# Patient Record
Sex: MALE | Race: OTHER | HISPANIC OR LATINO | Employment: OTHER | URBAN - METROPOLITAN AREA
[De-identification: names, ages, dates, MRNs, and addresses within clinical notes are randomized per-mention and may not be internally consistent; named-entity substitution may affect disease eponyms.]

---

## 2017-02-13 ENCOUNTER — HOSPITAL ENCOUNTER (EMERGENCY)
Facility: HOSPITAL | Age: 52
Discharge: HOME | End: 2017-02-13
Attending: EMERGENCY MEDICINE | Admitting: EMERGENCY MEDICINE
Payer: COMMERCIAL

## 2017-02-13 ENCOUNTER — APPOINTMENT (EMERGENCY)
Dept: RADIOLOGY | Facility: HOSPITAL | Age: 52
End: 2017-02-13
Payer: COMMERCIAL

## 2017-02-13 VITALS
OXYGEN SATURATION: 98 % | SYSTOLIC BLOOD PRESSURE: 130 MMHG | WEIGHT: 202.82 LBS | RESPIRATION RATE: 18 BRPM | DIASTOLIC BLOOD PRESSURE: 75 MMHG | HEART RATE: 95 BPM | TEMPERATURE: 98.5 F

## 2017-02-13 DIAGNOSIS — J45.901 ASTHMA EXACERBATION: Primary | ICD-10-CM

## 2017-02-13 DIAGNOSIS — J06.9 ACUTE UPPER RESPIRATORY INFECTION: ICD-10-CM

## 2017-02-13 PROCEDURE — 94640 AIRWAY INHALATION TREATMENT: CPT

## 2017-02-13 PROCEDURE — 99285 EMERGENCY DEPT VISIT HI MDM: CPT

## 2017-02-13 PROCEDURE — 71020 HB CHEST X-RAY 2VW FRONTAL&LATL: CPT

## 2017-02-13 RX ORDER — PREDNISONE 20 MG/1
60 TABLET ORAL DAILY
Qty: 12 TABLET | Refills: 0 | Status: SHIPPED | OUTPATIENT
Start: 2017-02-13 | End: 2017-02-17

## 2017-02-13 RX ORDER — IPRATROPIUM BROMIDE AND ALBUTEROL SULFATE 2.5; .5 MG/3ML; MG/3ML
SOLUTION RESPIRATORY (INHALATION)
Status: COMPLETED
Start: 2017-02-13 | End: 2017-02-13

## 2017-02-13 RX ORDER — METHYLPREDNISOLONE SODIUM SUCCINATE 125 MG/2ML
INJECTION, POWDER, LYOPHILIZED, FOR SOLUTION INTRAMUSCULAR; INTRAVENOUS
Status: COMPLETED
Start: 2017-02-13 | End: 2017-02-13

## 2017-02-13 RX ADMIN — IPRATROPIUM BROMIDE 0.5 MG: 0.5 SOLUTION RESPIRATORY (INHALATION) at 10:55

## 2017-02-13 RX ADMIN — ALBUTEROL SULFATE 5 MG: 2.5 SOLUTION RESPIRATORY (INHALATION) at 10:54

## 2017-04-05 ENCOUNTER — HOSPITAL ENCOUNTER (EMERGENCY)
Facility: HOSPITAL | Age: 52
Discharge: HOME | End: 2017-04-05
Attending: EMERGENCY MEDICINE | Admitting: EMERGENCY MEDICINE
Payer: COMMERCIAL

## 2017-04-05 VITALS
TEMPERATURE: 97.7 F | WEIGHT: 195 LBS | SYSTOLIC BLOOD PRESSURE: 121 MMHG | RESPIRATION RATE: 17 BRPM | DIASTOLIC BLOOD PRESSURE: 72 MMHG | OXYGEN SATURATION: 96 % | HEART RATE: 92 BPM

## 2017-04-05 DIAGNOSIS — J45.901 ASTHMA EXACERBATION: Primary | ICD-10-CM

## 2017-04-05 PROCEDURE — 94640 AIRWAY INHALATION TREATMENT: CPT

## 2017-04-05 PROCEDURE — 99285 EMERGENCY DEPT VISIT HI MDM: CPT

## 2017-04-05 RX ORDER — PREDNISONE 20 MG/1
60 TABLET ORAL DAILY
Qty: 12 TABLET | Refills: 0 | Status: SHIPPED | OUTPATIENT
Start: 2017-04-05 | End: 2017-04-05

## 2017-04-05 RX ORDER — PREDNISONE 20 MG/1
60 TABLET ORAL DAILY
Qty: 12 TABLET | Refills: 0 | Status: SHIPPED | OUTPATIENT
Start: 2017-04-05 | End: 2017-04-05 | Stop reason: ALTCHOICE

## 2017-04-05 RX ORDER — LANOLIN ALCOHOL/MO/W.PET/CERES
3 CREAM (GRAM) TOPICAL
COMMUNITY
End: 2018-08-04

## 2017-04-05 RX ORDER — HYDROCODONE BITARTRATE AND ACETAMINOPHEN 5; 325 MG/1; MG/1
1 TABLET ORAL EVERY 4 HOURS PRN
COMMUNITY
End: 2018-05-24

## 2017-04-05 RX ORDER — PREDNISONE 20 MG/1
60 TABLET ORAL ONCE
Status: COMPLETED | OUTPATIENT
Start: 2017-04-05 | End: 2017-04-05

## 2017-04-05 RX ORDER — OMEGA-3-ACID ETHYL ESTERS 1 G/1
1 CAPSULE, LIQUID FILLED ORAL DAILY
COMMUNITY

## 2017-04-05 RX ADMIN — PREDNISONE 60 MG: 20 TABLET ORAL at 07:14

## 2017-04-05 RX ADMIN — IPRATROPIUM BROMIDE 0.5 MG: 0.5 SOLUTION RESPIRATORY (INHALATION) at 07:14

## 2017-04-05 RX ADMIN — ALBUTEROL SULFATE 5 MG: 2.5 SOLUTION RESPIRATORY (INHALATION) at 07:14

## 2017-07-18 ENCOUNTER — HOSPITAL ENCOUNTER (EMERGENCY)
Facility: HOSPITAL | Age: 52
Discharge: HOME | End: 2017-07-18
Attending: EMERGENCY MEDICINE | Admitting: EMERGENCY MEDICINE
Payer: COMMERCIAL

## 2017-07-18 ENCOUNTER — APPOINTMENT (EMERGENCY)
Dept: RADIOLOGY | Facility: HOSPITAL | Age: 52
End: 2017-07-18
Payer: COMMERCIAL

## 2017-07-18 VITALS
HEART RATE: 81 BPM | DIASTOLIC BLOOD PRESSURE: 95 MMHG | OXYGEN SATURATION: 100 % | WEIGHT: 203.04 LBS | TEMPERATURE: 98.3 F | RESPIRATION RATE: 18 BRPM | SYSTOLIC BLOOD PRESSURE: 144 MMHG

## 2017-07-18 DIAGNOSIS — J45.901 ASTHMA EXACERBATION: Primary | ICD-10-CM

## 2017-07-18 LAB
ANION GAP SERPL CALCULATED.3IONS-SCNC: 8 MMOL/L (ref 4–13)
ATRIAL RATE: 76 BPM
BASOPHILS # BLD AUTO: 0.02 THOUSANDS/ΜL (ref 0–0.1)
BASOPHILS NFR BLD AUTO: 0 % (ref 0–1)
BUN SERPL-MCNC: 15 MG/DL (ref 5–25)
CALCIUM SERPL-MCNC: 8.6 MG/DL (ref 8.3–10.1)
CHLORIDE SERPL-SCNC: 103 MMOL/L (ref 100–108)
CO2 SERPL-SCNC: 29 MMOL/L (ref 21–32)
CREAT SERPL-MCNC: 1.25 MG/DL (ref 0.6–1.3)
EOSINOPHIL # BLD AUTO: 0.26 THOUSAND/ΜL (ref 0–0.61)
EOSINOPHIL NFR BLD AUTO: 4 % (ref 0–6)
ERYTHROCYTE [DISTWIDTH] IN BLOOD BY AUTOMATED COUNT: 12.8 % (ref 11.6–15.1)
GFR SERPL CREATININE-BSD FRML MDRD: >60 ML/MIN/1.73SQ M
GLUCOSE SERPL-MCNC: 318 MG/DL (ref 65–140)
HCT VFR BLD AUTO: 40 % (ref 36.5–49.3)
HGB BLD-MCNC: 14.3 G/DL (ref 12–17)
LYMPHOCYTES # BLD AUTO: 2.38 THOUSANDS/ΜL (ref 0.6–4.47)
LYMPHOCYTES NFR BLD AUTO: 34 % (ref 14–44)
MCH RBC QN AUTO: 29.6 PG (ref 26.8–34.3)
MCHC RBC AUTO-ENTMCNC: 35.8 G/DL (ref 31.4–37.4)
MCV RBC AUTO: 83 FL (ref 82–98)
MONOCYTES # BLD AUTO: 0.61 THOUSAND/ΜL (ref 0.17–1.22)
MONOCYTES NFR BLD AUTO: 9 % (ref 4–12)
NEUTROPHILS # BLD AUTO: 3.83 THOUSANDS/ΜL (ref 1.85–7.62)
NEUTS SEG NFR BLD AUTO: 53 % (ref 43–75)
NRBC BLD AUTO-RTO: 0 /100 WBCS
P AXIS: 66 DEGREES
PLATELET # BLD AUTO: 231 THOUSANDS/UL (ref 149–390)
PMV BLD AUTO: 9.7 FL (ref 8.9–12.7)
POTASSIUM SERPL-SCNC: 3.7 MMOL/L (ref 3.5–5.3)
PR INTERVAL: 164 MS
QRS AXIS: 69 DEGREES
QRSD INTERVAL: 82 MS
QT INTERVAL: 360 MS
QTC INTERVAL: 405 MS
RBC # BLD AUTO: 4.83 MILLION/UL (ref 3.88–5.62)
SODIUM SERPL-SCNC: 140 MMOL/L (ref 136–145)
T WAVE AXIS: 62 DEGREES
VENTRICULAR RATE: 76 BPM
WBC # BLD AUTO: 7.1 THOUSAND/UL (ref 4.31–10.16)

## 2017-07-18 PROCEDURE — 94640 AIRWAY INHALATION TREATMENT: CPT

## 2017-07-18 PROCEDURE — 80048 BASIC METABOLIC PNL TOTAL CA: CPT | Performed by: EMERGENCY MEDICINE

## 2017-07-18 PROCEDURE — 36415 COLL VENOUS BLD VENIPUNCTURE: CPT | Performed by: EMERGENCY MEDICINE

## 2017-07-18 PROCEDURE — 93005 ELECTROCARDIOGRAM TRACING: CPT

## 2017-07-18 PROCEDURE — 85025 COMPLETE CBC W/AUTO DIFF WBC: CPT | Performed by: EMERGENCY MEDICINE

## 2017-07-18 PROCEDURE — 99285 EMERGENCY DEPT VISIT HI MDM: CPT

## 2017-07-18 PROCEDURE — 93005 ELECTROCARDIOGRAM TRACING: CPT | Performed by: EMERGENCY MEDICINE

## 2017-07-18 PROCEDURE — 71020 HB CHEST X-RAY 2VW FRONTAL&LATL: CPT

## 2017-07-18 PROCEDURE — 96360 HYDRATION IV INFUSION INIT: CPT

## 2017-07-18 RX ORDER — PREDNISONE 20 MG/1
50 TABLET ORAL DAILY
Qty: 10 TABLET | Refills: 0 | Status: SHIPPED | OUTPATIENT
Start: 2017-07-18 | End: 2017-07-22

## 2017-07-18 RX ORDER — ALBUTEROL SULFATE 2.5 MG/3ML
5 SOLUTION RESPIRATORY (INHALATION) ONCE
Status: COMPLETED | OUTPATIENT
Start: 2017-07-18 | End: 2017-07-18

## 2017-07-18 RX ADMIN — IPRATROPIUM BROMIDE 0.5 MG: 0.5 SOLUTION RESPIRATORY (INHALATION) at 01:38

## 2017-07-18 RX ADMIN — SODIUM CHLORIDE 1000 ML: 0.9 INJECTION, SOLUTION INTRAVENOUS at 01:41

## 2017-07-18 RX ADMIN — ALBUTEROL SULFATE 5 MG: 2.5 SOLUTION RESPIRATORY (INHALATION) at 01:38

## 2017-09-09 ENCOUNTER — HOSPITAL ENCOUNTER (EMERGENCY)
Facility: HOSPITAL | Age: 52
Discharge: HOME | End: 2017-09-09
Attending: EMERGENCY MEDICINE | Admitting: EMERGENCY MEDICINE
Payer: COMMERCIAL

## 2017-09-09 VITALS
RESPIRATION RATE: 16 BRPM | WEIGHT: 195.77 LBS | SYSTOLIC BLOOD PRESSURE: 151 MMHG | DIASTOLIC BLOOD PRESSURE: 84 MMHG | HEART RATE: 94 BPM | OXYGEN SATURATION: 98 % | TEMPERATURE: 98.6 F

## 2017-09-09 DIAGNOSIS — J45.51 ACUTE EXACERBATION OF SEVERE PERSISTENT EXTRINSIC ASTHMA: Primary | ICD-10-CM

## 2017-09-09 PROCEDURE — 94644 CONT INHLJ TX 1ST HOUR: CPT

## 2017-09-09 PROCEDURE — 99285 EMERGENCY DEPT VISIT HI MDM: CPT

## 2017-09-09 PROCEDURE — 96374 THER/PROPH/DIAG INJ IV PUSH: CPT

## 2017-09-09 RX ORDER — PREDNISONE 20 MG/1
20 TABLET ORAL 2 TIMES DAILY
Qty: 10 TABLET | Refills: 0 | Status: SHIPPED | OUTPATIENT
Start: 2017-09-09 | End: 2018-10-27 | Stop reason: HOSPADM

## 2017-09-09 RX ORDER — METHYLPREDNISOLONE SODIUM SUCCINATE 125 MG/2ML
125 INJECTION, POWDER, LYOPHILIZED, FOR SOLUTION INTRAMUSCULAR; INTRAVENOUS ONCE
Status: COMPLETED | OUTPATIENT
Start: 2017-09-09 | End: 2017-09-09

## 2017-09-09 RX ORDER — ALBUTEROL SULFATE 2.5 MG/3ML
10 SOLUTION RESPIRATORY (INHALATION) ONCE
Status: COMPLETED | OUTPATIENT
Start: 2017-09-09 | End: 2017-09-09

## 2017-09-09 RX ADMIN — IPRATROPIUM BROMIDE 1 MG: 0.5 SOLUTION RESPIRATORY (INHALATION) at 11:39

## 2017-09-09 RX ADMIN — METHYLPREDNISOLONE SODIUM SUCCINATE 125 MG: 125 INJECTION, POWDER, FOR SOLUTION INTRAMUSCULAR; INTRAVENOUS at 11:30

## 2017-09-09 RX ADMIN — ALBUTEROL SULFATE 10 MG: 2.5 SOLUTION RESPIRATORY (INHALATION) at 11:39

## 2017-10-31 ENCOUNTER — APPOINTMENT (EMERGENCY)
Dept: RADIOLOGY | Facility: HOSPITAL | Age: 52
End: 2017-10-31
Payer: COMMERCIAL

## 2017-10-31 ENCOUNTER — HOSPITAL ENCOUNTER (EMERGENCY)
Facility: HOSPITAL | Age: 52
Discharge: HOME | End: 2017-10-31
Attending: EMERGENCY MEDICINE | Admitting: EMERGENCY MEDICINE
Payer: COMMERCIAL

## 2017-10-31 VITALS
DIASTOLIC BLOOD PRESSURE: 82 MMHG | SYSTOLIC BLOOD PRESSURE: 150 MMHG | RESPIRATION RATE: 16 BRPM | TEMPERATURE: 97.7 F | WEIGHT: 197.2 LBS | HEART RATE: 84 BPM | OXYGEN SATURATION: 97 %

## 2017-10-31 DIAGNOSIS — M25.561 RIGHT KNEE PAIN: Primary | ICD-10-CM

## 2017-10-31 PROCEDURE — 96372 THER/PROPH/DIAG INJ SC/IM: CPT

## 2017-10-31 PROCEDURE — 73564 X-RAY EXAM KNEE 4 OR MORE: CPT

## 2017-10-31 PROCEDURE — 99283 EMERGENCY DEPT VISIT LOW MDM: CPT

## 2017-10-31 RX ORDER — MORPHINE SULFATE 15 MG/1
7.5 TABLET ORAL EVERY 6 HOURS PRN
Qty: 4 TABLET | Refills: 0 | Status: SHIPPED | OUTPATIENT
Start: 2017-10-31 | End: 2017-11-02

## 2017-10-31 RX ORDER — KETOROLAC TROMETHAMINE 30 MG/ML
30 INJECTION, SOLUTION INTRAMUSCULAR; INTRAVENOUS ONCE
Status: COMPLETED | OUTPATIENT
Start: 2017-10-31 | End: 2017-10-31

## 2017-10-31 RX ORDER — ACETAMINOPHEN 325 MG/1
650 TABLET ORAL EVERY 6 HOURS PRN
Qty: 30 TABLET | Refills: 0 | Status: SHIPPED | OUTPATIENT
Start: 2017-10-31 | End: 2018-07-22

## 2017-10-31 RX ORDER — NAPROXEN 375 MG/1
375 TABLET ORAL 2 TIMES DAILY WITH MEALS
Qty: 20 TABLET | Refills: 0 | Status: SHIPPED | OUTPATIENT
Start: 2017-10-31 | End: 2018-10-27 | Stop reason: HOSPADM

## 2017-10-31 RX ORDER — ACETAMINOPHEN 325 MG/1
975 TABLET ORAL ONCE
Status: COMPLETED | OUTPATIENT
Start: 2017-10-31 | End: 2017-10-31

## 2017-10-31 RX ADMIN — KETOROLAC TROMETHAMINE 30 MG: 30 INJECTION, SOLUTION INTRAMUSCULAR at 12:08

## 2017-10-31 RX ADMIN — ACETAMINOPHEN 975 MG: 325 TABLET, FILM COATED ORAL at 12:08

## 2017-10-31 NOTE — DISCHARGE INSTRUCTIONS

## 2017-10-31 NOTE — ED PROVIDER NOTES
Final Diagnosis:  1  Right knee pain      Chief Complaint   Patient presents with    Knee Pain     Reports to have R knee pain and weakness that started yesterday  Denies trauma  This is a 47year old male presenting for right knee pain  About 2 days ago he woke up and had right knee pain  Denies falls, trauma  Denies being on his hands and knees a lot  Pain is worse with movement, worse with bending  Has no pain if perfectly still  Denies f/ch/n/v/cp/sob  Denies dizziness/LH  Recently treated with a course of abx for a URI  He states that his breathing / URI symptoms have resolved  The only thing bothering him now is his knee  PMH:  - Asthma  - HLD  - Seizures  - DM  - Prostate enlargement  PSH:  - Knee surgery (left)  No smoking, drinking, drugs  PE:   Vitals:    10/31/17 1100   BP: 150/82   Pulse: 84   Resp: 16   Temp: 97 7 °F (36 5 °C)   TempSrc: Oral   SpO2: 97%   Weight: 89 4 kg (197 lb 3 2 oz)   General: VSS, NAD, awake, alert  Well-nourished, well-developed  Appears stated age  Speaking normally in full sentences  Head: Normocephalic, atraumatic, nontender  Eyes: PERRL, EOM-I  No diplopia  No hyphema  No subconjunctival hemorrhages  Symmetrical lids  ENT: Atraumatic external nose and ears  MMM  No malocclusion  No stridor  Normal phonation  No drooling  Normal swallowing  Neck: Symmetric, trachea midline  No JVD  CV: RRR  +S1/S2  No murmurs or gallops  Peripheral pulses +2 throughout  No chest wall tenderness  Lungs:   Unlabored No retractions  CTAB, lungs sounds equal bilateral    No tachypnea  Abd: +BS, soft, NT/ND    MSK:   RIGHT:  EHL/FHL/PF/DF/KF/KE/HF/HE 5/5  No saddle anesthesia  2+ patellar reflexes  Severe pain with flexion of the knee  There is tenderness of the posterior popliteal fossa with palpation  No tendon tenderness  No swelling, no cords  Knee: AD/PD/Varus/Valgus/Lachman 5/5 without demonstration of joint laxity   These don't cause pain  No signs of trauma  No effusion  Back:   No rashes  Skin: Dry, intact  Neuro: AAOx3, GCS 15, CN II-XII grossly intact  Motor grossly intact  Psychiatric/Behavioral: Appropriate mood and affect   Exam: deferred  A:  - Knee pain, atraumatic  P:  - XR for fluid, arthritic changes  - NSAIDs  - Short course of pain meds PRN  - f/u orthopedics   - He is walking with a cane and can do his ADLs which is good  - 13 point ROS was performed and all are normal unless stated in the history above  - Nursing note reviewed  Vitals reviewed  - Orders placed by myself and/or advanced practitioner / resident     - Previous chart was not reviewed  - No language barrier    - History obtained from patient  - There are no limitations to the history obtained  - Critical care time: Not applicable for this patient  ED Course as of Oct 31 1239   Tue Oct 31, 2017   1210 At [facility] now  1226 ACE WRAP Splint was placed by nursing  Examined by me post splint placement  Splint is in good position and neurovascular exam intact after splint placement  XR is normal without fx  Will do NSAIDs, short course of morphine for use at night when going to bed        1227  Aware:  - 01/24/2017 1 01/24/2017 ACETAMINOPHEN-COD #3 TABLET       Medications   ketorolac (TORADOL) 30 mg/mL injection 30 mg (30 mg Intramuscular Given 10/31/17 1208)   acetaminophen (TYLENOL) tablet 975 mg (975 mg Oral Given 10/31/17 1208)     XR knee 4+ vw right injury   ED Interpretation   The KNEE was ordered and interpreted by me independently  On my read, it appears normal without acute abnormalities      - Arthritic changes        Orders Placed This Encounter   Procedures    XR knee 4+ vw right injury    Nursing Communication ACE Wrap / cling wrap right knee     Labs Reviewed - No data to display  Time reflects when diagnosis was documented in both MDM as applicable and the Disposition within this note     Time User Action Codes Description Comment    10/31/2017 12:29 PM Loki Party Add [G45 [id number]] Right knee pain       ED Disposition     ED Disposition Condition Comment    Discharge  Annika Mendez discharge to home/self care  Condition at discharge: Good        Follow-up Information     Follow up With Specialties Details Why [facility] Emergency Department Emergency Medicine Go to If symptoms worsen [address] [facility]  [phone] [place] ED, [address] Laura Gutierrez , Malena Magdaleno MD Family Medicine Call in 1 day To make appointment for re-eval in 3-5 days [address] [facility]       Pranav Cox MD Orthopedic Surgery Call in 1 day To make appointment for re-evaluation in 1 week 300 [facility]  2nd [address] [facility] [address]           Patient's Medications   Discharge Prescriptions    ACETAMINOPHEN (TYLENOL) 325 MG TABLET    Take 2 tablets by mouth every 6 (six) hours as needed for mild pain or fever       Start Date: 10/31/2017End Date: --       Order Dose: 650 mg       Quantity: 30 tablet    Refills: 0    MORPHINE (MSIR) 15 MG TABLET    Take 0 5 tablets by mouth every 6 (six) hours as needed for severe pain (No driving while using ) for up to 2 days Max Daily Amount: 30 mg       Start Date: 10/31/2017End Date: 11/2/2017       Order Dose: 7 5 mg       Quantity: 4 tablet    Refills: 0    NAPROXEN (NAPROSYN) 375 MG TABLET    Take 1 tablet by mouth 2 (two) times a day with meals       Start Date: 10/31/2017End Date: --       Order Dose: 375 mg       Quantity: 20 tablet    Refills: 0     No discharge procedures on file  Prior to Admission Medications   Prescriptions Last Dose Informant Patient Reported? Taking?    Calcium Carb-Cholecalciferol (CALCIUM CARBONATE-VITAMIN D3 PO)   Yes Yes   Sig: Take 1,500 mg by mouth daily   HYDROcodone-acetaminophen (NORCO) 5-325 mg per tablet   Yes Yes   Sig: Take 1 tablet by mouth every 4 (four) hours as needed for pain   Mometasone Furo-Formoterol Fum (DULERA) 200-5 MCG/ACT AERO   Yes Yes   Si puffs   Multiple Vitamins-Minerals (CENTRUM SILVER PO)   Yes Yes   Si tablet daily   albuterol (2 5 mg/3 mL) 0 083 % nebulizer solution   Yes Yes   Sig: Inhale 2 5 mg as needed   albuterol (PROVENTIL HFA,VENTOLIN HFA) 90 mcg/act inhaler   Yes Yes   Sig: Inhale 2 puffs as needed   atorvastatin (LIPITOR) 80 mg tablet   Yes Yes   Sig: Take 80 mg by mouth daily at bedtime   beclomethasone (QVAR) 40 MCG/ACT inhaler   Yes Yes   Si puffs as needed     bimatoprost (LUMIGAN) 0 03 % ophthalmic drops   Yes Yes   Sig: daily   brimonidine (ALPHAGAN P) 0 15 % ophthalmic solution   Yes Yes   Sig: Apply to eye   carBAMazepine (TEGRETOL) 200 mg tablet   Yes Yes   Sig: Take 2 tabs in the morning and 3 tabs at night   finasteride (PROSCAR) 5 mg tablet   Yes Yes   Sig: Take 5 mg by mouth daily   fluticasone (FLONASE) 50 mcg/act nasal spray   Yes Yes   Sig: into each nostril daily   gabapentin (NEURONTIN) 300 mg capsule   Yes Yes   Sig: Take 300 mg by mouth 3 (three) times a day   glucagon 1 MG injection   Yes Yes   Sig: as needed   hydrOXYzine HCL (ATARAX) 25 mg tablet   Yes Yes   Sig: Take 25 mg by mouth every 6 (six) hours as needed   hydrocortisone (PROCTOZONE-HC) 2 5 % rectal cream   Yes Yes   Sig: Insert 2 5 % into the rectum 2 (two) times a day   insulin glargine (LANTUS) 100 units/mL subcutaneous injection   Yes Yes   Si Units   insulin lispro (HUMALOG KWIKPEN) 100 Units/mL SOPN   Yes Yes   Sig: Inject 10 units with breakfast, 10 units with lunch, 14 units with dinner; plus sliding scale (TDD= 55)   ipratropium (ATROVENT) 0 02 % nebulizer solution   Yes Yes   Sig: as needed   latanoprost (XALATAN) 0 005 % ophthalmic solution   Yes Yes   Sig: Apply 1 drop to eye   lisinopril (ZESTRIL) 5 mg tablet   Yes Yes   Si mg daily   loratadine (CLARITIN REDITABS) 10 MG dissolvable tablet Yes Yes   Sig: Take 10 mg by mouth daily   melatonin 3 mg   Yes Yes   Sig: Take 3 mg by mouth daily at bedtime   montelukast (SINGULAIR) 10 mg tablet   Yes Yes   Sig: 10 mg daily   omega-3-acid ethyl esters (LOVAZA) 1 g capsule   Yes Yes   Sig: Take 1 g by mouth daily   omeprazole (PriLOSEC) 40 MG capsule   Yes Yes   Sig: Take 40 mg by mouth daily   predniSONE 20 mg tablet   No Yes   Sig: Take 1 tablet by mouth 2 (two) times a day   tamsulosin (FLOMAX) 0 4 mg   Yes Yes   Sig: Take 0 4 mg by mouth daily   terbinafine (LamISIL) 1 % cream   Yes Yes   Sig: Apply topically      Facility-Administered Medications: None       Portions of the record may have been created with voice recognition software  Occasional wrong word or "sound a like" substitutions may have occurred due to the inherent limitations of voice recognition software  Read the chart carefully and recognize, using context, where substitutions have occurred      Electronically signed by:  Esau Garibay MD  10/31/17 [phone]

## 2017-10-31 NOTE — ED NOTES
Pt c/o right knee pain starting 2 days ago denies injury or trauma c/o pain radiating down to ankle reports tingling     Florentin Land RN  10/31/17 3304

## 2018-05-24 ENCOUNTER — HOSPITAL ENCOUNTER (EMERGENCY)
Facility: HOSPITAL | Age: 53
Discharge: HOME | End: 2018-05-24
Admitting: EMERGENCY MEDICINE
Payer: COMMERCIAL

## 2018-05-24 VITALS
SYSTOLIC BLOOD PRESSURE: 137 MMHG | RESPIRATION RATE: 16 BRPM | WEIGHT: 210.98 LBS | TEMPERATURE: 98 F | OXYGEN SATURATION: 99 % | DIASTOLIC BLOOD PRESSURE: 71 MMHG | HEART RATE: 98 BPM

## 2018-05-24 DIAGNOSIS — J45.41 MODERATE PERSISTENT ASTHMA WITH EXACERBATION: Primary | ICD-10-CM

## 2018-05-24 PROCEDURE — 99284 EMERGENCY DEPT VISIT MOD MDM: CPT

## 2018-05-24 PROCEDURE — 94644 CONT INHLJ TX 1ST HOUR: CPT

## 2018-05-24 RX ORDER — ALBUTEROL SULFATE 2.5 MG/3ML
SOLUTION RESPIRATORY (INHALATION)
Status: DISCONTINUED
Start: 2018-05-24 | End: 2018-05-24 | Stop reason: HOSPADM

## 2018-05-24 RX ORDER — INSULIN GLARGINE 100 [IU]/ML
40 INJECTION, SOLUTION SUBCUTANEOUS
COMMUNITY
End: 2019-04-08

## 2018-05-24 RX ORDER — SODIUM CHLORIDE FOR INHALATION 0.9 %
3 VIAL, NEBULIZER (ML) INHALATION ONCE
Status: DISCONTINUED | OUTPATIENT
Start: 2018-05-24 | End: 2018-05-24 | Stop reason: HOSPADM

## 2018-05-24 RX ORDER — ALBUTEROL SULFATE 2.5 MG/3ML
SOLUTION RESPIRATORY (INHALATION)
Status: COMPLETED
Start: 2018-05-24 | End: 2018-05-24

## 2018-05-24 RX ADMIN — IPRATROPIUM BROMIDE 1 MG: 0.5 SOLUTION RESPIRATORY (INHALATION) at 07:36

## 2018-05-24 RX ADMIN — ALBUTEROL SULFATE 10 MG: 2.5 SOLUTION RESPIRATORY (INHALATION) at 07:36

## 2018-05-24 NOTE — DISCHARGE INSTRUCTIONS
Asthma   WHAT YOU NEED TO KNOW:   What is asthma? Asthma is a lung disease that makes breathing difficult  Chronic inflammation and reactions to triggers narrow the airways in the lungs  Asthma can become life-threatening if it is not managed  What is cough-variant asthma? Cough-variant asthma is a type of asthma that causes a dry cough that keeps coming back  A dry cough may be your only symptom, or you may also have chest tightness  These symptoms may be caused by exercise or exposure to odors, allergens, or respiratory tract infections  Cough-variant asthma is treated the same way as typical asthma  What are the signs and symptoms of asthma? · Coughing     · Wheezing     · Shortness of breath     · Chest tightness  What may trigger an asthma attack? · A cold, the flu, or a sinus infection     · Exercise     · Weather changes, especially cold, dry air    · Smoking or secondhand smoke    · Fumes from chemicals, dust, air pollution, or other small particles in the air    · Pets, pollen, dust mites, or cockroaches       How is asthma diagnosed? Your healthcare provider will examine you and listen to your lungs  He or she will ask how often you have symptoms and what makes them worse  Tell him or her if you have trouble sleeping, exercising, or doing other activities because of shortness of breath  Your provider will ask about your allergies and past colds, and if anyone in your family has allergies or asthma  Tell your healthcare provider about medicines you take, including over-the-counter drugs and herbal supplements  You may need a chest x-ray to check for lung problems, or a lung function test  Lung function tests show how well you can breathe  How is asthma treated? · Medicines  decrease inflammation, open airways, and make it easier to breathe  Medicines may be inhaled, taken as a pill, or injected  Short-term medicines relieve your symptoms quickly   Long-term medicines are used to prevent future attacks  You may also need medicine to help control your allergies  · Allergy testing  may find allergies that trigger an asthma attack  You may need allergy shots or medicine to control allergies that make your asthma worse  How can I manage my symptoms and prevent future attacks? · Follow your Asthma Action Plan (AAP)  This is a written plan that you and your healthcare provider create  It explains which medicine you need and when to change doses if necessary  It also explains how you can monitor symptoms and use a peak flow meter  The meter measures how well your lungs are working  · Manage other health conditions , such as allergies, acid reflux, and sleep apnea  · Identify and avoid triggers  These may include pets, dust mites, mold, and cockroaches  · Do not smoke or be around others who smoke  Nicotine and other chemicals in cigarettes and cigars can cause lung damage  Ask your healthcare provider for information if you currently smoke and need help to quit  E-cigarettes or smokeless tobacco still contain nicotine  Talk to your healthcare provider before you use these products  · Ask about the flu vaccine  The flu can make your asthma worse  You may need a yearly flu shot  When should I seek immediate care? · You have severe shortness of breath  · Your lips or nails turn blue or gray  · The skin around your neck and ribs pulls in with each breath  · You have shortness of breath, even after you take your short-term medicine as directed  · Your peak flow numbers are in the red zone of your AAP  When should I contact my healthcare provider? · You run out of medicine before your next refill is due  · Your symptoms get worse  · You need to take more medicine than usual to control your symptoms  · You have questions or concerns about your condition or care  CARE AGREEMENT:   You have the right to help plan your care   Learn about your health condition and how it may be treated  Discuss treatment options with your caregivers to decide what care you want to receive  You always have the right to refuse treatment  The above information is an  only  It is not intended as medical advice for individual conditions or treatments  Talk to your doctor, nurse or pharmacist before following any medical regimen to see if it is safe and effective for you  © 2017 [employer] Information is for End User's use only and may not be sold, redistributed or otherwise used for commercial purposes  All illustrations and images included in CareNotes® are the copyrighted property of [employer]  or [employer]ópez

## 2018-05-24 NOTE — ED PROVIDER NOTES
History  Chief Complaint   Patient presents with    Asthma     SOB this morning with feeling of a lump in his throat  Recently allergy symptoms- PCP called in Prednisone 40mg yesterday and 10mg every other day  59-year-old male with a history of asthma, DMT2, seizure disorder, depression, presents for evaluation of chest tightness  Patient states that for the past 2 days he feels that his asthma is acting up, due to the weather  States that he felt as if his throat was closing up any felt a "lump" in his throat when he has an asthma exacerbation  Patient reports that he noticed wheezing  States that he takes a DuoNeb at home when he gets like this  Patient reports that he started taking 40 mg of prednisone yesterday which was called in by his family doctor  States he normally takes prednisone 10 mg every other day for his asthma, which is less than what he used to take previously  States that he did a DuoNeb prior to arrival via EMS  Reports since his EMS replenishment of solumedrol and albuterol, he feels much improved  States he has allergies and due to the weather he gets this way  Denies fever, chills, nausea, vomiting, abdominal pain  Reports he was intubated once for 2 days in  when he went into cardiac arrest due to his asthma exacerbation  Prior to Admission Medications   Prescriptions Last Dose Informant Patient Reported? Taking?    Calcium Carb-Cholecalciferol (CALCIUM CARBONATE-VITAMIN D3 PO)   Yes No   Sig: Take 1,500 mg by mouth daily   Mometasone Furo-Formoterol Fum (DULERA) 200-5 MCG/ACT AERO   Yes No   Si puffs   Multiple Vitamins-Minerals (CENTRUM SILVER PO)   Yes No   Si tablet daily   acetaminophen (TYLENOL) 325 mg tablet   No No   Sig: Take 2 tablets by mouth every 6 (six) hours as needed for mild pain or fever   albuterol (2 5 mg/3 mL) 0 083 % nebulizer solution 2018 at Unknown time  Yes Yes   Sig: Inhale 2 5 mg as needed   albuterol (PROVENTIL HFA,VENTOLIN HFA) 90 mcg/act inhaler   Yes No   Sig: Inhale 2 puffs as needed   atorvastatin (LIPITOR) 80 mg tablet   Yes No   Sig: Take 80 mg by mouth daily at bedtime   beclomethasone (QVAR) 40 MCG/ACT inhaler   Yes No   Si puffs as needed     bimatoprost (LUMIGAN) 0 03 % ophthalmic drops   Yes No   Sig: daily   brimonidine (ALPHAGAN P) 0 15 % ophthalmic solution   Yes No   Sig: Apply to eye   carBAMazepine (TEGRETOL) 200 mg tablet   Yes No   Sig: Take 2 tabs in the morning and 3 tabs at night   finasteride (PROSCAR) 5 mg tablet   Yes No   Sig: Take 5 mg by mouth daily   fluticasone (FLONASE) 50 mcg/act nasal spray   Yes No   Sig: into each nostril daily   gabapentin (NEURONTIN) 300 mg capsule   Yes No   Sig: Take 300 mg by mouth 3 (three) times a day   glucagon 1 MG injection   Yes No   Sig: as needed   hydrocortisone (PROCTOZONE-HC) 2 5 % rectal cream   Yes No   Sig: Insert 2 5 % into the rectum 2 (two) times a day   insulin glargine (LANTUS) 100 units/mL subcutaneous injection   Yes Yes   Sig: Inject 40 Units under the skin daily at bedtime   insulin lispro (HUMALOG KWIKPEN) 100 Units/mL SOPN   Yes No   Sig: Inject 12 units with breakfast, 14 units with lunch, 16 units with dinner; plus sliding scale (TDD= 55)   ipratropium (ATROVENT) 0 02 % nebulizer solution   Yes No   Sig: as needed   latanoprost (XALATAN) 0 005 % ophthalmic solution   Yes No   Sig: Apply 1 drop to eye   lisinopril (ZESTRIL) 5 mg tablet   Yes No   Si mg daily   loratadine (CLARITIN REDITABS) 10 MG dissolvable tablet   Yes No   Sig: Take 10 mg by mouth daily   melatonin 3 mg   Yes No   Sig: Take 3 mg by mouth daily at bedtime   montelukast (SINGULAIR) 10 mg tablet   Yes No   Sig: 10 mg daily   naproxen (NAPROSYN) 375 mg tablet   No No   Sig: Take 1 tablet by mouth 2 (two) times a day with meals   omega-3-acid ethyl esters (LOVAZA) 1 g capsule   Yes No   Sig: Take 1 g by mouth daily   omeprazole (PriLOSEC) 40 MG capsule   Yes No   Sig: Take 40 mg by mouth daily   predniSONE 20 mg tablet 5/23/2018 at Unknown time  No Yes   Sig: Take 1 tablet by mouth 2 (two) times a day   Patient taking differently: Take 10 mg by mouth every other day Had 40 mg dose 5/23/18    tamsulosin (FLOMAX) 0 4 mg   Yes No   Sig: Take 0 4 mg by mouth daily   terbinafine (LamISIL) 1 % cream   Yes No   Sig: Apply topically      Facility-Administered Medications: None       Past Medical History:   Diagnosis Date    Asthma     Diabetes mellitus ([facility] )     Enlarged prostate     Hyperlipidemia     Seasonal allergic rhinitis     Seizures ([facility] )        Past Surgical History:   Procedure Laterality Date    KNEE SURGERY         History reviewed  No pertinent family history  I have reviewed and agree with the history as documented  Social History   Substance Use Topics    Smoking status: Former Smoker     Quit date: 1992    Smokeless tobacco: Never Used    Alcohol use No        Review of Systems   Constitutional: Negative for chills and fever  HENT: Negative for congestion and sore throat  Respiratory: Positive for chest tightness, shortness of breath and wheezing  Cardiovascular: Negative for chest pain  Gastrointestinal: Negative for abdominal pain, constipation, diarrhea, nausea and vomiting  Musculoskeletal: Negative for myalgias  Skin: Negative  Physical Exam  Physical Exam   Constitutional: He is oriented to person, place, and time  He appears well-developed and well-nourished  No distress  HENT:   Head: Normocephalic and atraumatic  Mouth/Throat: Uvula is midline and oropharynx is clear and moist  No oropharyngeal exudate  Eyes: Conjunctivae are normal    Neck: Normal range of motion  Cardiovascular: Normal rate and normal heart sounds  Pulmonary/Chest: Effort normal  He has wheezes  Expiratory wheezing heard b/l over lung bases  No accessory muscle usage or labored breathing noted   Speaking in full sentences without being short of breath  Abdominal: Soft  Neurological: He is alert and oriented to person, place, and time  Skin: Skin is warm  Capillary refill takes less than 2 seconds  He is not diaphoretic  No erythema  No pallor  Nursing note and vitals reviewed  Vital Signs  ED Triage Vitals [05/24/18 0658]   Temperature Pulse Respirations Blood Pressure SpO2   98 °F (36 7 °C) 86 16 142/85 97 %      Temp src Heart Rate Source Patient Position - Orthostatic VS BP Location FiO2 (%)   -- -- -- -- --      Pain Score       No Pain           Vitals:    05/24/18 0658 05/24/18 0715 05/24/18 0830 05/24/18 0900   BP: 142/85   137/71   Pulse: 86 82 96 98       Visual Acuity      ED Medications  Medications    EMS REPLENISHMENT MED ( Does not apply Given to EMS 5/24/18 0657)   albuterol inhalation solution 10 mg (10 mg Nebulization Given 5/24/18 0736)     And   ipratropium (ATROVENT) 0 02 % inhalation solution 1 mg (1 mg Nebulization Given 5/24/18 0736)   albuterol (2 5 mg/3 mL) 0 083 % inhalation solution **AcuDose Override Pull** (10 mg  Given 5/24/18 0736)       Diagnostic Studies  Results Reviewed     None                 No orders to display              Procedures  Procedures       Phone Contacts  ED Phone Contact    ED Course  ED Course as of May 28 0932   Thu May 24, 2018   0905 Reports he feels much better and is back to baseline  MDM  Number of Diagnoses or Management Options  Moderate persistent asthma with exacerbation:   Diagnosis management comments: 58-year-old male presents for evaluation of an asthma exacerbation that started approximately 2 does appear patient reports that he has been taking his daily medications for asthma as well having Atrovent and albuterol  Patient states that he feels improved since being on the ambulance  States he gets allergy shots, has it scheduled for today however will be unable to get it due to his exacerbation   I advised pt to take 20 mg prednisone once tomorrow and to continue going back to his original regimen  CritCare Time    Disposition  Final diagnoses: Moderate persistent asthma with exacerbation     Time reflects when diagnosis was documented in both MDM as applicable and the Disposition within this note     Time User Action Codes Description Comment    5/24/2018  9:06 AM Lakshmi August Sandoval [J45 41] Moderate persistent asthma with exacerbation       ED Disposition     ED Disposition Condition Comment    Discharge  Carlin Essex discharge to home/self care      Condition at discharge: Stable        Follow-up Information     Follow up With Specialties Details Why [address] [facility] Schedule an appointment as soon as possible for a visit  [address] [place] [address] Blvd  [id number]  [address] [place] [address], [address] [place] [address] Drive   [address] [place] [address]  [phone]            Discharge Medication List as of 5/24/2018  9:07 AM      CONTINUE these medications which have NOT CHANGED    Details   albuterol (2 5 mg/3 mL) 0 083 % nebulizer solution Inhale 2 5 mg as needed, Starting 7/3/2015, Until Discontinued, Historical Med      insulin glargine (LANTUS) 100 units/mL subcutaneous injection Inject 40 Units under the skin daily at bedtime, Historical Med      predniSONE 20 mg tablet Take 1 tablet by mouth 2 (two) times a day, Starting Sat 9/9/2017, Print      acetaminophen (TYLENOL) 325 mg tablet Take 2 tablets by mouth every 6 (six) hours as needed for mild pain or fever, Starting Tue 10/31/2017, Print      albuterol (PROVENTIL HFA,VENTOLIN HFA) 90 mcg/act inhaler Inhale 2 puffs as needed, Starting 4/8/2009, Until Discontinued, Historical Med      atorvastatin (LIPITOR) 80 mg tablet Take 80 mg by mouth daily at bedtime, Starting Thu 9/8/2016, Until Tue 10/31/2017, Historical Med      beclomethasone (QVAR) 40 MCG/ACT inhaler 2 puffs as needed  , Starting Fri 8/27/2010, Historical Med      bimatoprost (LUMIGAN) 0 03 % ophthalmic drops daily, Starting 6/5/2008, Until Discontinued, Historical Med      brimonidine (ALPHAGAN P) 0 15 % ophthalmic solution Apply to eye, Starting 7/2/2015, Until Discontinued, Historical Med      Calcium Carb-Cholecalciferol (CALCIUM CARBONATE-VITAMIN D3 PO) Take 1,500 mg by mouth daily, Starting 9/20/2013, Until Discontinued, Historical Med      carBAMazepine (TEGRETOL) 200 mg tablet Take 2 tabs in the morning and 3 tabs at night, Historical Med      finasteride (PROSCAR) 5 mg tablet Take 5 mg by mouth daily, Starting 4/26/2016, Until Discontinued, Historical Med      fluticasone (FLONASE) 50 mcg/act nasal spray into each nostril daily, Starting 6/13/2008, Until Discontinued, Historical Med      gabapentin (NEURONTIN) 300 mg capsule Take 300 mg by mouth 3 (three) times a day, Starting Thu 9/8/2016, Until Tue 10/31/2017, Historical Med      glucagon 1 MG injection as needed, Starting 8/5/2016, Until Discontinued, Historical Med      hydrocortisone (PROCTOZONE-HC) 2 5 % rectal cream Insert 2 5 % into the rectum 2 (two) times a day, Starting 7/26/2012, Until Discontinued, Historical Med      insulin lispro (HUMALOG KWIKPEN) 100 Units/mL SOPN Inject 12 units with breakfast, 14 units with lunch, 16 units with dinner; plus sliding scale (TDD= 55), Historical Med      ipratropium (ATROVENT) 0 02 % nebulizer solution as needed, Starting 7/2/2015, Until Discontinued, Historical Med      latanoprost (XALATAN) 0 005 % ophthalmic solution Apply 1 drop to eye, Starting 7/2/2015, Until Discontinued, Historical Med      lisinopril (ZESTRIL) 5 mg tablet 5 mg daily, Starting 7/7/2016, Until Discontinued, Historical Med      loratadine (CLARITIN REDITABS) 10 MG dissolvable tablet Take 10 mg by mouth daily, Starting 4/2/2015, Until Discontinued, Historical Med      melatonin 3 mg Take 3 mg by mouth daily at bedtime, Until Discontinued, Historical Med      Mometasone Furo-Formoterol Fum (DULERA) 200-5 MCG/ACT AERO 2 puffs, Starting 8/31/2015, Until Discontinued, Historical Med      montelukast (SINGULAIR) 10 mg tablet 10 mg daily, Starting 8/25/2015, Until Discontinued, Historical Med      Multiple Vitamins-Minerals (CENTRUM SILVER PO) 1 tablet daily, Starting 6/2/2011, Until Discontinued, Historical Med      naproxen (NAPROSYN) 375 mg tablet Take 1 tablet by mouth 2 (two) times a day with meals, Starting Tue 10/31/2017, Print      omega-3-acid ethyl esters (LOVAZA) 1 g capsule Take 1 g by mouth daily, Until Discontinued, Historical Med      omeprazole (PriLOSEC) 40 MG capsule Take 40 mg by mouth daily, Starting 4/26/2016, Until Discontinued, Historical Med      tamsulosin (FLOMAX) 0 4 mg Take 0 4 mg by mouth daily, Starting 4/26/2016, Until Discontinued, Historical Med      terbinafine (LamISIL) 1 % cream Apply topically, Starting Thu 9/8/2016, Until Tue 10/31/2017, Historical Med           No discharge procedures on file      ED Provider  Electronically Signed by           Mehdi Hernadez PA-C  05/28/18 4552

## 2018-05-24 NOTE — ED NOTES
Patient states is steroid dependent  Has appt today with allergist at [phone]       Crista Sandifer, RN  05/24/18 5958

## 2018-07-22 ENCOUNTER — HOSPITAL ENCOUNTER (EMERGENCY)
Facility: HOSPITAL | Age: 53
Discharge: HOME | End: 2018-07-22
Attending: EMERGENCY MEDICINE
Payer: COMMERCIAL

## 2018-07-22 ENCOUNTER — APPOINTMENT (EMERGENCY)
Dept: RADIOLOGY | Facility: HOSPITAL | Age: 53
End: 2018-07-22
Payer: COMMERCIAL

## 2018-07-22 VITALS
HEART RATE: 94 BPM | DIASTOLIC BLOOD PRESSURE: 77 MMHG | WEIGHT: 212.96 LBS | SYSTOLIC BLOOD PRESSURE: 129 MMHG | RESPIRATION RATE: 20 BRPM | OXYGEN SATURATION: 98 %

## 2018-07-22 DIAGNOSIS — R06.00 DYSPNEA: Primary | ICD-10-CM

## 2018-07-22 DIAGNOSIS — R05.9 COUGH: ICD-10-CM

## 2018-07-22 DIAGNOSIS — J45.901 ASTHMA EXACERBATION: ICD-10-CM

## 2018-07-22 DIAGNOSIS — R06.2 WHEEZING: ICD-10-CM

## 2018-07-22 LAB
ALBUMIN SERPL BCP-MCNC: 3.9 G/DL (ref 3.5–5)
ALP SERPL-CCNC: 66 U/L (ref 46–116)
ALT SERPL W P-5'-P-CCNC: 30 U/L (ref 12–78)
ANION GAP SERPL CALCULATED.3IONS-SCNC: 7 MMOL/L (ref 4–13)
AST SERPL W P-5'-P-CCNC: 14 U/L (ref 5–45)
ATRIAL RATE: 88 BPM
BASOPHILS # BLD AUTO: 0.04 THOUSANDS/ΜL (ref 0–0.1)
BASOPHILS NFR BLD AUTO: 1 % (ref 0–1)
BILIRUB SERPL-MCNC: 0.21 MG/DL (ref 0.2–1)
BUN SERPL-MCNC: 17 MG/DL (ref 5–25)
CALCIUM SERPL-MCNC: 8.7 MG/DL (ref 8.3–10.1)
CHLORIDE SERPL-SCNC: 106 MMOL/L (ref 100–108)
CO2 SERPL-SCNC: 29 MMOL/L (ref 21–32)
CREAT SERPL-MCNC: 1.12 MG/DL (ref 0.6–1.3)
EOSINOPHIL # BLD AUTO: 0.31 THOUSAND/ΜL (ref 0–0.61)
EOSINOPHIL NFR BLD AUTO: 5 % (ref 0–6)
ERYTHROCYTE [DISTWIDTH] IN BLOOD BY AUTOMATED COUNT: 12.8 % (ref 11.6–15.1)
GFR SERPL CREATININE-BSD FRML MDRD: 74 ML/MIN/1.73SQ M
GLUCOSE SERPL-MCNC: 160 MG/DL (ref 65–140)
HCT VFR BLD AUTO: 42.5 % (ref 36.5–49.3)
HGB BLD-MCNC: 14.5 G/DL (ref 12–17)
LYMPHOCYTES # BLD AUTO: 2.37 THOUSANDS/ΜL (ref 0.6–4.47)
LYMPHOCYTES NFR BLD AUTO: 39 % (ref 14–44)
MCH RBC QN AUTO: 29.7 PG (ref 26.8–34.3)
MCHC RBC AUTO-ENTMCNC: 34.1 G/DL (ref 31.4–37.4)
MCV RBC AUTO: 87 FL (ref 82–98)
MONOCYTES # BLD AUTO: 0.57 THOUSAND/ΜL (ref 0.17–1.22)
MONOCYTES NFR BLD AUTO: 10 % (ref 4–12)
NEUTROPHILS # BLD AUTO: 2.74 THOUSANDS/ΜL (ref 1.85–7.62)
NEUTS SEG NFR BLD AUTO: 45 % (ref 43–75)
NRBC BLD AUTO-RTO: 0 /100 WBCS
P AXIS: 67 DEGREES
PLATELET # BLD AUTO: 193 THOUSANDS/UL (ref 149–390)
PMV BLD AUTO: 9.9 FL (ref 8.9–12.7)
POTASSIUM SERPL-SCNC: 3.5 MMOL/L (ref 3.5–5.3)
PR INTERVAL: 150 MS
PROT SERPL-MCNC: 6.4 G/DL (ref 6.4–8.2)
QRS AXIS: 87 DEGREES
QRSD INTERVAL: 82 MS
QT INTERVAL: 370 MS
QTC INTERVAL: 448 MS
RBC # BLD AUTO: 4.88 MILLION/UL (ref 3.88–5.62)
SODIUM SERPL-SCNC: 142 MMOL/L (ref 136–145)
T WAVE AXIS: 45 DEGREES
TROPONIN I SERPL-MCNC: <0.02 NG/ML
VENTRICULAR RATE: 88 BPM
WBC # BLD AUTO: 6.03 THOUSAND/UL (ref 4.31–10.16)

## 2018-07-22 PROCEDURE — 85025 COMPLETE CBC W/AUTO DIFF WBC: CPT | Performed by: EMERGENCY MEDICINE

## 2018-07-22 PROCEDURE — 80053 COMPREHEN METABOLIC PANEL: CPT | Performed by: EMERGENCY MEDICINE

## 2018-07-22 PROCEDURE — 93010 ELECTROCARDIOGRAM REPORT: CPT | Performed by: INTERNAL MEDICINE

## 2018-07-22 PROCEDURE — 36415 COLL VENOUS BLD VENIPUNCTURE: CPT | Performed by: EMERGENCY MEDICINE

## 2018-07-22 PROCEDURE — 71045 X-RAY EXAM CHEST 1 VIEW: CPT

## 2018-07-22 PROCEDURE — 84484 ASSAY OF TROPONIN QUANT: CPT | Performed by: EMERGENCY MEDICINE

## 2018-07-22 PROCEDURE — 99285 EMERGENCY DEPT VISIT HI MDM: CPT

## 2018-07-22 PROCEDURE — 94640 AIRWAY INHALATION TREATMENT: CPT

## 2018-07-22 PROCEDURE — 93005 ELECTROCARDIOGRAM TRACING: CPT

## 2018-07-22 PROCEDURE — 96365 THER/PROPH/DIAG IV INF INIT: CPT

## 2018-07-22 RX ORDER — PREDNISONE 10 MG/1
60 TABLET ORAL DAILY
Qty: 24 TABLET | Refills: 0 | Status: SHIPPED | OUTPATIENT
Start: 2018-07-22 | End: 2018-07-26

## 2018-07-22 RX ORDER — MAGNESIUM SULFATE HEPTAHYDRATE 40 MG/ML
2 INJECTION, SOLUTION INTRAVENOUS ONCE
Status: COMPLETED | OUTPATIENT
Start: 2018-07-22 | End: 2018-07-22

## 2018-07-22 RX ORDER — SODIUM CHLORIDE FOR INHALATION 0.9 %
3 VIAL, NEBULIZER (ML) INHALATION ONCE
Status: COMPLETED | OUTPATIENT
Start: 2018-07-22 | End: 2018-07-22

## 2018-07-22 RX ADMIN — IPRATROPIUM BROMIDE 1 MG: 0.5 SOLUTION RESPIRATORY (INHALATION) at 06:16

## 2018-07-22 RX ADMIN — MAGNESIUM SULFATE HEPTAHYDRATE 2 G: 40 INJECTION, SOLUTION INTRAVENOUS at 06:19

## 2018-07-22 RX ADMIN — ISODIUM CHLORIDE 3 ML: 0.03 SOLUTION RESPIRATORY (INHALATION) at 06:16

## 2018-07-22 RX ADMIN — ALBUTEROL SULFATE 10 MG: 2.5 SOLUTION RESPIRATORY (INHALATION) at 06:13

## 2018-07-22 RX ADMIN — SODIUM CHLORIDE 1000 ML: 0.9 INJECTION, SOLUTION INTRAVENOUS at 06:18

## 2018-07-22 NOTE — ED CARE HANDOFF
Emergency Department Sign Out Note        Sign out and transfer of care from Dr Max Mancuso  See Separate Emergency Department note  The patient, Monty Bush, was evaluated by the previous provider for dyspnea, asthma exacerbation  Workup Completed:  Chest x-ray reviewed and patient finished an hour long breathing treatment along with magnesium and received pre-hospital steroids  ED Course / Workup Pending (followup): Patient finished his breathing treatment and feels much better  Good air movement  Relatively clear lungs with only minor wheezing  98% saturation  Patient follows up at [facility] and will follow up with his primary care doctor next week  Patient has never seen a pulmonary doctor and I told him that he should discuss with his doctor about following up with the pulmonary doctor  Procedures  MDM  CritCare Time      Disposition  Final diagnoses:   Dyspnea   Wheezing   Asthma exacerbation   Cough     Time reflects when diagnosis was documented in both MDM as applicable and the Disposition within this note     Time User Action Codes Description Comment    7/22/2018  6:12 AM Theodoro Salisbury Add [R06 00] Dyspnea     7/22/2018  6:12 AM Theodoro Lisa Add [R06 2] Wheezing     7/22/2018  6:12 AM Theodoro Salisbury Add [[id number]] Asthma exacerbation     7/22/2018  6:12 AM Theodoro Lisa Add [R05] Cough       ED Disposition     ED Disposition Condition Comment    Discharge  Monty Bush discharge to home/self care      Condition at discharge: Good        Follow-up Information     Follow up With Specialties Details Why Contact Info Additional [address] [facility] [address] Emergency Department Emergency Medicine Go to If symptoms worsen [address] [facility]  [phone] [place] ED, [address] , Seamus Herrera MD Family Medicine Call in 1 day To make appointment for re-evaluation in 1 week [facility]  [phone]           Patient's Medications   Discharge Prescriptions    PREDNISONE 10 MG TABLET    Take 6 tablets (60 mg total) by mouth daily for 4 days       Start Date: 7/22/2018 End Date: 7/26/2018       Order Dose: 60 mg       Quantity: 24 tablet    Refills: 0     No discharge procedures on file         ED Provider  Electronically Signed by     Artemio Lucas MD  07/22/18 7045

## 2018-07-22 NOTE — ED PROVIDER NOTES
Final Diagnosis:  1  Dyspnea    2  Wheezing    3  Asthma exacerbation    4  Cough      Chief Complaint   Patient presents with    Shortness of Breath     Pt brought to ED by ambulance Per EMS PT c/o SOB reports Hx of asthma; worsening over last 2 days; symptoms silimar to previous asthma attacks    Asthma     This is a 46year old male presenting for evaluation of dyspnea  Per EMS + patient, he has been well until about 2 days ago  He has been compliant with all of his medications including inhaled steroids, standing every other day prednisone 10mg PO, and albuterol tx (both puffer + nebulizer)  Despite this he has been slowly becoming more and more short of breath noticing chest tightness + pain  He tonight felt extremely SOB w/o any improvement from nebulizer so came in for evaluation  Denies f/ch/n/v  Denies arm pain jaw pain  Denies diaphoresis  EMS gave 125 solumedrol + 2 duonebs with marked improvement per patient and EMS  He does state he has had a cough that is productive of green mucous which is new  PMH:  - asthma  - hld  - seizures  - DM  - prostate is enlarged  PSH:  - knee surgery  Former smoker  No alcohol/drugs  PE:   Vitals:    07/22/18 0602 07/22/18 0735   BP: 134/88 129/77   BP Location:  Left arm   Pulse: 97 94   Resp: 20 20   SpO2: 98% 98%   Weight: 96 6 kg (212 lb 15 4 oz)    General: VSS, NAD, awake, alert  Well-nourished, well-developed  Appears stated age  Head: Normocephalic, atraumatic, nontender  Eyes: PERRL, EOM-I  No diplopia  No hyphema  No subconjunctival hemorrhages  Symmetrical lids  ENT: Atraumatic external nose and ears  Dry MM  No malocclusion  No stridor  Normal phonation  No drooling  Normal swallowing  Neck: Symmetric, trachea midline  No JVD  CV: RRR  +S1/S2  No murmurs or gallops  Peripheral pulses +2 throughout  No chest wall tenderness     Lungs:   Minimally labored   No retractions  Wheezing, poor air movement, very tight sounding  lungs sounds equal bilateral    No crepitus  Mild tachypnea  Abd: +BS, soft, NT/ND    MSK:   FROM   No lower extremity edema  Back:   No CVAT  Skin: Dry, intact  Neuro: AAOx3, GCS 15, CN II-XII grossly intact  Motor grossly intact  Psychiatric/Behavioral: Appropriate mood and affect   Exam: deferred  A:  - Dyspnea  - wheezing  P:  - cardiac r/o  - We will do IVF for insensible losses  - IV steroids provided by EMS: solumedrol 125mg  - We will do aggressive beta-agonist therapy, specifically 1 hour MULLIGAN neb   - CXR for PNA  - Given how dyspneic the patient appears, will do IV magnesium 2g over 30 minutes as well  - Will continue to monitor patient  - Re-assess --> disposition based on improvement  If still wheezing, tachypneic, or tachycardic, or appears ill and unable to do baseline activities, will admit  - 13 point ROS was performed and all are normal unless stated in the history above  - Nursing note reviewed  Vitals reviewed  - Orders placed by myself and/or advanced practitioner / resident     - Previous chart was reviewed  - No language barrier    - History obtained from EMS patient  - There are no limitations to the history obtained  - Critical care time: 35 minutes  - Critical care time was exclusive of seperately bilable procedures and treating other patients as well as teaching time     - Critical care was necessary to treat or prevent imminent or life-threatening deterioration of the following condition: Dyspnea, asthma exacerbation necessitating 1 hour neb treatment  - Critical care time was spent personally by me on the following activities as well as the above as per the ED course and rest of chart: blood draw for specimens, obtaining history from patient / surrogate, developement of a treatment plan, discussions with consultants, evaluation of patient's response to the treatment, examination of the patient, ordering/performing treatements and interventions, re-evaluation of the patient's condition, review of old charts, ordering/reviewing laboratory studies, ordering/reviewing of radiographic studies    ED Course as of Jul 24 1407   Sun Jul 22, 2018   0608 Procedure Note: EKG  Date/Time: 07/22/18 6:08 AM   Performed by: Zeenat Walsh  Authorized by: Zeenat Walsh   Indications / Diagnosis: asthma exacerbation; CP  ECG reviewed by me, the ED Provider: yes   The EKG demonstrates:  Rhythm: 88 normal sinus  Intervals: normal intervals  Axis: normal axis  QRS/Blocks: normal QRS  ST Changes: No acute ST Changes, no STD/NENA  Similar to July 18 2018    0638 HEART Score = [2]  [0] History = Highly / Moderately / Slightly Suspicious  [0] EKG = Significant STD / Non-specific repolarization / Normal  [1] Age = >65, 45-65, <45  [1] Risk Factors (0, 1-2, 3+): Cholesterol, HTN, DM, Cigarettes, FH, Obesity  [0] Troponin: 3+ x normal, 1-3x normal, <normal    Low Score (0-3 points), risk of MACE of 0 9-1 7%  Troponin I: <0 02   2791 Updated patient about normal CXR  Reviewed that whether he stays or goes home is up to him:  - if symptomatically improved, DC home with steroid burst and f/u PCP  - if continues to be symptomatic / dyspneic, admit for asthma exacerbation  Medications    EMS REPLENISHMENT MED ( Does not apply Given to EMS 7/22/18 0602)   sodium chloride 0 9 % bolus 1,000 mL (0 mL Intravenous Stopped 7/22/18 0721)   magnesium sulfate 2 g/50 mL IVPB (premix) 2 g (0 g Intravenous Stopped 7/22/18 0721)   albuterol inhalation solution 10 mg (10 mg Nebulization Given 7/22/18 0613)   ipratropium (ATROVENT) 0 02 % inhalation solution 1 mg (1 mg Nebulization Given 7/22/18 0616)   sodium chloride 0 9 % inhalation solution 3 mL (3 mL Nebulization Given 7/22/18 0616)     XR chest portable   ED Interpretation   The cxr was ordered by me and interpreted by me independently  On my read, it appears: no obvious pna      Final Result      No acute cardiopulmonary disease  Hyperinflation  Findings concur with the preliminary report by the referring clinician already in PACS and/or our electronic record EPIC  Workstation performed: [id number]           Orders Placed This Encounter   Procedures    XR chest portable    CBC and differential    Comprehensive metabolic panel    Troponin I    EKG RESULTS    ECG 12 lead    ECG 12 lead     Labs Reviewed   COMPREHENSIVE METABOLIC PANEL - Abnormal        Result Value Ref Range Status    Sodium 142  136 - 145 mmol/L Final    Potassium 3 5  3 5 - 5 3 mmol/L Final    Chloride 106  100 - 108 mmol/L Final    CO2 29  21 - 32 mmol/L Final    Anion Gap 7  4 - 13 mmol/L Final    BUN 17  5 - 25 mg/dL Final    Creatinine 1 12  0 60 - 1 30 mg/dL Final    Comment: Standardized to IDMS reference method    Glucose 160 (*) 65 - 140 mg/dL Final    Comment:   If the patient is fasting, the ADA then defines impaired fasting glucose as > 100 mg/dL and diabetes as > or equal to 123 mg/dL  Specimen collection should occur prior to Sulfasalazine administration due to the potential for falsely depressed results  Specimen collection should occur prior to Sulfapyridine administration due to the potential for falsely elevated results  Calcium 8 7  8 3 - 10 1 mg/dL Final    AST 14  5 - 45 U/L Final    Comment:   Specimen collection should occur prior to Sulfasalazine administration due to the potential for falsely depressed results  ALT 30  12 - 78 U/L Final    Comment:   Specimen collection should occur prior to Sulfasalazine administration due to the potential for falsely depressed results       Alkaline Phosphatase 66  46 - 116 U/L Final    Total Protein 6 4  6 4 - 8 2 g/dL Final    Albumin 3 9  3 5 - 5 0 g/dL Final    Total Bilirubin 0 21  0 20 - 1 00 mg/dL Final    eGFR 74  ml/min/1 73sq m Final    Narrative:     National Kidney Disease Education Program recommendations are as follows:  GFR calculation is accurate only with a steady state creatinine  Chronic Kidney disease less than 60 ml/min/1 73 sq  meters  Kidney failure less than 15 ml/min/1 73 sq  meters  TROPONIN I - Normal    Troponin I <0 02  <=0 04 ng/mL Final    Comment: 3Autovalidation override  [employer] Chemistry analyzer 99% cutoff is > 0 04 ng/mL in network labs     o cTnI 99% cutoff is useful only when applied to patients in the clinical setting of myocardial ischemia   o cTnI 99% cutoff should be interpreted in the context of clinical history, ECG findings and possibly cardiac imaging to establish correct diagnosis  o cTnI 99% cutoff may be suggestive but clearly not indicative of a coronary event without the clinical setting of myocardial ischemia       CBC AND DIFFERENTIAL    WBC 6 03  4 31 - 10 16 Thousand/uL Final    RBC 4 88  3 88 - 5 62 Million/uL Final    Hemoglobin 14 5  12 0 - 17 0 g/dL Final    Hematocrit 42 5  36 5 - 49 3 % Final    MCV 87  82 - 98 fL Final    MCH 29 7  26 8 - 34 3 pg Final    MCHC 34 1  31 4 - 37 4 g/dL Final    RDW 12 8  11 6 - 15 1 % Final    MPV 9 9  8 9 - 12 7 fL Final    Platelets 750  376 - 390 Thousands/uL Final    nRBC 0  /100 WBCs Final    Neutrophils Relative 45  43 - 75 % Final    Lymphocytes Relative 39  14 - 44 % Final    Monocytes Relative 10  4 - 12 % Final    Eosinophils Relative 5  0 - 6 % Final    Basophils Relative 1  0 - 1 % Final    Neutrophils Absolute 2 74  1 85 - 7 62 Thousands/µL Final    Lymphocytes Absolute 2 37  0 60 - 4 47 Thousands/µL Final    Monocytes Absolute 0 57  0 17 - 1 22 Thousand/µL Final    Eosinophils Absolute 0 31  0 00 - 0 61 Thousand/µL Final    Basophils Absolute 0 04  0 00 - 0 10 Thousands/µL Final     Time reflects when diagnosis was documented in both MDM as applicable and the Disposition within this note     Time User Action Codes Description Comment    7/22/2018  6:12 AM Vasu Urbina Add [R06 00] Dyspnea     7/22/2018  6:12 AM Vasu Urbina Add [R06 2] Wheezing     7/22/2018  6:12 AM Vasu Urbina Add [J45 901] Asthma exacerbation     7/22/2018  6:12 AM Loki Party Add [R05] Cough       ED Disposition     ED Disposition Condition Comment    Discharge  Annika Mendez discharge to home/self care  Condition at discharge: Good        Follow-up Information     Follow up With Specialties Details Why Contact Info Additional [address] [facility] Emergency Department Emergency Medicine Go to If symptoms worsen [address] [facility]  [phone] [place] ED, [address] , Malena Magdaleno MD Family Medicine Call in 1 day To make appointment for re-evaluation in 1 week [address] [facility]  [phone]           Discharge Medication List as of 7/22/2018  6:13 AM      START taking these medications    Details   ! ! predniSONE 10 mg tablet Take 6 tablets (60 mg total) by mouth daily for 4 days, Starting Sun 7/22/2018, Until Thu 7/26/2018, Print       !! - Potential duplicate medications found  Please discuss with provider        CONTINUE these medications which have NOT CHANGED    Details   albuterol (2 5 mg/3 mL) 0 083 % nebulizer solution Inhale 2 5 mg as needed, Starting 7/3/2015, Until Discontinued, Historical Med      albuterol (PROVENTIL HFA,VENTOLIN HFA) 90 mcg/act inhaler Inhale 2 puffs as needed, Starting 4/8/2009, Until Discontinued, Historical Med      atorvastatin (LIPITOR) 80 mg tablet Take 80 mg by mouth daily at bedtime, Starting Thu 9/8/2016, Until Tue 10/31/2017, Historical Med      beclomethasone (QVAR) 40 MCG/ACT inhaler 2 puffs as needed  , Starting Fri 8/27/2010, Historical Med      bimatoprost (LUMIGAN) 0 03 % ophthalmic drops daily, Starting 6/5/2008, Until Discontinued, Historical Med      brimonidine (ALPHAGAN P) 0 15 % ophthalmic solution Apply to eye, Starting 7/2/2015, Until Discontinued, Historical Med      Calcium Carb-Cholecalciferol (CALCIUM CARBONATE-VITAMIN D3 PO) Take 1,500 mg by mouth daily, Starting 9/20/2013, Until Discontinued, Historical Med      carBAMazepine (TEGRETOL) 200 mg tablet Take 2 tabs in the morning and 3 tabs at night, Historical Med      finasteride (PROSCAR) 5 mg tablet Take 5 mg by mouth daily, Starting 4/26/2016, Until Discontinued, Historical Med      fluticasone (FLONASE) 50 mcg/act nasal spray into each nostril daily, Starting 6/13/2008, Until Discontinued, Historical Med      gabapentin (NEURONTIN) 300 mg capsule Take 300 mg by mouth 3 (three) times a day, Starting Thu 9/8/2016, Until Tue 10/31/2017, Historical Med      glucagon 1 MG injection as needed, Starting 8/5/2016, Until Discontinued, Historical Med      hydrocortisone (PROCTOZONE-HC) 2 5 % rectal cream Insert 2 5 % into the rectum 2 (two) times a day, Starting 7/26/2012, Until Discontinued, Historical Med      insulin glargine (LANTUS) 100 units/mL subcutaneous injection Inject 40 Units under the skin daily at bedtime, Historical Med      insulin lispro (HUMALOG KWIKPEN) 100 Units/mL SOPN Inject 12 units with breakfast, 14 units with lunch, 16 units with dinner; plus sliding scale (TDD= 55), Historical Med      ipratropium (ATROVENT) 0 02 % nebulizer solution as needed, Starting 7/2/2015, Until Discontinued, Historical Med      latanoprost (XALATAN) 0 005 % ophthalmic solution Apply 1 drop to eye, Starting 7/2/2015, Until Discontinued, Historical Med      lisinopril (ZESTRIL) 5 mg tablet 5 mg daily, Starting 7/7/2016, Until Discontinued, Historical Med      loratadine (CLARITIN REDITABS) 10 MG dissolvable tablet Take 10 mg by mouth daily, Starting 4/2/2015, Until Discontinued, Historical Med      melatonin 3 mg Take 3 mg by mouth daily at bedtime, Until Discontinued, Historical Med      Mometasone Furo-Formoterol Fum (DULERA) 200-5 MCG/ACT AERO 2 puffs, Starting 8/31/2015, Until Discontinued, Historical Med      montelukast (SINGULAIR) 10 mg tablet 10 mg daily, Starting 8/25/2015, Until Discontinued, Historical Med      Multiple Vitamins-Minerals (CENTRUM SILVER PO) 1 tablet daily, Starting 2011, Until Discontinued, Historical Med      naproxen (NAPROSYN) 375 mg tablet Take 1 tablet by mouth 2 (two) times a day with meals, Starting Tue 10/31/2017, Print      omega-3-acid ethyl esters (LOVAZA) 1 g capsule Take 1 g by mouth daily, Until Discontinued, Historical Med      omeprazole (PriLOSEC) 40 MG capsule Take 40 mg by mouth daily, Starting 2016, Until Discontinued, Historical Med      !! predniSONE 20 mg tablet Take 1 tablet by mouth 2 (two) times a day, Starting Sat 2017, Print      tamsulosin (FLOMAX) 0 4 mg Take 0 4 mg by mouth daily, Starting 2016, Until Discontinued, Historical Med      terbinafine (LamISIL) 1 % cream Apply topically, Starting Thu 2016, Until Tue 10/31/2017, Historical Med      acetaminophen (TYLENOL) 325 mg tablet Take 2 tablets by mouth every 6 (six) hours as needed for mild pain or fever, Starting Tue 10/31/2017, Print       !! - Potential duplicate medications found  Please discuss with provider  No discharge procedures on file  Prior to Admission Medications   Prescriptions Last Dose Informant Patient Reported? Taking?    Calcium Carb-Cholecalciferol (CALCIUM CARBONATE-VITAMIN D3 PO)   Yes Yes   Sig: Take 1,500 mg by mouth daily   Mometasone Furo-Formoterol Fum (DULERA) 200-5 MCG/ACT AERO   Yes Yes   Si puffs   Multiple Vitamins-Minerals (CENTRUM SILVER PO)   Yes Yes   Si tablet daily   albuterol (2 5 mg/3 mL) 0 083 % nebulizer solution   Yes Yes   Sig: Inhale 2 5 mg as needed   albuterol (PROVENTIL HFA,VENTOLIN HFA) 90 mcg/act inhaler   Yes Yes   Sig: Inhale 2 puffs as needed   atorvastatin (LIPITOR) 80 mg tablet   Yes Yes   Sig: Take 80 mg by mouth daily at bedtime   beclomethasone (QVAR) 40 MCG/ACT inhaler   Yes Yes   Si puffs as needed     bimatoprost (LUMIGAN) 0 03 % ophthalmic drops   Yes Yes   Sig: daily   brimonidine (ALPHAGAN P) 0 15 % ophthalmic solution   Yes Yes   Sig: Apply to eye   carBAMazepine (TEGRETOL) 200 mg tablet   Yes Yes   Sig: Take 2 tabs in the morning and 3 tabs at night   finasteride (PROSCAR) 5 mg tablet   Yes Yes   Sig: Take 5 mg by mouth daily   fluticasone (FLONASE) 50 mcg/act nasal spray   Yes Yes   Sig: into each nostril daily   gabapentin (NEURONTIN) 300 mg capsule   Yes Yes   Sig: Take 300 mg by mouth 3 (three) times a day   glucagon 1 MG injection   Yes Yes   Sig: as needed   hydrocortisone (PROCTOZONE-HC) 2 5 % rectal cream   Yes Yes   Sig: Insert 2 5 % into the rectum 2 (two) times a day   insulin glargine (LANTUS) 100 units/mL subcutaneous injection   Yes Yes   Sig: Inject 40 Units under the skin daily at bedtime   insulin lispro (HUMALOG KWIKPEN) 100 Units/mL SOPN   Yes Yes   Sig: Inject 12 units with breakfast, 14 units with lunch, 16 units with dinner; plus sliding scale (TDD= 55)   ipratropium (ATROVENT) 0 02 % nebulizer solution   Yes Yes   Sig: as needed   latanoprost (XALATAN) 0 005 % ophthalmic solution   Yes Yes   Sig: Apply 1 drop to eye   lisinopril (ZESTRIL) 5 mg tablet   Yes Yes   Si mg daily   loratadine (CLARITIN REDITABS) 10 MG dissolvable tablet   Yes Yes   Sig: Take 10 mg by mouth daily   melatonin 3 mg   Yes Yes   Sig: Take 3 mg by mouth daily at bedtime   montelukast (SINGULAIR) 10 mg tablet   Yes Yes   Sig: 10 mg daily   naproxen (NAPROSYN) 375 mg tablet   No Yes   Sig: Take 1 tablet by mouth 2 (two) times a day with meals   omega-3-acid ethyl esters (LOVAZA) 1 g capsule   Yes Yes   Sig: Take 1 g by mouth daily   omeprazole (PriLOSEC) 40 MG capsule   Yes Yes   Sig: Take 40 mg by mouth daily   predniSONE 20 mg tablet   No Yes   Sig: Take 1 tablet by mouth 2 (two) times a day   Patient taking differently: Take 10 mg by mouth every other day Had 40 mg dose 18    tamsulosin (FLOMAX) 0 4 mg   Yes Yes   Sig: Take 0 4 mg by mouth daily   terbinafine (LamISIL) 1 % cream   Yes Yes   Sig: Apply topically Facility-Administered Medications: None       Portions of the record may have been created with voice recognition software  Occasional wrong word or "sound a like" substitutions may have occurred due to the inherent limitations of voice recognition software  Read the chart carefully and recognize, using context, where substitutions have occurred      Electronically signed by:  Osmar Yee MD  07/24/18 7819

## 2018-07-22 NOTE — DISCHARGE INSTRUCTIONS
Asthma, Ambulatory Care   GENERAL INFORMATION:   Asthma  is a lung disease that makes breathing difficult  Chronic inflammation and reactions to triggers narrow the airways in your lungs  Asthma can become life-threatening if it is not managed  Common symptoms include the following:   · Coughing     · Wheezing     · Shortness of breath     · Chest tightness  Seek immediate care for the following symptoms:   · Severe shortness of breath    · Blue or gray lips or nails    · Skin around your neck and ribs pulls in with each breath    · Shortness of breath, even after you take your short-term medicine as directed     · Peak flow numbers in the red zone of your asthma action plan  Treatment for asthma  will depend on how severe it is  Medicine may decrease inflammation, open airways, and make it easier to breathe  Medicines may be inhaled, taken as a pill, or injected  Short-term medicines relieve your symptoms quickly  Long-term medicines are used to prevent future attacks  You may also need medicine to help control your allergies  Manage and prevent future asthma attacks:   · Follow your asthma action pan  This is a written plan that you and your healthcare provider create  It explains which medicine you need and when to change doses if necessary  It also explains how you can monitor symptoms and use a peak flow meter  The meter measures how well your lungs are working  · Manage other health conditions , such as allergies, acid reflux, and sleep apnea  · Identify and avoid triggers  These may include pets, dust mites, mold, and cockroaches  · Do not smoke and avoid others who smoke  If you smoke, it is never too late to quit  Ask your healthcare provider if you need help quitting  · Ask about a flu vaccine  The flu can make your asthma worse  You may need a yearly flu shot  Follow up with your healthcare provider as directed:   You will need to return to make sure your medicine is working and your symptoms are controlled  You may be referred to an asthma or allergy specialist  Devika Dwaine may be asked to keep a record of your peak flow values and bring it with you to your appointments  Write down your questions so you remember to ask them during your visits  CARE AGREEMENT:   You have the right to help plan your care  Learn about your health condition and how it may be treated  Discuss treatment options with your caregivers to decide what care you want to receive  You always have the right to refuse treatment  The above information is an  only  It is not intended as medical advice for individual conditions or treatments  Talk to your doctor, nurse or pharmacist before following any medical regimen to see if it is safe and effective for you  © 2014 [address] is for End User's use only and may not be sold, redistributed or otherwise used for commercial purposes  All illustrations and images included in CareNotes® are the copyrighted property of A D [employer] M [employer]  or Toby López

## 2018-08-04 ENCOUNTER — HOSPITAL ENCOUNTER (EMERGENCY)
Facility: HOSPITAL | Age: 53
Discharge: HOME | End: 2018-08-04
Attending: EMERGENCY MEDICINE
Payer: COMMERCIAL

## 2018-08-04 VITALS
DIASTOLIC BLOOD PRESSURE: 71 MMHG | TEMPERATURE: 97.9 F | HEART RATE: 91 BPM | RESPIRATION RATE: 16 BRPM | SYSTOLIC BLOOD PRESSURE: 118 MMHG | OXYGEN SATURATION: 97 % | WEIGHT: 204 LBS

## 2018-08-04 DIAGNOSIS — T78.40XA ACUTE ALLERGIC REACTION, INITIAL ENCOUNTER: Primary | ICD-10-CM

## 2018-08-04 DIAGNOSIS — Z91.030 BEE STING ALLERGY: ICD-10-CM

## 2018-08-04 PROCEDURE — 99284 EMERGENCY DEPT VISIT MOD MDM: CPT

## 2018-08-04 RX ORDER — ACETAMINOPHEN 325 MG/1
650 TABLET ORAL ONCE
Status: COMPLETED | OUTPATIENT
Start: 2018-08-04 | End: 2018-08-04

## 2018-08-04 RX ORDER — DIPHENHYDRAMINE HCL 25 MG
25 CAPSULE ORAL EVERY 6 HOURS PRN
Qty: 20 CAPSULE | Refills: 0 | Status: SHIPPED | OUTPATIENT
Start: 2018-08-04 | End: 2018-11-14

## 2018-08-04 RX ORDER — FAMOTIDINE 20 MG/1
20 TABLET, FILM COATED ORAL 2 TIMES DAILY
Qty: 10 TABLET | Refills: 0 | Status: SHIPPED | OUTPATIENT
Start: 2018-08-04 | End: 2018-10-27 | Stop reason: HOSPADM

## 2018-08-04 RX ORDER — FAMOTIDINE 20 MG/1
20 TABLET, FILM COATED ORAL ONCE
Status: COMPLETED | OUTPATIENT
Start: 2018-08-04 | End: 2018-08-04

## 2018-08-04 RX ORDER — EPINEPHRINE 0.3 MG/.3ML
0.3 INJECTION SUBCUTANEOUS ONCE
Qty: 0.3 ML | Refills: 0 | Status: SHIPPED | OUTPATIENT
Start: 2018-08-04 | End: 2018-11-15 | Stop reason: HOSPADM

## 2018-08-04 RX ORDER — PREDNISONE 20 MG/1
40 TABLET ORAL DAILY
Qty: 8 TABLET | Refills: 0 | Status: SHIPPED | OUTPATIENT
Start: 2018-08-04 | End: 2018-08-08

## 2018-08-04 RX ADMIN — ACETAMINOPHEN 650 MG: 325 TABLET, FILM COATED ORAL at 11:59

## 2018-08-04 RX ADMIN — FAMOTIDINE 20 MG: 20 TABLET ORAL at 11:43

## 2018-08-04 NOTE — DISCHARGE INSTRUCTIONS
Anaphylaxis   WHAT YOU NEED TO KNOW:   Anaphylaxis is a life-threatening allergic reaction that must be treated immediately  Your risk for anaphylaxis increases if you have asthma that is severe or not controlled  Medical conditions such as heart disease can also increase your risk  It is important to be prepared if you are at risk for anaphylaxis  Your symptoms can be worse each time you are exposed to the trigger  DISCHARGE INSTRUCTIONS:   Steps to take for signs or symptoms of anaphylaxis:   · Immediately  give 1 shot of epinephrine only into the outer thigh muscle  · Leave the shot in place  as directed  Your healthcare provider may recommend you leave it in place for up to 10 seconds before you remove it  This helps make sure all of the epinephrine is delivered  · Call 911 and go to the emergency department,  even if the shot improved symptoms  Do not drive yourself  Bring the used epinephrine shot with you  Call 911 for any of the following:   · You have a skin rash, hives, swelling, or itching  · You have trouble breathing, shortness of breath, wheezing, or coughing  · Your throat tightens or your lips or tongue swell  · You have difficulty swallowing or speaking  · You are dizzy, lightheaded, confused, or feel like you are going to faint  · You have nausea, diarrhea, or abdominal cramps, or you are vomiting  Seek care immediately if:   · Signs or symptoms of anaphylaxis return  Contact your healthcare provider if:   · You have questions or concerns about your condition or care  Medicines:   · Epinephrine  is used to treat severe allergic reactions such as anaphylaxis  · Medicines  such as antihistamines, steroids, and bronchodilators decrease inflammation, open airways, and make breathing easier  · Take your medicine as directed  Contact your healthcare provider if you think your medicine is not helping or if you have side effects   Tell him or her if you are allergic to any medicine  Keep a list of the medicines, vitamins, and herbs you take  Include the amounts, and when and why you take them  Bring the list or the pill bottles to follow-up visits  Carry your medicine list with you in case of an emergency  Follow up with your healthcare provider as directed: Allergy testing may reveal allergies that can trigger anaphylaxis  Write down your questions so you remember to ask them during your visits  Safety precautions:   · Keep 2 shots of epinephrine with you at all times  You may need a second shot, because epinephrine only works for about 20 minutes and symptoms may return  Your healthcare provider can show you and family members how to give the shot  Check the expiration date every month and replace it before it expires  · Create an action plan  Your healthcare provider can help you create a written plan that explains the allergy and an emergency plan to treat a reaction  The plan explains when to give a second epinephrine shot if symptoms return or do not improve after the first  Give copies of the action plan and emergency instructions to family members, work and school staff, and  providers  Show them how to give a shot of epinephrine  · Be careful when you exercise  If you have had exercise-induced anaphylaxis, do not exercise right after you eat  Stop exercising right away if you start to develop any signs or symptoms of anaphylaxis  You may first feel tired, warm, or have itchy skin  Hives, swelling, and severe breathing problems may develop if you continue to exercise  · Carry medical alert identification  Wear medical alert jewelry or carry a card that explains the allergy  Ask your healthcare provider where to get these items  · Identify and avoid known triggers  Read food labels for ingredients  Look for triggers in your environment  · Ask about treatments to prevent anaphylaxis    You may need allergy shots or other medicines to treat allergies  © 2017 [address] [facility] Information is for End User's use only and may not be sold, redistributed or otherwise used for commercial purposes  All illustrations and images included in CareNotes® are the copyrighted property of A D A M [employer] Inc  or Toby López  The above information is an  only  It is not intended as medical advice for individual conditions or treatments  Talk to your doctor, nurse or pharmacist before following any medical regimen to see if it is safe and effective for you

## 2018-08-04 NOTE — ED PROVIDER NOTES
History  Chief Complaint   Patient presents with    Allergic Reaction     pt with allergy to bees was trying to get rid of a nest and got stung twice  once behind right ear and on right 5th digit  pt felt knot in throat and had audible wheezing per EMS  pt took 25mg PO benadryl at home and EMS gave pt another 25mg IV along with 125mg solu-medrol and 2,5mg albuterol  pt reports feeling a little better at this time  History provided by:  Patient  Allergic Reaction   Presenting symptoms: difficulty swallowing (felt like a knot in his throat  resolved after iv benadryl, solumedrol, albuterol prehospital ) and wheezing (improved after prehospital tx )    Presenting symptoms: no difficulty breathing, no itching, no rash and no swelling    Severity:  Mild  Duration: minutes pta  Prior episodes: prior bee sting  Context: insect bite/sting    Relieved by:  Bronchodilators, antihistamines and steroids  Worsened by:  Nothing      Prior to Admission Medications   Prescriptions Last Dose Informant Patient Reported? Taking?    Calcium Carb-Cholecalciferol (CALCIUM CARBONATE-VITAMIN D3 PO)   Yes Yes   Sig: Take 1,500 mg by mouth daily   Mometasone Furo-Formoterol Fum (DULERA) 200-5 MCG/ACT AERO   Yes Yes   Si puffs   Multiple Vitamins-Minerals (CENTRUM SILVER PO)   Yes Yes   Si tablet daily   albuterol (2 5 mg/3 mL) 0 083 % nebulizer solution   Yes Yes   Sig: Inhale 2 5 mg as needed   albuterol (PROVENTIL HFA,VENTOLIN HFA) 90 mcg/act inhaler   Yes Yes   Sig: Inhale 2 puffs as needed   atorvastatin (LIPITOR) 80 mg tablet   Yes Yes   Sig: Take 80 mg by mouth daily at bedtime   beclomethasone (QVAR) 40 MCG/ACT inhaler   Yes Yes   Si puffs as needed     bimatoprost (LUMIGAN) 0 03 % ophthalmic drops   Yes Yes   Sig: daily   brimonidine (ALPHAGAN P) 0 15 % ophthalmic solution   Yes Yes   Sig: Apply to eye   carBAMazepine (TEGRETOL) 200 mg tablet   Yes Yes   Sig: Take 2 tabs in the morning and 3 tabs at night finasteride (PROSCAR) 5 mg tablet   Yes Yes   Sig: Take 5 mg by mouth daily   gabapentin (NEURONTIN) 300 mg capsule   Yes Yes   Sig: Take 300 mg by mouth 3 (three) times a day   glucagon 1 MG injection   Yes Yes   Sig: as needed   hydrocortisone (PROCTOZONE-HC) 2 5 % rectal cream   Yes Yes   Sig: Insert 2 5 % into the rectum 2 (two) times a day   insulin glargine (LANTUS) 100 units/mL subcutaneous injection   Yes Yes   Sig: Inject 40 Units under the skin daily at bedtime   insulin lispro (HUMALOG KWIKPEN) 100 Units/mL SOPN   Yes Yes   Sig: Inject 12 units with breakfast, 14 units with lunch, 16 units with dinner; plus sliding scale (TDD= 55)   ipratropium (ATROVENT) 0 02 % nebulizer solution   Yes Yes   Sig: as needed   latanoprost (XALATAN) 0 005 % ophthalmic solution   Yes Yes   Sig: Apply 1 drop to eye   lisinopril (ZESTRIL) 5 mg tablet   Yes Yes   Si mg daily   loratadine (CLARITIN REDITABS) 10 MG dissolvable tablet   Yes Yes   Sig: Take 10 mg by mouth daily   montelukast (SINGULAIR) 10 mg tablet   Yes Yes   Sig: 10 mg daily   naproxen (NAPROSYN) 375 mg tablet   No Yes   Sig: Take 1 tablet by mouth 2 (two) times a day with meals   omega-3-acid ethyl esters (LOVAZA) 1 g capsule   Yes Yes   Sig: Take 1 g by mouth daily   omeprazole (PriLOSEC) 40 MG capsule   Yes Yes   Sig: Take 40 mg by mouth daily   predniSONE 20 mg tablet   No Yes   Sig: Take 1 tablet by mouth 2 (two) times a day   Patient taking differently: Take 10 mg by mouth every other day Had 40 mg dose 18    terbinafine (LamISIL) 1 % cream   Yes Yes   Sig: Apply topically      Facility-Administered Medications: None       Past Medical History:   Diagnosis Date    Asthma     Diabetes mellitus (HCC)     Enlarged prostate     Hyperlipidemia     Seasonal allergic rhinitis     Seizures (HCC)        Past Surgical History:   Procedure Laterality Date    KNEE SURGERY         History reviewed  No pertinent family history    I have reviewed and agree with the history as documented  Social History   Substance Use Topics    Smoking status: Former Smoker     Quit date: 1992    Smokeless tobacco: Never Used    Alcohol use No        Review of Systems   Constitutional: Negative for activity change, appetite change, fatigue and fever  HENT: Positive for sore throat and trouble swallowing (felt like a knot in his throat  resolved after iv benadryl, solumedrol, albuterol prehospital )  Negative for congestion, dental problem, ear pain and rhinorrhea  Eyes: Negative for pain and redness  Respiratory: Positive for wheezing (improved after prehospital tx )  Negative for chest tightness and shortness of breath  Cardiovascular: Negative for chest pain and palpitations  Gastrointestinal: Negative for abdominal pain, blood in stool, constipation, diarrhea, nausea and vomiting  Endocrine: Negative for cold intolerance and heat intolerance  Genitourinary: Negative for dysuria, frequency and hematuria  Musculoskeletal: Negative for arthralgias and myalgias  Skin: Negative for color change, itching, pallor and rash  Neurological: Negative for weakness and numbness  Hematological: Does not bruise/bleed easily  Psychiatric/Behavioral: Negative for agitation, hallucinations and suicidal ideas  Physical Exam  Physical Exam   Constitutional: He is oriented to person, place, and time  He appears well-developed and well-nourished  HENT:   Mouth/Throat: No oropharyngeal exudate  TMs normal bilaterally no pharyngeal erythema no rhinorrhea nontender palpation of sinuses, normal looking turbinates   Eyes: Conjunctivae and EOM are normal    Neck: Normal range of motion  Neck supple  No meningeal signs   Cardiovascular: Normal rate, regular rhythm, normal heart sounds and intact distal pulses  Pulmonary/Chest: Effort normal and breath sounds normal  No respiratory distress  He has no wheezes  He has no rales  He exhibits no tenderness  Abdominal: Soft  Bowel sounds are normal  He exhibits no distension and no mass  There is no tenderness  No hernia  No cvat   Musculoskeletal: Normal range of motion  He exhibits no edema  Lymphadenopathy:     He has no cervical adenopathy  Neurological: He is alert and oriented to person, place, and time  No cranial nerve deficit  Skin: No rash noted  There is erythema (localized allergic reaction behind R ear and R 5th finger)  No edema   Psychiatric: He has a normal mood and affect  His behavior is normal    Nursing note and vitals reviewed  Vital Signs  ED Triage Vitals [08/04/18 1054]   Temperature Pulse Respirations Blood Pressure SpO2   97 9 °F (36 6 °C) 94 16 148/73 97 %      Temp Source Heart Rate Source Patient Position - Orthostatic VS BP Location FiO2 (%)   Oral -- -- -- --      Pain Score       8           Vitals:    08/04/18 1054 08/04/18 1143   BP: 148/73 136/72   Pulse: 94 79       Visual Acuity      ED Medications  Medications    EMS REPLENISHMENT MED ( Does not apply Given to EMS 8/4/18 1100)   famotidine (PEPCID) tablet 20 mg (20 mg Oral Given 8/4/18 1143)   acetaminophen (TYLENOL) tablet 650 mg (650 mg Oral Given 8/4/18 1159)       Diagnostic Studies  Results Reviewed     None                 No orders to display              Procedures  Procedures       Phone Contacts  ED Phone Contact    ED Course  ED Course as of Aug 04 1222   Sat Aug 04, 2018   1217 Patient's symptoms have completely resolved  Will discharged home with symptomatic treatment  MDM  Number of Diagnoses or Management Options  Diagnosis management comments: Acute allergic reaction with improving symptoms-will add pepcid, observe for one hour   If symtpoms still improving will dc to [facility] on benadryl, pepcid, steroid, script for epi-pen    CritCare Time    Disposition  Final diagnoses:   Acute allergic reaction, initial encounter   Bee sting allergy     Time reflects when diagnosis was documented in both MDM as applicable and the Disposition within this note     Time User Action Codes Description Comment    8/4/2018 12:18 PM Mecca Hendrix Add [T78 40XA] Acute allergic reaction, initial encounter     8/4/2018 12:18 PM Rashid Benshwetha Durant Add [T58 [id number]] Bee sting allergy       ED Disposition     ED Disposition Condition Comment    Discharge  Mercedes Kohli discharge to home/self care  Condition at discharge: Good        Follow-up Information     Follow up With Specialties Details Why Jeferson Álvarez MD Family Medicine Schedule an appointment as soon as possible for a visit in 2 days  [address] [facility]  [phone]            Patient's Medications   Discharge Prescriptions    DIPHENHYDRAMINE (BENADRYL) 25 MG CAPSULE    Take 1 capsule (25 mg total) by mouth every 6 (six) hours as needed for itching       Start Date: 8/4/2018  End Date: --       Order Dose: 25 mg       Quantity: 20 capsule    Refills: 0    EPINEPHRINE (EPIPEN) 0 3 MG/0 3 ML SOAJ    Inject 0 3 mL (0 3 mg total) into a muscle once for 1 dose       Start Date: 8/4/2018  End Date: 8/4/2018       Order Dose: 0 3 mg       Quantity: 0 3 mL    Refills: 0    FAMOTIDINE (PEPCID) 20 MG TABLET    Take 1 tablet (20 mg total) by mouth 2 (two) times a day for 10 doses       Start Date: 8/4/2018  End Date: 8/9/2018       Order Dose: 20 mg       Quantity: 10 tablet    Refills: 0    PREDNISONE 20 MG TABLET    Take 2 tablets (40 mg total) by mouth daily for 4 days       Start Date: 8/4/2018  End Date: 8/8/2018       Order Dose: 40 mg       Quantity: 8 tablet    Refills: 0     No discharge procedures on file      ED Provider  Electronically Signed by           Franci Carrizales MD  08/04/18 7456

## 2018-10-25 ENCOUNTER — HOSPITAL ENCOUNTER (INPATIENT)
Facility: HOSPITAL | Age: 53
LOS: 2 days | Discharge: HOME | DRG: 141 | End: 2018-10-27
Attending: EMERGENCY MEDICINE | Admitting: FAMILY MEDICINE
Payer: COMMERCIAL

## 2018-10-25 ENCOUNTER — APPOINTMENT (EMERGENCY)
Dept: RADIOLOGY | Facility: HOSPITAL | Age: 53
DRG: 141 | End: 2018-10-25
Payer: COMMERCIAL

## 2018-10-25 DIAGNOSIS — R06.02 SHORTNESS OF BREATH: ICD-10-CM

## 2018-10-25 DIAGNOSIS — J45.901 ASTHMA EXACERBATION: Primary | ICD-10-CM

## 2018-10-25 DIAGNOSIS — J20.9 ACUTE BRONCHITIS: ICD-10-CM

## 2018-10-25 DIAGNOSIS — Z79.4 TYPE 2 DIABETES MELLITUS, WITH LONG-TERM CURRENT USE OF INSULIN (HCC): ICD-10-CM

## 2018-10-25 DIAGNOSIS — J45.51 SEVERE PERSISTENT ASTHMA WITH ACUTE EXACERBATION: ICD-10-CM

## 2018-10-25 DIAGNOSIS — E11.9 TYPE 2 DIABETES MELLITUS, WITH LONG-TERM CURRENT USE OF INSULIN (HCC): ICD-10-CM

## 2018-10-25 DIAGNOSIS — R06.2 WHEEZING: ICD-10-CM

## 2018-10-25 PROBLEM — I10 HTN (HYPERTENSION): Status: ACTIVE | Noted: 2018-10-25

## 2018-10-25 PROBLEM — K21.9 GERD (GASTROESOPHAGEAL REFLUX DISEASE): Status: ACTIVE | Noted: 2018-10-25

## 2018-10-25 PROBLEM — G47.33 OSA (OBSTRUCTIVE SLEEP APNEA): Status: ACTIVE | Noted: 2018-10-25

## 2018-10-25 PROBLEM — N40.0 BPH (BENIGN PROSTATIC HYPERPLASIA): Status: ACTIVE | Noted: 2018-10-25

## 2018-10-25 PROBLEM — H40.9 GLAUCOMA: Status: ACTIVE | Noted: 2018-10-25

## 2018-10-25 PROBLEM — E11.40 DIABETIC NEUROPATHY (HCC): Status: ACTIVE | Noted: 2018-10-25

## 2018-10-25 PROBLEM — R56.9 SEIZURE (HCC): Status: ACTIVE | Noted: 2018-10-25

## 2018-10-25 PROBLEM — E78.5 HLD (HYPERLIPIDEMIA): Status: ACTIVE | Noted: 2018-10-25

## 2018-10-25 LAB
ALBUMIN SERPL BCP-MCNC: 4.4 G/DL (ref 3–5.2)
ALP SERPL-CCNC: 75 U/L (ref 43–122)
ALT SERPL W P-5'-P-CCNC: 30 U/L (ref 9–52)
ANION GAP SERPL CALCULATED.3IONS-SCNC: 10 MMOL/L (ref 5–14)
AST SERPL W P-5'-P-CCNC: 19 U/L (ref 17–59)
BASOPHILS # BLD AUTO: 0.1 THOUSANDS/ΜL (ref 0–0.1)
BASOPHILS NFR BLD AUTO: 1 % (ref 0–1)
BILIRUB SERPL-MCNC: 0.8 MG/DL
BILIRUB UR QL STRIP: NEGATIVE
BUN SERPL-MCNC: 13 MG/DL (ref 5–25)
CALCIUM SERPL-MCNC: 9.5 MG/DL (ref 8.4–10.2)
CARBAMAZEPINE SERPL-MCNC: <3 UG/ML (ref 4–12)
CHLORIDE SERPL-SCNC: 100 MMOL/L (ref 97–108)
CLARITY UR: CLEAR
CO2 SERPL-SCNC: 27 MMOL/L (ref 22–30)
COLOR UR: YELLOW
CREAT SERPL-MCNC: 0.81 MG/DL (ref 0.7–1.5)
EOSINOPHIL # BLD AUTO: 0.1 THOUSAND/ΜL (ref 0–0.4)
EOSINOPHIL NFR BLD AUTO: 1 % (ref 0–6)
ERYTHROCYTE [DISTWIDTH] IN BLOOD BY AUTOMATED COUNT: 12.8 %
FLUAV + FLUBV RNA ISLT NAA+PROBE: NOT DETECTED
FLUAV + FLUBV RNA ISLT NAA+PROBE: NOT DETECTED
GFR SERPL CREATININE-BSD FRML MDRD: 101 ML/MIN/1.73SQ M
GLUCOSE SERPL-MCNC: 178 MG/DL (ref 70–99)
GLUCOSE SERPL-MCNC: 267 MG/DL (ref 70–99)
GLUCOSE SERPL-MCNC: 364 MG/DL (ref 70–99)
GLUCOSE UR STRIP-MCNC: ABNORMAL MG/DL
HCT VFR BLD AUTO: 45.4 % (ref 41–53)
HGB BLD-MCNC: 15.4 G/DL (ref 13.5–17.5)
HGB UR QL STRIP.AUTO: NEGATIVE
KETONES UR STRIP-MCNC: NEGATIVE MG/DL
LACTATE SERPL-SCNC: 1.4 MMOL/L (ref 0.7–2)
LEUKOCYTE ESTERASE UR QL STRIP: NEGATIVE
LYMPHOCYTES # BLD AUTO: 1.2 THOUSANDS/ΜL (ref 0.5–4)
LYMPHOCYTES NFR BLD AUTO: 13 % (ref 20–50)
MAGNESIUM SERPL-MCNC: 2.1 MG/DL (ref 1.6–2.3)
MCH RBC QN AUTO: 29.4 PG (ref 26–34)
MCHC RBC AUTO-ENTMCNC: 33.9 G/DL (ref 31–36)
MCV RBC AUTO: 87 FL (ref 80–100)
MONOCYTES # BLD AUTO: 0.5 THOUSAND/ΜL (ref 0.2–0.9)
MONOCYTES NFR BLD AUTO: 5 % (ref 1–10)
NEUTROPHILS # BLD AUTO: 7.6 THOUSANDS/ΜL (ref 1.8–7.8)
NEUTS SEG NFR BLD AUTO: 81 % (ref 45–65)
NITRITE UR QL STRIP: NEGATIVE
PH UR STRIP.AUTO: 5 [PH] (ref 4.5–8)
PLATELET # BLD AUTO: 273 THOUSANDS/UL (ref 150–450)
PMV BLD AUTO: 7.4 FL (ref 8.9–12.7)
POTASSIUM SERPL-SCNC: 3.7 MMOL/L (ref 3.6–5)
PROCALCITONIN SERPL-MCNC: <0.05 NG/ML
PROT SERPL-MCNC: 7 G/DL (ref 5.9–8.4)
PROT UR STRIP-MCNC: NEGATIVE MG/DL
RBC # BLD AUTO: 5.23 MILLION/UL (ref 4.5–5.9)
RSV RNA ISLT QL NAA+PROBE: NOT DETECTED
SODIUM SERPL-SCNC: 137 MMOL/L (ref 137–147)
SP GR UR STRIP.AUTO: 1.03 (ref 1–1.04)
TROPONIN I SERPL-MCNC: <0.01 NG/ML (ref 0–0.03)
TROPONIN I SERPL-MCNC: <0.01 NG/ML (ref 0–0.03)
UROBILINOGEN UA: NEGATIVE MG/DL
WBC # BLD AUTO: 9.4 THOUSAND/UL (ref 4.5–11)

## 2018-10-25 PROCEDURE — 99285 EMERGENCY DEPT VISIT HI MDM: CPT

## 2018-10-25 PROCEDURE — 85025 COMPLETE CBC W/AUTO DIFF WBC: CPT | Performed by: EMERGENCY MEDICINE

## 2018-10-25 PROCEDURE — 36415 COLL VENOUS BLD VENIPUNCTURE: CPT | Performed by: EMERGENCY MEDICINE

## 2018-10-25 PROCEDURE — 94640 AIRWAY INHALATION TREATMENT: CPT

## 2018-10-25 PROCEDURE — 87801 DETECT AGNT MULT DNA AMPLI: CPT | Performed by: EMERGENCY MEDICINE

## 2018-10-25 PROCEDURE — 99222 1ST HOSP IP/OBS MODERATE 55: CPT | Performed by: FAMILY MEDICINE

## 2018-10-25 PROCEDURE — 93005 ELECTROCARDIOGRAM TRACING: CPT

## 2018-10-25 PROCEDURE — 84145 PROCALCITONIN (PCT): CPT | Performed by: EMERGENCY MEDICINE

## 2018-10-25 PROCEDURE — 83605 ASSAY OF LACTIC ACID: CPT | Performed by: EMERGENCY MEDICINE

## 2018-10-25 PROCEDURE — 80053 COMPREHEN METABOLIC PANEL: CPT | Performed by: EMERGENCY MEDICINE

## 2018-10-25 PROCEDURE — 80156 ASSAY CARBAMAZEPINE TOTAL: CPT | Performed by: EMERGENCY MEDICINE

## 2018-10-25 PROCEDURE — 94664 DEMO&/EVAL PT USE INHALER: CPT

## 2018-10-25 PROCEDURE — 82948 REAGENT STRIP/BLOOD GLUCOSE: CPT

## 2018-10-25 PROCEDURE — 83735 ASSAY OF MAGNESIUM: CPT | Performed by: EMERGENCY MEDICINE

## 2018-10-25 PROCEDURE — 94760 N-INVAS EAR/PLS OXIMETRY 1: CPT

## 2018-10-25 PROCEDURE — 87502 INFLUENZA DNA AMP PROBE: CPT | Performed by: EMERGENCY MEDICINE

## 2018-10-25 PROCEDURE — 96365 THER/PROPH/DIAG IV INF INIT: CPT

## 2018-10-25 PROCEDURE — 94660 CPAP INITIATION&MGMT: CPT

## 2018-10-25 PROCEDURE — 71046 X-RAY EXAM CHEST 2 VIEWS: CPT

## 2018-10-25 PROCEDURE — 84484 ASSAY OF TROPONIN QUANT: CPT | Performed by: EMERGENCY MEDICINE

## 2018-10-25 RX ORDER — GUAIFENESIN 100 MG/5ML
200 SOLUTION ORAL EVERY 4 HOURS PRN
Status: DISCONTINUED | OUTPATIENT
Start: 2018-10-25 | End: 2018-10-27 | Stop reason: HOSPADM

## 2018-10-25 RX ORDER — CARBAMAZEPINE 200 MG/1
400 TABLET ORAL 3 TIMES DAILY
Status: DISCONTINUED | OUTPATIENT
Start: 2018-10-25 | End: 2018-10-27 | Stop reason: HOSPADM

## 2018-10-25 RX ORDER — LATANOPROST 50 UG/ML
1 SOLUTION/ DROPS OPHTHALMIC
Status: DISCONTINUED | OUTPATIENT
Start: 2018-10-25 | End: 2018-10-27 | Stop reason: HOSPADM

## 2018-10-25 RX ORDER — GABAPENTIN 300 MG/1
300 CAPSULE ORAL 3 TIMES DAILY
Status: DISCONTINUED | OUTPATIENT
Start: 2018-10-25 | End: 2018-10-27 | Stop reason: HOSPADM

## 2018-10-25 RX ORDER — PANTOPRAZOLE SODIUM 40 MG/1
40 TABLET, DELAYED RELEASE ORAL
Status: DISCONTINUED | OUTPATIENT
Start: 2018-10-26 | End: 2018-10-27 | Stop reason: HOSPADM

## 2018-10-25 RX ORDER — METHYLPREDNISOLONE SODIUM SUCCINATE 125 MG/2ML
INJECTION, POWDER, LYOPHILIZED, FOR SOLUTION INTRAMUSCULAR; INTRAVENOUS
Status: COMPLETED
Start: 2018-10-25 | End: 2018-10-25

## 2018-10-25 RX ORDER — FLUTICASONE FUROATE AND VILANTEROL 200; 25 UG/1; UG/1
1 POWDER RESPIRATORY (INHALATION) DAILY
Status: DISCONTINUED | OUTPATIENT
Start: 2018-10-25 | End: 2018-10-27 | Stop reason: HOSPADM

## 2018-10-25 RX ORDER — IPRATROPIUM BROMIDE AND ALBUTEROL SULFATE 2.5; .5 MG/3ML; MG/3ML
3 SOLUTION RESPIRATORY (INHALATION)
Status: DISCONTINUED | OUTPATIENT
Start: 2018-10-25 | End: 2018-10-26

## 2018-10-25 RX ORDER — FLUTICASONE PROPIONATE 44 UG/1
1 AEROSOL, METERED RESPIRATORY (INHALATION)
Status: DISCONTINUED | OUTPATIENT
Start: 2018-10-26 | End: 2018-10-27 | Stop reason: HOSPADM

## 2018-10-25 RX ORDER — SODIUM CHLORIDE FOR INHALATION 0.9 %
3 VIAL, NEBULIZER (ML) INHALATION ONCE
Status: DISCONTINUED | OUTPATIENT
Start: 2018-10-25 | End: 2018-10-27 | Stop reason: HOSPADM

## 2018-10-25 RX ORDER — INSULIN GLARGINE 100 [IU]/ML
40 INJECTION, SOLUTION SUBCUTANEOUS
Status: DISCONTINUED | OUTPATIENT
Start: 2018-10-25 | End: 2018-10-26

## 2018-10-25 RX ORDER — LORATADINE 10 MG/1
10 TABLET ORAL DAILY
Status: DISCONTINUED | OUTPATIENT
Start: 2018-10-25 | End: 2018-10-27 | Stop reason: HOSPADM

## 2018-10-25 RX ORDER — AZITHROMYCIN 250 MG/1
250 TABLET, FILM COATED ORAL EVERY 24 HOURS
Status: DISCONTINUED | OUTPATIENT
Start: 2018-10-26 | End: 2018-10-27 | Stop reason: HOSPADM

## 2018-10-25 RX ORDER — MAGNESIUM SULFATE HEPTAHYDRATE 40 MG/ML
INJECTION, SOLUTION INTRAVENOUS
Status: COMPLETED
Start: 2018-10-25 | End: 2018-10-25

## 2018-10-25 RX ORDER — IPRATROPIUM BROMIDE AND ALBUTEROL SULFATE 2.5; .5 MG/3ML; MG/3ML
SOLUTION RESPIRATORY (INHALATION)
Status: COMPLETED
Start: 2018-10-25 | End: 2018-10-25

## 2018-10-25 RX ORDER — MONTELUKAST SODIUM 10 MG/1
10 TABLET ORAL DAILY
Status: DISCONTINUED | OUTPATIENT
Start: 2018-10-25 | End: 2018-10-27 | Stop reason: HOSPADM

## 2018-10-25 RX ORDER — METHYLPREDNISOLONE SODIUM SUCCINATE 125 MG/2ML
60 INJECTION, POWDER, LYOPHILIZED, FOR SOLUTION INTRAMUSCULAR; INTRAVENOUS ONCE
Status: COMPLETED | OUTPATIENT
Start: 2018-10-25 | End: 2018-10-25

## 2018-10-25 RX ORDER — LATANOPROST 50 UG/ML
1 SOLUTION/ DROPS OPHTHALMIC
Status: DISCONTINUED | OUTPATIENT
Start: 2018-10-25 | End: 2018-10-25

## 2018-10-25 RX ORDER — MAGNESIUM SULFATE HEPTAHYDRATE 40 MG/ML
2 INJECTION, SOLUTION INTRAVENOUS ONCE
Status: COMPLETED | OUTPATIENT
Start: 2018-10-25 | End: 2018-10-25

## 2018-10-25 RX ORDER — BRIMONIDINE TARTRATE 0.15 %
1 DROPS OPHTHALMIC (EYE) 3 TIMES DAILY
Status: DISCONTINUED | OUTPATIENT
Start: 2018-10-25 | End: 2018-10-27 | Stop reason: HOSPADM

## 2018-10-25 RX ORDER — IPRATROPIUM BROMIDE AND ALBUTEROL SULFATE 2.5; .5 MG/3ML; MG/3ML
3 SOLUTION RESPIRATORY (INHALATION) ONCE
Status: COMPLETED | OUTPATIENT
Start: 2018-10-25 | End: 2018-10-25

## 2018-10-25 RX ORDER — IPRATROPIUM BROMIDE AND ALBUTEROL SULFATE 2.5; .5 MG/3ML; MG/3ML
3 SOLUTION RESPIRATORY (INHALATION) EVERY 2 HOUR PRN
Status: DISCONTINUED | OUTPATIENT
Start: 2018-10-25 | End: 2018-10-27 | Stop reason: HOSPADM

## 2018-10-25 RX ORDER — LISINOPRIL 5 MG/1
5 TABLET ORAL DAILY
Status: DISCONTINUED | OUTPATIENT
Start: 2018-10-25 | End: 2018-10-27 | Stop reason: HOSPADM

## 2018-10-25 RX ORDER — AZITHROMYCIN 250 MG/1
500 TABLET, FILM COATED ORAL ONCE
Status: COMPLETED | OUTPATIENT
Start: 2018-10-25 | End: 2018-10-25

## 2018-10-25 RX ORDER — GUAIFENESIN 600 MG
600 TABLET, EXTENDED RELEASE 12 HR ORAL 2 TIMES DAILY
Status: DISCONTINUED | OUTPATIENT
Start: 2018-10-25 | End: 2018-10-27 | Stop reason: HOSPADM

## 2018-10-25 RX ORDER — FAMOTIDINE 20 MG/1
20 TABLET, FILM COATED ORAL 2 TIMES DAILY
Status: DISCONTINUED | OUTPATIENT
Start: 2018-10-25 | End: 2018-10-25

## 2018-10-25 RX ORDER — ATORVASTATIN CALCIUM 80 MG/1
80 TABLET, FILM COATED ORAL
Status: DISCONTINUED | OUTPATIENT
Start: 2018-10-25 | End: 2018-10-27 | Stop reason: HOSPADM

## 2018-10-25 RX ORDER — METHYLPREDNISOLONE SODIUM SUCCINATE 40 MG/ML
40 INJECTION, POWDER, LYOPHILIZED, FOR SOLUTION INTRAMUSCULAR; INTRAVENOUS EVERY 8 HOURS SCHEDULED
Status: DISCONTINUED | OUTPATIENT
Start: 2018-10-25 | End: 2018-10-26

## 2018-10-25 RX ORDER — CHLORAL HYDRATE 500 MG
1000 CAPSULE ORAL DAILY
Status: DISCONTINUED | OUTPATIENT
Start: 2018-10-25 | End: 2018-10-27 | Stop reason: HOSPADM

## 2018-10-25 RX ORDER — FINASTERIDE 5 MG/1
5 TABLET, FILM COATED ORAL DAILY
Status: DISCONTINUED | OUTPATIENT
Start: 2018-10-25 | End: 2018-10-27 | Stop reason: HOSPADM

## 2018-10-25 RX ADMIN — MAGNESIUM SULFATE HEPTAHYDRATE 2 G: 40 INJECTION, SOLUTION INTRAVENOUS at 10:16

## 2018-10-25 RX ADMIN — LATANOPROST 1 DROP: 50 SOLUTION OPHTHALMIC at 22:02

## 2018-10-25 RX ADMIN — GUAIFENESIN 600 MG: 600 TABLET, EXTENDED RELEASE ORAL at 17:29

## 2018-10-25 RX ADMIN — CARBAMAZEPINE 400 MG: 200 TABLET ORAL at 17:23

## 2018-10-25 RX ADMIN — METHYLPREDNISOLONE SODIUM SUCCINATE 60 MG: 125 INJECTION, POWDER, FOR SOLUTION INTRAMUSCULAR; INTRAVENOUS at 13:43

## 2018-10-25 RX ADMIN — ALBUTEROL SULFATE 10 MG: 2.5 SOLUTION RESPIRATORY (INHALATION) at 10:16

## 2018-10-25 RX ADMIN — INSULIN LISPRO 3 UNITS: 100 INJECTION, SOLUTION INTRAVENOUS; SUBCUTANEOUS at 17:21

## 2018-10-25 RX ADMIN — GABAPENTIN 300 MG: 300 CAPSULE ORAL at 17:22

## 2018-10-25 RX ADMIN — MONTELUKAST SODIUM 10 MG: 10 TABLET, COATED ORAL at 17:30

## 2018-10-25 RX ADMIN — Medication 0.5 MG: at 10:16

## 2018-10-25 RX ADMIN — IPRATROPIUM BROMIDE AND ALBUTEROL SULFATE 3 ML: 2.5; .5 SOLUTION RESPIRATORY (INHALATION) at 15:52

## 2018-10-25 RX ADMIN — ATORVASTATIN CALCIUM 80 MG: 80 TABLET, FILM COATED ORAL at 21:51

## 2018-10-25 RX ADMIN — MAGNESIUM SULFATE IN WATER 2 G: 40 INJECTION, SOLUTION INTRAVENOUS at 10:16

## 2018-10-25 RX ADMIN — LORATADINE 10 MG: 10 TABLET ORAL at 17:29

## 2018-10-25 RX ADMIN — LISINOPRIL 5 MG: 5 TABLET ORAL at 17:29

## 2018-10-25 RX ADMIN — IPRATROPIUM BROMIDE 0.5 MG: 0.5 SOLUTION RESPIRATORY (INHALATION) at 10:16

## 2018-10-25 RX ADMIN — BRIMONIDINE TARTRATE 1 DROP: 1.5 SOLUTION OPHTHALMIC at 21:13

## 2018-10-25 RX ADMIN — AZITHROMYCIN MONOHYDRATE 500 MG: 250 TABLET ORAL at 17:22

## 2018-10-25 RX ADMIN — IPRATROPIUM BROMIDE AND ALBUTEROL SULFATE 3 ML: 2.5; .5 SOLUTION RESPIRATORY (INHALATION) at 13:15

## 2018-10-25 RX ADMIN — METHYLPREDNISOLONE SODIUM SUCCINATE 60 MG: 125 INJECTION, POWDER, LYOPHILIZED, FOR SOLUTION INTRAMUSCULAR; INTRAVENOUS at 13:43

## 2018-10-25 RX ADMIN — Medication 1000 MG: at 17:25

## 2018-10-25 RX ADMIN — IPRATROPIUM BROMIDE AND ALBUTEROL SULFATE 3 ML: 2.5; .5 SOLUTION RESPIRATORY (INHALATION) at 19:10

## 2018-10-25 RX ADMIN — INSULIN GLARGINE 40 UNITS: 100 INJECTION, SOLUTION SUBCUTANEOUS at 21:54

## 2018-10-25 RX ADMIN — GABAPENTIN 300 MG: 300 CAPSULE ORAL at 21:14

## 2018-10-25 RX ADMIN — BRIMONIDINE TARTRATE 1 DROP: 1.5 SOLUTION OPHTHALMIC at 17:26

## 2018-10-25 RX ADMIN — INSULIN LISPRO 16 UNITS: 100 INJECTION, SOLUTION INTRAVENOUS; SUBCUTANEOUS at 17:24

## 2018-10-25 RX ADMIN — SODIUM CHLORIDE 1000 ML: 9 INJECTION, SOLUTION INTRAVENOUS at 10:04

## 2018-10-25 RX ADMIN — METHYLPREDNISOLONE SODIUM SUCCINATE 40 MG: 40 INJECTION, POWDER, FOR SOLUTION INTRAMUSCULAR; INTRAVENOUS at 17:29

## 2018-10-25 RX ADMIN — Medication 10 MG: at 10:16

## 2018-10-25 RX ADMIN — METHYLPREDNISOLONE SODIUM SUCCINATE 40 MG: 40 INJECTION, POWDER, FOR SOLUTION INTRAMUSCULAR; INTRAVENOUS at 21:52

## 2018-10-25 RX ADMIN — CARBAMAZEPINE 400 MG: 200 TABLET ORAL at 21:12

## 2018-10-25 RX ADMIN — FINASTERIDE 5 MG: 5 TABLET, FILM COATED ORAL at 17:26

## 2018-10-26 LAB
GLUCOSE SERPL-MCNC: 120 MG/DL (ref 70–99)
GLUCOSE SERPL-MCNC: 164 MG/DL (ref 70–99)
GLUCOSE SERPL-MCNC: 213 MG/DL (ref 70–99)
GLUCOSE SERPL-MCNC: 232 MG/DL (ref 70–99)

## 2018-10-26 PROCEDURE — 99232 SBSQ HOSP IP/OBS MODERATE 35: CPT | Performed by: FAMILY MEDICINE

## 2018-10-26 PROCEDURE — 94640 AIRWAY INHALATION TREATMENT: CPT

## 2018-10-26 PROCEDURE — 82948 REAGENT STRIP/BLOOD GLUCOSE: CPT

## 2018-10-26 PROCEDURE — 94760 N-INVAS EAR/PLS OXIMETRY 1: CPT

## 2018-10-26 PROCEDURE — 94660 CPAP INITIATION&MGMT: CPT

## 2018-10-26 RX ORDER — INSULIN GLARGINE 100 [IU]/ML
45 INJECTION, SOLUTION SUBCUTANEOUS
Status: DISCONTINUED | OUTPATIENT
Start: 2018-10-26 | End: 2018-10-27 | Stop reason: HOSPADM

## 2018-10-26 RX ORDER — METHYLPREDNISOLONE SODIUM SUCCINATE 40 MG/ML
40 INJECTION, POWDER, LYOPHILIZED, FOR SOLUTION INTRAMUSCULAR; INTRAVENOUS EVERY 12 HOURS SCHEDULED
Status: DISCONTINUED | OUTPATIENT
Start: 2018-10-26 | End: 2018-10-27 | Stop reason: HOSPADM

## 2018-10-26 RX ORDER — IPRATROPIUM BROMIDE AND ALBUTEROL SULFATE 2.5; .5 MG/3ML; MG/3ML
3 SOLUTION RESPIRATORY (INHALATION)
Status: DISCONTINUED | OUTPATIENT
Start: 2018-10-26 | End: 2018-10-27 | Stop reason: HOSPADM

## 2018-10-26 RX ADMIN — GABAPENTIN 300 MG: 300 CAPSULE ORAL at 15:17

## 2018-10-26 RX ADMIN — FINASTERIDE 5 MG: 5 TABLET, FILM COATED ORAL at 08:34

## 2018-10-26 RX ADMIN — GABAPENTIN 300 MG: 300 CAPSULE ORAL at 08:33

## 2018-10-26 RX ADMIN — BRIMONIDINE TARTRATE 1 DROP: 1.5 SOLUTION OPHTHALMIC at 08:35

## 2018-10-26 RX ADMIN — BRIMONIDINE TARTRATE 1 DROP: 1.5 SOLUTION OPHTHALMIC at 17:14

## 2018-10-26 RX ADMIN — ENOXAPARIN SODIUM 40 MG: 40 INJECTION SUBCUTANEOUS at 08:32

## 2018-10-26 RX ADMIN — GUAIFENESIN 600 MG: 600 TABLET, EXTENDED RELEASE ORAL at 08:32

## 2018-10-26 RX ADMIN — AZITHROMYCIN MONOHYDRATE 250 MG: 250 TABLET ORAL at 08:32

## 2018-10-26 RX ADMIN — INSULIN LISPRO 16 UNITS: 100 INJECTION, SOLUTION INTRAVENOUS; SUBCUTANEOUS at 16:45

## 2018-10-26 RX ADMIN — FLUTICASONE PROPIONATE 1 PUFF: 44 AEROSOL, METERED RESPIRATORY (INHALATION) at 11:24

## 2018-10-26 RX ADMIN — LORATADINE 10 MG: 10 TABLET ORAL at 08:32

## 2018-10-26 RX ADMIN — LATANOPROST 1 DROP: 50 SOLUTION OPHTHALMIC at 21:59

## 2018-10-26 RX ADMIN — GUAIFENESIN 600 MG: 600 TABLET, EXTENDED RELEASE ORAL at 17:13

## 2018-10-26 RX ADMIN — Medication 1000 MG: at 08:33

## 2018-10-26 RX ADMIN — IPRATROPIUM BROMIDE AND ALBUTEROL SULFATE 3 ML: 2.5; .5 SOLUTION RESPIRATORY (INHALATION) at 08:11

## 2018-10-26 RX ADMIN — INSULIN LISPRO 1 UNITS: 100 INJECTION, SOLUTION INTRAVENOUS; SUBCUTANEOUS at 16:44

## 2018-10-26 RX ADMIN — METHYLPREDNISOLONE SODIUM SUCCINATE 40 MG: 40 INJECTION, POWDER, FOR SOLUTION INTRAMUSCULAR; INTRAVENOUS at 20:57

## 2018-10-26 RX ADMIN — MONTELUKAST SODIUM 10 MG: 10 TABLET, COATED ORAL at 08:32

## 2018-10-26 RX ADMIN — IPRATROPIUM BROMIDE AND ALBUTEROL SULFATE 3 ML: 2.5; .5 SOLUTION RESPIRATORY (INHALATION) at 11:27

## 2018-10-26 RX ADMIN — FLUTICASONE FUROATE AND VILANTEROL TRIFENATATE 1 PUFF: 200; 25 POWDER RESPIRATORY (INHALATION) at 11:25

## 2018-10-26 RX ADMIN — INSULIN LISPRO 12 UNITS: 100 INJECTION, SOLUTION INTRAVENOUS; SUBCUTANEOUS at 08:38

## 2018-10-26 RX ADMIN — ATORVASTATIN CALCIUM 80 MG: 80 TABLET, FILM COATED ORAL at 21:55

## 2018-10-26 RX ADMIN — IPRATROPIUM BROMIDE AND ALBUTEROL SULFATE 3 ML: 2.5; .5 SOLUTION RESPIRATORY (INHALATION) at 19:48

## 2018-10-26 RX ADMIN — PANTOPRAZOLE SODIUM 40 MG: 40 TABLET, DELAYED RELEASE ORAL at 06:12

## 2018-10-26 RX ADMIN — GABAPENTIN 300 MG: 300 CAPSULE ORAL at 20:56

## 2018-10-26 RX ADMIN — METHYLPREDNISOLONE SODIUM SUCCINATE 40 MG: 40 INJECTION, POWDER, FOR SOLUTION INTRAMUSCULAR; INTRAVENOUS at 06:10

## 2018-10-26 RX ADMIN — BRIMONIDINE TARTRATE 1 DROP: 1.5 SOLUTION OPHTHALMIC at 20:56

## 2018-10-26 RX ADMIN — CARBAMAZEPINE 400 MG: 200 TABLET ORAL at 08:34

## 2018-10-26 RX ADMIN — INSULIN LISPRO 2 UNITS: 100 INJECTION, SOLUTION INTRAVENOUS; SUBCUTANEOUS at 08:37

## 2018-10-26 RX ADMIN — METHYLPREDNISOLONE SODIUM SUCCINATE 40 MG: 40 INJECTION, POWDER, FOR SOLUTION INTRAMUSCULAR; INTRAVENOUS at 13:03

## 2018-10-26 RX ADMIN — FLUTICASONE PROPIONATE 1 PUFF: 44 AEROSOL, METERED RESPIRATORY (INHALATION) at 19:48

## 2018-10-26 RX ADMIN — IPRATROPIUM BROMIDE AND ALBUTEROL SULFATE 3 ML: 2.5; .5 SOLUTION RESPIRATORY (INHALATION) at 00:23

## 2018-10-26 RX ADMIN — CARBAMAZEPINE 400 MG: 200 TABLET ORAL at 20:56

## 2018-10-26 RX ADMIN — INSULIN LISPRO 16 UNITS: 100 INJECTION, SOLUTION INTRAVENOUS; SUBCUTANEOUS at 11:32

## 2018-10-26 RX ADMIN — CARBAMAZEPINE 400 MG: 200 TABLET ORAL at 15:17

## 2018-10-26 RX ADMIN — INSULIN GLARGINE 45 UNITS: 100 INJECTION, SOLUTION SUBCUTANEOUS at 21:55

## 2018-10-27 VITALS
WEIGHT: 208.34 LBS | TEMPERATURE: 98 F | HEIGHT: 68 IN | RESPIRATION RATE: 20 BRPM | SYSTOLIC BLOOD PRESSURE: 128 MMHG | HEART RATE: 85 BPM | BODY MASS INDEX: 31.57 KG/M2 | DIASTOLIC BLOOD PRESSURE: 80 MMHG | OXYGEN SATURATION: 98 %

## 2018-10-27 LAB
ATRIAL RATE: 85 BPM
GLUCOSE SERPL-MCNC: 178 MG/DL (ref 70–99)
P AXIS: 56 DEGREES
PR INTERVAL: 152 MS
QRS AXIS: 31 DEGREES
QRSD INTERVAL: 86 MS
QT INTERVAL: 378 MS
QTC INTERVAL: 449 MS
T WAVE AXIS: 37 DEGREES
VENTRICULAR RATE: 85 BPM

## 2018-10-27 PROCEDURE — 99239 HOSP IP/OBS DSCHRG MGMT >30: CPT | Performed by: FAMILY MEDICINE

## 2018-10-27 PROCEDURE — 94760 N-INVAS EAR/PLS OXIMETRY 1: CPT

## 2018-10-27 PROCEDURE — 94640 AIRWAY INHALATION TREATMENT: CPT

## 2018-10-27 PROCEDURE — 93010 ELECTROCARDIOGRAM REPORT: CPT | Performed by: INTERNAL MEDICINE

## 2018-10-27 PROCEDURE — 82948 REAGENT STRIP/BLOOD GLUCOSE: CPT

## 2018-10-27 RX ORDER — PREDNISONE 10 MG/1
10 TABLET ORAL SEE ADMIN INSTRUCTIONS
Qty: 40 TABLET | Refills: 0 | Status: ON HOLD | OUTPATIENT
Start: 2018-10-27 | End: 2018-11-15 | Stop reason: CLARIF

## 2018-10-27 RX ORDER — AZITHROMYCIN 250 MG/1
250 TABLET, FILM COATED ORAL EVERY 24 HOURS
Qty: 2 TABLET | Refills: 0 | Status: SHIPPED | OUTPATIENT
Start: 2018-10-28 | End: 2018-10-30

## 2018-10-27 RX ADMIN — FLUTICASONE PROPIONATE 1 PUFF: 44 AEROSOL, METERED RESPIRATORY (INHALATION) at 08:15

## 2018-10-27 RX ADMIN — FINASTERIDE 5 MG: 5 TABLET, FILM COATED ORAL at 09:05

## 2018-10-27 RX ADMIN — INSULIN LISPRO 12 UNITS: 100 INJECTION, SOLUTION INTRAVENOUS; SUBCUTANEOUS at 07:40

## 2018-10-27 RX ADMIN — CARBAMAZEPINE 400 MG: 200 TABLET ORAL at 09:06

## 2018-10-27 RX ADMIN — LORATADINE 10 MG: 10 TABLET ORAL at 09:06

## 2018-10-27 RX ADMIN — MONTELUKAST SODIUM 10 MG: 10 TABLET, COATED ORAL at 09:06

## 2018-10-27 RX ADMIN — INSULIN LISPRO 1 UNITS: 100 INJECTION, SOLUTION INTRAVENOUS; SUBCUTANEOUS at 07:40

## 2018-10-27 RX ADMIN — LISINOPRIL 5 MG: 5 TABLET ORAL at 09:05

## 2018-10-27 RX ADMIN — METHYLPREDNISOLONE SODIUM SUCCINATE 40 MG: 40 INJECTION, POWDER, FOR SOLUTION INTRAMUSCULAR; INTRAVENOUS at 09:05

## 2018-10-27 RX ADMIN — PANTOPRAZOLE SODIUM 40 MG: 40 TABLET, DELAYED RELEASE ORAL at 07:53

## 2018-10-27 RX ADMIN — GABAPENTIN 300 MG: 300 CAPSULE ORAL at 09:06

## 2018-10-27 RX ADMIN — ENOXAPARIN SODIUM 40 MG: 40 INJECTION SUBCUTANEOUS at 09:05

## 2018-10-27 RX ADMIN — FLUTICASONE FUROATE AND VILANTEROL TRIFENATATE 1 PUFF: 200; 25 POWDER RESPIRATORY (INHALATION) at 08:15

## 2018-10-27 RX ADMIN — AZITHROMYCIN MONOHYDRATE 250 MG: 250 TABLET ORAL at 09:06

## 2018-10-27 RX ADMIN — GUAIFENESIN 600 MG: 600 TABLET, EXTENDED RELEASE ORAL at 09:05

## 2018-10-27 RX ADMIN — Medication 1000 MG: at 09:06

## 2018-10-27 RX ADMIN — BRIMONIDINE TARTRATE 1 DROP: 1.5 SOLUTION OPHTHALMIC at 09:05

## 2018-10-27 RX ADMIN — IPRATROPIUM BROMIDE AND ALBUTEROL SULFATE 3 ML: 2.5; .5 SOLUTION RESPIRATORY (INHALATION) at 08:16

## 2018-11-14 ENCOUNTER — HOSPITAL ENCOUNTER (INPATIENT)
Facility: HOSPITAL | Age: 53
LOS: 1 days | Discharge: HOME | DRG: 141 | End: 2018-11-15
Attending: EMERGENCY MEDICINE | Admitting: FAMILY MEDICINE
Payer: COMMERCIAL

## 2018-11-14 ENCOUNTER — APPOINTMENT (INPATIENT)
Dept: RADIOLOGY | Facility: HOSPITAL | Age: 53
DRG: 141 | End: 2018-11-14
Payer: COMMERCIAL

## 2018-11-14 DIAGNOSIS — J45.51 SEVERE PERSISTENT ASTHMA WITH ACUTE EXACERBATION: ICD-10-CM

## 2018-11-14 DIAGNOSIS — J45.902 STATUS ASTHMATICUS: Primary | ICD-10-CM

## 2018-11-14 PROBLEM — E87.6 HYPOKALEMIA: Status: ACTIVE | Noted: 2018-11-14

## 2018-11-14 LAB
ANION GAP SERPL CALCULATED.3IONS-SCNC: 7 MMOL/L (ref 5–14)
BUN SERPL-MCNC: 10 MG/DL (ref 5–25)
CALCIUM SERPL-MCNC: 9.2 MG/DL (ref 8.4–10.2)
CARBAMAZEPINE SERPL-MCNC: 3.8 UG/ML (ref 4–12)
CHLORIDE SERPL-SCNC: 102 MMOL/L (ref 97–108)
CO2 SERPL-SCNC: 31 MMOL/L (ref 22–30)
CREAT SERPL-MCNC: 0.8 MG/DL (ref 0.7–1.5)
EOSINOPHIL # BLD AUTO: 0.22 THOUSAND/UL (ref 0–0.4)
EOSINOPHIL NFR BLD MANUAL: 3 % (ref 0–6)
ERYTHROCYTE [DISTWIDTH] IN BLOOD BY AUTOMATED COUNT: 13.1 %
GFR SERPL CREATININE-BSD FRML MDRD: 101 ML/MIN/1.73SQ M
GLUCOSE SERPL-MCNC: 135 MG/DL (ref 70–99)
GLUCOSE SERPL-MCNC: 251 MG/DL (ref 70–99)
HCT VFR BLD AUTO: 42.6 % (ref 41–53)
HGB BLD-MCNC: 14.4 G/DL (ref 13.5–17.5)
LYMPHOCYTES # BLD AUTO: 1.31 THOUSAND/UL (ref 0.5–4)
LYMPHOCYTES # BLD AUTO: 18 % (ref 20–50)
MCH RBC QN AUTO: 29.3 PG (ref 26–34)
MCHC RBC AUTO-ENTMCNC: 33.7 G/DL (ref 31–36)
MCV RBC AUTO: 87 FL (ref 80–100)
MONOCYTES # BLD AUTO: 0.37 THOUSAND/UL (ref 0.2–0.9)
MONOCYTES NFR BLD AUTO: 5 % (ref 1–10)
NEUTS BAND NFR BLD MANUAL: 2 % (ref 0–8)
NEUTS SEG # BLD: 5.4 THOUSAND/UL (ref 1.8–7.8)
NEUTS SEG NFR BLD AUTO: 72 %
PLATELET # BLD AUTO: 235 THOUSANDS/UL (ref 150–450)
PLATELET BLD QL SMEAR: ADEQUATE
PMV BLD AUTO: 7.4 FL (ref 8.9–12.7)
POTASSIUM SERPL-SCNC: 3.5 MMOL/L (ref 3.6–5)
RBC # BLD AUTO: 4.9 MILLION/UL (ref 4.5–5.9)
RBC MORPH BLD: NORMAL
SODIUM SERPL-SCNC: 140 MMOL/L (ref 137–147)
TOTAL CELLS COUNTED SPEC: 100
WBC # BLD AUTO: 7.3 THOUSAND/UL (ref 4.5–11)

## 2018-11-14 PROCEDURE — 85027 COMPLETE CBC AUTOMATED: CPT | Performed by: EMERGENCY MEDICINE

## 2018-11-14 PROCEDURE — 82948 REAGENT STRIP/BLOOD GLUCOSE: CPT

## 2018-11-14 PROCEDURE — 93005 ELECTROCARDIOGRAM TRACING: CPT

## 2018-11-14 PROCEDURE — 85007 BL SMEAR W/DIFF WBC COUNT: CPT | Performed by: EMERGENCY MEDICINE

## 2018-11-14 PROCEDURE — 94640 AIRWAY INHALATION TREATMENT: CPT

## 2018-11-14 PROCEDURE — 99222 1ST HOSP IP/OBS MODERATE 55: CPT | Performed by: FAMILY MEDICINE

## 2018-11-14 PROCEDURE — 71045 X-RAY EXAM CHEST 1 VIEW: CPT

## 2018-11-14 PROCEDURE — 87040 BLOOD CULTURE FOR BACTERIA: CPT | Performed by: EMERGENCY MEDICINE

## 2018-11-14 PROCEDURE — 36415 COLL VENOUS BLD VENIPUNCTURE: CPT | Performed by: EMERGENCY MEDICINE

## 2018-11-14 PROCEDURE — 80048 BASIC METABOLIC PNL TOTAL CA: CPT | Performed by: EMERGENCY MEDICINE

## 2018-11-14 PROCEDURE — 99285 EMERGENCY DEPT VISIT HI MDM: CPT

## 2018-11-14 PROCEDURE — 94664 DEMO&/EVAL PT USE INHALER: CPT

## 2018-11-14 PROCEDURE — 99255 IP/OBS CONSLTJ NEW/EST HI 80: CPT | Performed by: INTERNAL MEDICINE

## 2018-11-14 PROCEDURE — 80156 ASSAY CARBAMAZEPINE TOTAL: CPT | Performed by: FAMILY MEDICINE

## 2018-11-14 PROCEDURE — 94150 VITAL CAPACITY TEST: CPT

## 2018-11-14 RX ORDER — CHLORAL HYDRATE 500 MG
1000 CAPSULE ORAL DAILY
Status: DISCONTINUED | OUTPATIENT
Start: 2018-11-15 | End: 2018-11-15 | Stop reason: HOSPADM

## 2018-11-14 RX ORDER — BRIMONIDINE TARTRATE 0.15 %
1 DROPS OPHTHALMIC (EYE) EVERY 8 HOURS SCHEDULED
Status: DISCONTINUED | OUTPATIENT
Start: 2018-11-14 | End: 2018-11-15 | Stop reason: HOSPADM

## 2018-11-14 RX ORDER — FLUTICASONE FUROATE AND VILANTEROL 200; 25 UG/1; UG/1
1 POWDER RESPIRATORY (INHALATION) DAILY
Status: DISCONTINUED | OUTPATIENT
Start: 2018-11-15 | End: 2018-11-15

## 2018-11-14 RX ORDER — GUAIFENESIN 600 MG
600 TABLET, EXTENDED RELEASE 12 HR ORAL 2 TIMES DAILY
Status: DISCONTINUED | OUTPATIENT
Start: 2018-11-14 | End: 2018-11-15 | Stop reason: HOSPADM

## 2018-11-14 RX ORDER — PANTOPRAZOLE SODIUM 40 MG/1
40 TABLET, DELAYED RELEASE ORAL
Status: DISCONTINUED | OUTPATIENT
Start: 2018-11-15 | End: 2018-11-15 | Stop reason: HOSPADM

## 2018-11-14 RX ORDER — CARBAMAZEPINE 200 MG/1
600 TABLET ORAL EVERY EVENING
Status: DISCONTINUED | OUTPATIENT
Start: 2018-11-14 | End: 2018-11-15 | Stop reason: HOSPADM

## 2018-11-14 RX ORDER — METHYLPREDNISOLONE SODIUM SUCCINATE 125 MG/2ML
125 INJECTION, POWDER, LYOPHILIZED, FOR SOLUTION INTRAMUSCULAR; INTRAVENOUS ONCE
Status: COMPLETED | OUTPATIENT
Start: 2018-11-14 | End: 2018-11-14

## 2018-11-14 RX ORDER — ATORVASTATIN CALCIUM 80 MG/1
80 TABLET, FILM COATED ORAL
Status: DISCONTINUED | OUTPATIENT
Start: 2018-11-14 | End: 2018-11-15 | Stop reason: HOSPADM

## 2018-11-14 RX ORDER — INSULIN GLARGINE 100 [IU]/ML
40 INJECTION, SOLUTION SUBCUTANEOUS
Status: DISCONTINUED | OUTPATIENT
Start: 2018-11-14 | End: 2018-11-15 | Stop reason: HOSPADM

## 2018-11-14 RX ORDER — LISINOPRIL 5 MG/1
5 TABLET ORAL DAILY
Status: DISCONTINUED | OUTPATIENT
Start: 2018-11-15 | End: 2018-11-15 | Stop reason: HOSPADM

## 2018-11-14 RX ORDER — POTASSIUM CHLORIDE 20 MEQ/1
20 TABLET, EXTENDED RELEASE ORAL ONCE
Status: COMPLETED | OUTPATIENT
Start: 2018-11-14 | End: 2018-11-14

## 2018-11-14 RX ORDER — LEVALBUTEROL 1.25 MG/.5ML
1.25 SOLUTION, CONCENTRATE RESPIRATORY (INHALATION) EVERY 8 HOURS PRN
Status: DISCONTINUED | OUTPATIENT
Start: 2018-11-14 | End: 2018-11-14

## 2018-11-14 RX ORDER — IPRATROPIUM BROMIDE AND ALBUTEROL SULFATE 2.5; .5 MG/3ML; MG/3ML
3 SOLUTION RESPIRATORY (INHALATION) 3 TIMES DAILY PRN
Status: DISCONTINUED | OUTPATIENT
Start: 2018-11-14 | End: 2018-11-15 | Stop reason: HOSPADM

## 2018-11-14 RX ORDER — CARBAMAZEPINE 200 MG/1
400 TABLET ORAL EVERY MORNING
Status: DISCONTINUED | OUTPATIENT
Start: 2018-11-15 | End: 2018-11-15 | Stop reason: HOSPADM

## 2018-11-14 RX ORDER — LORATADINE 10 MG/1
10 TABLET ORAL DAILY
Status: DISCONTINUED | OUTPATIENT
Start: 2018-11-15 | End: 2018-11-15 | Stop reason: HOSPADM

## 2018-11-14 RX ORDER — LEVALBUTEROL 1.25 MG/.5ML
0.63 SOLUTION, CONCENTRATE RESPIRATORY (INHALATION)
Status: DISCONTINUED | OUTPATIENT
Start: 2018-11-14 | End: 2018-11-15 | Stop reason: HOSPADM

## 2018-11-14 RX ORDER — MONTELUKAST SODIUM 10 MG/1
10 TABLET ORAL DAILY
Status: DISCONTINUED | OUTPATIENT
Start: 2018-11-15 | End: 2018-11-15 | Stop reason: HOSPADM

## 2018-11-14 RX ORDER — BENZONATATE 100 MG/1
100 CAPSULE ORAL 3 TIMES DAILY PRN
Status: DISCONTINUED | OUTPATIENT
Start: 2018-11-14 | End: 2018-11-15 | Stop reason: HOSPADM

## 2018-11-14 RX ORDER — IPRATROPIUM BROMIDE AND ALBUTEROL SULFATE 2.5; .5 MG/3ML; MG/3ML
3 SOLUTION RESPIRATORY (INHALATION)
Status: DISCONTINUED | OUTPATIENT
Start: 2018-11-14 | End: 2018-11-14

## 2018-11-14 RX ORDER — METHYLPREDNISOLONE SODIUM SUCCINATE 40 MG/ML
40 INJECTION, POWDER, LYOPHILIZED, FOR SOLUTION INTRAMUSCULAR; INTRAVENOUS EVERY 8 HOURS SCHEDULED
Status: DISCONTINUED | OUTPATIENT
Start: 2018-11-14 | End: 2018-11-15 | Stop reason: HOSPADM

## 2018-11-14 RX ORDER — LATANOPROST 50 UG/ML
1 SOLUTION/ DROPS OPHTHALMIC
Status: DISCONTINUED | OUTPATIENT
Start: 2018-11-14 | End: 2018-11-15 | Stop reason: HOSPADM

## 2018-11-14 RX ADMIN — ATORVASTATIN CALCIUM 80 MG: 80 TABLET, FILM COATED ORAL at 22:55

## 2018-11-14 RX ADMIN — ALBUTEROL SULFATE 10 MG: 2.5 SOLUTION RESPIRATORY (INHALATION) at 13:41

## 2018-11-14 RX ADMIN — IPRATROPIUM BROMIDE 1 MG: 0.5 SOLUTION RESPIRATORY (INHALATION) at 13:41

## 2018-11-14 RX ADMIN — CARBAMAZEPINE 600 MG: 200 TABLET ORAL at 17:17

## 2018-11-14 RX ADMIN — INSULIN LISPRO 16 UNITS: 100 INJECTION, SOLUTION INTRAVENOUS; SUBCUTANEOUS at 17:17

## 2018-11-14 RX ADMIN — IPRATROPIUM BROMIDE 0.5 MG: 0.5 SOLUTION RESPIRATORY (INHALATION) at 18:20

## 2018-11-14 RX ADMIN — POTASSIUM CHLORIDE 20 MEQ: 20 TABLET, EXTENDED RELEASE ORAL at 17:16

## 2018-11-14 RX ADMIN — LATANOPROST 1 DROP: 50 SOLUTION OPHTHALMIC at 22:55

## 2018-11-14 RX ADMIN — BRIMONIDINE TARTRATE 1 DROP: 1.5 SOLUTION OPHTHALMIC at 17:19

## 2018-11-14 RX ADMIN — METHYLPREDNISOLONE SODIUM SUCCINATE 125 MG: 125 INJECTION, POWDER, FOR SOLUTION INTRAMUSCULAR; INTRAVENOUS at 14:51

## 2018-11-14 RX ADMIN — INSULIN GLARGINE 40 UNITS: 100 INJECTION, SOLUTION SUBCUTANEOUS at 22:55

## 2018-11-14 RX ADMIN — INSULIN LISPRO 3 UNITS: 100 INJECTION, SOLUTION INTRAVENOUS; SUBCUTANEOUS at 17:18

## 2018-11-14 RX ADMIN — METHYLPREDNISOLONE SODIUM SUCCINATE 40 MG: 40 INJECTION, POWDER, FOR SOLUTION INTRAMUSCULAR; INTRAVENOUS at 22:55

## 2018-11-14 RX ADMIN — BRIMONIDINE TARTRATE 1 DROP: 1.5 SOLUTION OPHTHALMIC at 22:55

## 2018-11-14 RX ADMIN — ALBUTEROL SULFATE 10 MG: 2.5 SOLUTION RESPIRATORY (INHALATION) at 14:39

## 2018-11-14 RX ADMIN — GUAIFENESIN 600 MG: 600 TABLET, EXTENDED RELEASE ORAL at 17:16

## 2018-11-14 RX ADMIN — IPRATROPIUM BROMIDE 0.5 MG: 0.5 SOLUTION RESPIRATORY (INHALATION) at 18:00

## 2018-11-14 RX ADMIN — ALBUTEROL SULFATE 2.5 MG: 2.5 SOLUTION RESPIRATORY (INHALATION) at 18:00

## 2018-11-14 RX ADMIN — METHYLPREDNISOLONE SODIUM SUCCINATE 40 MG: 40 INJECTION, POWDER, FOR SOLUTION INTRAMUSCULAR; INTRAVENOUS at 17:16

## 2018-11-14 NOTE — ASSESSMENT & PLAN NOTE
Lab Results   Component Value Date    HGBA1C 10 6 (H) 12/11/2014       No results for input(s): POCGLU in the last 72 hours      Blood Sugar Average: Last 72 hrs:  · continue gabapentin

## 2018-11-14 NOTE — CONSULTS
Consultation - Pulmonary Medicine   Francoise Burris 46 y o  male MRN: [id number]  Unit/Bed#: 5T -01 Encounter: [id number]      Physician Requesting Consult:  Dr Zina Romero  Reason for Consult:  Severe persistent asthma with acute exacerbation      Assessment:  1  Improved acute pulmonary insufficiency  2  Severe persistent asthma with acute exacerbation  3  Obstructive sleep apnea on CPAP  4  Allergic rhinitis  5  GERD    Plan:   · Continue IV Solu-Medrol for today, I believe if patient is stable he can be switched to prednisone 40 mg tomorrow morning for another 5 days then stop without tapering  · Continue bronchodilator nebulizer therapy with albuterol but be cautious with ipratropium with his history of urinary retention  · Continue Breo while in hospital, after discharge she can go back to his regimen of Dulera and QVar, and p r n  Albuterol  · Continue Singulair and Claritin  · Continue PPI  · Continue CPAP at night  · Encourage out of bed and ambulation  · Patient will call our office and schedule follow-up after discharge     ______________________________________________________________________    HPI:    Francoise Burris is a 46 y o  male who presents with worsening shortness of breath  Patient has severe persistent asthma who was admitted few weeks ago with exacerbation and improved and discharged home given starting Saturday he started to have worsening of his shortness of breath that he attributes to be out under the rain for long time, his shortness of breath was stable until today when he started to have worsening shortness of breath and chest tightness with wheezing and cough productive of green sputum without any fever or chills, denies any hemoptysis, he was brought to the emergency room and started on medications and nebulizer and currently feels much better    Patient had asthma since infancy, he was intubated once due to asthma in 2013, his asthma exacerbations/symptoms got worse over the past few years with multiple exacerbations every year, 3-4 per year, his triggers are usually allergies and also URIs  He is currently on Dulera 200/5 b i d , QVar twice daily, and p r n  Albuterol  He also has allergic rhinitis all year long and he takes Claritin and Singulair  He follows with allergist and he is allergic to multiple antigens and he is currently getting some allergy therapy  At baseline he uses albuterol at least once daily  Patient has peak flow meter at home and is best number is 370 ml, and today is peak flow was 270 then improved to 320  Patient was tried on Spiriva in the past but that caused worsening of his BPH and urinary retention  Patient denies postnasal drips usually  He has GERD symptoms/acid reflux that is controlled with PPI  He also has obstructive sleep apnea diagnosed few years ago and he is currently on CPAP 10 cm H2 O and he is compliant every night  He denies any excessive daytime sleepiness or fatigue after using the CPAP but he notices the difference when he does not use it  He lives at home, he has no pets, he smoked cigarettes in the past for about 7 years, 2 packs per day, he quit in 1992  He does not use any drugs  He was a vee but currently on disability  Review of Systems:  12 points Full review of systems was performed  Aside from what was mentioned in the HPI, it is otherwise negative  Historical Information   Past Medical History:   Diagnosis Date    Asthma     Diabetes mellitus ([address] )     Enlarged prostate     Hyperlipidemia     Seasonal allergic rhinitis     Seizures (HCC)      Past Surgical History:   Procedure Laterality Date    KNEE SURGERY       Social History   History   Alcohol Use No     History   Smoking Status    Former Smoker    Quit date: 1992   Smokeless Tobacco    Never Used       Occupational history:  No occupational exposure    Family History:   History reviewed  No pertinent family history     Multiple family members with asthma including mother, grandmother and siblings    Medications: The patient's active and prehospital medications were reviewed      Current Facility-Administered Medications:  albuterol 2 5 mg Nebulization Q4H Latasha Montenegro MD   albuterol 2 5 mg Nebulization Q2H PRN Latasha Montenegro MD   atorvastatin 80 mg Oral HS Latasha Montenegro MD   benzonatate 100 mg Oral TID PRN Latasha Montenegro MD   brimonidine 1 drop Both [facility] Latasha Montenegro MD   [START ON 11/15/2018] carBAMazepine 400 mg Oral QAM Latasha Montenegro MD   carBAMazepine 600 mg Oral QPM Latasha Montenegro MD   [START ON 11/15/2018] fish oil 1,000 mg Oral Daily MD Audrey Ventura ON 11/15/2018] fluticasone-vilanterol 1 puff Inhalation Daily Latasha Montenegro MD   guaiFENesin 600 mg Oral BID Latasha Montenegro MD   insulin glargine 40 Units Subcutaneous HS Latasha Montenegro MD   insulin lispro 1-6 Units Subcutaneous TID [facility] Latasha Montenegro MD   [START ON 11/15/2018] insulin lispro 12 Units Subcutaneous Daily With Breakfast Latasha Montenegro MD   [START ON 11/15/2018] insulin lispro 16 Units Subcutaneous Daily With Lunch Latasha Montenegro MD   insulin lispro 16 Units Subcutaneous Daily With Khalida Finn MD   ipratropium 0 5 mg Nebulization TID Latasha Montenegro MD   ipratropium-albuterol 3 mL Nebulization Q6H Chase Anderson MD   latanoprost 1 drop Both Eyes HS Latasha Montenegro MD   [START ON 11/15/2018] lisinopril 5 mg Oral Daily MD Audrey Ventura ON 11/15/2018] loratadine 10 mg Oral Daily Latasha Montenegro MD   methylPREDNISolone sodium succinate 40 mg Intravenous [facility] Latasha Montenegro MD   [START ON 11/15/2018] montelukast 10 mg Oral Daily Latasha Montenegro MD   [START ON 11/15/2018] pantoprazole 40 mg Oral Early Morning Latasha Montenegro MD   potassium chloride 20 mEq Oral Once Latasha Montenegro MD         PhysicalExamination:  Vitals:   Vitals:    11/14/18 1334 11/14/18 1425 11/14/18 1541   BP: 143/86 131/78 120/72   BP Location: Left arm Left arm Left arm   Pulse: 99 101 (!) 108   Resp: 22 20 22   Temp: (!) 97 3 °F (36 3 °C)  97 6 °F (36 4 °C)   TempSrc: Tympanic  Temporal   SpO2: 97% 99% 94%   Weight: 94 8 kg (209 lb)  93 5 kg (206 lb 2 1 oz)   Height: 5' 8" (1 727 m)  5' 8" (1 727 m)     Temp  Min: 97 3 °F (36 3 °C)  Max: 97 6 °F (36 4 °C)  IBW: 68 4 kg    SpO2: 94 %,   SpO2 Activity: At Rest,   O2 Device: None (Room air)    General: alert, not in acute distress  HEENT: PERRL, no icteric sclera or cyanosis, no thrush, Mallampati 3  Neck:  Supple, no lymphadenopathy or thyromegaly, no JVD  Lungs:  Equal breath sounds with bilateral diffuse mild wheezing, no crackles, good air more  Heart: S1S2 regular, no murmures or gallops  Abdomen: soft, non-tender, bowel sounds  present  Extrimities: no edema, no clubbing or cyanosis  Neuro: Alert and oriented x 3, no focal neurodeficits   Skin: intact, no rashes  Diagnostic Data:  CBC:     Results from last 7 days  Lab Units 11/14/18  1451   WBC Thousand/uL 7 30   HEMOGLOBIN g/dL 14 4   HEMATOCRIT % 42 6   PLATELETS Thousands/uL 235       CMP:     Results from last 7 days  Lab Units 11/14/18  1451   POTASSIUM mmol/L 3 5*   CHLORIDE mmol/L 102   CO2 mmol/L 31*   BUN mg/dL 10   CREATININE mg/dL 0 80   CALCIUM mg/dL 9 2     PT/INR:   No results found for: PT, INR,     Microbiology:         ABG: No results found for: PHART, WHG2MWX, PO2ART, RKU7EPA, P1NODKLF, BEART, SOURCE    Imaging:  Chest x-ray reviewed on PACs:  Clear lungs       Code Status: Level 1 - Full Code    Dayanal MD Quentin    Portions of the record may have been created with voice recognition software  Occasional wrong word or "sound a like" substitutions may have occurred due to the inherent limitations of voice recognition software  Read the chart carefully and recognize, using context, where substitutions have occurred

## 2018-11-14 NOTE — RESPIRATORY THERAPY NOTE
RT Protocol Note  Josselyn Song 46 y o  male MRN: [id number]  Unit/Bed#: 5T -01 Encounter: [id number]    Assessment    Principal Problem:    Severe persistent asthma with acute exacerbation  Active Problems:    Acute bronchitis    HTN (hypertension)    Type 2 diabetes mellitus, with long-term current use of insulin (HCC)    Seizure (HCC)    Glaucoma    BPH (benign prostatic hyperplasia)    Diabetic neuropathy (HCC)    HLD (hyperlipidemia)    ARPITA (obstructive sleep apnea)    GERD (gastroesophageal reflux disease)    Hypokalemia      Home Pulmonary Medications:  Neb treatments with albuteral/atrovent cpap 10cm h2o  Home Devices/Therapy: Other (Comment)    Past Medical History:   Diagnosis Date    Asthma     Diabetes mellitus ([address] )     Enlarged prostate     Hyperlipidemia     Seasonal allergic rhinitis     Seizures (HCC)      Social History     Social History    Marital status: /Civil Union     Spouse name: N/A    Number of children: N/A    Years of education: N/A     Social History Main Topics    Smoking status: Former Smoker     Quit date: 1992    Smokeless tobacco: Never Used    Alcohol use No    Drug use: No      Comment: previous heroin clean since 1997    Sexual activity: Not Asked     Other Topics Concern    None     Social History Narrative    None       Subjective    pt resting and in no distress  Objective  Pt very knowable about his treatment has all med at home  Physical Exam:   Assessment Type: Pre-treatment  General Appearance: Alert, Awake  Respiratory Pattern: Normal  Chest Assessment: Chest expansion symmetrical  Bilateral Breath Sounds: Expiratory wheezes  Cough: Productive    Vitals:  Blood pressure 120/72, pulse (!) 108, temperature 97 6 °F (36 4 °C), temperature source Temporal, resp  rate 22, height 5' 8" (1 727 m), weight 93 5 kg (206 lb 2 1 oz), SpO2 94 %  Imaging and other studies: I have personally reviewed pertinent reports              Plan  Will change resp treatments to xopenex and atrovent q6h and at this time pt refused cpap 10  Said he was going home tomarrow and if needed tonight will call    Respiratory Plan: Mild Distress pathway

## 2018-11-14 NOTE — PLAN OF CARE
DISCHARGE PLANNING     Discharge to home or other facility with appropriate resources Adequate for Discharge        Knowledge Deficit     Patient/family/caregiver demonstrates understanding of disease process, treatment plan, medications, and discharge instructions Adequate for Discharge        PAIN - ADULT     Verbalizes/displays adequate comfort level or baseline comfort level Adequate for Discharge        Potential for Falls     Patient will remain free of falls Adequate for Discharge        SAFETY ADULT     Maintain or return to baseline ADL function Adequate for Discharge     Maintain or return mobility status to optimal level Adequate for Discharge          INFECTION - ADULT     Absence or prevention of progression during hospitalization Progressing     Absence of fever/infection during neutropenic period Progressing

## 2018-11-14 NOTE — ED PROVIDER NOTES
History  Chief Complaint   Patient presents with    Asthma     I woke up this morining feeling sob and pain in my throat, I was hospitalized with asthma 3 weeks ago for 2-3 days, pt ambulated in with EMS on duo neb treatment into room 5     Patient is a 26-year-old male with a history of asthma who presents with a 2 day history of an asthma exacerbation  States that he has been using his albuterol inhalers at home with no relief  States that he had 3 nebs today prior to calling the ambulance and was given additional neb EN route  No steroids were given by EMS  Patient was just recently admitted here 3 weeks ago for the same was discharged with steroids and antibiotics  Patient states his last intubation was   Also complained of productive green cough no fevers sweats or chills  Worse with exertion better with rest               Prior to Admission Medications   Prescriptions Last Dose Informant Patient Reported? Taking?    Calcium Carb-Cholecalciferol (CALCIUM CARBONATE-VITAMIN D3 PO) 2018 at Unknown time  Yes Yes   Sig: Take 1,500 mg by mouth daily   EPINEPHrine (EPIPEN) 0 3 mg/0 3 mL SOAJ   No No   Sig: Inject 0 3 mL (0 3 mg total) into a muscle once for 1 dose   Mometasone Furo-Formoterol Fum (DULERA) 200-5 MCG/ACT AERO 2018 at Unknown time  Yes Yes   Si puffs   Multiple Vitamins-Minerals (CENTRUM SILVER PO) 2018 at Unknown time  Yes Yes   Si tablet daily   albuterol (2 5 mg/3 mL) 0 083 % nebulizer solution 2018 at Unknown time  Yes Yes   Sig: Inhale 2 5 mg as needed   albuterol (PROVENTIL HFA,VENTOLIN HFA) 90 mcg/act inhaler 2018 at Unknown time  Yes Yes   Sig: Inhale 2 puffs as needed   atorvastatin (LIPITOR) 80 mg tablet   Yes No   Sig: Take 80 mg by mouth daily at bedtime   beclomethasone (QVAR) 40 MCG/ACT inhaler   Yes No   Si puffs as needed     bimatoprost (LUMIGAN) 0 03 % ophthalmic drops 2018 at Unknown time  Yes Yes   Sig: daily   brimonidine (ALPHAGAN P) 0 15 % ophthalmic solution 2018 at Unknown time  Yes Yes   Sig: Administer 1 drop to both eyes every 8 (eight) hours     carBAMazepine (TEGRETOL) 200 mg tablet 2018 at Unknown time  Yes Yes   Sig: Take 2 tabs in the morning and 3 tabs at night   finasteride (PROSCAR) 5 mg tablet 2018 at Unknown time  Yes Yes   Sig: Take 5 mg by mouth daily   gabapentin (NEURONTIN) 300 mg capsule   Yes No   Sig: Take 300 mg by mouth 3 (three) times a day   glucagon 1 MG injection   Yes No   Sig: as needed   insulin glargine (LANTUS) 100 units/mL subcutaneous injection 2018 at Unknown time  Yes Yes   Sig: Inject 40 Units under the skin daily at bedtime   insulin lispro (HumaLOG) 100 units/mL injection 2018 at Unknown time  No Yes   Sig: Inject 12 Units under the skin daily with breakfast   insulin lispro (HumaLOG) 100 units/mL injection 2018 at Unknown time  No Yes   Sig: Inject 16 Units under the skin daily with lunch   insulin lispro (HumaLOG) 100 units/mL injection 2018 at Unknown time  No Yes   Sig: Inject 16 Units under the skin daily with dinner   ipratropium (ATROVENT) 0 02 % nebulizer solution 2018 at Unknown time  Yes Yes   Sig: as needed   lisinopril (ZESTRIL) 5 mg tablet 2018 at Unknown time  Yes Yes   Si mg daily   loratadine (CLARITIN REDITABS) 10 MG dissolvable tablet   Yes No   Sig: Take 10 mg by mouth daily   montelukast (SINGULAIR) 10 mg tablet 2018 at Unknown time  Yes Yes   Sig: 10 mg daily   omega-3-acid ethyl esters (LOVAZA) 1 g capsule   Yes No   Sig: Take 1 g by mouth daily   omeprazole (PriLOSEC) 40 MG capsule   Yes No   Sig: Take 40 mg by mouth daily   predniSONE 10 mg tablet 2018 at Unknown time  No Yes   Sig: Take 1 tablet (10 mg total) by mouth see administration instructions 4 tabs po daily x2 more days start on 10/28, then 3 tabs daily x3 days ,then 2 tabs daily x3 days , then can go back to 10 mg every other day as before  Facility-Administered Medications: None       Past Medical History:   Diagnosis Date    Asthma     Diabetes mellitus ([facility] )     Enlarged prostate     Hyperlipidemia     Seasonal allergic rhinitis     Seizures ([facility] )        Past Surgical History:   Procedure Laterality Date    KNEE SURGERY         History reviewed  No pertinent family history  I have reviewed and agree with the history as documented  Social History   Substance Use Topics    Smoking status: Former Smoker     Quit date: 1992    Smokeless tobacco: Never Used    Alcohol use No        Review of Systems   Constitutional: Negative  Negative for activity change, chills and fever  HENT: Negative  Eyes: Negative  Respiratory: Positive for cough, shortness of breath and wheezing  Cardiovascular: Negative  Negative for chest pain  Gastrointestinal: Negative  Negative for nausea and vomiting  Endocrine: Negative  Genitourinary: Negative  Musculoskeletal: Negative  Skin: Negative  Allergic/Immunologic: Negative  Neurological: Negative  Hematological: Negative  Psychiatric/Behavioral: Negative  All other systems reviewed and are negative  Physical Exam  Physical Exam   Constitutional: He appears well-developed and well-nourished  HENT:   Head: Normocephalic  Right Ear: External ear normal    Left Ear: External ear normal    Nose: Nose normal    Mouth/Throat: Oropharynx is clear and moist    Eyes: Pupils are equal, round, and reactive to light  Conjunctivae are normal    Neck: Normal range of motion  Neck supple  Cardiovascular: Normal rate, regular rhythm, normal heart sounds and intact distal pulses  Pulmonary/Chest: He is in respiratory distress  He has wheezes  Patient with diffuse end-expiratory wheezes in all fields  Abdominal: Soft  Bowel sounds are normal    Musculoskeletal: Normal range of motion  Neurological: He is alert  Skin: Skin is warm and dry   Capillary refill takes less than 2 seconds  Psychiatric: He has a normal mood and affect  His behavior is normal    Nursing note and vitals reviewed        Vital Signs  ED Triage Vitals [11/14/18 1334]   Temperature Pulse Respirations Blood Pressure SpO2   (!) 97 3 °F (36 3 °C) 99 22 143/86 97 %      Temp Source Heart Rate Source Patient Position - Orthostatic VS BP Location FiO2 (%)   Tympanic Monitor Lying Left arm --      Pain Score       No Pain           Vitals:    11/14/18 1334 11/14/18 1425 11/14/18 1541   BP: 143/86 131/78 120/72   Pulse: 99 101 (!) 108   Patient Position - Orthostatic VS: Lying Lying Lying       Visual Acuity      ED Medications  Medications   ipratropium-albuterol (DUO-NEB) 0 5-2 5 mg/3 mL inhalation solution 3 mL (0 mL Nebulization Given to EMS 11/14/18 1410)   potassium chloride (K-DUR,KLOR-CON) CR tablet 20 mEq (not administered)   albuterol inhalation solution 10 mg (10 mg Nebulization Given 11/14/18 1341)   ipratropium (ATROVENT) 0 02 % inhalation solution 1 mg (1 mg Nebulization Given 11/14/18 1341)   albuterol inhalation solution 10 mg (10 mg Nebulization Given 11/14/18 1439)   methylPREDNISolone sodium succinate (Solu-MEDROL) injection 125 mg (125 mg Intravenous Given 11/14/18 1451)       Diagnostic Studies  Results Reviewed     Procedure Component Value Units Date/Time    Blood culture [[id number]] Collected:  11/14/18 1514    Lab Status:  No result Specimen:  Blood from Arm, Left     Carbamazepine level, total [[id number]]     Lab Status:  No result Specimen:  Blood     CBC and differential [[id number]]  (Abnormal) Collected:  11/14/18 1451    Lab Status:  Final result Specimen:  Blood from Arm, Right Updated:  11/14/18 1511     WBC 7 30 Thousand/uL      RBC 4 90 Million/uL      Hemoglobin 14 4 g/dL      Hematocrit 42 6 %      MCV 87 fL      MCH 29 3 pg      MCHC 33 7 g/dL      RDW 13 1 %      MPV 7 4 (L) fL      Platelets 616 Thousands/uL     Basic metabolic panel [[id number]]  (Abnormal) Collected: 11/14/18 1451    Lab Status:  Final result Specimen:  Blood from Arm, Right Updated:  11/14/18 1510     Sodium 140 mmol/L      Potassium 3 5 (L) mmol/L      Chloride 102 mmol/L      CO2 31 (H) mmol/L      ANION GAP 7 mmol/L      BUN 10 mg/dL      Creatinine 0 80 mg/dL      Glucose 135 (H) mg/dL      Calcium 9 2 mg/dL      eGFR 101 ml/min/1 73sq m     Narrative:         National Kidney Disease Education Program recommendations are as follows:  GFR calculation is accurate only with a steady state creatinine  Chronic Kidney disease less than 60 ml/min/1 73 sq  meters  Kidney failure less than 15 ml/min/1 73 sq  meters  Blood culture [[id number]] Collected:  11/14/18 1451    Lab Status:  No result Specimen:  Blood from Arm, Right                  XR chest portable    (Results Pending)              Procedures  Procedures       Phone Contacts  ED Phone Contact    ED Course  ED Course as of Nov 14 1549 Wed Nov 14, 2018   1336 Initial peak flow 270      1429 After hour long neb  Still wheezing, peak flow unchanged at 270       1431 Spoke with Dr Anatoliy Perdomo  Will admit  Okay with 125 of solumedrol  MDM    CritCare Time    Disposition  Final diagnoses:   Status asthmaticus     Time reflects when diagnosis was documented in both MDM as applicable and the Disposition within this note     Time User Action Codes Description Comment    11/14/2018  2:33 PM Becca Andersen Add [G54 207] Status asthmaticus     11/14/2018  3:08 PM Leigh Wallis Add [J45 51] Severe persistent asthma with acute exacerbation     11/14/2018  3:08 PM Leigh Wallis Modify [J45 51] Severe persistent asthma with acute exacerbation       ED Disposition     ED Disposition Condition Comment    Admit  Case was discussed with Dr Anatoliy Perdomo and the patient's admission status was agreed to be Admission Status: inpatient status to the service of Dr Anatoliy Perdomo           Follow-up Information    None         Current Discharge Medication List      CONTINUE these medications which have NOT CHANGED    Details   albuterol (2 5 mg/3 mL) 0 083 % nebulizer solution Inhale 2 5 mg as needed      albuterol (PROVENTIL HFA,VENTOLIN HFA) 90 mcg/act inhaler Inhale 2 puffs as needed      bimatoprost (LUMIGAN) 0 03 % ophthalmic drops daily      brimonidine (ALPHAGAN P) 0 15 % ophthalmic solution Administer 1 drop to both eyes every 8 (eight) hours        Calcium Carb-Cholecalciferol (CALCIUM CARBONATE-VITAMIN D3 PO) Take 1,500 mg by mouth daily      carBAMazepine (TEGRETOL) 200 mg tablet Take 2 tabs in the morning and 3 tabs at night      finasteride (PROSCAR) 5 mg tablet Take 5 mg by mouth daily      insulin glargine (LANTUS) 100 units/mL subcutaneous injection Inject 40 Units under the skin daily at bedtime      !! insulin lispro (HumaLOG) 100 units/mL injection Inject 12 Units under the skin daily with breakfast  Qty: 10 mL, Refills: 0    Associated Diagnoses: Type 2 diabetes mellitus, with long-term current use of insulin ([address] )      ! ! insulin lispro (HumaLOG) 100 units/mL injection Inject 16 Units under the skin daily with lunch  Qty: 10 mL, Refills: 0    Associated Diagnoses: Type 2 diabetes mellitus, with long-term current use of insulin ([address] )      ! ! insulin lispro (HumaLOG) 100 units/mL injection Inject 16 Units under the skin daily with dinner  Qty: 10 mL, Refills: 0    Associated Diagnoses: Type 2 diabetes mellitus, with long-term current use of insulin (HCC)      ipratropium (ATROVENT) 0 02 % nebulizer solution as needed      lisinopril (ZESTRIL) 5 mg tablet 5 mg daily      Mometasone Furo-Formoterol Fum (DULERA) 200-5 MCG/ACT AERO 2 puffs      montelukast (SINGULAIR) 10 mg tablet 10 mg daily      Multiple Vitamins-Minerals (CENTRUM SILVER PO) 1 tablet daily      predniSONE 10 mg tablet Take 1 tablet (10 mg total) by mouth see administration instructions 4 tabs po daily x2 more days start on 10/28, then 3 tabs daily x3 days ,then 2 tabs daily x3 days , then can go back to 10 mg every other day as before  Qty: 40 tablet, Refills: 0    Associated Diagnoses: Severe persistent asthma with acute exacerbation      atorvastatin (LIPITOR) 80 mg tablet Take 80 mg by mouth daily at bedtime      beclomethasone (QVAR) 40 MCG/ACT inhaler 2 puffs as needed        EPINEPHrine (EPIPEN) 0 3 mg/0 3 mL SOAJ Inject 0 3 mL (0 3 mg total) into a muscle once for 1 dose  Qty: 0 3 mL, Refills: 0    Associated Diagnoses: Acute allergic reaction, initial encounter      gabapentin (NEURONTIN) 300 mg capsule Take 300 mg by mouth 3 (three) times a day      glucagon 1 MG injection as needed      loratadine (CLARITIN REDITABS) 10 MG dissolvable tablet Take 10 mg by mouth daily      omega-3-acid ethyl esters (LOVAZA) 1 g capsule Take 1 g by mouth daily      omeprazole (PriLOSEC) 40 MG capsule Take 40 mg by mouth daily       ! ! - Potential duplicate medications found  Please discuss with provider  No discharge procedures on file      ED Provider  Electronically Signed by           Acosta Kingston MD  11/14/18 9383

## 2018-11-14 NOTE — ASSESSMENT & PLAN NOTE
· Obtain tegretol level as last time was sub therapeutic - continue tegretol 400 mg po am and 600 mg po pm

## 2018-11-14 NOTE — ED NOTES
Patient is resting comfortably  , headphones on listening to music, no c/o offered, "I am feeling better"     [address] [facility], RN  11/14/18 4469

## 2018-11-14 NOTE — ASSESSMENT & PLAN NOTE
· Patient does have severe persistent asthma he is usually on prednisone 10 mg every other day  He was hospitalized on 10/27 and discharged on a taper he finished his taper on nov 4th  Resume 10 every other day now comes in with another acute exacerbation  · Will be placed back on Solu-Medrol 40 mg IV q 8 hours he was given a loading of 125 in the ER  · Will restart his although the [facility] he has outpatient will be [facility] to [facility]  · DuoNeb treatments standing- and will prescribe albuterol p r n   · Oxygen if needed to keep sats greater than 92%  · Peak flows pre and post treatment  · Continue singular  · This time will consult pulmonology  · Last intubation 2013  · He finished antibiotics last admission no antibiotics indicated right now  Acute bronchitis most likely viral   · He follows with allergy outpatient and has been on prednisone x 25 years  He does not follow with pulmonology

## 2018-11-14 NOTE — ED NOTES
Pt states he was once intubated on 6/2013 for his asthma, post nasal drip with throat pain and progressive sob since Saturday, "I got stuck in the rain and I think it got me sick", denies fever or chills, not sleeping, decreased appetite     Estelita Leslie RN  11/14/18 4049

## 2018-11-14 NOTE — H&P
H&P- Rebecca Amin 1965, 46 y o  male MRN: [id number]    Unit/Bed#: CHAYITO Encounter: [id number]    Primary Care Provider: Joanie Chowdhury MD   Date and time admitted to hospital: 11/14/2018  1:31 PM        Hypokalemia   Assessment & Plan    · Replace kcl 20 meq po x1, bmp in am      GERD (gastroesophageal reflux disease)   Assessment & Plan    · Continue ppi      ARPITA (obstructive sleep apnea)   Assessment & Plan    · cpap at night at setting of 10     HLD (hyperlipidemia)   Assessment & Plan    · Continue statin     Diabetic neuropathy [facility])   Assessment & Plan    Lab Results   Component Value Date    HGBA1C 10 6 (H) 12/11/2014       No results for input(s): POCGLU in the last 72 hours  Blood Sugar Average: Last 72 hrs:  · continue gabapentin     BPH (benign prostatic hyperplasia)   Assessment & Plan    · Continue proscar     Glaucoma   Assessment & Plan    · Continue lumigan and alphagan eye drops     Seizure ([address] )   Assessment & Plan    · Obtain tegretol level as last time was sub therapeutic - continue tegretol 400 mg po am and 600 mg po pm      Type 2 diabetes mellitus, with long-term current use of insulin ([facility])   Assessment & Plan    · Lab Results   Component Value Date    HGBA1C 10 6 (H) 12/11/2014       No results for input(s): POCGLU in the last 72 hours      Blood Sugar Average: Last 72 hrs:  · continue with lantus 40 units at night and pre meal 12/16/16 and correction sliding scale   · Follows outpatient with [facility] endocrinology      HTN (hypertension)   Assessment & Plan    · Continue lisinopril      Acute bronchitis   Assessment & Plan    · He completed course of antibiotics on last discharge 3 weeks ago- no clinical evidence of pneumonia- no wbc or fever- most likely vital- just symptomatic tx- mucinex and tessalon pearls  · cxr done im unable to view it will await read     * Severe persistent asthma with acute exacerbation   Assessment & Plan    · Patient does have severe persistent asthma he is usually on prednisone 10 mg every other day  He was hospitalized on 10/27 and discharged on a taper he finished his taper on nov 4th  Resume 10 every other day now comes in with another acute exacerbation  · Will be placed back on Solu-Medrol 40 mg IV q 8 hours he was given a loading of 125 in the ER  · Will restart his although the [facility] he has outpatient will be converted to [facility]  · DuoNeb treatments standing- and will prescribe albuterol p r n   · Oxygen if needed to keep sats greater than 92%  · Peak flows pre and post treatment  · Continue singular  · This time will consult pulmonology  · Last intubation 2013  · He finished antibiotics last admission no antibiotics indicated right now  Acute bronchitis most likely viral   · He follows with allergy outpatient and has been on prednisone x 25 years  He does not follow with pulmonology   VTE Prophylaxis: Pharmacologic VTE Prophylaxis contraindicated due to low risk  / sequential compression device   Code Status: full code  POLST: There is no POLST form on file for this patient (pre-hospital)  Discussion with family: patient     Anticipated Length of Stay:  Patient will be admitted on an Inpatient basis with an anticipated length of stay of  > 2 midnights  Justification for Hospital Stay: severe persistent asthma exacerbation     Total Time for Visit, including Counseling / Coordination of Care: 1 hour  Greater than 50% of this total time spent on direct patient counseling and coordination of care  Chief Complaint:   Sob     History of Present Illness:    Licha Oneil is a 46 y o  male who presents with shortness of breath and productive cough of green sputum since Friday  Patient states he got wet from the rain on Friday and that were all started otherwise since finishing his tapered steroids on November 4th he felt great week after which he followed up with his allergist specialist and received an allergy shot  He has no fevers he has been taking all his medications as prescribed  Also associated with runny nose and sore throat  No chest pain this time  Post nebulizer treatment in the ER feels little better but still tight  No nausea no vomiting no diarrhea no abdominal pain  Patient was previously intubated with a cardiac arrest from exacerbation of asthma in 2013  Last exacerbation he was hospitalized here in October 27  Previous to that [place] 2017  He states he has been using all his medications and compliant with it  He does not have a pulmonologist outpatient and he will white to follow up with 1  No sick contact  He is on chronic prednisone every other day  He does not wear any cologne he does have carpet in his house he thinks he has mold as well  Patient does not smoke anymore he quit smoking in 89 and he does not do heroin anymore he quit in 97 he used to snort heroin  Review of Systems:    Review of Systems   Constitutional: Negative for fatigue and fever  HENT: Positive for rhinorrhea and sore throat  Negative for ear pain  Eyes: Negative  Negative for pain and itching  Respiratory: Positive for cough, shortness of breath and wheezing  Negative for chest tightness  Cardiovascular: Negative  Negative for chest pain, palpitations and leg swelling  Gastrointestinal: Negative  Negative for abdominal pain, diarrhea, nausea and vomiting  Endocrine: Negative for polydipsia, polyphagia and polyuria  Genitourinary: Negative for dysuria and flank pain  Musculoskeletal: Negative  Negative for back pain and myalgias  Skin: Negative  Negative for rash and wound  Neurological: Negative  Negative for dizziness, syncope, speech difficulty, weakness, light-headedness, numbness and headaches  Psychiatric/Behavioral: Negative for agitation, confusion and decreased concentration  All other systems reviewed and are negative        Past Medical and Surgical History:     Past Medical History:   Diagnosis Date    Asthma     Diabetes mellitus (HCC)     Enlarged prostate     Hyperlipidemia     Seasonal allergic rhinitis     Seizures (HCC)        Past Surgical History:   Procedure Laterality Date    KNEE SURGERY         Meds/Allergies:    Prior to Admission medications    Medication Sig Start Date End Date Taking?  Authorizing Provider   albuterol (2 5 mg/3 mL) 0 083 % nebulizer solution Inhale 2 5 mg as needed 7/3/15  Yes Historical Provider, MD   albuterol (PROVENTIL HFA,VENTOLIN HFA) 90 mcg/act inhaler Inhale 2 puffs as needed 4/8/09  Yes Historical Provider, MD   bimatoprost (LUMIGAN) 0 03 % ophthalmic drops daily 6/5/08  Yes Historical Provider, MD   brimonidine (ALPHAGAN P) 0 15 % ophthalmic solution Administer 1 drop to both eyes every 8 (eight) hours   7/2/15  Yes Historical Provider, MD   Calcium Carb-Cholecalciferol (CALCIUM CARBONATE-VITAMIN D3 PO) Take 1,500 mg by mouth daily 9/20/13  Yes Historical Provider, MD   carBAMazepine (TEGRETOL) 200 mg tablet Take 2 tabs in the morning and 3 tabs at night 10/19/16  Yes Historical Provider, MD   finasteride (PROSCAR) 5 mg tablet Take 5 mg by mouth daily 4/26/16  Yes Historical Provider, MD   insulin glargine (LANTUS) 100 units/mL subcutaneous injection Inject 40 Units under the skin daily at bedtime   Yes Historical Provider, MD   insulin lispro (HumaLOG) 100 units/mL injection Inject 12 Units under the skin daily with breakfast 10/27/18  Yes Hesham Nunez MD   insulin lispro (HumaLOG) 100 units/mL injection Inject 16 Units under the skin daily with lunch 10/27/18  Yes Hesham Nunez MD   insulin lispro (HumaLOG) 100 units/mL injection Inject 16 Units under the skin daily with dinner 10/27/18  Yes Hesham Nunez MD   ipratropium (ATROVENT) 0 02 % nebulizer solution as needed 7/2/15  Yes Historical Provider, MD   lisinopril (ZESTRIL) 5 mg tablet 5 mg daily 7/7/16  Yes Historical Provider, MD   Mometasone Furo-Formoterol Fum (DULERA) 200-5 MCG/ACT AERO 2 puffs 8/31/15  Yes Historical Provider, MD   montelukast (SINGULAIR) 10 mg tablet 10 mg daily 8/25/15  Yes Historical Provider, MD   Multiple Vitamins-Minerals (CENTRUM SILVER PO) 1 tablet daily 6/2/11  Yes Historical Provider, MD   predniSONE 10 mg tablet Take 1 tablet (10 mg total) by mouth see administration instructions 4 tabs po daily x2 more days start on 10/28, then 3 tabs daily x3 days ,then 2 tabs daily x3 days , then can go back to 10 mg every other day as before  10/27/18  Yes Anupama Pal MD   atorvastatin (LIPITOR) 80 mg tablet Take 80 mg by mouth daily at bedtime 9/8/16 8/4/18  Historical Provider, MD   beclomethasone (QVAR) 40 MCG/ACT inhaler 2 puffs as needed   8/27/10   Historical Provider, MD   EPINEPHrine (EPIPEN) 0 3 mg/0 3 mL SOAJ Inject 0 3 mL (0 3 mg total) into a muscle once for 1 dose 8/4/18 8/4/18  David Solomon MD   gabapentin (NEURONTIN) 300 mg capsule Take 300 mg by mouth 3 (three) times a day 9/8/16 8/4/18  Historical Provider, MD   glucagon 1 MG injection as needed 8/5/16   Historical Provider, MD   loratadine (CLARITIN REDITABS) 10 MG dissolvable tablet Take 10 mg by mouth daily 4/2/15   Historical Provider, MD   omega-3-acid ethyl esters (LOVAZA) 1 g capsule Take 1 g by mouth daily    Historical Provider, MD   omeprazole (PriLOSEC) 40 MG capsule Take 40 mg by mouth daily 4/26/16   Historical Provider, MD   diphenhydrAMINE (BENADRYL) 25 mg capsule Take 1 capsule (25 mg total) by mouth every 6 (six) hours as needed for itching 8/4/18 11/14/18  David Solomon MD     I have reviewed home medications using allscripts  Allergies:    Allergies   Allergen Reactions    Aspirin     Haloperidol Other (See Comments)     "bent out of shape" - dystonia    Ibuprofen GI Bleeding    Licorice Flavor [Flavoring Agent]     Lithium Other (See Comments)     edema    Penicillins     Propoxyphene Hives    Tramadol      Other reaction(s): heart pumps real fast    Wasp Venom        Social History:     Marital Status: /Civil Union   Substance Use History:   History   Alcohol Use No     History   Smoking Status    Former Smoker    Quit date: 1992   Smokeless Tobacco    Never Used     History   Drug Use No     Comment: previous heroin clean since 1997       Family History:    History reviewed  No pertinent family history  Physical Exam:     Vitals:   Blood Pressure: 131/78 (11/14/18 1425)  Pulse: 101 (11/14/18 1425)  Temperature: (!) 97 3 °F (36 3 °C) (11/14/18 1334)  Temp Source: Tympanic (11/14/18 1334)  Respirations: 20 (11/14/18 1425)  Height: 5' 8" (172 7 cm) (11/14/18 1334)  Weight - Scale: 94 8 kg (209 lb) (11/14/18 1334)  SpO2: 99 % (11/14/18 1425)    Physical Exam   Constitutional: He is oriented to person, place, and time  He appears well-developed and well-nourished  HENT:   Head: Normocephalic and atraumatic  Mouth/Throat: No oropharyngeal exudate  Eyes: Pupils are equal, round, and reactive to light  EOM are normal    Neck: Normal range of motion  No tracheal deviation present  No thyromegaly present  Cardiovascular: Normal heart sounds  Tachycardia present  Pulmonary/Chest: Effort normal  No stridor  No respiratory distress  He has wheezes (Expiratory wheezing throughout lung fields)  Abdominal: Soft  Bowel sounds are normal  He exhibits no distension  There is no tenderness  Musculoskeletal: Normal range of motion  He exhibits no edema or tenderness  Neurological: He is alert and oriented to person, place, and time  Skin: Skin is warm  Psychiatric: He has a normal mood and affect  Additional Data:     Lab Results: I have personally reviewed pertinent reports          Results from last 7 days  Lab Units 11/14/18  1451   WBC Thousand/uL 7 30   HEMOGLOBIN g/dL 14 4   HEMATOCRIT % 42 6   PLATELETS Thousands/uL 235   BANDS PCT % 2   TOTAL NEUT ABS Thousand/uL 5 40   LYMPHO PCT % 18*   MONO PCT % 5   EOS PCT % 3       Results from last 7 days  Lab Units 11/14/18  1451   POTASSIUM mmol/L 3 5*   CHLORIDE mmol/L 102   CO2 mmol/L 31*   BUN mg/dL 10   CREATININE mg/dL 0 80   ANION GAP mmol/L 7   CALCIUM mg/dL 9 2                       Imaging:  I am unable to open chest x-ray with multiple attempts two-view myself awaiting final read    XR chest portable    (Results Pending)       EKG, Pathology, and Other Studies Reviewed on Admission:   · EKG: will order     Allscripts / Epic Records Reviewed: Yes     ** Please Note: This note has been constructed using a voice recognition system   **

## 2018-11-14 NOTE — ASSESSMENT & PLAN NOTE
· He completed course of antibiotics on last discharge 3 weeks ago- no clinical evidence of pneumonia- no wbc or fever- most likely vital- just symptomatic tx- mucinex and tessalon pearls  · cxr done im unable to view it will await read

## 2018-11-14 NOTE — ASSESSMENT & PLAN NOTE
· Lab Results   Component Value Date    HGBA1C 10 6 (H) 12/11/2014       No results for input(s): POCGLU in the last 72 hours      Blood Sugar Average: Last 72 hrs:  · continue with lantus 40 units at night and pre meal 12/16/16 and correction sliding scale   · Follows outpatient with [facility] endocrinology

## 2018-11-15 VITALS
DIASTOLIC BLOOD PRESSURE: 80 MMHG | HEART RATE: 94 BPM | HEIGHT: 68 IN | BODY MASS INDEX: 31.24 KG/M2 | OXYGEN SATURATION: 97 % | SYSTOLIC BLOOD PRESSURE: 134 MMHG | WEIGHT: 206.13 LBS | RESPIRATION RATE: 18 BRPM | TEMPERATURE: 97 F

## 2018-11-15 LAB
ANION GAP SERPL CALCULATED.3IONS-SCNC: 6 MMOL/L (ref 5–14)
ATRIAL RATE: 104 BPM
BUN SERPL-MCNC: 14 MG/DL (ref 5–25)
CALCIUM SERPL-MCNC: 9.9 MG/DL (ref 8.4–10.2)
CHLORIDE SERPL-SCNC: 102 MMOL/L (ref 97–108)
CO2 SERPL-SCNC: 31 MMOL/L (ref 22–30)
CREAT SERPL-MCNC: 0.78 MG/DL (ref 0.7–1.5)
ERYTHROCYTE [DISTWIDTH] IN BLOOD BY AUTOMATED COUNT: 13.2 %
GFR SERPL CREATININE-BSD FRML MDRD: 102 ML/MIN/1.73SQ M
GLUCOSE SERPL-MCNC: 203 MG/DL (ref 70–99)
GLUCOSE SERPL-MCNC: 220 MG/DL (ref 70–99)
HCT VFR BLD AUTO: 41.4 % (ref 41–53)
HGB BLD-MCNC: 14.1 G/DL (ref 13.5–17.5)
LYMPHOCYTES # BLD AUTO: 0.12 THOUSAND/UL (ref 0.5–4)
LYMPHOCYTES # BLD AUTO: 1 % (ref 20–50)
MCH RBC QN AUTO: 29.8 PG (ref 26–34)
MCHC RBC AUTO-ENTMCNC: 34 G/DL (ref 31–36)
MCV RBC AUTO: 88 FL (ref 80–100)
MONOCYTES # BLD AUTO: 0.12 THOUSAND/UL (ref 0.2–0.9)
MONOCYTES NFR BLD AUTO: 1 % (ref 1–10)
NEUTS BAND NFR BLD MANUAL: 20 % (ref 0–8)
NEUTS SEG # BLD: 11.56 THOUSAND/UL (ref 1.8–7.8)
NEUTS SEG NFR BLD AUTO: 78 %
P AXIS: 61 DEGREES
PLATELET # BLD AUTO: 247 THOUSANDS/UL (ref 150–450)
PLATELET BLD QL SMEAR: ADEQUATE
PMV BLD AUTO: 7.6 FL (ref 8.9–12.7)
POTASSIUM SERPL-SCNC: 4.4 MMOL/L (ref 3.6–5)
PR INTERVAL: 146 MS
QRS AXIS: 18 DEGREES
QRSD INTERVAL: 82 MS
QT INTERVAL: 328 MS
QTC INTERVAL: 431 MS
RBC # BLD AUTO: 4.72 MILLION/UL (ref 4.5–5.9)
RBC MORPH BLD: NORMAL
SODIUM SERPL-SCNC: 139 MMOL/L (ref 137–147)
T WAVE AXIS: 35 DEGREES
TOTAL CELLS COUNTED SPEC: 100
VENTRICULAR RATE: 104 BPM
WBC # BLD AUTO: 11.8 THOUSAND/UL (ref 4.5–11)

## 2018-11-15 PROCEDURE — 80048 BASIC METABOLIC PNL TOTAL CA: CPT | Performed by: FAMILY MEDICINE

## 2018-11-15 PROCEDURE — 94640 AIRWAY INHALATION TREATMENT: CPT

## 2018-11-15 PROCEDURE — 93010 ELECTROCARDIOGRAM REPORT: CPT | Performed by: INTERNAL MEDICINE

## 2018-11-15 PROCEDURE — 85007 BL SMEAR W/DIFF WBC COUNT: CPT | Performed by: FAMILY MEDICINE

## 2018-11-15 PROCEDURE — 99239 HOSP IP/OBS DSCHRG MGMT >30: CPT | Performed by: INTERNAL MEDICINE

## 2018-11-15 PROCEDURE — 94760 N-INVAS EAR/PLS OXIMETRY 1: CPT

## 2018-11-15 PROCEDURE — 82948 REAGENT STRIP/BLOOD GLUCOSE: CPT

## 2018-11-15 PROCEDURE — 85027 COMPLETE CBC AUTOMATED: CPT | Performed by: FAMILY MEDICINE

## 2018-11-15 RX ORDER — PREDNISONE 10 MG/1
TABLET ORAL
Qty: 30 TABLET | Refills: 0 | Status: SHIPPED | OUTPATIENT
Start: 2018-11-15 | End: 2019-01-28 | Stop reason: ALTCHOICE

## 2018-11-15 RX ORDER — FLUTICASONE FUROATE AND VILANTEROL 200; 25 UG/1; UG/1
1 POWDER RESPIRATORY (INHALATION) DAILY
Status: DISCONTINUED | OUTPATIENT
Start: 2018-11-16 | End: 2018-11-15 | Stop reason: HOSPADM

## 2018-11-15 RX ORDER — ALBUTEROL SULFATE 2.5 MG/3ML
2.5 SOLUTION RESPIRATORY (INHALATION) EVERY 4 HOURS PRN
Qty: 25 VIAL | Refills: 0 | Status: SHIPPED | OUTPATIENT
Start: 2018-11-15

## 2018-11-15 RX ORDER — ALBUTEROL SULFATE 90 UG/1
2 AEROSOL, METERED RESPIRATORY (INHALATION) EVERY 4 HOURS PRN
Qty: 1 INHALER | Refills: 0 | Status: SHIPPED | OUTPATIENT
Start: 2018-11-15

## 2018-11-15 RX ORDER — PREDNISONE 10 MG/1
10 TABLET ORAL EVERY OTHER DAY
COMMUNITY
End: 2019-05-03 | Stop reason: HOSPADM

## 2018-11-15 RX ADMIN — CARBAMAZEPINE 400 MG: 200 TABLET ORAL at 08:18

## 2018-11-15 RX ADMIN — BRIMONIDINE TARTRATE 1 DROP: 1.5 SOLUTION OPHTHALMIC at 06:36

## 2018-11-15 RX ADMIN — LORATADINE 10 MG: 10 TABLET ORAL at 08:18

## 2018-11-15 RX ADMIN — Medication 1000 MG: at 08:18

## 2018-11-15 RX ADMIN — FLUTICASONE FUROATE AND VILANTEROL TRIFENATATE 1 PUFF: 200; 25 POWDER RESPIRATORY (INHALATION) at 07:33

## 2018-11-15 RX ADMIN — PANTOPRAZOLE SODIUM 40 MG: 40 TABLET, DELAYED RELEASE ORAL at 06:37

## 2018-11-15 RX ADMIN — LEVALBUTEROL HYDROCHLORIDE 0.63 MG: 1.25 SOLUTION, CONCENTRATE RESPIRATORY (INHALATION) at 07:32

## 2018-11-15 RX ADMIN — INSULIN LISPRO 12 UNITS: 100 INJECTION, SOLUTION INTRAVENOUS; SUBCUTANEOUS at 08:19

## 2018-11-15 RX ADMIN — LISINOPRIL 5 MG: 5 TABLET ORAL at 08:18

## 2018-11-15 RX ADMIN — IPRATROPIUM BROMIDE 0.5 MG: 0.5 SOLUTION RESPIRATORY (INHALATION) at 00:38

## 2018-11-15 RX ADMIN — MONTELUKAST SODIUM 10 MG: 10 TABLET, FILM COATED ORAL at 08:18

## 2018-11-15 RX ADMIN — IPRATROPIUM BROMIDE AND ALBUTEROL SULFATE 3 ML: 2.5; .5 SOLUTION RESPIRATORY (INHALATION) at 06:10

## 2018-11-15 RX ADMIN — IPRATROPIUM BROMIDE 0.5 MG: 0.5 SOLUTION RESPIRATORY (INHALATION) at 07:32

## 2018-11-15 RX ADMIN — LEVALBUTEROL HYDROCHLORIDE 0.63 MG: 1.25 SOLUTION, CONCENTRATE RESPIRATORY (INHALATION) at 00:38

## 2018-11-15 RX ADMIN — GUAIFENESIN 600 MG: 600 TABLET, EXTENDED RELEASE ORAL at 08:18

## 2018-11-15 RX ADMIN — METHYLPREDNISOLONE SODIUM SUCCINATE 40 MG: 40 INJECTION, POWDER, FOR SOLUTION INTRAMUSCULAR; INTRAVENOUS at 06:37

## 2018-11-15 RX ADMIN — INSULIN LISPRO 2 UNITS: 100 INJECTION, SOLUTION INTRAVENOUS; SUBCUTANEOUS at 06:37

## 2018-11-15 NOTE — DISCHARGE INSTR - AVS FIRST PAGE
1  Asthma exacerbation    Continue prednisone taper and resume 10 mg every other day as previously taken

## 2018-11-15 NOTE — NURSING NOTE
Patient's assessment as charted  Alert and orientated x3  Lungs with inspiratory and expiratory wheezes christal  Denies shortness of breath and chest pain  Patient inquiring as to if he'll be discharged today, nurse advised patient that physician will discuss this when rounds are done but he is still on IV solumedrol at this time to which patient stated "I'll probably stay today and go home tomorrow"  Patient compliant when speaking about when discharge will occur  Remaining assessment WNL, no pain, abdomen unremarkable  No edema  Call bell in reach, will monitor

## 2018-11-15 NOTE — NURSING NOTE
Patient's VS stable  Diminished breath sounds  Inspr  And expiratory wheezes present  SCDs are on  Call bell within reach

## 2018-11-15 NOTE — DISCHARGE SUMMARY
Discharge- Colton Javed 1965, 46 y o  male MRN: [id number]    Unit/Bed#: 5T -01 Encounter: [id number]      Admitting Provider:  Ren Sarabia MD  Discharge Provider:  David Farah DO  Admission Date: 11/14/2018       Discharge Date: 11/15/18   LOS: 1  Primary Care Physician at Discharge: [facility]:  Colton Javed is a 46 y o  male who presented worsening shortness of breath  The patient has severe asthma maintain on prednisone 10 mg every other day  During hospitalization he was found to have pulmonary insufficiency in the setting of severe asthma with exacerbation  He was seen by pulmonary  He was started on IV methylprednisolone with improving symptoms  Status at time of discharge he is able to ambulate, shower, and perform activities of daily living without difficulty  DISCHARGE DIAGNOSES  * Severe persistent asthma with acute exacerbation   Assessment & Plan    Seen by pulmonary  PTA on prednisone 10 mg every other day  Was placed on IV methylprednisolone during hospitalization  Seen by Pulmonary  Will be discharged prednisone taper and can resume 10 mg every other day as previously taken  Can resume montelukast albuterol p r n  and dulera     GERD (gastroesophageal reflux disease)   Assessment & Plan    Continue omeprazole after discharge     ARPITA (obstructive sleep apnea)   Assessment & Plan    Continue CPAP q h s  HLD (hyperlipidemia)   Assessment & Plan    Continue omega 3 and atorvastatin after discharge     Seizure [facility])   Assessment & Plan    Seizure disorder on tegretol     Type 2 diabetes mellitus, with long-term current use of insulin [facility])   Assessment & Plan    Lab Results   Component Value Date    HGBA1C 10 6 (H) 12/11/2014       Recent Labs      11/14/18   1707  11/15/18   0604   POCGLU  251*  203*       Steroid induced hyperglycemia    Continue glargine and lispro as previously taken     HTN (hypertension)   Assessment & Plan    Blood pressure stable lisinopril     Acute bronchitis   Assessment & Plan    The viral tracheobronchitis  No evidence of infiltrate on chest x-ray  [facility]   Dr Joselito Hess - Pulmonary    RADIOLOGY RESULTS  Xr Chest Portable  Result Date: 11/14/2018  Impression: No acute cardiopulmonary disease  Workstation performed: [id number]     LABS  Results from last 7 days  Lab Units 11/15/18  0537 11/14/18  1451   WBC Thousand/uL 11 80* 7 30   HEMOGLOBIN g/dL 14 1 14 4   HEMATOCRIT % 41 4 42 6   MCV fL 88 87   BANDS PCT % 20* 2   PLATELETS Thousands/uL 247 235       Results from last 7 days  Lab Units 11/15/18  0537 11/14/18  1451   SODIUM mmol/L 139 140   POTASSIUM mmol/L 4 4 3 5*   CHLORIDE mmol/L 102 102   CO2 mmol/L 31* 31*   BUN mg/dL 14 10   CREATININE mg/dL 0 78 0 80   CALCIUM mg/dL 9 9 9 2   EGFR ml/min/1 73sq m 102 101   GLUCOSE RANDOM mg/dL 220* 135*       Results from last 7 days  Lab Units 11/15/18  0604 11/14/18  1707   POC GLUCOSE mg/dl 203* 251*       PHYSICAL EXAM:  Vitals:   Blood Pressure: 134/80 (11/15/18 0818)  Pulse: 94 (11/15/18 0730)  Temperature: (!) 97 °F (36 1 °C) (11/15/18 0730)  Temp Source: Temporal (11/15/18 0730)  Respirations: 18 (11/15/18 0730)  Height: 5' 8" (172 7 cm) (11/14/18 1541)  Weight - Scale: 93 5 kg (206 lb 2 1 oz) (11/14/18 1541)  SpO2: 97 % (11/15/18 0734)    General appearance: alert, appears stated age and cooperative  Head: Normocephalic, without obvious abnormality, atraumatic  Eyes: conjunctivae/corneas clear  PERRL, EOM's intact  Lungs: diminished breath sounds  Heart: regular rate and rhythm  Abdomen: soft, non-tender; bowel sounds normal; no masses,  no organomegaly  Back: symmetric, no curvature  ROM normal  No CVA tenderness    Extremities: no ulcers, gangrene or trophic changes  Neurologic: Grossly normal    Planned Re-admission: NO  Discharge Disposition: Home/Self Care    Test Results Pending at Discharge:    Order Current Status    Blood culture In process    Blood culture In process        Incidental findings: None    Medications   Please see After Visit Summary for reconciled discharge medications provided to patient and family  Diet restrictions: Diabetic diet   Activity restrictions: No strenuous activity  Discharge Condition: fair    Outpatient Follow-Up  1  Cherri Alcazar MD [id number] / [place] [address] [phone] - follow-up within one week  2  Angy Vidal MD pulmonary  Call for follow-up appointment    Code Status: Level 1 - Full Code  Discharge Statement   I spent 35 minutes discharging the patient  This time was spent on the day of discharge  Greater than 50% of total time was spent with the patient and / or family counseling and / or coordination of care  ** Please Note: This note has been constructed using a voice recognition system   **

## 2018-11-15 NOTE — PROGRESS NOTES
Clinical Pharmacy Note: Carbamazepine    Tilton Nissen is a 46 y o  male who presents with seizure ([facility] [address] )    Assessment/Plan:    Carbamazepine indication: continuation of home medication and seizure disorder  Edd Scott is currently taking 1000 mg carbamazepine tablet twice daily  Last carbamazepine level concentration is 3 8ug/mL on 11/15/2018  Continue current-dose/current dosing and Take steady state trough after 4 days of therapy  Patient specific Drug Interactions: solu-medrol    Monitoring:    Therapeutic carbamazepine levels are 4-12 mg/L based on epilepsy studies  Will target this serum range to prevent toxicity and achieve symptom resolvement  Common side effects include: upset stomach, dry mouth, constipation, dizziness, drowsiness  Carbamazepine may cause hyponatremia and agranulocytosis so monitor baseline and weekly sodium levels and complete blood count  Assess for worsening depressive symptoms, mild rash, and severe rash with blistering of the skin and mucus membranes  If a severe rash occurs, discontinue carbamazepine and assess for Ojeda-Rock Syndrome  Carbamazepine has several drug interactions and may decrease the effectiveness of: oral contraceptives, antipsychotics, anticonvulsants, calcium channel blockers, benzodiazepines, antibiotics, statins, HIV medications, anticoagulations, tricyclic antidepressants, and MAO-Is  Reassess carbamazepine level if liver function or mental status changes      Carbamazepine:    0  Lab Value Date/Time   CARBAMAZEPIN 3 8 (L) 11/14/2018 1451       Sodium:    0  Lab Value Date/Time   SODIUM 139 11/15/2018 0537   SODIUM 140 11/14/2018 1451   SODIUM 137 10/25/2018 1003       Complete Blood Count:    0  Lab Value Date/Time   WBC 11 80 (H) 11/15/2018 0537   WBC 7 30 11/14/2018 1451   WBC 9 40 10/25/2018 1003   WBC 8 75 12/11/2014 0545   WBC 6 79 12/10/2014 1136   WBC 7 24 10/01/2014 0720       0  Lab Value Date/Time   HGB 14 1 11/15/2018 0537   HGB 14 4 11/14/2018 1451   HGB 15 4 10/25/2018 1003   HGB 15 0 12/11/2014 0545   HGB 14 8 12/10/2014 1136   HGB 15 0 10/01/2014 0720       0  Lab Value Date/Time   HCT 41 4 11/15/2018 0537   HCT 42 6 11/14/2018 1451   HCT 45 4 10/25/2018 1003   HCT 44 2 12/11/2014 0545   HCT 43 0 12/10/2014 1136   HCT 43 0 10/01/2014 0720       0  Lab Value Date/Time    11/15/2018 0537    11/14/2018 1451    10/25/2018 1003    12/11/2014 0545    12/10/2014 1136    10/01/2014 0720       0  Lab Value Date/Time   MCV 88 11/15/2018 0537   MCV 87 11/14/2018 1451   MCV 87 10/25/2018 1003   MCV 85 12/11/2014 0545   MCV 85 12/10/2014 1136   MCV 83 10/01/2014 0720       0  Lab Value Date/Time   MCH 29 8 11/15/2018 0537   MCH 29 3 11/14/2018 1451   MCH 29 4 10/25/2018 1003   MCH 28 8 12/11/2014 0545   MCH 29 1 12/10/2014 1136   MCH 28 9 10/01/2014 0720       0  Lab Value Date/Time   MCHC 34 0 11/15/2018 0537   MCHC 33 7 11/14/2018 1451   MCHC 33 9 10/25/2018 1003   MCHC 33 9 12/11/2014 0545   MCHC 34 4 12/10/2014 1136   MCHC 34 9 10/01/2014 0720       0  Lab Value Date/Time   RDW 13 2 11/15/2018 0537   RDW 13 1 11/14/2018 1451   RDW 12 8 10/25/2018 1003   RDW 13 0 12/11/2014 0545   RDW 12 9 12/10/2014 1136   RDW 12 6 10/01/2014 0720       0  Lab Value Date/Time   NEUTROABS 11 56 (H) 11/15/2018 0537   NEUTROABS 5 40 11/14/2018 1451   NEUTROABS 7 60 10/25/2018 1003   NEUTROABS 2 74 07/22/2018 0610   NEUTROABS 3 83 07/18/2017 0141   NEUTROABS 7 61 12/11/2014 0545   NEUTROABS 4 82 12/10/2014 1136   NEUTROABS 4 05 10/01/2014 0720       0  Lab Value Date/Time   LYMPHSABS 0 12 (L) 11/15/2018 0537   LYMPHSABS 1 31 11/14/2018 1451   LYMPHSABS 1 20 10/25/2018 1003   LYMPHSABS 2 37 07/22/2018 0610   LYMPHSABS 2 38 07/18/2017 0141   LYMPHSABS 0 61 12/11/2014 0545   LYMPHSABS 0 88 12/10/2014 1136   LYMPHSABS 2 17 10/01/2014 0720       0  Lab Value Date/Time   MONOSABS 0 50 10/25/2018 1003   MONOSABS 0 57 07/22/2018 0610   MONOSABS 0 61 07/18/2017 0141   MONOSABS 0 53 12/11/2014 0545   MONOSABS 0 95 12/10/2014 1136   MONOSABS 0 65 10/01/2014 0720       0  Lab Value Date/Time   EOSABS 0 22 11/14/2018 1451   EOSABS 0 10 10/25/2018 1003   EOSABS 0 31 07/22/2018 0610   EOSABS 0 26 07/18/2017 0141   EOSABS 0 00 12/11/2014 0545   EOSABS 0 14 12/10/2014 1136   EOSABS 0 36 10/01/2014 0720       0  Lab Value Date/Time   BASOSABS 0 10 10/25/2018 1003   BASOSABS 0 04 07/22/2018 0610   BASOSABS 0 02 07/18/2017 0141   BASOSABS 0 01 12/11/2014 0545   BASOSABS 0 02 12/10/2014 1136   BASOSABS 0 01 10/01/2014 0720         Liver Function:    0  Lab Value Date/Time   ALB 4 4 10/25/2018 1003   ALB 3 9 07/22/2018 0610   ALB 3 6 10/01/2014 0720   ALB 3 7 04/17/2014 1525   ALB 3 7 04/02/2014 0655       0  Lab Value Date/Time   TBILI 0 80 10/25/2018 1003   TBILI 0 21 07/22/2018 0610       0  Lab Value Date/Time   AST 19 10/25/2018 1003   AST 14 07/22/2018 0610   AST 9 (L) 10/01/2014 0720   AST 17 04/17/2014 1525   AST 18 04/02/2014 0655       0  Lab Value Date/Time   ALT 30 10/25/2018 1003   ALT 30 07/22/2018 0610   ALT 26 10/01/2014 0720   ALT 35 04/17/2014 1525   ALT 26 04/02/2014 0655     No results found for: INR    Pharmacy will continue to follow patient with team     Electronically signed by: Immanuel Brown, Pharmacist

## 2018-11-15 NOTE — ASSESSMENT & PLAN NOTE
Seen by pulmonary  PTA on prednisone 10 mg every other day  Was placed on IV methylprednisolone during hospitalization  Seen by Pulmonary  Will be discharged prednisone taper and can resume 10 mg every other day as previously taken    Can resume montelukast albuterol bucky r n  and dulera

## 2018-11-15 NOTE — SOCIAL WORK
Pt admitted for treatment of severe persistent asthma w/ acute exacerbation  Pt admitted for same on 10/27 and was discharged home on a steroid taper  Pt is followed out-patient by his PCP Dr Jonathan Sylvester and allergist through [facility]  Pt has been inquiring nursing on ability to be discharged home today  SW met with pt to complete assessment  Pt lives with his wife in a 1st floor apartment with 6 NENA  Pt ambulates independently w/o AD and is independent with self-care/home management tasks  Due to hx of seizures, pt cannot drive and relies on public transportation/walking  No recent hx of in-home services or SNF/rehab admissions  Pt reports dx of Bipolar D/O with OP treatment at [facility] on [address] by Dr Marley Sacks  Preferred pharmacy is [facility]  SW discussed f/u with pulmonary  Pt expressed interest and requested SW put information on AVS  Pt wants to schedule own appt  When cleared for discharge pt informs SW that he wants to walk home  No other questions or concerns from pt at this time  SW will continue to follow for plan of care

## 2018-11-15 NOTE — NURSING NOTE
Patient's VS stable  Remains afebrile  Denies any pain  Diminished breath sounds, rhonchi present  Faint expiratory wheezes b/l  Patient resting in bed  SCDs are on  Will continue to monitor

## 2018-11-15 NOTE — ASSESSMENT & PLAN NOTE
Lab Results   Component Value Date    HGBA1C 10 6 (H) 12/11/2014       Recent Labs      11/14/18   1707  11/15/18   0604   POCGLU  251*  203*       Steroid induced hyperglycemia    Continue glargine and lispro as previously taken

## 2018-11-15 NOTE — SOCIAL WORK
Pt has been medically cleared for discharge home  Medications escribed to [address]  Per pharmacy staff, Prednisone will not be covered as it is too soon (can be filled on 11/22)  This will have an OOP cost of $14  SW discussed with pt who called a friend that will bring money to the pharmacy, meet pt there to  meds, and will provide transport home  Nursing and Attending made aware

## 2018-11-16 NOTE — UTILIZATION REVIEW
Initial Clinical Review    Admission: Date/Time/Statement: 11/14/18 @ [address] [place] [address] This Encounter   Procedures    Inpatient Admission (expected length of stay for this patient is greater than two midnights)     Standing Status:   Standing     Number of Occurrences:   1     Order Specific Question:   Admitting Physician     Answer:   Katelyn Marin [[id number]]     Order Specific Question:   Level of Care     Answer:   Med Surg [16]     Order Specific Question:   Estimated length of stay     Answer:   More than 2 Midnights     Order Specific Question:   Certification     Answer:   I certify that inpatient services are medically necessary for this patient for a duration of greater than two midnights  See H&P and MD Progress Notes for additional information about the patient's course of treatment  ED: Date/Time/Mode of Arrival:   ED Arrival Information     Expected Arrival Acuity Means of Arrival Escorted By Service Admission Type    - 11/14/2018 13:31 Urgent Walk-In [address] [facility] Hospitalist Urgent    Arrival Complaint    Shortness Of Breath          Chief Complaint:   Chief Complaint   Patient presents with    Asthma     I woke up this morining feeling sob and pain in my throat, I was hospitalized with asthma 3 weeks ago for 2-3 days, pt ambulated in with EMS on duo neb treatment into room 5       History of Illness: Patient is a 50-year-old male with a history of asthma who presents with a 2 day history of an asthma exacerbation  States that he has been using his albuterol inhalers at home with no relief  States that he had 3 nebs today prior to calling the ambulance and was given additional neb EN route  No steroids were given by EMS  Patient was just recently admitted here 3 weeks ago for the same was discharged with steroids and antibiotics  Patient states his last intubation was 2013  Also complained of productive green cough no fevers sweats or chills    Worse with exertion better with rest       ED Vital Signs:   ED Triage Vitals [11/14/18 1334]   Temperature Pulse Respirations Blood Pressure SpO2   (!) 97 3 °F (36 3 °C) 99 22 143/86 97 % Room Air      Temp Source Heart Rate Source Patient Position - Orthostatic VS BP Location FiO2 (%)   Tympanic Monitor Lying Left arm --      Pain Score       No Pain        Wt Readings from Last 1 Encounters:   11/14/18 93 5 kg (206 lb 2 1 oz)     Physical exam: Pulmonary/Chest: He is in respiratory distress  He has wheezes  Patient with diffuse end-expiratory wheezes in all fields  Vital Signs: WNL    Abnormal Labs/Diagnostic Test Results:     Potassium = 3 5, Glucose = 135    Chest x-ray: WNL    ED Treatment:   Medication Administration from 11/14/2018 1331 to 11/14/2018 1537       Date/Time Order Dose Route Action Comments     11/14/2018 1341 albuterol inhalation solution 10 mg 10 mg Nebulization Given      11/14/2018 1341 ipratropium (ATROVENT) 0 02 % inhalation solution 1 mg 1 mg Nebulization Given      11/14/2018 1410 ipratropium-albuterol (DUO-NEB) 0 5-2 5 mg/3 mL inhalation solution 3 mL 0 mL Nebulization Given to EMS handed to [facility]     11/14/2018 1403 ipratropium-albuterol (DUO-NEB) 0 5-2 5 mg/3 mL inhalation solution 3 mL 0 mL Nebulization Given to EMS      11/14/2018 1439 albuterol inhalation solution 10 mg 10 mg Nebulization Given      11/14/2018 1451 methylPREDNISolone sodium succinate (Solu-MEDROL) injection 125 mg 125 mg Intravenous Given           Past Medical/Surgical History:    Active Ambulatory Problems     Diagnosis Date Noted    Severe persistent asthma with acute exacerbation 10/25/2018    Acute bronchitis 10/25/2018    HTN (hypertension) 10/25/2018    Type 2 diabetes mellitus, with long-term current use of insulin ([facility] ) 10/25/2018    Seizure ([address] ) 10/25/2018    Glaucoma 10/25/2018    BPH (benign prostatic hyperplasia) 10/25/2018    Diabetic neuropathy ([facility] [address] [facility] ) 10/25/2018    HLD (hyperlipidemia) 10/25/2018    ARPITA (obstructive sleep apnea) 10/25/2018    GERD (gastroesophageal reflux disease) 10/25/2018     Resolved Ambulatory Problems     Diagnosis Date Noted    No Resolved Ambulatory Problems     Past Medical History:   Diagnosis Date    Asthma     Diabetes mellitus ([address] )     Enlarged prostate     Hyperlipidemia     Seasonal allergic rhinitis     Seizures ([facility])        Admitting Diagnosis: Status asthmaticus [J45 902]  Asthma [J45 909]  Severe persistent asthma with acute exacerbation [J45 51]    Age/Sex: 46 y o  male    Assessment/Plan:     Sha Pierce is a 46 y o  male who presents with shortness of breath and productive cough of green sputum since Friday  Post nebulizer treatment in the ER feels little better but still tight  Previous to that [place] 2017  He states he has been using all his medications and compliant with it  * Severe persistent asthma with acute exacerbation   Assessment & Plan     · Patient does have severe persistent asthma he is usually on prednisone 10 mg every other day  He was hospitalized on 10/27 and discharged on a taper he finished his taper on nov 4th  Resume 10 every other day now comes in with another acute exacerbation  · Will be placed back on Solu-Medrol 40 mg IV q 8 hours he was given a loading of 125 in the ER  · Will restart his although the [facility] he has outpatient will be converted to [facility]  · DuoNeb treatments standing- and will prescribe albuterol p r n   · Oxygen if needed to keep sats greater than 92%  · Peak flows pre and post treatment  · Continue singular  · Consult pulmonology  · Last intubation 2013  · He finished antibiotics last admission no antibiotics indicated right now  Acute bronchitis most likely viral   · He follows with allergy outpatient and has been on prednisone x 25 years   He does not follow with pulmonology           Hypokalemia   Assessment & Plan     · Replace kcl 20 meq po x1, bmp in am       ARPITA (obstructive sleep apnea)   Assessment & Plan     · cpap at night at setting of 10      Type 2 diabetes mellitus, with long-term current use of insulin (HCC)   Assessment & Plan           ·   Lab Results   Component Value Date     HGBA1C 10 6 (H) 12/11/2014         No results for input(s): POCGLU in the last 72 hours      Blood Sugar Average: Last 72 hrs:  · continue with lantus 40 units at night and pre meal 12/16/16 and correction sliding scale   · Follows outpatient with [facility] endocrinology         Admission Orders: Pulmonology consult, blood cultures, nasal cannula oxygen, peak flow pre and post, CPAP, CBC, BMP in a m  Scheduled medications:        Medications         albuterol inhalation solution 2 5 mg  Dose: 2 5 mg  Freq: Every 4 hours (RESP) Route: NEBULIZATION  Indications Comment: wheezing, shortness of breath  Start: 11/14/18 1615 End: 11/14/18 1918      atorvastatin (LIPITOR) tablet 80 mg  Dose: 80 mg  Freq: Daily at bedtime Route: PO  Start: 11/14/18 2200 End: 11/15/18 1428      bimatoprost (LUMIGAN) 0 01 % ophthalmic solution 1 drop  Dose: 1 drop  Freq: Daily Route: Both Eyes  Start: 11/15/18 0900 End: 11/14/18 1612      brimonidine (ALPHAGAN P) 0 15 % ophthalmic solution 1 drop  Dose: 1 drop  Freq: Every 8 hours scheduled Route: Both Eyes  Start: 11/14/18 1615 End: 11/15/18 1428      carBAMazepine (TEGretol) tablet 400 mg  Dose: 400 mg  Freq: Every morning Route: PO  Start: 11/15/18 0900 End: 11/15/18 1428    Admin Instructions:   LOOK ALIKE SOUND ALIKE MED      carBAMazepine (TEGretol) tablet 600 mg  Dose: 600 mg  Freq: Every evening Route: PO  Start: 11/14/18 1800 End: 11/15/18 1428    Admin Instructions:   LOOK ALIKE SOUND ALIKE MED      fish oil capsule 1,000 mg  Dose: 1,000 mg  Freq: Daily Route: PO  Start: 11/15/18 0900 End: 11/15/18 1428    Admin Instructions:   Swallow whole; do not break, crush, dissolve, or chew        fluticasone-vilanterol (BREO ELLIPTA) 200-25 MCG/INH inhaler 1 puff  Dose: 1 puff  Freq: Daily Route: IN  Start: 11/16/18 0800 End: 11/15/18 1428    Admin Instructions:   Rinse mouth after use  fluticasone-vilanterol (BREO ELLIPTA) 200-25 MCG/INH inhaler 1 puff  Dose: 1 puff  Freq: Daily Route: IN  Start: 11/15/18 0900 End: 11/15/18 0734    Admin Instructions:   Rinse mouth after use  guaiFENesin (MUCINEX) 12 hr tablet 600 mg  Dose: 600 mg  Freq: 2 times daily Route: PO  Start: 11/14/18 1800 End: 11/15/18 1428      insulin glargine (LANTUS) subcutaneous injection 40 Units 0 4 mL  Dose: 40 Units  Freq: Daily at bedtime Route: SC  Start: 11/14/18 2200 End: 11/15/18 1428    Admin Instructions:   HIGH ALERT MEDICATION  Do not dilute/mix insulin glargine with any other insulin formulation/solution  **DISPOSE IN 8 GALLON BLACK CONTAINER** Do not hold medication without a physician order  insulin lispro (HumaLOG) 100 units/mL subcutaneous injection 1-6 Units  Dose: 1-6 Units  Freq: 3 times daily before meals Route: SC  Start: 11/14/18 1615 End: 11/15/18 1428    Admin Instructions:   Algorithm 3    Blood Glucose 150 - 189: 1 Unit of Insulin  Blood Glucose 190 - 229: 2 Units of Insulin  Blood Glucose 230 - 269: 3 Units of Insulin  Blood Glucose 270 - 309: 4 Units of Insulin  Blood Glucose 310 - 349: 5 Units of Insulin  Blood Glucose greater than or equal to 350: 6 Units of Insulin  HIGH ALERT MEDICATION  **DISPOSE IN 8 GALLON BLACK CONTAINER**  LOOK ALIKE SOUND ALIKE MED      insulin lispro (HumaLOG) 100 units/mL subcutaneous injection 12 Units  Dose: 12 Units  Freq: Daily with breakfast Route: SC  Start: 11/15/18 0730 End: 11/15/18 1428    Admin Instructions:   HIGH ALERT MEDICATION  **DISPOSE IN 8 GALLON BLACK CONTAINER**  LOOK ALIKE SOUND ALIKE MED      insulin lispro (HumaLOG) 100 units/mL subcutaneous injection 16 Units  Dose: 16 Units  Freq: Daily with dinner Route: SC  Start: 11/14/18 1630 End: 11/15/18 1428    Admin Instructions:   HIGH ALERT MEDICATION   **DISPOSE IN 8 GALLON BLACK CONTAINER**  LOOK ALIKE SOUND ALIKE MED      insulin lispro (HumaLOG) 100 units/mL subcutaneous injection 16 Units  Dose: 16 Units  Freq: Daily with lunch Route: SC  Start: 11/15/18 1200 End: 11/15/18 1428    Admin Instructions:   HIGH ALERT MEDICATION  **DISPOSE IN 8 GALLON BLACK CONTAINER**  LOOK ALIKE SOUND ALIKE MED      ipratropium (ATROVENT) 0 02 % inhalation solution 0 5 mg  Dose: 0 5 mg  Freq: Every 6 hours (RESP) Route: NEBULIZATION  Start: 11/14/18 2000 End: 11/15/18 1428      ipratropium (ATROVENT) 0 02 % inhalation solution 0 5 mg  Dose: 0 5 mg  Freq: 4 times daily (RESP) Route: NEBULIZATION  Start: 11/14/18 2000 End: 11/14/18 1923      ipratropium (ATROVENT) 0 02 % inhalation solution 0 5 mg  Dose: 0 5 mg  Freq: 3 times daily (RESP) Route: NEBULIZATION  Start: 11/14/18 1615 End: 11/14/18 1918         ipratropium-albuterol (DUO-NEB) 0 5-2 5 mg/3 mL inhalation solution 3 mL  Dose: 3 mL  Freq: Every 6 hours (RESP) Route: NEBULIZATION  Start: 11/14/18 1400 End: 11/14/18 1918      latanoprost (XALATAN) 0 005 % ophthalmic solution 1 drop  Dose: 1 drop  Freq: Daily at bedtime Route:  Both Eyes  Start: 11/14/18 2200 End: 11/15/18 1428    Admin Instructions:   Sub for bimatoprost 0 01% one drop both eyes daily      levalbuterol (XOPENEX) inhalation solution 0 63 mg  Dose: 0 63 mg  Freq: Every 6 hours (RESP) Route: NEBULIZATION  Start: 11/14/18 2000 End: 11/15/18 1428      lisinopril (ZESTRIL) tablet 5 mg  Dose: 5 mg  Freq: Daily Route: PO  Start: 11/15/18 0900 End: 11/15/18 1428    Admin Instructions:   LOOK ALIKE SOUND ALIKE MED    Order specific questions:   Hold for systolic blood pressure less than (mmHg) 110      loratadine (CLARITIN) tablet 10 mg  Dose: 10 mg  Freq: Daily Route: PO  Start: 11/15/18 0900 End: 11/15/18 1428         methylPREDNISolone sodium succinate (Solu-MEDROL) injection 40 mg  Dose: 40 mg  Freq: Every 8 hours scheduled Route: IV  Start: 11/14/18 1615 End: 11/15/18 1428 montelukast (SINGULAIR) tablet 10 mg  Dose: 10 mg  Freq: Daily Route: PO  Start: 11/15/18 0900 End: 11/15/18 1428      pantoprazole (PROTONIX) EC tablet 40 mg  Dose: 40 mg  Freq: Daily (early morning) Route: PO  Start: 11/15/18 0600 End: 11/15/18 1428    Admin Instructions:   Swallow whole; do not crush, chew or split  LOOK ALIKE SOUND ALIKE MED       Admin Instructions:            PRN Meds     Medications   albuterol inhalation solution 2 5 mg  Dose: 2 5 mg  Freq: Every 2 hour PRN Route: NEBULIZATION  PRN Reasons: wheezing,shortness of breath  Start: 11/14/18 1601 End: 11/15/18 1428      benzonatate (TESSALON PERLES) capsule 100 mg  Dose: 100 mg  Freq: 3 times daily PRN Route: PO  PRN Reason: cough  Start: 11/14/18 1601 End: 11/15/18 1428    Admin Instructions:   Do NOT crush      ipratropium-albuterol (DUO-NEB) 0 5-2 5 mg/3 mL inhalation solution 3 mL  Dose: 3 mL  Freq: 3 times daily PRN Route: NEBULIZATION  PRN Reason: wheezing  Start: 11/14/18 1930 End: 11/15/18 1428      levalbuterol (XOPENEX) inhalation solution 1 25 mg  Dose: 1 25 mg  Freq: Every 8 hours PRN Route: NEBULIZATION  PRN Reason: wheezing  Start: 11/14/18 1918 End: 11/14/18 1700 [facility] Utilization Review Department  Phone: [phone]; Fax [phone]  [email or web address]  ATTENTION: Please call with any questions or concerns to [phone]  and carefully listen to the prompts so that you are directed to the right person  Send all requests for admission clinical reviews, approved or denied determinations and any other requests to fax [phone]   All voicemails are confidential

## 2018-11-19 LAB
BACTERIA BLD CULT: NORMAL
BACTERIA BLD CULT: NORMAL

## 2018-11-26 ENCOUNTER — APPOINTMENT (EMERGENCY)
Dept: RADIOLOGY | Facility: HOSPITAL | Age: 53
End: 2018-11-26
Payer: COMMERCIAL

## 2018-11-26 ENCOUNTER — HOSPITAL ENCOUNTER (EMERGENCY)
Facility: HOSPITAL | Age: 53
Discharge: HOME | End: 2018-11-26
Attending: EMERGENCY MEDICINE | Admitting: EMERGENCY MEDICINE
Payer: COMMERCIAL

## 2018-11-26 VITALS
SYSTOLIC BLOOD PRESSURE: 134 MMHG | HEART RATE: 105 BPM | DIASTOLIC BLOOD PRESSURE: 66 MMHG | TEMPERATURE: 97.5 F | RESPIRATION RATE: 18 BRPM | OXYGEN SATURATION: 95 %

## 2018-11-26 DIAGNOSIS — J45.901 ASTHMA EXACERBATION: Primary | ICD-10-CM

## 2018-11-26 LAB
ANION GAP SERPL CALCULATED.3IONS-SCNC: 12 MMOL/L (ref 5–14)
APTT PPP: 28 SECONDS (ref 23–34)
ATRIAL RATE: 117 BPM
BUN SERPL-MCNC: 13 MG/DL (ref 5–25)
CALCIUM SERPL-MCNC: 9.5 MG/DL (ref 8.4–10.2)
CHLORIDE SERPL-SCNC: 101 MMOL/L (ref 97–108)
CO2 SERPL-SCNC: 26 MMOL/L (ref 22–30)
CREAT SERPL-MCNC: 0.87 MG/DL (ref 0.7–1.5)
ERYTHROCYTE [DISTWIDTH] IN BLOOD BY AUTOMATED COUNT: 13 %
GFR SERPL CREATININE-BSD FRML MDRD: 98 ML/MIN/1.73SQ M
GLUCOSE SERPL-MCNC: 107 MG/DL (ref 70–99)
HCT VFR BLD AUTO: 44.8 % (ref 41–53)
HGB BLD-MCNC: 14.7 G/DL (ref 13.5–17.5)
INR PPP: 0.98 (ref 0.89–1.1)
LIPASE SERPL-CCNC: 31 U/L (ref 23–300)
LYMPHOCYTES # BLD AUTO: 12 % (ref 20–50)
LYMPHOCYTES # BLD AUTO: 2.57 THOUSAND/UL (ref 0.5–4)
MCH RBC QN AUTO: 28.4 PG (ref 26–34)
MCHC RBC AUTO-ENTMCNC: 32.7 G/DL (ref 31–36)
MCV RBC AUTO: 87 FL (ref 80–100)
NEUTS BAND NFR BLD MANUAL: 11 % (ref 0–8)
NEUTS SEG # BLD: 18.83 THOUSAND/UL (ref 1.8–7.8)
NEUTS SEG NFR BLD AUTO: 77 %
NT-PROBNP SERPL-MCNC: 12.5 PG/ML (ref 0–299)
P AXIS: 53 DEGREES
PLATELET # BLD AUTO: 271 THOUSANDS/UL (ref 150–450)
PLATELET BLD QL SMEAR: ADEQUATE
PMV BLD AUTO: 7.3 FL (ref 8.9–12.7)
POTASSIUM SERPL-SCNC: 3.4 MMOL/L (ref 3.6–5)
PR INTERVAL: 144 MS
PROTHROMBIN TIME: 10.4 SECONDS (ref 9.5–11.6)
QRS AXIS: -21 DEGREES
QRSD INTERVAL: 82 MS
QT INTERVAL: 330 MS
QTC INTERVAL: 460 MS
RBC # BLD AUTO: 5.17 MILLION/UL (ref 4.5–5.9)
RBC MORPH BLD: NORMAL
SODIUM SERPL-SCNC: 139 MMOL/L (ref 137–147)
T WAVE AXIS: 32 DEGREES
TOTAL CELLS COUNTED SPEC: 100
TROPONIN I SERPL-MCNC: <0.01 NG/ML (ref 0–0.03)
VENTRICULAR RATE: 117 BPM
WBC # BLD AUTO: 21.4 THOUSAND/UL (ref 4.5–11)

## 2018-11-26 PROCEDURE — 85730 THROMBOPLASTIN TIME PARTIAL: CPT | Performed by: EMERGENCY MEDICINE

## 2018-11-26 PROCEDURE — 84484 ASSAY OF TROPONIN QUANT: CPT | Performed by: EMERGENCY MEDICINE

## 2018-11-26 PROCEDURE — 71045 X-RAY EXAM CHEST 1 VIEW: CPT

## 2018-11-26 PROCEDURE — 83690 ASSAY OF LIPASE: CPT | Performed by: EMERGENCY MEDICINE

## 2018-11-26 PROCEDURE — 85007 BL SMEAR W/DIFF WBC COUNT: CPT | Performed by: EMERGENCY MEDICINE

## 2018-11-26 PROCEDURE — 36415 COLL VENOUS BLD VENIPUNCTURE: CPT | Performed by: EMERGENCY MEDICINE

## 2018-11-26 PROCEDURE — 83880 ASSAY OF NATRIURETIC PEPTIDE: CPT | Performed by: EMERGENCY MEDICINE

## 2018-11-26 PROCEDURE — 85027 COMPLETE CBC AUTOMATED: CPT | Performed by: EMERGENCY MEDICINE

## 2018-11-26 PROCEDURE — 99285 EMERGENCY DEPT VISIT HI MDM: CPT

## 2018-11-26 PROCEDURE — 80048 BASIC METABOLIC PNL TOTAL CA: CPT | Performed by: EMERGENCY MEDICINE

## 2018-11-26 PROCEDURE — 93005 ELECTROCARDIOGRAM TRACING: CPT

## 2018-11-26 PROCEDURE — 93010 ELECTROCARDIOGRAM REPORT: CPT | Performed by: INTERNAL MEDICINE

## 2018-11-26 PROCEDURE — 85610 PROTHROMBIN TIME: CPT | Performed by: EMERGENCY MEDICINE

## 2018-11-26 RX ORDER — IPRATROPIUM BROMIDE AND ALBUTEROL SULFATE 2.5; .5 MG/3ML; MG/3ML
3 SOLUTION RESPIRATORY (INHALATION)
Status: DISCONTINUED | OUTPATIENT
Start: 2018-11-26 | End: 2018-11-26 | Stop reason: HOSPADM

## 2018-11-26 RX ORDER — METHYLPREDNISOLONE SODIUM SUCCINATE 125 MG/2ML
INJECTION, POWDER, LYOPHILIZED, FOR SOLUTION INTRAMUSCULAR; INTRAVENOUS
Status: COMPLETED
Start: 2018-11-26 | End: 2018-11-26

## 2018-11-26 RX ORDER — ALBUTEROL SULFATE 2.5 MG/3ML
SOLUTION RESPIRATORY (INHALATION)
Status: COMPLETED
Start: 2018-11-26 | End: 2018-11-26

## 2018-11-26 RX ORDER — LANOLIN ALCOHOL/MO/W.PET/CERES
3 CREAM (GRAM) TOPICAL
COMMUNITY
End: 2021-05-14

## 2018-11-26 RX ORDER — LATANOPROST 50 UG/ML
1 SOLUTION/ DROPS OPHTHALMIC DAILY
COMMUNITY

## 2018-11-26 RX ADMIN — IPRATROPIUM BROMIDE AND ALBUTEROL SULFATE 3 ML: 2.5; .5 SOLUTION RESPIRATORY (INHALATION) at 07:51

## 2018-11-26 NOTE — ED PROVIDER NOTES
History  Chief Complaint   Patient presents with    Shortness of Breath     pt states that he has been short of breath since last night  used neb and inhaler with little relief       History provided by:  Patient  Asthma   Location:  Shortness of breath  Severity:  Moderate  Onset quality:  Gradual  Progression:  Waxing and waning  Context:  Recent admission for bronchitis, tx   Associated symptoms: cough, shortness of breath and wheezing    Associated symptoms: no abdominal pain, no chest pain, no diarrhea, no fever, no headaches, no myalgias, no nausea, no rash, no rhinorrhea and no sore throat        Prior to Admission Medications   Prescriptions Last Dose Informant Patient Reported? Taking?    Calcium Carb-Cholecalciferol (CALCIUM CARBONATE-VITAMIN D3 PO)   Yes No   Sig: Take 1,500 mg by mouth daily   Multiple Vitamins-Minerals (CENTRUM SILVER PO)   Yes No   Si tablet daily   TAMSULOSIN HCL PO   Yes Yes   Sig: Take by mouth daily at bedtime   albuterol (2 5 mg/3 mL) 0 083 % nebulizer solution   No No   Sig: Take 1 vial (2 5 mg total) by nebulization every 4 (four) hours as needed for shortness of breath   albuterol (PROVENTIL HFA,VENTOLIN HFA) 90 mcg/act inhaler   No No   Sig: Inhale 2 puffs every 4 (four) hours as needed for wheezing   atorvastatin (LIPITOR) 80 mg tablet   Yes No   Sig: Take 80 mg by mouth daily at bedtime   beclomethasone (QVAR) 40 MCG/ACT inhaler   Yes No   Sig: Inhale 2 puffs 2 (two) times a day Rinse mouth after use    bimatoprost (LUMIGAN) 0 03 % ophthalmic drops   Yes No   Sig: Administer 1 drop to both eyes daily at bedtime     brimonidine (ALPHAGAN P) 0 15 % ophthalmic solution   Yes No   Sig: Administer 1 drop to both eyes every 8 (eight) hours     carBAMazepine (TEGRETOL) 200 mg tablet   Yes No   Sig: Take 2 tabs in the morning and 3 tabs at night   finasteride (PROSCAR) 5 mg tablet   Yes No   Sig: Take 5 mg by mouth daily   gabapentin (NEURONTIN) 300 mg capsule   Yes No   Sig: Take 300 mg by mouth 3 (three) times a day   glucagon 1 MG injection   Yes No   Sig: as needed   insulin glargine (LANTUS) 100 units/mL subcutaneous injection   Yes No   Sig: Inject 40 Units under the skin daily at bedtime   insulin lispro (HumaLOG) 100 units/mL injection   No No   Sig: Inject 12 Units under the skin daily with breakfast   insulin lispro (HumaLOG) 100 units/mL injection   No No   Sig: Inject 16 Units under the skin daily with lunch   insulin lispro (HumaLOG) 100 units/mL injection   No No   Sig: Inject 16 Units under the skin daily with dinner   ipratropium (ATROVENT) 0 02 % nebulizer solution   Yes No   Sig: as needed   latanoprost (XALATAN) 0 005 % ophthalmic solution   Yes Yes   Sig: Administer 1 drop to both eyes daily   lisinopril (ZESTRIL) 5 mg tablet   Yes No   Sig: Take 5 mg by mouth daily     melatonin 3 mg   Yes Yes   Sig: Take 3 mg by mouth daily at bedtime   mometasone-formoterol (DULERA) 200-5 MCG/ACT inhaler   Yes No   Sig: Inhale 2 puffs 2 (two) times a day     montelukast (SINGULAIR) 10 mg tablet   Yes No   Sig: Take 10 mg by mouth daily     omega-3-acid ethyl esters (LOVAZA) 1 g capsule   Yes No   Sig: Take 1 g by mouth daily   omeprazole (PriLOSEC) 40 MG capsule   Yes No   Sig: Take 40 mg by mouth daily   predniSONE 10 mg tablet   No No   Sig: Days 1-3: 4 tablets daily Days 4-6: 3 tablets daily Days 7-9: 2 tablets daily Days 10-12: 1 tablet daily then stop   predniSONE 10 mg tablet   Yes No   Sig: Take 10 mg by mouth every other day      Facility-Administered Medications: None       Past Medical History:   Diagnosis Date    Asthma     [facility]ipolar disorder ([facility] [address] [facility]     Diabetes mellitus ([facility] [address] [facility]     Enlarged prostate     Glaucoma     Hyperlipidemia     Hypertension     Seasonal allergic rhinitis     Seizures (HCC)        Past Surgical History:   Procedure Laterality Date    KNEE SURGERY      WRIST SURGERY Left        History reviewed  No pertinent family history    I have reviewed and agree with the history as documented  Social History   Substance Use Topics    Smoking status: Former Smoker     Quit date: 1992    Smokeless tobacco: Never Used    Alcohol use No        Review of Systems   Constitutional: Negative for chills and fever  HENT: Negative for rhinorrhea, sore throat and trouble swallowing  Eyes: Negative for pain  Respiratory: Positive for cough, shortness of breath and wheezing  Negative for stridor  Cardiovascular: Negative for chest pain and leg swelling  Gastrointestinal: Negative for abdominal pain, diarrhea and nausea  Endocrine: Negative for polyuria  Genitourinary: Negative for dysuria, flank pain and urgency  Musculoskeletal: Negative for joint swelling, myalgias and neck stiffness  Skin: Negative for rash  Allergic/Immunologic: Negative for immunocompromised state  Neurological: Negative for dizziness, syncope, weakness, numbness and headaches  Psychiatric/Behavioral: Negative for confusion and suicidal ideas  All other systems reviewed and are negative  Physical Exam  Physical Exam   Constitutional: He is oriented to person, place, and time  He appears well-developed and well-nourished  HENT:   Head: Normocephalic and atraumatic  Eyes: Pupils are equal, round, and reactive to light  EOM are normal    Neck: Normal range of motion  Neck supple  Cardiovascular: Normal rate and regular rhythm  Exam reveals no friction rub  No murmur heard  Pulmonary/Chest: No respiratory distress  He has wheezes (Slight expiratory wheeze)  He has no rales  No evidence of respiratory distress no tachypnea   Abdominal: Soft  Bowel sounds are normal  He exhibits no distension  There is no tenderness  Musculoskeletal: Normal range of motion  He exhibits no edema or tenderness  Neurological: He is alert and oriented to person, place, and time  Skin: Skin is warm  No rash noted  Psychiatric: He has a normal mood and affect     Nursing note and vitals reviewed        Vital Signs  ED Triage Vitals [11/26/18 0743]   Temperature Pulse Respirations Blood Pressure SpO2   97 5 °F (36 4 °C) (!) 117 (!) 26 155/84 98 %      Temp Source Heart Rate Source Patient Position - Orthostatic VS BP Location FiO2 (%)   Tympanic Monitor Sitting Left arm --      Pain Score       --           Vitals:    11/26/18 0743 11/26/18 0903 11/26/18 0930   BP: 155/84 147/88 134/66   Pulse: (!) 117 (!) 112 105   Patient Position - Orthostatic VS: Sitting Sitting Sitting       Visual Acuity      ED Medications  Medications   albuterol (2 5 mg/3 mL) 0 083 % inhalation solution **ADS Override Pull** (  Given to EMS 11/26/18 0744)   methylPREDNISolone sodium succinate (Solu-MEDROL) 125 MG injection **ADS Override Pull** (  Given to EMS 11/26/18 0745)       Diagnostic Studies  Results Reviewed     Procedure Component Value Units Date/Time    NT-BNP PRO [[id number]]  (Normal) Collected:  11/26/18 0749    Lab Status:  Final result Specimen:  Blood from Arm, Right Updated:  11/26/18 0821     NT-proBNP 12 5 pg/mL     Troponin I [[id number]]  (Normal) Collected:  11/26/18 0749    Lab Status:  Final result Specimen:  Blood from Arm, Right Updated:  11/26/18 0821     Troponin I <0 01 ng/mL     Protime-INR [[id number]]  (Normal) Collected:  11/26/18 0749    Lab Status:  Final result Specimen:  Blood from Arm, Right Updated:  11/26/18 0817     Protime 10 4 seconds      INR 0 98    Narrative:       INR:  ,PROTIME:      APTT [[id number]]  (Normal) Collected:  11/26/18 0749    Lab Status:  Final result Specimen:  Blood from Arm, Right Updated:  11/26/18 0817     PTT 28 seconds     Narrative:       PTT:      CBC and differential [[id number]]  (Abnormal) Collected:  11/26/18 0749    Lab Status:  Final result Specimen:  Blood from Arm, Right Updated:  11/26/18 0816     WBC 21 40 (H) Thousand/uL      RBC 5 17 Million/uL      Hemoglobin 14 7 g/dL      Hematocrit 44 8 %      MCV 87 fL      MCH 28 4 pg      MCHC 32 7 g/dL      RDW 13 0 %      MPV 7 3 (L) fL      Platelets 643 Thousands/uL     Lipase [[id number]]  (Normal) Collected:  11/26/18 0749    Lab Status:  Final result Specimen:  Blood from Arm, Right Updated:  11/26/18 0806     Lipase 31 u/L     Basic metabolic panel [[id number]]  (Abnormal) Collected:  11/26/18 0749    Lab Status:  Final result Specimen:  Blood from Arm, Right Updated:  11/26/18 6727     Sodium 139 mmol/L      Potassium 3 4 (L) mmol/L      Chloride 101 mmol/L      CO2 26 mmol/L      ANION GAP 12 mmol/L      BUN 13 mg/dL      Creatinine 0 87 mg/dL      Glucose 107 (H) mg/dL      Calcium 9 5 mg/dL      eGFR 98 ml/min/1 73sq m     Narrative:         National Kidney Disease Education Program recommendations are as follows:  GFR calculation is accurate only with a steady state creatinine  Chronic Kidney disease less than 60 ml/min/1 73 sq  meters  Kidney failure less than 15 ml/min/1 73 sq  meters  XR chest portable   Final Result by Berkley Lofton MD (11/26 4585)      No acute cardiopulmonary disease  Minimal right basilar atelectasis or scarring              Workstation performed: [id number]                    Procedures  ECG 12 Lead Documentation  Date/Time: 11/26/2018 8:08 AM  Performed by: Noreen Marquez by: Elvera Kayser     ECG reviewed by me, the ED Provider: yes    Patient location:  ED  Previous ECG:     Previous ECG:  Compared to current    Similarity:  No change  Interpretation:     Interpretation: abnormal    Rate:     ECG rate assessment: tachycardic    Rhythm:     Rhythm: sinus rhythm and sinus tachycardia    Ectopy:     Ectopy: none    QRS:     QRS axis:  Normal    QRS intervals:  Normal  Conduction:     Conduction: normal    ST segments:     ST segments:  Normal  T waves:     T waves: normal             Phone Contacts  ED Phone Contact    ED Course  ED Course as of Dec 01 0816   Mon Nov 26, 2018   0848 Chronic elevated wbc from chronic steroids, recent steroids given  5232 Re-evaluation after ambulatory pulse ox shows no evidence of hypoxia no increased tachycardia  Patient has no respiratory distress at this point time feels much improved I suspect that the patient white blood cell count is related to the recent steroid exacerbation and steroid administration  The plan will be for outpatient management followup patient has also medications at home and is noted to follow up with the pulmonologist as an outpatient given strict instructions when to return back to the emergency department  MDM  Number of Diagnoses or Management Options  Asthma exacerbation: new and requires workup  Diagnosis management comments:  Pt re-examined and evaluated after testing and treatment  Spoke with the patient and feeling improved and sxs have resolved  Will discharge home with close f/u with pcp and instructed to return to the ED if sxs worsen or continue  Pt agrees with the plan for discharge and feels comfortable to go home with proper f/u  Advised to return for worsening or additional problems  Diagnostic tests were reviewed and questions answered  Diagnosis, care plan and treatment options were discussed  The patient understand instructions and will follow up as directed           Amount and/or Complexity of Data Reviewed  Clinical lab tests: reviewed and ordered  Tests in the radiology section of CPT®: ordered and reviewed  Decide to obtain previous medical records or to obtain history from someone other than the patient: yes  Review and summarize past medical records: yes  Independent visualization of images, tracings, or specimens: yes (All labs reviewed and utilized in the medical decision making process    All radiology studies independently viewed by me and interpreted by the radiologist )      CritCare Time    Disposition  Final diagnoses:   Asthma exacerbation     Time reflects when diagnosis was documented in both MDM as applicable and the Disposition within this note     Time User Action Codes Description Comment    11/26/2018  9:04 AM Rogelio Alicea Add [L74 [id number]] Asthma exacerbation       ED Disposition     ED Disposition Condition Comment    Discharge  John Rosas discharge to home/self care      Condition at discharge: Stable        Follow-up Information    None         Discharge Medication List as of 11/26/2018  9:24 AM      CONTINUE these medications which have NOT CHANGED    Details   latanoprost (XALATAN) 0 005 % ophthalmic solution Administer 1 drop to both eyes daily, Historical Med      melatonin 3 mg Take 3 mg by mouth daily at bedtime, Historical Med      TAMSULOSIN HCL PO Take by mouth daily at bedtime, Historical Med      albuterol (2 5 mg/3 mL) 0 083 % nebulizer solution Take 1 vial (2 5 mg total) by nebulization every 4 (four) hours as needed for shortness of breath, Starting Thu 11/15/2018, Normal      albuterol (PROVENTIL HFA,VENTOLIN HFA) 90 mcg/act inhaler Inhale 2 puffs every 4 (four) hours as needed for wheezing, Starting Thu 11/15/2018, Normal      atorvastatin (LIPITOR) 80 mg tablet Take 80 mg by mouth daily at bedtime, Starting Thu 9/8/2016, Historical Med      beclomethasone (QVAR) 40 MCG/ACT inhaler Inhale 2 puffs 2 (two) times a day Rinse mouth after use , Historical Med      bimatoprost (LUMIGAN) 0 03 % ophthalmic drops Administer 1 drop to both eyes daily at bedtime  , Starting Thu 6/5/2008, Historical Med      brimonidine (ALPHAGAN P) 0 15 % ophthalmic solution Administer 1 drop to both eyes every 8 (eight) hours  , Starting Thu 7/2/2015, Historical Med      Calcium Carb-Cholecalciferol (CALCIUM CARBONATE-VITAMIN D3 PO) Take 1,500 mg by mouth daily, Starting 9/20/2013, Until Discontinued, Historical Med      carBAMazepine (TEGRETOL) 200 mg tablet Take 2 tabs in the morning and 3 tabs at night, Historical Med      finasteride (PROSCAR) 5 mg tablet Take 5 mg by mouth daily, Starting 4/26/2016, Until Discontinued, Historical Med      gabapentin (NEURONTIN) 300 mg capsule Take 300 mg by mouth 3 (three) times a day, Starting Thu 9/8/2016, Historical Med      glucagon 1 MG injection as needed, Starting 8/5/2016, Until Discontinued, Historical Med      insulin glargine (LANTUS) 100 units/mL subcutaneous injection Inject 40 Units under the skin daily at bedtime, Historical Med      !! insulin lispro (HumaLOG) 100 units/mL injection Inject 12 Units under the skin daily with breakfast, Starting Sat 10/27/2018, Print      !! insulin lispro (HumaLOG) 100 units/mL injection Inject 16 Units under the skin daily with lunch, Starting Sat 10/27/2018, Print      !! insulin lispro (HumaLOG) 100 units/mL injection Inject 16 Units under the skin daily with dinner, Starting Sat 10/27/2018, Print      ipratropium (ATROVENT) 0 02 % nebulizer solution as needed, Starting 7/2/2015, Until Discontinued, Historical Med      lisinopril (ZESTRIL) 5 mg tablet Take 5 mg by mouth daily  , Starting Thu 7/7/2016, Historical Med      mometasone-formoterol (DULERA) 200-5 MCG/ACT inhaler Inhale 2 puffs 2 (two) times a day  , Starting Mon 8/31/2015, Historical Med      montelukast (SINGULAIR) 10 mg tablet Take 10 mg by mouth daily  , Starting Tue 8/25/2015, Historical Med      Multiple Vitamins-Minerals (CENTRUM SILVER PO) 1 tablet daily, Starting 6/2/2011, Until Discontinued, Historical Med      omega-3-acid ethyl esters (LOVAZA) 1 g capsule Take 1 g by mouth daily, Until Discontinued, Historical Med      omeprazole (PriLOSEC) 40 MG capsule Take 40 mg by mouth daily, Starting 4/26/2016, Until Discontinued, Historical Med      !! predniSONE 10 mg tablet Days 1-3: 4 tablets daily Days 4-6: 3 tablets daily Days 7-9: 2 tablets daily Days 10-12: 1 tablet daily then stop, Normal      !! predniSONE 10 mg tablet Take 10 mg by mouth every other day, Historical Med       !! - Potential duplicate medications found  Please discuss with provider          No discharge procedures on file      ED Provider  Electronically Signed by           Jyothi Pritchard DO  12/01/18 9832

## 2018-11-26 NOTE — ED NOTES
Pt walked on pulse ox 95%-94% Pt stated he feels good and much better than when he tried to walk earlier today       Nydia Haines  11/26/18 8721

## 2018-11-26 NOTE — ED NOTES
Pt states that he feels better at this time compared to when he came in to [address] JASV[place] Mariee  11/26/18 0911

## 2018-12-03 ENCOUNTER — HOSPITAL ENCOUNTER (EMERGENCY)
Facility: HOSPITAL | Age: 53
Discharge: HOME | End: 2018-12-03
Attending: EMERGENCY MEDICINE | Admitting: EMERGENCY MEDICINE
Payer: COMMERCIAL

## 2018-12-03 ENCOUNTER — APPOINTMENT (EMERGENCY)
Dept: RADIOLOGY | Facility: HOSPITAL | Age: 53
End: 2018-12-03
Payer: COMMERCIAL

## 2018-12-03 VITALS
OXYGEN SATURATION: 95 % | SYSTOLIC BLOOD PRESSURE: 152 MMHG | RESPIRATION RATE: 18 BRPM | DIASTOLIC BLOOD PRESSURE: 89 MMHG | WEIGHT: 208.56 LBS | HEIGHT: 66 IN | BODY MASS INDEX: 33.52 KG/M2 | TEMPERATURE: 97.4 F | HEART RATE: 113 BPM

## 2018-12-03 DIAGNOSIS — J45.909 ASTHMA: Primary | ICD-10-CM

## 2018-12-03 LAB
ANION GAP SERPL CALCULATED.3IONS-SCNC: 8 MMOL/L (ref 5–14)
BUN SERPL-MCNC: 12 MG/DL (ref 5–25)
CALCIUM SERPL-MCNC: 9.2 MG/DL (ref 8.4–10.2)
CHLORIDE SERPL-SCNC: 102 MMOL/L (ref 97–108)
CO2 SERPL-SCNC: 29 MMOL/L (ref 22–30)
CREAT SERPL-MCNC: 0.84 MG/DL (ref 0.7–1.5)
ERYTHROCYTE [DISTWIDTH] IN BLOOD BY AUTOMATED COUNT: 13.2 %
GFR SERPL CREATININE-BSD FRML MDRD: 99 ML/MIN/1.73SQ M
GLUCOSE SERPL-MCNC: 130 MG/DL (ref 70–99)
HCT VFR BLD AUTO: 41.7 % (ref 41–53)
HGB BLD-MCNC: 13.5 G/DL (ref 13.5–17.5)
LYMPHOCYTES # BLD AUTO: 1.13 THOUSAND/UL (ref 0.5–4)
LYMPHOCYTES # BLD AUTO: 9 % (ref 20–50)
MCH RBC QN AUTO: 28.4 PG (ref 26–34)
MCHC RBC AUTO-ENTMCNC: 32.5 G/DL (ref 31–36)
MCV RBC AUTO: 87 FL (ref 80–100)
MONOCYTES # BLD AUTO: 0.13 THOUSAND/UL (ref 0.2–0.9)
MONOCYTES NFR BLD AUTO: 1 % (ref 1–10)
NEUTS BAND NFR BLD MANUAL: 8 % (ref 0–8)
NEUTS SEG # BLD: 11.25 THOUSAND/UL (ref 1.8–7.8)
NEUTS SEG NFR BLD AUTO: 82 %
NT-PROBNP SERPL-MCNC: 16.1 PG/ML (ref 0–299)
PLATELET # BLD AUTO: 318 THOUSANDS/UL (ref 150–450)
PLATELET BLD QL SMEAR: ADEQUATE
PMV BLD AUTO: 6.7 FL (ref 8.9–12.7)
POTASSIUM SERPL-SCNC: 3.7 MMOL/L (ref 3.6–5)
RBC # BLD AUTO: 4.77 MILLION/UL (ref 4.5–5.9)
RBC MORPH BLD: NORMAL
SODIUM SERPL-SCNC: 139 MMOL/L (ref 137–147)
TOTAL CELLS COUNTED SPEC: 100
TROPONIN I SERPL-MCNC: <0.01 NG/ML (ref 0–0.03)
WBC # BLD AUTO: 12.5 THOUSAND/UL (ref 4.5–11)

## 2018-12-03 PROCEDURE — 99284 EMERGENCY DEPT VISIT MOD MDM: CPT

## 2018-12-03 PROCEDURE — 83880 ASSAY OF NATRIURETIC PEPTIDE: CPT | Performed by: EMERGENCY MEDICINE

## 2018-12-03 PROCEDURE — 94640 AIRWAY INHALATION TREATMENT: CPT

## 2018-12-03 PROCEDURE — 85007 BL SMEAR W/DIFF WBC COUNT: CPT | Performed by: EMERGENCY MEDICINE

## 2018-12-03 PROCEDURE — 80048 BASIC METABOLIC PNL TOTAL CA: CPT | Performed by: EMERGENCY MEDICINE

## 2018-12-03 PROCEDURE — 36415 COLL VENOUS BLD VENIPUNCTURE: CPT | Performed by: EMERGENCY MEDICINE

## 2018-12-03 PROCEDURE — 84484 ASSAY OF TROPONIN QUANT: CPT | Performed by: EMERGENCY MEDICINE

## 2018-12-03 PROCEDURE — 85027 COMPLETE CBC AUTOMATED: CPT | Performed by: EMERGENCY MEDICINE

## 2018-12-03 PROCEDURE — 93005 ELECTROCARDIOGRAM TRACING: CPT

## 2018-12-03 PROCEDURE — 71046 X-RAY EXAM CHEST 2 VIEWS: CPT

## 2018-12-03 RX ORDER — METHYLPREDNISOLONE SODIUM SUCCINATE 125 MG/2ML
INJECTION, POWDER, LYOPHILIZED, FOR SOLUTION INTRAMUSCULAR; INTRAVENOUS
Status: DISCONTINUED
Start: 2018-12-03 | End: 2018-12-03 | Stop reason: HOSPADM

## 2018-12-03 RX ORDER — ALBUTEROL SULFATE 2.5 MG/3ML
SOLUTION RESPIRATORY (INHALATION)
Status: DISCONTINUED
Start: 2018-12-03 | End: 2018-12-03 | Stop reason: HOSPADM

## 2018-12-03 RX ORDER — SODIUM CHLORIDE FOR INHALATION 0.9 %
12 VIAL, NEBULIZER (ML) INHALATION ONCE
Status: COMPLETED | OUTPATIENT
Start: 2018-12-03 | End: 2018-12-03

## 2018-12-03 RX ORDER — HYDROCODONE BITARTRATE AND ACETAMINOPHEN 5; 325 MG/1; MG/1
1 TABLET ORAL EVERY 4 HOURS PRN
COMMUNITY
End: 2019-04-08

## 2018-12-03 RX ADMIN — ALBUTEROL SULFATE 10 MG: 2.5 SOLUTION RESPIRATORY (INHALATION) at 13:06

## 2018-12-03 RX ADMIN — IPRATROPIUM BROMIDE 1 MG: 0.5 SOLUTION RESPIRATORY (INHALATION) at 13:06

## 2018-12-03 RX ADMIN — ISODIUM CHLORIDE 12 ML: 0.03 SOLUTION RESPIRATORY (INHALATION) at 13:06

## 2018-12-03 NOTE — DISCHARGE INSTRUCTIONS
Asthma, Ambulatory Care   GENERAL INFORMATION:   Asthma  is a lung disease that makes breathing difficult  Chronic inflammation and reactions to triggers narrow the airways in your lungs  Asthma can become life-threatening if it is not managed  Common symptoms include the following:   · Coughing     · Wheezing     · Shortness of breath     · Chest tightness  Seek immediate care for the following symptoms:   · Severe shortness of breath    · Blue or gray lips or nails    · Skin around your neck and ribs pulls in with each breath    · Shortness of breath, even after you take your short-term medicine as directed     · Peak flow numbers in the red zone of your asthma action plan  Treatment for asthma  will depend on how severe it is  Medicine may decrease inflammation, open airways, and make it easier to breathe  Medicines may be inhaled, taken as a pill, or injected  Short-term medicines relieve your symptoms quickly  Long-term medicines are used to prevent future attacks  You may also need medicine to help control your allergies  Manage and prevent future asthma attacks:   · Follow your asthma action pan  This is a written plan that you and your healthcare provider create  It explains which medicine you need and when to change doses if necessary  It also explains how you can monitor symptoms and use a peak flow meter  The meter measures how well your lungs are working  · Manage other health conditions , such as allergies, acid reflux, and sleep apnea  · Identify and avoid triggers  These may include pets, dust mites, mold, and cockroaches  · Do not smoke and avoid others who smoke  If you smoke, it is never too late to quit  Ask your healthcare provider if you need help quitting  · Ask about a flu vaccine  The flu can make your asthma worse  You may need a yearly flu shot  Follow up with your healthcare provider as directed:   You will need to return to make sure your medicine is working and your symptoms are controlled  You may be referred to an asthma or allergy specialist  Wyatt Carrie may be asked to keep a record of your peak flow values and bring it with you to your appointments  Write down your questions so you remember to ask them during your visits  CARE AGREEMENT:   You have the right to help plan your care  Learn about your health condition and how it may be treated  Discuss treatment options with your caregivers to decide what care you want to receive  You always have the right to refuse treatment  The above information is an  only  It is not intended as medical advice for individual conditions or treatments  Talk to your doctor, nurse or pharmacist before following any medical regimen to see if it is safe and effective for you  © 2014 [address] is for End User's use only and may not be sold, redistributed or otherwise used for commercial purposes  All illustrations and images included in CareNotes® are the copyrighted property of [employer]  or [employer] López

## 2018-12-03 NOTE — ED PROVIDER NOTES
History  Chief Complaint   Patient presents with    Asthma     Pt states he has had URI symptoms x35 days  Pt seen by PCP 5 days ago and was Rx'd steriods and ABX, but has had no relief  Pt experienced difficulty breathing earlier today and called the PCP  PCP told him to come to ER since he wasnt feeling better  Pt denies difficulty breathing at this time  50-year-old gentleman presents with complaint of URI symptoms for the past month  States that he has been seen multiple times but has not been given antibiotics  He reports he has follow-up established already with his allergist on the sixth of this month  He denies fever but complains of greenish sputum with this coughing and chest discomfort  He [place] at the bank any began having increased wheezing and chest tightness  He used his albuterol inhaler with minimal to no effect  He went home and continued to have increasing wheezing and discomfort along shortness of breath  Called his allergist to then directed him here for evaluation  URI   Presenting symptoms: congestion, cough, fatigue and rhinorrhea    Presenting symptoms: no fever and no sore throat    Severity:  Moderate  Onset quality:  Gradual  Timing:  Constant  Progression:  Worsening  Relieved by:  Nothing  Worsened by: Movement  Ineffective treatments:  Nebulizer treatments and prescription medications  Associated symptoms: wheezing    Associated symptoms: no arthralgias, no myalgias and no sinus pain        Prior to Admission Medications   Prescriptions Last Dose Informant Patient Reported? Taking?    Calcium Carb-Cholecalciferol (CALCIUM CARBONATE-VITAMIN D3 PO) 12/2/2018 at Unknown time  Yes Yes   Sig: Take 1,500 mg by mouth daily   HYDROcodone-acetaminophen (NORCO) 5-325 mg per tablet 12/2/2018 at Unknown time  Yes Yes   Sig: Take 1 tablet by mouth every 4 (four) hours as needed for pain   Multiple Vitamins-Minerals (CENTRUM SILVER PO) 12/2/2018 at Unknown time  Yes Yes   Si tablet daily   TAMSULOSIN HCL PO 2018 at Unknown time  Yes Yes   Sig: Take by mouth daily at bedtime   albuterol (2 5 mg/3 mL) 0 083 % nebulizer solution 12/3/2018 at Unknown time  No Yes   Sig: Take 1 vial (2 5 mg total) by nebulization every 4 (four) hours as needed for shortness of breath   albuterol (PROVENTIL HFA,VENTOLIN HFA) 90 mcg/act inhaler 12/3/2018 at Unknown time  No Yes   Sig: Inhale 2 puffs every 4 (four) hours as needed for wheezing   atorvastatin (LIPITOR) 80 mg tablet 12/3/2018 at Unknown time  Yes Yes   Sig: Take 80 mg by mouth daily at bedtime   beclomethasone (QVAR) 40 MCG/ACT inhaler 2018 at Unknown time  Yes Yes   Sig: Inhale 2 puffs 2 (two) times a day Rinse mouth after use    bimatoprost (LUMIGAN) 0 03 % ophthalmic drops 2018 at Unknown time  Yes Yes   Sig: Administer 1 drop to both eyes daily at bedtime     brimonidine (ALPHAGAN P) 0 15 % ophthalmic solution Unknown at Unknown time  Yes No   Sig: Administer 1 drop to both eyes every 8 (eight) hours     carBAMazepine (TEGRETOL) 200 mg tablet 2018 at Unknown time  Yes Yes   Sig: Take 2 tabs in the morning and 3 tabs at night   finasteride (PROSCAR) 5 mg tablet 2018 at Unknown time  Yes Yes   Sig: Take 5 mg by mouth daily   gabapentin (NEURONTIN) 300 mg capsule 2018 at Unknown time  Yes Yes   Sig: Take 300 mg by mouth 3 (three) times a day   glucagon 1 MG injection Unknown at Unknown time  Yes No   Sig: as needed   insulin glargine (LANTUS) 100 units/mL subcutaneous injection 2018 at Unknown time  Yes Yes   Sig: Inject 40 Units under the skin daily at bedtime   insulin lispro (HumaLOG) 100 units/mL injection 2018 at Unknown time  No Yes   Sig: Inject 12 Units under the skin daily with breakfast   insulin lispro (HumaLOG) 100 units/mL injection 2018 at Unknown time  No Yes   Sig: Inject 16 Units under the skin daily with lunch   insulin lispro (HumaLOG) 100 units/mL injection 2018 at Unknown time  No Yes   Sig: Inject 16 Units under the skin daily with dinner   ipratropium (ATROVENT) 0 02 % nebulizer solution 12/2/2018 at Unknown time  Yes Yes   Sig: as needed   latanoprost (XALATAN) 0 005 % ophthalmic solution 12/2/2018 at Unknown time  Yes Yes   Sig: Administer 1 drop to both eyes daily   lisinopril (ZESTRIL) 5 mg tablet 12/2/2018 at Unknown time  Yes Yes   Sig: Take 5 mg by mouth daily     melatonin 3 mg 12/2/2018 at Unknown time  Yes Yes   Sig: Take 3 mg by mouth daily at bedtime   mometasone-formoterol (DULERA) 200-5 MCG/ACT inhaler 12/2/2018 at Unknown time  Yes Yes   Sig: Inhale 2 puffs 2 (two) times a day     montelukast (SINGULAIR) 10 mg tablet 12/2/2018 at Unknown time  Yes Yes   Sig: Take 10 mg by mouth daily     omega-3-acid ethyl esters (LOVAZA) 1 g capsule 12/2/2018 at Unknown time  Yes Yes   Sig: Take 1 g by mouth daily   omeprazole (PriLOSEC) 40 MG capsule 12/2/2018 at Unknown time  Yes Yes   Sig: Take 40 mg by mouth daily   predniSONE 10 mg tablet 12/2/2018 at Unknown time  No Yes   Sig: Days 1-3: 4 tablets daily Days 4-6: 3 tablets daily Days 7-9: 2 tablets daily Days 10-12: 1 tablet daily then stop   predniSONE 10 mg tablet 12/2/2018 at Unknown time  Yes Yes   Sig: Take 10 mg by mouth every other day      Facility-Administered Medications: None       Past Medical History:   Diagnosis Date    Asthma     [facility] [address] [facility]     [facility]     Enlarged prostate     Glaucoma     Hyperlipidemia     Hypertension     Seasonal allergic rhinitis     [facility]        Past Surgical History:   Procedure Laterality Date    KNEE SURGERY      WRIST SURGERY Left        History reviewed  No pertinent family history  I have reviewed and agree with the history as documented      Social History   Substance Use Topics    Smoking status: Former Smoker     Quit date: 1992    Smokeless tobacco: Never Used    Alcohol use No        Review of Systems Constitutional: Positive for fatigue  Negative for activity change, chills and fever  HENT: Positive for congestion, postnasal drip and rhinorrhea  Negative for sinus pain, sore throat and trouble swallowing  Eyes: Negative  Respiratory: Positive for cough, chest tightness, shortness of breath and wheezing  Cardiovascular: Negative for chest pain  Gastrointestinal: Negative for abdominal pain, constipation, diarrhea, nausea and vomiting  Endocrine: Negative  Genitourinary: Negative  Musculoskeletal: Negative  Negative for arthralgias, back pain and myalgias  Skin: Negative  Allergic/Immunologic: Negative  Neurological: Negative  Hematological: Negative  Psychiatric/Behavioral: Negative  Physical Exam  Physical Exam   Constitutional: He is oriented to person, place, and time  He appears well-developed and well-nourished  No distress  HENT:   Head: Normocephalic and atraumatic  Nose: Nose normal    Mouth/Throat: Oropharynx is clear and moist    Eyes: Pupils are equal, round, and reactive to light  Conjunctivae are normal    Neck: Neck supple  Cardiovascular: Normal rate, regular rhythm and normal heart sounds  Pulmonary/Chest: Effort normal  No respiratory distress  He has wheezes  Abdominal: Soft  Bowel sounds are normal  He exhibits no distension  There is no tenderness  There is no guarding  Musculoskeletal: Normal range of motion  He exhibits no edema  Neurological: He is alert and oriented to person, place, and time  Skin: Skin is warm and dry  Capillary refill takes less than 2 seconds  He is not diaphoretic  Psychiatric: He has a normal mood and affect  His behavior is normal    Nursing note and vitals reviewed        Vital Signs  ED Triage Vitals   Temperature Pulse Respirations Blood Pressure SpO2   12/03/18 1236 12/03/18 1236 12/03/18 1236 12/03/18 1236 12/03/18 1236   (!) 97 4 °F (36 3 °C) (!) 106 20 133/82 97 %      Temp Source Heart Rate Source Patient Position - Orthostatic VS BP Location FiO2 (%)   12/03/18 1236 12/03/18 1236 12/03/18 1236 12/03/18 1236 --   Tympanic Monitor Sitting Left arm       Pain Score       12/03/18 1514       No Pain           Vitals:    12/03/18 1236 12/03/18 1514   BP: 133/82 152/89   Pulse: (!) 106 (!) 113   Patient Position - Orthostatic VS: Sitting Sitting       Visual Acuity      ED Medications  Medications   albuterol (2 5 mg/3 mL) 0 083 % inhalation solution **ADS Override Pull** (not administered)   methylPREDNISolone sodium succinate (Solu-MEDROL) 125 MG injection **ADS Override Pull** (not administered)   albuterol inhalation solution 10 mg (10 mg Nebulization Given 12/3/18 1306)   ipratropium (ATROVENT) 0 02 % inhalation solution 1 mg (1 mg Nebulization Given 12/3/18 1306)   sodium chloride 0 9 % inhalation solution 12 mL (12 mL Nebulization Given 12/3/18 1306)       Diagnostic Studies  Results Reviewed     Procedure Component Value Units Date/Time    Troponin I [[id number]]  (Normal) Collected:  12/03/18 1255    Lab Status:  Final result Specimen:  Blood from Arm, Left Updated:  12/03/18 1324     Troponin I <0 01 ng/mL     B-type natriuretic peptide [[id number]]  (Normal) Collected:  12/03/18 1255    Lab Status:  Final result Specimen:  Blood from Arm, Left Updated:  12/03/18 1321     NT-proBNP 16 1 pg/mL     Basic metabolic panel [[id number]]  (Abnormal) Collected:  12/03/18 1255    Lab Status:  Final result Specimen:  Blood from Arm, Left Updated:  12/03/18 1312     Sodium 139 mmol/L      Potassium 3 7 mmol/L      Chloride 102 mmol/L      CO2 29 mmol/L      ANION GAP 8 mmol/L      BUN 12 mg/dL      Creatinine 0 84 mg/dL      Glucose 130 (H) mg/dL      Calcium 9 2 mg/dL      eGFR 99 ml/min/1 73sq m     Narrative:         National Kidney Disease Education Program recommendations are as follows:  GFR calculation is accurate only with a steady state creatinine  Chronic Kidney disease less than 60 ml/min/1 73 sq  meters  Kidney failure less than 15 ml/min/1 73 sq  meters  CBC and differential [[id number]]  (Abnormal) Collected:  12/03/18 1255    Lab Status:  Final result Specimen:  Blood from Arm, Left Updated:  12/03/18 1306     WBC 12 50 (H) Thousand/uL      RBC 4 77 Million/uL      Hemoglobin 13 5 g/dL      Hematocrit 41 7 %      MCV 87 fL      MCH 28 4 pg      MCHC 32 5 g/dL      RDW 13 2 %      MPV 6 7 (L) fL      Platelets 775 Thousands/uL                  XR chest 2 views   Final Result by Stephanie Garcia MD (12/03 1320)      No acute cardiopulmonary disease  Workstation performed: [id number]                    Procedures  ECG 12 Lead Documentation  Date/Time: 12/3/2018 3:28 PM  Performed by: Lamont Messina  Authorized by: Lamont Messina     ECG reviewed by me, the ED Provider: yes    Patient location:  ED  Interpretation:     Interpretation: normal    Rate:     ECG rate:  104    ECG rate assessment: tachycardic    Rhythm:     Rhythm: sinus tachycardia    Ectopy:     Ectopy: none    QRS:     QRS axis:  Normal  Conduction:     Conduction: normal    T waves:     T waves: flattening             Phone Contacts  ED Phone Contact    ED Course                               MDM  Number of Diagnoses or Management Options  Asthma:   Diagnosis management comments: 48-year-old gentleman presents with symptoms of increasing asthma  States that the present for the past month or so  He has been seen several times this had fairly unremarkable workups  His workup here today is also unremarkable except for a very mild leukocytosis  His chest x-ray remains clear  He was given a nebulization treatment with marked improvement of his symptoms  He stated that he was breathing much easier and on re-evaluation, his wheezing had diminished dramatically  He states that he has all the medications that he requires but that he believes his apartment is making things worse    He states that every time he returns there that his symptoms increase in intensity  I discussed at length the need to get an air pure fire and also addition to the carpets in addition to other measures to reduce his increasing asthma symptoms  He really has follow-up established in a few days with his allergist   He is aware of the need to keep this appointment along with reasons to return to the ER  Amount and/or Complexity of Data Reviewed  Clinical lab tests: ordered and reviewed  Tests in the radiology section of CPT®: ordered and reviewed  Tests in the medicine section of CPT®: ordered and reviewed  Independent visualization of images, tracings, or specimens: yes      CritCare Time    Disposition  Final diagnoses:   Asthma     Time reflects when diagnosis was documented in both MDM as applicable and the Disposition within this note     Time User Action Codes Description Comment    12/3/2018  2:43 PM Keith Lira Smoke [P90 [id number]] Asthma       ED Disposition     ED Disposition Condition Comment    Discharge  Roxana Ibrahim discharge to home/self care  Condition at discharge: Good        Follow-up Information     Follow up With Specialties Details Why Contact Info       You must follow up with your physician  He will also should acquire an air purifier and shampoo your carpets as we discussed             Discharge Medication List as of 12/3/2018  3:09 PM      CONTINUE these medications which have NOT CHANGED    Details   albuterol (2 5 mg/3 mL) 0 083 % nebulizer solution Take 1 vial (2 5 mg total) by nebulization every 4 (four) hours as needed for shortness of breath, Starting Thu 11/15/2018, Normal      albuterol (PROVENTIL HFA,VENTOLIN HFA) 90 mcg/act inhaler Inhale 2 puffs every 4 (four) hours as needed for wheezing, Starting Thu 11/15/2018, Normal      atorvastatin (LIPITOR) 80 mg tablet Take 80 mg by mouth daily at bedtime, Starting Thu 9/8/2016, Historical Med      beclomethasone (QVAR) 40 MCG/ACT inhaler Inhale 2 puffs 2 (two) times a day Rinse mouth after use , Historical Med      bimatoprost (LUMIGAN) 0 03 % ophthalmic drops Administer 1 drop to both eyes daily at bedtime  , Starting Thu 6/5/2008, Historical Med      Calcium Carb-Cholecalciferol (CALCIUM CARBONATE-VITAMIN D3 PO) Take 1,500 mg by mouth daily, Starting 9/20/2013, Until Discontinued, Historical Med      carBAMazepine (TEGRETOL) 200 mg tablet Take 2 tabs in the morning and 3 tabs at night, Historical Med      finasteride (PROSCAR) 5 mg tablet Take 5 mg by mouth daily, Starting 4/26/2016, Until Discontinued, Historical Med      gabapentin (NEURONTIN) 300 mg capsule Take 300 mg by mouth 3 (three) times a day, Starting Thu 9/8/2016, Historical Med      HYDROcodone-acetaminophen (NORCO) 5-325 mg per tablet Take 1 tablet by mouth every 4 (four) hours as needed for pain, Historical Med      insulin glargine (LANTUS) 100 units/mL subcutaneous injection Inject 40 Units under the skin daily at bedtime, Historical Med      !! insulin lispro (HumaLOG) 100 units/mL injection Inject 12 Units under the skin daily with breakfast, Starting Sat 10/27/2018, Print      !! insulin lispro (HumaLOG) 100 units/mL injection Inject 16 Units under the skin daily with lunch, Starting Sat 10/27/2018, Print      !! insulin lispro (HumaLOG) 100 units/mL injection Inject 16 Units under the skin daily with dinner, Starting Sat 10/27/2018, Print      ipratropium (ATROVENT) 0 02 % nebulizer solution as needed, Starting 7/2/2015, Until Discontinued, Historical Med      latanoprost (XALATAN) 0 005 % ophthalmic solution Administer 1 drop to both eyes daily, Historical Med      lisinopril (ZESTRIL) 5 mg tablet Take 5 mg by mouth daily  , Starting Thu 7/7/2016, Historical Med      melatonin 3 mg Take 3 mg by mouth daily at bedtime, Historical Med      mometasone-formoterol (DULERA) 200-5 MCG/ACT inhaler Inhale 2 puffs 2 (two) times a day  , Starting Mon 8/31/2015, Historical Med      montelukast (SINGULAIR) 10 mg tablet Take 10 mg by mouth daily  , Starting Tue 8/25/2015, Historical Med      Multiple Vitamins-Minerals (CENTRUM SILVER PO) 1 tablet daily, Starting 6/2/2011, Until Discontinued, Historical Med      omega-3-acid ethyl esters (LOVAZA) 1 g capsule Take 1 g by mouth daily, Until Discontinued, Historical Med      omeprazole (PriLOSEC) 40 MG capsule Take 40 mg by mouth daily, Starting 4/26/2016, Until Discontinued, Historical Med      !! predniSONE 10 mg tablet Days 1-3: 4 tablets daily Days 4-6: 3 tablets daily Days 7-9: 2 tablets daily Days 10-12: 1 tablet daily then stop, Normal      !! predniSONE 10 mg tablet Take 10 mg by mouth every other day, Historical Med      TAMSULOSIN HCL PO Take by mouth daily at bedtime, Historical Med      brimonidine (ALPHAGAN P) 0 15 % ophthalmic solution Administer 1 drop to both eyes every 8 (eight) hours  , Starting Thu 7/2/2015, Historical Med      glucagon 1 MG injection as needed, Starting 8/5/2016, Until Discontinued, Historical Med       !! - Potential duplicate medications found  Please discuss with provider  No discharge procedures on file      ED Provider  Electronically Signed by           Luís Paiz DO  12/03/18 8182

## 2018-12-06 LAB
ATRIAL RATE: 104 BPM
P AXIS: 49 DEGREES
PR INTERVAL: 146 MS
QRS AXIS: 15 DEGREES
QRSD INTERVAL: 88 MS
QT INTERVAL: 350 MS
QTC INTERVAL: 460 MS
T WAVE AXIS: 20 DEGREES
VENTRICULAR RATE: 104 BPM

## 2018-12-06 PROCEDURE — 93010 ELECTROCARDIOGRAM REPORT: CPT | Performed by: INTERNAL MEDICINE

## 2019-01-28 ENCOUNTER — APPOINTMENT (EMERGENCY)
Dept: RADIOLOGY | Facility: HOSPITAL | Age: 54
End: 2019-01-28
Payer: COMMERCIAL

## 2019-01-28 ENCOUNTER — HOSPITAL ENCOUNTER (EMERGENCY)
Facility: HOSPITAL | Age: 54
Discharge: HOME | End: 2019-01-28
Attending: EMERGENCY MEDICINE
Payer: COMMERCIAL

## 2019-01-28 VITALS
TEMPERATURE: 96.7 F | RESPIRATION RATE: 18 BRPM | SYSTOLIC BLOOD PRESSURE: 137 MMHG | HEART RATE: 96 BPM | WEIGHT: 209.7 LBS | BODY MASS INDEX: 33.85 KG/M2 | OXYGEN SATURATION: 98 % | DIASTOLIC BLOOD PRESSURE: 82 MMHG

## 2019-01-28 DIAGNOSIS — J45.901 ASTHMA EXACERBATION: ICD-10-CM

## 2019-01-28 DIAGNOSIS — J20.9 ACUTE BRONCHITIS: Primary | ICD-10-CM

## 2019-01-28 PROCEDURE — 94640 AIRWAY INHALATION TREATMENT: CPT

## 2019-01-28 PROCEDURE — 99284 EMERGENCY DEPT VISIT MOD MDM: CPT

## 2019-01-28 PROCEDURE — 71046 X-RAY EXAM CHEST 2 VIEWS: CPT

## 2019-01-28 RX ORDER — IPRATROPIUM BROMIDE AND ALBUTEROL SULFATE 2.5; .5 MG/3ML; MG/3ML
SOLUTION RESPIRATORY (INHALATION)
Status: COMPLETED
Start: 2019-01-28 | End: 2019-01-28

## 2019-01-28 RX ORDER — AZITHROMYCIN 250 MG/1
TABLET, FILM COATED ORAL
Qty: 6 TABLET | Refills: 0 | Status: SHIPPED | OUTPATIENT
Start: 2019-01-28 | End: 2019-02-01

## 2019-01-28 RX ORDER — METHYLPREDNISOLONE SODIUM SUCCINATE 125 MG/2ML
INJECTION, POWDER, LYOPHILIZED, FOR SOLUTION INTRAMUSCULAR; INTRAVENOUS
Status: COMPLETED
Start: 2019-01-28 | End: 2019-01-28

## 2019-01-28 RX ORDER — AZITHROMYCIN 250 MG/1
500 TABLET, FILM COATED ORAL ONCE
Status: COMPLETED | OUTPATIENT
Start: 2019-01-28 | End: 2019-01-28

## 2019-01-28 RX ORDER — HYDROXYZINE HYDROCHLORIDE 25 MG/1
25 TABLET, FILM COATED ORAL EVERY 6 HOURS PRN
COMMUNITY
End: 2019-04-08

## 2019-01-28 RX ORDER — POLYVINYL ALCOHOL 14 MG/ML
2 SOLUTION/ DROPS OPHTHALMIC AS NEEDED
COMMUNITY

## 2019-01-28 RX ORDER — PRENATAL VIT 91/IRON/FOLIC/DHA 28-975-200
COMBINATION PACKAGE (EA) ORAL
COMMUNITY
End: 2021-05-14

## 2019-01-28 RX ORDER — PREDNISONE 20 MG/1
40 TABLET ORAL DAILY
Qty: 8 TABLET | Refills: 0 | Status: SHIPPED | OUTPATIENT
Start: 2019-01-28 | End: 2019-03-14

## 2019-01-28 RX ORDER — IPRATROPIUM BROMIDE AND ALBUTEROL SULFATE 2.5; .5 MG/3ML; MG/3ML
3 SOLUTION RESPIRATORY (INHALATION)
Status: DISCONTINUED | OUTPATIENT
Start: 2019-01-28 | End: 2019-01-28 | Stop reason: HOSPADM

## 2019-01-28 RX ORDER — FLUTICASONE PROPIONATE 50 MCG
1 SPRAY, SUSPENSION (ML) NASAL DAILY
COMMUNITY

## 2019-01-28 RX ADMIN — AZITHROMYCIN 500 MG: 250 TABLET, FILM COATED ORAL at 03:08

## 2019-01-28 RX ADMIN — IPRATROPIUM BROMIDE AND ALBUTEROL SULFATE 3 ML: 2.5; .5 SOLUTION RESPIRATORY (INHALATION) at 02:08

## 2019-01-28 RX ADMIN — IPRATROPIUM BROMIDE AND ALBUTEROL SULFATE 3 ML: 2.5; .5 SOLUTION RESPIRATORY (INHALATION) at 03:08

## 2019-01-28 NOTE — DISCHARGE INSTRUCTIONS
Acute Bronchitis   WHAT YOU NEED TO KNOW:   Acute bronchitis is swelling and irritation in the air passages of your lungs  This irritation may cause you to cough or have other breathing problems  Acute bronchitis often starts because of another illness, such as a cold or the flu  The illness spreads from your nose and throat to your windpipe and airways  Bronchitis is often called a chest cold  Acute bronchitis lasts about 3 to 6 weeks and is usually not a serious illness  Your cough can last for several weeks  DISCHARGE INSTRUCTIONS:   Return to the emergency department if:   · You cough up blood  · Your lips or fingernails turn blue  · You feel like you are not getting enough air when you breathe  Contact your healthcare provider if:   · You have a fever  · Your breathing problems do not go away or get worse  · Your cough does not get better within 4 weeks  · You have questions or concerns about your condition or care  Self-care:   · Get more rest   Rest helps your body to heal  Slowly start to do more each day  Rest when you feel it is needed  · Avoid irritants in the air  Avoid chemicals, fumes, and dust  Wear a face mask if you must work around dust or fumes  Stay inside on days when air pollution levels are high  If you have allergies, stay inside when pollen counts are high  Do not use aerosol products, such as spray-on deodorant, bug spray, and hair spray  · Do not smoke or be around others who smoke  Nicotine and other chemicals in cigarettes and cigars damages the cilia that move mucus out of your lungs  Ask your healthcare provider for information if you currently smoke and need help to quit  E-cigarettes or smokeless tobacco still contain nicotine  Talk to your healthcare provider before you use these products  · Drink liquids as directed  Liquids help keep your air passages moist and help you cough up mucus   You may need to drink more liquids when you have acute bronchitis  Ask how much liquid to drink each day and which liquids are best for you  · Use a humidifier or vaporizer  Use a cool mist humidifier or a vaporizer to increase air moisture in your home  This may make it easier for you to breathe and help decrease your cough  Decrease risk for acute bronchitis:   · Get the vaccinations you need  Ask your healthcare provider if you should get vaccinated against the flu or pneumonia  · Prevent the spread of germs  You can decrease your risk of acute bronchitis and other illnesses by doing the following:     [facility] your hands often with soap and water  Carry germ-killing hand lotion or gel with you  You can use the lotion or gel to clean your hands when soap and water are not available  ¨ Do not touch your eyes, nose, or mouth unless you have washed your hands first     ¨ Always cover your mouth when you cough to prevent the spread of germs  It is best to cough into a tissue or your shirt sleeve instead of into your hand  Ask those around you cover their mouths when they cough  ¨ Try to avoid people who have a cold or the flu  If you are sick, stay away from others as much as possible  Medicines: Your healthcare provider may  give you any of the following:  · Ibuprofen or acetaminophen  are medicines that help lower your fever  They are available without a doctor's order  Ask your healthcare provider which medicine is right for you  Ask how much to take and how often to take it  Follow directions  These medicines can cause stomach bleeding if not taken correctly  Ibuprofen can cause kidney damage  Do not take ibuprofen if you have kidney disease, an ulcer, or allergies to aspirin  Acetaminophen can cause liver damage  Do not take more than 4,000 milligrams in 24 hours  · Decongestants  help loosen mucus in your lungs and make it easier to cough up  This can help you breathe easier  · Cough suppressants  decrease your urge to cough   If your cough produces mucus, do not take a cough suppressant unless your healthcare provider tells you to  Your healthcare provider may suggest that you take a cough suppressant at night so you can rest     · Inhalers  may be given  Your healthcare provider may give you one or more inhalers to help you breathe easier and cough less  An inhaler gives your medicine to open your airways  Ask your healthcare provider to show you how to use your inhaler correctly  · Take your medicine as directed  Contact your healthcare provider if you think your medicine is not helping or if you have side effects  Tell him of her if you are allergic to any medicine  Keep a list of the medicines, vitamins, and herbs you take  Include the amounts, and when and why you take them  Bring the list or the pill bottles to follow-up visits  Carry your medicine list with you in case of an emergency  Follow up with your healthcare provider as directed:  Write down questions you have so you will remember to ask them during your follow-up visits  © 2017 [address] Information is for End User's use only and may not be sold, redistributed or otherwise used for commercial purposes  All illustrations and images included in CareNotes® are the copyrighted property of [employer]  or Toby López  The above information is an  only  It is not intended as medical advice for individual conditions or treatments  Talk to your doctor, nurse or pharmacist before following any medical regimen to see if it is safe and effective for you  Asthma, Ambulatory Care   GENERAL INFORMATION:   Asthma  is a lung disease that makes breathing difficult  Chronic inflammation and reactions to triggers narrow the airways in your lungs  Asthma can become life-threatening if it is not managed     Common symptoms include the following:   · Coughing     · Wheezing     · Shortness of breath     · Chest tightness  Seek immediate care for the following symptoms:   · Severe shortness of breath    · Blue or gray lips or nails    · Skin around your neck and ribs pulls in with each breath    · Shortness of breath, even after you take your short-term medicine as directed     · Peak flow numbers in the red zone of your asthma action plan  Treatment for asthma  will depend on how severe it is  Medicine may decrease inflammation, open airways, and make it easier to breathe  Medicines may be inhaled, taken as a pill, or injected  Short-term medicines relieve your symptoms quickly  Long-term medicines are used to prevent future attacks  You may also need medicine to help control your allergies  Manage and prevent future asthma attacks:   · Follow your asthma action pan  This is a written plan that you and your healthcare provider create  It explains which medicine you need and when to change doses if necessary  It also explains how you can monitor symptoms and use a peak flow meter  The meter measures how well your lungs are working  · Manage other health conditions , such as allergies, acid reflux, and sleep apnea  · Identify and avoid triggers  These may include pets, dust mites, mold, and cockroaches  · Do not smoke and avoid others who smoke  If you smoke, it is never too late to quit  Ask your healthcare provider if you need help quitting  · Ask about a flu vaccine  The flu can make your asthma worse  You may need a yearly flu shot  Follow up with your healthcare provider as directed: You will need to return to make sure your medicine is working and your symptoms are controlled  You may be referred to an asthma or allergy specialist  Amber Wild may be asked to keep a record of your peak flow values and bring it with you to your appointments  Write down your questions so you remember to ask them during your visits  CARE AGREEMENT:   You have the right to help plan your care  Learn about your health condition and how it may be treated  Discuss treatment options with your caregivers to decide what care you want to receive  You always have the right to refuse treatment  The above information is an  only  It is not intended as medical advice for individual conditions or treatments  Talk to your doctor, nurse or pharmacist before following any medical regimen to see if it is safe and effective for you  © 2014 [address] is for End User's use only and may not be sold, redistributed or otherwise used for commercial purposes  All illustrations and images included in CareNotes® are the copyrighted property of A D A M [employer] Inc  or Toby López

## 2019-01-28 NOTE — ED PROVIDER NOTES
History  Chief Complaint   Patient presents with    Asthma     pt  reports feeling SOB around midnight    Mayda Erasmo Mayda Zimmerman used his nebulizer & called 911  Mayda Zimmerman   pt  reports feeling better on arrival to er dept  53-year-old male with history of asthma presents for evaluation of acute onset shortness of breath which started this evening  He of breath associated with chest tightness and wheezing which was typical of his regular asthma exacerbation  He states that he had URI symptoms for 3 days and developed a cough productive of green sputum  He denies chest  pain  He states he has had multiple previous severe asthma exacerbations including 3 admissions where he required ICU stay and intubation  He is currently on daily steroids at 2 mg per day  He follows up with pulmonology and has an appointment scheduled next month  He received albuterol and Solu-Medrol EN route and currently feeling much better  Denies any current shortness of breath or chest pain  States that he is going to be seeing his primary care provider for follow-up this week  He recently stopped his Singulair as it was no longer covered under his insurance otherwise uses Dulera daily as well as his breathing treatment  Prior to Admission Medications   Prescriptions Last Dose Informant Patient Reported? Taking?    Calcium Carb-Cholecalciferol (CALCIUM CARBONATE-VITAMIN D3 PO)   Yes No   Sig: Take 1,500 mg by mouth daily   HYDROcodone-acetaminophen (NORCO) 5-325 mg per tablet   Yes No   Sig: Take 1 tablet by mouth every 4 (four) hours as needed for pain   Multiple Vitamins-Minerals (CENTRUM SILVER PO)   Yes No   Si tablet daily   TAMSULOSIN HCL PO   Yes No   Sig: Take 0 4 mg by mouth daily at bedtime     albuterol (2 5 mg/3 mL) 0 083 % nebulizer solution   No No   Sig: Take 1 vial (2 5 mg total) by nebulization every 4 (four) hours as needed for shortness of breath   albuterol (PROVENTIL HFA,VENTOLIN HFA) 90 mcg/act inhaler   No No   Sig: Inhale 2 puffs every 4 (four) hours as needed for wheezing   atorvastatin (LIPITOR) 80 mg tablet   Yes No   Sig: Take 80 mg by mouth daily at bedtime   beclomethasone (QVAR) 40 MCG/ACT inhaler   Yes No   Sig: Inhale 2 puffs as needed Rinse mouth after use      bimatoprost (LUMIGAN) 0 03 % ophthalmic drops   Yes No   Sig: Administer 1 drop to both eyes daily at bedtime     brimonidine (ALPHAGAN P) 0 15 % ophthalmic solution   Yes No   Sig: Administer 1 drop to both eyes every 8 (eight) hours     carBAMazepine (TEGRETOL) 200 mg tablet   Yes No   Sig: Take 2 tabs in the morning and 3 tabs at night   finasteride (PROSCAR) 5 mg tablet   Yes No   Sig: Take 5 mg by mouth daily   fluticasone (FLONASE) 50 mcg/act nasal spray   Yes Yes   Si spray into each nostril daily   gabapentin (NEURONTIN) 300 mg capsule   Yes No   Sig: Take 300 mg by mouth 3 (three) times a day   glucagon 1 MG injection   Yes No   Sig: as needed   hydrOXYzine HCL (ATARAX) 25 mg tablet   Yes Yes   Sig: Take 25 mg by mouth every 6 (six) hours as needed for itching   hydrocortisone (ANUSOL-HC, PROCTOSOL HC,) 2 5 % rectal cream   Yes Yes   Sig: Insert 1 application into the rectum 2 (two) times a day   insulin glargine (LANTUS) 100 units/mL subcutaneous injection   Yes No   Sig: Inject 40 Units under the skin daily at bedtime   insulin lispro (HumaLOG) 100 units/mL injection   No No   Sig: Inject 12 Units under the skin daily with breakfast   Patient taking differently: Inject 10 Units under the skin daily with breakfast     insulin lispro (HumaLOG) 100 units/mL injection   No No   Sig: Inject 16 Units under the skin daily with lunch   Patient taking differently: Inject 10 Units under the skin daily with lunch     insulin lispro (HumaLOG) 100 units/mL injection   No No   Sig: Inject 16 Units under the skin daily with dinner   Patient taking differently: Inject 14 Units under the skin daily with dinner     ipratropium (ATROVENT) 0 02 % nebulizer solution   Yes No Sig: Take 0 5 mg by nebulization every 6 (six) hours as needed for wheezing or shortness of breath     latanoprost (XALATAN) 0 005 % ophthalmic solution   Yes No   Sig: Administer 1 drop to both eyes daily   lisinopril (ZESTRIL) 5 mg tablet   Yes No   Sig: Take 5 mg by mouth daily     melatonin 3 mg   Yes No   Sig: Take 3 mg by mouth daily at bedtime   mometasone-formoterol (DULERA) 200-5 MCG/ACT inhaler   Yes No   Sig: Inhale 2 puffs 2 (two) times a day     montelukast (SINGULAIR) 10 mg tablet   Yes No   Sig: Take 10 mg by mouth daily     omega-3-acid ethyl esters (LOVAZA) 1 g capsule   Yes No   Sig: Take 1 g by mouth daily   omeprazole (PriLOSEC) 40 MG capsule   Yes No   Sig: Take 40 mg by mouth daily   polyvinyl alcohol (LIQUIFILM TEARS) 1 4 % ophthalmic solution   Yes Yes   Si drops as needed for dry eyes   predniSONE 10 mg tablet   Yes No   Sig: Take 10 mg by mouth every other day   terbinafine (LamISIL) 1 % cream   Yes No   Sig: terbinafine HCl 1 % topical cream      Facility-Administered Medications: None       Past Medical History:   Diagnosis Date    Asthma     [facility] [address] [facility]     [facility]     Enlarged prostate     Glaucoma     Hyperlipidemia     Hypertension     Seasonal allergic rhinitis     Seizures (HCC)        Past Surgical History:   Procedure Laterality Date    KNEE SURGERY      WRIST SURGERY Left        History reviewed  No pertinent family history  I have reviewed and agree with the history as documented  Social History   Substance Use Topics    Smoking status: Former Smoker     Quit date:     Smokeless tobacco: Never Used    Alcohol use No        Review of Systems   Constitutional: Negative for appetite change, chills and fever  HENT: Negative for rhinorrhea and sore throat  Eyes: Negative for photophobia and visual disturbance  Respiratory: Positive for cough, chest tightness and shortness of breath      Cardiovascular: Negative for chest pain and palpitations  Gastrointestinal: Negative for abdominal pain and diarrhea  Genitourinary: Negative for dysuria, frequency and urgency  Skin: Negative for rash  Neurological: Negative for dizziness and weakness  All other systems reviewed and are negative  Physical Exam  Physical Exam   Constitutional: He is oriented to person, place, and time  He appears well-developed and well-nourished  HENT:   Head: Normocephalic and atraumatic  Right Ear: External ear normal    Left Ear: External ear normal    Mouth/Throat: Oropharynx is clear and moist    Eyes: Pupils are equal, round, and reactive to light  Conjunctivae and EOM are normal    Neck: Normal range of motion  Neck supple  No JVD present  No tracheal deviation present  Cardiovascular: Normal rate, regular rhythm and normal heart sounds  Exam reveals no gallop and no friction rub  No murmur heard  Pulmonary/Chest: Effort normal  No stridor  No respiratory distress  He has wheezes  He has no rales  Bilateral end-expiratory wheezes   Abdominal: Soft  He exhibits no distension and no mass  There is no tenderness  There is no rebound and no guarding  Musculoskeletal: Normal range of motion  He exhibits no edema  Neurological: He is alert and oriented to person, place, and time  No cranial nerve deficit  Skin: Skin is warm and dry  No rash noted  No erythema  No pallor  Psychiatric: He has a normal mood and affect  Nursing note and vitals reviewed        Vital Signs  ED Triage Vitals [01/28/19 0200]   Temperature Pulse Respirations Blood Pressure SpO2   (!) 96 7 °F (35 9 °C) 90 20 103/79 97 %      Temp Source Heart Rate Source Patient Position - Orthostatic VS BP Location FiO2 (%)   Tympanic -- Sitting Left arm --      Pain Score       --           Vitals:    01/28/19 0200 01/28/19 0229 01/28/19 0300   BP: 103/79 150/85 150/92   Pulse: 90 94 92   Patient Position - Orthostatic VS: Sitting Sitting        Visual Acuity      ED Medications  Medications   ipratropium-albuterol (DUO-NEB) 0 5-2 5 mg/3 mL inhalation solution 3 mL (3 mL Nebulization Given 1/28/19 0208)    EMS REPLENISHMENT MED (not administered)   ipratropium-albuterol (DUO-NEB) 0 5-2 5 mg/3 mL inhalation solution 3 mL (3 mL Nebulization Given 1/28/19 0308)   methylPREDNISolone sodium succinate (Solu-MEDROL) 125 MG injection **ADS Override Pull** (  Given to EMS 1/28/19 0215)   ipratropium-albuterol (DUO-NEB) 0 5-2 5 mg/3 mL inhalation solution **ADS Override Pull** (  Given to EMS 1/28/19 0215)   azithromycin (ZITHROMAX) tablet 500 mg (500 mg Oral Given 1/28/19 0308)       Diagnostic Studies  Results Reviewed     None                 XR chest 2 views    (Results Pending)              Procedures  Procedures       Phone Contacts  ED Phone Contact    ED Course                               MDM  Number of Diagnoses or Management Options  Diagnosis management comments: 25-year-old male with past medical history of asthma presents for evaluation of tightness and shortness of breath consistent with his asthma exacerbation  Patient currently feeling much better and denies any current discomfort, states that his breathing is currently at baseline  Will start patient on steroids and azithromycin given productive cough and patient will follow up with PCP for further care    CritCare Time    Disposition  Final diagnoses:   Acute bronchitis   Asthma exacerbation     Time reflects when diagnosis was documented in both MDM as applicable and the Disposition within this note     Time User Action Codes Description Comment    1/28/2019  3:17 AM Nicci Nick [J20 [id number]] Acute bronchitis     1/28/2019  3:17 AM Nicci Nick [J45 [id number]] Asthma exacerbation       ED Disposition     ED Disposition Condition Comment    Discharge  Ana Luisa Salcido discharge to home/self care      Condition at discharge: Stable        Follow-up Information     Follow up With Specialties Details Why [id number] [facility]  [facility] Emergency Department Emergency Medicine  If symptoms worsen [email or web address] [id number]  [facility] Schedule an appointment as soon as possible for a visit  [address]            Patient's Medications   Discharge Prescriptions    AZITHROMYCIN (ZITHROMAX Z-MIKE) 250 MG TABLET    Take 2 tablets today then 1 tablet daily x 4 days       Start Date: 1/28/2019 End Date: 2/1/2019       Order Dose: --       Quantity: 6 tablet    Refills: 0    PREDNISONE 20 MG TABLET    Take 2 tablets (40 mg total) by mouth daily       Start Date: 1/28/2019 End Date: --       Order Dose: 40 mg       Quantity: 8 tablet    Refills: 0     No discharge procedures on file      ED Provider  Electronically Signed by           Mark Anthony Shanks MD  01/28/19 7404

## 2019-02-06 ENCOUNTER — HOSPITAL ENCOUNTER (EMERGENCY)
Facility: HOSPITAL | Age: 54
Discharge: HOME | End: 2019-02-06
Attending: EMERGENCY MEDICINE | Admitting: EMERGENCY MEDICINE
Payer: COMMERCIAL

## 2019-02-06 VITALS
OXYGEN SATURATION: 98 % | BODY MASS INDEX: 32.93 KG/M2 | HEART RATE: 82 BPM | SYSTOLIC BLOOD PRESSURE: 130 MMHG | WEIGHT: 204 LBS | DIASTOLIC BLOOD PRESSURE: 78 MMHG | TEMPERATURE: 96.1 F | RESPIRATION RATE: 18 BRPM

## 2019-02-06 DIAGNOSIS — J45.901 MODERATE ASTHMA WITH ACUTE EXACERBATION, UNSPECIFIED WHETHER PERSISTENT: Primary | ICD-10-CM

## 2019-02-06 PROCEDURE — 99284 EMERGENCY DEPT VISIT MOD MDM: CPT

## 2019-02-06 PROCEDURE — 94640 AIRWAY INHALATION TREATMENT: CPT

## 2019-02-06 RX ADMIN — ALBUTEROL SULFATE 5 MG: 2.5 SOLUTION RESPIRATORY (INHALATION) at 07:18

## 2019-02-06 RX ADMIN — IPRATROPIUM BROMIDE 0.5 MG: 0.5 SOLUTION RESPIRATORY (INHALATION) at 07:18

## 2019-02-06 RX ADMIN — ALBUTEROL SULFATE 5 MG: 2.5 SOLUTION RESPIRATORY (INHALATION) at 07:36

## 2019-02-06 NOTE — DISCHARGE INSTRUCTIONS
Asthma, Ambulatory Care   GENERAL INFORMATION:   Asthma  is a lung disease that makes breathing difficult  Chronic inflammation and reactions to triggers narrow the airways in your lungs  Asthma can become life-threatening if it is not managed  Common symptoms include the following:   · Coughing     · Wheezing     · Shortness of breath     · Chest tightness  Seek immediate care for the following symptoms:   · Severe shortness of breath    · Blue or gray lips or nails    · Skin around your neck and ribs pulls in with each breath    · Shortness of breath, even after you take your short-term medicine as directed     · Peak flow numbers in the red zone of your asthma action plan  Treatment for asthma  will depend on how severe it is  Medicine may decrease inflammation, open airways, and make it easier to breathe  Medicines may be inhaled, taken as a pill, or injected  Short-term medicines relieve your symptoms quickly  Long-term medicines are used to prevent future attacks  You may also need medicine to help control your allergies  Manage and prevent future asthma attacks:   · Follow your asthma action pan  This is a written plan that you and your healthcare provider create  It explains which medicine you need and when to change doses if necessary  It also explains how you can monitor symptoms and use a peak flow meter  The meter measures how well your lungs are working  · Manage other health conditions , such as allergies, acid reflux, and sleep apnea  · Identify and avoid triggers  These may include pets, dust mites, mold, and cockroaches  · Do not smoke and avoid others who smoke  If you smoke, it is never too late to quit  Ask your healthcare provider if you need help quitting  · Ask about a flu vaccine  The flu can make your asthma worse  You may need a yearly flu shot  Follow up with your healthcare provider as directed:   You will need to return to make sure your medicine is working and your symptoms are controlled  You may be referred to an asthma or allergy specialist  Italo Jauregui may be asked to keep a record of your peak flow values and bring it with you to your appointments  Write down your questions so you remember to ask them during your visits  CARE AGREEMENT:   You have the right to help plan your care  Learn about your health condition and how it may be treated  Discuss treatment options with your caregivers to decide what care you want to receive  You always have the right to refuse treatment  The above information is an  only  It is not intended as medical advice for individual conditions or treatments  Talk to your doctor, nurse or pharmacist before following any medical regimen to see if it is safe and effective for you  © 2014 [address] is for End User's use only and may not be sold, redistributed or otherwise used for commercial purposes  All illustrations and images included in CareNotes® are the copyrighted property of A D A M , Inc  or Toby López

## 2019-02-06 NOTE — ED PROVIDER NOTES
History  Chief Complaint   Patient presents with    Asthma     pt states that this started yesterday  pt states that he got worse just a few hours ago  EMS stated that pt was doing a neb treatment prior to arrival  EMS gave pt Duo X2  EMS stated that pt takes steriods every other day  pt states he has been coughing green stuff for a few weeks  pt states that he feels tight     Patient is a 62-year-old male with a history of asthma complaining of a 2 day history of exacerbation  No relief with inhaler at home  Given 2 duo nebs by EMS  No steroids given her patient is currently on tele mg daily from his allergist   He denies any cough chest pain  The worse with exertion mildly improved with his inhalers at home  States that bedtime for ear pain  Denies any fevers sweats or chills  Similar to his previous allergies  Prior to Admission Medications   Prescriptions Last Dose Informant Patient Reported? Taking?    Calcium Carb-Cholecalciferol (CALCIUM CARBONATE-VITAMIN D3 PO)   Yes No   Sig: Take 1,500 mg by mouth daily   HYDROcodone-acetaminophen (NORCO) 5-325 mg per tablet   Yes No   Sig: Take 1 tablet by mouth every 4 (four) hours as needed for pain   Multiple Vitamins-Minerals (CENTRUM SILVER PO)   Yes No   Si tablet daily   TAMSULOSIN HCL PO   Yes No   Sig: Take 0 4 mg by mouth daily at bedtime     albuterol (2 5 mg/3 mL) 0 083 % nebulizer solution   No No   Sig: Take 1 vial (2 5 mg total) by nebulization every 4 (four) hours as needed for shortness of breath   albuterol (PROVENTIL HFA,VENTOLIN HFA) 90 mcg/act inhaler   No No   Sig: Inhale 2 puffs every 4 (four) hours as needed for wheezing   atorvastatin (LIPITOR) 80 mg tablet   Yes No   Sig: Take 80 mg by mouth daily at bedtime   beclomethasone (QVAR) 40 MCG/ACT inhaler   Yes No   Sig: Inhale 2 puffs as needed Rinse mouth after use      bimatoprost (LUMIGAN) 0 03 % ophthalmic drops   Yes No   Sig: Administer 1 drop to both eyes daily at bedtime brimonidine (ALPHAGAN P) 0 15 % ophthalmic solution   Yes No   Sig: Administer 1 drop to both eyes every 8 (eight) hours     carBAMazepine (TEGRETOL) 200 mg tablet   Yes No   Sig: Take 2 tabs in the morning and 3 tabs at night   finasteride (PROSCAR) 5 mg tablet   Yes No   Sig: Take 5 mg by mouth daily   fluticasone (FLONASE) 50 mcg/act nasal spray   Yes No   Si spray into each nostril daily   gabapentin (NEURONTIN) 300 mg capsule   Yes No   Sig: Take 300 mg by mouth 3 (three) times a day   glucagon 1 MG injection   Yes No   Sig: as needed   hydrOXYzine HCL (ATARAX) 25 mg tablet   Yes No   Sig: Take 25 mg by mouth every 6 (six) hours as needed for itching   hydrocortisone (ANUSOL-HC, PROCTOSOL HC,) 2 5 % rectal cream   Yes No   Sig: Insert 1 application into the rectum 2 (two) times a day   insulin glargine (LANTUS) 100 units/mL subcutaneous injection   Yes No   Sig: Inject 40 Units under the skin daily at bedtime   insulin lispro (HumaLOG) 100 units/mL injection   No No   Sig: Inject 12 Units under the skin daily with breakfast   insulin lispro (HumaLOG) 100 units/mL injection   No No   Sig: Inject 16 Units under the skin daily with lunch   Patient taking differently: Inject 10 Units under the skin daily with lunch     insulin lispro (HumaLOG) 100 units/mL injection   No No   Sig: Inject 16 Units under the skin daily with dinner   Patient taking differently: Inject 14 Units under the skin daily with dinner     ipratropium (ATROVENT) 0 02 % nebulizer solution   Yes No   Sig: Take 0 5 mg by nebulization every 6 (six) hours as needed for wheezing or shortness of breath     latanoprost (XALATAN) 0 005 % ophthalmic solution   Yes No   Sig: Administer 1 drop to both eyes daily   lisinopril (ZESTRIL) 5 mg tablet   Yes No   Sig: Take 5 mg by mouth daily     melatonin 3 mg   Yes No   Sig: Take 3 mg by mouth daily at bedtime   mometasone-formoterol (DULERA) 200-5 MCG/ACT inhaler   Yes No   Sig: Inhale 2 puffs 2 (two) times a day     montelukast (SINGULAIR) 10 mg tablet   Yes No   Sig: Take 10 mg by mouth daily     omega-3-acid ethyl esters (LOVAZA) 1 g capsule   Yes No   Sig: Take 1 g by mouth daily   omeprazole (PriLOSEC) 40 MG capsule   Yes No   Sig: Take 40 mg by mouth daily   polyvinyl alcohol (LIQUIFILM TEARS) 1 4 % ophthalmic solution   Yes No   Si drops as needed for dry eyes   predniSONE 10 mg tablet   Yes No   Sig: Take 10 mg by mouth every other day   predniSONE 20 mg tablet   No No   Sig: Take 2 tablets (40 mg total) by mouth daily   terbinafine (LamISIL) 1 % cream   Yes No   Sig: terbinafine HCl 1 % topical cream      Facility-Administered Medications: None       Past Medical History:   Diagnosis Date    Asthma     [facility]     Diabetes mellitus ([facility]     Enlarged prostate     Glaucoma     Hyperlipidemia     Hypertension     Seasonal allergic rhinitis     Seizures ([facility] )        Past Surgical History:   Procedure Laterality Date    KNEE SURGERY      WRIST SURGERY Left        History reviewed  No pertinent family history  I have reviewed and agree with the history as documented  Social History   Substance Use Topics    Smoking status: Former Smoker     Quit date:     Smokeless tobacco: Never Used    Alcohol use No        Review of Systems   Constitutional: Negative  Negative for activity change, fatigue and fever  HENT: Negative  Eyes: Negative  Respiratory: Positive for shortness of breath and wheezing  Negative for cough and choking  Cardiovascular: Negative  Negative for chest pain  Gastrointestinal: Negative  Negative for abdominal pain, diarrhea, nausea and vomiting  Endocrine: Negative  Genitourinary: Negative  Musculoskeletal: Negative  Skin: Negative  Allergic/Immunologic: Negative  Neurological: Negative  Hematological: Negative  Psychiatric/Behavioral: Negative  All other systems reviewed and are negative        Physical Exam  Physical Exam   Constitutional: He appears well-developed and well-nourished  HENT:   Head: Normocephalic and atraumatic  Right Ear: External ear normal    Left Ear: External ear normal    Nose: Nose normal    Mouth/Throat: Oropharynx is clear and moist    Eyes: Pupils are equal, round, and reactive to light  Conjunctivae are normal    Neck: Normal range of motion  Neck supple  Cardiovascular: Normal rate, regular rhythm, normal heart sounds and intact distal pulses  Pulmonary/Chest: Effort normal  No respiratory distress  He has no wheezes  Diffuse end expiratory wheezing   Abdominal: Soft  Bowel sounds are normal    Nursing note and vitals reviewed  Vital Signs  ED Triage Vitals [02/06/19 0619]   Temperature Pulse Respirations Blood Pressure SpO2   (!) 96 1 °F (35 6 °C) 93 (!) 25 152/64 99 %      Temp Source Heart Rate Source Patient Position - Orthostatic VS BP Location FiO2 (%)   Tympanic Monitor Sitting Left arm --      Pain Score       --           Vitals:    02/06/19 0619 02/06/19 0811   BP: 152/64 130/78   Pulse: 93 82   Patient Position - Orthostatic VS: Sitting        Visual Acuity      ED Medications  Medications   albuterol inhalation solution 5 mg (5 mg Nebulization Given 2/6/19 0718)   ipratropium (ATROVENT) 0 02 % inhalation solution 0 5 mg (0 5 mg Nebulization Given 2/6/19 0718)   albuterol inhalation solution 5 mg (5 mg Nebulization Given 2/6/19 0736)       Diagnostic Studies  Results Reviewed     None                 No orders to display              Procedures  Procedures       Phone Contacts  ED Phone Contact    ED Course  ED Course as of Feb 07 1502   Wed Feb 06, 2019   0805 Peak flow increased from 150-250 after 2 nebs  Patient feeling improved moving more air  Only scant an x-ray wheezes this time  Patient feeling improved would like to go home  States he has medications at home with refills coming tomorrow  Will follow-up PCP return to the ER for any concerns  MDM  Number of Diagnoses or Management Options  Moderate asthma with acute exacerbation, unspecified whether persistent:      Amount and/or Complexity of Data Reviewed  Review and summarize past medical records: yes        Disposition  Final diagnoses: Moderate asthma with acute exacerbation, unspecified whether persistent     Time reflects when diagnosis was documented in both MDM as applicable and the Disposition within this note     Time User Action Codes Description Comment    2/6/2019  8:06 AM Meli Gavin Add [J45 [id number]] Moderate asthma with acute exacerbation, unspecified whether persistent       ED Disposition     ED Disposition Condition Date/Time Comment    Discharge  Wed Feb 6, 2019  8:07 AM Rebecca Amin discharge to home/self care      Condition at discharge: Stable        Follow-up Information     Follow up With Specialties Details Why Joe E MD [facility]   [address] [place] [address] [address] [facility]  [phone]            Discharge Medication List as of 2/6/2019  8:07 AM      CONTINUE these medications which have NOT CHANGED    Details   albuterol (2 5 mg/3 mL) 0 083 % nebulizer solution Take 1 vial (2 5 mg total) by nebulization every 4 (four) hours as needed for shortness of breath, Starting Thu 11/15/2018, Normal      albuterol (PROVENTIL HFA,VENTOLIN HFA) 90 mcg/act inhaler Inhale 2 puffs every 4 (four) hours as needed for wheezing, Starting Th 11/15/2018, Normal      atorvastatin (LIPITOR) 80 mg tablet Take 80 mg by mouth daily at bedtime, Starting Th 9/8/2016, Historical Med      beclomethasone (QVAR) 40 MCG/ACT inhaler Inhale 2 puffs as needed Rinse mouth after use   , Historical Med      bimatoprost (LUMIGAN) 0 03 % ophthalmic drops Administer 1 drop to both eyes daily at bedtime  , Starting Th 6/5/2008, Historical Med      brimonidine (ALPHAGAN P) 0 15 % ophthalmic solution Administer 1 drop to both eyes every 8 (eight) hours  , Starting Thu 7/2/2015, Historical Med      Calcium Carb-Cholecalciferol (CALCIUM CARBONATE-VITAMIN D3 PO) Take 1,500 mg by mouth daily, Starting 9/20/2013, Until Discontinued, Historical Med      carBAMazepine (TEGRETOL) 200 mg tablet Take 2 tabs in the morning and 3 tabs at night, Historical Med      finasteride (PROSCAR) 5 mg tablet Take 5 mg by mouth daily, Starting 4/26/2016, Until Discontinued, Historical Med      fluticasone (FLONASE) 50 mcg/act nasal spray 1 spray into each nostril daily, Historical Med      gabapentin (NEURONTIN) 300 mg capsule Take 300 mg by mouth 3 (three) times a day, Starting Thu 9/8/2016, Historical Med      insulin glargine (LANTUS) 100 units/mL subcutaneous injection Inject 40 Units under the skin daily at bedtime, Historical Med      !! insulin lispro (HumaLOG) 100 units/mL injection Inject 12 Units under the skin daily with breakfast, Starting Sat 10/27/2018, Print      !! insulin lispro (HumaLOG) 100 units/mL injection Inject 16 Units under the skin daily with lunch, Starting Sat 10/27/2018, Print      !! insulin lispro (HumaLOG) 100 units/mL injection Inject 16 Units under the skin daily with dinner, Starting Sat 10/27/2018, Print      ipratropium (ATROVENT) 0 02 % nebulizer solution Take 0 5 mg by nebulization every 6 (six) hours as needed for wheezing or shortness of breath  , Starting u 7/2/2015, Historical Med      latanoprost (XALATAN) 0 005 % ophthalmic solution Administer 1 drop to both eyes daily, Historical Med      lisinopril (ZESTRIL) 5 mg tablet Take 5 mg by mouth daily  , Starting Thu 7/7/2016, Historical Med      melatonin 3 mg Take 3 mg by mouth daily at bedtime, Historical Med      mometasone-formoterol (DULERA) 200-5 MCG/ACT inhaler Inhale 2 puffs 2 (two) times a day  , Starting Mon 8/31/2015, Historical Med      montelukast (SINGULAIR) 10 mg tablet Take 10 mg by mouth daily  , Starting Tue 8/25/2015, Historical Med      Multiple Vitamins-Minerals (CENTRUM SILVER PO) 1 tablet daily, Starting 6/2/2011, Until Discontinued, Historical Med      omega-3-acid ethyl esters (LOVAZA) 1 g capsule Take 1 g by mouth daily, Until Discontinued, Historical Med      omeprazole (PriLOSEC) 40 MG capsule Take 40 mg by mouth daily, Starting 4/26/2016, Until Discontinued, Historical Med      polyvinyl alcohol (LIQUIFILM TEARS) 1 4 % ophthalmic solution 2 drops as needed for dry eyes, Historical Med      !! predniSONE 10 mg tablet Take 10 mg by mouth every other day, Historical Med      !! predniSONE 20 mg tablet Take 2 tablets (40 mg total) by mouth daily, Starting Mon 1/28/2019, Normal      TAMSULOSIN HCL PO Take 0 4 mg by mouth daily at bedtime  , Historical Med      terbinafine (LamISIL) 1 % cream terbinafine HCl 1 % topical cream, Historical Med      glucagon 1 MG injection as needed, Starting 8/5/2016, Until Discontinued, Historical Med      HYDROcodone-acetaminophen (NORCO) 5-325 mg per tablet Take 1 tablet by mouth every 4 (four) hours as needed for pain, Historical Med      hydrocortisone (ANUSOL-HC, PROCTOSOL HC,) 2 5 % rectal cream Insert 1 application into the rectum 2 (two) times a day, Historical Med      hydrOXYzine HCL (ATARAX) 25 mg tablet Take 25 mg by mouth every 6 (six) hours as needed for itching, Historical Med       !! - Potential duplicate medications found  Please discuss with provider  No discharge procedures on file      ED Provider  Electronically Signed by           Maureen Cottrell MD  02/07/19 6747

## 2019-02-06 NOTE — ED NOTES
Pt stating that he feels better now than when he came in     [facility], [address] [facility]  02/06/19 0099

## 2019-02-07 ENCOUNTER — HOSPITAL ENCOUNTER (INPATIENT)
Facility: HOSPITAL | Age: 54
LOS: 2 days | Discharge: HOME | DRG: 141 | End: 2019-02-09
Attending: EMERGENCY MEDICINE | Admitting: INTERNAL MEDICINE
Payer: COMMERCIAL

## 2019-02-07 ENCOUNTER — APPOINTMENT (EMERGENCY)
Dept: RADIOLOGY | Facility: HOSPITAL | Age: 54
DRG: 141 | End: 2019-02-07
Payer: COMMERCIAL

## 2019-02-07 DIAGNOSIS — R09.2 RESPIRATORY ARREST (HCC): Primary | ICD-10-CM

## 2019-02-07 DIAGNOSIS — J45.51 SEVERE PERSISTENT ASTHMA WITH ACUTE EXACERBATION: ICD-10-CM

## 2019-02-07 LAB
ALBUMIN SERPL BCP-MCNC: 4.3 G/DL (ref 3–5.2)
ALP SERPL-CCNC: 65 U/L (ref 43–122)
ALT SERPL W P-5'-P-CCNC: 26 U/L (ref 9–52)
ANION GAP BLD CALC-SCNC: 19 MMOL/L (ref 4–13)
ANION GAP SERPL CALCULATED.3IONS-SCNC: 14 MMOL/L (ref 5–14)
APTT PPP: 22 SECONDS (ref 23–34)
ARTERIAL PATENCY WRIST A: NO
ARTERIAL PATENCY WRIST A: NO
AST SERPL W P-5'-P-CCNC: 28 U/L (ref 17–59)
ATRIAL RATE: 137 BPM
BACTERIA UR QL AUTO: ABNORMAL /HPF
BASE EXCESS BLDA CALC-SCNC: -2.6 MMOL/L (ref -2.1–2.1)
BASE EXCESS BLDA CALC-SCNC: -8 MMOL/L (ref -2.1–2.1)
BILIRUB SERPL-MCNC: 0.3 MG/DL
BILIRUB UR QL STRIP: NEGATIVE
BUN BLD-MCNC: 15 MG/DL (ref 5–25)
BUN SERPL-MCNC: 14 MG/DL (ref 5–25)
CA-I BLD-SCNC: 1.16 MMOL/L (ref 1.12–1.32)
CALCIUM SERPL-MCNC: 8.6 MG/DL (ref 8.4–10.2)
CHLORIDE SERPL-SCNC: 100 MMOL/L (ref 97–108)
CLARITY UR: CLEAR
CO2 SERPL-SCNC: 22 MMOL/L (ref 22–30)
COARSE GRAN CASTS URNS QL MICRO: ABNORMAL /LPF
COLOR UR: ABNORMAL
CREAT BLD-MCNC: 0.9 MG/DL (ref 0.6–1.3)
CREAT SERPL-MCNC: 0.92 MG/DL (ref 0.7–1.5)
EOSINOPHIL # BLD AUTO: 0.16 THOUSAND/UL (ref 0–0.4)
EOSINOPHIL NFR BLD MANUAL: 1 % (ref 0–6)
ERYTHROCYTE [DISTWIDTH] IN BLOOD BY AUTOMATED COUNT: 14.1 %
FLUAV + FLUBV RNA ISLT NAA+PROBE: NOT DETECTED
FLUAV + FLUBV RNA ISLT NAA+PROBE: NOT DETECTED
GFR SERPL CREATININE-BSD FRML MDRD: 93 ML/MIN/1.73SQ M
GFR SERPL CREATININE-BSD FRML MDRD: 96 ML/MIN/1.73SQ M
GLUCOSE SERPL-MCNC: 143 MG/DL (ref 65–140)
GLUCOSE SERPL-MCNC: 197 MG/DL (ref 65–140)
GLUCOSE SERPL-MCNC: 268 MG/DL (ref 65–140)
GLUCOSE SERPL-MCNC: 276 MG/DL (ref 65–140)
GLUCOSE SERPL-MCNC: 318 MG/DL (ref 70–99)
GLUCOSE SERPL-MCNC: 340 MG/DL (ref 65–140)
GLUCOSE UR STRIP-MCNC: ABNORMAL MG/DL
HCO3 BLDA-SCNC: 21.9 MMOL/L (ref 22–26)
HCO3 BLDA-SCNC: 24.5 MMOL/L (ref 22–26)
HCT VFR BLD AUTO: 44.5 % (ref 41–53)
HCT VFR BLD CALC: 42 % (ref 36.5–49.3)
HGB BLD-MCNC: 14.3 G/DL (ref 13.5–17.5)
HGB BLDA-MCNC: 14.3 G/DL (ref 12–17)
HGB UR QL STRIP.AUTO: NEGATIVE
HOROWITZ INDEX BLDA+IHG-RTO: 25 MM[HG]
HOROWITZ INDEX BLDA+IHG-RTO: 25 MM[HG]
HYALINE CASTS #/AREA URNS LPF: ABNORMAL /LPF
INR PPP: 1.02 (ref 0.89–1.1)
KETONES UR STRIP-MCNC: NEGATIVE MG/DL
LACTATE SERPL-SCNC: 1.4 MMOL/L (ref 0.7–2)
LACTATE SERPL-SCNC: 2.1 MMOL/L (ref 0.7–2)
LACTATE SERPL-SCNC: 2.3 MMOL/L (ref 0.7–2)
LEUKOCYTE ESTERASE UR QL STRIP: NEGATIVE
LYMPHOCYTES # BLD AUTO: 35 % (ref 25–45)
LYMPHOCYTES # BLD AUTO: 5.53 THOUSAND/UL (ref 0.5–4)
MCH RBC QN AUTO: 28.6 PG (ref 26–34)
MCHC RBC AUTO-ENTMCNC: 32.2 G/DL (ref 31–36)
MCV RBC AUTO: 89 FL (ref 80–100)
MONOCYTES # BLD AUTO: 0.79 THOUSAND/UL (ref 0.2–0.9)
MONOCYTES NFR BLD AUTO: 5 % (ref 1–10)
MUCOUS THREADS UR QL AUTO: ABNORMAL
NEUTS BAND NFR BLD MANUAL: 1 % (ref 0–8)
NEUTS SEG # BLD: 9.32 THOUSAND/UL (ref 1.8–7.8)
NEUTS SEG NFR BLD AUTO: 58 %
NITRITE UR QL STRIP: NEGATIVE
NON-SQ EPI CELLS URNS QL MICRO: ABNORMAL /HPF
O2 CT BLDA-SCNC: 18.7 ML/DL (ref 16–23)
O2 CT BLDA-SCNC: 18.8 ML/DL (ref 16–23)
OXYHGB MFR BLDA: 92.1 % (ref 95–98)
OXYHGB MFR BLDA: 96.6 % (ref 95–98)
P AXIS: 87 DEGREES
PCO2 BLDA: 51 MM HG (ref 35–45)
PCO2 BLDA: 63 MM HG (ref 35–45)
PEEP RESPIRATORY: 5 CM[H2O]
PEEP RESPIRATORY: 5 CM[H2O]
PH BLDA: 7.15 [PH] (ref 7.35–7.45)
PH BLDA: 7.29 [PH] (ref 7.35–7.45)
PH UR STRIP.AUTO: 6 [PH] (ref 4.5–8)
PLATELET # BLD AUTO: 321 THOUSANDS/UL (ref 150–450)
PLATELET BLD QL SMEAR: ADEQUATE
PMV BLD AUTO: 8 FL (ref 8.9–12.7)
PO2 BLDA: 225 MM HG (ref 80–105)
PO2 BLDA: 72 MM HG (ref 80–105)
POTASSIUM BLD-SCNC: 3.6 MMOL/L (ref 3.5–5.3)
POTASSIUM SERPL-SCNC: 3.8 MMOL/L (ref 3.6–5)
PR INTERVAL: 136 MS
PROCALCITONIN SERPL-MCNC: <0.05 NG/ML
PROT SERPL-MCNC: 6.5 G/DL (ref 5.9–8.4)
PROT UR STRIP-MCNC: ABNORMAL MG/DL
PROTHROMBIN TIME: 10.8 SECONDS (ref 9.5–11.6)
QRS AXIS: 86 DEGREES
QRSD INTERVAL: 82 MS
QT INTERVAL: 284 MS
QTC INTERVAL: 428 MS
RBC # BLD AUTO: 5.01 MILLION/UL (ref 4.5–5.9)
RBC #/AREA URNS AUTO: ABNORMAL /HPF
RBC MORPH BLD: NORMAL
SODIUM BLD-SCNC: 139 MMOL/L (ref 136–145)
SODIUM SERPL-SCNC: 136 MMOL/L (ref 137–147)
SP GR UR STRIP.AUTO: 1.02 (ref 1–1.04)
SPECIMEN SOURCE: ABNORMAL
SPECIMEN SOURCE: NORMAL
T WAVE AXIS: 76 DEGREES
TOTAL CELLS COUNTED SPEC: 100
TROPONIN I BLD-MCNC: 0 NG/ML (ref 0–0.08)
UROBILINOGEN UA: NEGATIVE MG/DL
VENT AC: 12
VENT AC: 12
VENTRICULAR RATE: 137 BPM
VT SETTING VENT: 500 ML
VT SETTING VENT: 500 ML
WBC # BLD AUTO: 15.8 THOUSAND/UL (ref 4.5–11)
WBC #/AREA URNS AUTO: ABNORMAL /HPF

## 2019-02-07 PROCEDURE — 96366 THER/PROPH/DIAG IV INF ADDON: CPT

## 2019-02-07 PROCEDURE — 80047 BASIC METABLC PNL IONIZED CA: CPT

## 2019-02-07 PROCEDURE — 96375 TX/PRO/DX INJ NEW DRUG ADDON: CPT

## 2019-02-07 PROCEDURE — 84484 ASSAY OF TROPONIN QUANT: CPT

## 2019-02-07 PROCEDURE — 36415 COLL VENOUS BLD VENIPUNCTURE: CPT | Performed by: EMERGENCY MEDICINE

## 2019-02-07 PROCEDURE — 99223 1ST HOSP IP/OBS HIGH 75: CPT | Performed by: PHYSICIAN ASSISTANT

## 2019-02-07 PROCEDURE — 96374 THER/PROPH/DIAG INJ IV PUSH: CPT

## 2019-02-07 PROCEDURE — 87205 SMEAR GRAM STAIN: CPT | Performed by: EMERGENCY MEDICINE

## 2019-02-07 PROCEDURE — 87070 CULTURE OTHR SPECIMN AEROBIC: CPT | Performed by: EMERGENCY MEDICINE

## 2019-02-07 PROCEDURE — 85027 COMPLETE CBC AUTOMATED: CPT | Performed by: EMERGENCY MEDICINE

## 2019-02-07 PROCEDURE — 94150 VITAL CAPACITY TEST: CPT

## 2019-02-07 PROCEDURE — 93010 ELECTROCARDIOGRAM REPORT: CPT | Performed by: INTERNAL MEDICINE

## 2019-02-07 PROCEDURE — 93005 ELECTROCARDIOGRAM TRACING: CPT

## 2019-02-07 PROCEDURE — 94644 CONT INHLJ TX 1ST HOUR: CPT

## 2019-02-07 PROCEDURE — 99291 CRITICAL CARE FIRST HOUR: CPT

## 2019-02-07 PROCEDURE — 85014 HEMATOCRIT: CPT

## 2019-02-07 PROCEDURE — 94640 AIRWAY INHALATION TREATMENT: CPT

## 2019-02-07 PROCEDURE — 94664 DEMO&/EVAL PT USE INHALER: CPT

## 2019-02-07 PROCEDURE — 94002 VENT MGMT INPAT INIT DAY: CPT

## 2019-02-07 PROCEDURE — 87502 INFLUENZA DNA AMP PROBE: CPT | Performed by: EMERGENCY MEDICINE

## 2019-02-07 PROCEDURE — 5A1935Z RESPIRATORY VENTILATION, LESS THAN 24 CONSECUTIVE HOURS: ICD-10-PCS | Performed by: INTERNAL MEDICINE

## 2019-02-07 PROCEDURE — 87040 BLOOD CULTURE FOR BACTERIA: CPT | Performed by: EMERGENCY MEDICINE

## 2019-02-07 PROCEDURE — 83605 ASSAY OF LACTIC ACID: CPT | Performed by: INTERNAL MEDICINE

## 2019-02-07 PROCEDURE — 71045 X-RAY EXAM CHEST 1 VIEW: CPT

## 2019-02-07 PROCEDURE — 82948 REAGENT STRIP/BLOOD GLUCOSE: CPT

## 2019-02-07 PROCEDURE — 84145 PROCALCITONIN (PCT): CPT | Performed by: INTERNAL MEDICINE

## 2019-02-07 PROCEDURE — 94660 CPAP INITIATION&MGMT: CPT

## 2019-02-07 PROCEDURE — 85730 THROMBOPLASTIN TIME PARTIAL: CPT | Performed by: EMERGENCY MEDICINE

## 2019-02-07 PROCEDURE — 96365 THER/PROPH/DIAG IV INF INIT: CPT

## 2019-02-07 PROCEDURE — 85007 BL SMEAR W/DIFF WBC COUNT: CPT | Performed by: EMERGENCY MEDICINE

## 2019-02-07 PROCEDURE — 94760 N-INVAS EAR/PLS OXIMETRY 1: CPT

## 2019-02-07 PROCEDURE — 96368 THER/DIAG CONCURRENT INF: CPT

## 2019-02-07 PROCEDURE — 82805 BLOOD GASES W/O2 SATURATION: CPT | Performed by: EMERGENCY MEDICINE

## 2019-02-07 PROCEDURE — 81001 URINALYSIS AUTO W/SCOPE: CPT | Performed by: EMERGENCY MEDICINE

## 2019-02-07 PROCEDURE — 80053 COMPREHEN METABOLIC PANEL: CPT | Performed by: EMERGENCY MEDICINE

## 2019-02-07 PROCEDURE — 85610 PROTHROMBIN TIME: CPT | Performed by: EMERGENCY MEDICINE

## 2019-02-07 PROCEDURE — 83605 ASSAY OF LACTIC ACID: CPT | Performed by: EMERGENCY MEDICINE

## 2019-02-07 RX ORDER — ALBUTEROL SULFATE 2.5 MG/3ML
10 SOLUTION RESPIRATORY (INHALATION) ONCE
Status: COMPLETED | OUTPATIENT
Start: 2019-02-07 | End: 2019-02-07

## 2019-02-07 RX ORDER — IPRATROPIUM BROMIDE AND ALBUTEROL SULFATE 2.5; .5 MG/3ML; MG/3ML
SOLUTION RESPIRATORY (INHALATION)
Status: COMPLETED
Start: 2019-02-07 | End: 2019-02-07

## 2019-02-07 RX ORDER — LEVOFLOXACIN 5 MG/ML
750 INJECTION, SOLUTION INTRAVENOUS ONCE
Status: COMPLETED | OUTPATIENT
Start: 2019-02-07 | End: 2019-02-07

## 2019-02-07 RX ORDER — POLYVINYL ALCOHOL 14 MG/ML
2 SOLUTION/ DROPS OPHTHALMIC AS NEEDED
Status: DISCONTINUED | OUTPATIENT
Start: 2019-02-07 | End: 2019-02-09 | Stop reason: HOSPADM

## 2019-02-07 RX ORDER — CHLORAL HYDRATE 500 MG
1000 CAPSULE ORAL DAILY
Status: DISCONTINUED | OUTPATIENT
Start: 2019-02-07 | End: 2019-02-09 | Stop reason: HOSPADM

## 2019-02-07 RX ORDER — ONDANSETRON 2 MG/ML
INJECTION INTRAMUSCULAR; INTRAVENOUS
Status: COMPLETED
Start: 2019-02-07 | End: 2019-02-07

## 2019-02-07 RX ORDER — SODIUM CHLORIDE FOR INHALATION 0.9 %
3 VIAL, NEBULIZER (ML) INHALATION EVERY 12 HOURS
Status: DISCONTINUED | OUTPATIENT
Start: 2019-02-07 | End: 2019-02-09 | Stop reason: HOSPADM

## 2019-02-07 RX ORDER — SODIUM CHLORIDE 9 MG/ML
100 INJECTION, SOLUTION INTRAVENOUS CONTINUOUS
Status: DISCONTINUED | OUTPATIENT
Start: 2019-02-07 | End: 2019-02-09 | Stop reason: HOSPADM

## 2019-02-07 RX ORDER — LATANOPROST 50 UG/ML
1 SOLUTION/ DROPS OPHTHALMIC DAILY
Status: DISCONTINUED | OUTPATIENT
Start: 2019-02-07 | End: 2019-02-09 | Stop reason: HOSPADM

## 2019-02-07 RX ORDER — FLUTICASONE FUROATE AND VILANTEROL 200; 25 UG/1; UG/1
1 POWDER RESPIRATORY (INHALATION)
Status: DISCONTINUED | OUTPATIENT
Start: 2019-02-08 | End: 2019-02-09 | Stop reason: HOSPADM

## 2019-02-07 RX ORDER — BRIMONIDINE TARTRATE 0.15 %
1 DROPS OPHTHALMIC (EYE) EVERY 8 HOURS
Status: DISCONTINUED | OUTPATIENT
Start: 2019-02-07 | End: 2019-02-09 | Stop reason: HOSPADM

## 2019-02-07 RX ORDER — FLUTICASONE PROPIONATE 50 MCG
1 SPRAY, SUSPENSION (ML) NASAL DAILY
Status: DISCONTINUED | OUTPATIENT
Start: 2019-02-07 | End: 2019-02-09 | Stop reason: HOSPADM

## 2019-02-07 RX ORDER — ALBUTEROL SULFATE 90 UG/1
2 AEROSOL, METERED RESPIRATORY (INHALATION) EVERY 4 HOURS PRN
Status: DISCONTINUED | OUTPATIENT
Start: 2019-02-07 | End: 2019-02-09 | Stop reason: HOSPADM

## 2019-02-07 RX ORDER — GABAPENTIN 300 MG/1
300 CAPSULE ORAL 3 TIMES DAILY
Status: DISCONTINUED | OUTPATIENT
Start: 2019-02-07 | End: 2019-02-09 | Stop reason: HOSPADM

## 2019-02-07 RX ORDER — INSULIN GLARGINE 100 [IU]/ML
40 INJECTION, SOLUTION SUBCUTANEOUS
Status: DISCONTINUED | OUTPATIENT
Start: 2019-02-07 | End: 2019-02-08

## 2019-02-07 RX ORDER — MAGNESIUM SULFATE HEPTAHYDRATE 40 MG/ML
2 INJECTION, SOLUTION INTRAVENOUS ONCE
Status: COMPLETED | OUTPATIENT
Start: 2019-02-07 | End: 2019-02-07

## 2019-02-07 RX ORDER — MONTELUKAST SODIUM 10 MG/1
10 TABLET ORAL DAILY
Status: DISCONTINUED | OUTPATIENT
Start: 2019-02-07 | End: 2019-02-09 | Stop reason: HOSPADM

## 2019-02-07 RX ORDER — CLOTRIMAZOLE 1 %
CREAM (GRAM) TOPICAL 2 TIMES DAILY
Status: DISCONTINUED | OUTPATIENT
Start: 2019-02-07 | End: 2019-02-09 | Stop reason: HOSPADM

## 2019-02-07 RX ORDER — PANTOPRAZOLE SODIUM 40 MG/1
40 TABLET, DELAYED RELEASE ORAL
Status: DISCONTINUED | OUTPATIENT
Start: 2019-02-08 | End: 2019-02-09 | Stop reason: HOSPADM

## 2019-02-07 RX ORDER — PROPOFOL 10 MG/ML
5-50 INJECTION, EMULSION INTRAVENOUS
Status: DISCONTINUED | OUTPATIENT
Start: 2019-02-07 | End: 2019-02-07

## 2019-02-07 RX ORDER — FLUTICASONE PROPIONATE 44 UG/1
2 AEROSOL, METERED RESPIRATORY (INHALATION) EVERY 12 HOURS SCHEDULED
Status: DISCONTINUED | OUTPATIENT
Start: 2019-02-07 | End: 2019-02-07 | Stop reason: SDUPTHER

## 2019-02-07 RX ORDER — CARBAMAZEPINE 200 MG/1
400 TABLET ORAL 2 TIMES DAILY
Status: DISCONTINUED | OUTPATIENT
Start: 2019-02-07 | End: 2019-02-09 | Stop reason: HOSPADM

## 2019-02-07 RX ORDER — TAMSULOSIN HYDROCHLORIDE 0.4 MG/1
0.4 CAPSULE ORAL
Status: DISCONTINUED | OUTPATIENT
Start: 2019-02-07 | End: 2019-02-09 | Stop reason: HOSPADM

## 2019-02-07 RX ORDER — ATORVASTATIN CALCIUM 80 MG/1
80 TABLET, FILM COATED ORAL
Status: DISCONTINUED | OUTPATIENT
Start: 2019-02-07 | End: 2019-02-09 | Stop reason: HOSPADM

## 2019-02-07 RX ORDER — LEVALBUTEROL 1.25 MG/.5ML
1.25 SOLUTION, CONCENTRATE RESPIRATORY (INHALATION) EVERY 12 HOURS
Status: DISCONTINUED | OUTPATIENT
Start: 2019-02-07 | End: 2019-02-09 | Stop reason: HOSPADM

## 2019-02-07 RX ORDER — IPRATROPIUM BROMIDE AND ALBUTEROL SULFATE 2.5; .5 MG/3ML; MG/3ML
3 SOLUTION RESPIRATORY (INHALATION) EVERY 4 HOURS PRN
Status: DISCONTINUED | OUTPATIENT
Start: 2019-02-07 | End: 2019-02-09 | Stop reason: HOSPADM

## 2019-02-07 RX ORDER — FENTANYL CITRATE 50 UG/ML
100 INJECTION, SOLUTION INTRAMUSCULAR; INTRAVENOUS ONCE
Status: COMPLETED | OUTPATIENT
Start: 2019-02-07 | End: 2019-02-07

## 2019-02-07 RX ORDER — ONDANSETRON 4 MG/1
4 TABLET, ORALLY DISINTEGRATING ORAL EVERY 8 HOURS PRN
Status: DISCONTINUED | OUTPATIENT
Start: 2019-02-07 | End: 2019-02-09 | Stop reason: HOSPADM

## 2019-02-07 RX ORDER — LANOLIN ALCOHOL/MO/W.PET/CERES
3 CREAM (GRAM) TOPICAL
Status: DISCONTINUED | OUTPATIENT
Start: 2019-02-07 | End: 2019-02-09 | Stop reason: HOSPADM

## 2019-02-07 RX ORDER — PROPOFOL 10 MG/ML
5-50 INJECTION, EMULSION INTRAVENOUS ONCE
Status: DISCONTINUED | OUTPATIENT
Start: 2019-02-07 | End: 2019-02-07

## 2019-02-07 RX ORDER — ONDANSETRON 2 MG/ML
4 INJECTION INTRAMUSCULAR; INTRAVENOUS EVERY 8 HOURS PRN
Status: DISCONTINUED | OUTPATIENT
Start: 2019-02-07 | End: 2019-02-07

## 2019-02-07 RX ORDER — METHYLPREDNISOLONE SODIUM SUCCINATE 40 MG/ML
40 INJECTION, POWDER, LYOPHILIZED, FOR SOLUTION INTRAMUSCULAR; INTRAVENOUS EVERY 8 HOURS SCHEDULED
Status: DISCONTINUED | OUTPATIENT
Start: 2019-02-08 | End: 2019-02-09 | Stop reason: HOSPADM

## 2019-02-07 RX ORDER — LISINOPRIL 5 MG/1
5 TABLET ORAL DAILY
Status: DISCONTINUED | OUTPATIENT
Start: 2019-02-07 | End: 2019-02-09 | Stop reason: HOSPADM

## 2019-02-07 RX ORDER — HYDROXYZINE HYDROCHLORIDE 25 MG/1
25 TABLET, FILM COATED ORAL EVERY 6 HOURS PRN
Status: DISCONTINUED | OUTPATIENT
Start: 2019-02-07 | End: 2019-02-09 | Stop reason: HOSPADM

## 2019-02-07 RX ORDER — MIDAZOLAM HYDROCHLORIDE 1 MG/ML
5 INJECTION INTRAMUSCULAR; INTRAVENOUS ONCE
Status: COMPLETED | OUTPATIENT
Start: 2019-02-07 | End: 2019-02-07

## 2019-02-07 RX ORDER — FINASTERIDE 5 MG/1
5 TABLET, FILM COATED ORAL DAILY
Status: DISCONTINUED | OUTPATIENT
Start: 2019-02-07 | End: 2019-02-09 | Stop reason: HOSPADM

## 2019-02-07 RX ORDER — METHYLPREDNISOLONE SODIUM SUCCINATE 125 MG/2ML
125 INJECTION, POWDER, LYOPHILIZED, FOR SOLUTION INTRAMUSCULAR; INTRAVENOUS ONCE
Status: COMPLETED | OUTPATIENT
Start: 2019-02-07 | End: 2019-02-07

## 2019-02-07 RX ADMIN — CARBAMAZEPINE 400 MG: 200 TABLET ORAL at 18:03

## 2019-02-07 RX ADMIN — GABAPENTIN 300 MG: 300 CAPSULE ORAL at 21:50

## 2019-02-07 RX ADMIN — SODIUM CHLORIDE 100 ML/HR: 9 INJECTION, SOLUTION INTRAVENOUS at 21:42

## 2019-02-07 RX ADMIN — LISINOPRIL 5 MG: 5 TABLET ORAL at 16:27

## 2019-02-07 RX ADMIN — BRIMONIDINE TARTRATE 1 DROP: 1.5 SOLUTION OPHTHALMIC at 16:26

## 2019-02-07 RX ADMIN — PROPOFOL 5 MCG/KG/MIN: 10 INJECTION, EMULSION INTRAVENOUS at 08:46

## 2019-02-07 RX ADMIN — ONDANSETRON 4 MG: 2 INJECTION INTRAMUSCULAR; INTRAVENOUS at 13:58

## 2019-02-07 RX ADMIN — INSULIN GLARGINE 40 UNITS: 100 INJECTION, SOLUTION SUBCUTANEOUS at 21:49

## 2019-02-07 RX ADMIN — SODIUM CHLORIDE 1000 ML: 9 INJECTION, SOLUTION INTRAVENOUS at 10:21

## 2019-02-07 RX ADMIN — METHYLPREDNISOLONE SODIUM SUCCINATE 125 MG: 125 INJECTION, POWDER, FOR SOLUTION INTRAMUSCULAR; INTRAVENOUS at 09:13

## 2019-02-07 RX ADMIN — ENOXAPARIN SODIUM 40 MG: 40 INJECTION SUBCUTANEOUS at 16:26

## 2019-02-07 RX ADMIN — ALBUTEROL SULFATE 10 MG: 2.5 SOLUTION RESPIRATORY (INHALATION) at 09:26

## 2019-02-07 RX ADMIN — IPRATROPIUM BROMIDE 1 MG: 0.5 SOLUTION RESPIRATORY (INHALATION) at 09:27

## 2019-02-07 RX ADMIN — MELATONIN TAB 3 MG 3 MG: 3 TAB at 21:50

## 2019-02-07 RX ADMIN — TAMSULOSIN HYDROCHLORIDE 0.4 MG: 0.4 CAPSULE ORAL at 21:50

## 2019-02-07 RX ADMIN — ISODIUM CHLORIDE 3 ML: 0.03 SOLUTION RESPIRATORY (INHALATION) at 20:00

## 2019-02-07 RX ADMIN — LEVOFLOXACIN 750 MG: 750 INJECTION, SOLUTION INTRAVENOUS at 11:27

## 2019-02-07 RX ADMIN — SODIUM CHLORIDE 100 ML/HR: 9 INJECTION, SOLUTION INTRAVENOUS at 11:29

## 2019-02-07 RX ADMIN — ATORVASTATIN CALCIUM 80 MG: 80 TABLET, FILM COATED ORAL at 21:50

## 2019-02-07 RX ADMIN — LATANOPROST 1 DROP: 50 SOLUTION OPHTHALMIC at 21:56

## 2019-02-07 RX ADMIN — FINASTERIDE 5 MG: 5 TABLET, FILM COATED ORAL at 16:26

## 2019-02-07 RX ADMIN — CLOTRIMAZOLE: 10 CREAM TOPICAL at 18:03

## 2019-02-07 RX ADMIN — INSULIN LISPRO 16 UNITS: 100 INJECTION, SOLUTION INTRAVENOUS; SUBCUTANEOUS at 16:50

## 2019-02-07 RX ADMIN — MAGNESIUM SULFATE IN WATER 2 G: 40 INJECTION, SOLUTION INTRAVENOUS at 09:36

## 2019-02-07 RX ADMIN — AZTREONAM 2000 MG: 2 INJECTION, POWDER, LYOPHILIZED, FOR SOLUTION INTRAMUSCULAR; INTRAVENOUS at 13:02

## 2019-02-07 RX ADMIN — PROPOFOL 30 MCG/KG/MIN: 10 INJECTION, EMULSION INTRAVENOUS at 11:01

## 2019-02-07 RX ADMIN — FLUTICASONE PROPIONATE 1 SPRAY: 50 SPRAY, METERED NASAL at 16:27

## 2019-02-07 RX ADMIN — FENTANYL CITRATE 50 MCG/HR: 50 INJECTION INTRAVENOUS at 10:25

## 2019-02-07 RX ADMIN — LEVALBUTEROL HYDROCHLORIDE 1.25 MG: 1.25 SOLUTION, CONCENTRATE RESPIRATORY (INHALATION) at 19:59

## 2019-02-07 RX ADMIN — GABAPENTIN 300 MG: 300 CAPSULE ORAL at 16:27

## 2019-02-07 RX ADMIN — FENTANYL CITRATE 100 MCG: 50 INJECTION INTRAMUSCULAR; INTRAVENOUS at 09:12

## 2019-02-07 RX ADMIN — MONTELUKAST SODIUM 10 MG: 10 TABLET, FILM COATED ORAL at 16:28

## 2019-02-07 RX ADMIN — SODIUM CHLORIDE 1000 ML: 9 INJECTION, SOLUTION INTRAVENOUS at 08:49

## 2019-02-07 RX ADMIN — MIDAZOLAM HYDROCHLORIDE 5 MG: 1 INJECTION, SOLUTION INTRAMUSCULAR; INTRAVENOUS at 09:12

## 2019-02-07 NOTE — NURSING NOTE
Pt received to 410 from ED at 14:26 and is as per admission assessment  Admitted and oriented to room 410  Routine and restrictions explained to Pt

## 2019-02-07 NOTE — ED PROVIDER NOTES
History  Chief Complaint   Patient presents with    Respiratory Arrest     EMS called for [facility], patient in respiratory arrest        50-year-old gentleman presents intubated after a severe asthma exacerbation  EMS reports that on their arrival patient was gasping for air was found to have oxygen saturations in the 40s to 50s  He has known asthmatic and was unresponsive on EMS arrival   He was intubated EN route and as his oxygen levels improved he became combative  On arrival here he is not able to answer questions appropriately is being ventilated via bag-valve mask and is agitated  Oxygen saturations are in the 90s and there are no signs of trauma or other acute injury  No further history could be elicited from patient and there are no family members present at this point time  Asthma   Severity:  Severe  Onset quality:  Unable to specify  Timing:  Constant  Progression:  Worsening  Chronicity:  Recurrent  Relieved by:  Unknown  Worsened by:  Unknown  Ineffective treatments:  Unknown  Associated symptoms: loss of consciousness, shortness of breath and wheezing        Prior to Admission Medications   Prescriptions Last Dose Informant Patient Reported? Taking?    Calcium Carb-Cholecalciferol (CALCIUM CARBONATE-VITAMIN D3 PO) 2019 at Unknown time  Yes Yes   Sig: Take 1,500 mg by mouth daily   HYDROcodone-acetaminophen (NORCO) 5-325 mg per tablet Not Taking at Unknown time  Yes No   Sig: Take 1 tablet by mouth every 4 (four) hours as needed for pain   Multiple Vitamins-Minerals (CENTRUM SILVER PO) 2019 at Unknown time  Yes Yes   Si tablet daily   TAMSULOSIN HCL PO 2019 at Unknown time  Yes Yes   Sig: Take 0 4 mg by mouth daily at bedtime     albuterol (2 5 mg/3 mL) 0 083 % nebulizer solution 2019 at Unknown time  No Yes   Sig: Take 1 vial (2 5 mg total) by nebulization every 4 (four) hours as needed for shortness of breath   albuterol (PROVENTIL HFA,VENTOLIN HFA) 90 mcg/act inhaler No No   Sig: Inhale 2 puffs every 4 (four) hours as needed for wheezing   atorvastatin (LIPITOR) 80 mg tablet 2019 at Unknown time  Yes Yes   Sig: Take 80 mg by mouth daily at bedtime   beclomethasone (QVAR) 40 MCG/ACT inhaler 2019 at Unknown time  Yes Yes   Sig: Inhale 2 puffs as needed Rinse mouth after use      bimatoprost (LUMIGAN) 0 03 % ophthalmic drops 2019 at Unknown time  Yes Yes   Sig: Administer 1 drop to both eyes daily at bedtime     brimonidine (ALPHAGAN P) 0 15 % ophthalmic solution 2019 at Unknown time  Yes Yes   Sig: Administer 1 drop to both eyes every 8 (eight) hours     carBAMazepine (TEGRETOL) 200 mg tablet 2019 at Unknown time  Yes Yes   Sig: Take 2 tabs in the morning and 3 tabs at night   finasteride (PROSCAR) 5 mg tablet 2019 at Unknown time  Yes Yes   Sig: Take 5 mg by mouth daily   fluticasone (FLONASE) 50 mcg/act nasal spray 2019 at Unknown time  Yes Yes   Si spray into each nostril daily   gabapentin (NEURONTIN) 300 mg capsule 2019 at Unknown time  Yes Yes   Sig: Take 300 mg by mouth 3 (three) times a day   glucagon 1 MG injection Unknown at Unknown time  Yes No   Sig: as needed   hydrOXYzine HCL (ATARAX) 25 mg tablet Unknown at Unknown time  Yes No   Sig: Take 25 mg by mouth every 6 (six) hours as needed for itching   hydrocortisone (ANUSOL-HC, PROCTOSOL HC,) 2 5 % rectal cream Unknown at Unknown time  Yes No   Sig: Insert 1 application into the rectum 2 (two) times a day   insulin glargine (LANTUS) 100 units/mL subcutaneous injection 2019 at Unknown time  Yes Yes   Sig: Inject 40 Units under the skin daily at bedtime   insulin lispro (HumaLOG) 100 units/mL injection 2019 at Unknown time  No Yes   Sig: Inject 12 Units under the skin daily with breakfast   Patient taking differently: Inject 10 Units under the skin daily with breakfast     insulin lispro (HumaLOG) 100 units/mL injection 2019 at Unknown time  No Yes   Sig: Inject 16 Units under the skin daily with lunch   Patient taking differently: Inject 10 Units under the skin daily with lunch     insulin lispro (HumaLOG) 100 units/mL injection 2019 at Unknown time  No Yes   Sig: Inject 16 Units under the skin daily with dinner   Patient taking differently: Inject 14 Units under the skin daily with dinner     ipratropium (ATROVENT) 0 02 % nebulizer solution 2019 at Unknown time  Yes Yes   Sig: Take 0 5 mg by nebulization every 6 (six) hours as needed for wheezing or shortness of breath     latanoprost (XALATAN) 0 005 % ophthalmic solution 2019 at Unknown time  Yes Yes   Sig: Administer 1 drop to both eyes daily   lisinopril (ZESTRIL) 5 mg tablet 2019 at Unknown time  Yes Yes   Sig: Take 5 mg by mouth daily     melatonin 3 mg 2019 at Unknown time  Yes Yes   Sig: Take 3 mg by mouth daily at bedtime   mometasone-formoterol (DULERA) 200-5 MCG/ACT inhaler 2019 at Unknown time  Yes Yes   Sig: Inhale 2 puffs 2 (two) times a day     montelukast (SINGULAIR) 10 mg tablet 2019 at Unknown time  Yes Yes   Sig: Take 10 mg by mouth daily     omega-3-acid ethyl esters (LOVAZA) 1 g capsule 2019 at Unknown time  Yes Yes   Sig: Take 1 g by mouth daily   omeprazole (PriLOSEC) 40 MG capsule 2019 at Unknown time  Yes Yes   Sig: Take 40 mg by mouth daily   polyvinyl alcohol (LIQUIFILM TEARS) 1 4 % ophthalmic solution 2019 at Unknown time  Yes Yes   Si drops as needed for dry eyes   predniSONE 10 mg tablet 2019 at Unknown time  Yes Yes   Sig: Take 10 mg by mouth every other day   predniSONE 20 mg tablet 2019 at Unknown time  No Yes   Sig: Take 2 tablets (40 mg total) by mouth daily   terbinafine (LamISIL) 1 % cream 2019 at Unknown time  Yes Yes   Sig: terbinafine HCl 1 % topical cream      Facility-Administered Medications: None       Past Medical History:   Diagnosis Date    Asthma     [facility] [address] [facility]     [facility]     Enlarged prostate  Glaucoma     Hyperlipidemia     Hypertension     Seasonal allergic rhinitis     Seizures (HCC)        Past Surgical History:   Procedure Laterality Date    KNEE SURGERY      WRIST SURGERY Left        History reviewed  No pertinent family history  I have reviewed and agree with the history as documented  Social History   Substance Use Topics    Smoking status: Former Smoker     Quit date: 1992    Smokeless tobacco: Never Used    Alcohol use No        Review of Systems   Unable to perform ROS: Intubated   Respiratory: Positive for shortness of breath and wheezing  Neurological: Positive for loss of consciousness  Physical Exam  Physical Exam   Constitutional: He appears well-developed and well-nourished  He is intubated  HENT:   Head: Normocephalic and atraumatic  Nose: Nose normal    Mouth/Throat: Oropharynx is clear and moist    Eyes: Pupils are equal, round, and reactive to light  Conjunctivae are normal    Neck: Neck supple  Cardiovascular: Regular rhythm  Tachycardia present  Pulmonary/Chest: Accessory muscle usage present  He is intubated  He is in respiratory distress  He has wheezes  Abdominal: Soft  Bowel sounds are normal    Musculoskeletal: Normal range of motion  He exhibits no edema or tenderness  Neurological: He is disoriented  GCS eye subscore is 4  GCS verbal subscore is 1  GCS motor subscore is 5  Skin: Skin is warm  Capillary refill takes less than 2 seconds  No rash noted  He is diaphoretic  Psychiatric: His mood appears anxious  He is agitated  Nursing note and vitals reviewed        Vital Signs  ED Triage Vitals   Temperature Pulse Respirations Blood Pressure SpO2   02/07/19 0844 02/07/19 0844 02/07/19 0844 02/07/19 0844 02/07/19 0835   (!) 96 3 °F (35 7 °C) (!) 135 16 150/83 100 %      Temp Source Heart Rate Source Patient Position - Orthostatic VS BP Location FiO2 (%)   02/07/19 0844 02/07/19 0844 02/07/19 0844 02/07/19 0844 02/07/19 1218   Tympanic Monitor Lying Left arm 25      Pain Score       02/07/19 1135       No Pain           Vitals:    02/07/19 1135 02/07/19 1211 02/07/19 1218 02/07/19 1245   BP: 132/73 121/75 157/90 150/81   Pulse: 90 95 100 93   Patient Position - Orthostatic VS: Lying Lying Sitting        Visual Acuity      ED Medications  Medications   propofol (DIPRIVAN) 1000 mg in 100 mL infusion (premix) (0 mcg/kg/min × 92 5 kg Intravenous Stopped 2/7/19 1207)   fentaNYL (SUBLIMAZE) 1250 mcg (STANDARD CONCENTRATION) drip in sodium chloride 0 9% 125 mL (0 mcg/hr Intravenous Stopped 2/7/19 1206)   propofol (DIPRIVAN) 1000 mg in 100 mL infusion (premix) (not administered)   aztreonam (AZACTAM) 2,000 mg in sodium chloride 0 9 % 100 mL IVPB (2,000 mg Intravenous New Bag 2/7/19 1302)   sodium chloride 0 9 % infusion (100 mL/hr Intravenous New Bag 2/7/19 1129)   sodium chloride 0 9 % bolus 1,000 mL (0 mL Intravenous Stopped 2/7/19 1019)   methylPREDNISolone sodium succinate (Solu-MEDROL) injection 125 mg (125 mg Intravenous Given 2/7/19 0913)   albuterol inhalation solution 10 mg (10 mg Nebulization Given 2/7/19 0926)   ipratropium (ATROVENT) 0 02 % inhalation solution 1 mg (1 mg Nebulization Given 2/7/19 0927)   ipratropium-albuterol (DUO-NEB) 0 5-2 5 mg/3 mL inhalation solution **ADS Override Pull** (  Given to EMS 2/7/19 0854)   magnesium sulfate 2 g/50 mL IVPB (premix) 2 g (0 g Intravenous Stopped 2/7/19 1123)   midazolam (VERSED) injection 5 mg (5 mg Intravenous Given 2/7/19 0912)   fentanyl citrate (PF) 100 MCG/2ML 100 mcg (100 mcg Intravenous Given 2/7/19 0912)   sodium chloride 0 9 % bolus 1,000 mL (0 mL Intravenous Stopped 2/7/19 1130)   levofloxacin (LEVAQUIN) IVPB (premix) 750 mg (0 mg Intravenous Stopped 2/7/19 1301)       Diagnostic Studies  Results Reviewed     Procedure Component Value Units Date/Time    Fingerstick Glucose (POCT) [[id number]]  (Abnormal) Collected:  02/07/19 1258    Lab Status:  Final result Updated:  02/07/19 3876 POC Glucose 197 (H) mg/dl     Sputum culture and Gram stain [[id number]] Collected:  02/07/19 1126    Lab Status: In process Specimen:  Sputum from Induced Sputum Updated:  02/07/19 1133    Urine Microscopic [[id number]]  (Abnormal) Collected:  02/07/19 0956    Lab Status:  Final result Specimen:  Urine from Urine, Clean Catch Updated:  02/07/19 1041     RBC, UA 0-1 (A) /hpf      WBC, UA 0-1 (A) /hpf      Epithelial Cells None Seen /hpf      Bacteria, UA Occasional /hpf      Hyaline Casts, UA 1-2 (A) /lpf      COARSE GRANULAR CASTS 1-2 /lpf      MUCUS THREADS Occasional (A)    Rapid Flu-Viral RNA amplification [facility] HEART ONLY) [[id number]]  (Normal) Collected:  02/07/19 0903    Lab Status:  Final result Specimen:  Nares from Nose Updated:  02/07/19 1032     INFLUENZA A AMPLIFIED RNA Not Detected     INFLUENZA B AMPLIFIED Not Detected    Lactic acid, plasma [[id number]]  (Abnormal) Collected:  02/07/19 0957    Lab Status:  Final result Specimen:  Blood from Arm, Right Updated:  02/07/19 1023     LACTIC ACID 2 1 (HH) mmol/L     Narrative:         Result may be elevated if tourniquet was used during collection      Blood gas, arterial [[id number]]  (Abnormal) Collected:  02/07/19 1010    Lab Status:  Final result Specimen:  Blood, Arterial from Radial, Left Updated:  02/07/19 1021     pH, Arterial 7 290 (L)     pCO2, Arterial 51 0 (H) mm Hg      pO2, Arterial 225 0 (H) mm Hg      HCO3, Arterial 24 5 mmol/L      Base Excess, Arterial -2 6 (L) mmol/L      O2 Content, Arterial 18 7 mL/dL      O2 HGB,Arterial  96 6 %      SOURCE Radial, Left     VERONICA TEST No     AC Rate 12     Tidal Volume 500 ml      Inspired Air (FIO2) 25     PEEP 5    UA w Reflex to Microscopic [[id number]]  (Abnormal) Collected:  02/07/19 0956    Lab Status:  Final result Specimen:  Urine from Urine, Clean Catch Updated:  02/07/19 1021     Color, UA Straw     Clarity, UA Clear     Specific [place], UA 1 020     pH, UA 6 0     Leukocytes, UA Negative Nitrite, UA Negative     Protein, UA 30 (1+) (A) mg/dl      Glucose, UA >=1000 (1%) (A) mg/dl      Ketones, UA Negative mg/dl      Bilirubin, UA Negative     Blood, UA Negative     UROBILINOGEN UA Negative mg/dL     Blood culture #1 [[id number]] Collected:  02/07/19 0903    Lab Status: In process Specimen:  Blood from Arm, Right Updated:  02/07/19 1008    Comprehensive metabolic panel [[id number]]  (Abnormal) Collected:  02/07/19 0857    Lab Status:  Final result Specimen:  Blood from Arm, Right Updated:  02/07/19 0956     Sodium 136 (L) mmol/L      Potassium 3 8 mmol/L      Chloride 100 mmol/L      CO2 22 mmol/L      ANION GAP 14 mmol/L      BUN 14 mg/dL      Creatinine 0 92 mg/dL      Glucose 318 (H) mg/dL      Calcium 8 6 mg/dL      AST 28 U/L      ALT 26 U/L      Alkaline Phosphatase 65 U/L      Total Protein 6 5 g/dL      Albumin 4 3 g/dL      Total Bilirubin 0 30 mg/dL      eGFR 93 ml/min/1 73sq m     Narrative:         National Kidney Disease Education Program recommendations are as follows:  GFR calculation is accurate only with a steady state creatinine  Chronic Kidney disease less than 60 ml/min/1 73 sq  meters  Kidney failure less than 15 ml/min/1 73 sq  meters      Rapid drug screen, urine [[id number]] Collected:  02/07/19 0956    Lab Status:  No result Specimen:  Urine from Urine, Catheter     Protime-INR [[id number]]  (Normal) Collected:  02/07/19 0857    Lab Status:  Final result Specimen:  Blood from Arm, Right Updated:  02/07/19 0947     Protime 10 8 seconds      INR 1 02    Narrative:       INR:  ,PROTIME:      APTT [[id number]]  (Abnormal) Collected:  02/07/19 0857    Lab Status:  Final result Specimen:  Blood from Arm, Right Updated:  02/07/19 0947     PTT 22 (L) seconds     Narrative:       PTT:      CBC and differential [[id number]]  (Abnormal) Collected:  02/07/19 0857    Lab Status:  Final result Specimen:  Blood from Arm, Right Updated:  02/07/19 0936     WBC 15 80 (H) Thousand/uL      RBC 5 01 Million/uL      Hemoglobin 14 3 g/dL      Hematocrit 44 5 %      MCV 89 fL      MCH 28 6 pg      MCHC 32 2 g/dL      RDW 14 1 %      MPV 8 0 (L) fL      Platelets 593 Thousands/uL     Blood culture #2 [[id number]] Collected:  02/07/19 0902    Lab Status: In process Specimen:  Blood from Arm, Right Updated:  02/07/19 0930    POCT troponin [[id number]] Collected:  02/07/19 0852    Lab Status:  Final result Updated:  02/07/19 0914     POC Troponin I 0 00 ng/ml      Specimen Type NELSON    Narrative:         [employer] i-Stat handheld analyzer 99% cutoff is > 0 08ng/mL in network Emergency Departments    o cTnI 99% cutoff is useful only when applied to patients in the clinical setting of myocardial ischemia  o cTnI 99% cutoff should be interpreted in the context of clinical history, ECG findings and possibly cardiac imaging to establish correct diagnosis  o cTnI 99% cutoff may be suggestive but clearly not indicative of a coronary event without the clinical setting of myocardial ischemia      POCT Chem 8+ [[id number]]  (Abnormal) Collected:  02/07/19 0854    Lab Status:  Final result Updated:  02/07/19 0909     SODIUM, I-STAT 139 mmol/l      Potassium, i-STAT 3 6 mmol/L      Chloride, istat -- mmol/L      CO2, i-STAT -- mmol/L      Anion Gap, i-STAT 19 (H) mmol/L      Calcium, Ionized i-STAT 1 16 mmol/L      BUN, I-STAT 15 mg/dl      Creatinine, i-STAT 0 9 mg/dl      eGFR 96 ml/min/1 73sq m      Glucose, i-STAT 340 (H) mg/dl      Hct, i-STAT 42 %      Hgb, i-STAT 14 3 g/dl      Specimen Type NELSON    Blood gas, arterial [[id number]]  (Abnormal) Collected:  02/07/19 0857    Lab Status:  Final result Specimen:  Blood, Arterial from Radial, Right Updated:  02/07/19 0908     pH, Arterial 7 150 (LL)     pCO2, Arterial 63 0 (HH) mm Hg      pO2, Arterial 72 0 (L) mm Hg      HCO3, Arterial 21 9 (L) mmol/L      Base Excess, Arterial -8 0 (L) mmol/L      O2 Content, Arterial 18 8 mL/dL      O2 HGB,Arterial  92 1 (L) %      SOURCE Radial, Right     VERONICA TEST No     AC Rate 12     Tidal Volume 500 ml      Inspired Air (FIO2) 25     PEEP 5    Fingerstick Glucose (POCT) [[id number]]  (Abnormal) Collected:  02/07/19 0846    Lab Status:  Final result Updated:  02/07/19 0852     POC Glucose 268 (H) mg/dl                  XR chest 1 view portable   Final Result by Pam Mancuso MD (02/07 1100)      No acute cardiopulmonary disease  Endotracheal tube has been inserted with tip 6 cm above the david        Workstation performed: [id number]                    Procedures  ECG 12 Lead Documentation  Date/Time: 2/7/2019 8:58 AM  Performed by: Seema Mckoy  Authorized by: Seema Mckoy     Quality:     Tracing quality:  Limited by artifact  Rate:     ECG rate:  137    ECG rate assessment: tachycardic    Rhythm:     Rhythm: sinus tachycardia    Ectopy:     Ectopy: none    QRS:     QRS axis:  Normal  ST segments:     ST segments:  Non-specific  T waves:     T waves: non-specific      CriticalCare Time  Performed by: Seema Mckoy  Authorized by: Seema Mckoy     Critical care provider statement:     Critical care was necessary to treat or prevent imminent or life-threatening deterioration of the following conditions:  Respiratory failure    Critical care was time spent personally by me on the following activities:  Blood draw for specimens, obtaining history from patient or surrogate, development of treatment plan with patient or surrogate, discussions with consultants, evaluation of patient's response to treatment, examination of patient, ventilator management, review of old charts, re-evaluation of patient's condition, ordering and review of radiographic studies, ordering and review of laboratory studies and ordering and performing treatments and interventions           Phone Contacts  ED Phone Contact    ED Course  ED Course as of Feb 07 1307   Thu Feb 07, 2019   0913 Patient is increasingly agitated asRespiratory adjusted the ET tube and had been suctioning the patient  The propofol all has been maxed out and get the patient remained agitated and combative  We will medicate with fentanyl and Versed at this time  Chest x-ray is still pending  1000 Patient's family is now arrive  They state that was back seen yesterday and was advised that he stay for further evaluation and treatment but he had refused  They report that he did not do well throughout the evening and was even worse this morning  Initial Sepsis Screening     Row Name 02/07/19 1031                Is the patient's history suggestive of a new or worsening infection? (!)  Yes (Proceed)  -AK        Suspected source of infection pneumonia  -AK        Are two or more of the following signs & symptoms of infection both present and new to the patient?         Indicate SIRS criteria Altered mental status; Tachycardia > 90 bpm;Leukocytosis (WBC > 27989 IJL)  -AK        If the answer is yes to both questions, suspicion of sepsis is present          If severe sepsis is present AND tissue hypoperfusion perists in the hour after fluid resuscitation or lactate > 4, the patient meets criteria for SEPTIC SHOCK          Are any of the following organ dysfunction criteria present within 6 hours of suspected infection and SIRS criteria that are NOT considered to be chronic conditions?         Organ dysfunction Lactate > 2 0 mmol/L;Acute respiratory failure (new need for invasive or non-invasive mechanical ventilation)  -AK        Date of presentation of severe sepsis 02/07/19  -AK        Time of presentation of severe sepsis 1032  -AK        Tissue hypoperfusion persists in the hour after crystalloid fluid administration, evidenced, by either:          Was hypotension present within one hour of the conclusion of crystalloid fluid administration?  No  -AK        Date of presentation of septic shock          Time of presentation of septic shock            User Key  (r) = Recorded By, (t) = Taken By, (c) = Cosigned By    [address] Name Provider Type    [address] [facility] [place], DO Physician                  MDM  Number of Diagnoses or Management Options  Diagnosis management comments: 53-year-old gentleman presents an respiratory distress  He had suffered a respiratory arrest secondary to acute asthma exacerbation  The patient was intubated pre-hospital and now has markedly improved oxygen saturation  He is anxious and agitated on arrival   Patient was placed on the events and was given propofol for sedation  A neb treatment was provided pre-hospital with no other interventions being accomplished  Patient is now being given steroids, and hour long nebulization treatment, and magnesium  His vital signs have improved and he is now resting comfortably on the vent  We will maintain sedation and will continue to manage on the vent  Labs, x-ray, EKG pending at this time  Review of the medical record shows that the patient was evaluated and treated here yesterday for asthma  On discharge he had markedly improved  We are still awaiting arrival of family so it is unclear what happened from the point of discharge until this morning         Amount and/or Complexity of Data Reviewed  Clinical lab tests: ordered and reviewed  Tests in the radiology section of CPT®: ordered and reviewed  Tests in the medicine section of CPT®: ordered and reviewed  Decide to obtain previous medical records or to obtain history from someone other than the patient: yes  Obtain history from someone other than the patient: yes  Discuss the patient with other providers: yes  Independent visualization of images, tracings, or specimens: yes        Disposition  Final diagnoses:   Respiratory arrest ([facility] [address] )   Severe persistent asthma with acute exacerbation     Time reflects when diagnosis was documented in both MDM as applicable and the Disposition within this note     Time User Action Codes Description Comment    2/7/2019 11:07 AM Crystal Blaknenship Add [R09 2] Respiratory arrest ([address] )     2/7/2019 11:07 AM Crystal Nick [J45 [id number]] Severe persistent asthma with acute exacerbation       ED Disposition     ED Disposition Condition Date/Time Comment    Admit  Thu Feb 7, 2019 11:07 AM Case was discussed with Dr Tre Moody and the patient's admission status was agreed to be Admission Status: inpatient status to the service of Dr Tre Moody   Follow-up Information    None         Patient's Medications   Discharge Prescriptions    No medications on file     No discharge procedures on file      ED Provider  Electronically Signed by           Latha Luna DO  02/07/19 9783

## 2019-02-07 NOTE — ASSESSMENT & PLAN NOTE
Lab Results   Component Value Date    HGBA1C 10 6 (H) 12/11/2014       Recent Labs      02/07/19   0846  02/07/19   1258   POCGLU  268*  197*       Blood Sugar Average: Last 72 hrs:  (P) 232 5

## 2019-02-07 NOTE — SEPSIS NOTE
Sepsis Note   Tilton Nissen 53 y o  male MRN: [id number]  Unit/Bed#: ED 01 Encounter: [id number]            Initial Sepsis Screening     [address] Name 02/07/19 1031                Is the patient's history suggestive of a new or worsening infection? (!)  Yes (Proceed)  -AK        Suspected source of infection pneumonia  -AK        Are two or more of the following signs & symptoms of infection both present and new to the patient?         Indicate SIRS criteria Altered mental status; Tachycardia > 90 bpm;Leukocytosis (WBC > 75561 IJL)  -AK        If the answer is yes to both questions, suspicion of sepsis is present          If severe sepsis is present AND tissue hypoperfusion perists in the hour after fluid resuscitation or lactate > 4, the patient meets criteria for SEPTIC SHOCK          Are any of the following organ dysfunction criteria present within 6 hours of suspected infection and SIRS criteria that are NOT considered to be chronic conditions?         Organ dysfunction Lactate > 2 0 mmol/L;Acute respiratory failure (new need for invasive or non-invasive mechanical ventilation)  -AK        Date of presentation of severe sepsis 02/07/19  -AK        Time of presentation of severe sepsis 1032  -AK        Tissue hypoperfusion persists in the hour after crystalloid fluid administration, evidenced, by either:          Was hypotension present within one hour of the conclusion of crystalloid fluid administration?  No  -AK        Date of presentation of septic shock          Time of presentation of septic shock            User Key  (r) = Recorded By, (t) = Taken By, (c) = Cosigned By    Initials Name Provider Type    [address] [facility] [address], DO Physician

## 2019-02-07 NOTE — ED NOTES
Patient awake, attempting to sit up and roll over   Propofol drip increased to 50mcg/min     Keyon Lewis RN  02/07/19 0200

## 2019-02-07 NOTE — ASSESSMENT & PLAN NOTE
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

## 2019-02-07 NOTE — ASSESSMENT & PLAN NOTE
Lab Results   Component Value Date    HGBA1C 10 6 (H) 12/11/2014     Discussed with patient diabetic and complication seemed to put poor compliance hemoglobin A1c elevated he already have multiple complication in-hospital he is running hypoglycemic with same dose of insulin in spite of steroid  Decrease insulin dose  Recent Labs      02/07/19   0846  02/07/19   1258   POCGLU  268*  197*       Blood Sugar Average: Last 72 hrs:  (P) 232 5

## 2019-02-07 NOTE — H&P
[address] [facility]  H&P- Jay Jose 1965, 53 y o  male MRN: [id number]  Unit/Bed#: ED 01 Encounter: [id number]  Primary Care Provider: Natalie Evans MD   Date and time admitted to hospital: 2/7/2019  8:40 AM      * Severe persistent asthma with acute exacerbation   Assessment & Plan    · Patient presented with acute exacerbation requiring intubation  He received 2L NS bolus in the ED  Once O2 saturation began to resolve, he was able to be weaned and extubated and maintained on 2L NC  Appreciate assistance from respiratory therapy  · Reveiced Levaquin and Azactam in ED, to be transitioned to PO Levaquin  · CXR negative for any acute cardiopulmonary disease   · Reports he has felt sick for 2 weeks, and has had a productive cough  Was seen in the ED yesterday and left AMA  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  · Has not had an attack requiring intubation since 2013  · Admitted to ICU, 40mg IV solumedrol Q8 and Xopenex Q12, Duonebs Q4 PRN, continue home regimen  · Patient on respiratory protocol     GERD (gastroesophageal reflux disease)   Assessment & Plan    · Continue PPI     HLD (hyperlipidemia)   Assessment & Plan    · Continue Lipitor and Fish Oil     Diabetic neuropathy (HCC)   Assessment & Plan    · Gabapentin 300 TID     BPH (benign prostatic hyperplasia)   Assessment & Plan    · Currently has a lagunas catheter  · Continue Flomax     Glaucoma   Assessment & Plan    · Continue home eye drop regimen     Seizure ([address] )   Assessment & Plan    · Controlled on Tegretol       Type 2 diabetes mellitus, with long-term current use of insulin [facility])   Assessment & Plan    Lab Results   Component Value Date    HGBA1C 10 6 (H) 12/11/2014       Recent Labs      02/07/19   0846  02/07/19   1258   POCGLU  268*  197*       Blood Sugar Average: Last 72 hrs:  (P) 232 5     HTN (hypertension)   Assessment & Plan    · Stable   Continue Lisinopril VTE Prophylaxis: Enoxaparin (Lovenox)  / sequential compression device   Code Status: Full Code  POLST: POLST form is not discussed and not completed at this time  Discussion with family: Discussed care plan with patient and family members at bedside  Answered all questions to the best of my ability  Anticipated Length of Stay:  Patient will be admitted on an Inpatient basis with an anticipated length of stay of  Greater than 2 midnights  Justification for Hospital Stay: Severe asthma exacerbation    Total Time for Visit, including Counseling / Coordination of Care: 1 hour  Greater than 50% of this total time spent on direct patient counseling and coordination of care  Chief Complaint:   Respiratory Arrest requiring intubation    History of Present Illness:    John Rosas is a 53 y o  male with a history of severe asthma, multiple drug and food allergies, DM, HTN, HLD, BPH who presents with asthma exacerbation  Patient reports he was in the ED yesterday for his asthma, but felt fine and left  Upon this presentation he was found to have respiratory arrest requiring intubation  His asthma has been worse x2 weeks and is associated with a productive cough  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  He reports eating at a new restaurant yesterday, and admits there may be a food allergy component to his exacerbation  He denies fevers, chills, recent travel or illness  After intubation he reports mild soreness in his throat and some stomach discomfort  Otherwise, he reports his breathing is improved with oxygen  Initial labs revealed acidosis, which began to resolve with fluid and O2  CXR was negative for acute disease  Respiratory therapy was able to wean patient off of intubation and maintain him on 2L NC  IV Solumedrol to be initiated, antibiotic therapy, and respiratory protocol  Patient stable and admitted to the ICU      Review of Systems:    Review of Systems   HENT: Positive for rhinorrhea and sore throat  Gastrointestinal: Positive for abdominal distention  All other systems reviewed and are negative  Past Medical and Surgical History:     Past Medical History:   Diagnosis Date    Asthma     Bipolar disorder ([facility] )     Diabetes mellitus ([facility] )     Enlarged prostate     Glaucoma     Hyperlipidemia     Hypertension     Seasonal allergic rhinitis     Seizures ([facility] )        Past Surgical History:   Procedure Laterality Date    KNEE SURGERY      WRIST SURGERY Left        Meds/Allergies:    Prior to Admission medications    Medication Sig Start Date End Date Taking? Authorizing Provider   albuterol (2 5 mg/3 mL) 0 083 % nebulizer solution Take 1 vial (2 5 mg total) by nebulization every 4 (four) hours as needed for shortness of breath 11/15/18  Yes Reuben Spears DO   atorvastatin (LIPITOR) 80 mg tablet Take 80 mg by mouth daily at bedtime 9/8/16  Yes Historical Provider, MD   beclomethasone (QVAR) 40 MCG/ACT inhaler Inhale 2 puffs as needed Rinse mouth after use       Yes Historical Provider, MD   bimatoprost (LUMIGAN) 0 03 % ophthalmic drops Administer 1 drop to both eyes daily at bedtime   6/5/08  Yes Historical Provider, MD   brimonidine (ALPHAGAN P) 0 15 % ophthalmic solution Administer 1 drop to both eyes every 8 (eight) hours   7/2/15  Yes Historical Provider, MD   Calcium Carb-Cholecalciferol (CALCIUM CARBONATE-VITAMIN D3 PO) Take 1,500 mg by mouth daily 9/20/13  Yes Historical Provider, MD   carBAMazepine (TEGRETOL) 200 mg tablet Take 2 tabs in the morning and 3 tabs at night 10/19/16  Yes Historical Provider, MD   finasteride (PROSCAR) 5 mg tablet Take 5 mg by mouth daily 4/26/16  Yes Historical Provider, MD   fluticasone (FLONASE) 50 mcg/act nasal spray 1 spray into each nostril daily   Yes Historical Provider, MD   gabapentin (NEURONTIN) 300 mg capsule Take 300 mg by mouth 3 (three) times a day 9/8/16  Yes Historical Provider, MD   insulin glargine (LANTUS) 100 units/mL subcutaneous injection Inject 40 Units under the skin daily at bedtime   Yes Historical Provider, MD   insulin lispro (HumaLOG) 100 units/mL injection Inject 12 Units under the skin daily with breakfast  Patient taking differently: Inject 10 Units under the skin daily with breakfast   10/27/18  Yes Valeria Olguin MD   insulin lispro (HumaLOG) 100 units/mL injection Inject 16 Units under the skin daily with lunch  Patient taking differently: Inject 10 Units under the skin daily with lunch   10/27/18  Yes Valeria Olguin MD   insulin lispro (HumaLOG) 100 units/mL injection Inject 16 Units under the skin daily with dinner  Patient taking differently: Inject 14 Units under the skin daily with dinner   10/27/18  Yes Valeria Olguin MD   ipratropium (ATROVENT) 0 02 % nebulizer solution Take 0 5 mg by nebulization every 6 (six) hours as needed for wheezing or shortness of breath   7/2/15  Yes Historical Provider, MD   latanoprost (XALATAN) 0 005 % ophthalmic solution Administer 1 drop to both eyes daily   Yes Historical Provider, MD   lisinopril (ZESTRIL) 5 mg tablet Take 5 mg by mouth daily   7/7/16  Yes Historical Provider, MD   melatonin 3 mg Take 3 mg by mouth daily at bedtime   Yes Historical Provider, MD   mometasone-formoterol (DULERA) 200-5 MCG/ACT inhaler Inhale 2 puffs 2 (two) times a day   8/31/15  Yes Historical Provider, MD   montelukast (SINGULAIR) 10 mg tablet Take 10 mg by mouth daily   8/25/15  Yes Historical Provider, MD   Multiple Vitamins-Minerals (CENTRUM SILVER PO) 1 tablet daily 6/2/11  Yes Historical Provider, MD   omega-3-acid ethyl esters (LOVAZA) 1 g capsule Take 1 g by mouth daily   Yes Historical Provider, MD   omeprazole (PriLOSEC) 40 MG capsule Take 40 mg by mouth daily 4/26/16  Yes Historical Provider, MD   polyvinyl alcohol (LIQUIFILM TEARS) 1 4 % ophthalmic solution 2 drops as needed for dry eyes   Yes Historical Provider, MD   predniSONE 10 mg tablet Take 10 mg by mouth every other day   Yes Historical Provider, MD   predniSONE 20 mg tablet Take 2 tablets (40 mg total) by mouth daily 1/28/19  Yes Tim Jacobs MD   TAMSULOSIN HCL PO Take 0 4 mg by mouth daily at bedtime     Yes Historical Provider, MD   terbinafine (LamISIL) 1 % cream terbinafine HCl 1 % topical cream   Yes Historical Provider, MD   albuterol (PROVENTIL HFA,VENTOLIN HFA) 90 mcg/act inhaler Inhale 2 puffs every 4 (four) hours as needed for wheezing 11/15/18   Glorianne Kuldeep, DO   glucagon 1 MG injection as needed 8/5/16   Historical Provider, MD   HYDROcodone-acetaminophen (NORCO) 5-325 mg per tablet Take 1 tablet by mouth every 4 (four) hours as needed for pain    Historical Provider, MD   hydrocortisone (ANUSOL-HC, PROCTOSOL HC,) 2 5 % rectal cream Insert 1 application into the rectum 2 (two) times a day    Historical Provider, MD   hydrOXYzine HCL (ATARAX) 25 mg tablet Take 25 mg by mouth every 6 (six) hours as needed for itching    Historical Provider, MD     I have reviewed home medications with patient personally  Allergies:    Allergies   Allergen Reactions    Aspirin GI Bleeding    Ibuprofen GI Bleeding    Licorice Flavor [Flavoring Agent] Hives    Penicillins Hives    Propoxyphene Hives     Propoxyphene N-Acetaminophen---hives & palpitations    Tramadol Palpitations     Other reaction(s): heart pumps real fast    Bee Venom     Haloperidol Other (See Comments)     "bent out of shape" - dystonia    Lithium Other (See Comments)     edema    Other     Wasp Venom        Social History:     Marital Status: /Civil Union   Occupation:   Patient Pre-hospital Living Situation: Lives at home with family  Patient Pre-hospital Level of Mobility: Independent  Patient Pre-hospital Diet Restrictions: Diabetic  Substance Use History:   History   Alcohol Use No     History   Smoking Status    Former Smoker    Quit date: 1992   Smokeless Tobacco    Never Used     History   Drug Use No     Comment: previous heroin clean since 1997       Family History:    History reviewed  No pertinent family history  Physical Exam:     Vitals:   Blood Pressure: 129/80 (02/07/19 1300)  Pulse: 92 (02/07/19 1300)  Temperature: 99 1 °F (37 3 °C) (02/07/19 1320)  Temp Source: Oral (02/07/19 1320)  Respirations: 16 (02/07/19 1300)  Height: 5' 6" (167 6 cm) (02/07/19 0844)  Weight - Scale: 89 5 kg (197 lb 5 oz) (02/07/19 0844)  SpO2: 99 % (02/07/19 1300)    Physical Exam   Constitutional: He appears well-developed and well-nourished  He appears distressed (resolved after extubation)  He is intubated (then weaned and extubated)  HENT:   Head: Normocephalic and atraumatic  Eyes: Conjunctivae are normal  No scleral icterus  Cardiovascular: Regular rhythm  Tachycardia present  No murmur heard  Pulmonary/Chest: Effort normal  Tachypnea noted  He is intubated (then weaned and extubated)  No respiratory distress  He has decreased breath sounds  He has wheezes  He has no rales  Abdominal: Soft  He exhibits distension  There is no tenderness  Musculoskeletal: He exhibits no edema  Neurological: He is alert  Skin: Skin is warm and dry  No erythema  Psychiatric: He has a normal mood and affect  Nursing note and vitals reviewed  Additional Data:     Lab Results: I have personally reviewed pertinent reports          Results from last 7 days  Lab Units 02/07/19  0857   WBC Thousand/uL 15 80*   HEMOGLOBIN g/dL 14 3   HEMATOCRIT % 44 5   PLATELETS Thousands/uL 321   BANDS PCT % 1   LYMPHO PCT % 35   MONO PCT % 5   EOS PCT % 1       Results from last 7 days  Lab Units 02/07/19  0857   SODIUM mmol/L 136*   POTASSIUM mmol/L 3 8   CHLORIDE mmol/L 100   CO2 mmol/L 22   BUN mg/dL 14   CREATININE mg/dL 0 92   ANION GAP mmol/L 14   CALCIUM mg/dL 8 6   ALBUMIN g/dL 4 3   TOTAL BILIRUBIN mg/dL 0 30   ALK PHOS U/L 65   ALT U/L 26   AST U/L 28   GLUCOSE RANDOM mg/dL 318*       Results from last 7 days  Lab Units 02/07/19  0857   INR  1 02       Results from last 7 days  Lab Units 02/07/19  1258 02/07/19  0846   POC GLUCOSE mg/dl 197* 268*           Results from last 7 days  Lab Units 02/07/19  0957   LACTIC ACID mmol/L 2 1*       Imaging: I have personally reviewed pertinent reports  XR chest 1 view portable   Final Result by Philip Pimentel MD (02/07 1100)      No acute cardiopulmonary disease  Endotracheal tube has been inserted with tip 6 cm above the david  Workstation performed: [id number]             EKG, Pathology, and Other Studies Reviewed on Admission:   · EKG: sinus tachycardia    Allscripts / Epic Records Reviewed: Yes     ** Please Note: This note has been constructed using a voice recognition system   **

## 2019-02-07 NOTE — ED NOTES
Patient glucose, chem8 and ISTAT is done and documented  EKG done and blood obtain by JASVIR Castro to be put in place now       Dale Hernández  02/07/19 0482

## 2019-02-07 NOTE — ED NOTES
Patient extubated, awake and alert, oxygen sats 100% on RA       Jaxon Durbin RN  02/07/19 508 [facility] [place]  02/07/19 0892

## 2019-02-07 NOTE — RESPIRATORY THERAPY NOTE
RT Ventilator Management Note  Apple Olvera 53 y o  male MRN: [id number]  Unit/Bed#: ED 01 Encounter: [id number]      Daily Screen       2/7/2019 1218             Patient safety screen outcome[de-identified] Passed    Spont breathing trial % for 30 min: Yes    Spont breathing trial outcome[de-identified] Passed    Name of Medical Team Notified[de-identified] (P)  Dinora Buitrago MD & Torito Cooper RN     Preparing to extubate/ Notify Nurse: Yes    Extubation order obtained: (P)  Yes    Patient extubated: (P)  Yes    RSBI: (P)  40            Physical Exam:   Assessment Type: Pre-treatment  General Appearance: Sedated  Respiratory Pattern: Normal, Tachypneic  Chest Assessment: Chest expansion symmetrical, Trachea midline  Bilateral Breath Sounds: Diminished, Clear  Cough: None  Suction: ET Tube  O2 Device: VEnt  Subjective Data: Vent     Pt has been successfully extubated after passing the weaning at 12:23 PM  Pt is put on NC 2 LPM and Sat O2 @ 100%  And pt has responded to the question that was asked of him   Pt is on Singular 10 mg qd, prednisone 10 mg  Qod,Breo qd,Spiriva qd, Duoneb and ventolin hfa q4h prn

## 2019-02-07 NOTE — PROGRESS NOTES
I myself examined the patient patient was intubated performed H&P discuss did diagnosis and management with physician assistant discussed ER physician also case discussed with the family member respiratory therapist review x-ray and all the blood work patient extubated tolerated well  I review H and P and assessment planning of nurse practitioner agree with planning  I noted on her H&P rhonchi information was written that I did not see patient will try to correct that information

## 2019-02-07 NOTE — ED NOTES
Patient awake, interacting with family, nodding head yes and no appropriately to questions asked  Family asked patient if he was comfortable  Patient nodded head yes and gave a thumbs up  Told family this RN can increase medication to help patient be more sedate, patient immediately shaking head no       Nay Lewis RN  02/07/19 3305

## 2019-02-07 NOTE — ED NOTES
Patient placed on ventilator by respiratory therapist, TV-500, Resp-12, FiO2-25%, PEEP-5     Matt Dowell RN  02/07/19 1910

## 2019-02-07 NOTE — ED NOTES
Patient remains very restless, biting tube despite Propofol drip at 50mcg/kg/min  Fentanyl drip started at 50mcg/hr       Annie Chavez RN  02/07/19 8946

## 2019-02-07 NOTE — ED NOTES
Dr Chago Gibson at bedside, respiratory called to extubate patient  Propofol and fentalyl drip discontinued       Nay Lewis RN  02/07/19 1018

## 2019-02-08 LAB
AMPHETAMINES SERPL QL SCN: NEGATIVE
ANION GAP SERPL CALCULATED.3IONS-SCNC: 7 MMOL/L (ref 5–14)
BARBITURATES UR QL: NEGATIVE
BASOPHILS # BLD AUTO: 0 THOUSANDS/ΜL (ref 0–0.1)
BASOPHILS NFR BLD AUTO: 0 % (ref 0–1)
BENZODIAZ UR QL: POSITIVE
BUN SERPL-MCNC: 15 MG/DL (ref 5–25)
CALCIUM SERPL-MCNC: 8.5 MG/DL (ref 8.4–10.2)
CHLORIDE SERPL-SCNC: 106 MMOL/L (ref 97–108)
CO2 SERPL-SCNC: 24 MMOL/L (ref 22–30)
COCAINE UR QL: NEGATIVE
CREAT SERPL-MCNC: 0.89 MG/DL (ref 0.7–1.5)
EOSINOPHIL # BLD AUTO: 0 THOUSAND/ΜL (ref 0–0.4)
EOSINOPHIL NFR BLD AUTO: 0 % (ref 0–6)
ERYTHROCYTE [DISTWIDTH] IN BLOOD BY AUTOMATED COUNT: 13.9 %
GFR SERPL CREATININE-BSD FRML MDRD: 96 ML/MIN/1.73SQ M
GLUCOSE SERPL-MCNC: 108 MG/DL (ref 65–140)
GLUCOSE SERPL-MCNC: 120 MG/DL (ref 70–99)
GLUCOSE SERPL-MCNC: 238 MG/DL (ref 65–140)
GLUCOSE SERPL-MCNC: 245 MG/DL (ref 65–140)
GLUCOSE SERPL-MCNC: 79 MG/DL (ref 65–140)
HCT VFR BLD AUTO: 38.2 % (ref 41–53)
HGB BLD-MCNC: 12.7 G/DL (ref 13.5–17.5)
LYMPHOCYTES # BLD AUTO: 1.4 THOUSANDS/ΜL (ref 0.5–4)
LYMPHOCYTES NFR BLD AUTO: 14 % (ref 25–45)
MCH RBC QN AUTO: 29 PG (ref 26–34)
MCHC RBC AUTO-ENTMCNC: 33.2 G/DL (ref 31–36)
MCV RBC AUTO: 87 FL (ref 80–100)
METHADONE UR QL: NEGATIVE
MONOCYTES # BLD AUTO: 0.9 THOUSAND/ΜL (ref 0.2–0.9)
MONOCYTES NFR BLD AUTO: 10 % (ref 1–10)
NEUTROPHILS # BLD AUTO: 7.3 THOUSANDS/ΜL (ref 1.8–7.8)
NEUTS SEG NFR BLD AUTO: 76 % (ref 45–65)
OPIATES UR QL SCN: POSITIVE
PCP UR QL: NEGATIVE
PLATELET # BLD AUTO: 211 THOUSANDS/UL (ref 150–450)
PMV BLD AUTO: 7.8 FL (ref 8.9–12.7)
POTASSIUM SERPL-SCNC: 3.8 MMOL/L (ref 3.6–5)
RBC # BLD AUTO: 4.38 MILLION/UL (ref 4.5–5.9)
SODIUM SERPL-SCNC: 137 MMOL/L (ref 137–147)
THC UR QL: NEGATIVE
WBC # BLD AUTO: 9.6 THOUSAND/UL (ref 4.5–11)

## 2019-02-08 PROCEDURE — 80307 DRUG TEST PRSMV CHEM ANLYZR: CPT | Performed by: EMERGENCY MEDICINE

## 2019-02-08 PROCEDURE — 80048 BASIC METABOLIC PNL TOTAL CA: CPT | Performed by: INTERNAL MEDICINE

## 2019-02-08 PROCEDURE — 94640 AIRWAY INHALATION TREATMENT: CPT

## 2019-02-08 PROCEDURE — 82948 REAGENT STRIP/BLOOD GLUCOSE: CPT

## 2019-02-08 PROCEDURE — 94664 DEMO&/EVAL PT USE INHALER: CPT

## 2019-02-08 PROCEDURE — 85025 COMPLETE CBC W/AUTO DIFF WBC: CPT | Performed by: INTERNAL MEDICINE

## 2019-02-08 PROCEDURE — 99233 SBSQ HOSP IP/OBS HIGH 50: CPT | Performed by: INTERNAL MEDICINE

## 2019-02-08 PROCEDURE — 94660 CPAP INITIATION&MGMT: CPT

## 2019-02-08 PROCEDURE — 94760 N-INVAS EAR/PLS OXIMETRY 1: CPT

## 2019-02-08 RX ORDER — LEVALBUTEROL 1.25 MG/.5ML
1.25 SOLUTION, CONCENTRATE RESPIRATORY (INHALATION) EVERY 8 HOURS PRN
Status: DISCONTINUED | OUTPATIENT
Start: 2019-02-08 | End: 2019-02-09 | Stop reason: HOSPADM

## 2019-02-08 RX ORDER — INSULIN GLARGINE 100 [IU]/ML
30 INJECTION, SOLUTION SUBCUTANEOUS
Status: DISCONTINUED | OUTPATIENT
Start: 2019-02-08 | End: 2019-02-09 | Stop reason: HOSPADM

## 2019-02-08 RX ADMIN — ISODIUM CHLORIDE 3 ML: 0.03 SOLUTION RESPIRATORY (INHALATION) at 19:48

## 2019-02-08 RX ADMIN — METHYLPREDNISOLONE SODIUM SUCCINATE 40 MG: 40 INJECTION, POWDER, FOR SOLUTION INTRAMUSCULAR; INTRAVENOUS at 21:38

## 2019-02-08 RX ADMIN — MELATONIN TAB 3 MG 3 MG: 3 TAB at 21:38

## 2019-02-08 RX ADMIN — CARBAMAZEPINE 400 MG: 200 TABLET ORAL at 17:54

## 2019-02-08 RX ADMIN — CLOTRIMAZOLE: 10 CREAM TOPICAL at 09:39

## 2019-02-08 RX ADMIN — GABAPENTIN 300 MG: 300 CAPSULE ORAL at 09:32

## 2019-02-08 RX ADMIN — Medication 1000 MG: at 09:39

## 2019-02-08 RX ADMIN — ENOXAPARIN SODIUM 40 MG: 40 INJECTION SUBCUTANEOUS at 09:27

## 2019-02-08 RX ADMIN — SODIUM CHLORIDE 100 ML/HR: 9 INJECTION, SOLUTION INTRAVENOUS at 17:57

## 2019-02-08 RX ADMIN — MONTELUKAST SODIUM 10 MG: 10 TABLET, FILM COATED ORAL at 09:32

## 2019-02-08 RX ADMIN — INSULIN LISPRO 12 UNITS: 100 INJECTION, SOLUTION INTRAVENOUS; SUBCUTANEOUS at 09:26

## 2019-02-08 RX ADMIN — PANTOPRAZOLE SODIUM 40 MG: 40 TABLET, DELAYED RELEASE ORAL at 06:47

## 2019-02-08 RX ADMIN — FLUTICASONE FUROATE AND VILANTEROL TRIFENATATE 1 PUFF: 200; 25 POWDER RESPIRATORY (INHALATION) at 09:44

## 2019-02-08 RX ADMIN — TIOTROPIUM BROMIDE 18 MCG: 18 CAPSULE ORAL; RESPIRATORY (INHALATION) at 09:41

## 2019-02-08 RX ADMIN — TAMSULOSIN HYDROCHLORIDE 0.4 MG: 0.4 CAPSULE ORAL at 21:39

## 2019-02-08 RX ADMIN — ATORVASTATIN CALCIUM 80 MG: 80 TABLET, FILM COATED ORAL at 21:38

## 2019-02-08 RX ADMIN — METHYLPREDNISOLONE SODIUM SUCCINATE 40 MG: 40 INJECTION, POWDER, FOR SOLUTION INTRAMUSCULAR; INTRAVENOUS at 09:29

## 2019-02-08 RX ADMIN — SODIUM CHLORIDE 100 ML/HR: 9 INJECTION, SOLUTION INTRAVENOUS at 08:03

## 2019-02-08 RX ADMIN — ISODIUM CHLORIDE 3 ML: 0.03 SOLUTION RESPIRATORY (INHALATION) at 08:33

## 2019-02-08 RX ADMIN — CARBAMAZEPINE 400 MG: 200 TABLET ORAL at 09:31

## 2019-02-08 RX ADMIN — LISINOPRIL 5 MG: 5 TABLET ORAL at 09:31

## 2019-02-08 RX ADMIN — ISODIUM CHLORIDE 3 ML: 0.03 SOLUTION RESPIRATORY (INHALATION) at 15:40

## 2019-02-08 RX ADMIN — FINASTERIDE 5 MG: 5 TABLET, FILM COATED ORAL at 09:31

## 2019-02-08 RX ADMIN — Medication 1 SPRAY: at 21:36

## 2019-02-08 RX ADMIN — IPRATROPIUM BROMIDE AND ALBUTEROL SULFATE 3 ML: 2.5; .5 SOLUTION RESPIRATORY (INHALATION) at 02:40

## 2019-02-08 RX ADMIN — BRIMONIDINE TARTRATE 1 DROP: 1.5 SOLUTION OPHTHALMIC at 14:09

## 2019-02-08 RX ADMIN — BRIMONIDINE TARTRATE 1 DROP: 1.5 SOLUTION OPHTHALMIC at 06:47

## 2019-02-08 RX ADMIN — LATANOPROST 1 DROP: 50 SOLUTION OPHTHALMIC at 21:39

## 2019-02-08 RX ADMIN — FLUTICASONE PROPIONATE 1 SPRAY: 50 SPRAY, METERED NASAL at 09:43

## 2019-02-08 RX ADMIN — LEVALBUTEROL HYDROCHLORIDE 1.25 MG: 1.25 SOLUTION, CONCENTRATE RESPIRATORY (INHALATION) at 08:33

## 2019-02-08 RX ADMIN — BRIMONIDINE TARTRATE 1 DROP: 1.5 SOLUTION OPHTHALMIC at 21:42

## 2019-02-08 RX ADMIN — LEVALBUTEROL HYDROCHLORIDE 1.25 MG: 1.25 SOLUTION, CONCENTRATE RESPIRATORY (INHALATION) at 19:47

## 2019-02-08 RX ADMIN — INSULIN GLARGINE 30 UNITS: 100 INJECTION, SOLUTION SUBCUTANEOUS at 21:38

## 2019-02-08 RX ADMIN — GABAPENTIN 300 MG: 300 CAPSULE ORAL at 16:44

## 2019-02-08 RX ADMIN — GABAPENTIN 300 MG: 300 CAPSULE ORAL at 21:38

## 2019-02-08 RX ADMIN — METHYLPREDNISOLONE SODIUM SUCCINATE 40 MG: 40 INJECTION, POWDER, FOR SOLUTION INTRAMUSCULAR; INTRAVENOUS at 14:09

## 2019-02-08 NOTE — PROGRESS NOTES
VTE Pharmacologic Prophylaxis:   Pharmacologic: Enoxaparin (Lovenox)  Mechanical VTE Prophylaxis in Place: Yes    Patient Centered Rounds: I have performed bedside rounds with nursing staff today  Discussions with Specialists or Other Care Team Provider:  None    Education and Discussions with Family / Patient:  Patient    Time Spent for Care: 30 minutes  More than 50% of total time spent on counseling and coordination of care as described above  Current Length of Stay: 1 day(s)    Current Patient Status: Inpatient   Certification Statement: The patient will continue to require additional inpatient hospital stay due to Acute asthma    Discharge Plan:  Next 24 hr    Code Status: Level 1 - Full Code      Subjective:   Feels better is still coughing and shortness of breath clinically much improve Castro catheter is out voiding without any difficulty discussed with the patient but better compliance with medication patient diabetic is out of control hemoglobin A1c is more than 10 does follow by Endocrinology hospital his lying low blood sugar discussed with the patient about diet    Objective:     Vitals:   Temp (24hrs), Av 9 °F (37 2 °C), Min:97 9 °F (36 6 °C), Max:99 3 °F (37 4 °C)    Temp:  [97 9 °F (36 6 °C)-99 3 °F (37 4 °C)] 97 9 °F (36 6 °C)  HR:  [] 97  Resp:  [5-40] 40  BP: ()/(59-95) 139/81  SpO2:  [93 %-100 %] 96 %  Body mass index is 29 63 kg/m²  Input and Output Summary (last 24 hours): Intake/Output Summary (Last 24 hours) at 19 1221  Last data filed at 19 0501   Gross per 24 hour   Intake             1800 ml   Output             2600 ml   Net             -800 ml       Physical Exam:     Physical Exam   Constitutional: He is oriented to person, place, and time  He appears well-developed and well-nourished  Obese   HENT:   Head: Normocephalic and atraumatic     Right Ear: External ear normal    Left Ear: External ear normal    Mouth/Throat: Oropharynx is clear and moist    Eyes: Pupils are equal, round, and reactive to light  Conjunctivae and EOM are normal    Right conjunctiva congested   Neck: Normal range of motion  Neck supple  Cardiovascular: Normal rate, regular rhythm, normal heart sounds and intact distal pulses  Pulmonary/Chest: Effort normal  He has wheezes  He has rales  Course crackling   Abdominal: Soft  Bowel sounds are normal  He exhibits no mass  There is no tenderness  There is no rebound and no guarding  Genitourinary:   Genitourinary Comments: deferred   Musculoskeletal: Normal range of motion  Neurological: He is alert and oriented to person, place, and time  Skin: Skin is warm and dry  No rash noted  Psychiatric: He has a normal mood and affect  Nursing note and vitals reviewed          Additional Data:     Labs:      Results from last 7 days  Lab Units 02/08/19  0556 02/07/19  0857   WBC Thousand/uL 9 60 15 80*   HEMOGLOBIN g/dL 12 7* 14 3   HEMATOCRIT % 38 2* 44 5   PLATELETS Thousands/uL 211 321   BANDS PCT %  --  1   NEUTROS PCT % 76*  --    LYMPHS PCT % 14*  --    LYMPHO PCT %  --  35   MONOS PCT % 10  --    MONO PCT %  --  5   EOS PCT % 0 1       Results from last 7 days  Lab Units 02/08/19  0556 02/07/19  0857   SODIUM mmol/L 137 136*   POTASSIUM mmol/L 3 8 3 8   CHLORIDE mmol/L 106 100   CO2 mmol/L 24 22   BUN mg/dL 15 14   CREATININE mg/dL 0 89 0 92   ANION GAP mmol/L 7 14   CALCIUM mg/dL 8 5 8 6   ALBUMIN g/dL  --  4 3   TOTAL BILIRUBIN mg/dL  --  0 30   ALK PHOS U/L  --  65   ALT U/L  --  26   AST U/L  --  28   GLUCOSE RANDOM mg/dL 120* 318*       Results from last 7 days  Lab Units 02/07/19  0857   INR  1 02       Results from last 7 days  Lab Units 02/08/19  1106 02/08/19  0649 02/07/19  2144 02/07/19  1640 02/07/19  1258 02/07/19  0846   POC GLUCOSE mg/dl 79 108 143* 276* 197* 268*           Results from last 7 days  Lab Units 02/07/19  2203 02/07/19  1942 02/07/19  0957   LACTIC ACID mmol/L 1 4 2 3* 2 1*   PROCALCITONIN ng/ml  --  <0 05  --            * I Have Reviewed All Lab Data Listed Above  * Additional Pertinent Lab Tests Reviewed:  [address] Admission Reviewed    Imaging:    Imaging Reports Reviewed Today Include:  None today  Imaging Personally Reviewed by Myself Includes:  Reviewed    Recent Cultures (last 7 days):       Results from last 7 days  Lab Units 02/07/19  1126   GRAM STAIN RESULT  1+ Epithelial Cells  1+ Polys  2+ Gram positive cocci in pairs and chains  1+ Gram positive rods       Last 24 Hours Medication List:     Current Facility-Administered Medications:  albuterol 2 puff Inhalation Q4H PRN Radha Loza V, PA-C    atorvastatin 80 mg Oral HS Radha Loza V, PA-C    brimonidine 1 drop Both Eyes Q8H Radha Loza V, PA-C    carBAMazepine 400 mg Oral BID Radha Loza V, PA-C    clotrimazole  Topical BID Radha Loza V, PA-C    enoxaparin 40 mg Subcutaneous Daily Radha Loza V, PA-C    finasteride 5 mg Oral Daily Radha Loza V, PA-C    fish oil 1,000 mg Oral Daily Radha Loza V, PA-C    fluticasone 1 spray Nasal Daily Radha Loza V, PA-C    fluticasone-vilanterol 1 puff Inhalation Daily Radha Loza V, PA-C    gabapentin 300 mg Oral TID Sola Scherer V, PA-C    hydrOXYzine HCL 25 mg Oral Q6H PRN Radha Loza V, PA-C    insulin glargine 30 Units Subcutaneous HS Pearl Vega MD    [START ON 2/9/2019] insulin lispro 8 Units Subcutaneous Daily With Breakfast Pearl Vega MD    insulin lispro 8 Units Subcutaneous Daily With Lunch Pearl Vega MD    insulin lispro 8 Units Subcutaneous Daily With Natasha Lord MD    ipratropium-albuterol 3 mL Nebulization Q4H PRN Radha Loza V, PA-ADALBERTO    latanoprost 1 drop Both Eyes Daily Radha Loza V, PA-C    levalbuterol 1 25 mg Nebulization Q12H Radha Loza V, PA-ADALBERTO    And        sodium chloride 3 mL Nebulization Q12H Radha Loza V, PA-C    lisinopril 5 mg Oral Daily Radha CASTRO PA-C    melatonin 3 mg Oral  Radha Dago CASTRO PA-C    methylPREDNISolone sodium succinate 40 mg Intravenous Q8H [facility] Radha CASTRO PA-C    montelukast 10 mg Oral Daily Radha CASTRO PA-C    ondansetron 4 mg Oral Q8H PRN Radha CASTRO PA-C    pantoprazole 40 mg Oral Early Morning Radha CASTRO PA-C    polyvinyl alcohol 2 drop Both Eyes PRN Radha CASTRO PA-C    sodium chloride 100 mL/hr Intravenous Continuous Fitz Glimpse, DO Last Rate: 100 mL/hr (02/08/19 0803)   tamsulosin 0 4 mg Oral HS Radha CASTRO PA-C    tiotropium 18 mcg Inhalation Daily Radha CASTRO PA-C         Today, Patient Was Seen By: Oskar Cui MD    ** Please Note: Dictation voice to text software may have been used in the creation of this document   **          Progress Note - Arty Manifold 1965, 53 y o  male MRN: [id number]    Unit/Bed#:  Encounter: [id number]    Primary Care Provider: Jacolyn Sever, MD   Date and time admitted to hospital: 2/7/2019  8:40 AM        Respiratory arrest [facility])   Assessment & Plan    Patient came in emergency room acute respiratory arrest intubated secondary to acute asthma attack most probably on top trial code tracheobronchitis or pneumonia  Patient was in ER prior to 1 day for acute asthma attack was treated and recommended to be admission but he did not wanted to stay was discharged and then came back with acute respiratory arrest  Patient was seen in ER intubated received extensive medical management bronchodilator IV Mag IV steroid  Clinically at time of exam was patient was improved was alert oriented  Saturation was 100% on 25% oxygen  Patient was successfully extubated tolerated very well and continued to progress fairly well admitted in ICU of watch closely for next 24 hr patient is doing much better  But is still bilateral wheezing and positive for coughing  Transfer from ICU to [facility] surge  Continue steroid continue bronchodilator and antibiotic     GERD (gastroesophageal reflux disease)   Assessment & Plan    · Continue PPI     ARPITA (obstructive sleep apnea)   Assessment & Plan    Uses CPAP     HLD (hyperlipidemia)   Assessment & Plan    · Continue Lipitor and Fish Oil     Diabetic neuropathy (HCC)   Assessment & Plan    · Gabapentin 300 TID     BPH (benign prostatic hyperplasia)   Assessment & Plan    · DC Castro catheter voiding well  · Continue Flomax     Glaucoma   Assessment & Plan    · Continue home eye drop regimen     Seizure ([address] )   Assessment & Plan    · Controlled on Tegretol       Type 2 diabetes mellitus, with long-term current use of insulin (HCC)   Assessment & Plan    Lab Results   Component Value Date    HGBA1C 10 6 (H) 12/11/2014     Discussed with patient diabetic and complication seemed to put poor compliance hemoglobin A1c elevated he already have multiple complication in-hospital he is running hypoglycemic with same dose of insulin in spite of steroid  Decrease insulin dose  Recent Labs      02/07/19   0846  02/07/19   1258   POCGLU  268*  197*       Blood Sugar Average: Last 72 hrs:  (P) 232 5     HTN (hypertension)   Assessment & Plan    · Stable  Continue Lisinopril discussed with patient also about diet  · Low cholesterol diet     * Severe persistent asthma with acute exacerbation   Assessment & Plan    · Patient presented with acute exacerbation requiring intubation  He received 2L NS bolus in the ED  Once O2 saturation began to resolve, he was able to be weaned and extubated and maintained on 2L NC  Appreciate assistance from respiratory therapy  · Reveiced Levaquin and Azactam in ED, to be transitioned to PO Levaquin  · CXR negative for any acute cardiopulmonary disease   · Reports he has felt sick for 2 weeks, and has had a productive cough  Was seen in the ED yesterday and left AMA  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

## 2019-02-08 NOTE — SOCIAL WORK
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  PCP is Dr Joe Julio  Allergist and Endocrinologist are also through [facility]  Preferred pharmacy is [facility]  Pt confirms that his wife will be bringing in clothes for his discharge home  Pt plans on paying for own cab ride home  No other questions or concerns from pt at this time  SW will continue to follow for plan of care

## 2019-02-08 NOTE — ASSESSMENT & PLAN NOTE
Patient came in emergency room acute respiratory arrest intubated secondary to acute asthma attack most probably on top trial code tracheobronchitis or pneumonia  Patient was in ER prior to 1 day for acute asthma attack was treated and recommended to be admission but he did not wanted to stay was discharged and then came back with acute respiratory arrest  Patient was seen in ER intubated received extensive medical management bronchodilator IV Mag IV steroid  Clinically at time of exam was patient was improved was alert oriented  Saturation was 100% on 25% oxygen  Patient was successfully extubated tolerated very well and continued to progress fairly well admitted in ICU of watch closely for next 24 hr patient is doing much better  But is still bilateral wheezing and positive for coughing  Transfer from ICU to [facility] surge  Continue steroid continue bronchodilator and antibiotic

## 2019-02-08 NOTE — NURSING NOTE
Pt tolerating 2L NC, lagunas removed  Pt sitting at bedside completing AM care, No complaints of SOB, no complaints of pain   Will continue to monitor

## 2019-02-08 NOTE — PLAN OF CARE
Problem: DISCHARGE PLANNING - CARE MANAGEMENT  Goal: Discharge to post-acute care or home with appropriate resources  INTERVENTIONS:  - Conduct assessment to determine patient/family and health care team treatment goals, and need for post-acute services based on payer coverage, community resources, and patient preferences, and barriers to discharge  - Address psychosocial, clinical, and financial barriers to discharge as identified in assessment in conjunction with the patient/family and health care team  - Arrange appropriate level of post-acute services according to patients   needs and preference and payer coverage in collaboration with the physician and health care team  - Communicate with and update the patient/family, physician, and health care team regarding progress on the discharge plan  - Arrange appropriate transportation to post-acute venues  Outcome: Fabio Andrade will continue to follow for plan of care  Anticipate discharge home when medically cleared  Pt plans on paying for own taxi ride home

## 2019-02-08 NOTE — UTILIZATION REVIEW
Initial Clinical Review    Admission: Date/Time/Statement: 2/7/19 @ 1107 INPATIENT  Orders Placed This Encounter   Procedures    Inpatient Admission (expected length of stay for this patient Order details is greater than two midnights)     Standing Status:   Standing     Number of Occurrences:   1     Order Specific Question:   Admitting Physician     Answer:   Leisa Benites     Order Specific Question:   Level of Care     Answer:   Critical Care [15]     Order Specific Question:   Estimated length of stay     Answer:   More than 2 Midnights     Order Specific Question:   Certification     Answer:   I certify that inpatient services are medically necessary for this patient for a duration of greater than two midnights  See H&P and MD Progress Notes for additional information about the patient's course of treatment  ED: Date/Time/Mode of Arrival:   ED Arrival Information     Expected Arrival Acuity Means of Arrival Escorted By Service Admission Type    - 2/7/2019 08:39 Immediate Ambulance [facility] Emergency    Arrival Complaint    respiratory arrest         Chief Complaint   Patient presents with    Respiratory Arrest     EMS called for asthma, patient in respiratory arrest        History of Illness: 54 y/o male with hx of asthma, DM, HTN presents to ED from home by EMS intubated after severe asthma exacerbation  EMS reports that on their arrival patient was gasping for air and was found to have oxygen saturations in the 40s to 50s  He has known asthmatic and was unresponsive on EMS arrival   He was intubated EN route and as his oxygen levels improved he became combative  On arrival to ED he is not able to answer questions appropriately as he is being ventilated via bag-valve mask and is agitated  On initial ED exam, pt is intubated  He is in respiratory distress, tachycardic, demonstrates accessory muscle usage, is diaphoretic, anxious and agitated    Initial labs revealed acidosis, which began to resolve with fluid and O2  CXR was negative for acute disease  Respiratory therapy was able to wean patient off of intubation and maintain him on 2L NC  IV Solumedrol to be initiated, antibiotic therapy, and respiratory protocol  Pt to be admitted to ICU  ED Triage Vitals   Temperature Pulse Respirations Blood Pressure SpO2   02/07/19 0844 02/07/19 0844 02/07/19 0844 02/07/19 0844 02/07/19 0835   (!) 96 3 °F (35 7 °C) (!) 135 16 150/83 100 %      Temp Source Heart Rate Source Patient Position - Orthostatic VS BP Location FiO2 (%)   02/07/19 0844 02/07/19 0844 02/07/19 0844 02/07/19 0844 02/07/19 1218   Tympanic Monitor Lying Left arm 25      Pain Score       02/07/19 1135       No Pain        Wt Readings from Last 1 Encounters:   02/07/19 89 7 kg (197 lb 12 oz)     Vital Signs (abnormal):   02/08/19 1200 -- 97  40 139/81 96 % --   02/08/19 0900 -- 94  23 116/73 96 % --   02/08/19 0100 -- 90 22 101/68 97 % Nasal cannula 2L   02/08/19 0000 99 3 °F (37 4 °C) 100 15 95/65 97 % CPAP   02/07/19 2200 -- 105  31 121/75 98 % None (Room air)   02/07/19 1700 -- 100  23 162/95 99 % Nasal cannula 2L   02/07/19 0944 --  118 20 138/97 100 %  vent-FiO2 25%     Pertinent Labs/Diagnostic Test Results:   pH, Arterial 7 150   pCO2, Arterial 63 0   pO2, Arterial 72 0   HCO3, Arterial 21 9   Base Excess, Arterial -8 0   O2 Content, Arterial 18 8   O2 HGB,Arterial 92 1   ABG SOURCE Radia    AC Rate 12   Tidal Volume 500   Inspired Air (FIO2) 25   PEEP 5     Blood gluc 268, 340, 318, 276  Na 136, 137  Anion gap 19, 14  Lactic acid 2 1, 2 3, 1 4  WBC 15 80, 9 60  H/H 14 3/44 5, 12 7/38 2  Segs 58  Bands 1  UA >100 glucose, 1+ protein, 0-1 RBC, 0-1 WBC, occasional bacteria  UDS +benzodiazepine, +opiate  Flu A/B negative    EKG:  Sinus tachycardia  Nonspecific ST abnormality    CXR:  No acute cardiopulmonary disease    Endotracheal tube has been inserted with tip 6 cm above the david      ED Treatment: Medication Administration from 02/07/2019 0839 to 02/07/2019 1423       Date/Time Order Dose Route Action     02/07/2019 0849 sodium chloride 0 9 % bolus 1,000 mL 1,000 mL Intravenous New Bag     02/07/2019 1101 propofol (DIPRIVAN) 1000 mg in 100 mL infusion (premix) 30 mcg/kg/min Intravenous New Bag     02/07/2019 0846 propofol (DIPRIVAN) 1000 mg in 100 mL infusion (premix) 5 mcg/kg/min Intravenous New Bag     02/07/2019 0913 methylPREDNISolone sodium succinate (Solu-MEDROL) injection 125 mg 125 mg Intravenous Given     02/07/2019 0926 albuterol inhalation solution 10 mg 10 mg Nebulization Given     02/07/2019 0927 ipratropium (ATROVENT) 0 02 % inhalation solution 1 mg 1 mg Nebulization Given     02/07/2019 0936 magnesium sulfate 2 g/50 mL IVPB (premix) 2 g 2 g Intravenous New Bag     02/07/2019 0912 midazolam (VERSED) injection 5 mg 5 mg Intravenous Given     02/07/2019 0912 fentanyl citrate (PF) 100 MCG/2ML 100 mcg 100 mcg Intravenous Given     02/07/2019 1025 fentaNYL (SUBLIMAZE) 1250 mcg (STANDARD CONCENTRATION) drip in sodium chloride 0 9% 125 mL 50 mcg/hr Intravenous New Bag     02/07/2019 1021 sodium chloride 0 9 % bolus 1,000 mL 1,000 mL Intravenous New Bag     02/07/2019 1302 aztreonam (AZACTAM) 2,000 mg in sodium chloride 0 9 % 100 mL IVPB 2,000 mg Intravenous New Bag     02/07/2019 1127 levofloxacin (LEVAQUIN) IVPB (premix) 750 mg 750 mg Intravenous New Bag     02/07/2019 1129 sodium chloride 0 9 % infusion 100 mL/hr Intravenous New Bag     02/07/2019 1358 ondansetron (ZOFRAN) injection 4 mg 4 mg Intravenous Given        Past Medical/Surgical History:    Active Ambulatory Problems     Diagnosis Date Noted    Severe persistent asthma with acute exacerbation 10/25/2018    Acute bronchitis 10/25/2018    HTN (hypertension) 10/25/2018    Type 2 diabetes mellitus, with long-term current use of insulin ([facility] [address] ) 10/25/2018    Seizure ([facility] [address] ) 10/25/2018    Glaucoma 10/25/2018    BPH (benign prostatic hyperplasia) 10/25/2018    [facility] 10/25/2018    HLD (hyperlipidemia) 10/25/2018    ARPITA (obstructive sleep apnea) 10/25/2018    GERD (gastroesophageal reflux disease) 10/25/2018    Hypokalemia 11/14/2018     Past Medical History:   Diagnosis Date    Asthma     Bipolar disorder ([place] [address] )     Diabetes mellitus ([place] [address] )     Enlarged prostate     Glaucoma     Hyperlipidemia     Hypertension     Seasonal allergic rhinitis     Seizures (HCC)      Admitting Diagnosis: Respiratory arrest ([place] [address] ) [R09 2]  Severe persistent asthma with acute exacerbation [J45 51]  Respiratory arrest [R09 2]  Age/Sex: 53 y o  male    Assessment/Plan: 54 y/o male to ED from home by EMS intubated after severe asthma exacerbation causing respiratory arrest   Admitted as inpatient to ICU level of care due to severe persistent asthma with acute exacerbation  Patient presented with acute exacerbation requiring intubation  He received 2L NS bolus in the ED  Once O2 saturation began to resolve, he was able to be weaned and extubated and maintained on 2L NC  Continue levaquin  Continue IV solumedrol, ATC neb tx, NC O2   2/8 Pt continues to have decreased breath sounds with scattered expiratory wheezes and coarse crackles  Continues to have cough and SOB  Continue to monitor pulmonary status with IV steroid bronchodilator and antibiotic      Admission Orders:  I/S  NC O2 to keep sat >92%  VS  SCDs  Labs  Diabetic diet  Peak flow Q shift  Respiratory protocol   CPAP    Scheduled Meds:   Current Facility-Administered Medications:  albuterol 2 puff Inhalation Q4H PRN   atorvastatin 80 mg Oral HS   brimonidine 1 drop Both Eyes Q8H   carBAMazepine 400 mg Oral BID   clotrimazole  Topical BID   enoxaparin 40 mg Subcutaneous Daily   finasteride 5 mg Oral Daily   fish oil 1,000 mg Oral Daily   fluticasone 1 spray Nasal Daily   fluticasone-vilanterol 1 puff Inhalation Daily   gabapentin 300 mg Oral TID   hydrOXYzine HCL 25 mg Oral Q6H PRN   insulin glargine 30 Units Subcutaneous HS   [START ON 2/9/2019] insulin lispro 8 Units Subcutaneous Daily With Breakfast   insulin lispro 8 Units Subcutaneous Daily With Lunch   insulin lispro 8 Units Subcutaneous Daily With Dinner   ipratropium-albuterol 3 mL Nebulization Q4H PRN x1   latanoprost 1 drop Both Eyes Daily   levalbuterol 1 25 mg Nebulization Q12H   And      sodium chloride 3 mL Nebulization Q12H   lisinopril 5 mg Oral Daily   melatonin 3 mg Oral HS   methylPREDNISolone sodium succinate 40 mg Intravenous Q8H [facility]   montelukast 10 mg Oral Daily   ondansetron 4 mg Oral Q8H PRN    pantoprazole 40 mg Oral Early Morning   polyvinyl alcohol 2 drop Both Eyes PRN   sodium chloride 100 mL/hr Intravenous Continuous   tamsulosin 0 4 mg Oral HS   tiotropium 18 mcg Inhalation Daily   ondansetron (ZOFRAN) injection 4 mg  Dose: 4 mg  Freq: Every 8 hours PRN Route: IV x1    Network Utilization Review Department  Phone: [phone]; Fax [phone]  [email or web address]  ATTENTION: Please call with any questions or concerns to [phone]  and carefully listen to the prompts so that you are directed to the right person  Send all requests for admission clinical reviews, approved or denied determinations and any other requests to fax [phone]   All voicemails are confidential

## 2019-02-08 NOTE — NURSING NOTE
Patient not tolerating CPAP states " Im done with it", placed on 2L NC  Denies chest tightness or SOB   Will continue to monitor

## 2019-02-08 NOTE — RESPIRATORY THERAPY NOTE
RT Protocol Note  Blanca Berger 53 y o  male MRN: [id number]  Unit/Bed#:  Encounter: [id number]    Assessment    Principal Problem:    Severe persistent asthma with acute exacerbation  Active Problems:    HTN (hypertension)    Type 2 diabetes mellitus, with long-term current use of insulin (HCC)    Seizure (HCC)    Glaucoma    BPH (benign prostatic hyperplasia)    Diabetic neuropathy (HCC)    HLD (hyperlipidemia)    ARPITA (obstructive sleep apnea)    GERD (gastroesophageal reflux disease)    Respiratory arrest (HCC)      Home Pulmonary Medications:    Home Devices/Therapy: (P) Other (Comment) ([id number],duoneb & ventolin prn, bero qd,) spririvaqd    Past Medical History:   Diagnosis Date    Asthma     [facility]ipolar disorder ([facility] )     Diabetes mellitus ([facility] )     Enlarged prostate     Glaucoma     Hyperlipidemia     Hypertension     Seasonal allergic rhinitis     Seizures ([facility] )      Social History     Social History    Marital status: /Civil Union     Spouse name: N/A    Number of children: N/A    Years of education: N/A     Social History Main Topics    Smoking status: Former Smoker     Quit date: 1992    Smokeless tobacco: Never Used    Alcohol use No    Drug use: No      Comment: previous heroin clean since 1997    Sexual activity: Not Asked     Other Topics Concern    None     Social History Narrative    None       Subjective    Subjective Data: Vent    Objective    Physical Exam:   Assessment Type: Pre-treatment  General Appearance: Sedated  Respiratory Pattern: Normal, Tachypneic  Chest Assessment: Chest expansion symmetrical, Trachea midline  Bilateral Breath Sounds: Diminished, Clear  Cough: None  Suction: ET Tube  O2 Device: VEnt    Vitals:  Blood pressure 116/83, pulse 96, temperature 99 1 °F (37 3 °C), temperature source Temporal, resp  rate 16, height 5' 8 5" (1 74 m), weight 89 7 kg (197 lb 12 oz), SpO2 100 %        Results from last 7 days  Lab Units 02/07/19  1010   PH ART  7 290*   PCO2 ART mm Hg 51 0*   PO2 ART mm Hg 225 0*   HCO3 ART mmol/L 24 5   BASE EXC ART mmol/L -2 6*   O2 CONTENT ART mL/dL 18 7   O2 HGB, ARTERIAL % 96 6   ABG SOURCE  Radial, Left   VERONICA TEST  No       Imaging and other studies: I have personally reviewed pertinent reports  O2 Device: VEnt     Plan    Respiratory Plan: (P) No distress/Pulmonary history        Resp Comments: Pt came in ER with ET tube put in by the EMT on the scean and pt has 7 5 ET at 24 @ the lip   PT ABG was taken and it was Acute Respiratory acidosis with meatabolic acidosis underlined, will take anoth ABG in an hour  , Pt has acute on chronic asthma

## 2019-02-08 NOTE — NURSING NOTE
Pt got out of bed  Ambulated in the dent - on second length of the dent he stated he felt a little SOB  Back to room to sit in the chair  After about 10 min he became to complain "of a lump like feeling"  in  throat  Vitals are within normal  Friend is viviting

## 2019-02-08 NOTE — NURSING NOTE
Patient placed on CPAP 10 of PEEP offers no complaints O2 saturations adequate will continue to monitor

## 2019-02-08 NOTE — NURSING NOTE
Patient complaining of SOB and chest tightness, found without oxygen on  Placed back on 2L NC, Respiratory paged and aware

## 2019-02-08 NOTE — NURSING NOTE
Resting in bed - offers no complaint  Does not appear SOB  Denies pain Lungs a little decreased , scattered expiratory wheeze is noted  Family - friends visiting   Voided 180 / cc in urinal On room air

## 2019-02-09 VITALS
DIASTOLIC BLOOD PRESSURE: 86 MMHG | HEIGHT: 69 IN | BODY MASS INDEX: 29.29 KG/M2 | OXYGEN SATURATION: 97 % | TEMPERATURE: 98.8 F | RESPIRATION RATE: 20 BRPM | HEART RATE: 79 BPM | SYSTOLIC BLOOD PRESSURE: 158 MMHG | WEIGHT: 197.75 LBS

## 2019-02-09 LAB
ANION GAP SERPL CALCULATED.3IONS-SCNC: 7 MMOL/L (ref 5–14)
BACTERIA SPT RESP CULT: NORMAL
BUN SERPL-MCNC: 12 MG/DL (ref 5–25)
CALCIUM SERPL-MCNC: 9 MG/DL (ref 8.4–10.2)
CHLORIDE SERPL-SCNC: 105 MMOL/L (ref 97–108)
CO2 SERPL-SCNC: 26 MMOL/L (ref 22–30)
CREAT SERPL-MCNC: 0.79 MG/DL (ref 0.7–1.5)
GFR SERPL CREATININE-BSD FRML MDRD: 101 ML/MIN/1.73SQ M
GLUCOSE SERPL-MCNC: 147 MG/DL (ref 65–140)
GLUCOSE SERPL-MCNC: 161 MG/DL (ref 70–99)
GRAM STN SPEC: NORMAL
POTASSIUM SERPL-SCNC: 4 MMOL/L (ref 3.6–5)
SODIUM SERPL-SCNC: 138 MMOL/L (ref 137–147)

## 2019-02-09 PROCEDURE — 94760 N-INVAS EAR/PLS OXIMETRY 1: CPT

## 2019-02-09 PROCEDURE — 94664 DEMO&/EVAL PT USE INHALER: CPT

## 2019-02-09 PROCEDURE — 80048 BASIC METABOLIC PNL TOTAL CA: CPT | Performed by: INTERNAL MEDICINE

## 2019-02-09 PROCEDURE — 94640 AIRWAY INHALATION TREATMENT: CPT

## 2019-02-09 PROCEDURE — 82948 REAGENT STRIP/BLOOD GLUCOSE: CPT

## 2019-02-09 PROCEDURE — 99239 HOSP IP/OBS DSCHRG MGMT >30: CPT | Performed by: INTERNAL MEDICINE

## 2019-02-09 RX ORDER — LEVALBUTEROL 1.25 MG/.5ML
1.25 SOLUTION, CONCENTRATE RESPIRATORY (INHALATION) EVERY 8 HOURS PRN
Qty: 1 VIAL | Refills: 0 | Status: SHIPPED | OUTPATIENT
Start: 2019-02-09 | End: 2019-03-11

## 2019-02-09 RX ADMIN — TIOTROPIUM BROMIDE 18 MCG: 18 CAPSULE ORAL; RESPIRATORY (INHALATION) at 09:06

## 2019-02-09 RX ADMIN — CARBAMAZEPINE 400 MG: 200 TABLET ORAL at 09:14

## 2019-02-09 RX ADMIN — Medication 1000 MG: at 09:15

## 2019-02-09 RX ADMIN — METHYLPREDNISOLONE SODIUM SUCCINATE 40 MG: 40 INJECTION, POWDER, FOR SOLUTION INTRAMUSCULAR; INTRAVENOUS at 06:39

## 2019-02-09 RX ADMIN — FLUTICASONE PROPIONATE 1 SPRAY: 50 SPRAY, METERED NASAL at 09:13

## 2019-02-09 RX ADMIN — ISODIUM CHLORIDE 3 ML: 0.03 SOLUTION RESPIRATORY (INHALATION) at 07:18

## 2019-02-09 RX ADMIN — MONTELUKAST SODIUM 10 MG: 10 TABLET, FILM COATED ORAL at 09:14

## 2019-02-09 RX ADMIN — LISINOPRIL 5 MG: 5 TABLET ORAL at 09:15

## 2019-02-09 RX ADMIN — PANTOPRAZOLE SODIUM 40 MG: 40 TABLET, DELAYED RELEASE ORAL at 06:39

## 2019-02-09 RX ADMIN — FLUTICASONE FUROATE AND VILANTEROL TRIFENATATE 1 PUFF: 200; 25 POWDER RESPIRATORY (INHALATION) at 09:07

## 2019-02-09 RX ADMIN — IPRATROPIUM BROMIDE AND ALBUTEROL SULFATE 3 ML: 2.5; .5 SOLUTION RESPIRATORY (INHALATION) at 05:55

## 2019-02-09 RX ADMIN — ENOXAPARIN SODIUM 40 MG: 40 INJECTION SUBCUTANEOUS at 09:12

## 2019-02-09 RX ADMIN — BRIMONIDINE TARTRATE 1 DROP: 1.5 SOLUTION OPHTHALMIC at 06:39

## 2019-02-09 RX ADMIN — GABAPENTIN 300 MG: 300 CAPSULE ORAL at 09:14

## 2019-02-09 RX ADMIN — LEVALBUTEROL HYDROCHLORIDE 1.25 MG: 1.25 SOLUTION, CONCENTRATE RESPIRATORY (INHALATION) at 07:18

## 2019-02-09 RX ADMIN — FINASTERIDE 5 MG: 5 TABLET, FILM COATED ORAL at 09:14

## 2019-02-09 NOTE — NURSING NOTE
Patient completed AM care independently with no difficulty, no SOB noted  Patient provided with IS, teaching and demonstration given  Return demonstration given   No questions at this time

## 2019-02-09 NOTE — DISCHARGE SUMMARY
Discharge Summary - Katlin Dietrich 53 y o  male MRN: [id number]    Unit/Bed#:  Encounter: [id number]    Admission Date:   Admission Orders     Ordered        02/07/19 1107  Inpatient Admission (expected length of stay for this patient Order details is greater than two midnights)  Once               Admitting Diagnosis: Respiratory arrest ([facility] ) [R09 2]  Severe persistent asthma with acute exacerbation [J45 51]  Respiratory arrest [R09 2]        Procedures Performed:   Orders Placed This Encounter   Procedures   [address] ED ECG Documentation Only       Summary of Hospital Course: Katlin Dietrich is a 53 y o  male with a history of severe asthma, multiple drug and food allergies, DM, HTN, HLD, BPH who presents with asthma exacerbation  Patient reports he was in the ED yesterday for his asthma, but felt fine and left  Upon this presentation he was found to have respiratory arrest requiring intubation  His asthma has been worse x2 weeks and is associated with a productive cough  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  He reports eating at a new restaurant yesterday, and admits there may be a food allergy component to his exacerbation  He denies fevers, chills, recent travel or illness  After intubation he reports mild soreness in his throat and some stomach discomfort  Otherwise, he reports his breathing is improved with oxygen  Initial labs revealed acidosis, which began to resolve with fluid and O2  CXR was negative for acute disease  Respiratory therapy was able to wean patient off of intubation and maintain him on 2L NC  IV Solumedrol to be initiated, antibiotic therapy, and respiratory protocol  Patient has remained stable throughout this hospital course initially admitted to ICU  At this time he is at baseline cognitive and physical function requesting to be discharged  Denies any chest pain denies wheezing he feels his coughing last and his breathing has improved  This may have been exposure to some cleaning products as he is reported which may have worsened his baseline asthma  But he has recovered quickly he has all his medications at home and available to him but requesting for ProAir  I have recommended at least 5 more days of continued 20 mg prednisone which he already has, I recommended to follow up with pulmonology which she has already established within 1 week  He does realize and has good understanding of his asthma action plan and reports I know when I am feeling bad I have to come back here   Certainly considered high risk given his exposure to cold weather and severe DA of his asthma  Reports he would like to get out of hospital before this cold weather settles and again [facility]  Discharge exam is as following:      Constitutional:  Well developed, well nourished, no acute distress, non-toxic appearance   Eyes:  PERRL, conjunctiva normal   HENT:  Atraumatic, external ears normal, nose normal, oropharynx moist, no pharyngeal exudates  Neck- normal range of motion, no tenderness, supple   Respiratory:  No respiratory distress, normal breath sounds, no rales, no wheezing   Cardiovascular:  Normal rate, normal rhythm, no murmurs, no gallops, no rubs   GI:  Soft, nondistended, normal bowel sounds, nontender, no organomegaly, no mass, no rebound, no guarding   :  No costovertebral angle tenderness   Musculoskeletal:  No edema, no tenderness, no deformities   Back- no tenderness  Integument:  Well hydrated, no rash   Lymphatic:  No lymphadenopathy noted   Neurologic:  Alert & oriented x 3, CN 2-12 normal, normal motor function, normal sensory function, no focal deficits noted   Psychiatric:  Speech and behavior appropriate             Significant Findings, Care, Treatment and Services Provided:  As per description about    Complications:   No known complications      Discharge Diagnosis:   Asthma exacerbation      Resolved Problems  Date Reviewed: 2/8/2019    None Condition at Discharge: good         Discharge instructions/Information to patient and family:   See after visit summary for information provided to patient and family  Provisions for Follow-Up Care:  See after visit summary for information related to follow-up care and any pertinent home health orders  PCP: Linette Alvarado MD    Disposition: Home    Planned Readmission: No    Discharge Statement   I spent 40 minutes discharging the patient  This time was spent on the day of discharge  I had direct contact with the patient on the day of discharge  Additional documentation is required if more than 30 minutes were spent on discharge  Discharge Medications:  See after visit summary for reconciled discharge medications provided to patient and family

## 2019-02-11 NOTE — UTILIZATION REVIEW
Notification of Discharge  This is a Notification of Discharge from our facility [address]  Please be advised that this patient has been discharge from our facility  Below you will find the admission and discharge date and time including the patients disposition  PRESENTATION DATE: 2/7/2019  8:40 AM  IP ADMISSION DATE: 2/7/19 1107  DISCHARGE DATE: 2/9/2019 10:15 AM  DISPOSITION: [address] Utilization Review Department  Phone: [phone]; Fax [phone]  [email or web address]  ATTENTION: Please call with any questions or concerns to [phone]  and carefully listen to the prompts so that you are directed to the right person  Send all requests for admission clinical reviews, approved or denied determinations and any other requests to fax [phone]   All voicemails are confidential

## 2019-02-12 LAB
BACTERIA BLD CULT: NORMAL
BACTERIA BLD CULT: NORMAL

## 2019-02-25 ENCOUNTER — TELEPHONE (OUTPATIENT)
Dept: PULMONOLOGY | Facility: CLINIC | Age: 54
End: 2019-02-25

## 2019-02-25 NOTE — TELEPHONE ENCOUNTER
Called to see the patient's status and find out which company he has for a cpap  Patient called me back and stated that he used [facility]      Per [facility] patient has not been with them since 2014

## 2019-03-14 ENCOUNTER — HOSPITAL ENCOUNTER (EMERGENCY)
Facility: HOSPITAL | Age: 54
Discharge: HOME | End: 2019-03-14
Attending: EMERGENCY MEDICINE
Payer: COMMERCIAL

## 2019-03-14 VITALS
SYSTOLIC BLOOD PRESSURE: 110 MMHG | RESPIRATION RATE: 18 BRPM | TEMPERATURE: 97.1 F | HEART RATE: 98 BPM | DIASTOLIC BLOOD PRESSURE: 85 MMHG | OXYGEN SATURATION: 99 %

## 2019-03-14 DIAGNOSIS — J45.901 MODERATE ASTHMA WITH ACUTE EXACERBATION, UNSPECIFIED WHETHER PERSISTENT: Primary | ICD-10-CM

## 2019-03-14 PROCEDURE — 94640 AIRWAY INHALATION TREATMENT: CPT

## 2019-03-14 PROCEDURE — 99285 EMERGENCY DEPT VISIT HI MDM: CPT

## 2019-03-14 RX ORDER — IPRATROPIUM BROMIDE AND ALBUTEROL SULFATE 2.5; .5 MG/3ML; MG/3ML
SOLUTION RESPIRATORY (INHALATION)
Status: COMPLETED
Start: 2019-03-14 | End: 2019-03-14

## 2019-03-14 RX ORDER — PREDNISONE 20 MG/1
60 TABLET ORAL DAILY
Qty: 15 TABLET | Refills: 0 | Status: SHIPPED | OUTPATIENT
Start: 2019-03-14 | End: 2019-03-19

## 2019-03-14 RX ORDER — IPRATROPIUM BROMIDE AND ALBUTEROL SULFATE 2.5; .5 MG/3ML; MG/3ML
3 SOLUTION RESPIRATORY (INHALATION) ONCE
Status: COMPLETED | OUTPATIENT
Start: 2019-03-14 | End: 2019-03-14

## 2019-03-14 RX ORDER — PREDNISONE 20 MG/1
60 TABLET ORAL ONCE
Status: COMPLETED | OUTPATIENT
Start: 2019-03-14 | End: 2019-03-14

## 2019-03-14 RX ADMIN — IPRATROPIUM BROMIDE 0.5 MG: 0.5 SOLUTION RESPIRATORY (INHALATION) at 07:36

## 2019-03-14 RX ADMIN — ALBUTEROL SULFATE 5 MG: 2.5 SOLUTION RESPIRATORY (INHALATION) at 08:07

## 2019-03-14 RX ADMIN — PREDNISONE 60 MG: 20 TABLET ORAL at 07:36

## 2019-03-14 RX ADMIN — ALBUTEROL SULFATE 5 MG: 2.5 SOLUTION RESPIRATORY (INHALATION) at 07:37

## 2019-03-14 NOTE — ED PROVIDER NOTES
History  Chief Complaint   Patient presents with    Shortness of Breath     3 days with increasing sob  Hx of intubation last month   Cough     Patient is a 69-year-old male with a longstanding history of asthma who was recently intubated last month who presents with 3 day history of progressively worsening shortness of breath and asthma  Patient states using his inhaler home, with little relief  States that he needs steroids to the feel better  Nonproductive cough  Denies any fevers sweats or chills  No chest pain  States similar to his typical asthma exacerbation  Again patient has been admitted and intubated multiple times the past   States he has appoint with a pulmonologist tomorrow  Patient is a nonsmoker  Cannot display prior to admission medications because the patient has not been admitted in this contact  Past Medical History:   Diagnosis Date    Asthma     Bipolar disorder ([facility] )     Diabetes mellitus ([facility] )     Enlarged prostate     Glaucoma     Hyperlipidemia     Hypertension     Seasonal allergic rhinitis     Seizures ([facility] )        Past Surgical History:   Procedure Laterality Date    KNEE SURGERY      WRIST SURGERY Left        History reviewed  No pertinent family history  I have reviewed and agree with the history as documented  Social History     Tobacco Use    Smoking status: Former Smoker     Last attempt to quit:      Years since quittin 2    Smokeless tobacco: Never Used   Substance Use Topics    Alcohol use: No    Drug use: No     Comment: previous heroin clean since         Review of Systems   Constitutional: Negative  Negative for activity change and appetite change  HENT: Negative  Eyes: Negative  Respiratory: Positive for cough and wheezing  Cardiovascular: Negative  Negative for chest pain  Gastrointestinal: Negative  Negative for abdominal pain, nausea and vomiting  Endocrine: Negative  Genitourinary: Negative  Musculoskeletal: Negative  Skin: Negative  Allergic/Immunologic: Negative  Neurological: Negative  Hematological: Negative  Psychiatric/Behavioral: Negative  All other systems reviewed and are negative  Physical Exam  Physical Exam   Constitutional: He appears well-developed and well-nourished  HENT:   Head: Normocephalic and atraumatic  Eyes: Pupils are equal, round, and reactive to light  EOM are normal    Neck: Normal range of motion  Neck supple  Cardiovascular: Normal rate, regular rhythm and normal heart sounds  Pulmonary/Chest: No accessory muscle usage  No tachypnea  He is in respiratory distress  He has wheezes in the right upper field, the right middle field, the right lower field, the left upper field, the left middle field and the left lower field  Musculoskeletal: Normal range of motion  Right lower leg: Normal    Neurological: He is alert  Skin: Skin is warm and dry  Capillary refill takes less than 2 seconds  Psychiatric: He has a normal mood and affect  His behavior is normal    Nursing note and vitals reviewed        Vital Signs  ED Triage Vitals [03/14/19 0729]   Temperature Pulse Respirations Blood Pressure SpO2   (!) 97 1 °F (36 2 °C) 98 18 144/74 99 %      Temp Source Heart Rate Source Patient Position - Orthostatic VS BP Location FiO2 (%)   Tympanic Monitor Sitting Left arm --      Pain Score       No Pain           Vitals:    03/14/19 0729   BP: 144/74   Pulse: 98   Patient Position - Orthostatic VS: Sitting       qSOFA     Row Name 03/14/19 0729                Altered mental status GCS < 15  --        Respiratory Rate > / =94  0        Systolic BP < / =033  0        Q Sofa Score  0              Visual Acuity      ED Medications  Medications   predniSONE tablet 60 mg (has no administration in time range)   albuterol inhalation solution 5 mg (has no administration in time range)   ipratropium (ATROVENT) 0 02 % inhalation solution 0 5 mg (has no administration in time range)   ipratropium-albuterol (DUO-NEB) 0 5-2 5 mg/3 mL inhalation solution 3 mL (has no administration in time range)       Diagnostic Studies  Results Reviewed     None                 No orders to display              Procedures  Procedures       Phone Contacts  ED Phone Contact    ED Course  ED Course as of Mar 14 0839   Thu Mar 14, 2019   0838 Peak flow 300 after 2nd neb  Lungs sound clear patient speaking full sentences without issues wants go home  Will follow-up pulmonology as scheduled tomorrow return to the ER for any concerns  MDM  Number of Diagnoses or Management Options  Moderate asthma with acute exacerbation, unspecified whether persistent:      Amount and/or Complexity of Data Reviewed  Review and summarize past medical records: yes        Disposition  Final diagnoses:   None     ED Disposition     None      Follow-up Information    None         Patient's Medications   Discharge Prescriptions    No medications on file     No discharge procedures on file      ED Provider  Electronically Signed by           Birdie Lieberman MD  03/14/19 8752

## 2019-03-15 ENCOUNTER — TELEPHONE (OUTPATIENT)
Dept: PULMONOLOGY | Facility: CLINIC | Age: 54
End: 2019-03-15

## 2019-03-15 ENCOUNTER — OFFICE VISIT (OUTPATIENT)
Dept: PULMONOLOGY | Facility: CLINIC | Age: 54
End: 2019-03-15
Payer: COMMERCIAL

## 2019-03-15 VITALS
TEMPERATURE: 98.3 F | WEIGHT: 210 LBS | HEIGHT: 69 IN | HEART RATE: 85 BPM | OXYGEN SATURATION: 96 % | SYSTOLIC BLOOD PRESSURE: 138 MMHG | BODY MASS INDEX: 31.1 KG/M2 | DIASTOLIC BLOOD PRESSURE: 60 MMHG | RESPIRATION RATE: 20 BRPM

## 2019-03-15 DIAGNOSIS — J45.51 SEVERE PERSISTENT ASTHMA WITH ACUTE EXACERBATION: Primary | ICD-10-CM

## 2019-03-15 DIAGNOSIS — G47.33 OSA (OBSTRUCTIVE SLEEP APNEA): ICD-10-CM

## 2019-03-15 DIAGNOSIS — R09.2 RESPIRATORY ARREST (HCC): ICD-10-CM

## 2019-03-15 DIAGNOSIS — H40.9 GLAUCOMA OF BOTH EYES, UNSPECIFIED GLAUCOMA TYPE: ICD-10-CM

## 2019-03-15 DIAGNOSIS — N40.0 BENIGN PROSTATIC HYPERPLASIA WITHOUT LOWER URINARY TRACT SYMPTOMS: ICD-10-CM

## 2019-03-15 DIAGNOSIS — I10 ESSENTIAL HYPERTENSION: ICD-10-CM

## 2019-03-15 DIAGNOSIS — K21.9 GASTROESOPHAGEAL REFLUX DISEASE WITHOUT ESOPHAGITIS: ICD-10-CM

## 2019-03-15 PROCEDURE — 99215 OFFICE O/P EST HI 40 MIN: CPT | Performed by: INTERNAL MEDICINE

## 2019-03-15 RX ORDER — BUDESONIDE 0.5 MG/2ML
0.5 INHALANT ORAL 2 TIMES DAILY
Qty: 60 VIAL | Refills: 6 | Status: SHIPPED | OUTPATIENT
Start: 2019-03-15 | End: 2019-04-08

## 2019-03-15 RX ORDER — AMLODIPINE BESYLATE 2.5 MG/1
5 TABLET ORAL DAILY
Qty: 30 TABLET | Refills: 6 | Status: SHIPPED | OUTPATIENT
Start: 2019-03-15 | End: 2019-03-15 | Stop reason: SDUPTHER

## 2019-03-15 NOTE — ASSESSMENT & PLAN NOTE
With acute exacerbation yesterday on 60 mg prednisone daily, improving  I told patient to take 40 mg prednisone starting tomorrow for 3 more days then he will go back to his regimen of 10 mg every other day  Continue Dulera 200/5, continue Singulair, continue QVAR for now, continue albuterol p r n  MDI and nebulizer  Patient has Spiriva already and he has been tolerating that very well at home so I order to his pharmacy:  Spiriva Respimat 1 25  I told him to monitor his symptoms for urinary retention with BPH and also his glaucoma and he is planning to see his eye doctor in the near future  He is already on medication for BPH and glaucoma  Patient is dependent on prednisone 10 mg every other day for many years  I will start patient on Pulmicort nebulizer therapy b i d  I told him to try that for 4 weeks then start tapering his prednisone to 5 mg every other day for 4 weeks, if he remains stable then he will be cut down to 2 5 mg every other day for another 4 weeks, if no worsening of symptoms then he will stop prednisone altogether after that and stay on Pulmicort b i d , Dulera and QVAR  In the future if this plan is successful then we can probably wean off QVAR  Last CBC did not show any eosinophils but we can consider repeating that in the future  Also I do not have allergy testing or IgE level, he will request these results to be faxed to us from his allergist so to consider immunotherapy in the future with possibly Xolair  Also next visit I should order PFTs

## 2019-03-15 NOTE — ASSESSMENT & PLAN NOTE
Continue CPAP 10 cm H2O every night  Patient has some excessive fatigue/excessive daytime sleepiness and he has been on the same machine/settings for more than 10 years  I will order split study

## 2019-03-15 NOTE — ASSESSMENT & PLAN NOTE
Continue Flomax and finasteride and follow with Urology  Patient was notified to monitor symptoms of urinary retention on Spiriva

## 2019-03-15 NOTE — ASSESSMENT & PLAN NOTE
Continue eyedrops, he is planning to contact is Ophthalmology to be seen in the near future and I told him to notify him about being on Spiriva

## 2019-03-15 NOTE — PROGRESS NOTES
Progress note - Pulmonary Medicine   Salima Harrell 53 y o  male MRN: [id number]       Impression & Plan:     Severe persistent asthma with acute exacerbation  With acute exacerbation yesterday on 60 mg prednisone daily, improving  I told patient to take 40 mg prednisone starting tomorrow for 3 more days then he will go back to his regimen of 10 mg every other day  Continue Dulera 200/5, continue Singulair, continue QVAR for now, continue albuterol p r n  MDI and nebulizer  Patient has Spiriva already and he has been tolerating that very well at home so I order to his pharmacy:  Spiriva Respimat 1 25  I told him to monitor his symptoms for urinary retention with BPH and also his glaucoma and he is planning to see his eye doctor in the near future  He is already on medication for BPH and glaucoma  Patient is dependent on prednisone 10 mg every other day for many years  I will start patient on Pulmicort nebulizer therapy b i d  I told him to try that for 4 weeks then start tapering his prednisone to 5 mg every other day for 4 weeks, if he remains stable then he will be cut down to 2 5 mg every other day for another 4 weeks, if no worsening of symptoms then he will stop prednisone altogether after that and stay on Pulmicort b i d , Dulera and QVAR  In the future if this plan is successful then we can probably wean off QVAR  Last CBC did not show any eosinophils but we can consider repeating that in the future  Also I do not have allergy testing or IgE level, he will request these results to be faxed to us from his allergist so to consider immunotherapy in the future with possibly Xolair  Also next visit I should order PFTs  ARPITA (obstructive sleep apnea)  Continue CPAP 10 cm H2O every night  Patient has some excessive fatigue/excessive daytime sleepiness and he has been on the same machine/settings for more than 10 years  I will order split study      Respiratory arrest [facility])  Recent respiratory arrest secondary to asthma exacerbation but also possibly angioedema as better patient description which could be secondary to food allergy  HTN (hypertension)  With the suspicious history of angioedema recently and although it was secondary to food allergy but cannot rule out ACE-inhibitor induced angioedema completely  I feel it is safer to stop lisinopril and switch to another agent and I chose to switch him to Norvasc 2 5 mg daily, patient will follow with his primary care physician for further adjustment or changes in the future if needed  BPH (benign prostatic hyperplasia)  Continue Flomax and finasteride and follow with Urology  Patient was notified to monitor symptoms of urinary retention on Spiriva  Glaucoma  Continue eyedrops, he is planning to contact is Ophthalmology to be seen in the near future and I told him to notify him about being on Spiriva    GERD (gastroesophageal reflux disease)  Currently controlled with medications with omeprazole, continue      Diagnoses and all orders for this visit:    Severe persistent asthma with acute exacerbation  -     tiotropium (SPIRIVA RESPIMAT) 1 25 MCG/ACT AERS inhaler; Inhale 2 puffs daily  -     budesonide (PULMICORT) 0 5 mg/2 mL nebulizer solution; Take 1 vial (0 5 mg total) by nebulization 2 (two) times a day Rinse mouth after use  ARPITA (obstructive sleep apnea)  -     Split Study; Future    Respiratory arrest (HCC)    Gastroesophageal reflux disease without esophagitis    Essential hypertension  -     amLODIPine (NORVASC) 2 5 mg tablet; Take 2 tablets (5 mg total) by mouth daily    Glaucoma of both eyes, unspecified glaucoma type    Benign prostatic hyperplasia without lower urinary tract symptoms      ______________________________________________________________________    HPI:    Salima Harrell presents today for follow-up of severe persistent asthma    Patient has severe persistent asthma since infancy, he had multiple exacerbations in the past, he was intubated 4 times in the past, 2 intubations where secondary to injuries but he was intubated for asthma exacerbation 2013 and now most recently on 2/7/2019  Patient has chronic dyspnea on exertion and intermittent wheezing and sometimes cough productive of clear sputum  He denies fever or chills or night sweats currently, denies any chest pain, denies any hemoptysis or weight loss  Yesterday he went to the emergency room with worsening of his asthma symptoms/wheezing and he was examined there and given nebulizer therapy and discharged on prednisone 60 mg for 5 day that he took yesterday and today  He feels better today  In February 2019 he had worsening shortness of breath/wheezing and also he woke up at night with facial flushing/swelling and feeling of a lump in his throat and he was not able to breathe, he ended up with respiratory arrest and was intubated and extubated very quickly within may be 1-2 days in the hospital as he was treated for asthma exacerbation  At that time also he suspected allergy [facility] food he ate at a new restaurant since symptoms started within 2-3 hours of eating that  He also has bee allergy and had mild angioedema in August of last year after bee sting but did not require intubation  Patient is currently on multiple medications for asthma including Dulera 200/5, Singulair 10, Spiriva Respimat 1 25, albuterol MDI and nebulizer, Qvar, and also he is on chronic prednisone at 10 mg every other day for many years  In the past used to be on higher dose and was tapered as low as 2 5 mg but he had a severe asthma attack and since then he was not being able to be tapered more than 10 mg every other day  He follows with allergy as well  Patient has GERD and currently he states that his symptoms are controlled with PPI  He also has postnasal drips on Flonase    Patient has obstructive sleep apnea currently on CPAP 10 cm H2O and he is very compliant every night and he had this CPAP with the same settings for many years, more than 10 years, and he is concerned since he started to have more fatigue and more daytime sleepiness  He lives at home with his wife, he has no pets at home, he feels his asthma is worse at home sometimes, he smoked cigarettes in the past for about 7 years/2 packs per day, he quit in   No occupational exposure currently, he is on disability         Review of Systems:  Review of Systems   Constitutional: Negative  HENT: Negative  Eyes: Negative  Respiratory:        As HPI   Cardiovascular: Negative  Gastrointestinal: Negative  Endocrine: Negative  Genitourinary: Negative  Musculoskeletal: Negative  Skin: Negative  Allergic/Immunologic: Negative  Neurological: Negative  Hematological: Negative  Psychiatric/Behavioral: Negative  Past medical history, surgical history, and family history were reviewed and updated as appropriate    Social history updates:  Social History     Tobacco Use   Smoking Status Former Smoker    Last attempt to quit:     Years since quittin 2   Smokeless Tobacco Never Used       PhysicalExamination:  Vitals:   /60 (BP Location: Left arm, Patient Position: Sitting, Cuff Size: Standard)   Pulse 85   Temp 98 3 °F (36 8 °C) (Tympanic)   Resp 20   Ht 5' 8 5" (1 74 m)   Wt 95 3 kg (210 lb)   SpO2 96%   BMI 31 47 kg/m²     General: alert, not in acute distress  HEENT: PERRL, no icteric sclera or cyanosis, no thrush  Neck:  Supple, no lymphadenopathy or thyromegaly, no JVD  Lungs:  Equal breath sounds and clear auscultations bilaterally, no wheezing or crackles  Heart: S1S2 regular, no murmures or gallops  Abdomen: soft, non-tender, bowel sounds  present  Extrimities: no edema, no clubbing or cyanosis  Neuro: Alert and oriented x 3, no focal neurodeficits   Skin: intact, no rashes    Diagnostic Data:  Labs:   I personally reviewed the most recent laboratory data pertinent to today's visit    Lab Results   Component Value Date    WBC 9 60 02/08/2019    HGB 12 7 (L) 02/08/2019    HCT 38 2 (L) 02/08/2019    MCV 87 02/08/2019     02/08/2019     Lab Results   Component Value Date    SODIUM 138 02/09/2019    K 4 0 02/09/2019    CO2 26 02/09/2019     02/09/2019    BUN 12 02/09/2019    CREATININE 0 79 02/09/2019    CALCIUM 9 0 02/09/2019     No results found for: IGE  Lab Results   Component Value Date    ALT 26 02/07/2019    AST 28 02/07/2019    ALKPHOS 65 02/07/2019    BILITOT 0 3 10/01/2014         Imaging:  I personally reviewed the images on the [facility] system pertinent to today's visit  Chest x-ray from 2/7/2019:  Clear lungs with endotracheal tube 6 cm above david that time    Chest CT scan report from [place] in 2015:  Lung fields demonstrate nodule in left upper lobe measuring 5 x 6 millimeters,unchanged from 2007  No additional pulmonary nodule  No pleural effusion or  suspicious pleural thickening        Ivy Saavedra MD

## 2019-03-15 NOTE — ASSESSMENT & PLAN NOTE
Recent respiratory arrest secondary to asthma exacerbation but also possibly angioedema as better patient description which could be secondary to food allergy

## 2019-03-15 NOTE — TELEPHONE ENCOUNTER
Attempted to contact the patient to let him know the date and instructions for the sleep study but could not get through on either number   Mailed all of the information with a note to contact me with any questions

## 2019-03-15 NOTE — ASSESSMENT & PLAN NOTE
With the suspicious history of angioedema recently and although it was secondary to food allergy but cannot rule out ACE-inhibitor induced angioedema completely  I feel it is safer to stop lisinopril and switch to another agent and I chose to switch him to Norvasc 2 5 mg daily, patient will follow with his primary care physician for further adjustment or changes in the future if needed

## 2019-03-15 NOTE — LETTER
March 15, 2019     Cathy Pitts, [address] [facility]    Patient: Vesta Whalen   YOB: 1965   Date of Visit: 3/15/2019       Dear Dr Luther Blevins: Thank you for referring Vesta Whalen to me for evaluation  Below are my notes for this consultation  If you have questions, please do not hesitate to call me  I look forward to following your patient along with you  Sincerely,        Concepción Hugo MD        CC: CARRIE Sadler MD  3/15/2019 12:18 PM  Sign at close encounter    Progress note - Pulmonary Medicine   Vesta Whalen 53 y o  male MRN: [id number]       Impression & Plan:     Severe persistent asthma with acute exacerbation  With acute exacerbation yesterday on 60 mg prednisone daily, improving  I told patient to take 40 mg prednisone starting tomorrow for 3 more days then he will go back to his regimen of 10 mg every other day  Continue Dulera 200/5, continue Singulair, continue QVAR for now, continue albuterol p r n  MDI and nebulizer  Patient has Spiriva already and he has been tolerating that very well at home so I order to his pharmacy:  Spiriva Respimat 1 25  I told him to monitor his symptoms for urinary retention with BPH and also his glaucoma and he is planning to see his eye doctor in the near future  He is already on medication for BPH and glaucoma  Patient is dependent on prednisone 10 mg every other day for many years  I will start patient on Pulmicort nebulizer therapy b i d  I told him to try that for 4 weeks then start tapering his prednisone to 5 mg every other day for 4 weeks, if he remains stable then he will be cut down to 2 5 mg every other day for another 4 weeks, if no worsening of symptoms then he will stop prednisone altogether after that and stay on Pulmicort b i d , Dulera and QVAR  In the future if this plan is successful then we can probably wean off QVAR    Last CBC did not show any eosinophils but we can consider repeating that in the future  Also I do not have allergy testing or IgE level, he will request these results to be faxed to us from his allergist so to consider immunotherapy in the future with possibly Xolair  ARPITA (obstructive sleep apnea)  Continue CPAP 10 cm H2O every night  Patient has some excessive fatigue/excessive daytime sleepiness and he has been on the same machine/settings for more than 10 years  I will order split study  Respiratory arrest [facility])  Recent respiratory arrest secondary to asthma exacerbation but also possibly angioedema as better patient description which could be secondary to food allergy  HTN (hypertension)  With the suspicious history of angioedema recently and although it was secondary to food allergy but cannot rule out ACE-inhibitor induced angioedema completely  I feel it is safer to stop lisinopril and switch to another agent and I chose to switch him to Norvasc 2 5 mg daily, patient will follow with his primary care physician for further adjustment or changes in the future if needed  BPH (benign prostatic hyperplasia)  Continue Flomax and finasteride and follow with Urology  Patient was notified to monitor symptoms of urinary retention on Spiriva  Glaucoma  Continue eyedrops, he is planning to contact is Ophthalmology to be seen in the near future and I told him to notify him about being on Spiriva    GERD (gastroesophageal reflux disease)  Currently controlled with medications with omeprazole, continue      Diagnoses and all orders for this visit:    Severe persistent asthma with acute exacerbation  -     tiotropium (SPIRIVA RESPIMAT) 1 25 MCG/ACT AERS inhaler; Inhale 2 puffs daily  -     budesonide (PULMICORT) 0 5 mg/2 mL nebulizer solution; Take 1 vial (0 5 mg total) by nebulization 2 (two) times a day Rinse mouth after use  ARPITA (obstructive sleep apnea)  -     Split Study;  Future    Respiratory arrest (HCC)    Gastroesophageal reflux disease without esophagitis    Essential hypertension  -     amLODIPine (NORVASC) 2 5 mg tablet; Take 2 tablets (5 mg total) by mouth daily    Glaucoma of both eyes, unspecified glaucoma type    Benign prostatic hyperplasia without lower urinary tract symptoms      ______________________________________________________________________    HPI:    Ty Plasencia presents today for follow-up of severe persistent asthma  Patient has severe persistent asthma since infancy, he had multiple exacerbations in the past, he was intubated 4 times in the past, 2 intubations where secondary to injuries but he was intubated for asthma exacerbation 2013 and now most recently on 2/7/2019  Patient has chronic dyspnea on exertion and intermittent wheezing and sometimes cough productive of clear sputum  He denies fever or chills or night sweats currently, denies any chest pain, denies any hemoptysis or weight loss  Yesterday he went to the emergency room with worsening of his asthma symptoms/wheezing and he was examined there and given nebulizer therapy and discharged on prednisone 60 mg for 5 day that he took yesterday and today  He feels better today  In February 2019 he had worsening shortness of breath/wheezing and also he woke up at night with facial flushing/swelling and feeling of a lump in his throat and he was not able to breathe, he ended up with respiratory arrest and was intubated and extubated very quickly within may be 1-2 days in the hospital as he was treated for asthma exacerbation  At that time also he suspected allergy [facility] food he ate at a new restaurant since symptoms started within 2-3 hours of eating that  He also has bee allergy and had mild angioedema in August of last year after bee sting but did not require intubation    Patient is currently on multiple medications for asthma including Dulera 200/5, Singulair 10, Spiriva Respimat 1 25, albuterol MDI and nebulizer, Qvar, and also he is on chronic prednisone at 10 mg every other day for many years  In the past used to be on higher dose and was tapered as low as 2 5 mg but he had a severe asthma attack and since then he was not being able to be tapered more than 10 mg every other day  He follows with allergy as well  Patient has GERD and currently he states that his symptoms are controlled with PPI  He also has postnasal drips on Flonase  Patient has obstructive sleep apnea currently on CPAP 10 cm H2O and he is very compliant every night and he had this CPAP with the same settings for many years, more than 10 years, and he is concerned since he started to have more fatigue and more daytime sleepiness  He lives at home with his wife, he has no pets at home, he feels his asthma is worse at home sometimes, he smoked cigarettes in the past for about 7 years/2 packs per day, he quit in   No occupational exposure currently, he is on disability         Review of Systems:  Review of Systems   Constitutional: Negative  HENT: Negative  Eyes: Negative  Respiratory:        As HPI   Cardiovascular: Negative  Gastrointestinal: Negative  Endocrine: Negative  Genitourinary: Negative  Musculoskeletal: Negative  Skin: Negative  Allergic/Immunologic: Negative  Neurological: Negative  Hematological: Negative  Psychiatric/Behavioral: Negative            Past medical history, surgical history, and family history were reviewed and updated as appropriate    Social history updates:  Social History     Tobacco Use   Smoking Status Former Smoker    Last attempt to quit:     Years since quittin 2   Smokeless Tobacco Never Used       PhysicalExamination:  Vitals:   /60 (BP Location: Left arm, Patient Position: Sitting, Cuff Size: Standard)   Pulse 85   Temp 98 3 °F (36 8 °C) (Tympanic)   Resp 20   Ht 5' 8 5" (1 74 m)   Wt 95 3 kg (210 lb)   SpO2 96%   BMI 31 47 kg/m²      General: alert, not in acute distress  HEENT: PERRL, no icteric sclera or cyanosis, no thrush  Neck:  Supple, no lymphadenopathy or thyromegaly, no JVD  Lungs:  Equal breath sounds and clear auscultations bilaterally, no wheezing or crackles  Heart: S1S2 regular, no murmures or gallops  Abdomen: soft, non-tender, bowel sounds  present  Extrimities: no edema, no clubbing or cyanosis  Neuro: Alert and oriented x 3, no focal neurodeficits   Skin: intact, no rashes    Diagnostic Data:  Labs: I personally reviewed the most recent laboratory data pertinent to today's visit    Lab Results   Component Value Date    WBC 9 60 02/08/2019    HGB 12 7 (L) 02/08/2019    HCT 38 2 (L) 02/08/2019    MCV 87 02/08/2019     02/08/2019     Lab Results   Component Value Date    SODIUM 138 02/09/2019    K 4 0 02/09/2019    CO2 26 02/09/2019     02/09/2019    BUN 12 02/09/2019    CREATININE 0 79 02/09/2019    CALCIUM 9 0 02/09/2019     No results found for: IGE  Lab Results   Component Value Date    ALT 26 02/07/2019    AST 28 02/07/2019    ALKPHOS 65 02/07/2019    BILITOT 0 3 10/01/2014         Imaging:  I personally reviewed the images on the [facility] system pertinent to today's visit  Chest x-ray from 2/7/2019:  Clear lungs with endotracheal tube 6 cm above david that time    Chest CT scan report from [facility] [place] in 2015:  Lung fields demonstrate nodule in left upper lobe measuring 5 x 6 millimeters,unchanged from 2007  No additional pulmonary nodule  No pleural effusion or  suspicious pleural thickening        Kendal Lr MD

## 2019-03-18 RX ORDER — AMLODIPINE BESYLATE 2.5 MG/1
2.5 TABLET ORAL DAILY
Qty: 30 TABLET | Refills: 5 | Status: ON HOLD | OUTPATIENT
Start: 2019-03-18 | End: 2021-01-10

## 2019-03-19 ENCOUNTER — TELEPHONE (OUTPATIENT)
Dept: OTHER | Facility: HOSPITAL | Age: 54
End: 2019-03-19

## 2019-03-19 ENCOUNTER — TELEPHONE (OUTPATIENT)
Dept: PULMONOLOGY | Facility: CLINIC | Age: 54
End: 2019-03-19

## 2019-03-19 NOTE — TELEPHONE ENCOUNTER
Inge Silver calling saying he started using the Budesonide last night  After he uses it, he feels like his throat is getting tight,  He says it goes away in 3-4 minutes  He says this is how he felt when he passed out on 2/7 and how he felt after he got stung by a bee  He says he does not have any issues swallowing  He just feel like his "throat wants to shut down"  I advised patient to hold the Budesonide until he hears back from us  He has Atrovent and Albuterol to use in the mean time  Please advise

## 2019-03-19 NOTE — TELEPHONE ENCOUNTER
Telephone note- Pulmonary Medicine   Claudia De Paz 53 y o  male MRN: [id number]    Reason for call: Allergic reaction to budesonide      Pertinent details:    Doris Chu called and reported symptoms of an allergic reaction to Budesonide, brief throat tightness and SOB now resolved  I have instructed him to stop Budesonide and continue with his origical regimen of Brlww223/5, spiriva 1 25, QVAR, albuterol MDI and nebulizer PRN and singulair  He will continue and follow up with Dr Clifford Carmichael  He does report his breathing is generally improving and he is continuing his prednisone taper as instructed by Dr Clifford Carmichael    He denied further questions and verbalized understanding of the plan    He will call the office or present to the Ed with changes in chest tightness, SOB or wheezing    CARRIE Sher

## 2019-03-20 ENCOUNTER — TELEPHONE (OUTPATIENT)
Dept: SLEEP CENTER | Facility: CLINIC | Age: 54
End: 2019-03-20

## 2019-03-20 NOTE — TELEPHONE ENCOUNTER
----- Message from John Saeed DO sent at 3/18/2019  5:17 PM EDT -----  Chart reviewed   Study approved   ----- Message -----  From: Leah Collins  Sent: 3/18/2019   8:22 AM  To: Sleep Medicine [facility] Provider    Study for approval

## 2019-04-04 ENCOUNTER — HOSPITAL ENCOUNTER (OUTPATIENT)
Dept: RADIOLOGY | Facility: HOSPITAL | Age: 54
Discharge: HOME | End: 2019-04-04
Attending: PODIATRIST
Payer: COMMERCIAL

## 2019-04-04 ENCOUNTER — TRANSCRIBE ORDERS (OUTPATIENT)
Dept: ADMINISTRATIVE | Facility: HOSPITAL | Age: 54
End: 2019-04-04

## 2019-04-04 DIAGNOSIS — M79.671 RIGHT FOOT PAIN: ICD-10-CM

## 2019-04-04 DIAGNOSIS — M79.671 RIGHT FOOT PAIN: Primary | ICD-10-CM

## 2019-04-04 PROCEDURE — 73630 X-RAY EXAM OF FOOT: CPT

## 2019-04-08 ENCOUNTER — HOSPITAL ENCOUNTER (EMERGENCY)
Facility: HOSPITAL | Age: 54
Discharge: HOME | End: 2019-04-08
Attending: EMERGENCY MEDICINE | Admitting: EMERGENCY MEDICINE
Payer: COMMERCIAL

## 2019-04-08 ENCOUNTER — APPOINTMENT (EMERGENCY)
Dept: RADIOLOGY | Facility: HOSPITAL | Age: 54
End: 2019-04-08
Payer: COMMERCIAL

## 2019-04-08 VITALS
SYSTOLIC BLOOD PRESSURE: 144 MMHG | OXYGEN SATURATION: 98 % | RESPIRATION RATE: 20 BRPM | TEMPERATURE: 96.7 F | HEART RATE: 95 BPM | DIASTOLIC BLOOD PRESSURE: 90 MMHG

## 2019-04-08 DIAGNOSIS — J45.51 SEVERE PERSISTENT ASTHMA WITH EXACERBATION: Primary | ICD-10-CM

## 2019-04-08 LAB
ALBUMIN SERPL BCP-MCNC: 4.3 G/DL (ref 3–5.2)
ALP SERPL-CCNC: 57 U/L (ref 43–122)
ALT SERPL W P-5'-P-CCNC: 28 U/L (ref 9–52)
ANION GAP SERPL CALCULATED.3IONS-SCNC: 9 MMOL/L (ref 5–14)
AST SERPL W P-5'-P-CCNC: 30 U/L (ref 17–59)
BASOPHILS # BLD AUTO: 0.1 THOUSANDS/ΜL (ref 0–0.1)
BASOPHILS NFR BLD AUTO: 1 % (ref 0–1)
BILIRUB SERPL-MCNC: 0.3 MG/DL
BUN SERPL-MCNC: 17 MG/DL (ref 5–25)
CALCIUM SERPL-MCNC: 9.5 MG/DL (ref 8.4–10.2)
CHLORIDE SERPL-SCNC: 103 MMOL/L (ref 97–108)
CO2 SERPL-SCNC: 28 MMOL/L (ref 22–30)
CREAT SERPL-MCNC: 0.83 MG/DL (ref 0.7–1.5)
EOSINOPHIL # BLD AUTO: 0.2 THOUSAND/ΜL (ref 0–0.4)
EOSINOPHIL NFR BLD AUTO: 3 % (ref 0–6)
ERYTHROCYTE [DISTWIDTH] IN BLOOD BY AUTOMATED COUNT: 13.5 %
GFR SERPL CREATININE-BSD FRML MDRD: 99 ML/MIN/1.73SQ M
GLUCOSE SERPL-MCNC: 127 MG/DL (ref 70–99)
HCT VFR BLD AUTO: 43.9 % (ref 41–53)
HGB BLD-MCNC: 14.6 G/DL (ref 13.5–17.5)
LYMPHOCYTES # BLD AUTO: 1.7 THOUSANDS/ΜL (ref 0.5–4)
LYMPHOCYTES NFR BLD AUTO: 26 % (ref 25–45)
MCH RBC QN AUTO: 28.8 PG (ref 26–34)
MCHC RBC AUTO-ENTMCNC: 33.3 G/DL (ref 31–36)
MCV RBC AUTO: 87 FL (ref 80–100)
MONOCYTES # BLD AUTO: 0.6 THOUSAND/ΜL (ref 0.2–0.9)
MONOCYTES NFR BLD AUTO: 9 % (ref 1–10)
NEUTROPHILS # BLD AUTO: 4 THOUSANDS/ΜL (ref 1.8–7.8)
NEUTS SEG NFR BLD AUTO: 61 % (ref 45–65)
PLATELET # BLD AUTO: 234 THOUSANDS/UL (ref 150–450)
PMV BLD AUTO: 7.5 FL (ref 8.9–12.7)
POTASSIUM SERPL-SCNC: 3.5 MMOL/L (ref 3.6–5)
PROT SERPL-MCNC: 6.8 G/DL (ref 5.9–8.4)
RBC # BLD AUTO: 5.07 MILLION/UL (ref 4.5–5.9)
SODIUM SERPL-SCNC: 140 MMOL/L (ref 137–147)
WBC # BLD AUTO: 6.6 THOUSAND/UL (ref 4.5–11)

## 2019-04-08 PROCEDURE — 36415 COLL VENOUS BLD VENIPUNCTURE: CPT | Performed by: FAMILY MEDICINE

## 2019-04-08 PROCEDURE — 85025 COMPLETE CBC W/AUTO DIFF WBC: CPT | Performed by: FAMILY MEDICINE

## 2019-04-08 PROCEDURE — 80053 COMPREHEN METABOLIC PANEL: CPT | Performed by: FAMILY MEDICINE

## 2019-04-08 PROCEDURE — 99285 EMERGENCY DEPT VISIT HI MDM: CPT

## 2019-04-08 PROCEDURE — 96374 THER/PROPH/DIAG INJ IV PUSH: CPT

## 2019-04-08 PROCEDURE — 99284 EMERGENCY DEPT VISIT MOD MDM: CPT | Performed by: EMERGENCY MEDICINE

## 2019-04-08 PROCEDURE — 71046 X-RAY EXAM CHEST 2 VIEWS: CPT

## 2019-04-08 RX ORDER — POTASSIUM CHLORIDE 750 MG/1
40 TABLET, EXTENDED RELEASE ORAL ONCE
Status: COMPLETED | OUTPATIENT
Start: 2019-04-08 | End: 2019-04-08

## 2019-04-08 RX ORDER — METHYLPREDNISOLONE SODIUM SUCCINATE 125 MG/2ML
125 INJECTION, POWDER, LYOPHILIZED, FOR SOLUTION INTRAMUSCULAR; INTRAVENOUS ONCE
Status: COMPLETED | OUTPATIENT
Start: 2019-04-08 | End: 2019-04-08

## 2019-04-08 RX ORDER — IPRATROPIUM BROMIDE AND ALBUTEROL SULFATE 2.5; .5 MG/3ML; MG/3ML
3 SOLUTION RESPIRATORY (INHALATION)
Status: DISCONTINUED | OUTPATIENT
Start: 2019-04-08 | End: 2019-04-08 | Stop reason: HOSPADM

## 2019-04-08 RX ORDER — ALBUTEROL SULFATE 2.5 MG/3ML
SOLUTION RESPIRATORY (INHALATION)
Status: COMPLETED
Start: 2019-04-08 | End: 2019-04-08

## 2019-04-08 RX ORDER — PREDNISONE 20 MG/1
TABLET ORAL
Qty: 15 TABLET | Refills: 0 | Status: SHIPPED | OUTPATIENT
Start: 2019-04-08 | End: 2019-04-16

## 2019-04-08 RX ORDER — METHYLPREDNISOLONE SODIUM SUCCINATE 125 MG/2ML
125 INJECTION, POWDER, LYOPHILIZED, FOR SOLUTION INTRAMUSCULAR; INTRAVENOUS ONCE
Status: DISCONTINUED | OUTPATIENT
Start: 2019-04-08 | End: 2019-04-08

## 2019-04-08 RX ORDER — SODIUM CHLORIDE FOR INHALATION 0.9 %
3 VIAL, NEBULIZER (ML) INHALATION ONCE
Status: COMPLETED | OUTPATIENT
Start: 2019-04-08 | End: 2019-04-08

## 2019-04-08 RX ADMIN — POTASSIUM CHLORIDE 40 MEQ: 10 TABLET, EXTENDED RELEASE ORAL at 10:27

## 2019-04-08 RX ADMIN — ALBUTEROL SULFATE 5 MG: 2.5 SOLUTION RESPIRATORY (INHALATION) at 10:27

## 2019-04-08 RX ADMIN — IPRATROPIUM BROMIDE 0.5 MG: 0.5 SOLUTION RESPIRATORY (INHALATION) at 10:27

## 2019-04-08 RX ADMIN — METHYLPREDNISOLONE SODIUM SUCCINATE 125 MG: 125 INJECTION, POWDER, FOR SOLUTION INTRAMUSCULAR; INTRAVENOUS at 09:10

## 2019-04-08 RX ADMIN — ISODIUM CHLORIDE 3 ML: 0.03 SOLUTION RESPIRATORY (INHALATION) at 10:27

## 2019-04-08 RX ADMIN — ALBUTEROL SULFATE: 2.5 SOLUTION RESPIRATORY (INHALATION) at 08:29

## 2019-04-08 RX ADMIN — IPRATROPIUM BROMIDE AND ALBUTEROL SULFATE 3 ML: 2.5; .5 SOLUTION RESPIRATORY (INHALATION) at 09:07

## 2019-04-28 ENCOUNTER — HOSPITAL ENCOUNTER (EMERGENCY)
Facility: HOSPITAL | Age: 54
Discharge: HOME | End: 2019-04-28
Attending: EMERGENCY MEDICINE | Admitting: EMERGENCY MEDICINE
Payer: COMMERCIAL

## 2019-04-28 VITALS
SYSTOLIC BLOOD PRESSURE: 156 MMHG | RESPIRATION RATE: 18 BRPM | HEART RATE: 93 BPM | WEIGHT: 210 LBS | TEMPERATURE: 97.9 F | BODY MASS INDEX: 31.47 KG/M2 | DIASTOLIC BLOOD PRESSURE: 93 MMHG | OXYGEN SATURATION: 95 %

## 2019-04-28 DIAGNOSIS — T78.40XA ALLERGIC REACTION, INITIAL ENCOUNTER: Primary | ICD-10-CM

## 2019-04-28 PROCEDURE — 99283 EMERGENCY DEPT VISIT LOW MDM: CPT

## 2019-04-28 PROCEDURE — 94640 AIRWAY INHALATION TREATMENT: CPT

## 2019-04-28 PROCEDURE — 99284 EMERGENCY DEPT VISIT MOD MDM: CPT | Performed by: PHYSICIAN ASSISTANT

## 2019-04-28 RX ORDER — DIPHENHYDRAMINE HCL 25 MG
25 TABLET ORAL EVERY 6 HOURS
Qty: 20 TABLET | Refills: 0 | Status: SHIPPED | OUTPATIENT
Start: 2019-04-28 | End: 2019-07-28

## 2019-04-28 RX ORDER — FAMOTIDINE 20 MG/1
20 TABLET, FILM COATED ORAL 2 TIMES DAILY
Qty: 30 TABLET | Refills: 0 | Status: SHIPPED | OUTPATIENT
Start: 2019-04-28 | End: 2019-10-11

## 2019-04-28 RX ORDER — DIPHENHYDRAMINE HCL 25 MG
25 TABLET ORAL ONCE
Status: COMPLETED | OUTPATIENT
Start: 2019-04-28 | End: 2019-04-28

## 2019-04-28 RX ORDER — FLUTICASONE PROPIONATE 50 MCG
1 SPRAY, SUSPENSION (ML) NASAL DAILY
Qty: 16 G | Refills: 0 | Status: SHIPPED | OUTPATIENT
Start: 2019-04-28 | End: 2019-09-23 | Stop reason: SDUPTHER

## 2019-04-28 RX ORDER — FAMOTIDINE 20 MG/1
40 TABLET, FILM COATED ORAL ONCE
Status: COMPLETED | OUTPATIENT
Start: 2019-04-28 | End: 2019-04-28

## 2019-04-28 RX ORDER — IPRATROPIUM BROMIDE AND ALBUTEROL SULFATE 2.5; .5 MG/3ML; MG/3ML
3 SOLUTION RESPIRATORY (INHALATION) ONCE
Status: COMPLETED | OUTPATIENT
Start: 2019-04-28 | End: 2019-04-28

## 2019-04-28 RX ORDER — PREDNISONE 20 MG/1
20 TABLET ORAL 3 TIMES DAILY
Qty: 12 TABLET | Refills: 0 | Status: SHIPPED | OUTPATIENT
Start: 2019-04-28 | End: 2019-05-03 | Stop reason: HOSPADM

## 2019-04-28 RX ADMIN — FAMOTIDINE 40 MG: 20 TABLET ORAL at 21:01

## 2019-04-28 RX ADMIN — DIPHENHYDRAMINE HCL 25 MG: 25 TABLET ORAL at 21:01

## 2019-04-28 RX ADMIN — IPRATROPIUM BROMIDE AND ALBUTEROL SULFATE 3 ML: 2.5; .5 SOLUTION RESPIRATORY (INHALATION) at 21:02

## 2019-04-29 ENCOUNTER — HOSPITAL ENCOUNTER (EMERGENCY)
Facility: HOSPITAL | Age: 54
Discharge: HOME | End: 2019-04-29
Attending: EMERGENCY MEDICINE | Admitting: EMERGENCY MEDICINE
Payer: COMMERCIAL

## 2019-04-29 ENCOUNTER — APPOINTMENT (EMERGENCY)
Dept: CT IMAGING | Facility: HOSPITAL | Age: 54
End: 2019-04-29
Payer: COMMERCIAL

## 2019-04-29 VITALS
SYSTOLIC BLOOD PRESSURE: 160 MMHG | WEIGHT: 210.1 LBS | TEMPERATURE: 97.5 F | RESPIRATION RATE: 20 BRPM | DIASTOLIC BLOOD PRESSURE: 86 MMHG | HEART RATE: 79 BPM | OXYGEN SATURATION: 97 % | BODY MASS INDEX: 31.48 KG/M2

## 2019-04-29 DIAGNOSIS — J45.41 MODERATE PERSISTENT ASTHMA WITH EXACERBATION: Primary | ICD-10-CM

## 2019-04-29 DIAGNOSIS — R91.1 PULMONARY NODULE: ICD-10-CM

## 2019-04-29 LAB
ANION GAP SERPL CALCULATED.3IONS-SCNC: 13 MMOL/L (ref 5–14)
BASOPHILS # BLD AUTO: 0 THOUSANDS/ΜL (ref 0–0.1)
BASOPHILS NFR BLD AUTO: 0 % (ref 0–1)
BUN SERPL-MCNC: 19 MG/DL (ref 5–25)
CALCIUM SERPL-MCNC: 9.5 MG/DL (ref 8.4–10.2)
CHLORIDE SERPL-SCNC: 100 MMOL/L (ref 97–108)
CO2 SERPL-SCNC: 23 MMOL/L (ref 22–30)
CREAT SERPL-MCNC: 0.98 MG/DL (ref 0.7–1.5)
EOSINOPHIL # BLD AUTO: 0 THOUSAND/ΜL (ref 0–0.4)
EOSINOPHIL NFR BLD AUTO: 0 % (ref 0–6)
ERYTHROCYTE [DISTWIDTH] IN BLOOD BY AUTOMATED COUNT: 13.3 %
GFR SERPL CREATININE-BSD FRML MDRD: 86 ML/MIN/1.73SQ M
GLUCOSE SERPL-MCNC: 310 MG/DL (ref 70–99)
HCT VFR BLD AUTO: 41.8 % (ref 41–53)
HGB BLD-MCNC: 14.1 G/DL (ref 13.5–17.5)
LYMPHOCYTES # BLD AUTO: 0.7 THOUSANDS/ΜL (ref 0.5–4)
LYMPHOCYTES NFR BLD AUTO: 6 % (ref 25–45)
MCH RBC QN AUTO: 29 PG (ref 26–34)
MCHC RBC AUTO-ENTMCNC: 33.6 G/DL (ref 31–36)
MCV RBC AUTO: 86 FL (ref 80–100)
MONOCYTES # BLD AUTO: 0.7 THOUSAND/ΜL (ref 0.2–0.9)
MONOCYTES NFR BLD AUTO: 6 % (ref 1–10)
NEUTROPHILS # BLD AUTO: 10.5 THOUSANDS/ΜL (ref 1.8–7.8)
NEUTS SEG NFR BLD AUTO: 88 % (ref 45–65)
PLATELET # BLD AUTO: 236 THOUSANDS/UL (ref 150–450)
PMV BLD AUTO: 7.7 FL (ref 8.9–12.7)
POTASSIUM SERPL-SCNC: 3.8 MMOL/L (ref 3.6–5)
RBC # BLD AUTO: 4.84 MILLION/UL (ref 4.5–5.9)
SODIUM SERPL-SCNC: 136 MMOL/L (ref 137–147)
TROPONIN I SERPL-MCNC: <0.01 NG/ML (ref 0–0.03)
WBC # BLD AUTO: 12 THOUSAND/UL (ref 4.5–11)

## 2019-04-29 PROCEDURE — 99284 EMERGENCY DEPT VISIT MOD MDM: CPT | Performed by: EMERGENCY MEDICINE

## 2019-04-29 PROCEDURE — 93005 ELECTROCARDIOGRAM TRACING: CPT

## 2019-04-29 PROCEDURE — 71275 CT ANGIOGRAPHY CHEST: CPT

## 2019-04-29 PROCEDURE — 84484 ASSAY OF TROPONIN QUANT: CPT | Performed by: EMERGENCY MEDICINE

## 2019-04-29 PROCEDURE — 96361 HYDRATE IV INFUSION ADD-ON: CPT

## 2019-04-29 PROCEDURE — 96360 HYDRATION IV INFUSION INIT: CPT

## 2019-04-29 PROCEDURE — 36415 COLL VENOUS BLD VENIPUNCTURE: CPT | Performed by: EMERGENCY MEDICINE

## 2019-04-29 PROCEDURE — 80048 BASIC METABOLIC PNL TOTAL CA: CPT | Performed by: EMERGENCY MEDICINE

## 2019-04-29 PROCEDURE — 85025 COMPLETE CBC W/AUTO DIFF WBC: CPT | Performed by: EMERGENCY MEDICINE

## 2019-04-29 PROCEDURE — 99285 EMERGENCY DEPT VISIT HI MDM: CPT

## 2019-04-29 PROCEDURE — 94640 AIRWAY INHALATION TREATMENT: CPT

## 2019-04-29 RX ORDER — METHYLPREDNISOLONE SODIUM SUCCINATE 125 MG/2ML
INJECTION, POWDER, LYOPHILIZED, FOR SOLUTION INTRAMUSCULAR; INTRAVENOUS
Status: COMPLETED
Start: 2019-04-29 | End: 2019-04-29

## 2019-04-29 RX ORDER — SODIUM CHLORIDE FOR INHALATION 0.9 %
12 VIAL, NEBULIZER (ML) INHALATION ONCE
Status: COMPLETED | OUTPATIENT
Start: 2019-04-29 | End: 2019-04-29

## 2019-04-29 RX ORDER — IPRATROPIUM BROMIDE AND ALBUTEROL SULFATE 2.5; .5 MG/3ML; MG/3ML
SOLUTION RESPIRATORY (INHALATION)
Status: COMPLETED
Start: 2019-04-29 | End: 2019-04-29

## 2019-04-29 RX ADMIN — ALBUTEROL SULFATE 10 MG: 2.5 SOLUTION RESPIRATORY (INHALATION) at 22:04

## 2019-04-29 RX ADMIN — ISODIUM CHLORIDE 12 ML: 0.03 SOLUTION RESPIRATORY (INHALATION) at 22:05

## 2019-04-29 RX ADMIN — IOHEXOL 85 ML: 350 INJECTION, SOLUTION INTRAVENOUS at 21:09

## 2019-04-29 RX ADMIN — SODIUM CHLORIDE 1000 ML: 9 INJECTION, SOLUTION INTRAVENOUS at 20:09

## 2019-04-29 RX ADMIN — IPRATROPIUM BROMIDE 1 MG: 0.5 SOLUTION RESPIRATORY (INHALATION) at 22:04

## 2019-04-30 LAB
ATRIAL RATE: 104 BPM
P AXIS: 60 DEGREES
PR INTERVAL: 154 MS
QRS AXIS: -29 DEGREES
QRSD INTERVAL: 86 MS
QT INTERVAL: 326 MS
QTC INTERVAL: 428 MS
T WAVE AXIS: 22 DEGREES
VENTRICULAR RATE: 104 BPM

## 2019-04-30 PROCEDURE — 93010 ELECTROCARDIOGRAM REPORT: CPT | Performed by: INTERNAL MEDICINE

## 2019-05-01 ENCOUNTER — HOSPITAL ENCOUNTER (OUTPATIENT)
Facility: HOSPITAL | Age: 54
Setting detail: OBSERVATION
Discharge: HOME | End: 2019-05-03
Attending: EMERGENCY MEDICINE | Admitting: INTERNAL MEDICINE
Payer: COMMERCIAL

## 2019-05-01 ENCOUNTER — APPOINTMENT (EMERGENCY)
Dept: RADIOLOGY | Facility: HOSPITAL | Age: 54
End: 2019-05-01
Payer: COMMERCIAL

## 2019-05-01 DIAGNOSIS — J45.51 SEVERE PERSISTENT ASTHMA WITH ACUTE EXACERBATION: ICD-10-CM

## 2019-05-01 DIAGNOSIS — J20.9 ACUTE BRONCHITIS: ICD-10-CM

## 2019-05-01 DIAGNOSIS — J45.51 SEVERE PERSISTENT ASTHMA WITH EXACERBATION: Primary | ICD-10-CM

## 2019-05-01 LAB
ANION GAP SERPL CALCULATED.3IONS-SCNC: 7 MMOL/L (ref 5–14)
BASOPHILS # BLD AUTO: 0 THOUSANDS/ΜL (ref 0–0.1)
BASOPHILS NFR BLD AUTO: 1 % (ref 0–1)
BUN SERPL-MCNC: 16 MG/DL (ref 5–25)
CALCIUM SERPL-MCNC: 9.2 MG/DL (ref 8.4–10.2)
CHLORIDE SERPL-SCNC: 104 MMOL/L (ref 97–108)
CO2 SERPL-SCNC: 25 MMOL/L (ref 22–30)
CREAT SERPL-MCNC: 0.78 MG/DL (ref 0.7–1.5)
EOSINOPHIL # BLD AUTO: 0.3 THOUSAND/ΜL (ref 0–0.4)
EOSINOPHIL NFR BLD AUTO: 4 % (ref 0–6)
ERYTHROCYTE [DISTWIDTH] IN BLOOD BY AUTOMATED COUNT: 13.5 %
GFR SERPL CREATININE-BSD FRML MDRD: 102 ML/MIN/1.73SQ M
GLUCOSE SERPL-MCNC: 153 MG/DL (ref 70–99)
GLUCOSE SERPL-MCNC: 281 MG/DL (ref 65–140)
HCT VFR BLD AUTO: 41.3 % (ref 41–53)
HGB BLD-MCNC: 14.2 G/DL (ref 13.5–17.5)
LYMPHOCYTES # BLD AUTO: 1.9 THOUSANDS/ΜL (ref 0.5–4)
LYMPHOCYTES NFR BLD AUTO: 28 % (ref 25–45)
MCH RBC QN AUTO: 29.6 PG (ref 26–34)
MCHC RBC AUTO-ENTMCNC: 34.3 G/DL (ref 31–36)
MCV RBC AUTO: 86 FL (ref 80–100)
MONOCYTES # BLD AUTO: 0.7 THOUSAND/ΜL (ref 0.2–0.9)
MONOCYTES NFR BLD AUTO: 10 % (ref 1–10)
NEUTROPHILS # BLD AUTO: 3.8 THOUSANDS/ΜL (ref 1.8–7.8)
NEUTS SEG NFR BLD AUTO: 57 % (ref 45–65)
PLATELET # BLD AUTO: 198 THOUSANDS/UL (ref 150–450)
PLATELET # BLD AUTO: 218 THOUSANDS/UL (ref 150–450)
PMV BLD AUTO: 7.4 FL (ref 8.9–12.7)
POTASSIUM SERPL-SCNC: 4.4 MMOL/L (ref 3.6–5)
RBC # BLD AUTO: 4.79 MILLION/UL (ref 4.5–5.9)
SODIUM SERPL-SCNC: 136 MMOL/L (ref 137–147)
TROPONIN I SERPL-MCNC: <0.01 NG/ML (ref 0–0.03)
WBC # BLD AUTO: 6.7 THOUSAND/UL (ref 4.5–11)

## 2019-05-01 PROCEDURE — 94150 VITAL CAPACITY TEST: CPT

## 2019-05-01 PROCEDURE — 94664 DEMO&/EVAL PT USE INHALER: CPT

## 2019-05-01 PROCEDURE — 85049 AUTOMATED PLATELET COUNT: CPT | Performed by: INTERNAL MEDICINE

## 2019-05-01 PROCEDURE — 93005 ELECTROCARDIOGRAM TRACING: CPT

## 2019-05-01 PROCEDURE — 36415 COLL VENOUS BLD VENIPUNCTURE: CPT | Performed by: EMERGENCY MEDICINE

## 2019-05-01 PROCEDURE — 71045 X-RAY EXAM CHEST 1 VIEW: CPT

## 2019-05-01 PROCEDURE — 84484 ASSAY OF TROPONIN QUANT: CPT | Performed by: EMERGENCY MEDICINE

## 2019-05-01 PROCEDURE — 94640 AIRWAY INHALATION TREATMENT: CPT

## 2019-05-01 PROCEDURE — 99284 EMERGENCY DEPT VISIT MOD MDM: CPT | Performed by: EMERGENCY MEDICINE

## 2019-05-01 PROCEDURE — 99219 PR INITIAL OBSERVATION CARE/DAY 50 MINUTES: CPT | Performed by: INTERNAL MEDICINE

## 2019-05-01 PROCEDURE — 99285 EMERGENCY DEPT VISIT HI MDM: CPT

## 2019-05-01 PROCEDURE — 80048 BASIC METABOLIC PNL TOTAL CA: CPT | Performed by: EMERGENCY MEDICINE

## 2019-05-01 PROCEDURE — 85025 COMPLETE CBC W/AUTO DIFF WBC: CPT | Performed by: EMERGENCY MEDICINE

## 2019-05-01 PROCEDURE — 96365 THER/PROPH/DIAG IV INF INIT: CPT

## 2019-05-01 PROCEDURE — 94760 N-INVAS EAR/PLS OXIMETRY 1: CPT

## 2019-05-01 PROCEDURE — 96375 TX/PRO/DX INJ NEW DRUG ADDON: CPT

## 2019-05-01 PROCEDURE — 82948 REAGENT STRIP/BLOOD GLUCOSE: CPT

## 2019-05-01 RX ORDER — IPRATROPIUM BROMIDE AND ALBUTEROL SULFATE 2.5; .5 MG/3ML; MG/3ML
3 SOLUTION RESPIRATORY (INHALATION) EVERY 4 HOURS PRN
Status: DISCONTINUED | OUTPATIENT
Start: 2019-05-01 | End: 2019-05-03 | Stop reason: HOSPADM

## 2019-05-01 RX ORDER — FLUTICASONE FUROATE AND VILANTEROL 100; 25 UG/1; UG/1
1 POWDER RESPIRATORY (INHALATION)
Status: DISCONTINUED | OUTPATIENT
Start: 2019-05-02 | End: 2019-05-03 | Stop reason: HOSPADM

## 2019-05-01 RX ORDER — SODIUM CHLORIDE FOR INHALATION 0.9 %
3 VIAL, NEBULIZER (ML) INHALATION ONCE
Status: COMPLETED | OUTPATIENT
Start: 2019-05-01 | End: 2019-05-01

## 2019-05-01 RX ORDER — CARBAMAZEPINE 200 MG/1
200 TABLET ORAL 3 TIMES DAILY
Status: DISCONTINUED | OUTPATIENT
Start: 2019-05-01 | End: 2019-05-01

## 2019-05-01 RX ORDER — FINASTERIDE 5 MG/1
5 TABLET, FILM COATED ORAL DAILY
Status: DISCONTINUED | OUTPATIENT
Start: 2019-05-02 | End: 2019-05-03 | Stop reason: HOSPADM

## 2019-05-01 RX ORDER — LEVALBUTEROL INHALATION SOLUTION 0.63 MG/3ML
0.63 SOLUTION RESPIRATORY (INHALATION) EVERY 8 HOURS PRN
Status: DISCONTINUED | OUTPATIENT
Start: 2019-05-01 | End: 2019-05-03 | Stop reason: HOSPADM

## 2019-05-01 RX ORDER — FAMOTIDINE 20 MG/1
20 TABLET, FILM COATED ORAL 2 TIMES DAILY
Status: DISCONTINUED | OUTPATIENT
Start: 2019-05-01 | End: 2019-05-03 | Stop reason: HOSPADM

## 2019-05-01 RX ORDER — MAGNESIUM SULFATE HEPTAHYDRATE 40 MG/ML
2 INJECTION, SOLUTION INTRAVENOUS ONCE
Status: COMPLETED | OUTPATIENT
Start: 2019-05-01 | End: 2019-05-01

## 2019-05-01 RX ORDER — FLUTICASONE PROPIONATE 50 MCG
1 SPRAY, SUSPENSION (ML) NASAL DAILY
Status: DISCONTINUED | OUTPATIENT
Start: 2019-05-02 | End: 2019-05-03 | Stop reason: HOSPADM

## 2019-05-01 RX ORDER — METHYLPREDNISOLONE SODIUM SUCCINATE 40 MG/ML
40 INJECTION, POWDER, LYOPHILIZED, FOR SOLUTION INTRAMUSCULAR; INTRAVENOUS EVERY 8 HOURS SCHEDULED
Status: DISCONTINUED | OUTPATIENT
Start: 2019-05-01 | End: 2019-05-03 | Stop reason: HOSPADM

## 2019-05-01 RX ORDER — TAMSULOSIN HYDROCHLORIDE 0.4 MG/1
0.4 CAPSULE ORAL
Status: DISCONTINUED | OUTPATIENT
Start: 2019-05-01 | End: 2019-05-03 | Stop reason: HOSPADM

## 2019-05-01 RX ORDER — CLOTRIMAZOLE 1 %
CREAM (GRAM) TOPICAL 2 TIMES DAILY
Status: DISCONTINUED | OUTPATIENT
Start: 2019-05-01 | End: 2019-05-03 | Stop reason: HOSPADM

## 2019-05-01 RX ORDER — FLUTICASONE PROPIONATE 50 MCG
1 SPRAY, SUSPENSION (ML) NASAL DAILY
Status: DISCONTINUED | OUTPATIENT
Start: 2019-05-02 | End: 2019-05-01 | Stop reason: SDUPTHER

## 2019-05-01 RX ORDER — PREDNISONE 10 MG/1
10 TABLET ORAL EVERY OTHER DAY
Status: DISCONTINUED | OUTPATIENT
Start: 2019-05-01 | End: 2019-05-01

## 2019-05-01 RX ORDER — POLYVINYL ALCOHOL 14 MG/ML
2 SOLUTION/ DROPS OPHTHALMIC AS NEEDED
Status: DISCONTINUED | OUTPATIENT
Start: 2019-05-01 | End: 2019-05-03 | Stop reason: HOSPADM

## 2019-05-01 RX ORDER — LANOLIN ALCOHOL/MO/W.PET/CERES
3 CREAM (GRAM) TOPICAL
Status: DISCONTINUED | OUTPATIENT
Start: 2019-05-01 | End: 2019-05-03 | Stop reason: HOSPADM

## 2019-05-01 RX ORDER — FLUTICASONE PROPIONATE 110 UG/1
1 AEROSOL, METERED RESPIRATORY (INHALATION) EVERY 12 HOURS SCHEDULED
Status: DISCONTINUED | OUTPATIENT
Start: 2019-05-01 | End: 2019-05-01 | Stop reason: SDUPTHER

## 2019-05-01 RX ORDER — MONTELUKAST SODIUM 10 MG/1
10 TABLET ORAL DAILY
Status: DISCONTINUED | OUTPATIENT
Start: 2019-05-02 | End: 2019-05-03 | Stop reason: HOSPADM

## 2019-05-01 RX ORDER — CARBAMAZEPINE 200 MG/1
200 TABLET, EXTENDED RELEASE ORAL 2 TIMES DAILY
Status: DISCONTINUED | OUTPATIENT
Start: 2019-05-01 | End: 2019-05-03 | Stop reason: HOSPADM

## 2019-05-01 RX ORDER — AMLODIPINE BESYLATE 2.5 MG/1
2.5 TABLET ORAL DAILY
Status: DISCONTINUED | OUTPATIENT
Start: 2019-05-02 | End: 2019-05-03 | Stop reason: HOSPADM

## 2019-05-01 RX ORDER — ATORVASTATIN CALCIUM 80 MG/1
80 TABLET, FILM COATED ORAL
Status: DISCONTINUED | OUTPATIENT
Start: 2019-05-01 | End: 2019-05-03 | Stop reason: HOSPADM

## 2019-05-01 RX ORDER — AZITHROMYCIN 250 MG/1
500 TABLET, FILM COATED ORAL EVERY 24 HOURS
Status: DISCONTINUED | OUTPATIENT
Start: 2019-05-01 | End: 2019-05-03 | Stop reason: HOSPADM

## 2019-05-01 RX ORDER — CALCIUM CARBONATE 500(1250)
1 TABLET ORAL
Status: DISCONTINUED | OUTPATIENT
Start: 2019-05-02 | End: 2019-05-03 | Stop reason: HOSPADM

## 2019-05-01 RX ORDER — METHYLPREDNISOLONE SODIUM SUCCINATE 125 MG/2ML
125 INJECTION, POWDER, LYOPHILIZED, FOR SOLUTION INTRAMUSCULAR; INTRAVENOUS ONCE
Status: COMPLETED | OUTPATIENT
Start: 2019-05-01 | End: 2019-05-01

## 2019-05-01 RX ORDER — DIPHENHYDRAMINE HCL 25 MG
25 TABLET ORAL EVERY 6 HOURS
Status: DISCONTINUED | OUTPATIENT
Start: 2019-05-01 | End: 2019-05-03 | Stop reason: HOSPADM

## 2019-05-01 RX ORDER — CHLORAL HYDRATE 500 MG
1000 CAPSULE ORAL DAILY
Status: DISCONTINUED | OUTPATIENT
Start: 2019-05-02 | End: 2019-05-03 | Stop reason: HOSPADM

## 2019-05-01 RX ORDER — ALBUTEROL SULFATE 90 UG/1
2 AEROSOL, METERED RESPIRATORY (INHALATION) EVERY 4 HOURS PRN
Status: DISCONTINUED | OUTPATIENT
Start: 2019-05-01 | End: 2019-05-03 | Stop reason: HOSPADM

## 2019-05-01 RX ORDER — PREDNISONE 20 MG/1
20 TABLET ORAL 3 TIMES DAILY
Status: DISCONTINUED | OUTPATIENT
Start: 2019-05-01 | End: 2019-05-01

## 2019-05-01 RX ADMIN — IPRATROPIUM BROMIDE 1 MG: 0.5 SOLUTION RESPIRATORY (INHALATION) at 11:59

## 2019-05-01 RX ADMIN — METHYLPREDNISOLONE SODIUM SUCCINATE 125 MG: 125 INJECTION, POWDER, FOR SOLUTION INTRAMUSCULAR; INTRAVENOUS at 13:53

## 2019-05-01 RX ADMIN — INSULIN LISPRO 14 UNITS: 100 INJECTION, SOLUTION INTRAVENOUS; SUBCUTANEOUS at 18:17

## 2019-05-01 RX ADMIN — ALBUTEROL SULFATE 10 MG: 2.5 SOLUTION RESPIRATORY (INHALATION) at 11:59

## 2019-05-01 RX ADMIN — FAMOTIDINE 20 MG: 20 TABLET ORAL at 18:16

## 2019-05-01 RX ADMIN — ATORVASTATIN CALCIUM 80 MG: 80 TABLET, FILM COATED ORAL at 21:19

## 2019-05-01 RX ADMIN — ISODIUM CHLORIDE 3 ML: 0.03 SOLUTION RESPIRATORY (INHALATION) at 11:59

## 2019-05-01 RX ADMIN — MAGNESIUM SULFATE IN WATER 2 G: 40 INJECTION, SOLUTION INTRAVENOUS at 12:05

## 2019-05-01 RX ADMIN — CARBAMAZEPINE 200 MG: 200 TABLET, EXTENDED RELEASE ORAL at 18:16

## 2019-05-01 RX ADMIN — MELATONIN TAB 3 MG 3 MG: 3 TAB at 21:19

## 2019-05-01 RX ADMIN — CLOTRIMAZOLE: 10 CREAM TOPICAL at 18:18

## 2019-05-01 RX ADMIN — DIPHENHYDRAMINE HCL 25 MG: 25 TABLET ORAL at 23:25

## 2019-05-01 RX ADMIN — LEVALBUTEROL HYDROCHLORIDE 0.63 MG: 0.63 SOLUTION RESPIRATORY (INHALATION) at 17:50

## 2019-05-01 RX ADMIN — TAMSULOSIN HYDROCHLORIDE 0.4 MG: 0.4 CAPSULE ORAL at 21:19

## 2019-05-01 RX ADMIN — DIPHENHYDRAMINE HCL 25 MG: 25 TABLET ORAL at 18:16

## 2019-05-01 RX ADMIN — METHYLPREDNISOLONE SODIUM SUCCINATE 40 MG: 40 INJECTION, POWDER, FOR SOLUTION INTRAMUSCULAR; INTRAVENOUS at 21:19

## 2019-05-01 RX ADMIN — METHYLPREDNISOLONE SODIUM SUCCINATE 40 MG: 40 INJECTION, POWDER, FOR SOLUTION INTRAMUSCULAR; INTRAVENOUS at 18:15

## 2019-05-01 RX ADMIN — RACEPINEPHRINE HYDROCHLORIDE 0.5 ML: 11.25 SOLUTION RESPIRATORY (INHALATION) at 17:50

## 2019-05-01 RX ADMIN — AZITHROMYCIN 500 MG: 250 TABLET, FILM COATED ORAL at 18:20

## 2019-05-02 LAB
ANION GAP SERPL CALCULATED.3IONS-SCNC: 10 MMOL/L (ref 5–14)
ATRIAL RATE: 103 BPM
BASOPHILS # BLD AUTO: 0 THOUSANDS/ΜL (ref 0–0.1)
BASOPHILS NFR BLD AUTO: 0 % (ref 0–1)
BUN SERPL-MCNC: 15 MG/DL (ref 5–25)
CALCIUM SERPL-MCNC: 9.5 MG/DL (ref 8.4–10.2)
CHLORIDE SERPL-SCNC: 99 MMOL/L (ref 97–108)
CO2 SERPL-SCNC: 28 MMOL/L (ref 22–30)
CREAT SERPL-MCNC: 0.79 MG/DL (ref 0.7–1.5)
EOSINOPHIL # BLD AUTO: 0 THOUSAND/ΜL (ref 0–0.4)
EOSINOPHIL NFR BLD AUTO: 0 % (ref 0–6)
ERYTHROCYTE [DISTWIDTH] IN BLOOD BY AUTOMATED COUNT: 13.4 %
GFR SERPL CREATININE-BSD FRML MDRD: 101 ML/MIN/1.73SQ M
GLUCOSE SERPL-MCNC: 169 MG/DL (ref 65–140)
GLUCOSE SERPL-MCNC: 198 MG/DL (ref 65–140)
GLUCOSE SERPL-MCNC: 240 MG/DL (ref 70–99)
GLUCOSE SERPL-MCNC: 245 MG/DL (ref 65–140)
GLUCOSE SERPL-MCNC: 255 MG/DL (ref 65–140)
HCT VFR BLD AUTO: 44.1 % (ref 41–53)
HGB BLD-MCNC: 14.7 G/DL (ref 13.5–17.5)
LYMPHOCYTES # BLD AUTO: 0.6 THOUSANDS/ΜL (ref 0.5–4)
LYMPHOCYTES NFR BLD AUTO: 6 % (ref 25–45)
MCH RBC QN AUTO: 29.3 PG (ref 26–34)
MCHC RBC AUTO-ENTMCNC: 33.5 G/DL (ref 31–36)
MCV RBC AUTO: 88 FL (ref 80–100)
MONOCYTES # BLD AUTO: 0.4 THOUSAND/ΜL (ref 0.2–0.9)
MONOCYTES NFR BLD AUTO: 4 % (ref 1–10)
NEUTROPHILS # BLD AUTO: 9.8 THOUSANDS/ΜL (ref 1.8–7.8)
NEUTS SEG NFR BLD AUTO: 90 % (ref 45–65)
P AXIS: 68 DEGREES
PLATELET # BLD AUTO: 251 THOUSANDS/UL (ref 150–450)
PMV BLD AUTO: 7.8 FL (ref 8.9–12.7)
POTASSIUM SERPL-SCNC: 4.4 MMOL/L (ref 3.6–5)
PR INTERVAL: 146 MS
QRS AXIS: -60 DEGREES
QRSD INTERVAL: 80 MS
QT INTERVAL: 330 MS
QTC INTERVAL: 432 MS
RBC # BLD AUTO: 5.02 MILLION/UL (ref 4.5–5.9)
SODIUM SERPL-SCNC: 137 MMOL/L (ref 137–147)
T WAVE AXIS: 32 DEGREES
VENTRICULAR RATE: 103 BPM
WBC # BLD AUTO: 10.8 THOUSAND/UL (ref 4.5–11)

## 2019-05-02 PROCEDURE — 85025 COMPLETE CBC W/AUTO DIFF WBC: CPT | Performed by: INTERNAL MEDICINE

## 2019-05-02 PROCEDURE — 99225 PR SBSQ OBSERVATION CARE/DAY 25 MINUTES: CPT | Performed by: INTERNAL MEDICINE

## 2019-05-02 PROCEDURE — 80048 BASIC METABOLIC PNL TOTAL CA: CPT | Performed by: INTERNAL MEDICINE

## 2019-05-02 PROCEDURE — 94760 N-INVAS EAR/PLS OXIMETRY 1: CPT

## 2019-05-02 PROCEDURE — 93010 ELECTROCARDIOGRAM REPORT: CPT | Performed by: INTERNAL MEDICINE

## 2019-05-02 PROCEDURE — 94150 VITAL CAPACITY TEST: CPT

## 2019-05-02 PROCEDURE — 82948 REAGENT STRIP/BLOOD GLUCOSE: CPT

## 2019-05-02 PROCEDURE — 94640 AIRWAY INHALATION TREATMENT: CPT

## 2019-05-02 RX ORDER — GUAIFENESIN 600 MG
600 TABLET, EXTENDED RELEASE 12 HR ORAL EVERY 12 HOURS SCHEDULED
Status: DISCONTINUED | OUTPATIENT
Start: 2019-05-02 | End: 2019-05-03 | Stop reason: HOSPADM

## 2019-05-02 RX ADMIN — ENOXAPARIN SODIUM 40 MG: 40 INJECTION SUBCUTANEOUS at 08:14

## 2019-05-02 RX ADMIN — ATORVASTATIN CALCIUM 80 MG: 80 TABLET, FILM COATED ORAL at 22:09

## 2019-05-02 RX ADMIN — METHYLPREDNISOLONE SODIUM SUCCINATE 40 MG: 40 INJECTION, POWDER, FOR SOLUTION INTRAMUSCULAR; INTRAVENOUS at 13:02

## 2019-05-02 RX ADMIN — MONTELUKAST SODIUM 10 MG: 10 TABLET, FILM COATED ORAL at 08:15

## 2019-05-02 RX ADMIN — CLOTRIMAZOLE: 10 CREAM TOPICAL at 17:38

## 2019-05-02 RX ADMIN — IPRATROPIUM BROMIDE AND ALBUTEROL SULFATE 3 ML: 2.5; .5 SOLUTION RESPIRATORY (INHALATION) at 05:29

## 2019-05-02 RX ADMIN — FLUTICASONE FUROATE AND VILANTEROL TRIFENATATE 1 PUFF: 100; 25 POWDER RESPIRATORY (INHALATION) at 08:14

## 2019-05-02 RX ADMIN — FLUTICASONE PROPIONATE 1 SPRAY: 50 SPRAY, METERED NASAL at 08:14

## 2019-05-02 RX ADMIN — METHYLPREDNISOLONE SODIUM SUCCINATE 40 MG: 40 INJECTION, POWDER, FOR SOLUTION INTRAMUSCULAR; INTRAVENOUS at 05:23

## 2019-05-02 RX ADMIN — DIPHENHYDRAMINE HCL 25 MG: 25 TABLET ORAL at 22:15

## 2019-05-02 RX ADMIN — TIOTROPIUM BROMIDE 18 MCG: 18 CAPSULE ORAL; RESPIRATORY (INHALATION) at 08:26

## 2019-05-02 RX ADMIN — AZITHROMYCIN 500 MG: 250 TABLET, FILM COATED ORAL at 17:38

## 2019-05-02 RX ADMIN — CARBAMAZEPINE 200 MG: 200 TABLET, EXTENDED RELEASE ORAL at 08:15

## 2019-05-02 RX ADMIN — INSULIN LISPRO 14 UNITS: 100 INJECTION, SOLUTION INTRAVENOUS; SUBCUTANEOUS at 11:25

## 2019-05-02 RX ADMIN — DIPHENHYDRAMINE HCL 25 MG: 25 TABLET ORAL at 17:38

## 2019-05-02 RX ADMIN — DIPHENHYDRAMINE HCL 25 MG: 25 TABLET ORAL at 05:23

## 2019-05-02 RX ADMIN — CALCIUM 1 TABLET: 500 TABLET ORAL at 08:15

## 2019-05-02 RX ADMIN — Medication 1 TABLET: at 08:15

## 2019-05-02 RX ADMIN — IPRATROPIUM BROMIDE AND ALBUTEROL SULFATE 3 ML: 2.5; .5 SOLUTION RESPIRATORY (INHALATION) at 16:06

## 2019-05-02 RX ADMIN — DIPHENHYDRAMINE HCL 25 MG: 25 TABLET ORAL at 11:25

## 2019-05-02 RX ADMIN — FAMOTIDINE 20 MG: 20 TABLET ORAL at 08:15

## 2019-05-02 RX ADMIN — METHYLPREDNISOLONE SODIUM SUCCINATE 40 MG: 40 INJECTION, POWDER, FOR SOLUTION INTRAMUSCULAR; INTRAVENOUS at 22:09

## 2019-05-02 RX ADMIN — TAMSULOSIN HYDROCHLORIDE 0.4 MG: 0.4 CAPSULE ORAL at 22:09

## 2019-05-02 RX ADMIN — Medication 1000 MG: at 08:15

## 2019-05-02 RX ADMIN — FINASTERIDE 5 MG: 5 TABLET, FILM COATED ORAL at 08:15

## 2019-05-02 RX ADMIN — FAMOTIDINE 20 MG: 20 TABLET ORAL at 17:38

## 2019-05-02 RX ADMIN — GUAIFENESIN 600 MG: 600 TABLET, EXTENDED RELEASE ORAL at 22:09

## 2019-05-02 RX ADMIN — INSULIN LISPRO 14 UNITS: 100 INJECTION, SOLUTION INTRAVENOUS; SUBCUTANEOUS at 16:29

## 2019-05-02 RX ADMIN — CARBAMAZEPINE 200 MG: 200 TABLET, EXTENDED RELEASE ORAL at 17:38

## 2019-05-02 RX ADMIN — MELATONIN TAB 3 MG 3 MG: 3 TAB at 22:09

## 2019-05-02 RX ADMIN — AMLODIPINE BESYLATE 2.5 MG: 2.5 TABLET ORAL at 08:15

## 2019-05-02 RX ADMIN — INSULIN LISPRO 12 UNITS: 100 INJECTION, SOLUTION INTRAVENOUS; SUBCUTANEOUS at 08:16

## 2019-05-02 RX ADMIN — CLOTRIMAZOLE: 10 CREAM TOPICAL at 08:14

## 2019-05-03 VITALS
SYSTOLIC BLOOD PRESSURE: 131 MMHG | HEIGHT: 68 IN | TEMPERATURE: 98 F | DIASTOLIC BLOOD PRESSURE: 75 MMHG | WEIGHT: 197.31 LBS | HEART RATE: 78 BPM | BODY MASS INDEX: 29.9 KG/M2 | RESPIRATION RATE: 18 BRPM | OXYGEN SATURATION: 97 %

## 2019-05-03 LAB
GLUCOSE SERPL-MCNC: 137 MG/DL (ref 65–140)
GLUCOSE SERPL-MCNC: 216 MG/DL (ref 65–140)

## 2019-05-03 PROCEDURE — 99217 PR OBSERVATION CARE DISCHARGE MANAGEMENT: CPT | Performed by: INTERNAL MEDICINE

## 2019-05-03 PROCEDURE — 82948 REAGENT STRIP/BLOOD GLUCOSE: CPT

## 2019-05-03 PROCEDURE — 94760 N-INVAS EAR/PLS OXIMETRY 1: CPT

## 2019-05-03 PROCEDURE — 94640 AIRWAY INHALATION TREATMENT: CPT

## 2019-05-03 RX ORDER — PREDNISONE 10 MG/1
10 TABLET ORAL 2 TIMES DAILY WITH MEALS
Qty: 20 TABLET | Refills: 0 | Status: ON HOLD | OUTPATIENT
Start: 2019-05-03 | End: 2019-07-28 | Stop reason: CLARIF

## 2019-05-03 RX ORDER — AZITHROMYCIN 500 MG/1
500 TABLET, FILM COATED ORAL EVERY 24 HOURS
Qty: 3 TABLET | Refills: 0 | Status: SHIPPED | OUTPATIENT
Start: 2019-05-03 | End: 2019-05-06

## 2019-05-03 RX ORDER — GUAIFENESIN 600 MG
600 TABLET, EXTENDED RELEASE 12 HR ORAL EVERY 12 HOURS SCHEDULED
Qty: 12 TABLET | Refills: 0 | Status: SHIPPED | OUTPATIENT
Start: 2019-05-03

## 2019-05-03 RX ADMIN — Medication 1000 MG: at 08:34

## 2019-05-03 RX ADMIN — DIPHENHYDRAMINE HCL 25 MG: 25 TABLET ORAL at 05:30

## 2019-05-03 RX ADMIN — FINASTERIDE 5 MG: 5 TABLET, FILM COATED ORAL at 08:33

## 2019-05-03 RX ADMIN — GUAIFENESIN 600 MG: 600 TABLET, EXTENDED RELEASE ORAL at 08:34

## 2019-05-03 RX ADMIN — FAMOTIDINE 20 MG: 20 TABLET ORAL at 08:34

## 2019-05-03 RX ADMIN — METHYLPREDNISOLONE SODIUM SUCCINATE 40 MG: 40 INJECTION, POWDER, FOR SOLUTION INTRAMUSCULAR; INTRAVENOUS at 05:30

## 2019-05-03 RX ADMIN — IPRATROPIUM BROMIDE AND ALBUTEROL SULFATE 3 ML: 2.5; .5 SOLUTION RESPIRATORY (INHALATION) at 07:57

## 2019-05-03 RX ADMIN — AMLODIPINE BESYLATE 2.5 MG: 2.5 TABLET ORAL at 08:34

## 2019-05-03 RX ADMIN — INSULIN LISPRO 14 UNITS: 100 INJECTION, SOLUTION INTRAVENOUS; SUBCUTANEOUS at 11:37

## 2019-05-03 RX ADMIN — DIPHENHYDRAMINE HCL 25 MG: 25 TABLET ORAL at 11:37

## 2019-05-03 RX ADMIN — CARBAMAZEPINE 200 MG: 200 TABLET, EXTENDED RELEASE ORAL at 08:34

## 2019-05-03 RX ADMIN — MONTELUKAST SODIUM 10 MG: 10 TABLET, FILM COATED ORAL at 08:33

## 2019-05-03 RX ADMIN — TIOTROPIUM BROMIDE 18 MCG: 18 CAPSULE ORAL; RESPIRATORY (INHALATION) at 08:35

## 2019-05-03 RX ADMIN — INSULIN LISPRO 12 UNITS: 100 INJECTION, SOLUTION INTRAVENOUS; SUBCUTANEOUS at 08:37

## 2019-05-03 RX ADMIN — Medication 1 TABLET: at 08:34

## 2019-05-03 RX ADMIN — CALCIUM 1 TABLET: 500 TABLET ORAL at 08:33

## 2019-05-03 RX ADMIN — FLUTICASONE PROPIONATE 1 SPRAY: 50 SPRAY, METERED NASAL at 08:34

## 2019-05-03 RX ADMIN — CLOTRIMAZOLE: 10 CREAM TOPICAL at 08:35

## 2019-05-03 RX ADMIN — ENOXAPARIN SODIUM 40 MG: 40 INJECTION SUBCUTANEOUS at 08:33

## 2019-05-03 RX ADMIN — FLUTICASONE FUROATE AND VILANTEROL TRIFENATATE 1 PUFF: 100; 25 POWDER RESPIRATORY (INHALATION) at 08:34

## 2019-07-28 ENCOUNTER — HOSPITAL ENCOUNTER (INPATIENT)
Facility: HOSPITAL | Age: 54
LOS: 1 days | Discharge: HOME | DRG: 203 | End: 2019-07-29
Attending: EMERGENCY MEDICINE | Admitting: INTERNAL MEDICINE
Payer: COMMERCIAL

## 2019-07-28 ENCOUNTER — APPOINTMENT (EMERGENCY)
Dept: RADIOLOGY | Facility: HOSPITAL | Age: 54
DRG: 203 | End: 2019-07-28
Payer: COMMERCIAL

## 2019-07-28 DIAGNOSIS — R07.9 CHEST PAIN: Primary | ICD-10-CM

## 2019-07-28 LAB
ALBUMIN SERPL BCP-MCNC: 4 G/DL (ref 3–5.2)
ALP SERPL-CCNC: 65 U/L (ref 43–122)
ALT SERPL W P-5'-P-CCNC: 29 U/L (ref 9–52)
ANION GAP SERPL CALCULATED.3IONS-SCNC: 9 MMOL/L (ref 5–14)
AST SERPL W P-5'-P-CCNC: 21 U/L (ref 17–59)
BASOPHILS # BLD AUTO: 0 THOUSANDS/ΜL (ref 0–0.1)
BASOPHILS NFR BLD AUTO: 1 % (ref 0–1)
BILIRUB SERPL-MCNC: 0.3 MG/DL
BUN SERPL-MCNC: 13 MG/DL (ref 5–25)
CALCIUM SERPL-MCNC: 9.3 MG/DL (ref 8.4–10.2)
CHLORIDE SERPL-SCNC: 104 MMOL/L (ref 97–108)
CK SERPL-CCNC: 159 U/L (ref 55–170)
CO2 SERPL-SCNC: 27 MMOL/L (ref 22–30)
CREAT SERPL-MCNC: 0.84 MG/DL (ref 0.7–1.5)
EOSINOPHIL # BLD AUTO: 0.2 THOUSAND/ΜL (ref 0–0.4)
EOSINOPHIL NFR BLD AUTO: 3 % (ref 0–6)
ERYTHROCYTE [DISTWIDTH] IN BLOOD BY AUTOMATED COUNT: 12.9 %
GFR SERPL CREATININE-BSD FRML MDRD: 99 ML/MIN/1.73SQ M
GLUCOSE SERPL-MCNC: 119 MG/DL (ref 65–140)
GLUCOSE SERPL-MCNC: 164 MG/DL (ref 70–99)
GLUCOSE SERPL-MCNC: 76 MG/DL (ref 65–140)
HCT VFR BLD AUTO: 42.6 % (ref 41–53)
HGB BLD-MCNC: 14.4 G/DL (ref 13.5–17.5)
LIPASE SERPL-CCNC: 68 U/L (ref 23–300)
LYMPHOCYTES # BLD AUTO: 1 THOUSANDS/ΜL (ref 0.5–4)
LYMPHOCYTES NFR BLD AUTO: 18 % (ref 25–45)
MCH RBC QN AUTO: 28.8 PG (ref 26–34)
MCHC RBC AUTO-ENTMCNC: 33.8 G/DL (ref 31–36)
MCV RBC AUTO: 85 FL (ref 80–100)
MONOCYTES # BLD AUTO: 0.6 THOUSAND/ΜL (ref 0.2–0.9)
MONOCYTES NFR BLD AUTO: 10 % (ref 1–10)
NEUTROPHILS # BLD AUTO: 3.8 THOUSANDS/ΜL (ref 1.8–7.8)
NEUTS SEG NFR BLD AUTO: 68 % (ref 45–65)
PLATELET # BLD AUTO: 254 THOUSANDS/UL (ref 150–450)
PMV BLD AUTO: 7.4 FL (ref 8.9–12.7)
POTASSIUM SERPL-SCNC: 3.8 MMOL/L (ref 3.6–5)
PROT SERPL-MCNC: 6.4 G/DL (ref 5.9–8.4)
RBC # BLD AUTO: 4.98 MILLION/UL (ref 4.5–5.9)
SODIUM SERPL-SCNC: 140 MMOL/L (ref 137–147)
TROPONIN I SERPL-MCNC: <0.01 NG/ML (ref 0–0.03)
WBC # BLD AUTO: 5.6 THOUSAND/UL (ref 4.5–11)

## 2019-07-28 PROCEDURE — 82550 ASSAY OF CK (CPK): CPT | Performed by: PHYSICIAN ASSISTANT

## 2019-07-28 PROCEDURE — 93005 ELECTROCARDIOGRAM TRACING: CPT

## 2019-07-28 PROCEDURE — 85025 COMPLETE CBC W/AUTO DIFF WBC: CPT | Performed by: PHYSICIAN ASSISTANT

## 2019-07-28 PROCEDURE — 99285 EMERGENCY DEPT VISIT HI MDM: CPT | Performed by: EMERGENCY MEDICINE

## 2019-07-28 PROCEDURE — 83690 ASSAY OF LIPASE: CPT | Performed by: PHYSICIAN ASSISTANT

## 2019-07-28 PROCEDURE — 80053 COMPREHEN METABOLIC PANEL: CPT | Performed by: PHYSICIAN ASSISTANT

## 2019-07-28 PROCEDURE — 71045 X-RAY EXAM CHEST 1 VIEW: CPT

## 2019-07-28 PROCEDURE — 82948 REAGENT STRIP/BLOOD GLUCOSE: CPT

## 2019-07-28 PROCEDURE — 36415 COLL VENOUS BLD VENIPUNCTURE: CPT | Performed by: PHYSICIAN ASSISTANT

## 2019-07-28 PROCEDURE — 99220 PR INITIAL OBSERVATION CARE/DAY 70 MINUTES: CPT | Performed by: INTERNAL MEDICINE

## 2019-07-28 PROCEDURE — 99285 EMERGENCY DEPT VISIT HI MDM: CPT

## 2019-07-28 PROCEDURE — 94640 AIRWAY INHALATION TREATMENT: CPT

## 2019-07-28 PROCEDURE — 94660 CPAP INITIATION&MGMT: CPT

## 2019-07-28 PROCEDURE — 84484 ASSAY OF TROPONIN QUANT: CPT | Performed by: INTERNAL MEDICINE

## 2019-07-28 PROCEDURE — 84484 ASSAY OF TROPONIN QUANT: CPT | Performed by: PHYSICIAN ASSISTANT

## 2019-07-28 RX ORDER — LANOLIN ALCOHOL/MO/W.PET/CERES
3 CREAM (GRAM) TOPICAL
Status: DISCONTINUED | OUTPATIENT
Start: 2019-07-28 | End: 2019-07-29 | Stop reason: HOSPADM

## 2019-07-28 RX ORDER — CARBAMAZEPINE 200 MG/1
600 TABLET ORAL
Status: DISCONTINUED | OUTPATIENT
Start: 2019-07-28 | End: 2019-07-29 | Stop reason: HOSPADM

## 2019-07-28 RX ORDER — ATORVASTATIN CALCIUM 80 MG/1
80 TABLET, FILM COATED ORAL
Status: DISCONTINUED | OUTPATIENT
Start: 2019-07-28 | End: 2019-07-29 | Stop reason: HOSPADM

## 2019-07-28 RX ORDER — FAMOTIDINE 20 MG/1
20 TABLET, FILM COATED ORAL 2 TIMES DAILY
Status: DISCONTINUED | OUTPATIENT
Start: 2019-07-28 | End: 2019-07-28

## 2019-07-28 RX ORDER — SODIUM CHLORIDE 9 MG/ML
250 INJECTION, SOLUTION INTRAVENOUS CONTINUOUS
Status: DISCONTINUED | OUTPATIENT
Start: 2019-07-28 | End: 2019-07-28

## 2019-07-28 RX ORDER — GABAPENTIN 300 MG/1
300 CAPSULE ORAL 3 TIMES DAILY
Status: DISCONTINUED | OUTPATIENT
Start: 2019-07-28 | End: 2019-07-29 | Stop reason: HOSPADM

## 2019-07-28 RX ORDER — MONTELUKAST SODIUM 10 MG/1
10 TABLET ORAL DAILY
Status: DISCONTINUED | OUTPATIENT
Start: 2019-07-28 | End: 2019-07-29 | Stop reason: HOSPADM

## 2019-07-28 RX ORDER — B-COMPLEX WITH VITAMIN C
1 TABLET ORAL
Status: DISCONTINUED | OUTPATIENT
Start: 2019-07-29 | End: 2019-07-29 | Stop reason: HOSPADM

## 2019-07-28 RX ORDER — ALBUTEROL SULFATE 2.5 MG/3ML
2.5 SOLUTION RESPIRATORY (INHALATION) ONCE
Status: COMPLETED | OUTPATIENT
Start: 2019-07-28 | End: 2019-07-28

## 2019-07-28 RX ORDER — PREDNISONE 1 MG/1
5 TABLET ORAL EVERY OTHER DAY
Status: DISCONTINUED | OUTPATIENT
Start: 2019-07-29 | End: 2019-07-29 | Stop reason: HOSPADM

## 2019-07-28 RX ORDER — CHLORAL HYDRATE 500 MG
1000 CAPSULE ORAL DAILY
Status: DISCONTINUED | OUTPATIENT
Start: 2019-07-28 | End: 2019-07-29 | Stop reason: HOSPADM

## 2019-07-28 RX ORDER — GUAIFENESIN 600 MG
600 TABLET, EXTENDED RELEASE 12 HR ORAL EVERY 12 HOURS SCHEDULED
Status: DISCONTINUED | OUTPATIENT
Start: 2019-07-28 | End: 2019-07-29 | Stop reason: HOSPADM

## 2019-07-28 RX ORDER — LATANOPROST 50 UG/ML
1 SOLUTION/ DROPS OPHTHALMIC DAILY
Status: DISCONTINUED | OUTPATIENT
Start: 2019-07-28 | End: 2019-07-29 | Stop reason: HOSPADM

## 2019-07-28 RX ORDER — ACETAMINOPHEN 325 MG/1
650 TABLET ORAL EVERY 6 HOURS PRN
Status: DISCONTINUED | OUTPATIENT
Start: 2019-07-28 | End: 2019-07-29 | Stop reason: HOSPADM

## 2019-07-28 RX ORDER — ONDANSETRON 2 MG/ML
4 INJECTION INTRAMUSCULAR; INTRAVENOUS EVERY 6 HOURS PRN
Status: DISCONTINUED | OUTPATIENT
Start: 2019-07-28 | End: 2019-07-29 | Stop reason: HOSPADM

## 2019-07-28 RX ORDER — TAMSULOSIN HYDROCHLORIDE 0.4 MG/1
0.4 CAPSULE ORAL
Status: DISCONTINUED | OUTPATIENT
Start: 2019-07-28 | End: 2019-07-29 | Stop reason: HOSPADM

## 2019-07-28 RX ORDER — AMLODIPINE BESYLATE 2.5 MG/1
2.5 TABLET ORAL DAILY
Status: DISCONTINUED | OUTPATIENT
Start: 2019-07-28 | End: 2019-07-29 | Stop reason: HOSPADM

## 2019-07-28 RX ORDER — ALBUTEROL SULFATE 2.5 MG/3ML
2.5 SOLUTION RESPIRATORY (INHALATION) EVERY 4 HOURS PRN
Status: DISCONTINUED | OUTPATIENT
Start: 2019-07-28 | End: 2019-07-29 | Stop reason: HOSPADM

## 2019-07-28 RX ORDER — FLUTICASONE PROPIONATE 50 MCG
1 SPRAY, SUSPENSION (ML) NASAL DAILY
Status: DISCONTINUED | OUTPATIENT
Start: 2019-07-28 | End: 2019-07-29 | Stop reason: HOSPADM

## 2019-07-28 RX ORDER — BRIMONIDINE TARTRATE 0.15 %
1 DROPS OPHTHALMIC (EYE) EVERY 8 HOURS
Status: DISCONTINUED | OUTPATIENT
Start: 2019-07-28 | End: 2019-07-29 | Stop reason: HOSPADM

## 2019-07-28 RX ORDER — CARBAMAZEPINE 200 MG/1
400 TABLET ORAL DAILY
Status: DISCONTINUED | OUTPATIENT
Start: 2019-07-28 | End: 2019-07-29 | Stop reason: HOSPADM

## 2019-07-28 RX ORDER — FLUTICASONE PROPIONATE 110 UG/1
1 AEROSOL, METERED RESPIRATORY (INHALATION) EVERY 12 HOURS SCHEDULED
Status: DISCONTINUED | OUTPATIENT
Start: 2019-07-28 | End: 2019-07-29 | Stop reason: HOSPADM

## 2019-07-28 RX ORDER — NITROGLYCERIN 0.4 MG/1
0.4 TABLET SUBLINGUAL
Status: DISCONTINUED | OUTPATIENT
Start: 2019-07-28 | End: 2019-07-29 | Stop reason: HOSPADM

## 2019-07-28 RX ORDER — ONDANSETRON 2 MG/ML
INJECTION INTRAMUSCULAR; INTRAVENOUS
Status: COMPLETED
Start: 2019-07-28 | End: 2019-07-28

## 2019-07-28 RX ORDER — POLYVINYL ALCOHOL 14 MG/ML
2 SOLUTION/ DROPS OPHTHALMIC AS NEEDED
Status: DISCONTINUED | OUTPATIENT
Start: 2019-07-28 | End: 2019-07-29 | Stop reason: HOSPADM

## 2019-07-28 RX ORDER — PREDNISONE 1 MG/1
2.5 TABLET ORAL EVERY OTHER DAY
COMMUNITY

## 2019-07-28 RX ORDER — PANTOPRAZOLE SODIUM 40 MG/1
40 TABLET, DELAYED RELEASE ORAL
Status: DISCONTINUED | OUTPATIENT
Start: 2019-07-28 | End: 2019-07-29 | Stop reason: HOSPADM

## 2019-07-28 RX ORDER — NITROGLYCERIN 0.4 MG/1
0.4 TABLET SUBLINGUAL ONCE
Status: COMPLETED | OUTPATIENT
Start: 2019-07-28 | End: 2019-07-28

## 2019-07-28 RX ORDER — INSULIN GLARGINE 100 [IU]/ML
15 INJECTION, SOLUTION SUBCUTANEOUS
Status: DISCONTINUED | OUTPATIENT
Start: 2019-07-28 | End: 2019-07-29 | Stop reason: HOSPADM

## 2019-07-28 RX ORDER — FINASTERIDE 5 MG/1
5 TABLET, FILM COATED ORAL DAILY
Status: DISCONTINUED | OUTPATIENT
Start: 2019-07-28 | End: 2019-07-29 | Stop reason: HOSPADM

## 2019-07-28 RX ADMIN — ALBUTEROL SULFATE 2.5 MG: 2.5 SOLUTION RESPIRATORY (INHALATION) at 08:29

## 2019-07-28 RX ADMIN — GABAPENTIN 300 MG: 300 CAPSULE ORAL at 21:36

## 2019-07-28 RX ADMIN — FLUTICASONE PROPIONATE 1 SPRAY: 50 SPRAY, METERED NASAL at 14:38

## 2019-07-28 RX ADMIN — BRIMONIDINE TARTRATE 1 DROP: 1.5 SOLUTION OPHTHALMIC at 14:39

## 2019-07-28 RX ADMIN — MELATONIN TAB 3 MG 3 MG: 3 TAB at 21:38

## 2019-07-28 RX ADMIN — GUAIFENESIN 600 MG: 600 TABLET, EXTENDED RELEASE ORAL at 21:36

## 2019-07-28 RX ADMIN — ENOXAPARIN SODIUM 40 MG: 40 INJECTION SUBCUTANEOUS at 12:28

## 2019-07-28 RX ADMIN — GABAPENTIN 300 MG: 300 CAPSULE ORAL at 17:05

## 2019-07-28 RX ADMIN — MONTELUKAST SODIUM 10 MG: 10 TABLET, FILM COATED ORAL at 12:50

## 2019-07-28 RX ADMIN — TAMSULOSIN HYDROCHLORIDE 0.4 MG: 0.4 CAPSULE ORAL at 21:38

## 2019-07-28 RX ADMIN — GUAIFENESIN 600 MG: 600 TABLET, EXTENDED RELEASE ORAL at 12:50

## 2019-07-28 RX ADMIN — Medication 1000 MG: at 12:50

## 2019-07-28 RX ADMIN — BIMATOPROST 1 DROP: 0.1 SOLUTION/ DROPS OPHTHALMIC at 21:39

## 2019-07-28 RX ADMIN — PANTOPRAZOLE SODIUM 40 MG: 40 TABLET, DELAYED RELEASE ORAL at 12:52

## 2019-07-28 RX ADMIN — CARBAMAZEPINE 400 MG: 200 TABLET ORAL at 12:50

## 2019-07-28 RX ADMIN — CARBAMAZEPINE 600 MG: 200 TABLET ORAL at 21:36

## 2019-07-28 RX ADMIN — TIOTROPIUM BROMIDE 18 MCG: 18 CAPSULE ORAL; RESPIRATORY (INHALATION) at 14:34

## 2019-07-28 RX ADMIN — SODIUM CHLORIDE 250 ML/HR: 0.9 INJECTION, SOLUTION INTRAVENOUS at 08:20

## 2019-07-28 RX ADMIN — FLUTICASONE PROPIONATE 1 PUFF: 110 AEROSOL, METERED RESPIRATORY (INHALATION) at 21:39

## 2019-07-28 RX ADMIN — LATANOPROST 1 DROP: 50 SOLUTION OPHTHALMIC at 21:39

## 2019-07-28 RX ADMIN — FINASTERIDE 5 MG: 5 TABLET, FILM COATED ORAL at 12:53

## 2019-07-28 RX ADMIN — FLUTICASONE PROPIONATE 1 PUFF: 110 AEROSOL, METERED RESPIRATORY (INHALATION) at 14:37

## 2019-07-28 RX ADMIN — BRIMONIDINE TARTRATE 1 DROP: 1.5 SOLUTION OPHTHALMIC at 21:00

## 2019-07-28 RX ADMIN — NITROGLYCERIN 0.4 MG: 0.4 TABLET SUBLINGUAL at 08:11

## 2019-07-28 RX ADMIN — ATORVASTATIN CALCIUM 80 MG: 80 TABLET, FILM COATED ORAL at 21:37

## 2019-07-28 RX ADMIN — AMLODIPINE BESYLATE 2.5 MG: 2.5 TABLET ORAL at 12:48

## 2019-07-28 RX ADMIN — INSULIN LISPRO 14 UNITS: 100 INJECTION, SOLUTION INTRAVENOUS; SUBCUTANEOUS at 17:06

## 2019-07-28 RX ADMIN — GABAPENTIN 300 MG: 300 CAPSULE ORAL at 12:50

## 2019-07-28 NOTE — H&P
History and Physical - [address] [place] [address] Internal Medicine    Patient Information: Bridget Brock 53 y o  male MRN: [id number]  Unit/Bed#: 5T -01 Encounter: [id number]  Admitting Physician: Weston Chavez DO  PCP: Liam Lawrence MD  Date of Admission:  07/28/19    Assessment/Plan:    Hospital Problem List:     Principal Problem:    Chest pain      Plan for the Primary Problem(s):  # CP, risk factors for CAD  - admit for observation to r/o acs thus serial trop  - Will proceed with nuclear stress test, npo after midnight and no caffeinated products  - allergy to asa; cont statin  - prn ntg     Plan for Additional Problems:   # HTN - controlled  Cont norvasc    # HL - cont lipitor    # IDDM  - given npo after midnight for stress test, will lower lantus to 15 units at bedtime     # Asthma  - no sign of exacerbation  - on chronic steroids, dupixent sc q14 days, singulair  - cont inhaler    VTE Prophylaxis: Enoxaparin (Lovenox)  / sequential compression device   Code Status: Full code  POLST: There is no POLST form on file for this patient (pre-hospital)    Anticipated Length of Stay:  Patient will be admitted on an Inpatient basis with an anticipated length of stay of  < 2 midnights  Justification for Hospital Stay:  Chest pain rule out MI    Total Time for Visit, including Counseling / Coordination of Care: 1 hour  Greater than 50% of this total time spent on direct patient counseling and coordination of care  Chief Complaint:   Chest pain    History of Present Illness:    Bridget Brock is a 53 y o  male medical history significant for asthma where he has been intubated twice secondary to respiratory arrest, insulin-dependent diabetes, hypertension, hyperlipidemia, seizure disorder he presents to the ER with complaint of chest pressure which woke him up from sleep    Patient reports of intermittent chest pain which has been ongoing for the past month, he had followed up with his primary care physician and was thought secondary to accelerated hypertension therefore he was placed on Norvasc  This recent incident of chest pressure he states woke him up from sleep  Located in midsternal region felt some numbness and tingling in his right arm otherwise no radiation  He reports of mild associated shortness of breath and had a vomiting episode  Denies diaphoresis or palpitations or lightheadedness  He states that it lasted for seconds but then reoccurred  He reports that he took his blood pressure at the time and his systolic was around 564M  Given recurrence of chest pressure he decided to come to the ER for further evaluation  Patient denies any cardiac history in fact he does not follow-up with a cardiologist routinely  He reports of having a stress test many years ago in 2014 this was at the time that his mother had passed away and per the patient he states that it was normal     Upon presentation to the ER, he was given a dose of nitroglycerin which provided some relief and he has not had any recurring chest pain since then  Troponin were negative, EKG without any acute ST changes  Review of Systems:    Review of Systems   Constitutional: Negative  HENT: Negative  Eyes: Negative  Respiratory: Negative for chest tightness  Cardiovascular: Positive for chest pain  Gastrointestinal: Negative  Endocrine: Negative  Genitourinary: Negative  Musculoskeletal: Negative  Skin: Negative  Allergic/Immunologic: Negative  Neurological: Negative  Hematological: Negative  Psychiatric/Behavioral: Negative          Past Medical and Surgical History:     Past Medical History:   Diagnosis Date    Asthma     [facility]     Diabetes mellitus ([facility] )     Enlarged prostate     Glaucoma     Hyperlipidemia     Hypertension     Seasonal allergic rhinitis     Seizures ([facility] )        Past Surgical History:   Procedure Laterality Date    KNEE SURGERY      WRIST SURGERY Left Meds/Allergies:    Prior to Admission medications    Medication Sig Start Date End Date Taking? Authorizing Provider   albuterol (2 5 mg/3 mL) 0 083 % nebulizer solution Take 1 vial (2 5 mg total) by nebulization every 4 (four) hours as needed for shortness of breath 11/15/18  Yes Reuben Spears, DO   albuterol (PROVENTIL HFA,VENTOLIN HFA) 90 mcg/act inhaler Inhale 2 puffs every 4 (four) hours as needed for wheezing 11/15/18  Yes Reuben Spears, DO   amLODIPine (NORVASC) 2 5 mg tablet Take 1 tablet (2 5 mg total) by mouth daily 3/18/19  Yes Hemant Pretty MD   atorvastatin (LIPITOR) 80 mg tablet Take 80 mg by mouth daily at bedtime 9/8/16  Yes Historical Provider, MD   beclomethasone (QVAR) 40 MCG/ACT inhaler Inhale 2 puffs as needed Rinse mouth after use       Yes Historical Provider, MD   bimatoprost (LUMIGAN) 0 03 % ophthalmic drops Administer 1 drop to both eyes daily at bedtime   6/5/08  Yes Historical Provider, MD   Blood Glucose Monitoring Suppl ([email or web address]) w/Device KIT by Does not apply route   Yes Historical Provider, MD   brimonidine (ALPHAGAN P) 0 15 % ophthalmic solution Administer 1 drop to both eyes every 8 (eight) hours   7/2/15  Yes Historical Provider, MD   Calcium Carb-Cholecalciferol (CALCIUM CARBONATE-VITAMIN D3 PO) Take 1,500 mg by mouth daily 9/20/13  Yes Historical Provider, MD   carBAMazepine (TEGRETOL) 200 mg tablet Take 2 tabs in the morning and 3 tabs at night 10/19/16  Yes Historical Provider, MD   famotidine (PEPCID) 20 mg tablet Take 1 tablet (20 mg total) by mouth 2 (two) times a day 4/28/19  Yes Melany Wright PA-C   finasteride (PROSCAR) 5 mg tablet Take 5 mg by mouth daily 4/26/16  Yes Historical Provider, MD   fluticasone (FLONASE) 50 mcg/act nasal spray 1 spray into each nostril daily   Yes Historical Provider, MD   fluticasone (FLONASE) 50 mcg/act nasal spray 1 spray into each nostril daily 4/28/19  Yes Melany Wright PA-C   gabapentin (NEURONTIN) 300 mg capsule Take 300 mg by mouth 3 (three) times a day 9/8/16  Yes Historical Provider, MD   glucagon 1 MG injection as needed 8/5/16  Yes Historical Provider, MD   guaiFENesin (MUCINEX) 600 mg 12 hr tablet Take 1 tablet (600 mg total) by mouth every 12 (twelve) hours 5/3/19  Yes Elsi Brar MD   hydrocortisone (ANUSOL-HC, PROCTOSOL HC,) 2 5 % rectal cream Insert 1 application into the rectum 2 (two) times a day   Yes Historical Provider, MD   insulin glargine (BASAGLAR KWIKPEN) 100 units/mL injection pen Inject 40 Units under the skin daily at bedtime   Yes Historical Provider, MD   insulin lispro (HumaLOG) 100 units/mL injection Inject 12 Units under the skin daily with breakfast 10/27/18  Yes Donal Vogel MD   insulin lispro (HumaLOG) 100 units/mL injection Inject 16 Units under the skin daily with lunch  Patient taking differently: Inject 14 Units under the skin daily with lunch  10/27/18  Yes Donal Vogel MD   insulin lispro (HumaLOG) 100 units/mL injection Inject 16 Units under the skin daily with dinner  Patient taking differently: Inject 14 Units under the skin daily with dinner   10/27/18  Yes Donal Vogel MD   Insulin Pen Needle 32G X 4 MM MISC Unifine Pentips Plus 32 gauge x 5/32" needle   Yes Historical Provider, MD   ipratropium (ATROVENT) 0 02 % nebulizer solution Take 0 5 mg by nebulization every 6 (six) hours as needed for wheezing or shortness of breath   7/2/15  Yes Historical Provider, MD   latanoprost (XALATAN) 0 005 % ophthalmic solution Administer 1 drop to both eyes daily   Yes Historical Provider, MD   melatonin 3 mg Take 3 mg by mouth daily at bedtime   Yes Historical Provider, MD   Misc Natural Products (BLOOD SUGAR 360 PO) Use to check BG 7x/day  Glycemic fluctuations  Steroid induced hyperglycemia   E11 65 9/18/18  Yes Historical Provider, MD   montelukast (SINGULAIR) 10 mg tablet Take 10 mg by mouth daily   8/25/15  Yes Historical Provider, MD   omega-3-acid ethyl esters (LOVAZA) 1 g capsule Take 1 g by mouth daily   Yes Historical Provider, MD   omeprazole (PriLOSEC) 40 MG capsule Take 40 mg by mouth daily 4/26/16  Yes Historical Provider, MD   polyvinyl alcohol (LIQUIFILM TEARS) 1 4 % ophthalmic solution 2 drops as needed for dry eyes   Yes Historical Provider, MD   predniSONE 5 mg tablet Take 5 mg by mouth every other day On Monday, Wednesday, Friday   Yes Historical Provider, MD   TAMSULOSIN HCL PO Take 0 4 mg by mouth daily at bedtime     Yes Historical Provider, MD   terbinafine (LamISIL) 1 % cream terbinafine HCl 1 % topical cream   Yes Historical Provider, MD   tiotropium (SPIRIVA RESPIMAT) 1 25 MCG/ACT AERS inhaler Inhale 2 puffs daily 3/15/19  Yes Yash Mcnamara MD   Multiple Vitamins-Minerals (CENTRUM SILVER PO) 1 tablet daily 6/2/11 7/28/19 Yes Historical Provider, MD   predniSONE 10 mg tablet Take 1 tablet (10 mg total) by mouth 2 (two) times a day with meals  Patient taking differently: Take 5 mg by mouth every other day On Monday, Wednesday, friday 5/3/19 7/28/19 Yes Bonita Salazar MD   diphenhydrAMINE (BENADRYL) 25 mg tablet Take 1 tablet (25 mg total) by mouth every 6 (six) hours 4/28/19 7/28/19  Maggie Knowles PA-C   mometasone-formoterol (DULERA) 200-5 MCG/ACT inhaler Inhale 2 puffs 2 (two) times a day   8/31/15 7/28/19  Historical Provider, MD     I have reviewed home medications using allscripts  And reviewed with patient    Allergies:    Allergies   Allergen Reactions    Aspirin GI Bleeding    Ibuprofen GI Bleeding    Licorice Flavor [Flavoring Agent] Hives    Penicillins Hives    Propoxyphene Hives     Propoxyphene N-Acetaminophen---hives & palpitations    Tramadol Palpitations     Other reaction(s): heart pumps real fast    Bee Venom     Glycyrrhiza     Haloperidol Other (See Comments)     "bent out of shape" - dystonia    Lithium Other (See Comments)     edema    Other     Wasp Venom        Social History:     Marital Status: /Civil Union   Occupation:  Retired vee  Patient Pre-hospital Living Situation:  Resides with his wife  Patient Pre-hospital Level of Mobility:  Independent  Patient Pre-hospital Diet Restrictions:  None  Substance Use History:   Social History     Substance and Sexual Activity   Alcohol Use Not Currently    Alcohol/week: 12 0 standard drinks    Types: 12 Cans of beer per week    Frequency: 4 or more times a week    Drinks per session: 10 or more     Social History     Tobacco Use   Smoking Status Former Smoker    Last attempt to quit:     Years since quittin 5   Smokeless Tobacco Never Used     Social History     Substance and Sexual Activity   Drug Use Not Currently    Types: Heroin    Comment: previous heroin clean since        Family History:    non-contributory    Physical Exam:     Vitals:   Blood Pressure: 143/97 (19 1110)  Pulse: 84 (19 1110)  Temperature: 97 5 °F (36 4 °C) (19 1110)  Temp Source: Temporal (19 1110)  Respirations: 14 (19 111)  Height: 5' 8 5" (174 cm) (19 1110)  Weight - Scale: 94 kg (207 lb 3 7 oz) (19 1110)  SpO2: 98 % (19 1110)    General Appearance:  Alert, cooperative, no distress, appears stated age   Head:  Normocephalic, without obvious abnormality, atraumatic   Neck: Supple   Lungs:   Good air entry, Clear to auscultation bilaterally, respirations unlabored   Chest Wall:  No tenderness or deformity    Heart:  Regular rate and rhythm, S1 and S2 normal, no murmur, rub or gallop   Abdomen:   Soft, non-tender, bowel sounds active all four quadrants,  no masses, no organomegaly   Extremities: Extremities normal, atraumatic, no cyanosis or edema   Pulses: 2+ and symmetric all extremities   Skin: Skin color, texture, turgor normal, no rashes or lesions   Lymph nodes:    Neurologic: CNII-XII intact, speech fluent, comprehensible, no facial asymmetry; normal strength 5/5 in all major muscle groups, sensation and reflexes throughout       Additional Data:     Lab Results: I have personally reviewed pertinent reports  Results from last 7 days   Lab Units 07/28/19  0817   WBC Thousand/uL 5 60   HEMOGLOBIN g/dL 14 4   HEMATOCRIT % 42 6   PLATELETS Thousands/uL 254   NEUTROS PCT % 68*   LYMPHS PCT % 18*   MONOS PCT % 10   EOS PCT % 3     Results from last 7 days   Lab Units 07/28/19  0817   POTASSIUM mmol/L 3 8   CHLORIDE mmol/L 104   CO2 mmol/L 27   BUN mg/dL 13   CREATININE mg/dL 0 84   CALCIUM mg/dL 9 3   ALK PHOS U/L 65   ALT U/L 29   AST U/L 21           Imaging: I have personally reviewed pertinent reports  Xr Chest 1 View Portable    Result Date: 7/28/2019  Narrative: CHEST INDICATION:   pain  COMPARISON:  5/1/2019  EXAM PERFORMED/VIEWS:  XR CHEST PORTABLE FINDINGS: Cardiomediastinal silhouette appears unremarkable  The lungs are clear  No pneumothorax or pleural effusion  Osseous structures appear within normal limits for patient age  Impression: No acute cardiopulmonary disease  Workstation performed: [id number]       EKG, Pathology, and Other Studies Reviewed on Admission:   · EKG:  Sinus rhythm at 91 beats per minute, normal axis, T-wave flattening in lead 3    Allscripts Records Reviewed: Yes     ** Please Note: Dragon 360 Dictation voice to text software may have been used in the creation of this document   **

## 2019-07-28 NOTE — ED PROVIDER NOTES
History  Chief Complaint   Patient presents with    Chest Pain     Chest pain on and off for a month  Patient said that it feels like a pressure   Vomiting     Patient last vomited at 0730 this morning  History provided by:  Patient   used: No    Medical Problem   Location:  Pt with chest pain/pressure since this morning   on and off /intermittent x 1 month   worse this morning   Quality:  Pt states chest pain woke him up this morning   Severity:  Mild  Onset quality:  Gradual  Duration:  1 month  Timing:  Intermittent  Progression:  Waxing and waning  Chronicity:  Recurrent  Context:  Pain worse with leaning forward   Associated symptoms: chest pain, nausea and vomiting    Associated symptoms: no abdominal pain, no congestion, no cough, no diarrhea, no ear pain, no fatigue, no fever, no headaches, no loss of consciousness, no myalgias, no rash, no rhinorrhea, no shortness of breath, no sore throat and no wheezing        Prior to Admission Medications   Prescriptions Last Dose Informant Patient Reported? Taking? Blood Glucose Monitoring Suppl (ACURA BLOOD GLUCOSE METER) w/Device KIT  Self Yes Yes   Sig: by Does not apply route   Calcium Carb-Cholecalciferol (CALCIUM CARBONATE-VITAMIN D3 PO)  Self Yes Yes   Sig: Take 1,500 mg by mouth daily   Insulin Pen Needle 32G X 4 MM MISC  Self Yes Yes   Sig: Unifine Pentips Plus 32 gauge x 5/32" needle   Misc Natural Products (BLOOD SUGAR 360 PO)  Self Yes Yes   Sig: Use to check BG 7x/day  Glycemic fluctuations  Steroid induced hyperglycemia   E11 65   TAMSULOSIN HCL PO  Self Yes Yes   Sig: Take 0 4 mg by mouth daily at bedtime     albuterol (2 5 mg/3 mL) 0 083 % nebulizer solution  Self No Yes   Sig: Take 1 vial (2 5 mg total) by nebulization every 4 (four) hours as needed for shortness of breath   albuterol (PROVENTIL HFA,VENTOLIN HFA) 90 mcg/act inhaler  Self No Yes   Sig: Inhale 2 puffs every 4 (four) hours as needed for wheezing amLODIPine (NORVASC) 2 5 mg tablet   No Yes   Sig: Take 1 tablet (2 5 mg total) by mouth daily   atorvastatin (LIPITOR) 80 mg tablet  Self Yes Yes   Sig: Take 80 mg by mouth daily at bedtime   beclomethasone (QVAR) 40 MCG/ACT inhaler  Self Yes Yes   Sig: Inhale 2 puffs as needed Rinse mouth after use      bimatoprost (LUMIGAN) 0 03 % ophthalmic drops  Self Yes Yes   Sig: Administer 1 drop to both eyes daily at bedtime     brimonidine (ALPHAGAN P) 0 15 % ophthalmic solution  Self Yes Yes   Sig: Administer 1 drop to both eyes every 8 (eight) hours     carBAMazepine (TEGRETOL) 200 mg tablet  Self Yes Yes   Sig: Take 2 tabs in the morning and 3 tabs at night   dupilumab (DUPIXENT) subcutaneous injection   Yes Yes   Sig: Inject under the skin every 14 (fourteen) days   famotidine (PEPCID) 20 mg tablet Not Taking at Unknown time  No No   Sig: Take 1 tablet (20 mg total) by mouth 2 (two) times a day   Patient not taking: Reported on 2019   finasteride (PROSCAR) 5 mg tablet  Self Yes Yes   Sig: Take 5 mg by mouth daily   fluticasone (FLONASE) 50 mcg/act nasal spray  Self Yes Yes   Si spray into each nostril daily   fluticasone (FLONASE) 50 mcg/act nasal spray   No Yes   Si spray into each nostril daily   gabapentin (NEURONTIN) 300 mg capsule  Self Yes Yes   Sig: Take 300 mg by mouth 3 (three) times a day   glucagon 1 MG injection  Self Yes Yes   Sig: as needed   guaiFENesin (MUCINEX) 600 mg 12 hr tablet   No Yes   Sig: Take 1 tablet (600 mg total) by mouth every 12 (twelve) hours   hydrocortisone (ANUSOL-HC, PROCTOSOL HC,) 2 5 % rectal cream  Self Yes Yes   Sig: Insert 1 application into the rectum 2 (two) times a day   insulin glargine (BASAGLAR KWIKPEN) 100 units/mL injection pen   Yes Yes   Sig: Inject 40 Units under the skin daily at bedtime   insulin lispro (HumaLOG) 100 units/mL injection  Self No Yes   Sig: Inject 12 Units under the skin daily with breakfast   insulin lispro (HumaLOG) 100 units/mL injection  Self No Yes   Sig: Inject 16 Units under the skin daily with lunch   Patient taking differently: Inject 14 Units under the skin daily with lunch    insulin lispro (HumaLOG) 100 units/mL injection  Self No Yes   Sig: Inject 16 Units under the skin daily with dinner   Patient taking differently: Inject 14 Units under the skin daily with dinner     ipratropium (ATROVENT) 0 02 % nebulizer solution  Self Yes Yes   Sig: Take 0 5 mg by nebulization every 6 (six) hours as needed for wheezing or shortness of breath     latanoprost (XALATAN) 0 005 % ophthalmic solution  Self Yes Yes   Sig: Administer 1 drop to both eyes daily   melatonin 3 mg  Self Yes Yes   Sig: Take 3 mg by mouth daily at bedtime   montelukast (SINGULAIR) 10 mg tablet  Self Yes Yes   Sig: Take 10 mg by mouth daily     omega-3-acid ethyl esters (LOVAZA) 1 g capsule  Self Yes Yes   Sig: Take 1 g by mouth daily   omeprazole (PriLOSEC) 40 MG capsule  Self Yes Yes   Sig: Take 40 mg by mouth daily   polyvinyl alcohol (LIQUIFILM TEARS) 1 4 % ophthalmic solution  Self Yes Yes   Si drops as needed for dry eyes   predniSONE 5 mg tablet   Yes Yes   Sig: Take 5 mg by mouth every other day On Monday, Wednesday, Friday   terbinafine (LamISIL) 1 % cream  Self Yes Yes   Sig: terbinafine HCl 1 % topical cream   tiotropium (SPIRIVA RESPIMAT) 1 25 MCG/ACT AERS inhaler   No Yes   Sig: Inhale 2 puffs daily      Facility-Administered Medications: None       Past Medical History:   Diagnosis Date    Asthma     [facility] disorder [facility]     Diabetes mellitus ([facility]     Enlarged prostate     Glaucoma     Hyperlipidemia     Hypertension     Seasonal allergic rhinitis     Seizures (HCC)        Past Surgical History:   Procedure Laterality Date    KNEE SURGERY      WRIST SURGERY Left        History reviewed  No pertinent family history  I have reviewed and agree with the history as documented      Social History     Tobacco Use    Smoking status: Former Smoker Last attempt to quit:      Years since quittin 5    Smokeless tobacco: Never Used   Substance Use Topics    Alcohol use: Not Currently     Alcohol/week: 12 0 standard drinks     Types: 12 Cans of beer per week     Frequency: 4 or more times a week     Drinks per session: 10 or more    Drug use: Not Currently     Types: Heroin     Comment: previous heroin clean since         Review of Systems   Constitutional: Negative  Negative for fatigue and fever  HENT: Negative  Negative for congestion, ear pain, rhinorrhea and sore throat  Eyes: Negative  Respiratory: Negative  Negative for cough, shortness of breath and wheezing  Cardiovascular: Positive for chest pain  Gastrointestinal: Positive for nausea and vomiting  Negative for abdominal pain and diarrhea  Endocrine: Negative  Genitourinary: Negative  Musculoskeletal: Negative  Negative for myalgias  Skin: Negative  Negative for rash  Allergic/Immunologic: Negative  Neurological: Negative  Negative for loss of consciousness and headaches  Hematological: Negative  Psychiatric/Behavioral: Negative  Physical Exam  Physical Exam   Constitutional: He is oriented to person, place, and time  He appears well-developed and well-nourished  Pt chest pain resolved with nitro      HENT:   Head: Normocephalic  Right Ear: External ear normal    Left Ear: External ear normal    Nose: Nose normal    Mouth/Throat: Oropharynx is clear and moist    Eyes: Pupils are equal, round, and reactive to light  Conjunctivae and EOM are normal    Neck: Normal range of motion  Neck supple  Cardiovascular: Normal rate, regular rhythm, normal heart sounds and intact distal pulses  Pulmonary/Chest: Effort normal and breath sounds normal    Abdominal: Soft  Bowel sounds are normal    Musculoskeletal: Normal range of motion  Neurological: He is alert and oriented to person, place, and time  Skin: Skin is warm   Capillary refill takes less than 2 seconds  Psychiatric: He has a normal mood and affect  His behavior is normal    Nursing note and vitals reviewed        Vital Signs  ED Triage Vitals [07/28/19 0800]   Temperature Pulse Respirations Blood Pressure SpO2   97 5 °F (36 4 °C) 94 22 153/93 97 %      Temp Source Heart Rate Source Patient Position - Orthostatic VS BP Location FiO2 (%)   Tympanic Monitor Lying Right arm --      Pain Score       Worst Possible Pain           Vitals:    07/28/19 0943 07/28/19 1110 07/28/19 1248 07/28/19 1526   BP: 115/92 143/97 143/97 126/79   Pulse: 87 84  79   Patient Position - Orthostatic VS: Lying Sitting  Sitting         Visual Acuity      ED Medications  Medications   sodium chloride 0 9 % infusion (0 mL/hr Intravenous Stopped 7/28/19 0951)   ondansetron (ZOFRAN) injection 4 mg (has no administration in time range)   enoxaparin (LOVENOX) subcutaneous injection 40 mg (40 mg Subcutaneous Given 7/28/19 1228)   acetaminophen (TYLENOL) tablet 650 mg (has no administration in time range)   insulin lispro (HumaLOG) 100 units/mL subcutaneous injection 1-6 Units (1 Units Subcutaneous Not Given 7/28/19 1238)   insulin lispro (HumaLOG) 100 units/mL subcutaneous injection 1-5 Units (has no administration in time range)   nitroglycerin (NITROSTAT) SL tablet 0 4 mg (has no administration in time range)   amLODIPine (NORVASC) tablet 2 5 mg (2 5 mg Oral Given 7/28/19 1248)   atorvastatin (LIPITOR) tablet 80 mg (has no administration in time range)   bimatoprost (LUMIGAN) 0 01 % ophthalmic solution 1 drop (has no administration in time range)   brimonidine (ALPHAGAN P) 0 15 % ophthalmic solution 1 drop (1 drop Both Eyes Given 7/28/19 1439)   calcium carbonate-vitamin D (OSCAL-D) 500 mg-200 units per tablet 1 tablet (has no administration in time range)   famotidine (PEPCID) tablet 20 mg (20 mg Oral Not Given 7/28/19 1256)   finasteride (PROSCAR) tablet 5 mg (5 mg Oral Given 7/28/19 1253)   fluticasone (FLONASE) 50 mcg/act nasal spray 1 spray (1 spray Nasal Given 7/28/19 1438)   gabapentin (NEURONTIN) capsule 300 mg (300 mg Oral Given 7/28/19 1250)   guaiFENesin (MUCINEX) 12 hr tablet 600 mg (600 mg Oral Given 7/28/19 1250)   insulin glargine (LANTUS) subcutaneous injection 15 Units 0 15 mL (has no administration in time range)   insulin lispro (HumaLOG) 100 units/mL subcutaneous injection 14 Units (has no administration in time range)   latanoprost (XALATAN) 0 005 % ophthalmic solution 1 drop (has no administration in time range)   melatonin tablet 3 mg (has no administration in time range)   montelukast (SINGULAIR) tablet 10 mg (10 mg Oral Given 7/28/19 1250)   fish oil capsule 1,000 mg (1,000 mg Oral Given 7/28/19 1250)   pantoprazole (PROTONIX) EC tablet 40 mg (40 mg Oral Given 7/28/19 1252)   tamsulosin (FLOMAX) capsule 0 4 mg (has no administration in time range)   tiotropium (SPIRIVA) capsule for inhaler 18 mcg (18 mcg Inhalation Given 7/28/19 1434)   albuterol inhalation solution 2 5 mg (has no administration in time range)   fluticasone (FLOVENT HFA) 110 MCG/ACT inhaler 1 puff (1 puff Inhalation Given 7/28/19 1437)   polyvinyl alcohol (LIQUIFILM TEARS) 1 4 % ophthalmic solution 2 drop (has no administration in time range)   carBAMazepine (TEGretol) tablet 400 mg (400 mg Oral Given 7/28/19 1250)   carBAMazepine (TEGretol) tablet 600 mg (has no administration in time range)   predniSONE tablet 5 mg (has no administration in time range)   ondansetron (ZOFRAN) 4 mg/2 mL injection **ADS Override Pull** (  Given to EMS 7/28/19 0801)   nitroglycerin (NITROSTAT) SL tablet 0 4 mg (0 4 mg Sublingual Given 7/28/19 0811)   albuterol inhalation solution 2 5 mg (2 5 mg Nebulization Given 7/28/19 0829)       Diagnostic Studies  Results Reviewed     Procedure Component Value Units Date/Time    Troponin I [[id number]]  (Normal) Collected:  07/28/19 0817    Lab Status:  Final result Specimen:  Blood from Arm, Left Updated:  07/28/19 9742     Troponin I <0 01 ng/mL     Lipase [[id number]]  (Normal) Collected:  07/28/19 0817    Lab Status:  Final result Specimen:  Blood from Arm, Left Updated:  07/28/19 0833     Lipase 68 u/L     CK (with reflex to MB) [[id number]]  (Normal) Collected:  07/28/19 0817    Lab Status:  Final result Specimen:  Blood from Arm, Left Updated:  07/28/19 0833     Total  U/L     Comprehensive metabolic panel [[id number]]  (Abnormal) Collected:  07/28/19 0817    Lab Status:  Final result Specimen:  Blood from Arm, Left Updated:  07/28/19 4373     Sodium 140 mmol/L      Potassium 3 8 mmol/L      Chloride 104 mmol/L      CO2 27 mmol/L      ANION GAP 9 mmol/L      BUN 13 mg/dL      Creatinine 0 84 mg/dL      Glucose 164 mg/dL      Calcium 9 3 mg/dL      AST 21 U/L      ALT 29 U/L      Alkaline Phosphatase 65 U/L      Total Protein 6 4 g/dL      Albumin 4 0 g/dL      Total Bilirubin 0 30 mg/dL      eGFR 99 ml/min/1 73sq m     Narrative:       [place] guidelines for Chronic Kidney Disease (CKD):     Stage 1 with normal or high GFR (GFR > 90 mL/min/1 73 square meters)    Stage 2 Mild CKD (GFR = 60-89 mL/min/1 73 square meters)    Stage 3A Moderate CKD (GFR = 45-59 mL/min/1 73 square meters)    Stage 3B Moderate CKD (GFR = 30-44 mL/min/1 73 square meters)    Stage 4 Severe CKD (GFR = 15-29 mL/min/1 73 square meters)    Stage 5 End Stage CKD (GFR <15 mL/min/1 73 square meters)  Note: GFR calculation is accurate only with a steady state creatinine    CBC and differential [[id number]]  (Abnormal) Collected:  07/28/19 0817    Lab Status:  Final result Specimen:  Blood from Arm, Left Updated:  07/28/19 0824     WBC 5 60 Thousand/uL      RBC 4 98 Million/uL      Hemoglobin 14 4 g/dL      Hematocrit 42 6 %      MCV 85 fL      MCH 28 8 pg      MCHC 33 8 g/dL      RDW 12 9 %      MPV 7 4 fL      Platelets 718 Thousands/uL      Neutrophils Relative 68 %      Lymphocytes Relative 18 %      Monocytes Relative 10 %      Eosinophils Relative 3 %      Basophils Relative 1 %      Neutrophils Absolute 3 80 Thousands/µL      Lymphocytes Absolute 1 00 Thousands/µL      Monocytes Absolute 0 60 Thousand/µL      Eosinophils Absolute 0 20 Thousand/µL      Basophils Absolute 0 00 Thousands/µL                  XR chest 1 view portable   Final Result by Tiffanie Malloy MD (07/28 7388)      No acute cardiopulmonary disease  Workstation performed: [id number]                    Procedures  Procedures       ED Course         HEART Risk Score      Most Recent Value   History  1 Filed at: 07/28/2019 0926   ECG  1 Filed at: 07/28/2019 9636   Age  1 Filed at: 07/28/2019 0926   Risk Factors  1 Filed at: 07/28/2019 0926   Troponin  0 Filed at: 07/28/2019 0926   Heart Score Risk Calculator   History  1 Filed at: 07/28/2019 0926   ECG  1 Filed at: 07/28/2019 3769   Age  1 Filed at: 07/28/2019 7965   Risk Factors  1 Filed at: 07/28/2019 0926   Troponin  0 Filed at: 07/28/2019 0926   HEART Score  4 Filed at: 07/28/2019 6375   HEART Score  4 Filed at: 07/28/2019 7055                            MDM    Disposition  Final diagnoses:   Chest pain     Time reflects when diagnosis was documented in both MDM as applicable and the Disposition within this note     Time User Action Codes Description Comment    7/28/2019  9:47 AM Dat Caldwell   Add [R07 9] Chest pain       ED Disposition     ED Disposition Condition Date/Time Comment    Admit Stable Sun Jul 28, 2019  9:47 AM Case was discussed with Dr Terri Levine and the patient's admission status was agreed to be Admission Status: inpatient status to the service of Dr Terri Levine           Follow-up Information    None         Current Discharge Medication List      CONTINUE these medications which have NOT CHANGED    Details   albuterol (2 5 mg/3 mL) 0 083 % nebulizer solution Take 1 vial (2 5 mg total) by nebulization every 4 (four) hours as needed for shortness of breath  Qty: 25 vial, Refills: 0    Associated Diagnoses: Severe persistent asthma with acute exacerbation      albuterol (PROVENTIL HFA,VENTOLIN HFA) 90 mcg/act inhaler Inhale 2 puffs every 4 (four) hours as needed for wheezing  Qty: 1 Inhaler, Refills: 0    Associated Diagnoses: Severe persistent asthma with acute exacerbation      amLODIPine (NORVASC) 2 5 mg tablet Take 1 tablet (2 5 mg total) by mouth daily  Qty: 30 tablet, Refills: 5    Comments: Please discontinue previous order  Associated Diagnoses: Essential hypertension      atorvastatin (LIPITOR) 80 mg tablet Take 80 mg by mouth daily at bedtime      beclomethasone (QVAR) 40 MCG/ACT inhaler Inhale 2 puffs as needed Rinse mouth after use         bimatoprost (LUMIGAN) 0 03 % ophthalmic drops Administer 1 drop to both eyes daily at bedtime        Blood Glucose Monitoring Suppl (ACTribzi BLOOD GLUCOSE METER) w/Device KIT by Does not apply route      brimonidine (ALPHAGAN P) 0 15 % ophthalmic solution Administer 1 drop to both eyes every 8 (eight) hours        Calcium Carb-Cholecalciferol (CALCIUM CARBONATE-VITAMIN D3 PO) Take 1,500 mg by mouth daily      carBAMazepine (TEGRETOL) 200 mg tablet Take 2 tabs in the morning and 3 tabs at night      dupilumab (DUPIXENT) subcutaneous injection Inject under the skin every 14 (fourteen) days      finasteride (PROSCAR) 5 mg tablet Take 5 mg by mouth daily      !! fluticasone (FLONASE) 50 mcg/act nasal spray 1 spray into each nostril daily      !! fluticasone (FLONASE) 50 mcg/act nasal spray 1 spray into each nostril daily  Qty: 16 g, Refills: 0    Associated Diagnoses:  Allergic reaction, initial encounter      gabapentin (NEURONTIN) 300 mg capsule Take 300 mg by mouth 3 (three) times a day      glucagon 1 MG injection as needed      guaiFENesin (MUCINEX) 600 mg 12 hr tablet Take 1 tablet (600 mg total) by mouth every 12 (twelve) hours  Qty: 12 tablet, Refills: 0    Associated Diagnoses: Acute bronchitis      hydrocortisone (ANUSOL-HC, PROCTOSOL HC,) 2 5 % rectal cream Insert 1 application into the rectum 2 (two) times a day      insulin glargine (BASAGLAR KWIKPEN) 100 units/mL injection pen Inject 40 Units under the skin daily at bedtime      !! insulin lispro (HumaLOG) 100 units/mL injection Inject 12 Units under the skin daily with breakfast  Qty: 10 mL, Refills: 0    Associated Diagnoses: Type 2 diabetes mellitus, with long-term current use of insulin ([address] )      ! ! insulin lispro (HumaLOG) 100 units/mL injection Inject 16 Units under the skin daily with lunch  Qty: 10 mL, Refills: 0    Associated Diagnoses: Type 2 diabetes mellitus, with long-term current use of insulin ([address] )      ! ! insulin lispro (HumaLOG) 100 units/mL injection Inject 16 Units under the skin daily with dinner  Qty: 10 mL, Refills: 0    Associated Diagnoses: Type 2 diabetes mellitus, with long-term current use of insulin (HCC)      Insulin Pen Needle 32G X 4 MM MISC Unifine Pentips Plus 32 gauge x 5/32" needle      ipratropium (ATROVENT) 0 02 % nebulizer solution Take 0 5 mg by nebulization every 6 (six) hours as needed for wheezing or shortness of breath        latanoprost (XALATAN) 0 005 % ophthalmic solution Administer 1 drop to both eyes daily      melatonin 3 mg Take 3 mg by mouth daily at bedtime      Misc Natural Products (BLOOD SUGAR 360 PO) Use to check BG 7x/day  Glycemic fluctuations  Steroid induced hyperglycemia   E11 65      montelukast (SINGULAIR) 10 mg tablet Take 10 mg by mouth daily        omega-3-acid ethyl esters (LOVAZA) 1 g capsule Take 1 g by mouth daily      omeprazole (PriLOSEC) 40 MG capsule Take 40 mg by mouth daily      polyvinyl alcohol (LIQUIFILM TEARS) 1 4 % ophthalmic solution 2 drops as needed for dry eyes      predniSONE 5 mg tablet Take 5 mg by mouth every other day On Monday, Wednesday, Friday      TAMSULOSIN HCL PO Take 0 4 mg by mouth daily at bedtime        terbinafine (LamISIL) 1 % cream terbinafine HCl 1 % topical cream      tiotropium (SPIRIVA RESPIMAT) 1 25 MCG/ACT AERS inhaler Inhale 2 puffs daily  Qty: 1 Inhaler, Refills: 6    Associated Diagnoses: Severe persistent asthma with acute exacerbation      famotidine (PEPCID) 20 mg tablet Take 1 tablet (20 mg total) by mouth 2 (two) times a day  Qty: 30 tablet, Refills: 0    Associated Diagnoses: Allergic reaction, initial encounter       !! - Potential duplicate medications found  Please discuss with provider  No discharge procedures on file      ED Provider  Electronically Signed by           Karine Rubio PA-C  07/28/19 1800

## 2019-07-28 NOTE — NURSING NOTE
Pt arrived on the unit  To #[id number]  Alert & oriented - denies chest pain SOB or nausea  Lungs clear  -  placed  on tele - monitor

## 2019-07-28 NOTE — PLAN OF CARE
Problem: Nutrition/Hydration-ADULT  Goal: Nutrient/Hydration intake appropriate for improving, restoring or maintaining nutritional needs  Description  Monitor and assess patient's nutrition/hydration status for malnutrition (ex- brittle hair, bruises, dry skin, pale skin and conjunctiva, muscle wasting, smooth red tongue, and disorientation)  Collaborate with interdisciplinary team and initiate plan and interventions as ordered  Monitor patient's weight and dietary intake as ordered or per policy  Utilize nutrition screening tool and intervene per policy  Determine patient's food preferences and provide high-protein, high-caloric foods as appropriate  INTERVENTIONS:  - Monitor oral intake, urinary output, labs, and treatment plans  - Assess nutrition and hydration status and recommend course of action  - Evaluate amount of meals eaten  - Assist patient with eating if necessary   - Allow adequate time for meals  - Recommend/ encourage appropriate diets, oral nutritional supplements, and vitamin/mineral supplements  - Order, calculate, and assess calorie counts as needed  - Recommend, monitor, and adjust tube feedings and TPN/PPN based on assessed needs  - Assess need for intravenous fluids  - Provide specific nutrition/hydration education as appropriate  - Include patient/family/caregiver in decisions related to nutrition  Outcome: Progressing     Problem: CARDIOVASCULAR - ADULT  Goal: Maintains optimal cardiac output and hemodynamic stability  Description  INTERVENTIONS:  - Monitor I/O, vital signs and rhythm  - Monitor for S/S and trends of decreased cardiac output i e  bleeding, hypotension  - Administer and titrate ordered vasoactive medications to optimize hemodynamic stability  - Assess quality of pulses, skin color and temperature  - Assess for signs of decreased coronary artery perfusion - ex   Angina  - Instruct patient to report change in severity of symptoms  Outcome: Progressing  Goal: Absence of cardiac dysrhythmias or at baseline rhythm  Description  INTERVENTIONS:  - Continuous cardiac monitoring, monitor vital signs, obtain 12 lead EKG if indicated  - Administer antiarrhythmic and heart rate control medications as ordered  - Monitor electrolytes and administer replacement therapy as ordered  Outcome: Progressing     Problem: RESPIRATORY - ADULT  Goal: Achieves optimal ventilation and oxygenation  Description  INTERVENTIONS:  - Assess for changes in respiratory status  - Assess for changes in mentation and behavior  - Position to facilitate oxygenation and minimize respiratory effort  - Oxygen administration by appropriate delivery method based on oxygen saturation (per order) or ABGs  - Initiate smoking cessation education as indicated  - Encourage broncho-pulmonary hygiene including cough, deep breathe, Incentive Spirometry  - Assess the need for suctioning and aspirate as needed  - Assess and instruct to report SOB or any respiratory difficulty  - Respiratory Therapy support as indicated  Outcome: Progressing     Problem: GASTROINTESTINAL - ADULT  Goal: Minimal or absence of nausea and/or vomiting  Description  INTERVENTIONS:  - Administer IV fluids as ordered to ensure adequate hydration  - Maintain NPO status until nausea and vomiting are resolved  - Nasogastric tube as ordered  - Administer ordered antiemetic medications as needed  - Provide nonpharmacologic comfort measures as appropriate  - Advance diet as tolerated, if ordered  - Nutrition services referral to assist patient with adequate nutrition and appropriate food choices  Outcome: Progressing  Goal: Maintains or returns to baseline bowel function  Description  INTERVENTIONS:  - Assess bowel function  - Encourage oral fluids to ensure adequate hydration  - Administer IV fluids as ordered to ensure adequate hydration  - Administer ordered medications as needed  - Encourage mobilization and activity  - Nutrition services referral to assist patient with appropriate food choices  Outcome: Progressing  Goal: Maintains adequate nutritional intake  Description  INTERVENTIONS:  - Monitor percentage of each meal consumed  - Identify factors contributing to decreased intake, treat as appropriate  - Assist with meals as needed  - Monitor I&O, WT and lab values  - Obtain nutrition services referral as needed  Outcome: Progressing  Goal: Establish and maintain optimal ostomy function  Description  INTERVENTIONS:  - Assess bowel function  - Encourage oral fluids to ensure adequate hydration  - Administer IV fluids as ordered to ensure adequate hydration  - Administer ordered medications as needed  - Encourage mobilization and activity  - Nutrition services referral to assist patient with appropriate food choices  - Assess stoma site  Outcome: Progressing     Problem: METABOLIC, FLUID AND ELECTROLYTES - ADULT  Goal: Electrolytes maintained within normal limits  Description  INTERVENTIONS:  - Monitor labs and assess patient for signs and symptoms of electrolyte imbalances  - Administer electrolyte replacement as ordered  - Monitor response to electrolyte replacements, including repeat lab results as appropriate  - Instruct patient on fluid and nutrition as appropriate  Outcome: Progressing  Goal: Fluid balance maintained  Description  INTERVENTIONS:  - Monitor labs and assess for signs and symptoms of volume excess or deficit  - Monitor I/O and WT  - Instruct patient on fluid and nutrition as appropriate  Outcome: Progressing  Goal: Glucose maintained within target range  Description  INTERVENTIONS:  - Monitor Blood Glucose as ordered  - Assess for signs and symptoms of hyperglycemia and hypoglycemia  - Administer ordered medications to maintain glucose within target range  - Assess nutritional intake and initiate nutrition service referral as needed  Outcome: Progressing     Problem: SKIN/TISSUE INTEGRITY - ADULT  Goal: Skin integrity remains intact  Description  INTERVENTIONS  - Identify patients at risk for skin breakdown  - Assess and monitor skin integrity  - Assess and monitor nutrition and hydration status  - Monitor labs (i e  albumin)  - Assess for incontinence   - Turn and reposition patient  - Assist with mobility/ambulation  - Relieve pressure over bony prominences  - Avoid friction and shearing  - Provide appropriate hygiene as needed including keeping skin clean and dry  - Evaluate need for skin moisturizer/barrier cream  - Collaborate with interdisciplinary team (i e  Nutrition, Rehabilitation, etc )   - Patient/family teaching  Outcome: Progressing  Goal: Incision(s), wounds(s) or drain site(s) healing without S/S of infection  Description  INTERVENTIONS  - Assess and document risk factors for skin impairment   - Assess and document dressing, incision, wound bed, drain sites and surrounding tissue  - Initiate Nutrition services consult and/or wound management as needed  Outcome: Progressing  Goal: Oral mucous membranes remain intact  Description  INTERVENTIONS  - Assess oral mucosa and hygiene practices  - Implement preventative oral hygiene regimen  - Implement oral medicated treatments as ordered  - Initiate Nutrition services referral as needed  Outcome: Progressing     Problem: DISCHARGE PLANNING - CARE MANAGEMENT  Goal: Discharge to post-acute care or home with appropriate resources  Description  INTERVENTIONS:  - Conduct assessment to determine patient/family and health care team treatment goals, and need for post-acute services based on payer coverage, community resources, and patient preferences, and barriers to discharge  - Address psychosocial, clinical, and financial barriers to discharge as identified in assessment in conjunction with the patient/family and health care team  - Arrange appropriate level of post-acute services according to patients   needs and preference and payer coverage in collaboration with the physician and health care team  - Communicate with and update the patient/family, physician, and health care team regarding progress on the discharge plan  - Arrange appropriate transportation to post-acute venues  Outcome: Progressing     Problem: SAFETY ADULT  Goal: Patient will remain free of falls  Description  INTERVENTIONS:  - Assess patient frequently for physical needs  -  Identify cognitive and physical deficits and behaviors that affect risk of falls    -  [place] fall precautions as indicated by assessment   - Educate patient/family on patient safety including physical limitations  - Instruct patient to call for assistance with activity based on assessment  - Modify environment to reduce risk of injury  - Consider OT/PT consult to assist with strengthening/mobility  Outcome: Progressing  Goal: Maintain or return to baseline ADL function  Description  INTERVENTIONS:  -  Assess patient's ability to carry out ADLs; assess patient's baseline for ADL function and identify physical deficits which impact ability to perform ADLs (bathing, care of mouth/teeth, toileting, grooming, dressing, etc )  - Assess/evaluate cause of self-care deficits   - Assess range of motion  - Assess patient's mobility; develop plan if impaired  - Assess patient's need for assistive devices and provide as appropriate  - Encourage maximum independence but intervene and supervise when necessary  ¯ Involve family in performance of ADLs  ¯ Assess for home care needs following discharge   ¯ Request OT consult to assist with ADL evaluation and planning for discharge  ¯ Provide patient education as appropriate  Outcome: Progressing  Goal: Maintain or return mobility status to optimal level  Description  INTERVENTIONS:  - Assess patient's baseline mobility status (ambulation, transfers, stairs, etc )    - Identify cognitive and physical deficits and behaviors that affect mobility  - Identify mobility aids required to assist with transfers and/or ambulation (gait belt, sit-to-stand, lift, walker, cane, etc )  - [place] fall precautions as indicated by assessment  - Record patient progress and toleration of activity level on Mobility SBAR; progress patient to next Phase/Stage  - Instruct patient to call for assistance with activity based on assessment  - Request Rehabilitation consult to assist with strengthening/weightbearing, etc   Outcome: Progressing     Problem: Knowledge Deficit  Goal: Patient/family/caregiver demonstrates understanding of disease process, treatment plan, medications, and discharge instructions  Description  Complete learning assessment and assess knowledge base    Interventions:  - Provide teaching at level of understanding  - Provide teaching via preferred learning methods  Outcome: Progressing

## 2019-07-29 ENCOUNTER — APPOINTMENT (INPATIENT)
Dept: RADIOLOGY | Facility: HOSPITAL | Age: 54
DRG: 203 | End: 2019-07-29
Attending: INTERNAL MEDICINE
Payer: COMMERCIAL

## 2019-07-29 VITALS
WEIGHT: 207.23 LBS | BODY MASS INDEX: 30.69 KG/M2 | TEMPERATURE: 98.2 F | DIASTOLIC BLOOD PRESSURE: 81 MMHG | SYSTOLIC BLOOD PRESSURE: 113 MMHG | OXYGEN SATURATION: 100 % | HEIGHT: 69 IN | HEART RATE: 86 BPM | RESPIRATION RATE: 18 BRPM

## 2019-07-29 LAB
ANION GAP SERPL CALCULATED.3IONS-SCNC: 6 MMOL/L (ref 5–14)
BUN SERPL-MCNC: 11 MG/DL (ref 5–25)
CALCIUM SERPL-MCNC: 8.9 MG/DL (ref 8.4–10.2)
CHEST PAIN STATEMENT: NORMAL
CHLORIDE SERPL-SCNC: 103 MMOL/L (ref 97–108)
CO2 SERPL-SCNC: 30 MMOL/L (ref 22–30)
CREAT SERPL-MCNC: 0.86 MG/DL (ref 0.7–1.5)
ERYTHROCYTE [DISTWIDTH] IN BLOOD BY AUTOMATED COUNT: 12.8 %
GFR SERPL CREATININE-BSD FRML MDRD: 98 ML/MIN/1.73SQ M
GLUCOSE SERPL-MCNC: 130 MG/DL (ref 70–99)
GLUCOSE SERPL-MCNC: 138 MG/DL (ref 65–140)
GLUCOSE SERPL-MCNC: 175 MG/DL (ref 65–140)
GLUCOSE SERPL-MCNC: 183 MG/DL (ref 65–140)
HCT VFR BLD AUTO: 44.7 % (ref 41–53)
HGB BLD-MCNC: 14.9 G/DL (ref 13.5–17.5)
MAX DIASTOLIC BP: 95 MMHG
MAX HEART RATE: 105 BPM
MAX PREDICTED HEART RATE: 165 BPM
MAX. SYSTOLIC BP: 151 MMHG
MCH RBC QN AUTO: 29 PG (ref 26–34)
MCHC RBC AUTO-ENTMCNC: 33.3 G/DL (ref 31–36)
MCV RBC AUTO: 87 FL (ref 80–100)
PLATELET # BLD AUTO: 222 THOUSANDS/UL (ref 150–450)
PMV BLD AUTO: 7.6 FL (ref 8.9–12.7)
POTASSIUM SERPL-SCNC: 3.6 MMOL/L (ref 3.6–5)
PROTOCOL NAME: NORMAL
RBC # BLD AUTO: 5.13 MILLION/UL (ref 4.5–5.9)
REASON FOR TERMINATION: NORMAL
SODIUM SERPL-SCNC: 139 MMOL/L (ref 137–147)
TARGET HR FORMULA: NORMAL
TEST INDICATION: NORMAL
TIME IN EXERCISE PHASE: NORMAL
WBC # BLD AUTO: 6 THOUSAND/UL (ref 4.5–11)

## 2019-07-29 PROCEDURE — 82948 REAGENT STRIP/BLOOD GLUCOSE: CPT

## 2019-07-29 PROCEDURE — 78452 HT MUSCLE IMAGE SPECT MULT: CPT | Performed by: INTERNAL MEDICINE

## 2019-07-29 PROCEDURE — 94660 CPAP INITIATION&MGMT: CPT

## 2019-07-29 PROCEDURE — 99239 HOSP IP/OBS DSCHRG MGMT >30: CPT | Performed by: INTERNAL MEDICINE

## 2019-07-29 PROCEDURE — 85027 COMPLETE CBC AUTOMATED: CPT | Performed by: INTERNAL MEDICINE

## 2019-07-29 PROCEDURE — 78452 HT MUSCLE IMAGE SPECT MULT: CPT

## 2019-07-29 PROCEDURE — A9502 TC99M TETROFOSMIN: HCPCS

## 2019-07-29 PROCEDURE — 93017 CV STRESS TEST TRACING ONLY: CPT

## 2019-07-29 PROCEDURE — 93016 CV STRESS TEST SUPVJ ONLY: CPT | Performed by: INTERNAL MEDICINE

## 2019-07-29 PROCEDURE — 80048 BASIC METABOLIC PNL TOTAL CA: CPT | Performed by: INTERNAL MEDICINE

## 2019-07-29 PROCEDURE — 93018 CV STRESS TEST I&R ONLY: CPT | Performed by: INTERNAL MEDICINE

## 2019-07-29 RX ADMIN — CARBAMAZEPINE 400 MG: 200 TABLET ORAL at 08:36

## 2019-07-29 RX ADMIN — CALCIUM CARBONATE-VITAMIN D TAB 500 MG-200 UNIT 1 TABLET: 500-200 TAB at 13:05

## 2019-07-29 RX ADMIN — ENOXAPARIN SODIUM 40 MG: 40 INJECTION SUBCUTANEOUS at 08:37

## 2019-07-29 RX ADMIN — Medication 1000 MG: at 13:06

## 2019-07-29 RX ADMIN — AMLODIPINE BESYLATE 2.5 MG: 2.5 TABLET ORAL at 08:36

## 2019-07-29 RX ADMIN — FINASTERIDE 5 MG: 5 TABLET, FILM COATED ORAL at 08:37

## 2019-07-29 RX ADMIN — INSULIN LISPRO 1 UNITS: 100 INJECTION, SOLUTION INTRAVENOUS; SUBCUTANEOUS at 12:59

## 2019-07-29 RX ADMIN — LATANOPROST 1 DROP: 50 SOLUTION OPHTHALMIC at 08:39

## 2019-07-29 RX ADMIN — GABAPENTIN 300 MG: 300 CAPSULE ORAL at 08:36

## 2019-07-29 RX ADMIN — BRIMONIDINE TARTRATE 1 DROP: 1.5 SOLUTION OPHTHALMIC at 13:06

## 2019-07-29 RX ADMIN — BRIMONIDINE TARTRATE 1 DROP: 1.5 SOLUTION OPHTHALMIC at 04:30

## 2019-07-29 RX ADMIN — PANTOPRAZOLE SODIUM 40 MG: 40 TABLET, DELAYED RELEASE ORAL at 05:54

## 2019-07-29 RX ADMIN — REGADENOSON 0.4 MG: 0.08 INJECTION, SOLUTION INTRAVENOUS at 11:26

## 2019-07-29 RX ADMIN — INSULIN LISPRO 14 UNITS: 100 INJECTION, SOLUTION INTRAVENOUS; SUBCUTANEOUS at 16:42

## 2019-07-29 RX ADMIN — GABAPENTIN 300 MG: 300 CAPSULE ORAL at 16:42

## 2019-07-29 RX ADMIN — FLUTICASONE PROPIONATE 1 PUFF: 110 AEROSOL, METERED RESPIRATORY (INHALATION) at 08:38

## 2019-07-29 RX ADMIN — INSULIN LISPRO 1 UNITS: 100 INJECTION, SOLUTION INTRAVENOUS; SUBCUTANEOUS at 16:43

## 2019-07-29 RX ADMIN — PREDNISONE 5 MG: 5 TABLET ORAL at 13:06

## 2019-07-29 RX ADMIN — MONTELUKAST SODIUM 10 MG: 10 TABLET, FILM COATED ORAL at 08:37

## 2019-07-29 RX ADMIN — GUAIFENESIN 600 MG: 600 TABLET, EXTENDED RELEASE ORAL at 08:37

## 2019-07-29 RX ADMIN — TIOTROPIUM BROMIDE 18 MCG: 18 CAPSULE ORAL; RESPIRATORY (INHALATION) at 08:47

## 2019-07-29 RX ADMIN — FLUTICASONE PROPIONATE 1 SPRAY: 50 SPRAY, METERED NASAL at 08:47

## 2019-07-29 NOTE — ASSESSMENT & PLAN NOTE
Patient underwent a stress test  Negative stress test no evidence of ischemia no wall motion abnormality  Cardiac enzyme negative EKG no changes  Chest pain resolved medically stable and is discharged home  Patient has all his medication  Ejection fraction normal

## 2019-07-29 NOTE — NURSING NOTE
Pt resting in bed, no signs of distress  Denies CP and SOB  SR on monitor  Call bell within reach, will continue to monitor

## 2019-07-29 NOTE — ASSESSMENT & PLAN NOTE
Lab Results   Component Value Date    HGBA1C 10 6 (H) 12/11/2014    Patient has all her medication discussed with the patient needs better control of blood sugar    Recent Labs     07/28/19  2013 07/29/19  0549 07/29/19  1259 07/29/19  1603   POCGLU 76 138 175* 183*       Blood Sugar Average: Last 72 hrs:  (P) 138 2

## 2019-07-29 NOTE — SOCIAL WORK
LOS 1 GMLOS NA  Pt is not bundled or a 30 day readmission    Pt admitted for c/o chest pain    CM in to see patient for assessment  Pt alert and states he lives with his wife in [address] [facility]  [address], [address] [facility] [address]  Pt is independent with ADLs  He uses a C Pap machine every night to sleep and uses a cane at times to ambulate  Pt has 5 daughters they are all grown and live far away  Pt does use use any in home services currently and has not been in a SNF in the past 60 days  He does not have a living will or is not a   PCP is Dr Jojo Carpenter and he uses [facility] pharmacy on [address]  Pt receives SSI/SSD as his only source of income  Pt states he has a hx of heroin use, he stopped using in 1996  Pt reports hx of Bipolar Depression, his Psychiatrist is Dr Coleen Baxter on [address]  He was previously in a [facility] hospital for 3 years in the 80's for depression  Pt does not drive because of hx of seizures and he states he will need transportation home when medically cleared for D/C  CM will follow D/c needs

## 2019-07-29 NOTE — PLAN OF CARE
Problem: CARDIOVASCULAR - ADULT  Goal: Maintains optimal cardiac output and hemodynamic stability  Description  INTERVENTIONS:  - Monitor I/O, vital signs and rhythm  - Monitor for S/S and trends of decreased cardiac output i e  bleeding, hypotension  - Administer and titrate ordered vasoactive medications to optimize hemodynamic stability  - Assess quality of pulses, skin color and temperature  - Assess for signs of decreased coronary artery perfusion - ex  Angina  - Instruct patient to report change in severity of symptoms  Outcome: Progressing     Problem: DISCHARGE PLANNING - CARE MANAGEMENT  Goal: Discharge to post-acute care or home with appropriate resources  Description  INTERVENTIONS:  - Conduct assessment to determine patient/family and health care team treatment goals, and need for post-acute services based on payer coverage, community resources, and patient preferences, and barriers to discharge  - Address psychosocial, clinical, and financial barriers to discharge as identified in assessment in conjunction with the patient/family and health care team  - Arrange appropriate level of post-acute services according to patients   needs and preference and payer coverage in collaboration with the physician and health care team  - Communicate with and update the patient/family, physician, and health care team regarding progress on the discharge plan  - Arrange appropriate transportation to post-acute venues  Outcome: Progressing     Problem: Knowledge Deficit  Goal: Patient/family/caregiver demonstrates understanding of disease process, treatment plan, medications, and discharge instructions  Description  Complete learning assessment and assess knowledge base    Interventions:  - Provide teaching at level of understanding  - Provide teaching via preferred learning methods  Outcome: Progressing     Problem: Nutrition/Hydration-ADULT  Goal: Nutrient/Hydration intake appropriate for improving, restoring or maintaining nutritional needs  Description  Monitor and assess patient's nutrition/hydration status for malnutrition (ex- brittle hair, bruises, dry skin, pale skin and conjunctiva, muscle wasting, smooth red tongue, and disorientation)  Collaborate with interdisciplinary team and initiate plan and interventions as ordered  Monitor patient's weight and dietary intake as ordered or per policy  Utilize nutrition screening tool and intervene per policy  Determine patient's food preferences and provide high-protein, high-caloric foods as appropriate       INTERVENTIONS:  - Monitor oral intake, urinary output, labs, and treatment plans  - Assess nutrition and hydration status and recommend course of action  - Evaluate amount of meals eaten  - Assist patient with eating if necessary   - Allow adequate time for meals  - Recommend/ encourage appropriate diets, oral nutritional supplements, and vitamin/mineral supplements  - Order, calculate, and assess calorie counts as needed  - Recommend, monitor, and adjust tube feedings and TPN/PPN based on assessed needs  - Assess need for intravenous fluids  - Provide specific nutrition/hydration education as appropriate  - Include patient/family/caregiver in decisions related to nutrition  Outcome: Adequate for Discharge     Problem: CARDIOVASCULAR - ADULT  Goal: Absence of cardiac dysrhythmias or at baseline rhythm  Description  INTERVENTIONS:  - Continuous cardiac monitoring, monitor vital signs, obtain 12 lead EKG if indicated  - Administer antiarrhythmic and heart rate control medications as ordered  - Monitor electrolytes and administer replacement therapy as ordered  Outcome: Adequate for Discharge     Problem: RESPIRATORY - ADULT  Goal: Achieves optimal ventilation and oxygenation  Description  INTERVENTIONS:  - Assess for changes in respiratory status  - Assess for changes in mentation and behavior  - Position to facilitate oxygenation and minimize respiratory effort  - Oxygen administration by appropriate delivery method based on oxygen saturation (per order) or ABGs  - Initiate smoking cessation education as indicated  - Encourage broncho-pulmonary hygiene including cough, deep breathe, Incentive Spirometry  - Assess the need for suctioning and aspirate as needed  - Assess and instruct to report SOB or any respiratory difficulty  - Respiratory Therapy support as indicated  Outcome: Adequate for Discharge     Problem: GASTROINTESTINAL - ADULT  Goal: Minimal or absence of nausea and/or vomiting  Description  INTERVENTIONS:  - Administer IV fluids as ordered to ensure adequate hydration  - Maintain NPO status until nausea and vomiting are resolved  - Nasogastric tube as ordered  - Administer ordered antiemetic medications as needed  - Provide nonpharmacologic comfort measures as appropriate  - Advance diet as tolerated, if ordered  - Nutrition services referral to assist patient with adequate nutrition and appropriate food choices  Outcome: Adequate for Discharge  Goal: Maintains or returns to baseline bowel function  Description  INTERVENTIONS:  - Assess bowel function  - Encourage oral fluids to ensure adequate hydration  - Administer IV fluids as ordered to ensure adequate hydration  - Administer ordered medications as needed  - Encourage mobilization and activity  - Nutrition services referral to assist patient with appropriate food choices  Outcome: Adequate for Discharge  Goal: Maintains adequate nutritional intake  Description  INTERVENTIONS:  - Monitor percentage of each meal consumed  - Identify factors contributing to decreased intake, treat as appropriate  - Assist with meals as needed  - Monitor I&O, WT and lab values  - Obtain nutrition services referral as needed  Outcome: Adequate for Discharge  Goal: Establish and maintain optimal ostomy function  Description  INTERVENTIONS:  - Assess bowel function  - Encourage oral fluids to ensure adequate hydration  - Administer IV fluids as ordered to ensure adequate hydration  - Administer ordered medications as needed  - Encourage mobilization and activity  - Nutrition services referral to assist patient with appropriate food choices  - Assess stoma site  Outcome: Adequate for Discharge     Problem: METABOLIC, FLUID AND ELECTROLYTES - ADULT  Goal: Electrolytes maintained within normal limits  Description  INTERVENTIONS:  - Monitor labs and assess patient for signs and symptoms of electrolyte imbalances  - Administer electrolyte replacement as ordered  - Monitor response to electrolyte replacements, including repeat lab results as appropriate  - Instruct patient on fluid and nutrition as appropriate  Outcome: Adequate for Discharge  Goal: Fluid balance maintained  Description  INTERVENTIONS:  - Monitor labs and assess for signs and symptoms of volume excess or deficit  - Monitor I/O and WT  - Instruct patient on fluid and nutrition as appropriate  Outcome: Adequate for Discharge  Goal: Glucose maintained within target range  Description  INTERVENTIONS:  - Monitor Blood Glucose as ordered  - Assess for signs and symptoms of hyperglycemia and hypoglycemia  - Administer ordered medications to maintain glucose within target range  - Assess nutritional intake and initiate nutrition service referral as needed  Outcome: Adequate for Discharge     Problem: SKIN/TISSUE INTEGRITY - ADULT  Goal: Skin integrity remains intact  Description  INTERVENTIONS  - Identify patients at risk for skin breakdown  - Assess and monitor skin integrity  - Assess and monitor nutrition and hydration status  - Monitor labs (i e  albumin)  - Assess for incontinence   - Turn and reposition patient  - Assist with mobility/ambulation  - Relieve pressure over bony prominences  - Avoid friction and shearing  - Provide appropriate hygiene as needed including keeping skin clean and dry  - Evaluate need for skin moisturizer/barrier cream  - Collaborate with interdisciplinary team (i e  Nutrition, Rehabilitation, etc )   - Patient/family teaching  Outcome: Adequate for Discharge  Goal: Incision(s), wounds(s) or drain site(s) healing without S/S of infection  Description  INTERVENTIONS  - Assess and document risk factors for skin impairment   - Assess and document dressing, incision, wound bed, drain sites and surrounding tissue  - Initiate Nutrition services consult and/or wound management as needed  Outcome: Adequate for Discharge  Goal: Oral mucous membranes remain intact  Description  INTERVENTIONS  - Assess oral mucosa and hygiene practices  - Implement preventative oral hygiene regimen  - Implement oral medicated treatments as ordered  - Initiate Nutrition services referral as needed  Outcome: Adequate for Discharge     Problem: SAFETY ADULT  Goal: Patient will remain free of falls  Description  INTERVENTIONS:  - Assess patient frequently for physical needs  -  Identify cognitive and physical deficits and behaviors that affect risk of falls    -  [place] fall precautions as indicated by assessment   - Educate patient/family on patient safety including physical limitations  - Instruct patient to call for assistance with activity based on assessment  - Modify environment to reduce risk of injury  - Consider OT/PT consult to assist with strengthening/mobility  Outcome: Adequate for Discharge  Goal: Maintain or return to baseline ADL function  Description  INTERVENTIONS:  -  Assess patient's ability to carry out ADLs; assess patient's baseline for ADL function and identify physical deficits which impact ability to perform ADLs (bathing, care of mouth/teeth, toileting, grooming, dressing, etc )  - Assess/evaluate cause of self-care deficits   - Assess range of motion  - Assess patient's mobility; develop plan if impaired  - Assess patient's need for assistive devices and provide as appropriate  - Encourage maximum independence but intervene and supervise when necessary  ¯ Involve family in performance of ADLs  ¯ Assess for home care needs following discharge   ¯ Request OT consult to assist with ADL evaluation and planning for discharge  ¯ Provide patient education as appropriate  Outcome: Adequate for Discharge  Goal: Maintain or return mobility status to optimal level  Description  INTERVENTIONS:  - Assess patient's baseline mobility status (ambulation, transfers, stairs, etc )    - Identify cognitive and physical deficits and behaviors that affect mobility  - Identify mobility aids required to assist with transfers and/or ambulation (gait belt, sit-to-stand, lift, walker, cane, etc )  - [place] fall precautions as indicated by assessment  - Record patient progress and toleration of activity level on Mobility SBAR; progress patient to next Phase/Stage  - Instruct patient to call for assistance with activity based on assessment  - Request Rehabilitation consult to assist with strengthening/weightbearing, etc   Outcome: Adequate for Discharge

## 2019-07-29 NOTE — NURSING NOTE
Reviewed AVS with PT and family  No questions had  Inhaler returned from pharmacy  Ambulated with steady gait off unit

## 2019-07-29 NOTE — UTILIZATION REVIEW
Initial Clinical Review    Admission: Date/Time/Statement: 7/28/19 @ 0946 INPATIENT    Orders Placed This Encounter   Procedures    Inpatient Admission (expected length of stay for this patient Order details is greater than two midnights)     Standing Status:   Standing     Number of Occurrences:   1     Order Specific Question:   Admitting Physician     Answer:   Santi Lynne [[id number]]     Order Specific Question:   Level of Care     Answer:   Med Surg [[id number]]     Order Specific Question:   Estimated length of stay     Answer:   More than 2 Midnights     Order Specific Question:   Certification     Answer:   I certify that inpatient services are medically necessary for this patient for a duration of greater than two midnights  See H&P and MD Progress Notes for additional information about the patient's course of treatment  ED Arrival Information     Expected Arrival Acuity Means of Arrival Escorted By Service Admission Type    - 7/28/2019 07:56 Urgent Walk-In Self General Medicine Urgent    Arrival Complaint    chest pain        Chief Complaint   Patient presents with    Chest Pain     Chest pain on and off for a month  Patient said that it feels like a pressure   Vomiting     Patient last vomited at 0730 this morning  Assessment/Plan: 52 y/o male presents to ED from home with chest pain/pressure intermittent for last month, worse this morning, woke him out of sleep  Also reports numbness/tingling R arm, mild SOB, vomited x1  Saw PCP who thought CP 2/2 accelerated HTN, placed on norvasc  Chest pain resolved with nitro in ED  Admitted as inpatient due to chest pain with risk factors for CAD  Rule out ACS  Trend troponins    NPO after MN for nuclear stress test     ED Triage Vitals [07/28/19 0800]   Temperature Pulse Respirations Blood Pressure SpO2   97 5 °F (36 4 °C) 94 22 153/93 97 %      Temp Source Heart Rate Source Patient Position - Orthostatic VS BP Location FiO2 (%)   Tympanic Monitor Lying Right arm --      Pain Score       Worst Possible Pain        Wt Readings from Last 1 Encounters:   07/28/19 94 kg (207 lb 3 7 oz)     Additional Vital Signs:   07/29/19 0710 96 3 °F (35 7 °C) 78 18 131/87 98 % None (Room air)   07/28/19 2311 97 6 °F (36 4 °C) 66 18 128/70 97 % CPAP   07/28/19 1526 97 8 °F (36 6 °C) 79 18 126/79 98 % None (Room air)   07/28/19 1110 97 5 °F (36 4 °C) 84 14 143/97 98 % None (Room air)   07/28/19 0943 -- 87 18 115/92 100 % None (Room air)       Pertinent Labs/Diagnostic Test Results:   Results from last 7 days   Lab Units 07/29/19  0504 07/28/19  0817   WBC Thousand/uL 6 00 5 60   HEMOGLOBIN g/dL 14 9 14 4   HEMATOCRIT % 44 7 42 6   PLATELETS Thousands/uL 222 254   NEUTROS ABS Thousands/µL  --  3 80     Results from last 7 days   Lab Units 07/29/19  0504 07/28/19  0817   SODIUM mmol/L 139 140   POTASSIUM mmol/L 3 6 3 8   CHLORIDE mmol/L 103 104   CO2 mmol/L 30 27   ANION GAP mmol/L 6 9   BUN mg/dL 11 13   CREATININE mg/dL 0 86 0 84   EGFR ml/min/1 73sq m 98 99   CALCIUM mg/dL 8 9 9 3     Results from last 7 days   Lab Units 07/28/19  0817   AST U/L 21   ALT U/L 29   ALK PHOS U/L 65   TOTAL PROTEIN g/dL 6 4   ALBUMIN g/dL 4 0   TOTAL BILIRUBIN mg/dL 0 30     Results from last 7 days   Lab Units 07/29/19  0549 07/28/19  2013 07/28/19  1610   POC GLUCOSE mg/dl 138 76 119     Results from last 7 days   Lab Units 07/29/19  0504 07/28/19  0817   GLUCOSE RANDOM mg/dL 130* 164*     Results from last 7 days   Lab Units 07/28/19  0817   CK TOTAL U/L 159     Results from last 7 days   Lab Units 07/28/19  1421 07/28/19  1227 07/28/19  0817   TROPONIN I ng/mL <0 01 <0 01 <0 01     Results from last 7 days   Lab Units 07/28/19  0817   LIPASE u/L 68     7/28 CXR:  No acute cardiopulmonary disease  7/29 Myocardial perfusion stress test:  SUMMARY:  -  Stress results: There was no chest pain during stress  -  ECG conclusions:  The stress ECG was negative for ischemia and normal   -  Perfusion imaging: There were no perfusion defects   -  Gated SPECT: The calculated left ventricular ejection fraction was 68 %  Left ventricular ejection fraction was within normal limits by visual estimate  There was no left ventricular regional abnormality    IMPRESSIONS: Normal study after pharmacologic vasodilation  Myocardial perfusion imaging was normal at rest and with stress   Left ventricular systolic function was normal     ED Treatment:   Medication Administration from 07/28/2019 0756 to 07/28/2019 1102       Date/Time Order Dose Route Action     07/28/2019 0820 sodium chloride 0 9 % infusion 250 mL/hr Intravenous New Bag     07/28/2019 0811 nitroglycerin (NITROSTAT) SL tablet 0 4 mg 0 4 mg Sublingual Given     07/28/2019 0829 albuterol inhalation solution 2 5 mg 2 5 mg Nebulization Given        Past Medical History:   Diagnosis Date    Asthma     Bipolar disorder ([facility] )     Diabetes mellitus ([facility] )     Enlarged prostate     Glaucoma     Hyperlipidemia     Hypertension     Seasonal allergic rhinitis     Seizures (HCC)      Present on Admission:  **None**      Admitting Diagnosis: Vomiting [R11 10]  Chest pain [R07 9]  Age/Sex: 53 y o  male  Admission Orders:    Current Facility-Administered Medications:  acetaminophen 650 mg Oral Q6H PRN   albuterol 2 5 mg Nebulization Q4H PRN   amLODIPine 2 5 mg Oral Daily   atorvastatin 80 mg Oral HS   bimatoprost 1 drop Both Eyes HS   brimonidine 1 drop Both Eyes Q8H   calcium carbonate-vitamin D 1 tablet Oral Daily With Breakfast   carBAMazepine 400 mg Oral Daily   carBAMazepine 600 mg Oral HS   enoxaparin 40 mg Subcutaneous Daily   finasteride 5 mg Oral Daily   fish oil 1,000 mg Oral Daily   fluticasone 1 spray Nasal Daily   fluticasone 1 puff Inhalation Q12H [facility]   gabapentin 300 mg Oral TID   guaiFENesin 600 mg Oral Q12H [facility]   insulin glargine 15 Units Subcutaneous HS   insulin lispro 1-5 Units Subcutaneous HS   insulin lispro 1-6 Units Subcutaneous TID AC   insulin lispro 14 Units Subcutaneous Daily With Dinner   latanoprost 1 drop Both Eyes Daily   melatonin 3 mg Oral HS   montelukast 10 mg Oral Daily   nitroglycerin 0 4 mg Sublingual Q5 Min PRN   ondansetron 4 mg Intravenous Q6H PRN   pantoprazole 40 mg Oral Early Morning   polyvinyl alcohol 2 drop Both Eyes PRN   predniSONE 5 mg Oral Every Other Day   tamsulosin 0 4 mg Oral HS   tiotropium 18 mcg Inhalation Daily   sodium chloride 0 9 % infusion   Rate: 250 mL/hr Dose: 250 mL/hr  Freq: Continuous Route: IV  Last Dose: Stopped (07/28/19 0951)  Start: 07/28/19 0815 End: 07/28/19 1906    VS  SCDs  Tele  NPO  CPAP    Network Utilization Review Department  Phone: [phone]; Fax [phone]  [email or web address]  ATTENTION: Please call with any questions or concerns to [phone]  and carefully listen to the prompts so that you are directed to the right person  Send all requests for admission clinical reviews, approved or denied determinations and any other requests to fax [phone]   All voicemails are confidential

## 2019-07-29 NOTE — PLAN OF CARE
Problem: DISCHARGE PLANNING - CARE MANAGEMENT  Goal: Discharge to post-acute care or home with appropriate resources  Description  INTERVENTIONS:  - Conduct assessment to determine patient/family and health care team treatment goals, and need for post-acute services based on payer coverage, community resources, and patient preferences, and barriers to discharge  - Address psychosocial, clinical, and financial barriers to discharge as identified in assessment in conjunction with the patient/family and health care team  - Arrange appropriate level of post-acute services according to patients   needs and preference and payer coverage in collaboration with the physician and health care team  - Communicate with and update the patient/family, physician, and health care team regarding progress on the discharge plan  - Arrange appropriate transportation to post-acute venues  - CM will arrange transportation at time of D/C  Outcome: Progressing

## 2019-07-29 NOTE — DISCHARGE SUMMARY
Discharge- Jeff Taylor 1965, 53 y o  male MRN: [id number]    Unit/Bed#: 5T -01 Encounter: [id number]    Primary Care Provider: Amilcar Paulino MD   Date and time admitted to hospital: 7/28/2019  7:57 AM        Hypokalemia  Assessment & Plan  Resolve    ARPITA (obstructive sleep apnea)  Assessment & Plan  History of obstructive sleep apnea  Patient uses CPAP  Continue you CPAP no problem    Seizure ([address] )  Assessment & Plan  Continue present medication    Type 2 diabetes mellitus, with long-term current use of insulin ([facility])  Assessment & Plan  Lab Results   Component Value Date    HGBA1C 10 6 (H) 12/11/2014    Patient has all her medication discussed with the patient needs better control of blood sugar    Recent Labs     07/28/19  2013 07/29/19  0549 07/29/19  1259 07/29/19  1603   POCGLU 76 138 175* 183*       Blood Sugar Average: Last 72 hrs:  (P) 138 2    HTN (hypertension)  Assessment & Plan  Continue blood pressure medication    Severe persistent asthma with acute exacerbation  Assessment & Plan  No acute exacerbation  Continue present medication    * Chest pain  Assessment & Plan  Patient underwent a stress test  Negative stress test no evidence of ischemia no wall motion abnormality  Cardiac enzyme negative EKG no changes  Chest pain resolved medically stable and is discharged home  Patient has all his medication  Ejection fraction normal                        Discharging Physician / Practitioner: Luis Giles MD  PCP: Amilcar Paulino MD  Admission Date:   Admission Orders (From admission, onward)     Ordered        07/28/19 0946  Inpatient Admission (expected length of stay for this patient Order details is greater than two midnights)  Once                   Discharge Date: 07/29/19    Resolved Problems  Date Reviewed: 7/29/2019    None          Consultations During Hospital Stay:  · none    Procedures Performed:   · Stress test negative    Significant Findings / Test Results: · None    Incidental Findings:   · None     Test Results Pending at Discharge (will require follow up): · None     Outpatient Tests Requested:  · None    Complications:  None    Reason for Admission:  Chest pain    Hospital Course: Carlin Essex is a 53 y o  male patient who originally presented to the hospital on 7/28/2019 due to chest pain admitted to rule out MI patient with multiple risk factors history of hypertension diabetes hyperlipidemia positive family underwent a stress test serial EKG cardiac enzyme negative stress test negative medically stable discharge home    Follow-up by family doctor    Please see above list of diagnoses and related plan for additional information  Condition at Discharge: stable     Discharge Day Visit / Exam:     Subjective:  No complain  Vitals: Blood Pressure: 113/81 (07/29/19 1504)  Pulse: 86 (07/29/19 1504)  Temperature: 98 2 °F (36 8 °C) (07/29/19 1504)  Temp Source: Temporal (07/29/19 1504)  Respirations: 18 (07/29/19 1504)  Height: 5' 8 5" (174 cm) (07/28/19 1110)  Weight - Scale: 94 kg (207 lb 3 7 oz) (07/28/19 1110)  SpO2: 100 % (07/29/19 1504)  Exam:   Physical Exam   Constitutional: He is oriented to person, place, and time  He appears well-developed and well-nourished  Obese   HENT:   Head: Normocephalic and atraumatic  Right Ear: External ear normal    Left Ear: External ear normal    Mouth/Throat: Oropharynx is clear and moist    Eyes: Pupils are equal, round, and reactive to light  Conjunctivae and EOM are normal    Neck: Normal range of motion  Neck supple  Cardiovascular: Normal rate, regular rhythm, normal heart sounds and intact distal pulses  Pulmonary/Chest: Effort normal and breath sounds normal    Abdominal: Soft  Bowel sounds are normal  He exhibits no mass  There is no tenderness  There is no rebound and no guarding  Genitourinary:   Genitourinary Comments: deferred   Musculoskeletal: Normal range of motion     Neurological: He is alert and oriented to person, place, and time  He has normal reflexes  Cranial nerves 2-12 are normal   Normal neurological exam   Skin: Skin is warm and dry  No rash noted  Psychiatric: He has a normal mood and affect  Nursing note and vitals reviewed  Discussion with Family:  Patient    Discharge instructions/Information to patient and family:   See after visit summary for information provided to patient and family  Provisions for Follow-Up Care:  See after visit summary for information related to follow-up care and any pertinent home health orders  Disposition:     Home    For Discharges to [facility] SNF:   · Not Applicable to this Patient - Not Applicable to this Patient    Planned Readmission:  None     Discharge Statement:  I spent 45 minutes minutes discharging the patient  This time was spent on the day of discharge  I had direct contact with the patient on the day of discharge  Greater than 50% of the total time was spent examining patient, answering all patient questions, arranging and discussing plan of care with patient as well as directly providing post-discharge instructions  Additional time then spent on discharge activities  Discharge Medications:  See after visit summary for reconciled discharge medications provided to patient and family        ** Please Note: This note has been constructed using a voice recognition system **

## 2019-07-30 LAB
ATRIAL RATE: 91 BPM
P AXIS: 54 DEGREES
PR INTERVAL: 160 MS
QRS AXIS: -9 DEGREES
QRSD INTERVAL: 90 MS
QT INTERVAL: 358 MS
QTC INTERVAL: 440 MS
T WAVE AXIS: 27 DEGREES
VENTRICULAR RATE: 91 BPM

## 2019-07-30 PROCEDURE — 93010 ELECTROCARDIOGRAM REPORT: CPT | Performed by: INTERNAL MEDICINE

## 2019-07-30 NOTE — UTILIZATION REVIEW
ADDITIONAL CLINICAL REQUEST 7/30:    Admission: Date/Time/Statement: 7/28/19 @ 0946 INPATIENT    EKG from 7/28:  Normal sinus rhythm  Nonspecific T wave abnormality  Abnormal ECG  When compared with ECG of 01-MAY-2019 11:42,  Non-specific change in ST segment in Anterior leads    Attending progress notes from 7/29:  Patient underwent a stress test  Negative stress test no evidence of ischemia no wall motion abnormality  Cardiac enzyme negative EKG no changes  Chest pain resolved medically stable and is discharged home  Patient has all his medication  Ejection fraction normal

## 2019-08-27 ENCOUNTER — APPOINTMENT (EMERGENCY)
Dept: CT IMAGING | Facility: HOSPITAL | Age: 54
End: 2019-08-27
Payer: COMMERCIAL

## 2019-08-27 ENCOUNTER — HOSPITAL ENCOUNTER (EMERGENCY)
Facility: HOSPITAL | Age: 54
Discharge: HOME | End: 2019-08-27
Attending: EMERGENCY MEDICINE
Payer: COMMERCIAL

## 2019-08-27 VITALS
SYSTOLIC BLOOD PRESSURE: 135 MMHG | DIASTOLIC BLOOD PRESSURE: 86 MMHG | RESPIRATION RATE: 16 BRPM | OXYGEN SATURATION: 98 % | HEART RATE: 76 BPM | BODY MASS INDEX: 31.72 KG/M2 | WEIGHT: 211.7 LBS | TEMPERATURE: 98 F

## 2019-08-27 DIAGNOSIS — H61.20 CERUMEN IMPACTION: Primary | ICD-10-CM

## 2019-08-27 DIAGNOSIS — H92.09 PAIN IN EAR: ICD-10-CM

## 2019-08-27 LAB — GLUCOSE SERPL-MCNC: 199 MG/DL (ref 65–140)

## 2019-08-27 PROCEDURE — 99284 EMERGENCY DEPT VISIT MOD MDM: CPT

## 2019-08-27 PROCEDURE — 82948 REAGENT STRIP/BLOOD GLUCOSE: CPT

## 2019-08-27 PROCEDURE — 99284 EMERGENCY DEPT VISIT MOD MDM: CPT | Performed by: EMERGENCY MEDICINE

## 2019-08-27 PROCEDURE — 70480 CT ORBIT/EAR/FOSSA W/O DYE: CPT

## 2019-08-27 RX ORDER — AZITHROMYCIN 250 MG/1
TABLET, FILM COATED ORAL
Qty: 6 TABLET | Refills: 0 | Status: SHIPPED | OUTPATIENT
Start: 2019-08-27 | End: 2019-08-31

## 2019-08-27 NOTE — ED PROVIDER NOTES
History  Chief Complaint   Patient presents with    Earache     bilateral ear pain     Patient is a 25-year-old male with a history of diabetes, chronically on steroids for asthma over 20 years, as well as seizures with last known seizure 20 years ago coming in today with right ear discomfort  Was nontraumatic in nature  He has no difficulty eating, drinking, there is no clicking of the ears/drop  He has no fevers or chills  He has not been swimming or traveling  He states is no drainage  He feels there is a constant dull achiness in the right ear  He states when he was a child that he had chronic ear infections  He use over-the-counter medications for cerumen removal without relief  History provided by:  Patient   used: No    Earache   Location:  Right  Behind ear:  No abnormality  Quality:  Aching, dull and pressure  Severity:  Mild  Onset quality:  Gradual  Duration:  5 days  Timing:  Constant  Progression:  Unchanged  Chronicity:  New  Context: not direct blow, not elevation change, not foreign body in ear, not loud noise, not recent URI and not water in ear    Relieved by:  Nothing  Worsened by:  Nothing  Ineffective treatments:  OTC medications  Associated symptoms: no abdominal pain, no congestion, no cough, no diarrhea, no ear discharge, no fever, no headaches, no hearing loss, no neck pain, no rash, no rhinorrhea, no sore throat, no tinnitus and no vomiting    Risk factors: chronic ear infection    Risk factors: no recent travel and no prior ear surgery        Prior to Admission Medications   Prescriptions Last Dose Informant Patient Reported? Taking?    Blood Glucose Monitoring Suppl (ACURA BLOOD GLUCOSE METER) w/Device KIT  Self Yes No   Sig: by Does not apply route   Calcium Carb-Cholecalciferol (CALCIUM CARBONATE-VITAMIN D3 PO)  Self Yes No   Sig: Take 1,500 mg by mouth daily   Insulin Pen Needle 32G X 4 MM MISC  Self Yes No   Sig: Unifine Pentips Plus 32 gauge x 5/32" needle   Misc Natural Products (BLOOD SUGAR 360 PO)  Self Yes No   Sig: Use to check BG 7x/day  Glycemic fluctuations  Steroid induced hyperglycemia   E11 65   TAMSULOSIN HCL PO  Self Yes No   Sig: Take 0 4 mg by mouth daily at bedtime     albuterol (2 5 mg/3 mL) 0 083 % nebulizer solution  Self No No   Sig: Take 1 vial (2 5 mg total) by nebulization every 4 (four) hours as needed for shortness of breath   albuterol (PROVENTIL HFA,VENTOLIN HFA) 90 mcg/act inhaler  Self No No   Sig: Inhale 2 puffs every 4 (four) hours as needed for wheezing   amLODIPine (NORVASC) 2 5 mg tablet   No No   Sig: Take 1 tablet (2 5 mg total) by mouth daily   atorvastatin (LIPITOR) 80 mg tablet  Self Yes No   Sig: Take 80 mg by mouth daily at bedtime   beclomethasone (QVAR) 40 MCG/ACT inhaler  Self Yes No   Sig: Inhale 2 puffs as needed Rinse mouth after use      bimatoprost (LUMIGAN) 0 03 % ophthalmic drops  Self Yes No   Sig: Administer 1 drop to both eyes daily at bedtime     brimonidine (ALPHAGAN P) 0 15 % ophthalmic solution  Self Yes No   Sig: Administer 1 drop to both eyes every 8 (eight) hours     carBAMazepine (TEGRETOL) 200 mg tablet  Self Yes No   Sig: Take 2 tabs in the morning and 3 tabs at night   dupilumab (DUPIXENT) subcutaneous injection   Yes No   Sig: Inject under the skin every 14 (fourteen) days   famotidine (PEPCID) 20 mg tablet   No No   Sig: Take 1 tablet (20 mg total) by mouth 2 (two) times a day   Patient not taking: Reported on 2019   finasteride (PROSCAR) 5 mg tablet  Self Yes No   Sig: Take 5 mg by mouth daily   fluticasone (FLONASE) 50 mcg/act nasal spray  Self Yes No   Si spray into each nostril daily   fluticasone (FLONASE) 50 mcg/act nasal spray   No No   Si spray into each nostril daily   gabapentin (NEURONTIN) 300 mg capsule  Self Yes No   Sig: Take 300 mg by mouth 3 (three) times a day   glucagon 1 MG injection  Self Yes No   Sig: as needed   guaiFENesin (MUCINEX) 600 mg 12 hr tablet   No No Sig: Take 1 tablet (600 mg total) by mouth every 12 (twelve) hours   hydrocortisone (ANUSOL-HC, PROCTOSOL HC,) 2 5 % rectal cream  Self Yes No   Sig: Insert 1 application into the rectum 2 (two) times a day   insulin glargine (BASAGLAR KWIKPEN) 100 units/mL injection pen   Yes No   Sig: Inject 40 Units under the skin daily at bedtime   insulin lispro (HumaLOG) 100 units/mL injection  Self No No   Sig: Inject 12 Units under the skin daily with breakfast   insulin lispro (HumaLOG) 100 units/mL injection  Self No No   Sig: Inject 16 Units under the skin daily with lunch   Patient taking differently: Inject 14 Units under the skin daily with lunch    insulin lispro (HumaLOG) 100 units/mL injection  Self No No   Sig: Inject 16 Units under the skin daily with dinner   Patient taking differently: Inject 14 Units under the skin daily with dinner     ipratropium (ATROVENT) 0 02 % nebulizer solution  Self Yes No   Sig: Take 0 5 mg by nebulization every 6 (six) hours as needed for wheezing or shortness of breath     latanoprost (XALATAN) 0 005 % ophthalmic solution  Self Yes No   Sig: Administer 1 drop to both eyes daily   melatonin 3 mg  Self Yes No   Sig: Take 3 mg by mouth daily at bedtime   montelukast (SINGULAIR) 10 mg tablet  Self Yes No   Sig: Take 10 mg by mouth daily     omega-3-acid ethyl esters (LOVAZA) 1 g capsule  Self Yes No   Sig: Take 1 g by mouth daily   omeprazole (PriLOSEC) 40 MG capsule  Self Yes No   Sig: Take 40 mg by mouth daily   polyvinyl alcohol (LIQUIFILM TEARS) 1 4 % ophthalmic solution  Self Yes No   Si drops as needed for dry eyes   predniSONE 5 mg tablet   Yes No   Sig: Take 5 mg by mouth every other day On Monday, Wednesday, Friday   terbinafine (LamISIL) 1 % cream  Self Yes No   Sig: terbinafine HCl 1 % topical cream   tiotropium (SPIRIVA RESPIMAT) 1 25 MCG/ACT AERS inhaler   No No   Sig: Inhale 2 puffs daily      Facility-Administered Medications: None       Past Medical History: Diagnosis Date    Asthma     Bipolar disorder ([facility] )     Diabetes mellitus ([facility] )     Enlarged prostate     Glaucoma     Hyperlipidemia     Hypertension     Seasonal allergic rhinitis     Seizures (HCC)        Past Surgical History:   Procedure Laterality Date    KNEE SURGERY      WRIST SURGERY Left        History reviewed  No pertinent family history  I have reviewed and agree with the history as documented  Social History     Tobacco Use    Smoking status: Former Smoker     Last attempt to quit:      Years since quittin 6    Smokeless tobacco: Never Used   Substance Use Topics    Alcohol use: Not Currently     Alcohol/week: 12 0 standard drinks     Types: 12 Cans of beer per week     Frequency: 4 or more times a week     Drinks per session: 10 or more    Drug use: Not Currently     Types: Heroin     Comment: previous heroin clean since         Review of Systems   Constitutional: Negative for diaphoresis and fever  HENT: Positive for ear pain  Negative for congestion, ear discharge, hearing loss, rhinorrhea, sore throat and tinnitus  Eyes: Negative for visual disturbance  Respiratory: Negative for cough, chest tightness and shortness of breath  Cardiovascular: Negative for chest pain and palpitations  Gastrointestinal: Negative for abdominal pain, diarrhea, nausea and vomiting  Genitourinary: Negative for difficulty urinating and dysuria  Musculoskeletal: Negative for back pain and neck pain  Skin: Negative for rash  Neurological: Negative for weakness and headaches  Psychiatric/Behavioral: Negative for confusion  All other systems reviewed and are negative  Physical Exam  Physical Exam   Constitutional: He is oriented to person, place, and time  He appears well-developed and well-nourished  No distress  HENT:   Head: Normocephalic and atraumatic  Left Ear: Hearing, tympanic membrane, external ear and ear canal normal  No mastoid tenderness  Ears:    Nose: Nose normal    Mouth/Throat: Oropharynx is clear and moist    Eyes: Pupils are equal, round, and reactive to light  Conjunctivae and EOM are normal    Neck: Normal range of motion  Neck supple  Cardiovascular: Normal rate, regular rhythm, normal heart sounds and intact distal pulses  No murmur heard  Pulmonary/Chest: Effort normal and breath sounds normal  No stridor  No respiratory distress  Abdominal: Soft  Bowel sounds are normal  He exhibits no distension  There is no tenderness  Musculoskeletal: Normal range of motion  He exhibits no edema  Neurological: He is alert and oriented to person, place, and time  He has normal strength  No cranial nerve deficit or sensory deficit  GCS eye subscore is 4  GCS verbal subscore is 5  GCS motor subscore is 6  Patient maintaining airway maintaining secretions  No facial asymmetry  No slurred speech  No ataxia  Skin: Skin is warm  Capillary refill takes less than 2 seconds  He is not diaphoretic  Nursing note and vitals reviewed        Vital Signs  ED Triage Vitals   Temperature Pulse Respirations Blood Pressure SpO2   08/27/19 1156 08/27/19 1156 08/27/19 1156 08/27/19 1156 08/27/19 1156   98 °F (36 7 °C) 93 16 123/71 100 %      Temp Source Heart Rate Source Patient Position - Orthostatic VS BP Location FiO2 (%)   08/27/19 1156 08/27/19 1156 08/27/19 1439 08/27/19 1439 --   Tympanic Monitor Sitting Left arm       Pain Score       08/27/19 1156       7           Vitals:    08/27/19 1156 08/27/19 1439   BP: 123/71 135/86   Pulse: 93 76   Patient Position - Orthostatic VS:  Sitting         Visual Acuity      ED Medications  Medications - No data to display    Diagnostic Studies  Results Reviewed     Procedure Component Value Units Date/Time    Fingerstick Glucose (POCT) [[id number]]  (Abnormal) Collected:  08/27/19 1210    Lab Status:  Final result Updated:  08/27/19 1213     POC Glucose 199 mg/dl                  CT orbits/temporal bones/skull base wo contrast   Final Result by Fausto Soriano DO (08/27 1420)      Opacification of the deep aspect of the right external auditory canal just superficial to the tympanic membrane with no opacification extending into the middle ear  Findings are nonspecific and may represent cerumen impaction  Workstation performed: [id number]                    Procedures  Procedures       ED Course  ED Course as of Aug 27 1505   Tue Aug 27, 2019   1211 Patient is a 54-year-old male with chronic prednisone use therefore chronically immunosuppressed as well as diabetes coming in today with right ear discomfort  On exam he is neuro intact with no focal deficits  I am unable to visualize the tympanic membrane at this time  There does appear to be cerumen impaction  Given his age, immunosuppression and diabetes will CT rule out malignancy versus mass  Portions of the record may have been created with voice recognition software  Occasional wrong word or "sound a like" substitutions may have occurred due to the inherent limitations of voice recognition software  Read the chart carefully and recognize, using context, where substitutions have occurred  1332 Pending CT      1340 Patient to CT      1425 Patient updated on CT no evidence of mass, lesion  Will give abx as I cannot visualize TM - will start in 24-48 hours if no better  Patient remains neuro intact with no focal deficits  I discussed with him CT report need for follow-up  He is agreeable                                    MDM  Number of Diagnoses or Management Options     Amount and/or Complexity of Data Reviewed  Clinical lab tests: ordered and reviewed  Tests in the radiology section of CPT®: reviewed and ordered  Tests in the medicine section of CPT®: reviewed and ordered  Independent visualization of images, tracings, or specimens: yes        Disposition  Final diagnoses:   Cerumen impaction   Pain in ear     Time reflects when diagnosis was documented in both MDM as applicable and the Disposition within this note     Time User Action Codes Description Comment    8/27/2019  2:27 PM Som Caldwell Add [H61 20] Cerumen impaction     8/27/2019  2:27 PM Vicente Anne Add [H92 09] Pain in ear       ED Disposition     ED Disposition Condition Date/Time Comment    Discharge Stable Tue Aug 27, 2019  2:22 PM Riccardo Raygoza discharge to home/self care              Follow-up Information     Follow up With Specialties Details Why Contact Info Additional Information    Michael Ku MD Family Medicine Schedule an appointment as soon as possible for a visit in 1 day  [address] [facility] [address]  96  Otolaryngology   [address]  [address] [facility] [id number]  [address] [facility], [address] 1632 [facility], [address] [facility]    Michael Ku MD Family Medicine   [address]  [address] [facility] Otolaryngology Schedule an appointment as soon as possible for a visit in 1 day  [address] [facility] [id number] [place] [address] 38, 100 [facility]  Floor 3  [facility]  49  [address] [facility]             Discharge Medication List as of 8/27/2019  2:31 PM      START taking these medications    Details   azithromycin (ZITHROMAX) 250 mg tablet Take 2 tablets today then 1 tablet daily x 4 days, Normal      carbamide peroxide (DEBROX) 6 5 % otic solution Administer 5 drops to the right ear 2 (two) times a day, Starting Tue 8/27/2019, Print         CONTINUE these medications which have NOT CHANGED    Details   albuterol (2 5 mg/3 mL) 0 083 % nebulizer solution Take 1 vial (2 5 mg total) by nebulization every 4 (four) hours as needed for shortness of breath, Starting Thu 11/15/2018, Normal      albuterol (PROVENTIL HFA,VENTOLIN HFA) 90 mcg/act inhaler Inhale 2 puffs every 4 (four) hours as needed for wheezing, Starting Thu 11/15/2018, Normal      amLODIPine (NORVASC) 2 5 mg tablet Take 1 tablet (2 5 mg total) by mouth daily, Starting Mon 3/18/2019, Normal      atorvastatin (LIPITOR) 80 mg tablet Take 80 mg by mouth daily at bedtime, Starting Thu 9/8/2016, Historical Med      beclomethasone (QVAR) 40 MCG/ACT inhaler Inhale 2 puffs as needed Rinse mouth after use   , Historical Med      bimatoprost (LUMIGAN) 0 03 % ophthalmic drops Administer 1 drop to both eyes daily at bedtime  , Starting Thu 6/5/2008, Historical Med      Blood Glucose Monitoring Suppl (ACURA BLOOD GLUCOSE METER) w/Device KIT by Does not apply route, Historical Med      brimonidine (ALPHAGAN P) 0 15 % ophthalmic solution Administer 1 drop to both eyes every 8 (eight) hours  , Starting Thu 7/2/2015, Historical Med      Calcium Carb-Cholecalciferol (CALCIUM CARBONATE-VITAMIN D3 PO) Take 1,500 mg by mouth daily, Starting Fri 9/20/2013, Historical Med      carBAMazepine (TEGRETOL) 200 mg tablet Take 2 tabs in the morning and 3 tabs at night, Historical Med      dupilumab (DUPIXENT) subcutaneous injection Inject under the skin every 14 (fourteen) days, Historical Med      famotidine (PEPCID) 20 mg tablet Take 1 tablet (20 mg total) by mouth 2 (two) times a day, Starting Sun 4/28/2019, Print      finasteride (PROSCAR) 5 mg tablet Take 5 mg by mouth daily, Starting Tue 4/26/2016, Historical Med      !! fluticasone (FLONASE) 50 mcg/act nasal spray 1 spray into each nostril daily, Historical Med      !! fluticasone (FLONASE) 50 mcg/act nasal spray 1 spray into each nostril daily, Starting Sun 4/28/2019, Print      gabapentin (NEURONTIN) 300 mg capsule Take 300 mg by mouth 3 (three) times a day, Starting Thu 9/8/2016, Historical Med      glucagon 1 MG injection as needed, Starting Fri 8/5/2016, Historical Med      guaiFENesin (MUCINEX) 600 mg 12 hr tablet Take 1 tablet (600 mg total) by mouth every 12 (twelve) hours, Starting Fri 5/3/2019, Print      hydrocortisone (ANUSOL-HC, PROCTOSOL HC,) 2 5 % rectal cream Insert 1 application into the rectum 2 (two) times a day, Historical Med      insulin glargine (BASAGLAR KWIKPEN) 100 units/mL injection pen Inject 40 Units under the skin daily at bedtime, Historical Med      !! insulin lispro (HumaLOG) 100 units/mL injection Inject 12 Units under the skin daily with breakfast, Starting Sat 10/27/2018, Print      !! insulin lispro (HumaLOG) 100 units/mL injection Inject 16 Units under the skin daily with lunch, Starting Sat 10/27/2018, Print      !! insulin lispro (HumaLOG) 100 units/mL injection Inject 16 Units under the skin daily with dinner, Starting Sat 10/27/2018, Print      Insulin Pen Needle 32G X 4 MM MISC Unifine Pentips Plus 32 gauge x 5/32" needle, Historical Med      ipratropium (ATROVENT) 0 02 % nebulizer solution Take 0 5 mg by nebulization every 6 (six) hours as needed for wheezing or shortness of breath  , Starting u 7/2/2015, Historical Med      latanoprost (XALATAN) 0 005 % ophthalmic solution Administer 1 drop to both eyes daily, Historical Med      melatonin 3 mg Take 3 mg by mouth daily at bedtime, Historical Med      Misc Natural Products (BLOOD SUGAR 360 PO) Use to check BG 7x/day  Glycemic fluctuations  Steroid induced hyperglycemia   E11 65, Historical Med      montelukast (SINGULAIR) 10 mg tablet Take 10 mg by mouth daily  , Starting Tue 8/25/2015, Historical Med      omega-3-acid ethyl esters (LOVAZA) 1 g capsule Take 1 g by mouth daily, Historical Med      omeprazole (PriLOSEC) 40 MG capsule Take 40 mg by mouth daily, Starting Tue 4/26/2016, Historical Med      polyvinyl alcohol (LIQUIFILM TEARS) 1 4 % ophthalmic solution 2 drops as needed for dry eyes, Historical Med      predniSONE 5 mg tablet Take 5 mg by mouth every other day On Monday, Wednesday, Friday, Historical Med      TAMSULOSIN HCL PO Take 0 4 mg by mouth daily at bedtime  , Historical Med      terbinafine (LamISIL) 1 % cream terbinafine HCl 1 % topical cream, Historical Med      tiotropium (SPIRIVA RESPIMAT) 1 25 MCG/ACT AERS inhaler Inhale 2 puffs daily, Starting Fri 3/15/2019, Normal       !! - Potential duplicate medications found  Please discuss with provider  No discharge procedures on file      ED Provider  Electronically Signed by           Verlin Mail, DO  08/27/19 [id number]

## 2019-09-23 ENCOUNTER — OFFICE VISIT (OUTPATIENT)
Dept: PULMONOLOGY | Facility: CLINIC | Age: 54
End: 2019-09-23
Payer: COMMERCIAL

## 2019-09-23 VITALS
RESPIRATION RATE: 16 BRPM | HEART RATE: 87 BPM | WEIGHT: 214.2 LBS | OXYGEN SATURATION: 96 % | DIASTOLIC BLOOD PRESSURE: 72 MMHG | HEIGHT: 69 IN | TEMPERATURE: 98 F | SYSTOLIC BLOOD PRESSURE: 138 MMHG | BODY MASS INDEX: 31.73 KG/M2

## 2019-09-23 DIAGNOSIS — F19.20 CORTICOSTEROID DEPENDENCE (HCC): ICD-10-CM

## 2019-09-23 DIAGNOSIS — K21.9 GASTROESOPHAGEAL REFLUX DISEASE WITHOUT ESOPHAGITIS: ICD-10-CM

## 2019-09-23 DIAGNOSIS — G47.33 OSA (OBSTRUCTIVE SLEEP APNEA): ICD-10-CM

## 2019-09-23 DIAGNOSIS — J45.51 SEVERE PERSISTENT ASTHMA WITH ACUTE EXACERBATION: Primary | ICD-10-CM

## 2019-09-23 PROCEDURE — 99215 OFFICE O/P EST HI 40 MIN: CPT | Performed by: INTERNAL MEDICINE

## 2019-09-23 RX ORDER — BUDESONIDE 0.5 MG/2ML
0.5 INHALANT ORAL 2 TIMES DAILY
Qty: 60 VIAL | Refills: 6 | Status: SHIPPED | OUTPATIENT
Start: 2019-09-23 | End: 2019-10-11

## 2019-09-23 RX ORDER — LORATADINE 10 MG/1
TABLET ORAL
COMMUNITY
End: 2021-05-14

## 2019-09-23 RX ORDER — HYDROXYZINE PAMOATE 50 MG/1
50 CAPSULE ORAL 3 TIMES DAILY PRN
COMMUNITY
Start: 2019-08-08

## 2019-09-23 NOTE — ASSESSMENT & PLAN NOTE
Continue current regimen with Dupilomab injections every 2 weeks and follow with allergist   Continue Spiriva Respimat, I will order budesonide b i d   Continue Singulair and loratadine and p r n  Albuterol  I told patient not to use his ipratropium nebulizer therapy since he has glaucoma and BPH and currently on Spiriva  Patient otherwise will continue prednisone tapering off and hopefully he can be tapered off completely with the help of Endocrinology

## 2019-09-23 NOTE — PROGRESS NOTES
Progress note - Pulmonary Medicine   Hillary Francis 53 y o  male MRN: [id number]       Impression & Plan:     Severe persistent asthma with acute exacerbation  Continue current regimen with Dupilomab injections every 2 weeks and follow with allergist   Continue Spiriva Respimat, I will order budesonide b i d   Continue Singulair and loratadine and p r n  Albuterol  I told patient not to use his ipratropium nebulizer therapy since he has glaucoma and BPH and currently on Spiriva  Patient otherwise will continue prednisone tapering off and hopefully he can be tapered off completely with the help of Endocrinology  ARPITA (obstructive sleep apnea)  Continue CPAP q h s  Corticosteroid dependence (HCC)  Continue slow steroid tapering with the help of Endocrinology    GERD (gastroesophageal reflux disease)  Continue PPI      Diagnoses and all orders for this visit:    Severe persistent asthma with acute exacerbation  -     budesonide (PULMICORT) 0 5 mg/2 mL nebulizer solution; Take 1 vial (0 5 mg total) by nebulization 2 (two) times a day Rinse mouth after use  Gastroesophageal reflux disease without esophagitis    ARPITA (obstructive sleep apnea)    Corticosteroid dependence (HCC)    Other orders  -     loratadine (CLARITIN) 10 mg tablet; loratadine 10 mg tablet  -     hydrOXYzine pamoate (VISTARIL) 50 mg capsule; Take 50 mg by mouth Three times daily as needed      ______________________________________________________________________    HPI:    Hillary Francis presents today for follow-up of severe persistent asthma and ARPITA  Patient has severe persistent asthma and last year he had a respiratory arrest due to his asthma with possibility of angioedema at that time  He presents today reporting significant improvement in his symptoms since he was started on Dupilumab injections 4 months ago, he injects himself every 2 weeks  Patient follows with allergy and immunology  He has an EpiPen at home as well    Patient has minimal intermittent dry cough but otherwise he feels good air movement and denies shortness of breath or wheezing  He denies fever or chills or night sweats  Denies any exacerbation of his asthma in the past several months  He is still currently on prednisone and my plan was to taper him off slowly but he had some fatigue symptoms and was told by his endocrinologist that he has adrenal suppression and so he is currently on 5 mg daily for the next few weeks and his endocrinologist will handle his prednisone tapering off  He is currently on Spiriva Respimat 1 25, Singulair, QVAR, loratadine, and p r n  Albuterol  And he also he continues to use budesonide b i d  Nebulizer but he ran out  Patient has GERD that is controlled with PPI  And he also has obstructive sleep apnea and currently using his CPAP every night  No complaints      Current Medications:    Current Outpatient Medications:     albuterol (2 5 mg/3 mL) 0 083 % nebulizer solution, Take 1 vial (2 5 mg total) by nebulization every 4 (four) hours as needed for shortness of breath, Disp: 25 vial, Rfl: 0    albuterol (PROVENTIL HFA,VENTOLIN HFA) 90 mcg/act inhaler, Inhale 2 puffs every 4 (four) hours as needed for wheezing, Disp: 1 Inhaler, Rfl: 0    amLODIPine (NORVASC) 2 5 mg tablet, Take 1 tablet (2 5 mg total) by mouth daily, Disp: 30 tablet, Rfl: 5    atorvastatin (LIPITOR) 80 mg tablet, Take 80 mg by mouth daily at bedtime, Disp: , Rfl:     beclomethasone (QVAR) 40 MCG/ACT inhaler, Inhale 2 puffs as needed Rinse mouth after use   , Disp: , Rfl:     bimatoprost (LUMIGAN) 0 03 % ophthalmic drops, Administer 1 drop to both eyes daily at bedtime  , Disp: , Rfl:     Blood Glucose Monitoring Suppl ([employer] BLOOD GLUCOSE METER) w/Device KIT, by Does not apply route, Disp: , Rfl:     brimonidine (ALPHAGAN P) 0 15 % ophthalmic solution, Administer 1 drop to both eyes every 8 (eight) hours  , Disp: , Rfl:     Calcium Carb-Cholecalciferol (CALCIUM CARBONATE-VITAMIN D3 PO), Take 1,500 mg by mouth daily, Disp: , Rfl:     carBAMazepine (TEGRETOL) 200 mg tablet, Take 2 tabs in the morning and 3 tabs at night, Disp: , Rfl:     dupilumab (DUPIXENT) subcutaneous injection, Inject under the skin every 14 (fourteen) days, Disp: , Rfl:     finasteride (PROSCAR) 5 mg tablet, Take 5 mg by mouth daily, Disp: , Rfl:     fluticasone (FLONASE) 50 mcg/act nasal spray, 1 spray into each nostril daily, Disp: , Rfl:     gabapentin (NEURONTIN) 300 mg capsule, Take 300 mg by mouth 3 (three) times a day, Disp: , Rfl:     glucagon 1 MG injection, as needed, Disp: , Rfl:     guaiFENesin (MUCINEX) 600 mg 12 hr tablet, Take 1 tablet (600 mg total) by mouth every 12 (twelve) hours, Disp: 12 tablet, Rfl: 0    hydrocortisone (ANUSOL-HC, PROCTOSOL HC,) 2 5 % rectal cream, Insert 1 application into the rectum 2 (two) times a day, Disp: , Rfl:     hydrOXYzine pamoate (VISTARIL) 50 mg capsule, Take 50 mg by mouth Three times daily as needed, Disp: , Rfl:     insulin glargine (BASAGLAR KWIKPEN) 100 units/mL injection pen, Inject 40 Units under the skin daily at bedtime, Disp: , Rfl:     insulin lispro (HumaLOG) 100 units/mL injection, Inject 12 Units under the skin daily with breakfast, Disp: 10 mL, Rfl: 0    insulin lispro (HumaLOG) 100 units/mL injection, Inject 16 Units under the skin daily with lunch (Patient taking differently: Inject 14 Units under the skin daily with lunch ), Disp: 10 mL, Rfl: 0    insulin lispro (HumaLOG) 100 units/mL injection, Inject 16 Units under the skin daily with dinner (Patient taking differently: Inject 14 Units under the skin daily with dinner  ), Disp: 10 mL, Rfl: 0    Insulin Pen Needle 32G X 4 MM MISC, Unifine Pentips Plus 32 gauge x 5/32" needle, Disp: , Rfl:     ipratropium (ATROVENT) 0 02 % nebulizer solution, Take 0 5 mg by nebulization every 6 (six) hours as needed for wheezing or shortness of breath  , Disp: , Rfl:     latanoprost (XALATAN) 0 005 % ophthalmic solution, Administer 1 drop to both eyes daily, Disp: , Rfl:     loratadine (CLARITIN) 10 mg tablet, loratadine 10 mg tablet, Disp: , Rfl:     melatonin 3 mg, Take 3 mg by mouth daily at bedtime, Disp: , Rfl:     Misc Natural Products (BLOOD SUGAR 360 PO), Use to check BG 7x/day  Glycemic fluctuations  Steroid induced hyperglycemia  E11 65, Disp: , Rfl:     montelukast (SINGULAIR) 10 mg tablet, Take 10 mg by mouth daily  , Disp: , Rfl:     omega-3-acid ethyl esters (LOVAZA) 1 g capsule, Take 1 g by mouth daily, Disp: , Rfl:     omeprazole (PriLOSEC) 40 MG capsule, Take 40 mg by mouth daily, Disp: , Rfl:     polyvinyl alcohol (LIQUIFILM TEARS) 1 4 % ophthalmic solution, 2 drops as needed for dry eyes, Disp: , Rfl:     predniSONE 5 mg tablet, Take 5 mg by mouth every other day On Monday, Wednesday, Friday, Disp: , Rfl:     TAMSULOSIN HCL PO, Take 0 4 mg by mouth daily at bedtime  , Disp: , Rfl:     terbinafine (LamISIL) 1 % cream, terbinafine HCl 1 % topical cream, Disp: , Rfl:     tiotropium (SPIRIVA RESPIMAT) 1 25 MCG/ACT AERS inhaler, Inhale 2 puffs daily, Disp: 1 Inhaler, Rfl: 6    carbamide peroxide (DEBROX) 6 5 % otic solution, Administer 5 drops to the right ear 2 (two) times a day (Patient not taking: Reported on 9/23/2019), Disp: 15 mL, Rfl: 0    famotidine (PEPCID) 20 mg tablet, Take 1 tablet (20 mg total) by mouth 2 (two) times a day (Patient not taking: Reported on 7/28/2019), Disp: 30 tablet, Rfl: 0    Review of Systems:  Review of Systems   Constitutional: Negative  HENT: Negative  Eyes: Negative  Respiratory:        As HPI     Cardiovascular: Negative  Gastrointestinal: Negative  Endocrine: Negative  Genitourinary: Negative  Musculoskeletal: Negative  Skin: Negative  Allergic/Immunologic: Negative  Neurological: Negative  Hematological: Negative  Psychiatric/Behavioral: Negative          Past medical history, surgical history, and family history were reviewed and updated as appropriate    Social history updates:  Social History     Tobacco Use   Smoking Status Former Smoker    Packs/day: 2 00    Years: 27 00    Pack years: 54 00    Types: Cigarettes    Start date:     Last attempt to quit:     Years since quittin 7   Smokeless Tobacco Never Used       PhysicalExamination:  Vitals:   /72 (BP Location: Left arm, Patient Position: Sitting, Cuff Size: Standard)   Pulse 87   Temp 98 °F (36 7 °C) (Tympanic)   Resp 16   Ht 5' 8 5" (1 74 m)   Wt 97 2 kg (214 lb 3 2 oz)   SpO2 96%   BMI 32 10 kg/m²     General: alert, not in acute distress  HEENT: PERRL, no icteric sclera or cyanosis, no thrush  Neck:  Supple, no lymphadenopathy or thyromegaly, no JVD  Lungs:  Equal breath sounds and clear auscultations bilaterally, no wheezing or crackles  Heart: S1S2 regular, no murmures or gallops  Abdomen: soft, non-tender, bowel sounds  present  Extrimities: no edema, no clubbing or cyanosis  Neuro: Alert and oriented x 3, no focal neurodeficits   Skin: intact, no rashes    Diagnostic Data:  Labs: I personally reviewed the most recent laboratory data pertinent to today's visit    Lab Results   Component Value Date    WBC 6 00 2019    HGB 14 9 2019    HCT 44 7 2019    MCV 87 2019     2019     Lab Results   Component Value Date    SODIUM 139 2019    K 3 6 2019    CO2 30 2019     2019    BUN 11 2019    CREATININE 0 86 2019    CALCIUM 8 9 2019         Imaging:  I personally reviewed the images on the [facility] system pertinent to today's visit  Chest x-ray reviewed on PACs:  Clear lungs    Other studies:  Cardiac nuclear stress test:  IMPRESSIONS: Normal study after pharmacologic vasodilation  Myocardial perfusion imaging was normal at rest and with stress   Left ventricular systolic function was normal    Shawn Shah MD

## 2019-10-11 ENCOUNTER — APPOINTMENT (EMERGENCY)
Dept: RADIOLOGY | Facility: HOSPITAL | Age: 54
End: 2019-10-11
Payer: COMMERCIAL

## 2019-10-11 ENCOUNTER — HOSPITAL ENCOUNTER (EMERGENCY)
Facility: HOSPITAL | Age: 54
Discharge: HOME | End: 2019-10-11
Attending: EMERGENCY MEDICINE | Admitting: EMERGENCY MEDICINE
Payer: COMMERCIAL

## 2019-10-11 ENCOUNTER — APPOINTMENT (EMERGENCY)
Dept: CT IMAGING | Facility: HOSPITAL | Age: 54
End: 2019-10-11
Payer: COMMERCIAL

## 2019-10-11 VITALS
DIASTOLIC BLOOD PRESSURE: 86 MMHG | OXYGEN SATURATION: 99 % | TEMPERATURE: 96.8 F | RESPIRATION RATE: 16 BRPM | WEIGHT: 212 LBS | HEART RATE: 66 BPM | SYSTOLIC BLOOD PRESSURE: 150 MMHG | BODY MASS INDEX: 31.77 KG/M2

## 2019-10-11 DIAGNOSIS — M79.602 LEFT ARM PAIN: Primary | ICD-10-CM

## 2019-10-11 LAB
ALBUMIN SERPL BCP-MCNC: 4 G/DL (ref 3–5.2)
ALP SERPL-CCNC: 69 U/L (ref 43–122)
ALT SERPL W P-5'-P-CCNC: 39 U/L (ref 9–52)
ANION GAP SERPL CALCULATED.3IONS-SCNC: 7 MMOL/L (ref 5–14)
APTT PPP: 28 SECONDS (ref 25–32)
AST SERPL W P-5'-P-CCNC: 29 U/L (ref 17–59)
ATRIAL RATE: 66 BPM
BASOPHILS # BLD AUTO: 0.1 THOUSANDS/ΜL (ref 0–0.1)
BASOPHILS NFR BLD AUTO: 1 % (ref 0–1)
BILIRUB SERPL-MCNC: 0.2 MG/DL
BUN SERPL-MCNC: 13 MG/DL (ref 5–25)
CALCIUM SERPL-MCNC: 9.3 MG/DL (ref 8.4–10.2)
CHLORIDE SERPL-SCNC: 101 MMOL/L (ref 97–108)
CO2 SERPL-SCNC: 32 MMOL/L (ref 22–30)
CREAT SERPL-MCNC: 0.83 MG/DL (ref 0.7–1.5)
EOSINOPHIL # BLD AUTO: 0.2 THOUSAND/ΜL (ref 0–0.4)
EOSINOPHIL NFR BLD AUTO: 3 % (ref 0–6)
ERYTHROCYTE [DISTWIDTH] IN BLOOD BY AUTOMATED COUNT: 13.2 %
GFR SERPL CREATININE-BSD FRML MDRD: 99 ML/MIN/1.73SQ M
GLUCOSE SERPL-MCNC: 121 MG/DL (ref 70–99)
GLUCOSE SERPL-MCNC: 96 MG/DL (ref 65–140)
HCT VFR BLD AUTO: 42 % (ref 41–53)
HGB BLD-MCNC: 14.2 G/DL (ref 13.5–17.5)
INR PPP: 0.92 (ref 0.91–1.09)
LYMPHOCYTES # BLD AUTO: 1.4 THOUSANDS/ΜL (ref 0.5–4)
LYMPHOCYTES NFR BLD AUTO: 22 % (ref 25–45)
MCH RBC QN AUTO: 28.7 PG (ref 26–34)
MCHC RBC AUTO-ENTMCNC: 33.7 G/DL (ref 31–36)
MCV RBC AUTO: 85 FL (ref 80–100)
MONOCYTES # BLD AUTO: 0.6 THOUSAND/ΜL (ref 0.2–0.9)
MONOCYTES NFR BLD AUTO: 10 % (ref 1–10)
NEUTROPHILS # BLD AUTO: 4.1 THOUSANDS/ΜL (ref 1.8–7.8)
NEUTS SEG NFR BLD AUTO: 65 % (ref 45–65)
P AXIS: 42 DEGREES
PLATELET # BLD AUTO: 233 THOUSANDS/UL (ref 150–450)
PMV BLD AUTO: 7.7 FL (ref 8.9–12.7)
POTASSIUM SERPL-SCNC: 3.7 MMOL/L (ref 3.6–5)
PR INTERVAL: 166 MS
PROT SERPL-MCNC: 6.8 G/DL (ref 5.9–8.4)
PROTHROMBIN TIME: 10.1 SECONDS (ref 9.8–12)
QRS AXIS: 28 DEGREES
QRSD INTERVAL: 90 MS
QT INTERVAL: 390 MS
QTC INTERVAL: 408 MS
RBC # BLD AUTO: 4.93 MILLION/UL (ref 4.5–5.9)
SODIUM SERPL-SCNC: 140 MMOL/L (ref 137–147)
T WAVE AXIS: 33 DEGREES
TROPONIN I SERPL-MCNC: <0.01 NG/ML (ref 0–0.03)
TROPONIN I SERPL-MCNC: <0.01 NG/ML (ref 0–0.03)
VENTRICULAR RATE: 66 BPM
WBC # BLD AUTO: 6.2 THOUSAND/UL (ref 4.5–11)

## 2019-10-11 PROCEDURE — 85730 THROMBOPLASTIN TIME PARTIAL: CPT | Performed by: EMERGENCY MEDICINE

## 2019-10-11 PROCEDURE — 36415 COLL VENOUS BLD VENIPUNCTURE: CPT | Performed by: EMERGENCY MEDICINE

## 2019-10-11 PROCEDURE — 99284 EMERGENCY DEPT VISIT MOD MDM: CPT | Performed by: EMERGENCY MEDICINE

## 2019-10-11 PROCEDURE — 84484 ASSAY OF TROPONIN QUANT: CPT | Performed by: EMERGENCY MEDICINE

## 2019-10-11 PROCEDURE — 93010 ELECTROCARDIOGRAM REPORT: CPT | Performed by: INTERNAL MEDICINE

## 2019-10-11 PROCEDURE — 85610 PROTHROMBIN TIME: CPT | Performed by: EMERGENCY MEDICINE

## 2019-10-11 PROCEDURE — 99284 EMERGENCY DEPT VISIT MOD MDM: CPT

## 2019-10-11 PROCEDURE — 71046 X-RAY EXAM CHEST 2 VIEWS: CPT

## 2019-10-11 PROCEDURE — 85025 COMPLETE CBC W/AUTO DIFF WBC: CPT | Performed by: EMERGENCY MEDICINE

## 2019-10-11 PROCEDURE — 93005 ELECTROCARDIOGRAM TRACING: CPT

## 2019-10-11 PROCEDURE — 82948 REAGENT STRIP/BLOOD GLUCOSE: CPT

## 2019-10-11 PROCEDURE — 70450 CT HEAD/BRAIN W/O DYE: CPT

## 2019-10-11 PROCEDURE — 80053 COMPREHEN METABOLIC PANEL: CPT | Performed by: EMERGENCY MEDICINE

## 2019-10-11 NOTE — ED PROVIDER NOTES
History  No chief complaint on file  52 yo male with a history of DM, asthma, bipolar disorder, hyperlipidemia, hypertension, and a seizure disorder presents with left sided "heaviness" since yesterday evening around 2000  The patient says he was picking up a heavy jug of water and suddenly felt the "heaviness" in his arm  The sensation then slowly spread down the left side of his body --> "now the whole left is heavy"  No numbness or weakness  He denies pain --> no chest, arm, or leg pain  No swelling  No shortness of breath or wheezing  He denies diaphoresis  The patient also reports some mild right swelling upon waking his morning "but it's better now"  No other specific complaints  Prior to Admission Medications   Prescriptions Last Dose Informant Patient Reported? Taking? Blood Glucose Monitoring Suppl ([employer] BLOOD GLUCOSE METER) w/Device KIT  Self Yes No   Sig: by Does not apply route   Calcium Carb-Cholecalciferol (CALCIUM CARBONATE-VITAMIN D3 PO)  Self Yes No   Sig: Take 1,500 mg by mouth daily   Insulin Pen Needle 32G X 4 MM MISC  Self Yes No   Sig: Unifine Pentips Plus 32 gauge x 5/32" needle   Misc Natural Products (BLOOD SUGAR 360 PO)  Self Yes No   Sig: Use to check BG 7x/day  Glycemic fluctuations  Steroid induced hyperglycemia   E11 65   TAMSULOSIN HCL PO  Self Yes No   Sig: Take 0 4 mg by mouth daily at bedtime     albuterol (2 5 mg/3 mL) 0 083 % nebulizer solution  Self No No   Sig: Take 1 vial (2 5 mg total) by nebulization every 4 (four) hours as needed for shortness of breath   albuterol (PROVENTIL HFA,VENTOLIN HFA) 90 mcg/act inhaler  Self No No   Sig: Inhale 2 puffs every 4 (four) hours as needed for wheezing   amLODIPine (NORVASC) 2 5 mg tablet  Self No No   Sig: Take 1 tablet (2 5 mg total) by mouth daily   atorvastatin (LIPITOR) 80 mg tablet  Self Yes No   Sig: Take 80 mg by mouth daily at bedtime   bimatoprost (LUMIGAN) 0 03 % ophthalmic drops  Self Yes No   Sig: Administer 1 drop to both eyes daily at bedtime     brimonidine (ALPHAGAN P) 0 15 % ophthalmic solution  Self Yes No   Sig: Administer 1 drop to both eyes every 8 (eight) hours     budesonide (PULMICORT) 0 5 mg/2 mL nebulizer solution   No No   Sig: Take 1 vial (0 5 mg total) by nebulization 2 (two) times a day Rinse mouth after use     carBAMazepine (TEGRETOL) 200 mg tablet  Self Yes No   Sig: Take 2 tabs in the morning and 3 tabs at night   carbamide peroxide (DEBROX) 6 5 % otic solution  Self No No   Sig: Administer 5 drops to the right ear 2 (two) times a day   Patient not taking: Reported on 2019   dupilumab (DUPIXENT) subcutaneous injection  Self Yes No   Sig: Inject under the skin every 14 (fourteen) days   famotidine (PEPCID) 20 mg tablet  Self No No   Sig: Take 1 tablet (20 mg total) by mouth 2 (two) times a day   Patient not taking: Reported on 2019   finasteride (PROSCAR) 5 mg tablet  Self Yes No   Sig: Take 5 mg by mouth daily   fluticasone (FLONASE) 50 mcg/act nasal spray  Self Yes No   Si spray into each nostril daily   gabapentin (NEURONTIN) 300 mg capsule  Self Yes No   Sig: Take 300 mg by mouth 3 (three) times a day   glucagon 1 MG injection  Self Yes No   Sig: as needed   guaiFENesin (MUCINEX) 600 mg 12 hr tablet   No No   Sig: Take 1 tablet (600 mg total) by mouth every 12 (twelve) hours   hydrOXYzine pamoate (VISTARIL) 50 mg capsule   Yes No   Sig: Take 50 mg by mouth Three times daily as needed   hydrocortisone (ANUSOL-HC, PROCTOSOL HC,) 2 5 % rectal cream  Self Yes No   Sig: Insert 1 application into the rectum 2 (two) times a day   insulin glargine (BASAGLAR KWIKPEN) 100 units/mL injection pen   Yes No   Sig: Inject 40 Units under the skin daily at bedtime   insulin lispro (HumaLOG) 100 units/mL injection  Self No No   Sig: Inject 12 Units under the skin daily with breakfast   insulin lispro (HumaLOG) 100 units/mL injection  Self No No   Sig: Inject 16 Units under the skin daily with lunch Patient taking differently: Inject 14 Units under the skin daily with lunch    insulin lispro (HumaLOG) 100 units/mL injection  Self No No   Sig: Inject 16 Units under the skin daily with dinner   Patient taking differently: Inject 14 Units under the skin daily with dinner     latanoprost (XALATAN) 0 005 % ophthalmic solution  Self Yes No   Sig: Administer 1 drop to both eyes daily   loratadine (CLARITIN) 10 mg tablet   Yes No   Sig: loratadine 10 mg tablet   melatonin 3 mg  Self Yes No   Sig: Take 3 mg by mouth daily at bedtime   montelukast (SINGULAIR) 10 mg tablet  Self Yes No   Sig: Take 10 mg by mouth daily     omega-3-acid ethyl esters (LOVAZA) 1 g capsule  Self Yes No   Sig: Take 1 g by mouth daily   omeprazole (PriLOSEC) 40 MG capsule  Self Yes No   Sig: Take 40 mg by mouth daily   polyvinyl alcohol (LIQUIFILM TEARS) 1 4 % ophthalmic solution  Self Yes No   Si drops as needed for dry eyes   predniSONE 5 mg tablet   Yes No   Sig: Take 5 mg by mouth every other day On Monday, Wednesday, Friday   terbinafine (LamISIL) 1 % cream  Self Yes No   Sig: terbinafine HCl 1 % topical cream   tiotropium (SPIRIVA RESPIMAT) 1 25 MCG/ACT AERS inhaler   No No   Sig: Inhale 2 puffs daily      Facility-Administered Medications: None       Past Medical History:   Diagnosis Date    Asthma     [facility] disorder ([facility] )     Diabetes mellitus ([facility] )     Enlarged prostate     Glaucoma     Hyperlipidemia     Hypertension     Seasonal allergic rhinitis     Seizures (HCC)        Past Surgical History:   Procedure Laterality Date    KNEE SURGERY      WRIST SURGERY Left        No family history on file  I have reviewed and agree with the history as documented      Social History     Tobacco Use    Smoking status: Former Smoker     Packs/day: 2 00     Years: 27 00     Pack years: 54 00     Types: Cigarettes     Start date:      Last attempt to quit:      Years since quittin 7    Smokeless tobacco: Never Used Substance Use Topics    Alcohol use: Not Currently     Alcohol/week: 12 0 standard drinks     Types: 12 Cans of beer per week     Frequency: 4 or more times a week     Drinks per session: 10 or more    Drug use: Not Currently     Types: Heroin     Comment: previous heroin clean since 1997        Review of Systems   Constitutional: Negative for chills and fever  HENT: Negative for sore throat  Respiratory: Negative for cough and shortness of breath  Cardiovascular: Negative for chest pain and palpitations  Gastrointestinal: Negative for abdominal pain, diarrhea, nausea and vomiting  Endocrine: Negative for cold intolerance and heat intolerance  Genitourinary: Negative for dysuria and flank pain  Musculoskeletal: Negative for back pain  Skin: Negative for rash  Allergic/Immunologic: Negative for immunocompromised state  Neurological: Negative for dizziness, weakness and headaches  Hematological: Negative for adenopathy  Psychiatric/Behavioral: The patient is not nervous/anxious  Physical Exam  Physical Exam   Constitutional: He is oriented to person, place, and time  He appears well-developed and well-nourished  No distress  HENT:   Head: Normocephalic and atraumatic  Eyes: Pupils are equal, round, and reactive to light  EOM are normal    Neck: Normal range of motion  Neck supple  Cardiovascular: Normal rate and regular rhythm  Pulmonary/Chest: Effort normal and breath sounds normal  No respiratory distress  Abdominal: Soft  He exhibits no distension  There is no tenderness  Musculoskeletal: Normal range of motion  He exhibits no edema  Neurological: He is alert and oriented to person, place, and time  He has normal strength and normal reflexes  No cranial nerve deficit or sensory deficit  He displays a negative Romberg sign  Skin: Skin is warm and dry  Psychiatric: He has a normal mood and affect         Vital Signs  ED Triage Vitals   Temp Pulse Resp BP SpO2   -- -- -- -- --      Temp src Heart Rate Source Patient Position - Orthostatic VS BP Location FiO2 (%)   -- -- -- -- --      Pain Score       --           There were no vitals filed for this visit  Visual Acuity      ED Medications  Medications - No data to display    Diagnostic Studies  Results Reviewed     None                 No orders to display              Procedures  ECG 12 Lead Documentation Only  Date/Time: 10/11/2019 1:18 PM  Performed by: John Monson MD  Authorized by: John Monson MD     Indications / Diagnosis:  Arm pain  ECG reviewed by me, the ED Provider: yes    Patient location:  ED  Previous ECG:     Previous ECG:  Compared to current    Comparison ECG info:  07/28/2019    Similarity:  No change  Interpretation:     Interpretation: abnormal    Rate:     ECG rate:  66 bpm    ECG rate assessment: normal    Rhythm:     Rhythm: sinus rhythm    Conduction:     Conduction: normal    T waves:     T waves: non-specific and flattening             ED Course                               MDM  Number of Diagnoses or Management Options  Left arm pain:   Diagnosis management comments: The patient is very well appearing with a benign exam and stable vital signs  No objective neurologic findings --> 5/5 strength in all extremities on exam  Low clinical suspicion for an acute CVA, CAD, and TAD  The patient had a negative stress test in July (see below)  Will check EKG, CXR, head CT, basic labs, troponin, and coags  Will continue to monitor in the ED     07/29/2019 STRESS IMPRESSIONS: Normal study after pharmacologic vasodilation  Myocardial perfusion imaging was normal at rest and with stress  Left ventricular systolic function was normal     15:15 Workup unremarkable  The patient is sleeping comfortably in his room --> no complaints on waking  Left sided "heaviness" completely resolved  Plan to discharge to home  with close PCP follow up early next week  The patient is agreeable to this plan   Strict return precautions provided  Amount and/or Complexity of Data Reviewed  Clinical lab tests: ordered and reviewed  Tests in the radiology section of CPT®: ordered and reviewed    Patient Progress  Patient progress: stable      Disposition  Final diagnoses:   None     ED Disposition     None      Follow-up Information    None         Patient's Medications   Discharge Prescriptions    No medications on file     No discharge procedures on file      ED Provider  Electronically Signed by           Percy Cotto MD  10/11/19 7511

## 2019-11-19 DIAGNOSIS — J45.51 SEVERE PERSISTENT ASTHMA WITH ACUTE EXACERBATION: ICD-10-CM

## 2019-11-19 RX ORDER — TIOTROPIUM BROMIDE INHALATION SPRAY 1.56 UG/1
SPRAY, METERED RESPIRATORY (INHALATION)
Qty: 1 INHALER | Refills: 5 | Status: SHIPPED | OUTPATIENT
Start: 2019-11-19 | End: 2020-10-05

## 2020-02-14 ENCOUNTER — HOSPITAL ENCOUNTER (EMERGENCY)
Facility: HOSPITAL | Age: 55
Discharge: HOME | End: 2020-02-14
Attending: EMERGENCY MEDICINE | Admitting: EMERGENCY MEDICINE
Payer: COMMERCIAL

## 2020-02-14 VITALS
OXYGEN SATURATION: 100 % | DIASTOLIC BLOOD PRESSURE: 89 MMHG | BODY MASS INDEX: 32.58 KG/M2 | TEMPERATURE: 96.7 F | HEART RATE: 89 BPM | SYSTOLIC BLOOD PRESSURE: 125 MMHG | WEIGHT: 220 LBS | HEIGHT: 69 IN | RESPIRATION RATE: 20 BRPM

## 2020-02-14 DIAGNOSIS — T40.1X1A HEROIN OVERDOSE, ACCIDENTAL OR UNINTENTIONAL, INITIAL ENCOUNTER (HCC): Primary | ICD-10-CM

## 2020-02-14 PROCEDURE — 99284 EMERGENCY DEPT VISIT MOD MDM: CPT

## 2020-02-14 PROCEDURE — 99282 EMERGENCY DEPT VISIT SF MDM: CPT | Performed by: EMERGENCY MEDICINE

## 2020-02-14 RX ORDER — NALOXONE HYDROCHLORIDE 1 MG/ML
INJECTION INTRAMUSCULAR; INTRAVENOUS; SUBCUTANEOUS
Status: COMPLETED
Start: 2020-02-14 | End: 2020-02-14

## 2020-02-14 NOTE — ED PROVIDER NOTES
History  Chief Complaint   Patient presents with    Heroin Overdose - Accidental     Pt brought in by EMS for evaluation after snorting one pack of heroin  Pt's wife performed CPR  Pt arrived to ED alert s/p narcan administration by EMS  Patient is a 57-year-old male brought in by [facility] after being found unresponsive at home by his wife  Patient had agonal respirations pinpoint pupil  Given 2 mg of IV Narcan with resolution of symptoms  Admits to snorting 1 bag of heroin earlier today  Cannot state why he used  States last use was several years ago  But denies any suicidality  Currently no complaints this time  Prior to Admission Medications   Prescriptions Last Dose Informant Patient Reported? Taking? Blood Glucose Monitoring Suppl (ACURA BLOOD GLUCOSE METER) w/Device KIT  Self Yes No   Sig: by Does not apply route   Calcium Carb-Cholecalciferol (CALCIUM CARBONATE-VITAMIN D3 PO)  Self Yes No   Sig: Take 1,500 mg by mouth daily   Insulin Pen Needle 32G X 4 MM MISC  Self Yes No   Sig: Unifine Pentips Plus 32 gauge x 5/32" needle   Misc Natural Products (BLOOD SUGAR 360 PO)  Self Yes No   Sig: Use to check BG 7x/day  Glycemic fluctuations  Steroid induced hyperglycemia   E11 65   SPIRIVA RESPIMAT 1 25 MCG/ACT AERS inhaler   No No   Sig: TWO INHALATIONS BY MOUTH DAILY   TAMSULOSIN HCL PO  Self Yes No   Sig: Take 0 4 mg by mouth daily at bedtime     albuterol (2 5 mg/3 mL) 0 083 % nebulizer solution  Self No No   Sig: Take 1 vial (2 5 mg total) by nebulization every 4 (four) hours as needed for shortness of breath   albuterol (PROVENTIL HFA,VENTOLIN HFA) 90 mcg/act inhaler  Self No No   Sig: Inhale 2 puffs every 4 (four) hours as needed for wheezing   amLODIPine (NORVASC) 2 5 mg tablet  Self No No   Sig: Take 1 tablet (2 5 mg total) by mouth daily   atorvastatin (LIPITOR) 80 mg tablet  Self Yes No   Sig: Take 80 mg by mouth daily at bedtime   bimatoprost (LUMIGAN) 0 03 % ophthalmic drops Self Yes No   Sig: Administer 1 drop to both eyes daily at bedtime     brimonidine (ALPHAGAN P) 0 15 % ophthalmic solution  Self Yes No   Sig: Administer 1 drop to both eyes every 8 (eight) hours     carBAMazepine (TEGRETOL) 200 mg tablet  Self Yes No   Sig: Take 2 tabs in the morning and 3 tabs at night   dupilumab (DUPIXENT) subcutaneous injection  Self Yes No   Sig: Inject under the skin every 14 (fourteen) days   finasteride (PROSCAR) 5 mg tablet  Self Yes No   Sig: Take 5 mg by mouth daily   fluticasone (FLONASE) 50 mcg/act nasal spray  Self Yes No   Si spray into each nostril daily   gabapentin (NEURONTIN) 300 mg capsule  Self Yes No   Sig: Take 300 mg by mouth 3 (three) times a day   glucagon 1 MG injection  Self Yes No   Sig: as needed   guaiFENesin (MUCINEX) 600 mg 12 hr tablet   No No   Sig: Take 1 tablet (600 mg total) by mouth every 12 (twelve) hours   hydrOXYzine pamoate (VISTARIL) 50 mg capsule   Yes No   Sig: Take 50 mg by mouth Three times daily as needed   hydrocortisone (ANUSOL-HC, PROCTOSOL HC,) 2 5 % rectal cream  Self Yes No   Sig: Insert 1 application into the rectum 2 (two) times a day   insulin glargine (BASAGLAR KWIKPEN) 100 units/mL injection pen   Yes No   Sig: Inject 40 Units under the skin daily at bedtime   insulin lispro (HumaLOG) 100 units/mL injection  Self No No   Sig: Inject 12 Units under the skin daily with breakfast   insulin lispro (HumaLOG) 100 units/mL injection  Self No No   Sig: Inject 16 Units under the skin daily with lunch   Patient taking differently: Inject 14 Units under the skin daily with lunch    insulin lispro (HumaLOG) 100 units/mL injection  Self No No   Sig: Inject 16 Units under the skin daily with dinner   Patient taking differently: Inject 14 Units under the skin daily with dinner     latanoprost (XALATAN) 0 005 % ophthalmic solution  Self Yes No   Sig: Administer 1 drop to both eyes daily   loratadine (CLARITIN) 10 mg tablet   Yes No   Sig: loratadine 10 mg tablet   melatonin 3 mg  Self Yes No   Sig: Take 3 mg by mouth daily at bedtime   montelukast (SINGULAIR) 10 mg tablet  Self Yes No   Sig: Take 10 mg by mouth daily     omega-3-acid ethyl esters (LOVAZA) 1 g capsule  Self Yes No   Sig: Take 1 g by mouth daily   omeprazole (PriLOSEC) 40 MG capsule  Self Yes No   Sig: Take 40 mg by mouth daily   polyvinyl alcohol (LIQUIFILM TEARS) 1 4 % ophthalmic solution  Self Yes No   Si drops as needed for dry eyes   predniSONE 5 mg tablet   Yes No   Sig: Take 5 mg by mouth every other day On Monday, Wednesday, Friday   terbinafine (LamISIL) 1 % cream  Self Yes No   Sig: terbinafine HCl 1 % topical cream      Facility-Administered Medications: None       Past Medical History:   Diagnosis Date    Asthma     Bipolar disorder ([facility] )     Diabetes mellitus ([facility] )     Enlarged prostate     Glaucoma     Hyperlipidemia     Hypertension     Seasonal allergic rhinitis     Seizures (HCC)        Past Surgical History:   Procedure Laterality Date    KNEE SURGERY      WRIST SURGERY Left        History reviewed  No pertinent family history  I have reviewed and agree with the history as documented  Social History     Tobacco Use    Smoking status: Former Smoker     Packs/day: 2 00     Years: 27 00     Pack years: 54 00     Types: Cigarettes     Start date:      Last attempt to quit:      Years since quittin     Smokeless tobacco: Never Used   Substance Use Topics    Alcohol use: Not Currently     Alcohol/week: 12 0 standard drinks     Types: 12 Cans of beer per week     Frequency: 4 or more times a week     Drinks per session: 10 or more    Drug use: Yes     Types: Heroin     Comment: Pt used one packet today and reports that it "was the first time in years"  Review of Systems   Constitutional: Positive for activity change  HENT: Negative  Eyes: Negative  Respiratory: Negative  Cardiovascular: Negative  Gastrointestinal: Negative  Endocrine: Negative  Genitourinary: Negative  Musculoskeletal: Negative  Skin: Negative  Allergic/Immunologic: Negative  Neurological: Negative  Hematological: Negative  Psychiatric/Behavioral: Negative  All other systems reviewed and are negative  Physical Exam  Physical Exam   Constitutional: He is oriented to person, place, and time  He appears well-developed and well-nourished  HENT:   Head: Normocephalic  Left Ear: External ear normal    Nose: Nose normal    Mouth/Throat: Oropharynx is clear and moist    Eyes: Pupils are equal, round, and reactive to light  Conjunctivae are normal    Neck: Normal range of motion  Neck supple  Cardiovascular: Normal rate, regular rhythm, normal heart sounds and intact distal pulses  Pulmonary/Chest: Effort normal and breath sounds normal    Abdominal: Soft  Bowel sounds are normal    Musculoskeletal: Normal range of motion  Neurological: He is alert and oriented to person, place, and time  Skin: Skin is warm and dry  Psychiatric: He has a normal mood and affect  His behavior is normal    Nursing note and vitals reviewed  Vital Signs  ED Triage Vitals [02/14/20 1513]   Temperature Pulse Resp Blood Pressure SpO2   (!) 96 7 °F (35 9 °C) 91 -- 143/82 99 %      Temp src Heart Rate Source Patient Position - Orthostatic VS BP Location FiO2 (%)   -- Monitor Lying Left arm --      Pain Score       No Pain           Vitals:    02/14/20 1513   BP: 143/82   Pulse: 91   Patient Position - Orthostatic VS: Lying         Visual Acuity      ED Medications  Medications   naloxone (NARCAN) 2 MG/2ML injection **ADS Override Pull** (  Given to EMS 2/14/20 1553)       Diagnostic Studies  Results Reviewed     None                 No orders to display              Procedures  Procedures         ED Course  ED Course as of Feb 14 1607 Fri Feb 14, 2020   1606 Patient awake alert oriented x3  No clinical signs of any opiate toxidrome  Family at bedside  Advised patient not to continue using heroin  Will discharge return to the ER for any concerns  MDM  Number of Diagnoses or Management Options  Heroin overdose, accidental or unintentional, initial encounter [facility]):      Amount and/or Complexity of Data Reviewed  Obtain history from someone other than the patient: yes  Review and summarize past medical records: yes          Disposition  Final diagnoses:   Heroin overdose, accidental or unintentional, initial encounter [facility])     Time reflects when diagnosis was documented in both MDM as applicable and the Disposition within this note     Time User Action Codes Description Comment    2/14/2020  4:07 PM Lexis [address] Heroin overdose, accidental or unintentional, initial encounter [facility])       ED Disposition     ED Disposition Condition Date/Time Comment    Discharge Stable Fri Feb 14, 2020  4:07 PM Neri Rodriges discharge to home/self care  Follow-up Information     Follow up With Specialties Details Why [address], [address]  [phone]            Patient's Medications   Discharge Prescriptions    No medications on file     No discharge procedures on file      PDMP Review     None          ED Provider  Electronically Signed by           Melba Avelar MD  02/14/20 4713

## 2020-02-19 ENCOUNTER — APPOINTMENT (EMERGENCY)
Dept: CT IMAGING | Facility: HOSPITAL | Age: 55
End: 2020-02-19
Payer: COMMERCIAL

## 2020-02-19 ENCOUNTER — HOSPITAL ENCOUNTER (OUTPATIENT)
Facility: HOSPITAL | Age: 55
Setting detail: OBSERVATION
Discharge: HOME | End: 2020-02-20
Attending: EMERGENCY MEDICINE | Admitting: FAMILY MEDICINE
Payer: COMMERCIAL

## 2020-02-19 DIAGNOSIS — R07.9 CHEST PAIN: Primary | ICD-10-CM

## 2020-02-19 DIAGNOSIS — R10.9 PAIN IN THE ABDOMEN: ICD-10-CM

## 2020-02-19 PROBLEM — J45.909 SEVERE ASTHMA: Status: ACTIVE | Noted: 2018-10-25

## 2020-02-19 PROBLEM — N43.3 RIGHT HYDROCELE: Status: ACTIVE | Noted: 2020-02-19

## 2020-02-19 LAB
ALBUMIN SERPL BCP-MCNC: 4 G/DL (ref 3–5.2)
ALP SERPL-CCNC: 73 U/L (ref 43–122)
ALT SERPL W P-5'-P-CCNC: 37 U/L (ref 9–52)
AMPHETAMINES SERPL QL SCN: NEGATIVE
ANION GAP SERPL CALCULATED.3IONS-SCNC: 7 MMOL/L (ref 5–14)
AST SERPL W P-5'-P-CCNC: 23 U/L (ref 17–59)
ATRIAL RATE: 76 BPM
BARBITURATES UR QL: NEGATIVE
BASOPHILS # BLD AUTO: 0 THOUSANDS/ΜL (ref 0–0.1)
BASOPHILS NFR BLD AUTO: 1 % (ref 0–1)
BENZODIAZ UR QL: NEGATIVE
BILIRUB SERPL-MCNC: 0.3 MG/DL
BILIRUB UR QL STRIP: NEGATIVE
BUN SERPL-MCNC: 14 MG/DL (ref 5–25)
CALCIUM SERPL-MCNC: 9.6 MG/DL (ref 8.4–10.2)
CHLORIDE SERPL-SCNC: 102 MMOL/L (ref 97–108)
CLARITY UR: CLEAR
CO2 SERPL-SCNC: 30 MMOL/L (ref 22–30)
COCAINE UR QL: NEGATIVE
COLOR UR: ABNORMAL
CREAT SERPL-MCNC: 0.83 MG/DL (ref 0.7–1.5)
EOSINOPHIL # BLD AUTO: 0.1 THOUSAND/ΜL (ref 0–0.4)
EOSINOPHIL NFR BLD AUTO: 2 % (ref 0–6)
ERYTHROCYTE [DISTWIDTH] IN BLOOD BY AUTOMATED COUNT: 13.4 %
GFR SERPL CREATININE-BSD FRML MDRD: 98 ML/MIN/1.73SQ M
GLUCOSE SERPL-MCNC: 118 MG/DL (ref 70–99)
GLUCOSE SERPL-MCNC: 129 MG/DL (ref 65–140)
GLUCOSE SERPL-MCNC: 153 MG/DL (ref 65–140)
GLUCOSE SERPL-MCNC: 90 MG/DL (ref 65–140)
GLUCOSE UR STRIP-MCNC: NEGATIVE MG/DL
HCT VFR BLD AUTO: 45.7 % (ref 41–53)
HGB BLD-MCNC: 15.7 G/DL (ref 13.5–17.5)
HGB UR QL STRIP.AUTO: NEGATIVE
KETONES UR STRIP-MCNC: NEGATIVE MG/DL
LEUKOCYTE ESTERASE UR QL STRIP: NEGATIVE
LIPASE SERPL-CCNC: 57 U/L (ref 23–300)
LYMPHOCYTES # BLD AUTO: 1.6 THOUSANDS/ΜL (ref 0.5–4)
LYMPHOCYTES NFR BLD AUTO: 26 % (ref 25–45)
MCH RBC QN AUTO: 28.5 PG (ref 26–34)
MCHC RBC AUTO-ENTMCNC: 34.2 G/DL (ref 31–36)
MCV RBC AUTO: 83 FL (ref 80–100)
METHADONE UR QL: NEGATIVE
MONOCYTES # BLD AUTO: 0.8 THOUSAND/ΜL (ref 0.2–0.9)
MONOCYTES NFR BLD AUTO: 12 % (ref 1–10)
NEUTROPHILS # BLD AUTO: 3.8 THOUSANDS/ΜL (ref 1.8–7.8)
NEUTS SEG NFR BLD AUTO: 60 % (ref 45–65)
NITRITE UR QL STRIP: NEGATIVE
OPIATES UR QL SCN: NEGATIVE
P AXIS: 47 DEGREES
PCP UR QL: NEGATIVE
PH UR STRIP.AUTO: 5 [PH]
PLATELET # BLD AUTO: 238 THOUSANDS/UL (ref 150–450)
PLATELET # BLD AUTO: 255 THOUSANDS/UL (ref 150–450)
PMV BLD AUTO: 7.7 FL (ref 8.9–12.7)
POTASSIUM SERPL-SCNC: 3.8 MMOL/L (ref 3.6–5)
PR INTERVAL: 156 MS
PROT SERPL-MCNC: 6.7 G/DL (ref 5.9–8.4)
PROT UR STRIP-MCNC: NEGATIVE MG/DL
QRS AXIS: 1 DEGREES
QRSD INTERVAL: 90 MS
QT INTERVAL: 370 MS
QTC INTERVAL: 416 MS
RBC # BLD AUTO: 5.49 MILLION/UL (ref 4.5–5.9)
SODIUM SERPL-SCNC: 139 MMOL/L (ref 137–147)
SP GR UR STRIP.AUTO: 1.02 (ref 1–1.04)
T WAVE AXIS: 38 DEGREES
THC UR QL: NEGATIVE
TROPONIN I SERPL-MCNC: <0.01 NG/ML (ref 0–0.03)
TSH SERPL DL<=0.05 MIU/L-ACNC: 2.16 UIU/ML (ref 0.47–4.68)
UROBILINOGEN UA: NEGATIVE MG/DL
VENTRICULAR RATE: 76 BPM
WBC # BLD AUTO: 6.4 THOUSAND/UL (ref 4.5–11)

## 2020-02-19 PROCEDURE — 96374 THER/PROPH/DIAG INJ IV PUSH: CPT

## 2020-02-19 PROCEDURE — 74177 CT ABD & PELVIS W/CONTRAST: CPT

## 2020-02-19 PROCEDURE — 83036 HEMOGLOBIN GLYCOSYLATED A1C: CPT | Performed by: FAMILY MEDICINE

## 2020-02-19 PROCEDURE — 81003 URINALYSIS AUTO W/O SCOPE: CPT | Performed by: PHYSICIAN ASSISTANT

## 2020-02-19 PROCEDURE — 80053 COMPREHEN METABOLIC PANEL: CPT | Performed by: PHYSICIAN ASSISTANT

## 2020-02-19 PROCEDURE — 99285 EMERGENCY DEPT VISIT HI MDM: CPT

## 2020-02-19 PROCEDURE — 84443 ASSAY THYROID STIM HORMONE: CPT | Performed by: FAMILY MEDICINE

## 2020-02-19 PROCEDURE — 80307 DRUG TEST PRSMV CHEM ANLYZR: CPT | Performed by: PHYSICIAN ASSISTANT

## 2020-02-19 PROCEDURE — 99219 PR INITIAL OBSERVATION CARE/DAY 50 MINUTES: CPT | Performed by: FAMILY MEDICINE

## 2020-02-19 PROCEDURE — 36415 COLL VENOUS BLD VENIPUNCTURE: CPT | Performed by: PHYSICIAN ASSISTANT

## 2020-02-19 PROCEDURE — 83690 ASSAY OF LIPASE: CPT | Performed by: PHYSICIAN ASSISTANT

## 2020-02-19 PROCEDURE — 93010 ELECTROCARDIOGRAM REPORT: CPT | Performed by: INTERNAL MEDICINE

## 2020-02-19 PROCEDURE — 84484 ASSAY OF TROPONIN QUANT: CPT | Performed by: FAMILY MEDICINE

## 2020-02-19 PROCEDURE — 85049 AUTOMATED PLATELET COUNT: CPT | Performed by: FAMILY MEDICINE

## 2020-02-19 PROCEDURE — 93005 ELECTROCARDIOGRAM TRACING: CPT

## 2020-02-19 PROCEDURE — 82948 REAGENT STRIP/BLOOD GLUCOSE: CPT

## 2020-02-19 PROCEDURE — 99285 EMERGENCY DEPT VISIT HI MDM: CPT | Performed by: PHYSICIAN ASSISTANT

## 2020-02-19 PROCEDURE — 84484 ASSAY OF TROPONIN QUANT: CPT | Performed by: PHYSICIAN ASSISTANT

## 2020-02-19 PROCEDURE — 71260 CT THORAX DX C+: CPT

## 2020-02-19 PROCEDURE — 85025 COMPLETE CBC W/AUTO DIFF WBC: CPT | Performed by: PHYSICIAN ASSISTANT

## 2020-02-19 PROCEDURE — 96361 HYDRATE IV INFUSION ADD-ON: CPT

## 2020-02-19 RX ORDER — BRIMONIDINE TARTRATE 0.15 %
1 DROPS OPHTHALMIC (EYE) EVERY 8 HOURS
Status: DISCONTINUED | OUTPATIENT
Start: 2020-02-19 | End: 2020-02-20 | Stop reason: HOSPADM

## 2020-02-19 RX ORDER — ACETAMINOPHEN 325 MG/1
650 TABLET ORAL EVERY 6 HOURS PRN
Status: DISCONTINUED | OUTPATIENT
Start: 2020-02-19 | End: 2020-02-20 | Stop reason: HOSPADM

## 2020-02-19 RX ORDER — SODIUM CHLORIDE 9 MG/ML
250 INJECTION, SOLUTION INTRAVENOUS CONTINUOUS
Status: DISCONTINUED | OUTPATIENT
Start: 2020-02-19 | End: 2020-02-19

## 2020-02-19 RX ORDER — MONTELUKAST SODIUM 10 MG/1
10 TABLET ORAL DAILY
Status: DISCONTINUED | OUTPATIENT
Start: 2020-02-19 | End: 2020-02-20 | Stop reason: HOSPADM

## 2020-02-19 RX ORDER — ONDANSETRON 2 MG/ML
4 INJECTION INTRAMUSCULAR; INTRAVENOUS ONCE
Status: COMPLETED | OUTPATIENT
Start: 2020-02-19 | End: 2020-02-19

## 2020-02-19 RX ORDER — FINASTERIDE 5 MG/1
5 TABLET, FILM COATED ORAL DAILY
Status: DISCONTINUED | OUTPATIENT
Start: 2020-02-19 | End: 2020-02-20 | Stop reason: HOSPADM

## 2020-02-19 RX ORDER — PREDNISONE 1 MG/1
5 TABLET ORAL EVERY OTHER DAY
Status: DISCONTINUED | OUTPATIENT
Start: 2020-02-20 | End: 2020-02-20 | Stop reason: HOSPADM

## 2020-02-19 RX ORDER — CARBAMAZEPINE 200 MG/1
400 TABLET ORAL DAILY
Status: DISCONTINUED | OUTPATIENT
Start: 2020-02-19 | End: 2020-02-20 | Stop reason: HOSPADM

## 2020-02-19 RX ORDER — HYDROXYZINE HYDROCHLORIDE 10 MG/1
10 TABLET, FILM COATED ORAL EVERY 6 HOURS PRN
Status: DISCONTINUED | OUTPATIENT
Start: 2020-02-19 | End: 2020-02-20 | Stop reason: HOSPADM

## 2020-02-19 RX ORDER — PANTOPRAZOLE SODIUM 40 MG/1
40 TABLET, DELAYED RELEASE ORAL
Status: DISCONTINUED | OUTPATIENT
Start: 2020-02-20 | End: 2020-02-20 | Stop reason: HOSPADM

## 2020-02-19 RX ORDER — ALBUTEROL SULFATE 90 UG/1
2 AEROSOL, METERED RESPIRATORY (INHALATION) EVERY 4 HOURS PRN
Status: DISCONTINUED | OUTPATIENT
Start: 2020-02-19 | End: 2020-02-20 | Stop reason: HOSPADM

## 2020-02-19 RX ORDER — ONDANSETRON 2 MG/ML
4 INJECTION INTRAMUSCULAR; INTRAVENOUS EVERY 6 HOURS PRN
Status: DISCONTINUED | OUTPATIENT
Start: 2020-02-19 | End: 2020-02-20 | Stop reason: HOSPADM

## 2020-02-19 RX ORDER — LANOLIN ALCOHOL/MO/W.PET/CERES
3 CREAM (GRAM) TOPICAL
Status: DISCONTINUED | OUTPATIENT
Start: 2020-02-19 | End: 2020-02-20 | Stop reason: HOSPADM

## 2020-02-19 RX ORDER — LATANOPROST 50 UG/ML
1 SOLUTION/ DROPS OPHTHALMIC
Status: DISCONTINUED | OUTPATIENT
Start: 2020-02-19 | End: 2020-02-20 | Stop reason: HOSPADM

## 2020-02-19 RX ORDER — ONDANSETRON 2 MG/ML
4 INJECTION INTRAMUSCULAR; INTRAVENOUS ONCE
Status: DISCONTINUED | OUTPATIENT
Start: 2020-02-19 | End: 2020-02-20 | Stop reason: HOSPADM

## 2020-02-19 RX ORDER — FLUTICASONE PROPIONATE 50 MCG
1 SPRAY, SUSPENSION (ML) NASAL DAILY
Status: DISCONTINUED | OUTPATIENT
Start: 2020-02-19 | End: 2020-02-20 | Stop reason: HOSPADM

## 2020-02-19 RX ORDER — TAMSULOSIN HYDROCHLORIDE 0.4 MG/1
0.4 CAPSULE ORAL
Status: DISCONTINUED | OUTPATIENT
Start: 2020-02-19 | End: 2020-02-20 | Stop reason: HOSPADM

## 2020-02-19 RX ORDER — POLYETHYLENE GLYCOL 3350 17 G/17G
17 POWDER, FOR SOLUTION ORAL DAILY PRN
Status: DISCONTINUED | OUTPATIENT
Start: 2020-02-19 | End: 2020-02-20 | Stop reason: HOSPADM

## 2020-02-19 RX ORDER — INSULIN GLARGINE 100 [IU]/ML
27 INJECTION, SOLUTION SUBCUTANEOUS
Status: DISCONTINUED | OUTPATIENT
Start: 2020-02-19 | End: 2020-02-20 | Stop reason: HOSPADM

## 2020-02-19 RX ORDER — MAGNESIUM HYDROXIDE/ALUMINUM HYDROXICE/SIMETHICONE 120; 1200; 1200 MG/30ML; MG/30ML; MG/30ML
30 SUSPENSION ORAL EVERY 6 HOURS PRN
Status: DISCONTINUED | OUTPATIENT
Start: 2020-02-19 | End: 2020-02-20 | Stop reason: HOSPADM

## 2020-02-19 RX ORDER — ATORVASTATIN CALCIUM 80 MG/1
80 TABLET, FILM COATED ORAL
Status: DISCONTINUED | OUTPATIENT
Start: 2020-02-19 | End: 2020-02-20 | Stop reason: HOSPADM

## 2020-02-19 RX ORDER — AMLODIPINE BESYLATE 2.5 MG/1
2.5 TABLET ORAL DAILY
Status: DISCONTINUED | OUTPATIENT
Start: 2020-02-19 | End: 2020-02-20 | Stop reason: HOSPADM

## 2020-02-19 RX ORDER — CARBAMAZEPINE 200 MG/1
600 TABLET ORAL
Status: DISCONTINUED | OUTPATIENT
Start: 2020-02-19 | End: 2020-02-20 | Stop reason: HOSPADM

## 2020-02-19 RX ADMIN — ACETAMINOPHEN 650 MG: 325 TABLET ORAL at 15:01

## 2020-02-19 RX ADMIN — BIMATOPROST 1 DROP: 0.1 SOLUTION/ DROPS OPHTHALMIC at 21:49

## 2020-02-19 RX ADMIN — LATANOPROST 1 DROP: 50 SOLUTION OPHTHALMIC at 21:58

## 2020-02-19 RX ADMIN — BRIMONIDINE TARTRATE 1 DROP: 1.5 SOLUTION OPHTHALMIC at 22:04

## 2020-02-19 RX ADMIN — BRIMONIDINE TARTRATE 1 DROP: 1.5 SOLUTION OPHTHALMIC at 17:14

## 2020-02-19 RX ADMIN — ONDANSETRON 4 MG: 2 INJECTION INTRAMUSCULAR; INTRAVENOUS at 10:40

## 2020-02-19 RX ADMIN — TAMSULOSIN HYDROCHLORIDE 0.4 MG: 0.4 CAPSULE ORAL at 21:56

## 2020-02-19 RX ADMIN — AMLODIPINE BESYLATE 2.5 MG: 2.5 TABLET ORAL at 15:00

## 2020-02-19 RX ADMIN — FLUTICASONE PROPIONATE 1 SPRAY: 50 SPRAY, METERED NASAL at 17:10

## 2020-02-19 RX ADMIN — SODIUM CHLORIDE 250 ML/HR: 0.9 INJECTION, SOLUTION INTRAVENOUS at 10:35

## 2020-02-19 RX ADMIN — ENOXAPARIN SODIUM 40 MG: 40 INJECTION SUBCUTANEOUS at 15:00

## 2020-02-19 RX ADMIN — MELATONIN TAB 3 MG 3 MG: 3 TAB at 21:53

## 2020-02-19 RX ADMIN — CARBAMAZEPINE 400 MG: 200 TABLET ORAL at 17:13

## 2020-02-19 RX ADMIN — TIOTROPIUM BROMIDE 18 MCG: 18 CAPSULE ORAL; RESPIRATORY (INHALATION) at 17:11

## 2020-02-19 RX ADMIN — INSULIN GLARGINE 27 UNITS: 100 INJECTION, SOLUTION SUBCUTANEOUS at 22:03

## 2020-02-19 RX ADMIN — FINASTERIDE 5 MG: 5 TABLET, FILM COATED ORAL at 15:01

## 2020-02-19 RX ADMIN — IOHEXOL 100 ML: 350 INJECTION, SOLUTION INTRAVENOUS at 11:43

## 2020-02-19 RX ADMIN — INSULIN LISPRO 10 UNITS: 100 INJECTION, SOLUTION INTRAVENOUS; SUBCUTANEOUS at 17:13

## 2020-02-19 RX ADMIN — CARBAMAZEPINE 600 MG: 200 TABLET ORAL at 21:52

## 2020-02-19 RX ADMIN — ATORVASTATIN CALCIUM 80 MG: 80 TABLET, FILM COATED ORAL at 21:52

## 2020-02-19 RX ADMIN — MONTELUKAST SODIUM 10 MG: 10 TABLET, COATED ORAL at 15:01

## 2020-02-19 NOTE — ASSESSMENT & PLAN NOTE
Patient is currently not on exacerbation  Patient state after starting [facility] outpatient, he did not get asthma exacerbation     Continue Spiriva, Singulair and albuterol as needed

## 2020-02-19 NOTE — PLAN OF CARE
Problem: Potential for Falls  Goal: Patient will remain free of falls  Description  INTERVENTIONS:  - Assess patient frequently for physical needs  -  Identify cognitive and physical deficits and behaviors that affect risk of falls  -  [place] fall precautions as indicated by assessment   - Educate patient/family on patient safety including physical limitations  - Instruct patient to call for assistance with activity based on assessment  - Modify environment to reduce risk of injury  - Consider OT/PT consult to assist with strengthening/mobility  Outcome: Progressing     Problem: Nutrition/Hydration-ADULT  Goal: Nutrient/Hydration intake appropriate for improving, restoring or maintaining nutritional needs  Description  Monitor and assess patient's nutrition/hydration status for malnutrition  Collaborate with interdisciplinary team and initiate plan and interventions as ordered  Monitor patient's weight and dietary intake as ordered or per policy  Utilize nutrition screening tool and intervene as necessary  Determine patient's food preferences and provide high-protein, high-caloric foods as appropriate       INTERVENTIONS:  - Monitor oral intake, urinary output, labs, and treatment plans  - Assess nutrition and hydration status and recommend course of action  - Evaluate amount of meals eaten  - Assist patient with eating if necessary   - Allow adequate time for meals  - Recommend/ encourage appropriate diets, oral nutritional supplements, and vitamin/mineral supplements  - Order, calculate, and assess calorie counts as needed  - Recommend, monitor, and adjust tube feedings and TPN/PPN based on assessed needs  - Assess need for intravenous fluids  - Provide specific nutrition/hydration education as appropriate  - Include patient/family/caregiver in decisions related to nutrition  Outcome: Progressing     Problem: PAIN - ADULT  Goal: Verbalizes/displays adequate comfort level or baseline comfort level  Description  Interventions:  - Encourage patient to monitor pain and request assistance  - Assess pain using appropriate pain scale  - Administer analgesics based on type and severity of pain and evaluate response  - Implement non-pharmacological measures as appropriate and evaluate response  - Consider cultural and social influences on pain and pain management  - Notify physician/advanced practitioner if interventions unsuccessful or patient reports new pain  Outcome: Progressing     Problem: INFECTION - ADULT  Goal: Absence or prevention of progression during hospitalization  Description  INTERVENTIONS:  - Assess and monitor for signs and symptoms of infection  - Monitor lab/diagnostic results  - Monitor all insertion sites, i e  indwelling lines, tubes, and drains  - Monitor endotracheal if appropriate and nasal secretions for changes in amount and color  - [place] appropriate cooling/warming therapies per order  - Administer medications as ordered  - Instruct and encourage patient and family to use good hand hygiene technique  - Identify and instruct in appropriate isolation precautions for identified infection/condition  Outcome: Progressing     Problem: SAFETY ADULT  Goal: Maintain or return to baseline ADL function  Description  INTERVENTIONS:  -  Assess patient's ability to carry out ADLs; assess patient's baseline for ADL function and identify physical deficits which impact ability to perform ADLs (bathing, care of mouth/teeth, toileting, grooming, dressing, etc )  - Assess/evaluate cause of self-care deficits   - Assess range of motion  - Assess patient's mobility; develop plan if impaired  - Assess patient's need for assistive devices and provide as appropriate  - Encourage maximum independence but intervene and supervise when necessary  - Involve family in performance of ADLs  - Assess for home care needs following discharge   - Consider OT consult to assist with ADL evaluation and planning for discharge  - Provide patient education as appropriate  Outcome: Progressing  Goal: Maintain or return mobility status to optimal level  Description  INTERVENTIONS:  - Assess patient's baseline mobility status (ambulation, transfers, stairs, etc )    - Identify cognitive and physical deficits and behaviors that affect mobility  - Identify mobility aids required to assist with transfers and/or ambulation (gait belt, sit-to-stand, lift, walker, cane, etc )  - [place] fall precautions as indicated by assessment  - Record patient progress and toleration of activity level on Mobility SBAR; progress patient to next Phase/Stage  - Instruct patient to call for assistance with activity based on assessment  - Consider rehabilitation consult to assist with strengthening/weightbearing, etc   Outcome: Progressing     Problem: DISCHARGE PLANNING  Goal: Discharge to home or other facility with appropriate resources  Description  INTERVENTIONS:  - Identify barriers to discharge w/patient and caregiver  - Arrange for needed discharge resources and transportation as appropriate  - Identify discharge learning needs (meds, wound care, etc )  - Arrange for interpretive services to assist at discharge as needed  - Refer to Case Management Department for coordinating discharge planning if the patient needs post-hospital services based on physician/advanced practitioner order or complex needs related to functional status, cognitive ability, or social support system  Outcome: Progressing     Problem: Knowledge Deficit  Goal: Patient/family/caregiver demonstrates understanding of disease process, treatment plan, medications, and discharge instructions  Description  Complete learning assessment and assess knowledge base    Interventions:  - Provide teaching at level of understanding  - Provide teaching via preferred learning methods  Outcome: Progressing

## 2020-02-19 NOTE — ASSESSMENT & PLAN NOTE
Patient presented to ER with complaint of right-sided flank pain  He was found to have T-wave inversion in the EKG   Patient does complain of intermittent chest pain that comes and goes, not associated with activity or food  Currently patient does not have any chest pain  His last stress test was completed in July 2019 which was negative for any abnormalities    No other previous cardiac history  Initial troponin in the ER was negative  Will place patient on telemetry monitor, will trend troponin  Obtain hemoglobin A1c, TSH, magnesium, phosphorus  Repeat EKG in the morning

## 2020-02-19 NOTE — ED PROVIDER NOTES
History  Chief Complaint   Patient presents with    Back Pain     "I overdosed on heroin and I think I fell" pt states he awoke yesterday w/ right lower back pain radiating to his lower abdomen and testicles       Medical Problem   Location:  Pt with right low back and flankl pain into groin and testes for several day pt with nausea for months  pt with chest pain from area of chest compressions 5 days ago   Severity:  Mild  Onset quality:  Gradual  Duration:  4 days  Timing:  Intermittent  Progression:  Unchanged  Chronicity:  New  Associated symptoms: abdominal pain and nausea    Associated symptoms: no chest pain, no congestion, no cough, no diarrhea, no ear pain, no fatigue, no fever, no headaches, no loss of consciousness, no myalgias, no rash, no rhinorrhea, no shortness of breath, no sore throat, no vomiting and no wheezing        Prior to Admission Medications   Prescriptions Last Dose Informant Patient Reported? Taking? Blood Glucose Monitoring Suppl (ACURA BLOOD GLUCOSE METER) w/Device KIT Past Week at Unknown time Self Yes Yes   Sig: by Does not apply route   Calcium Carb-Cholecalciferol (CALCIUM CARBONATE-VITAMIN D3 PO) 2/18/2020 at Unknown time Self Yes Yes   Sig: Take 1,500 mg by mouth daily   Insulin Pen Needle 32G X 4 MM MISC Past Month at Unknown time Self Yes Yes   Sig: Unifine Pentips Plus 32 gauge x 5/32" needle   Misc Natural Products (BLOOD SUGAR 360 PO) Not Taking at Unknown time Self Yes No   Sig: Use to check BG 7x/day  Glycemic fluctuations  Steroid induced hyperglycemia   E11 65   SPIRIVA RESPIMAT 1 25 MCG/ACT AERS inhaler 2/18/2020 at Unknown time  No Yes   Sig: TWO INHALATIONS BY MOUTH DAILY   TAMSULOSIN HCL PO 2/18/2020 at Unknown time Self Yes Yes   Sig: Take 0 4 mg by mouth daily at bedtime     albuterol (2 5 mg/3 mL) 0 083 % nebulizer solution 2/18/2020 at Unknown time Self No Yes   Sig: Take 1 vial (2 5 mg total) by nebulization every 4 (four) hours as needed for shortness of breath   albuterol (PROVENTIL HFA,VENTOLIN HFA) 90 mcg/act inhaler Past Week at Unknown time Self No Yes   Sig: Inhale 2 puffs every 4 (four) hours as needed for wheezing   amLODIPine (NORVASC) 2 5 mg tablet 2020 at Unknown time Self No Yes   Sig: Take 1 tablet (2 5 mg total) by mouth daily   atorvastatin (LIPITOR) 80 mg tablet 2020 at Unknown time Self Yes Yes   Sig: Take 80 mg by mouth daily at bedtime   bimatoprost (LUMIGAN) 0 03 % ophthalmic drops 2020 at Unknown time Self Yes Yes   Sig: Administer 1 drop to both eyes daily at bedtime     brimonidine (ALPHAGAN P) 0 15 % ophthalmic solution 2020 at Unknown time Self Yes Yes   Sig: Administer 1 drop to both eyes every 8 (eight) hours     carBAMazepine (TEGRETOL) 200 mg tablet 2020 at Unknown time Self Yes Yes   Sig: Take 2 tabs in the morning and 3 tabs at night   dupilumab (DUPIXENT) subcutaneous injection Past Month at Unknown time Self Yes Yes   Sig: Inject under the skin every 14 (fourteen) days   finasteride (PROSCAR) 5 mg tablet 2020 at Unknown time Self Yes Yes   Sig: Take 5 mg by mouth daily   fluticasone (FLONASE) 50 mcg/act nasal spray 2020 at Unknown time Self Yes Yes   Si spray into each nostril daily   gabapentin (NEURONTIN) 300 mg capsule Past Week at Unknown time Self Yes Yes   Sig: Take 300 mg by mouth 3 (three) times a day   glucagon 1 MG injection Not Taking at Unknown time Self Yes No   Sig: as needed   guaiFENesin (MUCINEX) 600 mg 12 hr tablet Past Month at Unknown time  No Yes   Sig: Take 1 tablet (600 mg total) by mouth every 12 (twelve) hours   hydrOXYzine pamoate (VISTARIL) 50 mg capsule 2020 at Unknown time  Yes Yes   Sig: Take 50 mg by mouth Three times daily as needed   hydrocortisone (ANUSOL-HC, PROCTOSOL HC,) 2 5 % rectal cream 2020 at Unknown time Self Yes Yes   Sig: Insert 1 application into the rectum 2 (two) times a day   insulin glargine (BASAGLAR KWIKPEN) 100 units/mL injection pen 2020 at Unknown time  Yes Yes   Sig: Inject 35 Units under the skin daily at bedtime    insulin lispro (HumaLOG) 100 units/mL injection 2020 at Unknown time Self No Yes   Sig: Inject 12 Units under the skin daily with breakfast   Patient taking differently: Inject 6 Units under the skin daily with breakfast    insulin lispro (HumaLOG) 100 units/mL injection 2020 at Unknown time Self No Yes   Sig: Inject 16 Units under the skin daily with lunch   Patient taking differently: Inject 6 Units under the skin daily with lunch    insulin lispro (HumaLOG) 100 units/mL injection 2020 at Unknown time Self No Yes   Sig: Inject 16 Units under the skin daily with dinner   Patient taking differently: Inject 12 Units under the skin daily with dinner    latanoprost (XALATAN) 0 005 % ophthalmic solution 2020 at Unknown time Self Yes Yes   Sig: Administer 1 drop to both eyes daily   loratadine (CLARITIN) 10 mg tablet 2020 at Unknown time  Yes Yes   Sig: loratadine 10 mg tablet   melatonin 3 mg Not Taking at Unknown time Self Yes No   Sig: Take 3 mg by mouth daily at bedtime   montelukast (SINGULAIR) 10 mg tablet 2020 at Unknown time Self Yes Yes   Sig: Take 10 mg by mouth daily     omega-3-acid ethyl esters (LOVAZA) 1 g capsule 2020 at Unknown time Self Yes Yes   Sig: Take 1 g by mouth daily   omeprazole (PriLOSEC) 40 MG capsule 2020 at Unknown time Self Yes Yes   Sig: Take 40 mg by mouth daily   polyvinyl alcohol (LIQUIFILM TEARS) 1 4 % ophthalmic solution 2020 at Unknown time Self Yes Yes   Si drops as needed for dry eyes   predniSONE 5 mg tablet 2020 at Unknown time  Yes Yes   Sig: Take 5 mg by mouth every other day On Monday, Wednesday, Friday   terbinafine (LamISIL) 1 % cream 2020 at Unknown time Self Yes Yes   Sig: terbinafine HCl 1 % topical cream      Facility-Administered Medications: None       Past Medical History:   Diagnosis Date    Asthma     Bipolar disorder ([facility] )     Diabetes mellitus ([facility] )     Enlarged prostate     Glaucoma     Hyperlipidemia     Hypertension     Seasonal allergic rhinitis     Seizures (HCC)        Past Surgical History:   Procedure Laterality Date    KNEE SURGERY      WRIST SURGERY Left        Family History   Problem Relation Age of Onset    Arthritis Mother      I have reviewed and agree with the history as documented  Social History     Tobacco Use    Smoking status: Former Smoker     Packs/day: 2 00     Years: 27 00     Pack years: 54 00     Types: Cigarettes     Start date:      Last attempt to quit:      Years since quittin 1    Smokeless tobacco: Never Used   Substance Use Topics    Alcohol use: Not on file     Comment: socially    Drug use: Yes     Types: Heroin       Review of Systems   Constitutional: Negative  Negative for fatigue and fever  HENT: Negative  Negative for congestion, ear pain, rhinorrhea and sore throat  Eyes: Negative  Respiratory: Negative  Negative for cough, shortness of breath and wheezing  Cardiovascular: Negative  Negative for chest pain  Gastrointestinal: Positive for abdominal pain and nausea  Negative for diarrhea and vomiting  Endocrine: Negative  Genitourinary: Negative  Musculoskeletal: Negative  Negative for myalgias  Skin: Negative  Negative for rash  Allergic/Immunologic: Negative  Neurological: Negative  Negative for loss of consciousness and headaches  Hematological: Negative  Psychiatric/Behavioral: Negative  All other systems reviewed and are negative  Physical Exam  Physical Exam   Constitutional: He is oriented to person, place, and time  He appears well-developed and well-nourished  HENT:   Head: Normocephalic and atraumatic  Right Ear: External ear normal    Left Ear: External ear normal    Nose: Nose normal    Mouth/Throat: Oropharynx is clear and moist    Eyes: Pupils are equal, round, and reactive to light  Conjunctivae and EOM are normal    Neck: Normal range of motion  Neck supple  Cardiovascular: Normal rate, regular rhythm and normal heart sounds  Pulmonary/Chest: Effort normal and breath sounds normal    Sternal tender to palp    Abdominal: Soft  Bowel sounds are normal    Right flank pain    Genitourinary: Penis normal    Genitourinary Comments: +tinea cruris    No testes tender to palp    Musculoskeletal: Normal range of motion  Right sciatic notch pain dtr wnl great toe ext strong bilat no paresthesias   Neurological: He is alert and oriented to person, place, and time  Skin: Skin is warm  Capillary refill takes less than 2 seconds  Psychiatric: He has a normal mood and affect  His behavior is normal    Nursing note and vitals reviewed        Vital Signs  ED Triage Vitals [02/19/20 1004]   Temperature Pulse Respirations Blood Pressure SpO2   (!) 96 9 °F (36 1 °C) 80 16 148/86 98 %      Temp Source Heart Rate Source Patient Position - Orthostatic VS BP Location FiO2 (%)   Tympanic Monitor Lying Left arm --      Pain Score       8           Vitals:    02/19/20 1130 02/19/20 1359 02/19/20 1421 02/19/20 1513   BP: 128/75 113/81 136/95 107/83   Pulse: 74 76 73 72   Patient Position - Orthostatic VS: Lying Lying Sitting Lying         Visual Acuity      ED Medications  Medications   ondansetron (ZOFRAN) injection 4 mg (4 mg Intravenous Not Given 2/19/20 1129)   amLODIPine (NORVASC) tablet 2 5 mg (2 5 mg Oral Given 2/19/20 1500)   atorvastatin (LIPITOR) tablet 80 mg (has no administration in time range)   bimatoprost (LUMIGAN) 0 01 % ophthalmic solution 1 drop (has no administration in time range)   brimonidine (ALPHAGAN P) 0 15 % ophthalmic solution 1 drop (has no administration in time range)   finasteride (PROSCAR) tablet 5 mg (5 mg Oral Given 2/19/20 1501)   fluticasone (FLONASE) 50 mcg/act nasal spray 1 spray (has no administration in time range)   latanoprost (XALATAN) 0 005 % ophthalmic solution 1 drop (has no administration in time range)   melatonin tablet 3 mg (has no administration in time range)   montelukast (SINGULAIR) tablet 10 mg (10 mg Oral Given 2/19/20 1501)   pantoprazole (PROTONIX) EC tablet 40 mg (has no administration in time range)   predniSONE tablet 5 mg (has no administration in time range)   tamsulosin (FLOMAX) capsule 0 4 mg (has no administration in time range)   albuterol (PROVENTIL HFA,VENTOLIN HFA) inhaler 2 puff (has no administration in time range)   carBAMazepine (TEGretol) tablet 400 mg (has no administration in time range)   tiotropium (SPIRIVA) capsule for inhaler 18 mcg (has no administration in time range)   hydrOXYzine HCL (ATARAX) tablet 10 mg (has no administration in time range)   insulin glargine (LANTUS) subcutaneous injection 27 Units 0 27 mL (has no administration in time range)   insulin lispro (HumaLOG) 100 units/mL subcutaneous injection 6 Units (has no administration in time range)   insulin lispro (HumaLOG) 100 units/mL subcutaneous injection 1-6 Units (has no administration in time range)   insulin lispro (HumaLOG) 100 units/mL subcutaneous injection 6 Units (has no administration in time range)   insulin lispro (HumaLOG) 100 units/mL subcutaneous injection 10 Units (has no administration in time range)   enoxaparin (LOVENOX) subcutaneous injection 40 mg (40 mg Subcutaneous Given 2/19/20 1500)   polyethylene glycol (MIRALAX) packet 17 g (has no administration in time range)   ondansetron (ZOFRAN) injection 4 mg (has no administration in time range)   aluminum-magnesium hydroxide-simethicone (MYLANTA) 200-200-20 mg/5 mL oral suspension 30 mL (has no administration in time range)   acetaminophen (TYLENOL) tablet 650 mg (650 mg Oral Given 2/19/20 1501)   carBAMazepine (TEGretol) tablet 600 mg (has no administration in time range)   ondansetron (ZOFRAN) injection 4 mg (4 mg Intravenous Given 2/19/20 1040)   iohexol (OMNIPAQUE) 350 MG/ML injection (MULTI-DOSE) 100 mL (100 mL Intravenous Given 2/19/20 1143)       Diagnostic Studies  Results Reviewed     Procedure Component Value Units Date/Time    TSH, 3rd generation [[id number]]  (Normal) Collected:  02/19/20 1035    Lab Status:  Final result Specimen:  Blood from Arm, Right Updated:  02/19/20 1547     TSH 3RD GENERATON 2 160 uIU/mL     Narrative:       Patients undergoing fluorescein dye angiography may retain small amounts of fluorescein in the body for 48-72 hours post procedure  Samples containing fluorescein can produce falsely depressed TSH values  If the patient had this procedure,a specimen should be resubmitted post fluorescein clearance  Rapid drug screen, urine [[id number]]  (Normal) Collected:  02/19/20 1035    Lab Status:  Final result Specimen:  Urine, Clean Catch Updated:  02/19/20 1121     Amph/Meth UR Negative     Barbiturate Ur Negative     Benzodiazepine Urine Negative     Cocaine Urine Negative     Methadone Urine Negative     Opiate Urine Negative     PCP Ur Negative     THC Urine Negative    Narrative:       FOR MEDICAL PURPOSES ONLY  IF CONFIRMATION NEEDED PLEASE CONTACT THE LAB WITHIN 5 DAYS      Drug Screen Cutoff Levels:  AMPHETAMINE/METHAMPHETAMINES  1000 ng/mL  BARBITURATES     200 ng/mL  BENZODIAZEPINES     200 ng/mL  COCAINE      300 ng/mL  METHADONE      300 ng/mL  OPIATES      300 ng/mL  PHENCYCLIDINE     25 ng/mL  THC       50 ng/mL      Troponin I [[id number]]  (Normal) Collected:  02/19/20 1035    Lab Status:  Final result Specimen:  Blood from Arm, Right Updated:  02/19/20 1120     Troponin I <0 01 ng/mL     CBC and differential [[id number]]  (Abnormal) Collected:  02/19/20 1035    Lab Status:  Final result Specimen:  Blood from Arm, Right Updated:  02/19/20 1107     WBC 6 40 Thousand/uL      RBC 5 49 Million/uL      Hemoglobin 15 7 g/dL      Hematocrit 45 7 %      MCV 83 fL      MCH 28 5 pg      MCHC 34 2 g/dL      RDW 13 4 %      MPV 7 7 fL      Platelets 416 Thousands/uL      Neutrophils Relative 60 %      Lymphocytes Relative 26 %      Monocytes Relative 12 %      Eosinophils Relative 2 %      Basophils Relative 1 %      Neutrophils Absolute 3 80 Thousands/µL      Lymphocytes Absolute 1 60 Thousands/µL      Monocytes Absolute 0 80 Thousand/µL      Eosinophils Absolute 0 10 Thousand/µL      Basophils Absolute 0 00 Thousands/µL     Comprehensive metabolic panel [[id number]]  (Abnormal) Collected:  02/19/20 1035    Lab Status:  Final result Specimen:  Blood from Arm, Right Updated:  02/19/20 1107     Sodium 139 mmol/L      Potassium 3 8 mmol/L      Chloride 102 mmol/L      CO2 30 mmol/L      ANION GAP 7 mmol/L      BUN 14 mg/dL      Creatinine 0 83 mg/dL      Glucose 118 mg/dL      Calcium 9 6 mg/dL      AST 23 U/L      ALT 37 U/L      Alkaline Phosphatase 73 U/L      Total Protein 6 7 g/dL      Albumin 4 0 g/dL      Total Bilirubin 0 30 mg/dL      eGFR 98 ml/min/1 73sq m     Narrative:       [place] guidelines for Chronic Kidney Disease (CKD):     Stage 1 with normal or high GFR (GFR > 90 mL/min/1 73 square meters)    Stage 2 Mild CKD (GFR = 60-89 mL/min/1 73 square meters)    Stage 3A Moderate CKD (GFR = 45-59 mL/min/1 73 square meters)    Stage 3B Moderate CKD (GFR = 30-44 mL/min/1 73 square meters)    Stage 4 Severe CKD (GFR = 15-29 mL/min/1 73 square meters)    Stage 5 End Stage CKD (GFR <15 mL/min/1 73 square meters)  Note: GFR calculation is accurate only with a steady state creatinine    Lipase [[id number]]  (Normal) Collected:  02/19/20 1035    Lab Status:  Final result Specimen:  Blood from Arm, Right Updated:  02/19/20 1107     Lipase 57 u/L     UA w Reflex to Microscopic w Reflex to Culture [[id number]]  (Abnormal) Collected:  02/19/20 1035    Lab Status:  Final result Specimen:  Urine, Clean Catch Updated:  02/19/20 1104     Color, UA Hina     Clarity, UA Clear     Specific Gravity, UA 1 025     pH, UA 5 0     Leukocytes, UA Negative     Nitrite, UA Negative Protein, UA Negative mg/dl      Glucose, UA Negative mg/dl      Ketones, UA Negative mg/dl      Bilirubin, UA Negative     Blood, UA Negative     UROBILINOGEN UA Negative mg/dL                  CT chest abdomen pelvis w contrast   Final Result by Dae Arias MD (02/19 1226)      No acute CT abnormality in the chest, abdomen or pelvis  No visceral injury  No hemoperitoneum  No fracture  Workstation performed: [id number]                    Procedures  Procedures         ED Course                       LUH Risk Score      Most Recent Value   Age >= 65  0 Filed at: 02/19/2020 1439   Known CAD (stenosis >= 50%)  0 Filed at: 02/19/2020 1439   Recent (<=24 hrs) Service Angina  0 Filed at: 02/19/2020 1439   ST Deviation >= 0 5 mm  0 Filed at: 02/19/2020 1439   3+ CAD Risk Factors (FHx, HTN, HLP, DM, Smoker)  1 Filed at: 02/19/2020 1439   Aspirin Use Past 7 Days  0 Filed at: 02/19/2020 1439   Elevated Cardiac Markers  0 Filed at: 02/19/2020 1439   LUH Risk Score (Calculated)  1 Filed at: 02/19/2020 1439              MDM      Disposition  Final diagnoses:   Chest pain   Pain in the abdomen     Time reflects when diagnosis was documented in both MDM as applicable and the Disposition within this note     Time User Action Codes Description Comment    2/19/2020  1:14 PM Jacqueline Terrazas  Add [R07 9] Chest pain     2/19/2020  1:14 PM Jacqueline Terrazas  Add [R10 9] Pain in the abdomen       ED Disposition     ED Disposition Condition Date/Time Comment    Admit Stable Wed Feb 19, 2020  1:14 PM Case was discussed with Dr Kim Mejias and the patient's admission status was agreed to be Admission Status: inpatient status to the service of Dr Kim Mejias          Follow-up Information    None         Current Discharge Medication List      CONTINUE these medications which have NOT CHANGED    Details   albuterol (2 5 mg/3 mL) 0 083 % nebulizer solution Take 1 vial (2 5 mg total) by nebulization every 4 (four) hours as needed for shortness of breath  Qty: 25 vial, Refills: 0    Associated Diagnoses: Severe persistent asthma with acute exacerbation      albuterol (PROVENTIL HFA,VENTOLIN HFA) 90 mcg/act inhaler Inhale 2 puffs every 4 (four) hours as needed for wheezing  Qty: 1 Inhaler, Refills: 0    Associated Diagnoses: Severe persistent asthma with acute exacerbation      amLODIPine (NORVASC) 2 5 mg tablet Take 1 tablet (2 5 mg total) by mouth daily  Qty: 30 tablet, Refills: 5    Comments: Please discontinue previous order  Associated Diagnoses: Essential hypertension      atorvastatin (LIPITOR) 80 mg tablet Take 80 mg by mouth daily at bedtime      bimatoprost (LUMIGAN) 0 03 % ophthalmic drops Administer 1 drop to both eyes daily at bedtime        Blood Glucose Monitoring Suppl ([employer] BLOOD GLUCOSE METER) w/Device KIT by Does not apply route      brimonidine (ALPHAGAN P) 0 15 % ophthalmic solution Administer 1 drop to both eyes every 8 (eight) hours        Calcium Carb-Cholecalciferol (CALCIUM CARBONATE-VITAMIN D3 PO) Take 1,500 mg by mouth daily      carBAMazepine (TEGRETOL) 200 mg tablet Take 2 tabs in the morning and 3 tabs at night      dupilumab (DUPIXENT) subcutaneous injection Inject under the skin every 14 (fourteen) days      finasteride (PROSCAR) 5 mg tablet Take 5 mg by mouth daily      fluticasone (FLONASE) 50 mcg/act nasal spray 1 spray into each nostril daily      gabapentin (NEURONTIN) 300 mg capsule Take 300 mg by mouth 3 (three) times a day      guaiFENesin (MUCINEX) 600 mg 12 hr tablet Take 1 tablet (600 mg total) by mouth every 12 (twelve) hours  Qty: 12 tablet, Refills: 0    Associated Diagnoses: Acute bronchitis      hydrocortisone (ANUSOL-HC, PROCTOSOL HC,) 2 5 % rectal cream Insert 1 application into the rectum 2 (two) times a day      hydrOXYzine pamoate (VISTARIL) 50 mg capsule Take 50 mg by mouth Three times daily as needed      insulin glargine (BASAGLAR KWIKPEN) 100 units/mL injection pen Inject 35 Units under the skin daily at bedtime       !! insulin lispro (HumaLOG) 100 units/mL injection Inject 12 Units under the skin daily with breakfast  Qty: 10 mL, Refills: 0    Associated Diagnoses: Type 2 diabetes mellitus, with long-term current use of insulin ([address] )      ! ! insulin lispro (HumaLOG) 100 units/mL injection Inject 16 Units under the skin daily with lunch  Qty: 10 mL, Refills: 0    Associated Diagnoses: Type 2 diabetes mellitus, with long-term current use of insulin ([address] )      ! ! insulin lispro (HumaLOG) 100 units/mL injection Inject 16 Units under the skin daily with dinner  Qty: 10 mL, Refills: 0    Associated Diagnoses: Type 2 diabetes mellitus, with long-term current use of insulin ([facility])      Insulin Pen Needle 32G X 4 MM MISC Unifine Pentips Plus 32 gauge x 5/32" needle      latanoprost (XALATAN) 0 005 % ophthalmic solution Administer 1 drop to both eyes daily      loratadine (CLARITIN) 10 mg tablet loratadine 10 mg tablet      montelukast (SINGULAIR) 10 mg tablet Take 10 mg by mouth daily        omega-3-acid ethyl esters (LOVAZA) 1 g capsule Take 1 g by mouth daily      omeprazole (PriLOSEC) 40 MG capsule Take 40 mg by mouth daily      polyvinyl alcohol (LIQUIFILM TEARS) 1 4 % ophthalmic solution 2 drops as needed for dry eyes      predniSONE 5 mg tablet Take 5 mg by mouth every other day On Monday, Wednesday, Friday      SPIRIVA RESPIMAT 1 25 MCG/ACT AERS inhaler TWO INHALATIONS BY MOUTH DAILY  Qty: 1 Inhaler, Refills: 5    Associated Diagnoses: Severe persistent asthma with acute exacerbation      TAMSULOSIN HCL PO Take 0 4 mg by mouth daily at bedtime        terbinafine (LamISIL) 1 % cream terbinafine HCl 1 % topical cream      glucagon 1 MG injection as needed      melatonin 3 mg Take 3 mg by mouth daily at bedtime      Misc Natural Products (BLOOD SUGAR 360 PO) Use to check BG 7x/day  Glycemic fluctuations  Steroid induced hyperglycemia  E11 65       !! - Potential duplicate medications found  Please discuss with provider  No discharge procedures on file      PDMP Review     None          ED Provider  Electronically Signed by           Willa Light PA-C  02/19/20 6318

## 2020-02-19 NOTE — H&P
H&P- Deanna Patricia 1965, 63 y o  male MRN: [id number]    Unit/Bed#: 7T [facility] 713-02 Encounter: [id number]    Primary Care Provider: Sangita León MD   Date and time admitted to hospital: 2/19/2020  9:59 AM        * Intermittent chest pain  Assessment & Plan  Patient presented to ER with complaint of right-sided flank pain  He was found to have T-wave inversion in the EKG   Patient does complain of intermittent chest pain that comes and goes, not associated with activity or food  Currently patient does not have any chest pain  His last stress test was completed in July 2019 which was negative for any abnormalities  No other previous cardiac history  Initial troponin in the ER was negative  Will place patient on telemetry monitor, will trend troponin  Obtain hemoglobin A1c, TSH, magnesium, phosphorus  Repeat EKG in the morning    Right flank pain  Assessment & Plan  Negative urinalysis, normal white count, normal renal function  CT scan of abdomen and pelvis is negative for any findings related to the symptoms  Patient did have heroin overdose 5 days ago where wife did CPR on his chest   Likely musculoskeletal  Acetaminophen as needed    Corticosteroid dependence ([facility] [address] )  Assessment & Plan  Patient currently is taking prednisone 5 mg daily for longstanding history of asthma  Patient has been on prednisone for 20 years  Will continue home does    HLD (hyperlipidemia)  Assessment & Plan  Continue home statin    BPH (benign prostatic hyperplasia)  Assessment & Plan  Continue finasteride and tamsulosin    Glaucoma  Assessment & Plan  Continue his eyedrops    Seizure ([address] )  Assessment & Plan  Patient's last episode of seizure was 2000  Continue Tegretol home does    Type 2 diabetes mellitus, with long-term current use of insulin ([facility])  Assessment & Plan  Lab Results   Component Value Date    HGBA1C 10 6 (H) 12/11/2014       No results for input(s): POCGLU in the last 72 hours      Blood Sugar Average: Last 72 hrs:   repeat A1c level  Currently patient is taking Basaglar 35 units at bedtime, 6 units of Humalog with breakfast and lunch and 12 units with dinner  Will order Lantus 27 units at bedtime, 6 units of Humalog with breakfast and lunch and 10 units with dinner  Will also order sliding scale         HTN (hypertension)  Assessment & Plan  Continue medication amlodipine 2 5 mg    Severe asthma  Assessment & Plan  Patient is currently not on exacerbation  Patient state after starting [facility] outpatient, he did not get asthma exacerbation  Continue Spiriva, Singulair and albuterol as needed        VTE Prophylaxis: Enoxaparin (Lovenox)  / sequential compression device   Code Status: full     Anticipated Length of Stay:  Patient will be admitted on an Observation basis with an anticipated length of stay of  < 2 midnights  Justification for Hospital Stay: chest pain, EKG abnormalities    Total Time for Visit, including Counseling / Coordination of Care: 1 hour  Greater than 50% of this total time spent on direct patient counseling and coordination of care  Chief Complaint:   Chest pain, right flank pain    History of Present Illness:    Lina Gomez is a 63 y o  male with history of insulin-dependent type 2 diabetes, asthma, presented to ER with complaint of right-sided flank pain that started 24 hours ago  Pain radiates to the right groin  Denies any dysuria, hematuria, nausea or vomiting  In ER, EKG shows T-wave inversion, troponin negative, no white blood cell count  Urinalysis is negative  CT of abdomen and pelvis is negative for any findings  Patient does states he has intermittent chest pain that comes and goes  Currently patient is pain free however chest pain is worsening for last 2 months associated with short of breath that is different from the asthma  Patient does not have any history of coronary artery disease  Patient was recently in the ER 5 days ago for heroin overdose    Patient states he was clean for 27 years however relapse on Friday  He did not use heroin since then  Review of Systems:    Review of Systems   Constitutional: Negative for activity change and appetite change  HENT: Negative for congestion and sore throat  Eyes: Negative for pain and visual disturbance  Respiratory: Negative for cough and shortness of breath  Cardiovascular: Positive for chest pain  Negative for leg swelling  Gastrointestinal: Positive for abdominal pain  Negative for abdominal distention  Endocrine: Negative for polyuria  Genitourinary: Negative for difficulty urinating  Musculoskeletal: Negative for arthralgias and joint swelling  Skin: Negative for wound  Allergic/Immunologic: Negative for food allergies  Neurological: Negative for light-headedness and headaches  Hematological: Negative for adenopathy  Psychiatric/Behavioral: Negative for sleep disturbance  Past Medical and Surgical History:     Past Medical History:   Diagnosis Date    Asthma     Bipolar disorder ([place] [address] )     Diabetes mellitus ([place] [address] )     Enlarged prostate     Glaucoma     Hyperlipidemia     Hypertension     Seasonal allergic rhinitis     Seizures ([place] [address] )        Past Surgical History:   Procedure Laterality Date    KNEE SURGERY      WRIST SURGERY Left        Meds/Allergies:    Prior to Admission medications    Medication Sig Start Date End Date Taking?  Authorizing Provider   albuterol (2 5 mg/3 mL) 0 083 % nebulizer solution Take 1 vial (2 5 mg total) by nebulization every 4 (four) hours as needed for shortness of breath 11/15/18  Yes Reuben Spears DO   albuterol (PROVENTIL HFA,VENTOLIN HFA) 90 mcg/act inhaler Inhale 2 puffs every 4 (four) hours as needed for wheezing 11/15/18  Yes Reuben Spears DO   amLODIPine (NORVASC) 2 5 mg tablet Take 1 tablet (2 5 mg total) by mouth daily 3/18/19  Yes Amy Dominguez MD   atorvastatin (LIPITOR) 80 mg tablet Take 80 mg by mouth daily at bedtime 9/8/16  Yes Historical Provider, MD   bimatoprost (LUMIGAN) 0 03 % ophthalmic drops Administer 1 drop to both eyes daily at bedtime   6/5/08  Yes Historical Provider, MD   Blood Glucose Monitoring Suppl (Truevision) w/Device KIT by Does not apply route   Yes Historical Provider, MD   brimonidine (ALPHAGAN P) 0 15 % ophthalmic solution Administer 1 drop to both eyes every 8 (eight) hours   7/2/15  Yes Historical Provider, MD   Calcium Carb-Cholecalciferol (CALCIUM CARBONATE-VITAMIN D3 PO) Take 1,500 mg by mouth daily 9/20/13  Yes Historical Provider, MD   carBAMazepine (TEGRETOL) 200 mg tablet Take 2 tabs in the morning and 3 tabs at night 10/19/16  Yes Historical Provider, MD   dupilumab ([facility]) subcutaneous injection Inject under the skin every 14 (fourteen) days   Yes Historical Provider, MD   finasteride (PROSCAR) 5 mg tablet Take 5 mg by mouth daily 4/26/16  Yes Historical Provider, MD   fluticasone (FLONASE) 50 mcg/act nasal spray 1 spray into each nostril daily   Yes Historical Provider, MD   gabapentin (NEURONTIN) 300 mg capsule Take 300 mg by mouth 3 (three) times a day 9/8/16  Yes Historical Provider, MD   guaiFENesin (MUCINEX) 600 mg 12 hr tablet Take 1 tablet (600 mg total) by mouth every 12 (twelve) hours 5/3/19  Yes Kirstin Monday, MD   hydrocortisone (ANUSOL-HC, PROCTOSOL HC,) 2 5 % rectal cream Insert 1 application into the rectum 2 (two) times a day   Yes Historical Provider, MD   hydrOXYzine pamoate (VISTARIL) 50 mg capsule Take 50 mg by mouth Three times daily as needed 8/8/19  Yes Historical Provider, MD   insulin glargine (BASAGLAR KWIKPEN) 100 units/mL injection pen Inject 35 Units under the skin daily at bedtime    Yes Historical Provider, MD   insulin lispro (HumaLOG) 100 units/mL injection Inject 12 Units under the skin daily with breakfast  Patient taking differently: Inject 6 Units under the skin daily with breakfast  10/27/18  Yes Katrina De Leon MD   insulin lispro (HumaLOG) 100 units/mL injection Inject 16 Units under the skin daily with lunch  Patient taking differently: Inject 6 Units under the skin daily with lunch  10/27/18  Yes Viet Wyatt MD   insulin lispro (HumaLOG) 100 units/mL injection Inject 16 Units under the skin daily with dinner  Patient taking differently: Inject 12 Units under the skin daily with dinner  10/27/18  Yes Viet Wyatt MD   Insulin Pen Needle 32G X 4 MM MISC Unifine Pentips Plus 32 gauge x 5/32" needle   Yes Historical Provider, MD   latanoprost (XALATAN) 0 005 % ophthalmic solution Administer 1 drop to both eyes daily   Yes Historical Provider, MD   loratadine (CLARITIN) 10 mg tablet loratadine 10 mg tablet   Yes Historical Provider, MD   montelukast (SINGULAIR) 10 mg tablet Take 10 mg by mouth daily   8/25/15  Yes Historical Provider, MD   omega-3-acid ethyl esters (LOVAZA) 1 g capsule Take 1 g by mouth daily   Yes Historical Provider, MD   omeprazole (PriLOSEC) 40 MG capsule Take 40 mg by mouth daily 4/26/16  Yes Historical Provider, MD   polyvinyl alcohol (LIQUIFILM TEARS) 1 4 % ophthalmic solution 2 drops as needed for dry eyes   Yes Historical Provider, MD   predniSONE 5 mg tablet Take 5 mg by mouth every other day On Monday, Wednesday, Friday   Yes Historical Provider, MD   SPIRIVA RESPIMAT 1 25 MCG/ACT AERS inhaler TWO INHALATIONS BY MOUTH DAILY 11/19/19  Yes Faye Pisano PA-C   TAMSULOSIN HCL PO Take 0 4 mg by mouth daily at bedtime     Yes Historical Provider, MD   terbinafine (LamISIL) 1 % cream terbinafine HCl 1 % topical cream   Yes Historical Provider, MD   glucagon 1 MG injection as needed 8/5/16   Historical Provider, MD   melatonin 3 mg Take 3 mg by mouth daily at bedtime    Historical Provider, MD   Misc Natural Products (BLOOD SUGAR 360 PO) Use to check BG 7x/day  Glycemic fluctuations  Steroid induced hyperglycemia   E11 65 9/18/18   Historical Provider, MD     I have reviewed home medications with patient personally  Allergies: Allergies   Allergen Reactions    Aspirin GI Bleeding    Ibuprofen GI Bleeding    Licorice Flavor [Flavoring Agent] Hives    Penicillins Hives    Propoxyphene Hives     Propoxyphene N-Acetaminophen---hives & palpitations    Tramadol Palpitations     Other reaction(s): heart pumps real fast    Bee Venom     Chocolate     Glycyrrhiza     Haloperidol Other (See Comments)     "bent out of shape" - dystonia    Lithium Other (See Comments)     edema    Molds & Smuts     Other     Wasp Venom        Social History:     Marital Status: /Civil Union      Substance Use History:   Social History     Substance and Sexual Activity   Alcohol Use Not on file    Comment: socially     Social History     Tobacco Use   Smoking Status Former Smoker    Packs/day: 2 00    Years: 27 00    Pack years: 54 00    Types: Cigarettes    Start date: 35    Last attempt to quit: Shitalmaggie Michaud Years since quittin 1   Smokeless Tobacco Never Used     Social History     Substance and Sexual Activity   Drug Use Yes    Types: Heroin       Family History:    Family History   Problem Relation Age of Onset    Arthritis Mother        Physical Exam:     Vitals:   Blood Pressure: 136/95 (20 1421)  Pulse: 73 (20 1421)  Temperature: (!) 97 1 °F (36 2 °C) (20 142)  Temp Source: Temporal (20 142)  Respirations: 20 (20 142)  Height: 5' 8 5" (174 cm) (20 142)  Weight - Scale: 90 8 kg (200 lb 2 8 oz) (20 142)  SpO2: 99 % (20 142)    Physical Exam   Constitutional: He appears well-developed and well-nourished  HENT:   Head: Normocephalic and atraumatic  Right Ear: External ear normal    Left Ear: External ear normal    Nose: Nose normal    Mouth/Throat: Oropharynx is clear and moist    Eyes: Conjunctivae and EOM are normal    Neck: Normal range of motion  Neck supple  Cardiovascular: Normal rate, regular rhythm and normal heart sounds  Pulmonary/Chest: Effort normal and breath sounds normal    Abdominal: Soft  Bowel sounds are normal    Right flank tenderness   Genitourinary:   Genitourinary Comments: deferred   Neurological: He is alert  Cranial nerve 2 -12 are normal    Skin: Skin is warm and dry  Psychiatric: He has a normal mood and affect  Additional Data:     Lab Results: I have personally reviewed pertinent reports  Results from last 7 days   Lab Units 02/19/20  1035   WBC Thousand/uL 6 40   HEMOGLOBIN g/dL 15 7   HEMATOCRIT % 45 7   PLATELETS Thousands/uL 255   NEUTROS PCT % 60   LYMPHS PCT % 26   MONOS PCT % 12*   EOS PCT % 2     Results from last 7 days   Lab Units 02/19/20  1035   SODIUM mmol/L 139   POTASSIUM mmol/L 3 8   CHLORIDE mmol/L 102   CO2 mmol/L 30   BUN mg/dL 14   CREATININE mg/dL 0 83   ANION GAP mmol/L 7   CALCIUM mg/dL 9 6   ALBUMIN g/dL 4 0   TOTAL BILIRUBIN mg/dL 0 30   ALK PHOS U/L 73   ALT U/L 37   AST U/L 23   GLUCOSE RANDOM mg/dL 118*                       Imaging: I have personally reviewed pertinent reports  CT chest abdomen pelvis w contrast   Final Result by Claudene Kitchens, MD (02/19 1226)      No acute CT abnormality in the chest, abdomen or pelvis  No visceral injury  No hemoperitoneum  No fracture  Workstation performed: [id number]             EKG, Pathology, and Other Studies Reviewed on Admission:   · EKG: reviewed, T wave inversion in lead 2    Allscripts / Epic Records Reviewed: Yes     ** Please Note: This note has been constructed using a voice recognition system   **

## 2020-02-19 NOTE — ASSESSMENT & PLAN NOTE
Negative urinalysis, normal white count, normal renal function  CT scan of abdomen and pelvis is negative for any findings related to the symptoms  Patient did have heroin overdose 5 days ago where wife did CPR on his chest   Likely musculoskeletal  Acetaminophen as needed

## 2020-02-19 NOTE — ASSESSMENT & PLAN NOTE
Lab Results   Component Value Date    HGBA1C 10 6 (H) 12/11/2014       No results for input(s): POCGLU in the last 72 hours      Blood Sugar Average: Last 72 hrs:   repeat A1c level  Currently patient is taking Basaglar 35 units at bedtime, 6 units of Humalog with breakfast and lunch and 12 units with dinner  Will order Lantus 27 units at bedtime, 6 units of Humalog with breakfast and lunch and 10 units with dinner  Will also order sliding scale

## 2020-02-19 NOTE — ASSESSMENT & PLAN NOTE
Patient currently is taking prednisone 5 mg daily for longstanding history of asthma  Patient has been on prednisone for 20 years    Will continue home does

## 2020-02-20 ENCOUNTER — APPOINTMENT (OUTPATIENT)
Dept: NON INVASIVE DIAGNOSTICS | Facility: HOSPITAL | Age: 55
End: 2020-02-20
Payer: COMMERCIAL

## 2020-02-20 VITALS
DIASTOLIC BLOOD PRESSURE: 81 MMHG | OXYGEN SATURATION: 98 % | SYSTOLIC BLOOD PRESSURE: 116 MMHG | HEART RATE: 66 BPM | RESPIRATION RATE: 20 BRPM | TEMPERATURE: 97.6 F | HEIGHT: 69 IN | BODY MASS INDEX: 29.65 KG/M2 | WEIGHT: 200.18 LBS

## 2020-02-20 LAB
ANION GAP SERPL CALCULATED.3IONS-SCNC: 5 MMOL/L (ref 5–14)
ATRIAL RATE: 73 BPM
BUN SERPL-MCNC: 14 MG/DL (ref 5–25)
CALCIUM SERPL-MCNC: 9.2 MG/DL (ref 8.4–10.2)
CHLORIDE SERPL-SCNC: 102 MMOL/L (ref 97–108)
CO2 SERPL-SCNC: 32 MMOL/L (ref 22–30)
CREAT SERPL-MCNC: 0.8 MG/DL (ref 0.7–1.5)
ERYTHROCYTE [DISTWIDTH] IN BLOOD BY AUTOMATED COUNT: 13.4 %
EST. AVERAGE GLUCOSE BLD GHB EST-MCNC: 166 MG/DL
GFR SERPL CREATININE-BSD FRML MDRD: 100 ML/MIN/1.73SQ M
GLUCOSE SERPL-MCNC: 74 MG/DL (ref 65–140)
GLUCOSE SERPL-MCNC: 77 MG/DL (ref 70–99)
GLUCOSE SERPL-MCNC: 81 MG/DL (ref 65–140)
HBA1C MFR BLD: 7.4 %
HCT VFR BLD AUTO: 43.4 % (ref 41–53)
HGB BLD-MCNC: 14.7 G/DL (ref 13.5–17.5)
MAGNESIUM SERPL-MCNC: 2.2 MG/DL (ref 1.6–2.3)
MCH RBC QN AUTO: 29.1 PG (ref 26–34)
MCHC RBC AUTO-ENTMCNC: 33.9 G/DL (ref 31–36)
MCV RBC AUTO: 86 FL (ref 80–100)
P AXIS: 45 DEGREES
PHOSPHATE SERPL-MCNC: 4.5 MG/DL (ref 2.5–4.8)
PLATELET # BLD AUTO: 213 THOUSANDS/UL (ref 150–450)
PMV BLD AUTO: 7.6 FL (ref 8.9–12.7)
POTASSIUM SERPL-SCNC: 3.8 MMOL/L (ref 3.6–5)
PR INTERVAL: 174 MS
QRS AXIS: 24 DEGREES
QRSD INTERVAL: 88 MS
QT INTERVAL: 358 MS
QTC INTERVAL: 394 MS
RBC # BLD AUTO: 5.06 MILLION/UL (ref 4.5–5.9)
SODIUM SERPL-SCNC: 139 MMOL/L (ref 137–147)
T WAVE AXIS: 70 DEGREES
VENTRICULAR RATE: 73 BPM
WBC # BLD AUTO: 5.1 THOUSAND/UL (ref 4.5–11)

## 2020-02-20 PROCEDURE — 93306 TTE W/DOPPLER COMPLETE: CPT | Performed by: INTERNAL MEDICINE

## 2020-02-20 PROCEDURE — 93306 TTE W/DOPPLER COMPLETE: CPT

## 2020-02-20 PROCEDURE — 80048 BASIC METABOLIC PNL TOTAL CA: CPT | Performed by: FAMILY MEDICINE

## 2020-02-20 PROCEDURE — 82948 REAGENT STRIP/BLOOD GLUCOSE: CPT

## 2020-02-20 PROCEDURE — 84100 ASSAY OF PHOSPHORUS: CPT | Performed by: FAMILY MEDICINE

## 2020-02-20 PROCEDURE — 85027 COMPLETE CBC AUTOMATED: CPT | Performed by: FAMILY MEDICINE

## 2020-02-20 PROCEDURE — 99217 PR OBSERVATION CARE DISCHARGE MANAGEMENT: CPT | Performed by: FAMILY MEDICINE

## 2020-02-20 PROCEDURE — 93010 ELECTROCARDIOGRAM REPORT: CPT | Performed by: INTERNAL MEDICINE

## 2020-02-20 PROCEDURE — 93005 ELECTROCARDIOGRAM TRACING: CPT

## 2020-02-20 PROCEDURE — 83735 ASSAY OF MAGNESIUM: CPT | Performed by: FAMILY MEDICINE

## 2020-02-20 RX ADMIN — FLUTICASONE PROPIONATE 1 SPRAY: 50 SPRAY, METERED NASAL at 08:37

## 2020-02-20 RX ADMIN — AMLODIPINE BESYLATE 2.5 MG: 2.5 TABLET ORAL at 08:36

## 2020-02-20 RX ADMIN — TIOTROPIUM BROMIDE 18 MCG: 18 CAPSULE ORAL; RESPIRATORY (INHALATION) at 08:37

## 2020-02-20 RX ADMIN — BRIMONIDINE TARTRATE 1 DROP: 1.5 SOLUTION OPHTHALMIC at 05:50

## 2020-02-20 RX ADMIN — ENOXAPARIN SODIUM 40 MG: 40 INJECTION SUBCUTANEOUS at 08:36

## 2020-02-20 RX ADMIN — CARBAMAZEPINE 400 MG: 200 TABLET ORAL at 08:36

## 2020-02-20 RX ADMIN — INSULIN LISPRO 6 UNITS: 100 INJECTION, SOLUTION INTRAVENOUS; SUBCUTANEOUS at 08:38

## 2020-02-20 RX ADMIN — MONTELUKAST SODIUM 10 MG: 10 TABLET, COATED ORAL at 08:36

## 2020-02-20 RX ADMIN — PANTOPRAZOLE SODIUM 40 MG: 40 TABLET, DELAYED RELEASE ORAL at 05:31

## 2020-02-20 RX ADMIN — PREDNISONE 5 MG: 5 TABLET ORAL at 08:36

## 2020-02-20 RX ADMIN — FINASTERIDE 5 MG: 5 TABLET, FILM COATED ORAL at 08:36

## 2020-02-20 NOTE — ASSESSMENT & PLAN NOTE
Right-sided lower back pain secondary to fall  Negative urinalysis, normal white count, normal renal function  CT scan of abdomen and pelvis is negative for any findings related to the symptoms  Patient did have heroin overdose 5 days ago where wife did CPR on his chest and he had a fall  Likely musculoskeletal  Acetaminophen as needed

## 2020-02-20 NOTE — DISCHARGE SUMMARY
Discharge- Awilda Casey 1965, 63 y o  male MRN: [id number]    Unit/Bed#: 7T [facility] 713-02 Encounter: [id number]    Primary Care Provider: Asmita Wilburn MD   Date and time admitted to hospital: 2/19/2020  9:59 AM        * Intermittent chest pain  Assessment & Plan  Patient presented to ER with complaint of right-sided flank pain  He was found to have T-wave inversion in the EKG   Repeat EKG shows no changes in EKG  Patient currently does not complained of any chest pain, short of breath, diaphoresis either at rest or activity  His last stress test was completed in July 2019 which was negative for any abnormalities  No other previous cardiac history  Initial troponin in the ER was negative, troponin trended and negative  No arrhythmia noted on telemetry monitor  Normal TSH, magnesium and phosphorus  Echocardiogram shows normal LVEF, no valvular abnormalities  Will discharge patient home with follow-up with Cardiology    Right flank pain  Assessment & Plan  Right-sided lower back pain secondary to fall  Negative urinalysis, normal white count, normal renal function  CT scan of abdomen and pelvis is negative for any findings related to the symptoms  Patient did have heroin overdose 5 days ago where wife did CPR on his chest and he had a fall  Likely musculoskeletal  Acetaminophen as needed    Corticosteroid dependence ([facility] [address] )  Assessment & Plan  Patient currently is taking prednisone 5 mg daily for longstanding history of asthma  Patient has been on prednisone for 20 years    Will continue home does    HLD (hyperlipidemia)  Assessment & Plan  Continue home statin    BPH (benign prostatic hyperplasia)  Assessment & Plan  Continue finasteride and tamsulosin    Glaucoma  Assessment & Plan  Continue his eyedrops    Seizure ([facility] [address] )  Assessment & Plan  Patient's last episode of seizure was 2000  Continue Tegretol home does    Type 2 diabetes mellitus, with long-term current use of insulin ([facility] [address] )  Assessment & Plan  Lab Results   Component Value Date    HGBA1C 7 4 (H) 02/19/2020       Recent Labs     02/19/20  1557 02/19/20  2024 02/19/20  2159 02/20/20  0544   POCGLU 129 90 153* 81       Blood Sugar Average: Last 72 hrs:  (P) 113 25 repeat A1c level  Currently patient is taking Basaglar 35 units at bedtime, 6 units of Humalog with breakfast and lunch and 12 units with dinner  Will order Lantus 27 units at bedtime, 6 units of Humalog with breakfast and lunch and 10 units with dinner  Will also order sliding scale         HTN (hypertension)  Assessment & Plan  Continue medication amlodipine 2 5 mg    Severe asthma  Assessment & Plan  Patient is currently not on exacerbation  Patient state after starting [facility] outpatient, he did not get asthma exacerbation  Continue Spiriva, Singulair and albuterol as needed           Discharging Physician / Practitioner: Veena Mccain MD  PCP: Lila Limon MD  Admission Date:   Admission Orders (From admission, onward)     Ordered        02/19/20 1331  Place in Observation  Once         02/19/20 1313  Inpatient Admission (expected length of stay for this patient Order details is greater than two midnights)  Once                   Discharge Date: 02/20/20    Resolved Problems  Date Reviewed: 2/19/2020    None          Consultations During Hospital Stay:  · none    Procedures Performed:   · none    Significant Findings / Test Results:   · EKG abnormalities    Incidental Findings:   · none     Test Results Pending at Discharge (will require follow up):   · none     Outpatient Tests Requested:  · none    Complications:  none    Reason for Admission: intermittent chest pain and abnormal EKG    Hospital Course: Pat Bull is a 63 y o  male patient who originally presented to the hospital on 2/19/2020 due to right-sided flank pain and back pain, however patient was attributed to intermittent chest pain that is not associated with activity or food    Patient was admitted, telemetry monitor did not show any arrhythmia, troponin was trended which was negative  Echocardiogram is also shows normal ejection fraction  Patient will be discharged home with follow-up with Cardiology  Please see above list of diagnoses and related plan for additional information  Condition at Discharge: good     Discharge Day Visit / Exam:     Subjective:  Patient denies any complaints this morning, denies chest pain, short of breath, diaphoresis, dyspnea, jaw pain, nausea or vomiting    Vitals: Blood Pressure: 116/81 (02/20/20 0727)  Pulse: 66 (02/20/20 0727)  Temperature: 97 6 °F (36 4 °C) (02/20/20 0727)  Temp Source: Temporal (02/20/20 0727)  Respirations: 20 (02/20/20 0727)  Height: 5' 8 5" (174 cm) (02/19/20 1421)  Weight - Scale: 90 8 kg (200 lb 2 8 oz) (02/19/20 1421)  SpO2: 98 % (02/20/20 0727)  Exam:   Physical Exam   Constitutional: He appears well-developed and well-nourished  HENT:   Head: Normocephalic and atraumatic  Right Ear: External ear normal    Left Ear: External ear normal    Nose: Nose normal    Mouth/Throat: Oropharynx is clear and moist    Eyes: Conjunctivae and EOM are normal    Neck: Normal range of motion  Neck supple  Cardiovascular: Normal rate, regular rhythm and normal heart sounds  Pulmonary/Chest: Effort normal and breath sounds normal    Abdominal: Soft  Bowel sounds are normal    Genitourinary:   Genitourinary Comments: deferred   Neurological: He is alert  Cranial nerve 2 -12 are normal    Skin: Skin is warm and dry  Psychiatric: He has a normal mood and affect  Discharge instructions/Information to patient and family:   See after visit summary for information provided to patient and family  Provisions for Follow-Up Care:  See after visit summary for information related to follow-up care and any pertinent home health orders  Disposition:     Home       Planned Readmission: no     Discharge Statement:  I spent 45 minutes discharging the patient  This time was spent on the day of discharge  I had direct contact with the patient on the day of discharge  Greater than 50% of the total time was spent examining patient, answering all patient questions, arranging and discussing plan of care with patient as well as directly providing post-discharge instructions  Additional time then spent on discharge activities  Discharge Medications:  See after visit summary for reconciled discharge medications provided to patient and family        ** Please Note: This note has been constructed using a voice recognition system **

## 2020-02-20 NOTE — ASSESSMENT & PLAN NOTE
Patient is currently not on exacerbation  Patient state after starting [facility] outpatient, he did not get asthma exacerbation     Continue Spiriva, Singulair and albuterol as needed

## 2020-02-20 NOTE — ASSESSMENT & PLAN NOTE
Patient presented to ER with complaint of right-sided flank pain  He was found to have T-wave inversion in the EKG   Repeat EKG shows no changes in EKG  Patient currently does not complained of any chest pain, short of breath, diaphoresis either at rest or activity  His last stress test was completed in July 2019 which was negative for any abnormalities  No other previous cardiac history  Initial troponin in the ER was negative, troponin trended and negative  No arrhythmia noted on telemetry monitor     Normal TSH, magnesium and phosphorus  Echocardiogram shows normal LVEF, no valvular abnormalities  Will discharge patient home with follow-up with Cardiology

## 2020-02-20 NOTE — PLAN OF CARE
Problem: Potential for Falls  Goal: Patient will remain free of falls  Description  INTERVENTIONS:  - Assess patient frequently for physical needs  -  Identify cognitive and physical deficits and behaviors that affect risk of falls  -  [place] fall precautions as indicated by assessment   - Educate patient/family on patient safety including physical limitations  - Instruct patient to call for assistance with activity based on assessment  - Modify environment to reduce risk of injury  - Consider OT/PT consult to assist with strengthening/mobility  Outcome: Adequate for Discharge     Problem: Nutrition/Hydration-ADULT  Goal: Nutrient/Hydration intake appropriate for improving, restoring or maintaining nutritional needs  Description  Monitor and assess patient's nutrition/hydration status for malnutrition  Collaborate with interdisciplinary team and initiate plan and interventions as ordered  Monitor patient's weight and dietary intake as ordered or per policy  Utilize nutrition screening tool and intervene as necessary  Determine patient's food preferences and provide high-protein, high-caloric foods as appropriate       INTERVENTIONS:  - Monitor oral intake, urinary output, labs, and treatment plans  - Assess nutrition and hydration status and recommend course of action  - Evaluate amount of meals eaten  - Assist patient with eating if necessary   - Allow adequate time for meals  - Recommend/ encourage appropriate diets, oral nutritional supplements, and vitamin/mineral supplements  - Order, calculate, and assess calorie counts as needed  - Recommend, monitor, and adjust tube feedings and TPN/PPN based on assessed needs  - Assess need for intravenous fluids  - Provide specific nutrition/hydration education as appropriate  - Include patient/family/caregiver in decisions related to nutrition  Outcome: Adequate for Discharge     Problem: PAIN - ADULT  Goal: Verbalizes/displays adequate comfort level or baseline comfort level  Description  Interventions:  - Encourage patient to monitor pain and request assistance  - Assess pain using appropriate pain scale  - Administer analgesics based on type and severity of pain and evaluate response  - Implement non-pharmacological measures as appropriate and evaluate response  - Consider cultural and social influences on pain and pain management  - Notify physician/advanced practitioner if interventions unsuccessful or patient reports new pain  Outcome: Adequate for Discharge     Problem: INFECTION - ADULT  Goal: Absence or prevention of progression during hospitalization  Description  INTERVENTIONS:  - Assess and monitor for signs and symptoms of infection  - Monitor lab/diagnostic results  - Monitor all insertion sites, i e  indwelling lines, tubes, and drains  - Monitor endotracheal if appropriate and nasal secretions for changes in amount and color  - [place] appropriate cooling/warming therapies per order  - Administer medications as ordered  - Instruct and encourage patient and family to use good hand hygiene technique  - Identify and instruct in appropriate isolation precautions for identified infection/condition  Outcome: Adequate for Discharge     Problem: SAFETY ADULT  Goal: Maintain or return to baseline ADL function  Description  INTERVENTIONS:  -  Assess patient's ability to carry out ADLs; assess patient's baseline for ADL function and identify physical deficits which impact ability to perform ADLs (bathing, care of mouth/teeth, toileting, grooming, dressing, etc )  - Assess/evaluate cause of self-care deficits   - Assess range of motion  - Assess patient's mobility; develop plan if impaired  - Assess patient's need for assistive devices and provide as appropriate  - Encourage maximum independence but intervene and supervise when necessary  - Involve family in performance of ADLs  - Assess for home care needs following discharge   - Consider OT consult to assist with ADL evaluation and planning for discharge  - Provide patient education as appropriate  Outcome: Adequate for Discharge  Goal: Maintain or return mobility status to optimal level  Description  INTERVENTIONS:  - Assess patient's baseline mobility status (ambulation, transfers, stairs, etc )    - Identify cognitive and physical deficits and behaviors that affect mobility  - Identify mobility aids required to assist with transfers and/or ambulation (gait belt, sit-to-stand, lift, walker, cane, etc )  - [place] fall precautions as indicated by assessment  - Record patient progress and toleration of activity level on Mobility SBAR; progress patient to next Phase/Stage  - Instruct patient to call for assistance with activity based on assessment  - Consider rehabilitation consult to assist with strengthening/weightbearing, etc   Outcome: Adequate for Discharge     Problem: DISCHARGE PLANNING  Goal: Discharge to home or other facility with appropriate resources  Description  INTERVENTIONS:  - Identify barriers to discharge w/patient and caregiver  - Arrange for needed discharge resources and transportation as appropriate  - Identify discharge learning needs (meds, wound care, etc )  - Arrange for interpretive services to assist at discharge as needed  - Refer to Case Management Department for coordinating discharge planning if the patient needs post-hospital services based on physician/advanced practitioner order or complex needs related to functional status, cognitive ability, or social support system  Outcome: Adequate for Discharge     Problem: Knowledge Deficit  Goal: Patient/family/caregiver demonstrates understanding of disease process, treatment plan, medications, and discharge instructions  Description  Complete learning assessment and assess knowledge base    Interventions:  - Provide teaching at level of understanding  - Provide teaching via preferred learning methods  Outcome: Adequate for Discharge

## 2020-02-20 NOTE — NURSING NOTE
"Am feeling good, I can go home"  Offers no complaints  Had right flank pain earlier but says received Tylenol that was effective  Snacks given

## 2020-02-20 NOTE — ASSESSMENT & PLAN NOTE
Lab Results   Component Value Date    HGBA1C 7 4 (H) 02/19/2020       Recent Labs     02/19/20  1557 02/19/20 2024 02/19/20 2159 02/20/20  0544   POCGLU 129 90 153* 81       Blood Sugar Average: Last 72 hrs:  (P) 113 25 repeat A1c level  Currently patient is taking Basaglar 35 units at bedtime, 6 units of Humalog with breakfast and lunch and 12 units with dinner  Will order Lantus 27 units at bedtime, 6 units of Humalog with breakfast and lunch and 10 units with dinner  Will also order sliding scale

## 2020-02-20 NOTE — SOCIAL WORK
CM spoke with physician who requested that pt have an outpatient follow-up cardiology appointment due to being admitted with chest pain   CM spoke with Martha Raymodn at [facility] [address] [facility] ([phone]) who scheduled pt an appointment on March 6th, 2020 at 3:20 PM  Appointment on AVS

## 2020-02-20 NOTE — PLAN OF CARE
Problem: Potential for Falls  Goal: Patient will remain free of falls  Description  INTERVENTIONS:  - Assess patient frequently for physical needs  -  Identify cognitive and physical deficits and behaviors that affect risk of falls  -  [place] fall precautions as indicated by assessment   - Educate patient/family on patient safety including physical limitations  - Instruct patient to call for assistance with activity based on assessment  - Modify environment to reduce risk of injury  - Consider OT/PT consult to assist with strengthening/mobility  Outcome: Progressing     Problem: Nutrition/Hydration-ADULT  Goal: Nutrient/Hydration intake appropriate for improving, restoring or maintaining nutritional needs  Description  Monitor and assess patient's nutrition/hydration status for malnutrition  Collaborate with interdisciplinary team and initiate plan and interventions as ordered  Monitor patient's weight and dietary intake as ordered or per policy  Utilize nutrition screening tool and intervene as necessary  Determine patient's food preferences and provide high-protein, high-caloric foods as appropriate       INTERVENTIONS:  - Monitor oral intake, urinary output, labs, and treatment plans  - Assess nutrition and hydration status and recommend course of action  - Evaluate amount of meals eaten  - Assist patient with eating if necessary   - Allow adequate time for meals  - Recommend/ encourage appropriate diets, oral nutritional supplements, and vitamin/mineral supplements  - Order, calculate, and assess calorie counts as needed  - Recommend, monitor, and adjust tube feedings and TPN/PPN based on assessed needs  - Assess need for intravenous fluids  - Provide specific nutrition/hydration education as appropriate  - Include patient/family/caregiver in decisions related to nutrition  Outcome: Progressing     Problem: PAIN - ADULT  Goal: Verbalizes/displays adequate comfort level or baseline comfort level  Description  Interventions:  - Encourage patient to monitor pain and request assistance  - Assess pain using appropriate pain scale  - Administer analgesics based on type and severity of pain and evaluate response  - Implement non-pharmacological measures as appropriate and evaluate response  - Consider cultural and social influences on pain and pain management  - Notify physician/advanced practitioner if interventions unsuccessful or patient reports new pain  Outcome: Progressing     Problem: INFECTION - ADULT  Goal: Absence or prevention of progression during hospitalization  Description  INTERVENTIONS:  - Assess and monitor for signs and symptoms of infection  - Monitor lab/diagnostic results  - Monitor all insertion sites, i e  indwelling lines, tubes, and drains  - Monitor endotracheal if appropriate and nasal secretions for changes in amount and color  - [place] appropriate cooling/warming therapies per order  - Administer medications as ordered  - Instruct and encourage patient and family to use good hand hygiene technique  - Identify and instruct in appropriate isolation precautions for identified infection/condition  Outcome: Progressing     Problem: SAFETY ADULT  Goal: Maintain or return to baseline ADL function  Description  INTERVENTIONS:  -  Assess patient's ability to carry out ADLs; assess patient's baseline for ADL function and identify physical deficits which impact ability to perform ADLs (bathing, care of mouth/teeth, toileting, grooming, dressing, etc )  - Assess/evaluate cause of self-care deficits   - Assess range of motion  - Assess patient's mobility; develop plan if impaired  - Assess patient's need for assistive devices and provide as appropriate  - Encourage maximum independence but intervene and supervise when necessary  - Involve family in performance of ADLs  - Assess for home care needs following discharge   - Consider OT consult to assist with ADL evaluation and planning for discharge  - Provide patient education as appropriate  Outcome: Progressing  Goal: Maintain or return mobility status to optimal level  Description  INTERVENTIONS:  - Assess patient's baseline mobility status (ambulation, transfers, stairs, etc )    - Identify cognitive and physical deficits and behaviors that affect mobility  - Identify mobility aids required to assist with transfers and/or ambulation (gait belt, sit-to-stand, lift, walker, cane, etc )  - [place] fall precautions as indicated by assessment  - Record patient progress and toleration of activity level on Mobility SBAR; progress patient to next Phase/Stage  - Instruct patient to call for assistance with activity based on assessment  - Consider rehabilitation consult to assist with strengthening/weightbearing, etc   Outcome: Progressing     Problem: DISCHARGE PLANNING  Goal: Discharge to home or other facility with appropriate resources  Description  INTERVENTIONS:  - Identify barriers to discharge w/patient and caregiver  - Arrange for needed discharge resources and transportation as appropriate  - Identify discharge learning needs (meds, wound care, etc )  - Arrange for interpretive services to assist at discharge as needed  - Refer to Case Management Department for coordinating discharge planning if the patient needs post-hospital services based on physician/advanced practitioner order or complex needs related to functional status, cognitive ability, or social support system  Outcome: Progressing     Problem: Knowledge Deficit  Goal: Patient/family/caregiver demonstrates understanding of disease process, treatment plan, medications, and discharge instructions  Description  Complete learning assessment and assess knowledge base    Interventions:  - Provide teaching at level of understanding  - Provide teaching via preferred learning methods  Outcome: Progressing

## 2020-02-20 NOTE — SOCIAL WORK
CM met with pt to discuss Obs notice  Pt expressed verbal confirmation of understanding  Signed notice in scan bin to be entered into pt's EHR  CM spoke with pt's physician who reported that pt is stable for discharge  Pt reported that he needs a ride home because his family and friends are at work and pt reports that he does not have the money to pay for a LYFT himself  LYFT ride is scheduled for 12:30 PM  Waiver signed and placed in scan bin to be entered into pt's EHR  Physician and pt's nurse aware

## 2020-02-28 ENCOUNTER — APPOINTMENT (EMERGENCY)
Dept: RADIOLOGY | Facility: HOSPITAL | Age: 55
End: 2020-02-28
Payer: COMMERCIAL

## 2020-02-28 ENCOUNTER — APPOINTMENT (EMERGENCY)
Dept: CT IMAGING | Facility: HOSPITAL | Age: 55
End: 2020-02-28
Payer: COMMERCIAL

## 2020-02-28 ENCOUNTER — HOSPITAL ENCOUNTER (EMERGENCY)
Facility: HOSPITAL | Age: 55
Discharge: HOME | End: 2020-02-28
Attending: EMERGENCY MEDICINE | Admitting: EMERGENCY MEDICINE
Payer: COMMERCIAL

## 2020-02-28 VITALS
HEART RATE: 122 BPM | WEIGHT: 214.95 LBS | BODY MASS INDEX: 32.21 KG/M2 | TEMPERATURE: 100.3 F | RESPIRATION RATE: 18 BRPM | SYSTOLIC BLOOD PRESSURE: 114 MMHG | OXYGEN SATURATION: 95 % | DIASTOLIC BLOOD PRESSURE: 74 MMHG

## 2020-02-28 DIAGNOSIS — J45.901 ASTHMA EXACERBATION: Primary | ICD-10-CM

## 2020-02-28 DIAGNOSIS — J40 BRONCHITIS: ICD-10-CM

## 2020-02-28 LAB
ALBUMIN SERPL BCP-MCNC: 3.8 G/DL (ref 3.5–5)
ALP SERPL-CCNC: 93 U/L (ref 46–116)
ALT SERPL W P-5'-P-CCNC: 38 U/L (ref 12–78)
ANION GAP SERPL CALCULATED.3IONS-SCNC: 14 MMOL/L (ref 4–13)
APTT PPP: 34 SECONDS (ref 23–37)
AST SERPL W P-5'-P-CCNC: 22 U/L (ref 5–45)
ATRIAL RATE: 116 BPM
BASOPHILS # BLD AUTO: 0.02 THOUSANDS/ΜL (ref 0–0.1)
BASOPHILS NFR BLD AUTO: 0 % (ref 0–1)
BILIRUB SERPL-MCNC: 0.35 MG/DL (ref 0.2–1)
BUN SERPL-MCNC: 14 MG/DL (ref 5–25)
CALCIUM SERPL-MCNC: 8.6 MG/DL (ref 8.3–10.1)
CHLORIDE SERPL-SCNC: 100 MMOL/L (ref 100–108)
CO2 SERPL-SCNC: 24 MMOL/L (ref 21–32)
CREAT SERPL-MCNC: 1.05 MG/DL (ref 0.6–1.3)
EOSINOPHIL # BLD AUTO: 0.08 THOUSAND/ΜL (ref 0–0.61)
EOSINOPHIL NFR BLD AUTO: 1 % (ref 0–6)
ERYTHROCYTE [DISTWIDTH] IN BLOOD BY AUTOMATED COUNT: 12 % (ref 11.6–15.1)
FLUAV RNA NPH QL NAA+PROBE: NORMAL
FLUBV RNA NPH QL NAA+PROBE: NORMAL
GFR SERPL CREATININE-BSD FRML MDRD: 79 ML/MIN/1.73SQ M
GLUCOSE SERPL-MCNC: 170 MG/DL (ref 65–140)
HCT VFR BLD AUTO: 43 % (ref 36.5–49.3)
HGB BLD-MCNC: 14.4 G/DL (ref 12–17)
IMM GRANULOCYTES # BLD AUTO: 0.03 THOUSAND/UL (ref 0–0.2)
IMM GRANULOCYTES NFR BLD AUTO: 0 % (ref 0–2)
INR PPP: 1.07 (ref 0.84–1.19)
LACTATE SERPL-SCNC: 1.1 MMOL/L (ref 0.5–2)
LYMPHOCYTES # BLD AUTO: 0.83 THOUSANDS/ΜL (ref 0.6–4.47)
LYMPHOCYTES NFR BLD AUTO: 10 % (ref 14–44)
MCH RBC QN AUTO: 28.7 PG (ref 26.8–34.3)
MCHC RBC AUTO-ENTMCNC: 33.5 G/DL (ref 31.4–37.4)
MCV RBC AUTO: 86 FL (ref 82–98)
MONOCYTES # BLD AUTO: 1.07 THOUSAND/ΜL (ref 0.17–1.22)
MONOCYTES NFR BLD AUTO: 12 % (ref 4–12)
NEUTROPHILS # BLD AUTO: 6.69 THOUSANDS/ΜL (ref 1.85–7.62)
NEUTS SEG NFR BLD AUTO: 77 % (ref 43–75)
NRBC BLD AUTO-RTO: 0 /100 WBCS
P AXIS: 57 DEGREES
PLATELET # BLD AUTO: 194 THOUSANDS/UL (ref 149–390)
PMV BLD AUTO: 10.1 FL (ref 8.9–12.7)
POTASSIUM SERPL-SCNC: 3.5 MMOL/L (ref 3.5–5.3)
PR INTERVAL: 164 MS
PROCALCITONIN SERPL-MCNC: <0.05 NG/ML
PROT SERPL-MCNC: 6.9 G/DL (ref 6.4–8.2)
PROTHROMBIN TIME: 14 SECONDS (ref 11.6–14.5)
QRS AXIS: 227 DEGREES
QRSD INTERVAL: 84 MS
QT INTERVAL: 314 MS
QTC INTERVAL: 436 MS
RBC # BLD AUTO: 5.02 MILLION/UL (ref 3.88–5.62)
RSV RNA NPH QL NAA+PROBE: NORMAL
SODIUM SERPL-SCNC: 138 MMOL/L (ref 136–145)
T WAVE AXIS: 27 DEGREES
TROPONIN I SERPL-MCNC: <0.02 NG/ML
VENTRICULAR RATE: 116 BPM
WBC # BLD AUTO: 8.72 THOUSAND/UL (ref 4.31–10.16)

## 2020-02-28 PROCEDURE — 71046 X-RAY EXAM CHEST 2 VIEWS: CPT

## 2020-02-28 PROCEDURE — 85730 THROMBOPLASTIN TIME PARTIAL: CPT | Performed by: NURSE PRACTITIONER

## 2020-02-28 PROCEDURE — 36415 COLL VENOUS BLD VENIPUNCTURE: CPT | Performed by: NURSE PRACTITIONER

## 2020-02-28 PROCEDURE — 85025 COMPLETE CBC W/AUTO DIFF WBC: CPT | Performed by: NURSE PRACTITIONER

## 2020-02-28 PROCEDURE — 93010 ELECTROCARDIOGRAM REPORT: CPT

## 2020-02-28 PROCEDURE — 99284 EMERGENCY DEPT VISIT MOD MDM: CPT | Performed by: EMERGENCY MEDICINE

## 2020-02-28 PROCEDURE — 94640 AIRWAY INHALATION TREATMENT: CPT

## 2020-02-28 PROCEDURE — 84484 ASSAY OF TROPONIN QUANT: CPT | Performed by: NURSE PRACTITIONER

## 2020-02-28 PROCEDURE — 80053 COMPREHEN METABOLIC PANEL: CPT | Performed by: NURSE PRACTITIONER

## 2020-02-28 PROCEDURE — 87040 BLOOD CULTURE FOR BACTERIA: CPT | Performed by: NURSE PRACTITIONER

## 2020-02-28 PROCEDURE — 93005 ELECTROCARDIOGRAM TRACING: CPT

## 2020-02-28 PROCEDURE — 99285 EMERGENCY DEPT VISIT HI MDM: CPT

## 2020-02-28 PROCEDURE — 87631 RESP VIRUS 3-5 TARGETS: CPT | Performed by: NURSE PRACTITIONER

## 2020-02-28 PROCEDURE — 85610 PROTHROMBIN TIME: CPT | Performed by: NURSE PRACTITIONER

## 2020-02-28 PROCEDURE — 83605 ASSAY OF LACTIC ACID: CPT | Performed by: NURSE PRACTITIONER

## 2020-02-28 PROCEDURE — 71250 CT THORAX DX C-: CPT

## 2020-02-28 PROCEDURE — 84145 PROCALCITONIN (PCT): CPT | Performed by: NURSE PRACTITIONER

## 2020-02-28 RX ORDER — SODIUM CHLORIDE 9 MG/ML
3 INJECTION INTRAVENOUS AS NEEDED
Status: DISCONTINUED | OUTPATIENT
Start: 2020-02-28 | End: 2020-02-28 | Stop reason: HOSPADM

## 2020-02-28 RX ORDER — PREDNISONE 20 MG/1
60 TABLET ORAL ONCE
Status: COMPLETED | OUTPATIENT
Start: 2020-02-28 | End: 2020-02-28

## 2020-02-28 RX ORDER — AZITHROMYCIN 250 MG/1
TABLET, FILM COATED ORAL
Qty: 6 TABLET | Refills: 0 | Status: SHIPPED | OUTPATIENT
Start: 2020-02-28 | End: 2020-03-03

## 2020-02-28 RX ORDER — GUAIFENESIN 200 MG/1
200 TABLET ORAL EVERY 4 HOURS PRN
Qty: 30 SUPPOSITORY | Refills: 0 | Status: SHIPPED | OUTPATIENT
Start: 2020-02-28 | End: 2021-01-11 | Stop reason: HOSPADM

## 2020-02-28 RX ORDER — ALBUTEROL SULFATE 2.5 MG/3ML
5 SOLUTION RESPIRATORY (INHALATION) ONCE
Status: COMPLETED | OUTPATIENT
Start: 2020-02-28 | End: 2020-02-28

## 2020-02-28 RX ORDER — PREDNISONE 20 MG/1
60 TABLET ORAL DAILY
Qty: 15 TABLET | Refills: 0 | Status: SHIPPED | OUTPATIENT
Start: 2020-02-28 | End: 2021-01-11 | Stop reason: HOSPADM

## 2020-02-28 RX ORDER — ALBUTEROL SULFATE 2.5 MG/3ML
2.5 SOLUTION RESPIRATORY (INHALATION) ONCE
Status: COMPLETED | OUTPATIENT
Start: 2020-02-28 | End: 2020-02-28

## 2020-02-28 RX ORDER — IPRATROPIUM BROMIDE AND ALBUTEROL SULFATE 2.5; .5 MG/3ML; MG/3ML
3 SOLUTION RESPIRATORY (INHALATION) ONCE
Status: COMPLETED | OUTPATIENT
Start: 2020-02-28 | End: 2020-02-28

## 2020-02-28 RX ADMIN — ALBUTEROL SULFATE 5 MG: 2.5 SOLUTION RESPIRATORY (INHALATION) at 10:22

## 2020-02-28 RX ADMIN — PREDNISONE 60 MG: 20 TABLET ORAL at 10:22

## 2020-02-28 RX ADMIN — IPRATROPIUM BROMIDE AND ALBUTEROL SULFATE 3 ML: 2.5; .5 SOLUTION RESPIRATORY (INHALATION) at 08:50

## 2020-02-28 RX ADMIN — ALBUTEROL SULFATE 2.5 MG: 2.5 SOLUTION RESPIRATORY (INHALATION) at 08:50

## 2020-02-28 NOTE — ED ATTENDING ATTESTATION
2/28/2020  IPerla DO, saw and evaluated the patient  I have discussed the patient with the resident/non-physician practitioner and agree with the resident's/non-physician practitioner's findings, Plan of Care, and MDM as documented in the resident's/non-physician practitioner's note, except where noted  All available labs and Radiology studies were reviewed  I was present for key portions of any procedure(s) performed by the resident/non-physician practitioner and I was immediately available to provide assistance  At this point I agree with the current assessment done in the Emergency Department  I have conducted an independent evaluation of this patient a history and physical is as follows:    ED Course         Critical Care Time  Procedures    58-year-old male with a history of asthma, pneumonia, heroin abuse presents to the emergency department with a 1 day history of cough productive of brown sputum, fevers up to 101 and shortness of breath  He states he has used his inhalers without relief  He has a history of prior intubations  On exam he is alert in no acute distress  He speaking in full sentences without difficulty  His lungs do have diffuse wheezes throughout  Abdomen is soft and nontender  No extremity edema or calf tenderness  Skin is warm and dry, normal color no rash  Will do septic workup, rule out influenza, rule out pneumonia  Will give albuterol/Atrovent and reassess

## 2020-02-28 NOTE — ED PROVIDER NOTES
History  Chief Complaint   Patient presents with    Fever - 75 years or older     Pt reports that he has had a cough with brown phlem since taking a cold shower last night  Reportsd Hx of pnuemonia and "feels the same" Temp 101 at home     This is a 58-year-old male with history of severe asthma and prior intubations, coming in today complaining fever (T-max 101), productive cough with brown/green sputum, intermittent chest pain, headache, eye pain, sore throat, right ear pressure, lightheadedness, and nausea  He states symptoms progressively came on yesterday  He has not been treating his fever or headache with anything at home  Headache is described as a bandlike pressure wrapping around his head, which includes pressure in his eyes  He was recently admitted for chest pain, he describes this chest pain as different from previous, because his current chest pain is associated with breathing and coughing  He denies SOB, change in activity, change in diet  He denies having any GI symptoms besides nausea  Prior to Admission Medications   Prescriptions Last Dose Informant Patient Reported? Taking? Blood Glucose Monitoring Suppl (ACURA BLOOD GLUCOSE METER) w/Device KIT  Self Yes No   Sig: by Does not apply route   Calcium Carb-Cholecalciferol (CALCIUM CARBONATE-VITAMIN D3 PO)  Self Yes No   Sig: Take 1,500 mg by mouth daily   Insulin Pen Needle 32G X 4 MM MISC  Self Yes No   Sig: Unifine Pentips Plus 32 gauge x 5/32" needle   Misc Natural Products (BLOOD SUGAR 360 PO)  Self Yes No   Sig: Use to check BG 7x/day  Glycemic fluctuations  Steroid induced hyperglycemia   E11 65   SPIRIVA RESPIMAT 1 25 MCG/ACT AERS inhaler   No No   Sig: TWO INHALATIONS BY MOUTH DAILY   TAMSULOSIN HCL PO  Self Yes No   Sig: Take 0 4 mg by mouth daily at bedtime     albuterol (2 5 mg/3 mL) 0 083 % nebulizer solution  Self No No   Sig: Take 1 vial (2 5 mg total) by nebulization every 4 (four) hours as needed for shortness of breath albuterol (PROVENTIL HFA,VENTOLIN HFA) 90 mcg/act inhaler  Self No No   Sig: Inhale 2 puffs every 4 (four) hours as needed for wheezing   amLODIPine (NORVASC) 2 5 mg tablet  Self No No   Sig: Take 1 tablet (2 5 mg total) by mouth daily   atorvastatin (LIPITOR) 80 mg tablet  Self Yes No   Sig: Take 80 mg by mouth daily at bedtime   bimatoprost (LUMIGAN) 0 03 % ophthalmic drops  Self Yes No   Sig: Administer 1 drop to both eyes daily at bedtime     brimonidine (ALPHAGAN P) 0 15 % ophthalmic solution  Self Yes No   Sig: Administer 1 drop to both eyes every 8 (eight) hours     carBAMazepine (TEGRETOL) 200 mg tablet  Self Yes No   Sig: Take 2 tabs in the morning and 3 tabs at night   dupilumab (DUPIXENT) subcutaneous injection  Self Yes No   Sig: Inject under the skin every 14 (fourteen) days   finasteride (PROSCAR) 5 mg tablet  Self Yes No   Sig: Take 5 mg by mouth daily   fluticasone (FLONASE) 50 mcg/act nasal spray  Self Yes No   Si spray into each nostril daily   gabapentin (NEURONTIN) 300 mg capsule  Self Yes No   Sig: Take 300 mg by mouth 3 (three) times a day   glucagon 1 MG injection  Self Yes No   Sig: as needed   guaiFENesin (MUCINEX) 600 mg 12 hr tablet   No No   Sig: Take 1 tablet (600 mg total) by mouth every 12 (twelve) hours   hydrOXYzine pamoate (VISTARIL) 50 mg capsule   Yes No   Sig: Take 50 mg by mouth Three times daily as needed   hydrocortisone (ANUSOL-HC, PROCTOSOL HC,) 2 5 % rectal cream  Self Yes No   Sig: Insert 1 application into the rectum 2 (two) times a day   insulin glargine (BASAGLAR KWIKPEN) 100 units/mL injection pen   Yes No   Sig: Inject 35 Units under the skin daily at bedtime    insulin lispro (HumaLOG) 100 units/mL injection  Self No No   Sig: Inject 12 Units under the skin daily with breakfast   Patient taking differently: Inject 6 Units under the skin daily with breakfast    insulin lispro (HumaLOG) 100 units/mL injection  Self No No   Sig: Inject 16 Units under the skin daily with lunch   Patient taking differently: Inject 6 Units under the skin daily with lunch    insulin lispro (HumaLOG) 100 units/mL injection  Self No No   Sig: Inject 16 Units under the skin daily with dinner   Patient taking differently: Inject 12 Units under the skin daily with dinner    latanoprost (XALATAN) 0 005 % ophthalmic solution  Self Yes No   Sig: Administer 1 drop to both eyes daily   loratadine (CLARITIN) 10 mg tablet   Yes No   Sig: loratadine 10 mg tablet   melatonin 3 mg  Self Yes No   Sig: Take 3 mg by mouth daily at bedtime   montelukast (SINGULAIR) 10 mg tablet  Self Yes No   Sig: Take 10 mg by mouth daily     omega-3-acid ethyl esters (LOVAZA) 1 g capsule  Self Yes No   Sig: Take 1 g by mouth daily   omeprazole (PriLOSEC) 40 MG capsule  Self Yes No   Sig: Take 40 mg by mouth daily   polyvinyl alcohol (LIQUIFILM TEARS) 1 4 % ophthalmic solution  Self Yes No   Si drops as needed for dry eyes   predniSONE 5 mg tablet   Yes No   Sig: Take 5 mg by mouth every other day On Monday, Wednesday, Friday   terbinafine (LamISIL) 1 % cream  Self Yes No   Sig: terbinafine HCl 1 % topical cream      Facility-Administered Medications: None       Past Medical History:   Diagnosis Date    Asthma     [facility]     Diabetes [facility]     Enlarged prostate     Glaucoma     Hyperlipidemia     Hypertension     Seasonal allergic rhinitis     Seizures (HCC)        Past Surgical History:   Procedure Laterality Date    KNEE SURGERY      WRIST SURGERY Left        Family History   Problem Relation Age of Onset    Arthritis Mother      I have reviewed and agree with the history as documented      E-Cigarette/Vaping    E-Cigarette Use Never User      E-Cigarette/Vaping Substances    Nicotine No     THC No     CBD No     Flavoring No     Other No     Unknown No      Social History     Tobacco Use    Smoking status: Former Smoker     Packs/day: 2 00     Years: 27 00     Pack years: 54 00     Types: Cigarettes     Start date: 35     Last attempt to quit:      Years since quittin 1    Smokeless tobacco: Never Used   Substance Use Topics    Alcohol use: Not Currently     Frequency: 4 or more times a week     Drinks per session: 10 or more     Comment: socially    Drug use: Not Currently     Types: Heroin     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       Review of Systems   Constitutional: Positive for fever  Negative for activity change, appetite change, chills, diaphoresis and fatigue  HENT: Positive for ear pain and sore throat  Negative for congestion, ear discharge, sinus pressure, sinus pain, sneezing and trouble swallowing  States that he has some right ear pressure  Eyes: Positive for pain  Negative for photophobia, redness and visual disturbance  Respiratory: Positive for cough  Negative for choking, chest tightness, shortness of breath and wheezing  Cardiovascular: Positive for chest pain and palpitations  Negative for leg swelling  Gastrointestinal: Positive for nausea  Negative for abdominal distention, abdominal pain, constipation, diarrhea and vomiting  Genitourinary: Negative for dysuria, frequency and urgency  Musculoskeletal: Negative for arthralgias, neck pain and neck stiffness  Skin: Negative for color change, rash and wound  Neurological: Positive for light-headedness  Negative for dizziness, syncope and weakness  Hematological: Negative for adenopathy  Psychiatric/Behavioral: Negative for confusion and decreased concentration  The patient is not nervous/anxious  Physical Exam  Physical Exam   Constitutional: He is oriented to person, place, and time  He appears well-developed and well-nourished  No distress  HENT:   Head: Normocephalic and atraumatic  Right Ear: External ear and ear canal normal  No lacerations  No drainage or tenderness  No mastoid tenderness     Left Ear: Tympanic membrane, external ear and ear canal normal  Nose: Nose normal  No mucosal edema, rhinorrhea or sinus tenderness  Mouth/Throat: Uvula is midline, oropharynx is clear and moist and mucous membranes are normal  No oropharyngeal exudate, posterior oropharyngeal edema or posterior oropharyngeal erythema  Unable to visualize right TM due to cerumen  Eyes: Pupils are equal, round, and reactive to light  Conjunctivae, EOM and lids are normal  Lids are everted and swept, no foreign bodies found  Neck: Normal range of motion  Neck supple  No neck rigidity  No edema present  Cardiovascular: Normal rate, regular rhythm, normal heart sounds, intact distal pulses and normal pulses  Pulmonary/Chest: Effort normal  No respiratory distress  He has wheezes  He has rhonchi  Abdominal: Soft  Normal appearance and bowel sounds are normal  There is no tenderness  No hernia  Neurological: He is alert and oriented to person, place, and time  He has normal strength  GCS eye subscore is 4  GCS verbal subscore is 5  GCS motor subscore is 6  Skin: Skin is warm and dry  Capillary refill takes less than 2 seconds  He is not diaphoretic  Psychiatric: He has a normal mood and affect   His speech is normal and behavior is normal  Judgment and thought content normal  Cognition and memory are normal        Vital Signs  ED Triage Vitals   Temperature Pulse Respirations Blood Pressure SpO2   02/28/20 0748 02/28/20 0749 02/28/20 0749 02/28/20 0749 02/28/20 0749   100 3 °F (37 9 °C) (!) 112 18 136/87 95 %      Temp Source Heart Rate Source Patient Position - Orthostatic VS BP Location FiO2 (%)   02/28/20 0748 02/28/20 0749 02/28/20 0749 02/28/20 0749 --   Oral Monitor Lying Right arm       Pain Score       02/28/20 0748       7           Vitals:    02/28/20 0749 02/28/20 0930 02/28/20 1023 02/28/20 1115   BP: 136/87  114/74    Pulse: (!) 112 (!) 122 (!) 122 (!) 122   Patient Position - Orthostatic VS: Lying  Lying          Visual Acuity      ED Medications  Medications ipratropium-albuterol (DUO-NEB) 0 5-2 5 mg/3 mL inhalation solution 3 mL (3 mL Nebulization Given 2/28/20 0850)   albuterol inhalation solution 2 5 mg (2 5 mg Nebulization Given 2/28/20 0850)   predniSONE tablet 60 mg (60 mg Oral Given 2/28/20 1022)   albuterol inhalation solution 5 mg (5 mg Nebulization Given 2/28/20 1022)       Diagnostic Studies  Results Reviewed     Procedure Component Value Units Date/Time    Blood culture #1 [[id number]] Collected:  02/28/20 0755    Lab Status:  Preliminary result Specimen:  Blood from Hand, Left Updated:  02/28/20 1402     Blood Culture Received in Microbiology Lab  Culture in Progress  Blood culture #2 [[id number]] Collected:  02/28/20 0902    Lab Status:  Preliminary result Specimen:  Blood from Arm, Right Updated:  02/28/20 1402     Blood Culture Received in Microbiology Lab  Culture in Progress  Procalcitonin [[id number]]  (Normal) Collected:  02/28/20 0902    Lab Status:  Final result Specimen:  Blood from Arm, Right Updated:  02/28/20 1330     Procalcitonin <0 05 ng/ml     Influenza A/B and RSV PCR [[id number]]  (Normal) Collected:  02/28/20 0849    Lab Status:  Final result Specimen:  Nasopharyngeal from Nose Updated:  02/28/20 0938     INFLUENZA A PCR None Detected     INFLUENZA B PCR None Detected     RSV PCR None Detected    Lactic acid x2 [[id number]]  (Normal) Collected:  02/28/20 0755    Lab Status:  Final result Specimen:  Blood from Hand, Left Updated:  02/28/20 5651     LACTIC ACID 1 1 mmol/L     Narrative:       Result may be elevated if tourniquet was used during collection      Troponin I [[id number]]  (Normal) Collected:  02/28/20 0755    Lab Status:  Final result Specimen:  Blood from Hand, Left Updated:  02/28/20 0918     Troponin I <0 02 ng/mL     Comprehensive metabolic panel [[id number]]  (Abnormal) Collected:  02/28/20 0755    Lab Status:  Final result Specimen:  Blood from Hand, Left Updated:  02/28/20 0915     Sodium 138 mmol/L      Potassium 3 5 mmol/L      Chloride 100 mmol/L      CO2 24 mmol/L      ANION GAP 14 mmol/L      BUN 14 mg/dL      Creatinine 1 05 mg/dL      Glucose 170 mg/dL      Calcium 8 6 mg/dL      AST 22 U/L      ALT 38 U/L      Alkaline Phosphatase 93 U/L      Total Protein 6 9 g/dL      Albumin 3 8 g/dL      Total Bilirubin 0 35 mg/dL      eGFR 79 ml/min/1 73sq m     Narrative:       National Kidney Disease Foundation guidelines for Chronic Kidney Disease (CKD):     Stage 1 with normal or high GFR (GFR > 90 mL/min/1 73 square meters)    Stage 2 Mild CKD (GFR = 60-89 mL/min/1 73 square meters)    Stage 3A Moderate CKD (GFR = 45-59 mL/min/1 73 square meters)    Stage 3B Moderate CKD (GFR = 30-44 mL/min/1 73 square meters)    Stage 4 Severe CKD (GFR = 15-29 mL/min/1 73 square meters)    Stage 5 End Stage CKD (GFR <15 mL/min/1 73 square meters)  Note: GFR calculation is accurate only with a steady state creatinine    Protime-INR [[id number]]  (Normal) Collected:  02/28/20 0755    Lab Status:  Final result Specimen:  Blood from Hand, Left Updated:  02/28/20 0908     Protime 14 0 seconds      INR 1 07    APTT [[id number]]  (Normal) Collected:  02/28/20 0755    Lab Status:  Final result Specimen:  Blood from Hand, Left Updated:  02/28/20 0908     PTT 34 seconds     CBC and differential [[id number]]  (Abnormal) Collected:  02/28/20 0755    Lab Status:  Final result Specimen:  Blood from Hand, Left Updated:  02/28/20 0903     WBC 8 72 Thousand/uL      RBC 5 02 Million/uL      Hemoglobin 14 4 g/dL      Hematocrit 43 0 %      MCV 86 fL      MCH 28 7 pg      MCHC 33 5 g/dL      RDW 12 0 %      MPV 10 1 fL      Platelets 852 Thousands/uL      nRBC 0 /100 WBCs      Neutrophils Relative 77 %      Immat GRANS % 0 %      Lymphocytes Relative 10 %      Monocytes Relative 12 %      Eosinophils Relative 1 %      Basophils Relative 0 %      Neutrophils Absolute 6 69 Thousands/µL      Immature Grans Absolute 0 03 Thousand/uL      Lymphocytes Absolute 0 83 Thousands/µL      Monocytes Absolute 1 07 Thousand/µL      Eosinophils Absolute 0 08 Thousand/µL      Basophils Absolute 0 02 Thousands/µL                  CT chest without contrast   Final Result by Nila Falcon MD (02/28 1117)      Right lower lobe bronchial wall thickening, endobronchial debris, and minimal atelectasis in the posterior basal right lower lobe  This may be due to asthma versus bronchitis  Mild calcification of the left anterior descending coronary artery indicating atherosclerotic heart disease  Workstation performed: [id number]         XR chest pa & lateral   Final Result by Ave Isidro MD (02/28 1019)      No acute cardiopulmonary disease  Workstation performed: [id number]                    Procedures  ECG 12 Lead Documentation Only  Date/Time: 2/28/2020 10:30 AM  Performed by: CARRIE Avalos  Authorized by: CARRIE Avalos     Indications / Diagnosis:  Chest pain  ECG reviewed by me, the ED Provider: yes    Patient location:  ED  Previous ECG:     Previous ECG:  Compared to current    Comparison ECG info:  Sinus Tachycardia    Comparison to cardiac monitor: Yes    Rate:     ECG rate:  116    ECG rate assessment: tachycardic    Rhythm:     Rhythm: sinus tachycardia    Ectopy:     Ectopy: none    QRS:     QRS axis:  Normal    QRS intervals:  Normal  Conduction:     Conduction: normal    ST segments:     ST segments:  Normal  T waves:     T waves: normal               ED Course  ED Course as of Feb 28 1737   Fri Feb 28, 2020   0926 Patient continues to have rhonchi post-breathing treatment  Less rhonchi in upper lobes in comparison to prior  No wheezing  Peak Flow improved to 310               HEART Risk Score      Most Recent Value   Heart Score Risk Calculator   History  0 Filed at: 02/28/2020 1014   ECG  0 Filed at: 02/28/2020 1014   Age  1 Filed at: 02/28/2020 1014   Risk Factors  2 Filed at: 02/28/2020 1014   Troponin  0 Filed at: 02/28/2020 1014 HEART Score  3 Filed at: 02/28/2020 1014                Initial Sepsis Screening     Row Name 02/28/20 0846                Is the patient's history suggestive of a new or worsening infection? (!) Yes (Proceed)  -ET        Suspected source of infection  pneumonia;empyema;suspect infection, source unknown  -ET        Are two or more of the following signs & symptoms of infection both present and new to the patient? No  -ET        Indicate SIRS criteria          If the answer is yes to both questions, suspicion of sepsis is present          If severe sepsis is present AND tissue hypoperfusion perists in the hour after fluid resuscitation or lactate > 4, the patient meets criteria for SEPTIC SHOCK          Are any of the following organ dysfunction criteria present within 6 hours of suspected infection and SIRS criteria that are NOT considered to be chronic conditions?         Organ dysfunction          Date of presentation of severe sepsis          Time of presentation of severe sepsis          Tissue hypoperfusion persists in the hour after crystalloid fluid administration, evidenced, by either:          Was hypotension present within one hour of the conclusion of crystalloid fluid administration?         Date of presentation of septic shock          Time of presentation of septic shock            User Key  (r) = Recorded By, (t) = Taken By, (c) = Cosigned By    [address] Name Provider Type    ET Rebecca Benites, [address]  Nurse Practitioner                  MDM  Number of Diagnoses or Management Options  Asthma exacerbation: new and requires workup  Bronchitis: new and requires workup  Diagnosis management comments: This is a 71-year-old male with history prior to patient's related to asthma, coming in today complaining fever productive cough headache, sore throat chest pain related to breathing cough  His T-max has been 101 at home  He has not been treating his fever anything at home    He was recently admitted for intermittent chest pain and referred to Cardiology  EKG was negative for MI  Chest x-ray was negative for pneumonia  Troponin was negative  Heart score was 3  Cardiac workup was unremarkable  Chest pain is likely associated to acute exacerbation of asthma with bronchitis  Discussed continuing to use his nebulizers at home, will add on burst dose of prednisone  Patient received 1st dose here  Will also give patient a Z-Ronny for bronchitis  Mucinex for cough/sputum  Discussed strict return precautions with patient  Recommended following up with PCP next week  Patient agrees with plan of care and states that he feels well enough to leave  He denies having any questions at this time  Amount and/or Complexity of Data Reviewed  Clinical lab tests: ordered and reviewed  Tests in the radiology section of CPT®: ordered and reviewed    Patient Progress  Patient progress: improved        Disposition  Final diagnoses:   Asthma exacerbation   Bronchitis     Time reflects when diagnosis was documented in both MDM as applicable and the Disposition within this note     Time User Action Codes Description Comment    2/28/2020 10:02 AM Ryland Blackmon Add [F83 892] Asthma exacerbation     2/28/2020 10:02 AM Ryland Nick [J40] Bronchitis       ED Disposition     ED Disposition Condition Date/Time Comment    Discharge Stable Fri Feb 28, 2020 11:52 AM Neri Rodriges discharge to home/self care              Follow-up Information     Follow up With Specialties Details Why Contact Info Additional Information    Momo Olivo MD Family Medicine Schedule an appointment as soon as possible for a visit in 1 week  [address] [facility] Emergency Department Emergency Medicine  As needed, If symptoms worsen [facility] [id number]  [phone] [address] [facility] ED, [address], [place], [address]          Discharge Medication List as of 2/28/2020 11:59 AM      START taking these medications    Details   azithromycin (Zithromax Z-Ronny) 250 mg tablet Take 2 tablets today then 1 tablet daily x 4 days, Normal      guaiFENesin 200 MG tablet Take 1 tablet (200 mg total) by mouth every 4 (four) hours as needed for cough, Starting Fri 2/28/2020, Normal      !! predniSONE 20 mg tablet Take 3 tablets (60 mg total) by mouth daily, Starting Fri 2/28/2020, Normal       !! - Potential duplicate medications found  Please discuss with provider        CONTINUE these medications which have NOT CHANGED    Details   albuterol (2 5 mg/3 mL) 0 083 % nebulizer solution Take 1 vial (2 5 mg total) by nebulization every 4 (four) hours as needed for shortness of breath, Starting Thu 11/15/2018, Normal      albuterol (PROVENTIL HFA,VENTOLIN HFA) 90 mcg/act inhaler Inhale 2 puffs every 4 (four) hours as needed for wheezing, Starting Thu 11/15/2018, Normal      amLODIPine (NORVASC) 2 5 mg tablet Take 1 tablet (2 5 mg total) by mouth daily, Starting Mon 3/18/2019, Normal      atorvastatin (LIPITOR) 80 mg tablet Take 80 mg by mouth daily at bedtime, Starting Thu 9/8/2016, Historical Med      bimatoprost (LUMIGAN) 0 03 % ophthalmic drops Administer 1 drop to both eyes daily at bedtime  , Starting Thu 6/5/2008, Historical Med      Blood Glucose Monitoring Suppl (ACURA BLOOD GLUCOSE METER) w/Device KIT by Does not apply route, Historical Med      brimonidine (ALPHAGAN P) 0 15 % ophthalmic solution Administer 1 drop to both eyes every 8 (eight) hours  , Starting Thu 7/2/2015, Historical Med      Calcium Carb-Cholecalciferol (CALCIUM CARBONATE-VITAMIN D3 PO) Take 1,500 mg by mouth daily, Starting Fri 9/20/2013, Historical Med      carBAMazepine (TEGRETOL) 200 mg tablet Take 2 tabs in the morning and 3 tabs at night, Historical Med      dupilumab (DUPIXENT) subcutaneous injection Inject under the skin every 14 (fourteen) days, Historical Med      finasteride (PROSCAR) 5 mg tablet Take 5 mg by mouth daily, Starting Tue 4/26/2016, Historical Med      fluticasone (FLONASE) 50 mcg/act nasal spray 1 spray into each nostril daily, Historical Med      gabapentin (NEURONTIN) 300 mg capsule Take 300 mg by mouth 3 (three) times a day, Starting Thu 9/8/2016, Historical Med      glucagon 1 MG injection as needed, Starting Fri 8/5/2016, Historical Med      guaiFENesin (MUCINEX) 600 mg 12 hr tablet Take 1 tablet (600 mg total) by mouth every 12 (twelve) hours, Starting Fri 5/3/2019, Print      hydrocortisone (ANUSOL-HC, PROCTOSOL HC,) 2 5 % rectal cream Insert 1 application into the rectum 2 (two) times a day, Historical Med      hydrOXYzine pamoate (VISTARIL) 50 mg capsule Take 50 mg by mouth Three times daily as needed, Starting Thu 8/8/2019, Historical Med      insulin glargine (BASAGLAR KWIKPEN) 100 units/mL injection pen Inject 35 Units under the skin daily at bedtime , Historical Med      !! insulin lispro (HumaLOG) 100 units/mL injection Inject 12 Units under the skin daily with breakfast, Starting Sat 10/27/2018, Print      !! insulin lispro (HumaLOG) 100 units/mL injection Inject 16 Units under the skin daily with lunch, Starting Sat 10/27/2018, Print      !! insulin lispro (HumaLOG) 100 units/mL injection Inject 16 Units under the skin daily with dinner, Starting Sat 10/27/2018, Print      Insulin Pen Needle 32G X 4 MM MISC Unifine Pentips Plus 32 gauge x 5/32" needle, Historical Med      latanoprost (XALATAN) 0 005 % ophthalmic solution Administer 1 drop to both eyes daily, Historical Med      loratadine (CLARITIN) 10 mg tablet loratadine 10 mg tablet, Historical Med      melatonin 3 mg Take 3 mg by mouth daily at bedtime, Historical Med      Misc Natural Products (BLOOD SUGAR 360 PO) Use to check BG 7x/day  Glycemic fluctuations  Steroid induced hyperglycemia   E11 65, Historical Med      montelukast (SINGULAIR) 10 mg tablet Take 10 mg by mouth daily  , Starting Tue 8/25/2015, Historical Med      omega-3-acid ethyl esters (LOVAZA) 1 g capsule Take 1 g by mouth daily, Historical Med      omeprazole (PriLOSEC) 40 MG capsule Take 40 mg by mouth daily, Starting Tue 4/26/2016, Historical Med      polyvinyl alcohol (LIQUIFILM TEARS) 1 4 % ophthalmic solution 2 drops as needed for dry eyes, Historical Med      !! predniSONE 5 mg tablet Take 5 mg by mouth every other day On Monday, Wednesday, Friday, Historical Med      SPIRIVA RESPIMAT 1 25 MCG/ACT AERS inhaler TWO INHALATIONS BY MOUTH DAILY, Normal      TAMSULOSIN HCL PO Take 0 4 mg by mouth daily at bedtime  , Historical Med      terbinafine (LamISIL) 1 % cream terbinafine HCl 1 % topical cream, Historical Med       !! - Potential duplicate medications found  Please discuss with provider  No discharge procedures on file      PDMP Review     None          ED Provider  Electronically Signed by           Kaylynn Hassan  02/28/20 8628

## 2020-02-28 NOTE — ED NOTES
Per providerChhaya Vazquez NP  No need to send urine at this time   Pt will be d/c'cassia Heard, RN  02/28/20 8416

## 2020-03-04 LAB
BACTERIA BLD CULT: NORMAL
BACTERIA BLD CULT: NORMAL

## 2020-03-06 NOTE — PROGRESS NOTES
First Cardiology Office Visit:  03/06/20  Neri Rodriges  1965  [id number]    ASSESSMENT:  1  Chest pain:   -Admitted 2/19-2/20 for atypical CP, Trop < 0 02 X 3, no EKG changes   -TTE 02/20: Unremarkable   -NM SPECT 07/19: No EKG changes or perfusion defects, nrml study     2  Severe persistent asthma, follow with pulmonary  3  Obstructive sleep apnea  4  Chronic steroid dependent, due to asthma  5  Gastroesophageal reflux disease  6  Type 2 diabetes mellitus  7  Reported allergy to aspirin    PLAN:  His symptoms of chest pain are very atypical for angina  He is active tells me can walk up to 10 blocks at a time frequently without any chest pain or pressure  His echocardiogram from last month and nuclear stress test 6 months ago were both normal   I reviewed his EKGs with him which have nonspecific abnormalities  He does however report a 6-8 week pattern of intermittent palpitations which frequently occur at rest & with activity  1  Will check 7 day Zio patch to assess for arrhythmias  2  Continue atorvastatin  3  He is off amlodipine and blood pressure within normal limits today, he has a follow-up visit with his primary care physician to repeat blood pressure towards the end of the month  4  Routine lab work including lipid panel he tells me will be followed with his PCP office, again later this month    Follow-up in about 6 weeks after Zio patch  If unremarkable can likely follow up p r n      ======================================================  History of Present Illness:    Neri Rodriges is a 63 y o  male with a past medical history significant for severe persistent asthma, ARPITA, type 2 diabetes, ARPITA, and GERD  He sees both pulmonology (Dr Nicolasa Robins, [address]) and endocrinology (Kirsten Barthel, [facility]) regularly  He has had multiple admissions for severe asthma exacerbations and had to be intubated on several occasions    He is on chronic steroids for his severe lung disease which is managed by his endocrinology office  He does have a history of chest pain as well and has been to the hospital multiple times within the last year for this symptom  He had a nuclear stress test performed in July 2019 which had no perfusion defects and an echocardiogram performed in February 2020 which was unremarkable  Most recently he was admitted on 02/20/2020 for atypical chest pain at which time he ruled out with serial enzymes and EKGs  He was referred follow-up with cardiology for which reason I am seeing him today  Interval history:  He was diagnosed with bronchitis 10 days ago and does note a productive cough and a bit worse shortness of breath  He occasionally has chest pressure when he walks several blocks but tells me this seems to have a random pattern does not always happen  Frequently if he gets anxiety this will cause him to have chest pressure  Sometimes it occurs at rest   It is quite brief and last less than 1 minute  He walks frequently almost every day and tells me that he can often walk up to 10 blocks at a time without any chest pressure or shortness of breath  Presently he is perfectly comfortable  He was concerned about his abnormal EKGs from recent emergency department stay at [facility]       He also tells me that for the last 6-8 weeks he has been having intermittent palpitations  These happen at most every other day and at least once a week  They are brief and generally last less than 1 minutes  There is also in [place]  Vitals:    03/11/20 0933   BP: 118/76   Pulse: 90        Review of Systems  Review of Systems   Constitutional: Positive for fatigue  Negative for unexpected weight change  HENT: Negative for congestion and nosebleeds  Respiratory: Positive for cough and chest tightness  Negative for shortness of breath  Cardiovascular: Negative for chest pain, palpitations and leg swelling     Gastrointestinal: Negative for abdominal pain, constipation, diarrhea, nausea and vomiting  Endocrine: Negative for cold intolerance and heat intolerance  Genitourinary: Negative for difficulty urinating and frequency  Musculoskeletal: Negative for back pain and myalgias  Neurological: Negative for dizziness and syncope  Hematological: Negative for adenopathy  Does not bruise/bleed easily  Past Medical History:   Diagnosis Date    Asthma     [facility]ipolar disorder ([facility] )     Diabetes mellitus ([place] [address] )     Enlarged prostate     Glaucoma     Hyperlipidemia     Hypertension     Seasonal allergic rhinitis     Seizures (HCC)        Allergies   Allergen Reactions    Aspirin GI Bleeding    Ibuprofen GI Bleeding    Licorice Flavor [Flavoring Agent] Hives    Penicillins Hives    Propoxyphene Hives     Propoxyphene N-Acetaminophen---hives & palpitations    Tramadol Palpitations     Other reaction(s): heart pumps real fast    Bee Venom     Chocolate     Glycyrrhiza     Haloperidol Other (See Comments)     "bent out of shape" - dystonia    Lithium Other (See Comments)     edema    Molds & Smuts     Other     Wasp Venom      I reviewed the Home Medication list in the chart       Family History   Problem Relation Age of Onset    Arthritis Mother        Social History     Socioeconomic History    Marital status: /Civil Union     Spouse name: Not on file    Number of children: Not on file    Years of education: Not on file    Highest education level: Not on file   Occupational History    Not on file   Social Needs    Financial resource strain: Not on file    Food insecurity:     Worry: Not on file     Inability: Not on file    Transportation needs:     Medical: Not on file     Non-medical: Not on file   Tobacco Use    Smoking status: Former Smoker     Packs/day: 2 00     Years: 27 00     Pack years: 54 00     Types: Cigarettes     Start date:      Last attempt to quit:      Years since quittin 1    Smokeless tobacco: Never Used   Substance and Sexual Activity    Alcohol use: Not Currently     Frequency: 4 or more times a week     Drinks per session: 10 or more     Comment: socially    Drug use: Not Currently     Types: Heroin     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    Sexual activity: Not on file   Lifestyle    Physical activity:     Days per week: Not on file     Minutes per session: Not on file    Stress: Not on file   Relationships    Social connections:     Talks on phone: Not on file     Gets together: Not on file     Attends Confucianist service: Not on file     Active member of club or organization: Not on file     Attends meetings of clubs or organizations: Not on file     Relationship status: Not on file    Intimate partner violence:     Fear of current or ex partner: Not on file     Emotionally abused: Not on file     Physically abused: Not on file     Forced sexual activity: Not on file   Other Topics Concern    Not on file   Social History Narrative    Not on file       There were no vitals filed for this visit  Physical Exam:  Physical Exam   Constitutional: He is oriented to person, place, and time  He appears well-developed and well-nourished  HENT:   Head: Normocephalic and atraumatic  Eyes: Pupils are equal, round, and reactive to light  Neck: Normal range of motion  Neck supple  Cardiovascular: Normal rate and regular rhythm  Exam reveals no gallop and no friction rub  No murmur heard  Pulmonary/Chest: Effort normal and breath sounds normal  No respiratory distress  He has no wheezes  He has no rales  Abdominal: Soft  Bowel sounds are normal  He exhibits no distension  There is no tenderness  Musculoskeletal: Normal range of motion  He exhibits no edema or deformity  Neurological: He is alert and oriented to person, place, and time  Skin: Skin is warm and dry  Psychiatric: He has a normal mood and affect   His behavior is normal          I have personally reviewed the EKG, CXR and Telemetry images directly  Portions of the record may have been created with voice recognition software  Occasional wrong word or "sound a like" substitutions may have occurred due to the inherent limitations of voice recognition software  Read the chart carefully and recognize, using context, where substitutions have occurred

## 2020-03-11 ENCOUNTER — OFFICE VISIT (OUTPATIENT)
Dept: CARDIOLOGY CLINIC | Facility: CLINIC | Age: 55
End: 2020-03-11
Payer: COMMERCIAL

## 2020-03-11 VITALS
HEART RATE: 90 BPM | HEIGHT: 69 IN | WEIGHT: 200.6 LBS | BODY MASS INDEX: 29.71 KG/M2 | SYSTOLIC BLOOD PRESSURE: 118 MMHG | DIASTOLIC BLOOD PRESSURE: 76 MMHG

## 2020-03-11 DIAGNOSIS — R07.9 CHEST PAIN, UNSPECIFIED TYPE: Primary | ICD-10-CM

## 2020-03-11 DIAGNOSIS — E78.2 MIXED HYPERLIPIDEMIA: ICD-10-CM

## 2020-03-11 DIAGNOSIS — I10 ESSENTIAL HYPERTENSION: ICD-10-CM

## 2020-03-11 DIAGNOSIS — R00.2 PALPITATIONS: ICD-10-CM

## 2020-03-11 PROCEDURE — 99214 OFFICE O/P EST MOD 30 MIN: CPT | Performed by: PHYSICIAN ASSISTANT

## 2020-03-11 RX ORDER — PERPHENAZINE 2 MG/1
2 TABLET, FILM COATED ORAL DAILY
COMMUNITY

## 2020-03-11 NOTE — PATIENT INSTRUCTIONS
Please check a [employer] heart monitor  This is a patch that goes onto your chest to monitor your heart rhythm  They will mail you the a package with the heart monitor and instructions for how to wear it  Afterwards you mail it back to the company and they will forward us the results  Check blood work with your primary care physician  We will see you back in about 6 weeks or so

## 2020-03-16 ENCOUNTER — TELEMEDICINE (OUTPATIENT)
Dept: PULMONOLOGY | Facility: CLINIC | Age: 55
End: 2020-03-16

## 2020-03-16 DIAGNOSIS — K21.9 GASTROESOPHAGEAL REFLUX DISEASE WITHOUT ESOPHAGITIS: Primary | ICD-10-CM

## 2020-03-16 DIAGNOSIS — J45.50 SEVERE PERSISTENT ASTHMA WITHOUT COMPLICATION: ICD-10-CM

## 2020-03-16 DIAGNOSIS — F19.20 CORTICOSTEROID DEPENDENCE (HCC): ICD-10-CM

## 2020-03-16 DIAGNOSIS — J20.9 ACUTE BRONCHITIS, UNSPECIFIED ORGANISM: ICD-10-CM

## 2020-03-16 DIAGNOSIS — G47.33 OSA (OBSTRUCTIVE SLEEP APNEA): ICD-10-CM

## 2020-03-16 PROCEDURE — G2012 BRIEF CHECK IN BY MD/QHP: HCPCS | Performed by: INTERNAL MEDICINE

## 2020-03-16 NOTE — ASSESSMENT & PLAN NOTE
Patient symptoms when he stopped his steroids included fatigue and mood swings as he states but no respiratory worsening  I told him to wean is corticosteroids slowly so he will start 2 5 mg daily for the next 2 months then we will do 2 5 mg every other day and stop after that

## 2020-03-16 NOTE — PROGRESS NOTES
Virtual Check-in Visit    Reason for visit is follow-up on asthma and ARPITA    This virtual check-in was done via telephone  Encounter provider Amy Dominguez MD    Provider located at [address] [place] [address] [address] [facility] [address]      Recent Visits  No visits were found meeting these conditions  Showing recent visits within past 7 days and meeting all other requirements     Future Appointments  No visits were found meeting these conditions  Showing future appointments within next 150 days and meeting all other requirements        Patient agrees to participate in a virtual check in via telephone or video visit instead of presenting to the office to address urgent/immediate medical needs  Patient is aware this is a billable service but at a lesser fee than in person care  After connecting through telephone, the patient was identified by name and date of birth  Nelida Lobo was informed that this was a telemedicine visit and that the exam was being conducted confidentially over secure lines  My office door was closed  No one else was in the room  He acknowledged consent and understanding of privacy and security of the virtual check-in visit  I informed the patient that I have reviewed his record in Epic and presented the opportunity for him to ask any questions regarding the visit today  The patient initiated communication and agreed to participate  I spent 25 minutes with the patient during this virtual check-in visit  Progress note - Pulmonary Medicine   Nelida Lobo 63 y o  male MRN: [id number]       Impression & Plan:     Severe asthma  Continue current medications with Spiriva, budesonide nebulizer therapy b i d , Singulair and p r n  Albuterol  Continue injectable Dupilomab for allergy which is helping his asthma    I doubt patient needs corticosteroids chronically for his asthma and he reports in no worsening of shortness of breaths or wheezing or cough after stopping prednisone for 1 month  See below  Corticosteroid dependence ([address] )  Patient symptoms when he stopped his steroids included fatigue and mood swings as he states but no respiratory worsening  I told him to wean is corticosteroids slowly so he will start 2 5 mg daily for the next 2 months then we will do 2 5 mg every other day and stop after that  ARPITA (obstructive sleep apnea)  Very noncompliant with CPAP but possibly has mild obstructive sleep apnea  I encouraged him to use CPAP routinely  GERD (gastroesophageal reflux disease)  Currently controlled with medications  ______________________________________________________________________    HPI:    Johnathan Torres presents today for follow-up of severe persistent asthma with chronic corticosteroids dependence  Patient has severe persistent asthma, currently managed with Spiriva, budesonide b i d  And p r n  Albuterol  He is also on Singulair  He has been on prednisone chronically for several years at 5 mg daily  In the past he tried to stop the prednisone for a month and he had worsening in his mood and also fatigue, he reports mood swings but he denies any worsening in his respiratory status of prednisone  He is back to his prednisone regimen  Few weeks ago he had exacerbation of asthma/acute bronchitis treated with Z-Ronny and also a course of 60 mg prednisone and he completed  Currently at baseline with intermittent complaints of dyspnea on exertion, minimal cough, minimal wheezing  But in general he feels fine since starting the new regimen with budesonide  Patient follows with allergy and he is currently on injections with Dupilomb  Patient has GERD that is controlled with medications  Also has postnasal drips on Flonase  Patient has obstructive sleep apnea with CPAP at home that he has not been using routinely and denies any complaints       Current Medications:    Current Outpatient Medications:     albuterol (2 5 mg/3 mL) 0 083 % nebulizer solution, Take 1 vial (2 5 mg total) by nebulization every 4 (four) hours as needed for shortness of breath, Disp: 25 vial, Rfl: 0    albuterol (PROVENTIL HFA,VENTOLIN HFA) 90 mcg/act inhaler, Inhale 2 puffs every 4 (four) hours as needed for wheezing, Disp: 1 Inhaler, Rfl: 0    amLODIPine (NORVASC) 2 5 mg tablet, Take 1 tablet (2 5 mg total) by mouth daily (Patient not taking: Reported on 3/11/2020), Disp: 30 tablet, Rfl: 5    atorvastatin (LIPITOR) 80 mg tablet, Take 80 mg by mouth daily at bedtime, Disp: , Rfl:     bimatoprost (LUMIGAN) 0 03 % ophthalmic drops, Administer 1 drop to both eyes daily at bedtime  , Disp: , Rfl:     Blood Glucose Monitoring Suppl (ACURA BLOOD GLUCOSE METER) w/Device KIT, by Does not apply route, Disp: , Rfl:     brimonidine (ALPHAGAN P) 0 15 % ophthalmic solution, Administer 1 drop to both eyes every 8 (eight) hours  , Disp: , Rfl:     Calcium Carb-Cholecalciferol (CALCIUM CARBONATE-VITAMIN D3 PO), Take 1,500 mg by mouth daily, Disp: , Rfl:     carBAMazepine (TEGRETOL) 200 mg tablet, Take 2 tabs in the morning and 3 tabs at night, Disp: , Rfl:     dupilumab (DUPIXENT) subcutaneous injection, Inject under the skin every 14 (fourteen) days, Disp: , Rfl:     finasteride (PROSCAR) 5 mg tablet, Take 5 mg by mouth daily, Disp: , Rfl:     fluticasone (FLONASE) 50 mcg/act nasal spray, 1 spray into each nostril daily, Disp: , Rfl:     gabapentin (NEURONTIN) 300 mg capsule, Take 300 mg by mouth 3 (three) times a day, Disp: , Rfl:     glucagon 1 MG injection, as needed, Disp: , Rfl:     guaiFENesin (MUCINEX) 600 mg 12 hr tablet, Take 1 tablet (600 mg total) by mouth every 12 (twelve) hours, Disp: 12 tablet, Rfl: 0    guaiFENesin 200 MG tablet, Take 1 tablet (200 mg total) by mouth every 4 (four) hours as needed for cough, Disp: 30 suppository, Rfl: 0    hydrocortisone (ANUSOL-HC, PROCTOSOL HC,) 2 5 % rectal cream, Insert 1 application into the rectum 2 (two) times a day, Disp: , Rfl:     hydrOXYzine pamoate (VISTARIL) 50 mg capsule, Take 50 mg by mouth Three times daily as needed, Disp: , Rfl:     insulin glargine (BASAGLAR KWIKPEN) 100 units/mL injection pen, Inject 35 Units under the skin daily at bedtime , Disp: , Rfl:     insulin lispro (HumaLOG) 100 units/mL injection, Inject 12 Units under the skin daily with breakfast (Patient taking differently: Inject 6 Units under the skin daily with breakfast ), Disp: 10 mL, Rfl: 0    insulin lispro (HumaLOG) 100 units/mL injection, Inject 16 Units under the skin daily with lunch (Patient taking differently: Inject 6 Units under the skin daily with lunch ), Disp: 10 mL, Rfl: 0    insulin lispro (HumaLOG) 100 units/mL injection, Inject 16 Units under the skin daily with dinner (Patient taking differently: Inject 12 Units under the skin daily with dinner ), Disp: 10 mL, Rfl: 0    Insulin Pen Needle 32G X 4 MM MISC, Unifine Pentips Plus 32 gauge x 5/32" needle, Disp: , Rfl:     latanoprost (XALATAN) 0 005 % ophthalmic solution, Administer 1 drop to both eyes daily, Disp: , Rfl:     loratadine (CLARITIN) 10 mg tablet, loratadine 10 mg tablet, Disp: , Rfl:     melatonin 3 mg, Take 3 mg by mouth daily at bedtime, Disp: , Rfl:     Misc Natural Products (BLOOD SUGAR 360 PO), Use to check BG 7x/day  Glycemic fluctuations  Steroid induced hyperglycemia   E11 65, Disp: , Rfl:     montelukast (SINGULAIR) 10 mg tablet, Take 10 mg by mouth daily  , Disp: , Rfl:     omega-3-acid ethyl esters (LOVAZA) 1 g capsule, Take 1 g by mouth daily, Disp: , Rfl:     omeprazole (PriLOSEC) 40 MG capsule, Take 40 mg by mouth daily, Disp: , Rfl:     perphenazine 2 mg tablet, Take 2 mg by mouth daily, Disp: , Rfl:     polyvinyl alcohol (LIQUIFILM TEARS) 1 4 % ophthalmic solution, 2 drops as needed for dry eyes, Disp: , Rfl:     predniSONE 20 mg tablet, Take 3 tablets (60 mg total) by mouth daily, Disp: 15 tablet, Rfl: 0    predniSONE 5 mg tablet, Take 5 mg by mouth every other day On Monday, Wednesday, Friday, Disp: , Rfl:     SPIRIVA RESPIMAT 1 25 MCG/ACT AERS inhaler, TWO INHALATIONS BY MOUTH DAILY, Disp: 1 Inhaler, Rfl: 5    TAMSULOSIN HCL PO, Take 0 4 mg by mouth 2 (two) times a day , Disp: , Rfl:     terbinafine (LamISIL) 1 % cream, terbinafine HCl 1 % topical cream, Disp: , Rfl:     Review of Systems:  Review of Systems   Constitutional: Negative  HENT: Negative  Eyes: Negative  Respiratory:        As HPI   Cardiovascular: Negative  Gastrointestinal: Negative  Endocrine: Negative  Genitourinary: Negative  Musculoskeletal: Negative  Skin: Negative  Allergic/Immunologic: Negative  Neurological: Negative  Hematological: Negative  Psychiatric/Behavioral: Negative  Past medical history, surgical history, and family history were reviewed and updated as appropriate    Social history updates:  Social History     Tobacco Use   Smoking Status Former Smoker    Packs/day: 2 00    Years: 27 00    Pack years: 54 00    Types: Cigarettes    Start date:     Last attempt to quit:     Years since quittin 2   Smokeless Tobacco Never Used       Diagnostic Data:  Labs: I personally reviewed the most recent laboratory data pertinent to today's visit    Lab Results   Component Value Date    WBC 8 72 2020    HGB 14 4 2020    HCT 43 0 2020    MCV 86 2020     2020     Lab Results   Component Value Date    SODIUM 138 2020    K 3 5 2020    CO2 24 2020     2020    BUN 14 2020    CREATININE 1 05 2020    CALCIUM 8 6 2020       CPAP compliance:  Over the past 90 days patient used her CPAP only 5 days out of 90, percentage of usage 5 6%  Average usage on the days he used the CPAP was 1 hour and 2 minutes  His average AHI 5 3  Average CPAP pressure 8 cm H2O        Imaging:  I personally reviewed the images on the [facility] system pertinent to today's visit  Chest CT scan reviewed on PACs:  Minimal right lower lobe basilar atelectasis, with some bronchial thickening  No masses or consolidation      Chest x-ray reviewed on PACs:  Clear lungs    Other studies:  Echocardiogram:  LVEF 60%, normal RV, mild MR, estimated peak PA pressure 28    Rachel Duran MD

## 2020-03-16 NOTE — ASSESSMENT & PLAN NOTE
Continue current medications with Spiriva, budesonide nebulizer therapy b i d , Singulair and p r n  Albuterol  Continue injectable Dupilomab for allergy which is helping his asthma  I doubt patient needs corticosteroids chronically for his asthma and he reports in no worsening of shortness of breaths or wheezing or cough after stopping prednisone for 1 month  See below

## 2020-03-27 ENCOUNTER — TELEPHONE (OUTPATIENT)
Dept: CARDIOLOGY CLINIC | Facility: CLINIC | Age: 55
End: 2020-03-27

## 2020-03-27 ENCOUNTER — CLINICAL SUPPORT (OUTPATIENT)
Dept: CARDIOLOGY CLINIC | Facility: CLINIC | Age: 55
End: 2020-03-27
Payer: COMMERCIAL

## 2020-03-27 DIAGNOSIS — R00.2 PALPITATIONS: ICD-10-CM

## 2020-03-27 PROCEDURE — 0298T PR EXT ECG > 48HR TO 21 DAY REVIEW AND INTERPRETATN: CPT | Performed by: INTERNAL MEDICINE

## 2020-03-27 NOTE — TELEPHONE ENCOUNTER
Thanks Louise Hannon,    Reviewed Zio patch  Everything looked within normal limites, no arrhythmias or pauses  Called patient and discussed results with him  His palpitations have calmed down, however he was having fevers and cough at home & just got tested for Co-Vid  Advised to remain at home until results come back  Given his unremarkable workup he can follow up with us PRN  Does not need to come to appointment in 6 weeks   He will call to re-schedule if he has any issues      Tatianna Rudolph

## 2020-07-06 ENCOUNTER — TELEPHONE (OUTPATIENT)
Dept: PULMONOLOGY | Facility: CLINIC | Age: 55
End: 2020-07-06

## 2020-07-06 NOTE — TELEPHONE ENCOUNTER
1  Do you presently have a fever or flu-like symptoms? NO  2  Do you have symptoms of an upper respiratory infection like runny nose, sore throat, or cough? YES AND COUGHING UP CLEAR FLEM  3  Are you suffering from new headache that you have not had in the past?  NO  4  Do you have/have you experienced any new shortness of breath recently? YES PAST 7 DAYS  5  Do you have any new diarrhea, nausea or vomiting? SOFT STOOL PAST 3 DAYS AND NAUSEA BUT NO VOMITING  6  Have you been in contact with anyone who has been sick or diagnosed with COVID-19?  NO

## 2020-07-07 ENCOUNTER — TELEMEDICINE (OUTPATIENT)
Dept: PULMONOLOGY | Facility: CLINIC | Age: 55
End: 2020-07-07
Payer: COMMERCIAL

## 2020-07-07 VITALS
WEIGHT: 210 LBS | TEMPERATURE: 98.1 F | HEIGHT: 69 IN | DIASTOLIC BLOOD PRESSURE: 86 MMHG | SYSTOLIC BLOOD PRESSURE: 133 MMHG | BODY MASS INDEX: 31.1 KG/M2 | HEART RATE: 84 BPM

## 2020-07-07 DIAGNOSIS — J45.50 SEVERE PERSISTENT ASTHMA WITHOUT COMPLICATION: ICD-10-CM

## 2020-07-07 DIAGNOSIS — G47.33 OSA (OBSTRUCTIVE SLEEP APNEA): ICD-10-CM

## 2020-07-07 DIAGNOSIS — K21.9 GASTROESOPHAGEAL REFLUX DISEASE WITHOUT ESOPHAGITIS: Primary | ICD-10-CM

## 2020-07-07 PROCEDURE — 99214 OFFICE O/P EST MOD 30 MIN: CPT | Performed by: INTERNAL MEDICINE

## 2020-07-07 NOTE — ASSESSMENT & PLAN NOTE
GERD could be playing a factor in his asthma specially that he has symptoms at night and then asthma symptoms in the morning  I told him to switch his Prilosec to dinnertime and take it 30-60 minutes before dinner instead of the morning and we will monitor symptoms over the next few months

## 2020-07-07 NOTE — ASSESSMENT & PLAN NOTE
I believe his symptoms could be related to GERD versus environmental given the fact that he staying home most of the time with exposure to the carpet and other factors in his home and also probably the weather change with humidity  We discussed all that  For now no need for systemic prednisone  Will continue budesonide and p r n  albuterol MDI or nebulizer  He will remain off Spiriva  Continue his self injections as well with immunotherapy  He will follow with his allergist next month and he will request a note to remove the carpet or changes housekeeping

## 2020-07-07 NOTE — PROGRESS NOTES
Virtual Regular Visit      Assessment/Plan:    Problem List Items Addressed This Visit        Digestive    GERD (gastroesophageal reflux disease) - Primary     GERD could be playing a factor in his asthma specially that he has symptoms at night and then asthma symptoms in the morning  I told him to switch his Prilosec to dinnertime and take it 30-60 minutes before dinner instead of the morning and we will monitor symptoms over the next few months  Respiratory    Severe asthma     I believe his symptoms could be related to GERD versus environmental given the fact that he staying home most of the time with exposure to the carpet and other factors in his home and also probably the weather change with humidity  We discussed all that  For now no need for systemic prednisone  Will continue budesonide and p r n  albuterol MDI or nebulizer  He will remain off Spiriva  Continue his self injections as well with immunotherapy  He will follow with his allergist next month and he will request a note to remove the carpet or changes housekeeping  ARPITA (obstructive sleep apnea)     Continue CPAP q h s  Reason for visit is   Chief Complaint   Patient presents with    Virtual Regular Visit        Encounter provider Ivy Saavedra MD    Provider located at [address] [place] [id number]      Recent Visits  Date Type Provider Dept   07/06/20 Telephone Melinda Burrell [facility] [place]   Showing recent visits within past 7 days and meeting all other requirements     Today's Visits  Date Type Provider Dept   07/07/20 Telemedicine Ivy Saavedra MD [facility] [place]   Showing today's visits and meeting all other requirements     Future Appointments  No visits were found meeting these conditions     Showing future appointments within next 150 days and meeting all other requirements        The patient was identified by name and date of birth  Jorge Fernández was informed that this is a telemedicine visit and that the visit is being conducted through [facility] and patient was informed that this is a secure, HIPAA-compliant platform  He agrees to proceed     My office door was closed  No one else was in the room  He acknowledged consent and understanding of privacy and security of the video platform  The patient has agreed to participate and understands they can discontinue the visit at any time  Patient is aware this is a billable service  Subjective  Jorge Fernández is a 63 y o  male with past medical history of severe asthma and GERD  Patient has severe persistent asthma currently treated with budesonide nebulizer therapy b i d  and p r n  albuterol and also was on Spiriva Respimat but he stopped due to prostate enlargement  He reports that he did not feel any difference after he stopped the Spiriva  He is also on Dupilomab self-injections every 2 weeks and has the EpiPen at home  He denies any asthma exacerbation over the past several months  But 3 weeks ago he started to have worsening symptoms specially in the morning with shortness of breath in the morning and clear sputum production, he uses his PRN nebulizer therapy and he feels better  He denies any wheezing or chest tightness  He attributes that to staying home and take exposure to his carpets and drop ceiling and he has mice at home as well  He has been social distancing and staying on most of the time  He noticed when he visited his daughter in [place] for few weeks his symptoms improved since she had hardwood floor  He has no pets at home  He does not smoke cigarettes  Patient has GERD and he notice also this GERD symptoms are worse, he is on Prilosec 40 mg in the morning every day, he has more GERD at night  Patient has obstructive sleep apnea and he is using CPAP q h s   No complaints        HPI     Past Medical History: Diagnosis Date    Asthma     [facility]     Diabetes mellitus ([facility] )     Enlarged prostate     Glaucoma     Hyperlipidemia     Hypertension     Seasonal allergic rhinitis     Seizures (HCC)        Past Surgical History:   Procedure Laterality Date    KNEE SURGERY      WRIST SURGERY Left        Current Outpatient Medications   Medication Sig Dispense Refill    albuterol (2 5 mg/3 mL) 0 083 % nebulizer solution Take 1 vial (2 5 mg total) by nebulization every 4 (four) hours as needed for shortness of breath 25 vial 0    albuterol (PROVENTIL HFA,VENTOLIN HFA) 90 mcg/act inhaler Inhale 2 puffs every 4 (four) hours as needed for wheezing 1 Inhaler 0    atorvastatin (LIPITOR) 80 mg tablet Take 80 mg by mouth daily at bedtime      bimatoprost (LUMIGAN) 0 03 % ophthalmic drops Administer 1 drop to both eyes daily at bedtime        Blood Glucose Monitoring Suppl (ACCurverider BLOOD GLUCOSE METER) w/Device KIT by Does not apply route      brimonidine (ALPHAGAN P) 0 15 % ophthalmic solution Administer 1 drop to both eyes every 8 (eight) hours        Calcium Carb-Cholecalciferol (CALCIUM CARBONATE-VITAMIN D3 PO) Take 1,500 mg by mouth daily      carBAMazepine (TEGRETOL) 200 mg tablet Take 2 tabs in the morning and 3 tabs at night      dupilumab (DUPIXENT) subcutaneous injection Inject under the skin every 14 (fourteen) days      finasteride (PROSCAR) 5 mg tablet Take 5 mg by mouth daily      fluticasone (FLONASE) 50 mcg/act nasal spray 1 spray into each nostril daily      gabapentin (NEURONTIN) 300 mg capsule Take 300 mg by mouth 3 (three) times a day      glucagon 1 MG injection as needed      guaiFENesin (MUCINEX) 600 mg 12 hr tablet Take 1 tablet (600 mg total) by mouth every 12 (twelve) hours 12 tablet 0    guaiFENesin 200 MG tablet Take 1 tablet (200 mg total) by mouth every 4 (four) hours as needed for cough 30 suppository 0    hydrocortisone (ANUSOL-HC, PROCTOSOL HC,) 2 5 % rectal cream Insert 1 application into the rectum 2 (two) times a day      hydrOXYzine pamoate (VISTARIL) 50 mg capsule Take 50 mg by mouth Three times daily as needed      insulin glargine (BASAGLAR KWIKPEN) 100 units/mL injection pen Inject 35 Units under the skin daily at bedtime       insulin lispro (HumaLOG) 100 units/mL injection Inject 12 Units under the skin daily with breakfast (Patient taking differently: Inject 6 Units under the skin daily with breakfast ) 10 mL 0    insulin lispro (HumaLOG) 100 units/mL injection Inject 16 Units under the skin daily with lunch (Patient taking differently: Inject 6 Units under the skin daily with lunch ) 10 mL 0    insulin lispro (HumaLOG) 100 units/mL injection Inject 16 Units under the skin daily with dinner (Patient taking differently: Inject 12 Units under the skin daily with dinner ) 10 mL 0    Insulin Pen Needle 32G X 4 MM MISC Unifine Pentips Plus 32 gauge x 5/32" needle      latanoprost (XALATAN) 0 005 % ophthalmic solution Administer 1 drop to both eyes daily      loratadine (CLARITIN) 10 mg tablet loratadine 10 mg tablet      melatonin 3 mg Take 3 mg by mouth daily at bedtime      Misc Natural Products (BLOOD SUGAR 360 PO) Use to check BG 7x/day  Glycemic fluctuations  Steroid induced hyperglycemia   E11 65      montelukast (SINGULAIR) 10 mg tablet Take 10 mg by mouth daily        omega-3-acid ethyl esters (LOVAZA) 1 g capsule Take 1 g by mouth daily      omeprazole (PriLOSEC) 40 MG capsule Take 40 mg by mouth daily      perphenazine 2 mg tablet Take 2 mg by mouth daily      pneumococcal 23-valent polysaccharide vaccine (Pneumovax 23) Pneumovax-23 25 mcg/0 5 mL injection syringe      polyvinyl alcohol (LIQUIFILM TEARS) 1 4 % ophthalmic solution 2 drops as needed for dry eyes      predniSONE 20 mg tablet Take 3 tablets (60 mg total) by mouth daily 15 tablet 0    predniSONE 5 mg tablet Take 5 mg by mouth every other day On Monday, Wednesday, Friday      SPIRIVA RESPIMAT 1 25 MCG/ACT AERS inhaler TWO INHALATIONS BY MOUTH DAILY 1 Inhaler 5    TAMSULOSIN HCL PO Take 0 4 mg by mouth 2 (two) times a day       terbinafine (LamISIL) 1 % cream terbinafine HCl 1 % topical cream      amLODIPine (NORVASC) 2 5 mg tablet Take 1 tablet (2 5 mg total) by mouth daily (Patient not taking: Reported on 3/11/2020) 30 tablet 5     No current facility-administered medications for this visit  Allergies   Allergen Reactions    Aspirin GI Bleeding    Ibuprofen GI Bleeding    Licorice Flavor [Flavoring Agent] Hives    Penicillins Hives    Propoxyphene Hives     Propoxyphene N-Acetaminophen---hives & palpitations    Tramadol Palpitations     Other reaction(s): heart pumps real fast    Bee Venom     Chocolate     Glycyrrhiza     Haloperidol Other (See Comments)     "bent out of shape" - dystonia    Lithium Other (See Comments)     edema    Molds & Smuts     Other     Wasp Venom        Review of Systems   Constitutional: Negative  HENT: Negative  Eyes: Negative  Respiratory:        As HPI   Cardiovascular: Negative  Gastrointestinal:        As HPI   Endocrine: Negative  Genitourinary: Negative  Musculoskeletal: Negative  Skin: Negative  Allergic/Immunologic: Negative  Neurological: Negative  Hematological: Negative  Psychiatric/Behavioral: Negative  Video Exam    Vitals:    07/07/20 1136   BP: 133/86   Pulse: 84   Temp: 98 1 °F (36 7 °C)   Weight: 95 3 kg (210 lb)   Height: 5' 8 5" (1 74 m)       Physical Exam   Constitutional: He is oriented to person, place, and time  He appears well-developed and well-nourished  No distress  Neck: Normal range of motion  Neck supple  Pulmonary/Chest: Effort normal    Neurological: He is alert and oriented to person, place, and time  Skin: He is not diaphoretic  Psychiatric: He has a normal mood and affect   His behavior is normal  Judgment and thought content normal    Vitals reviewed  As a result of this visit, I have not referred the patient for further respiratory evaluation  I spent 30 minutes with patient today in which greater than 50% of the time was spent in counseling/coordination of care regarding [address] acknowledges that he has consented to an online visit or consultation  He understands that the online visit is based solely on information provided by him, and that, in the absence of a face-to-face physical evaluation by the physician, the diagnosis he receives is both limited and provisional in terms of accuracy and completeness  This is not intended to replace a full medical face-to-face evaluation by the physician  Colton Javed understands and accepts these terms

## 2020-08-11 ENCOUNTER — TELEPHONE (OUTPATIENT)
Dept: PULMONOLOGY | Facility: CLINIC | Age: 55
End: 2020-08-11

## 2020-10-05 ENCOUNTER — TELEMEDICINE (OUTPATIENT)
Dept: PULMONOLOGY | Facility: CLINIC | Age: 55
End: 2020-10-05
Payer: COMMERCIAL

## 2020-10-05 VITALS
SYSTOLIC BLOOD PRESSURE: 134 MMHG | TEMPERATURE: 98.1 F | BODY MASS INDEX: 31.32 KG/M2 | WEIGHT: 209 LBS | HEART RATE: 86 BPM | DIASTOLIC BLOOD PRESSURE: 86 MMHG

## 2020-10-05 DIAGNOSIS — J06.9 URI, ACUTE: ICD-10-CM

## 2020-10-05 DIAGNOSIS — G47.33 OSA (OBSTRUCTIVE SLEEP APNEA): ICD-10-CM

## 2020-10-05 DIAGNOSIS — K21.9 GASTROESOPHAGEAL REFLUX DISEASE WITHOUT ESOPHAGITIS: ICD-10-CM

## 2020-10-05 DIAGNOSIS — F19.20 CORTICOSTEROID DEPENDENCE (HCC): ICD-10-CM

## 2020-10-05 DIAGNOSIS — J45.50 SEVERE PERSISTENT ASTHMA WITHOUT COMPLICATION: Primary | ICD-10-CM

## 2020-10-05 PROBLEM — R05.9 COUGH: Status: ACTIVE | Noted: 2020-10-05

## 2020-10-05 PROCEDURE — 99214 OFFICE O/P EST MOD 30 MIN: CPT | Performed by: INTERNAL MEDICINE

## 2020-10-05 RX ORDER — FLUTICASONE FUROATE AND VILANTEROL 200; 25 UG/1; UG/1
1 POWDER RESPIRATORY (INHALATION) DAILY
Qty: 1 INHALER | Refills: 6 | Status: SHIPPED | OUTPATIENT
Start: 2020-10-05 | End: 2021-01-19

## 2020-10-07 ENCOUNTER — TELEPHONE (OUTPATIENT)
Dept: PULMONOLOGY | Facility: CLINIC | Age: 55
End: 2020-10-07

## 2020-10-20 ENCOUNTER — APPOINTMENT (EMERGENCY)
Dept: RADIOLOGY | Facility: HOSPITAL | Age: 55
End: 2020-10-20
Payer: COMMERCIAL

## 2020-10-20 ENCOUNTER — HOSPITAL ENCOUNTER (EMERGENCY)
Facility: HOSPITAL | Age: 55
Discharge: HOME | End: 2020-10-20
Attending: EMERGENCY MEDICINE | Admitting: EMERGENCY MEDICINE
Payer: COMMERCIAL

## 2020-10-20 VITALS
WEIGHT: 209.44 LBS | DIASTOLIC BLOOD PRESSURE: 80 MMHG | OXYGEN SATURATION: 99 % | SYSTOLIC BLOOD PRESSURE: 126 MMHG | RESPIRATION RATE: 20 BRPM | HEART RATE: 73 BPM | TEMPERATURE: 97.8 F | BODY MASS INDEX: 31.38 KG/M2

## 2020-10-20 DIAGNOSIS — R07.89 ATYPICAL CHEST PAIN: Primary | ICD-10-CM

## 2020-10-20 LAB
ANION GAP SERPL CALCULATED.3IONS-SCNC: 8 MMOL/L (ref 4–13)
ATRIAL RATE: 70 BPM
ATRIAL RATE: 94 BPM
BASOPHILS # BLD AUTO: 0.04 THOUSANDS/ΜL (ref 0–0.1)
BASOPHILS NFR BLD AUTO: 1 % (ref 0–1)
BUN SERPL-MCNC: 18 MG/DL (ref 5–25)
CALCIUM SERPL-MCNC: 8.9 MG/DL (ref 8.3–10.1)
CHLORIDE SERPL-SCNC: 103 MMOL/L (ref 100–108)
CO2 SERPL-SCNC: 25 MMOL/L (ref 21–32)
CREAT SERPL-MCNC: 1.01 MG/DL (ref 0.6–1.3)
EOSINOPHIL # BLD AUTO: 0.11 THOUSAND/ΜL (ref 0–0.61)
EOSINOPHIL NFR BLD AUTO: 1 % (ref 0–6)
ERYTHROCYTE [DISTWIDTH] IN BLOOD BY AUTOMATED COUNT: 12 % (ref 11.6–15.1)
GFR SERPL CREATININE-BSD FRML MDRD: 83 ML/MIN/1.73SQ M
GLUCOSE SERPL-MCNC: 187 MG/DL (ref 65–140)
HCT VFR BLD AUTO: 41.7 % (ref 36.5–49.3)
HGB BLD-MCNC: 14 G/DL (ref 12–17)
IMM GRANULOCYTES # BLD AUTO: 0.02 THOUSAND/UL (ref 0–0.2)
IMM GRANULOCYTES NFR BLD AUTO: 0 % (ref 0–2)
LYMPHOCYTES # BLD AUTO: 1.17 THOUSANDS/ΜL (ref 0.6–4.47)
LYMPHOCYTES NFR BLD AUTO: 15 % (ref 14–44)
MCH RBC QN AUTO: 28.4 PG (ref 26.8–34.3)
MCHC RBC AUTO-ENTMCNC: 33.6 G/DL (ref 31.4–37.4)
MCV RBC AUTO: 85 FL (ref 82–98)
MONOCYTES # BLD AUTO: 0.59 THOUSAND/ΜL (ref 0.17–1.22)
MONOCYTES NFR BLD AUTO: 8 % (ref 4–12)
NEUTROPHILS # BLD AUTO: 5.67 THOUSANDS/ΜL (ref 1.85–7.62)
NEUTS SEG NFR BLD AUTO: 75 % (ref 43–75)
NRBC BLD AUTO-RTO: 0 /100 WBCS
P AXIS: 73 DEGREES
P AXIS: 75 DEGREES
PLATELET # BLD AUTO: 212 THOUSANDS/UL (ref 149–390)
PMV BLD AUTO: 9.1 FL (ref 8.9–12.7)
POTASSIUM SERPL-SCNC: 4.2 MMOL/L (ref 3.5–5.3)
PR INTERVAL: 168 MS
PR INTERVAL: 174 MS
QRS AXIS: -89 DEGREES
QRS AXIS: 55 DEGREES
QRSD INTERVAL: 84 MS
QRSD INTERVAL: 90 MS
QT INTERVAL: 332 MS
QT INTERVAL: 374 MS
QTC INTERVAL: 403 MS
QTC INTERVAL: 415 MS
RBC # BLD AUTO: 4.93 MILLION/UL (ref 3.88–5.62)
SODIUM SERPL-SCNC: 136 MMOL/L (ref 136–145)
T WAVE AXIS: 63 DEGREES
T WAVE AXIS: 73 DEGREES
TROPONIN I SERPL-MCNC: <0.02 NG/ML
TROPONIN I SERPL-MCNC: <0.02 NG/ML
VENTRICULAR RATE: 70 BPM
VENTRICULAR RATE: 94 BPM
WBC # BLD AUTO: 7.6 THOUSAND/UL (ref 4.31–10.16)

## 2020-10-20 PROCEDURE — 93010 ELECTROCARDIOGRAM REPORT: CPT | Performed by: INTERNAL MEDICINE

## 2020-10-20 PROCEDURE — 84484 ASSAY OF TROPONIN QUANT: CPT | Performed by: EMERGENCY MEDICINE

## 2020-10-20 PROCEDURE — 85025 COMPLETE CBC W/AUTO DIFF WBC: CPT | Performed by: EMERGENCY MEDICINE

## 2020-10-20 PROCEDURE — 80048 BASIC METABOLIC PNL TOTAL CA: CPT | Performed by: EMERGENCY MEDICINE

## 2020-10-20 PROCEDURE — 99285 EMERGENCY DEPT VISIT HI MDM: CPT

## 2020-10-20 PROCEDURE — 96374 THER/PROPH/DIAG INJ IV PUSH: CPT

## 2020-10-20 PROCEDURE — 71046 X-RAY EXAM CHEST 2 VIEWS: CPT

## 2020-10-20 PROCEDURE — 93005 ELECTROCARDIOGRAM TRACING: CPT

## 2020-10-20 PROCEDURE — 99285 EMERGENCY DEPT VISIT HI MDM: CPT | Performed by: EMERGENCY MEDICINE

## 2020-10-20 PROCEDURE — 36415 COLL VENOUS BLD VENIPUNCTURE: CPT | Performed by: EMERGENCY MEDICINE

## 2020-10-20 RX ORDER — METHOCARBAMOL 750 MG/1
750 TABLET, FILM COATED ORAL 3 TIMES DAILY PRN
Qty: 42 TABLET | Refills: 0 | Status: SHIPPED | OUTPATIENT
Start: 2020-10-20 | End: 2021-05-14

## 2020-10-20 RX ORDER — SODIUM CHLORIDE 9 MG/ML
3 INJECTION INTRAVENOUS
Status: DISCONTINUED | OUTPATIENT
Start: 2020-10-20 | End: 2020-10-20 | Stop reason: HOSPADM

## 2020-10-20 RX ORDER — METHOCARBAMOL 750 MG/1
750 TABLET, FILM COATED ORAL 3 TIMES DAILY PRN
Qty: 42 TABLET | Refills: 0 | Status: SHIPPED | OUTPATIENT
Start: 2020-10-20 | End: 2020-10-20 | Stop reason: SDUPTHER

## 2020-10-20 RX ORDER — ASPIRIN 81 MG/1
324 TABLET, CHEWABLE ORAL ONCE
Status: COMPLETED | OUTPATIENT
Start: 2020-10-20 | End: 2020-10-20

## 2020-10-20 RX ORDER — DIAZEPAM 5 MG/ML
2.5 INJECTION, SOLUTION INTRAMUSCULAR; INTRAVENOUS ONCE
Status: COMPLETED | OUTPATIENT
Start: 2020-10-20 | End: 2020-10-20

## 2020-10-20 RX ADMIN — DIAZEPAM 2.5 MG: 10 INJECTION, SOLUTION INTRAMUSCULAR; INTRAVENOUS at 12:27

## 2020-10-20 RX ADMIN — ASPIRIN 81 MG CHEWABLE TABLET 324 MG: 81 TABLET CHEWABLE at 12:28

## 2021-01-01 NOTE — ASSESSMENT & PLAN NOTE
Okay to use tylenol, ibuprofen. Call if no improvement in 48-72 hours.   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

## 2021-01-10 ENCOUNTER — APPOINTMENT (EMERGENCY)
Dept: RADIOLOGY | Facility: HOSPITAL | Age: 56
End: 2021-01-10
Payer: COMMERCIAL

## 2021-01-10 ENCOUNTER — HOSPITAL ENCOUNTER (OUTPATIENT)
Facility: HOSPITAL | Age: 56
Setting detail: OBSERVATION
Discharge: HOME | End: 2021-01-11
Attending: EMERGENCY MEDICINE | Admitting: INTERNAL MEDICINE
Payer: COMMERCIAL

## 2021-01-10 DIAGNOSIS — R07.9 CHEST PAIN, UNSPECIFIED TYPE: Primary | ICD-10-CM

## 2021-01-10 LAB
ALBUMIN SERPL BCP-MCNC: 4 G/DL (ref 3–5.2)
ALP SERPL-CCNC: 86 U/L (ref 43–122)
ALT SERPL W P-5'-P-CCNC: 25 U/L (ref 9–52)
ANION GAP SERPL CALCULATED.3IONS-SCNC: 10 MMOL/L (ref 5–14)
AST SERPL W P-5'-P-CCNC: 20 U/L (ref 17–59)
ATRIAL RATE: 110 BPM
ATRIAL RATE: 112 BPM
BASOPHILS # BLD AUTO: 0 THOUSANDS/ΜL (ref 0–0.1)
BASOPHILS NFR BLD AUTO: 0 % (ref 0–1)
BILIRUB SERPL-MCNC: 0.5 MG/DL
BUN SERPL-MCNC: 17 MG/DL (ref 5–25)
CALCIUM SERPL-MCNC: 8.9 MG/DL (ref 8.4–10.2)
CARBAMAZEPINE SERPL-MCNC: 9.1 UG/ML (ref 4–12)
CHLORIDE SERPL-SCNC: 101 MMOL/L (ref 97–108)
CK SERPL-CCNC: 86 U/L (ref 55–170)
CO2 SERPL-SCNC: 25 MMOL/L (ref 22–30)
CREAT SERPL-MCNC: 0.84 MG/DL (ref 0.7–1.5)
D DIMER PPP FEU-MCNC: 0.34 UG/ML FEU
EOSINOPHIL # BLD AUTO: 0 THOUSAND/ΜL (ref 0–0.4)
EOSINOPHIL NFR BLD AUTO: 0 % (ref 0–6)
ERYTHROCYTE [DISTWIDTH] IN BLOOD BY AUTOMATED COUNT: 13.3 %
ETHANOL SERPL-MCNC: <10 MG/DL (ref 0–10)
FLUAV RNA RESP QL NAA+PROBE: NEGATIVE
FLUBV RNA RESP QL NAA+PROBE: NEGATIVE
GFR SERPL CREATININE-BSD FRML MDRD: 98 ML/MIN/1.73SQ M
GLUCOSE SERPL-MCNC: 202 MG/DL (ref 65–140)
GLUCOSE SERPL-MCNC: 306 MG/DL (ref 70–99)
HCT VFR BLD AUTO: 42.9 % (ref 41–53)
HGB BLD-MCNC: 14.1 G/DL (ref 13.5–17.5)
LIPASE SERPL-CCNC: 48 U/L (ref 23–300)
LYMPHOCYTES # BLD AUTO: 1.1 THOUSANDS/ΜL (ref 0.5–4)
LYMPHOCYTES NFR BLD AUTO: 19 % (ref 25–45)
MCH RBC QN AUTO: 28.2 PG (ref 26–34)
MCHC RBC AUTO-ENTMCNC: 32.9 G/DL (ref 31–36)
MCV RBC AUTO: 86 FL (ref 80–100)
MONOCYTES # BLD AUTO: 0.3 THOUSAND/ΜL (ref 0.2–0.9)
MONOCYTES NFR BLD AUTO: 6 % (ref 1–10)
NEUTROPHILS # BLD AUTO: 4.7 THOUSANDS/ΜL (ref 1.8–7.8)
NEUTS SEG NFR BLD AUTO: 75 % (ref 45–65)
P AXIS: 46 DEGREES
P AXIS: 52 DEGREES
PLATELET # BLD AUTO: 206 THOUSANDS/UL (ref 150–450)
PMV BLD AUTO: 8 FL (ref 8.9–12.7)
POTASSIUM SERPL-SCNC: 3.6 MMOL/L (ref 3.6–5)
PR INTERVAL: 164 MS
PR INTERVAL: 166 MS
PROT SERPL-MCNC: 6.4 G/DL (ref 5.9–8.4)
QRS AXIS: -52 DEGREES
QRS AXIS: -54 DEGREES
QRSD INTERVAL: 84 MS
QRSD INTERVAL: 86 MS
QT INTERVAL: 350 MS
QT INTERVAL: 354 MS
QTC INTERVAL: 477 MS
QTC INTERVAL: 479 MS
RBC # BLD AUTO: 5.01 MILLION/UL (ref 4.5–5.9)
RSV RNA RESP QL NAA+PROBE: NEGATIVE
SARS-COV-2 RNA RESP QL NAA+PROBE: NEGATIVE
SODIUM SERPL-SCNC: 136 MMOL/L (ref 137–147)
T WAVE AXIS: 20 DEGREES
T WAVE AXIS: 9 DEGREES
TROPONIN I SERPL-MCNC: <0.01 NG/ML (ref 0–0.03)
TSH SERPL DL<=0.05 MIU/L-ACNC: 0.66 UIU/ML (ref 0.47–4.68)
VENTRICULAR RATE: 110 BPM
VENTRICULAR RATE: 112 BPM
WBC # BLD AUTO: 6.2 THOUSAND/UL (ref 4.5–11)

## 2021-01-10 PROCEDURE — 82077 ASSAY SPEC XCP UR&BREATH IA: CPT | Performed by: PHYSICIAN ASSISTANT

## 2021-01-10 PROCEDURE — 83690 ASSAY OF LIPASE: CPT | Performed by: PHYSICIAN ASSISTANT

## 2021-01-10 PROCEDURE — 84484 ASSAY OF TROPONIN QUANT: CPT | Performed by: PHYSICIAN ASSISTANT

## 2021-01-10 PROCEDURE — 84443 ASSAY THYROID STIM HORMONE: CPT | Performed by: INTERNAL MEDICINE

## 2021-01-10 PROCEDURE — 0241U HB NFCT DS VIR RESP RNA 4 TRGT: CPT | Performed by: PHYSICIAN ASSISTANT

## 2021-01-10 PROCEDURE — 84244 ASSAY OF RENIN: CPT | Performed by: INTERNAL MEDICINE

## 2021-01-10 PROCEDURE — 93005 ELECTROCARDIOGRAM TRACING: CPT

## 2021-01-10 PROCEDURE — 82948 REAGENT STRIP/BLOOD GLUCOSE: CPT

## 2021-01-10 PROCEDURE — 99285 EMERGENCY DEPT VISIT HI MDM: CPT

## 2021-01-10 PROCEDURE — 93010 ELECTROCARDIOGRAM REPORT: CPT | Performed by: INTERNAL MEDICINE

## 2021-01-10 PROCEDURE — 99220 PR INITIAL OBSERVATION CARE/DAY 70 MINUTES: CPT | Performed by: INTERNAL MEDICINE

## 2021-01-10 PROCEDURE — 82088 ASSAY OF ALDOSTERONE: CPT | Performed by: INTERNAL MEDICINE

## 2021-01-10 PROCEDURE — 80156 ASSAY CARBAMAZEPINE TOTAL: CPT | Performed by: PHYSICIAN ASSISTANT

## 2021-01-10 PROCEDURE — 83036 HEMOGLOBIN GLYCOSYLATED A1C: CPT | Performed by: INTERNAL MEDICINE

## 2021-01-10 PROCEDURE — 94760 N-INVAS EAR/PLS OXIMETRY 1: CPT

## 2021-01-10 PROCEDURE — 96360 HYDRATION IV INFUSION INIT: CPT

## 2021-01-10 PROCEDURE — 82550 ASSAY OF CK (CPK): CPT | Performed by: PHYSICIAN ASSISTANT

## 2021-01-10 PROCEDURE — 71045 X-RAY EXAM CHEST 1 VIEW: CPT

## 2021-01-10 PROCEDURE — 36415 COLL VENOUS BLD VENIPUNCTURE: CPT | Performed by: PHYSICIAN ASSISTANT

## 2021-01-10 PROCEDURE — 99285 EMERGENCY DEPT VISIT HI MDM: CPT | Performed by: PHYSICIAN ASSISTANT

## 2021-01-10 PROCEDURE — 85025 COMPLETE CBC W/AUTO DIFF WBC: CPT | Performed by: PHYSICIAN ASSISTANT

## 2021-01-10 PROCEDURE — 80053 COMPREHEN METABOLIC PANEL: CPT | Performed by: PHYSICIAN ASSISTANT

## 2021-01-10 PROCEDURE — 85379 FIBRIN DEGRADATION QUANT: CPT | Performed by: PHYSICIAN ASSISTANT

## 2021-01-10 PROCEDURE — 84484 ASSAY OF TROPONIN QUANT: CPT | Performed by: INTERNAL MEDICINE

## 2021-01-10 RX ORDER — GABAPENTIN 300 MG/1
300 CAPSULE ORAL 3 TIMES DAILY
Status: DISCONTINUED | OUTPATIENT
Start: 2021-01-10 | End: 2021-01-11 | Stop reason: HOSPADM

## 2021-01-10 RX ORDER — HYDROXYZINE HYDROCHLORIDE 10 MG/1
10 TABLET, FILM COATED ORAL 3 TIMES DAILY PRN
Status: DISCONTINUED | OUTPATIENT
Start: 2021-01-10 | End: 2021-01-11 | Stop reason: HOSPADM

## 2021-01-10 RX ORDER — LATANOPROST 50 UG/ML
1 SOLUTION/ DROPS OPHTHALMIC DAILY
Status: DISCONTINUED | OUTPATIENT
Start: 2021-01-11 | End: 2021-01-10

## 2021-01-10 RX ORDER — AMLODIPINE BESYLATE 5 MG/1
5 TABLET ORAL DAILY
COMMUNITY

## 2021-01-10 RX ORDER — BRIMONIDINE TARTRATE 0.15 %
1 DROPS OPHTHALMIC (EYE) EVERY 8 HOURS SCHEDULED
Status: DISCONTINUED | OUTPATIENT
Start: 2021-01-10 | End: 2021-01-11 | Stop reason: HOSPADM

## 2021-01-10 RX ORDER — INSULIN GLARGINE 100 [IU]/ML
35 INJECTION, SOLUTION SUBCUTANEOUS
Status: DISCONTINUED | OUTPATIENT
Start: 2021-01-10 | End: 2021-01-11 | Stop reason: HOSPADM

## 2021-01-10 RX ORDER — ACETAMINOPHEN 325 MG/1
975 TABLET ORAL ONCE
Status: COMPLETED | OUTPATIENT
Start: 2021-01-10 | End: 2021-01-10

## 2021-01-10 RX ORDER — LANOLIN ALCOHOL/MO/W.PET/CERES
3 CREAM (GRAM) TOPICAL
Status: DISCONTINUED | OUTPATIENT
Start: 2021-01-10 | End: 2021-01-11 | Stop reason: HOSPADM

## 2021-01-10 RX ORDER — METHOCARBAMOL 750 MG/1
750 TABLET, FILM COATED ORAL 3 TIMES DAILY PRN
Status: DISCONTINUED | OUTPATIENT
Start: 2021-01-10 | End: 2021-01-11 | Stop reason: HOSPADM

## 2021-01-10 RX ORDER — HEPARIN SODIUM 5000 [USP'U]/ML
5000 INJECTION, SOLUTION INTRAVENOUS; SUBCUTANEOUS EVERY 8 HOURS SCHEDULED
Status: DISCONTINUED | OUTPATIENT
Start: 2021-01-10 | End: 2021-01-11 | Stop reason: HOSPADM

## 2021-01-10 RX ORDER — FINASTERIDE 5 MG/1
5 TABLET, FILM COATED ORAL DAILY
Status: DISCONTINUED | OUTPATIENT
Start: 2021-01-11 | End: 2021-01-11 | Stop reason: HOSPADM

## 2021-01-10 RX ORDER — CHLORAL HYDRATE 500 MG
1000 CAPSULE ORAL DAILY
Status: DISCONTINUED | OUTPATIENT
Start: 2021-01-11 | End: 2021-01-11 | Stop reason: HOSPADM

## 2021-01-10 RX ORDER — ONDANSETRON 2 MG/ML
4 INJECTION INTRAMUSCULAR; INTRAVENOUS EVERY 6 HOURS PRN
Status: DISCONTINUED | OUTPATIENT
Start: 2021-01-10 | End: 2021-01-11 | Stop reason: HOSPADM

## 2021-01-10 RX ORDER — GUAIFENESIN 600 MG
600 TABLET, EXTENDED RELEASE 12 HR ORAL EVERY 12 HOURS SCHEDULED
Status: DISCONTINUED | OUTPATIENT
Start: 2021-01-10 | End: 2021-01-11 | Stop reason: HOSPADM

## 2021-01-10 RX ORDER — ATORVASTATIN CALCIUM 80 MG/1
80 TABLET, FILM COATED ORAL
Status: DISCONTINUED | OUTPATIENT
Start: 2021-01-10 | End: 2021-01-11 | Stop reason: HOSPADM

## 2021-01-10 RX ORDER — ACETAMINOPHEN 325 MG/1
650 TABLET ORAL EVERY 6 HOURS PRN
Status: DISCONTINUED | OUTPATIENT
Start: 2021-01-10 | End: 2021-01-11 | Stop reason: HOSPADM

## 2021-01-10 RX ORDER — SODIUM CHLORIDE 9 MG/ML
250 INJECTION, SOLUTION INTRAVENOUS CONTINUOUS
Status: DISCONTINUED | OUTPATIENT
Start: 2021-01-10 | End: 2021-01-10

## 2021-01-10 RX ORDER — ONDANSETRON 2 MG/ML
4 INJECTION INTRAMUSCULAR; INTRAVENOUS ONCE
Status: COMPLETED | OUTPATIENT
Start: 2021-01-10 | End: 2021-01-10

## 2021-01-10 RX ORDER — PANTOPRAZOLE SODIUM 40 MG/1
40 TABLET, DELAYED RELEASE ORAL
Status: DISCONTINUED | OUTPATIENT
Start: 2021-01-11 | End: 2021-01-11 | Stop reason: HOSPADM

## 2021-01-10 RX ORDER — NITROGLYCERIN 0.4 MG/1
0.4 TABLET SUBLINGUAL ONCE
Status: COMPLETED | OUTPATIENT
Start: 2021-01-10 | End: 2021-01-10

## 2021-01-10 RX ORDER — ALBUTEROL SULFATE 90 UG/1
2 AEROSOL, METERED RESPIRATORY (INHALATION) EVERY 4 HOURS PRN
Status: DISCONTINUED | OUTPATIENT
Start: 2021-01-10 | End: 2021-01-11 | Stop reason: HOSPADM

## 2021-01-10 RX ORDER — MONTELUKAST SODIUM 10 MG/1
10 TABLET ORAL DAILY
Status: DISCONTINUED | OUTPATIENT
Start: 2021-01-11 | End: 2021-01-11 | Stop reason: HOSPADM

## 2021-01-10 RX ADMIN — BRIMONIDINE TARTRATE 1 DROP: 1.5 SOLUTION OPHTHALMIC at 22:30

## 2021-01-10 RX ADMIN — ACETAMINOPHEN 975 MG: 325 TABLET ORAL at 16:25

## 2021-01-10 RX ADMIN — NITROGLYCERIN 0.4 MG: 0.4 TABLET SUBLINGUAL at 14:59

## 2021-01-10 RX ADMIN — BIMATOPROST 1 DROP: 0.1 SOLUTION/ DROPS OPHTHALMIC at 22:30

## 2021-01-10 RX ADMIN — INSULIN GLARGINE 35 UNITS: 100 INJECTION, SOLUTION SUBCUTANEOUS at 21:30

## 2021-01-10 RX ADMIN — GABAPENTIN 300 MG: 300 CAPSULE ORAL at 21:24

## 2021-01-10 RX ADMIN — ONDANSETRON 4 MG: 2 INJECTION INTRAMUSCULAR; INTRAVENOUS at 16:40

## 2021-01-10 RX ADMIN — HEPARIN SODIUM 5000 UNITS: 5000 INJECTION INTRAVENOUS; SUBCUTANEOUS at 21:24

## 2021-01-10 RX ADMIN — GUAIFENESIN 600 MG: 600 TABLET ORAL at 21:25

## 2021-01-10 RX ADMIN — SODIUM CHLORIDE 250 ML/HR: 0.9 INJECTION, SOLUTION INTRAVENOUS at 14:48

## 2021-01-10 RX ADMIN — MELATONIN TAB 3 MG 3 MG: 3 TAB at 21:24

## 2021-01-10 RX ADMIN — ATORVASTATIN CALCIUM 80 MG: 80 TABLET, FILM COATED ORAL at 21:24

## 2021-01-10 NOTE — H&P
H&P- Mary Eaton 1965, 63 y o  male MRN: [id number]    Unit/Bed#: 7T [facility] 706-02 Encounter: [id number]    Primary Care Provider: Sruthi Saez MD   Date and time admitted to hospital: 1/10/2021  2:32 PM        * Chest pain  Assessment & Plan  Admitted for ACS rule out  · 7/2019-Negative stress test no evidence of ischemia no wall motion abnormality, no EKG changes  · Last echo: 3/2020- Normal EF with some wall thickness  · 3/2020- Ambulatory Holter monitor- no arrhythmias  · Troponin trend x 3  · EKG no changes  · Will obtain aldosterone level, renin, TSH, morning cortisol, plasma metanephrine, urine metanephrines    HLD (hyperlipidemia)  Assessment & Plan  Continue atorvastatin 80 mg tablet     BPH (benign prostatic hyperplasia)  Assessment & Plan  Continue tamsulosin    Type 2 diabetes mellitus, with long-term current use of insulin (HCC)  Assessment & Plan  Lab Results   Component Value Date    HGBA1C 7 4 (H) 02/19/2020       No results for input(s): POCGLU in the last 72 hours  Blood Sugar Average: Last 72 hrs:     Blood sugars are controlled  Continue Insulin   Diabetic diet    HTN (hypertension)  Assessment & Plan  Amlodipine 5 mg daily however on hold now due to hypotension        VTE Prophylaxis: Enoxaparin (Lovenox)  / sequential compression device   Code Status: Level   POLST: POLST form is not discussed and not completed at this time  Discussion with family: none    Anticipated Length of Stay:  Patient will be admitted on an Observation basis with an anticipated length of stay of  Less than 2 midnights  Justification for Hospital Stay: ACS RULE OUT    Total Time for Visit, including Counseling / Coordination of Care: 1 hour  Greater than 50% of this total time spent on direct patient counseling and coordination of care  Chief Complaint:   Chest pain    History of Present Illness:    Mary Eaton is a 63 y o  male who presents with pain    Patient says chest pain is very sharp in the left sternal border  Patient says that is associated with episodes of diaphoresis as well as headaches  Patient says he has gone this before in the last year so  Patient has been evaluated by cardiology in the past with stress test as well as echo, Holter monitoring in all has been unremarkable  Patient has a history of hypertension as well as diabetes, hyperlipidemia  He also has a history of previous heroin use and tobacco use  Patient says that he does go through episodes of high and low blood pressure  He also has uncontrolled blood sugars as well as low blood sugars  Patient states that he does have shortness of breath at night however he does use a CPAP for sleep  currently asymptomatic  Review of Systems:    Review of Systems   Constitutional: Negative for activity change, chills and fever  Eyes: Negative for visual disturbance  Respiratory: Negative for cough, chest tightness and shortness of breath  Cardiovascular: Negative for chest pain and leg swelling  Gastrointestinal: Negative for abdominal pain, constipation, diarrhea, nausea and vomiting  Endocrine: Negative for cold intolerance and heat intolerance  Genitourinary: Negative for difficulty urinating  Musculoskeletal: Negative for gait problem  Skin: Negative for rash  Allergic/Immunologic: Negative  Neurological: Negative  Negative for dizziness, weakness and light-headedness  Hematological: Negative  Psychiatric/Behavioral: Negative  All other systems reviewed and are negative        Past Medical and Surgical History:     Past Medical History:   Diagnosis Date    Asthma     [facility]     Diabetes mellitus ([facility] )     Enlarged prostate     Glaucoma     Hyperlipidemia     Hypertension     Psychiatric disorder     Seasonal allergic rhinitis     Seizures ([facility] )        Past Surgical History:   Procedure Laterality Date    KNEE SURGERY      WRIST SURGERY Left        Meds/Allergies:    Prior to Admission medications    Medication Sig Start Date End Date Taking? Authorizing Provider   albuterol (2 5 mg/3 mL) 0 083 % nebulizer solution Take 1 vial (2 5 mg total) by nebulization every 4 (four) hours as needed for shortness of breath 11/15/18   Madelyn Drummond DO   albuterol (PROVENTIL HFA,VENTOLIN HFA) 90 mcg/act inhaler Inhale 2 puffs every 4 (four) hours as needed for wheezing 11/15/18   Reuben Spears DO   amLODIPine (NORVASC) 2 5 mg tablet Take 1 tablet (2 5 mg total) by mouth daily  Patient not taking: Reported on 3/11/2020 3/18/19   Nurys Mcclain MD   atorvastatin (LIPITOR) 80 mg tablet Take 80 mg by mouth daily at bedtime 9/8/16   Historical Provider, MD   bimatoprost (LUMIGAN) 0 03 % ophthalmic drops Administer 1 drop to both eyes daily at bedtime   6/5/08   Historical Provider, MD   Blood Glucose Monitoring Suppl (ACURA BLOOD GLUCOSE METER) w/Device KIT by Does not apply route    Historical Provider, MD   brimonidine (ALPHAGAN P) 0 15 % ophthalmic solution Administer 1 drop to both eyes every 8 (eight) hours   7/2/15   Historical Provider, MD   Calcium Carb-Cholecalciferol (CALCIUM CARBONATE-VITAMIN D3 PO) Take 1,500 mg by mouth daily 9/20/13   Historical Provider, MD   carBAMazepine (TEGRETOL) 200 mg tablet Take 2 tabs in the morning and 3 tabs at night 10/19/16   Historical Provider, MD   dupilumab (DUPIXENT) subcutaneous injection Inject under the skin every 14 (fourteen) days    Historical Provider, MD   finasteride (PROSCAR) 5 mg tablet Take 5 mg by mouth daily 4/26/16   Historical Provider, MD   fluticasone (FLONASE) 50 mcg/act nasal spray 1 spray into each nostril daily    Historical Provider, MD   fluticasone-vilanterol (BREO ELLIPTA) 200-25 MCG/INH inhaler Inhale 1 puff daily Rinse mouth after use   10/5/20   Nurys Mcclain MD   gabapentin (NEURONTIN) 300 mg capsule Take 300 mg by mouth 3 (three) times a day 9/8/16   Historical Provider, MD   glucagon 1 MG injection as needed 8/5/16 Historical Provider, MD rodriguezaiFENesin (MUCINEX) 600 mg 12 hr tablet Take 1 tablet (600 mg total) by mouth every 12 (twelve) hours 5/3/19   Darlene Skinner MD   guaiFENesin 200 MG tablet Take 1 tablet (200 mg total) by mouth every 4 (four) hours as needed for cough 2/28/20   CARRIE Howe   hydrocortisone (ANUSOL-HC, PROCTOSOL [facility] ,) 2 5 % rectal cream Insert 1 application into the rectum 2 (two) times a day    Historical Provider, MD   hydrOXYzine pamoate (VISTARIL) 50 mg capsule Take 50 mg by mouth Three times daily as needed 8/8/19   Historical Provider, MD   insulin aspart (NovoLOG) 100 units/mL injection Inject under the skin 3 (three) times a day before meals    Historical Provider, MD   insulin glargine (BASAGLAR KWIKPEN) 100 units/mL injection pen Inject 35 Units under the skin daily at bedtime     Historical Provider, MD   insulin lispro (HumaLOG) 100 units/mL injection Inject 12 Units under the skin daily with breakfast  Patient not taking: Reported on 10/5/2020 10/27/18   Blanca Johnson MD   insulin lispro (HumaLOG) 100 units/mL injection Inject 16 Units under the skin daily with lunch  Patient not taking: Reported on 10/5/2020 10/27/18   Blanca Johnson MD   insulin lispro (HumaLOG) 100 units/mL injection Inject 16 Units under the skin daily with dinner  Patient not taking: Reported on 10/5/2020 10/27/18   Blanca Johnson MD   Insulin Pen Needle 32G X 4 MM MISC Unifine Pentips Plus 32 gauge x 5/32" needle    Historical Provider, MD   latanoprost (XALATAN) 0 005 % ophthalmic solution Administer 1 drop to both eyes daily    Historical Provider, MD   loratadine (CLARITIN) 10 mg tablet loratadine 10 mg tablet    Historical Provider, MD   melatonin 3 mg Take 3 mg by mouth daily at bedtime    Historical Provider, MD   methocarbamol (ROBAXIN) 750 mg tablet Take 1 tablet (750 mg total) by mouth 3 (three) times a day as needed for muscle spasms 10/20/20   Destiny Timmons MD   Misc Natural Products (BLOOD SUGAR 360 PO) Use to check BG 7x/day  Glycemic fluctuations  Steroid induced hyperglycemia  E11 65 9/18/18   Historical Provider, MD   montelukast (SINGULAIR) 10 mg tablet Take 10 mg by mouth daily   8/25/15   Historical Provider, MD   omega-3-acid ethyl esters (LOVAZA) 1 g capsule Take 1 g by mouth daily    Historical Provider, MD   omeprazole (PriLOSEC) 40 MG capsule Take 40 mg by mouth daily 4/26/16   Historical Provider, MD   perphenazine 2 mg tablet Take 2 mg by mouth daily    Historical Provider, MD   pneumococcal 23-valent polysaccharide vaccine (Pneumovax 23) Pneumovax-23 25 mcg/0 5 mL injection syringe    Historical Provider, MD   polyvinyl alcohol (LIQUIFILM TEARS) 1 4 % ophthalmic solution 2 drops as needed for dry eyes    Historical Provider, MD   predniSONE 20 mg tablet Take 3 tablets (60 mg total) by mouth daily  Patient not taking: Reported on 10/5/2020 2/28/20   CARRIE Hassan   predniSONE 5 mg tablet Take 5 mg by mouth every other day On Monday, Wednesday, Friday    Historical Provider, MD   TAMSULOSIN HCL PO Take 0 4 mg by mouth 2 (two) times a day     Historical Provider, MD   terbinafine (LamISIL) 1 % cream terbinafine HCl 1 % topical cream    Historical Provider, MD     I have reviewed home medications with patient personally  Allergies:    Allergies   Allergen Reactions    Aspirin GI Bleeding    Ibuprofen GI Bleeding    Licorice Flavor [Flavoring Agent] Hives    Penicillins Hives    Propoxyphene Hives     Propoxyphene N-Acetaminophen---hives & palpitations    Tramadol Palpitations     Other reaction(s): heart pumps real fast    Bee Venom     Chocolate     Glycyrrhiza     Haloperidol Other (See Comments)     "bent out of shape" - dystonia    Lithium Other (See Comments)     edema    Molds & Smuts     Other     Wasp Venom        Social History:     Marital Status: /Civil Union   Occupation: DJ  Patient Pre-hospital Living Situation:  Independent  Patient Pre-hospital Level of Mobility:  Mobile  Patient Pre-hospital Diet Restrictions:  Diabetic  Substance Use History:   Social History     Substance and Sexual Activity   Alcohol Use Not Currently    Frequency: 4 or more times a week    Drinks per session: 10 or more    Comment: socially     Social History     Tobacco Use   Smoking Status Former Smoker    Packs/day: 2 00    Years: 27 00    Pack years: 54 00    Types: Cigarettes    Start date: 35    Quit date: 12    Years since quittin 0   Smokeless Tobacco Never Used     Social History     Substance and Sexual Activity   Drug Use Not Currently    Types: Heroin    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       Family History:    Family History   Problem Relation Age of Onset    Arthritis Mother        Physical Exam:     Vitals:   Blood Pressure: 121/84 (01/10/21 1735)  Pulse: (!) 107 (01/10/21 1735)  Temperature: 97 8 °F (36 6 °C) (01/10/21 1735)  Temp Source: Temporal (01/10/21 1735)  Respirations: 18 (01/10/21 1735)  Height: 5' 8" (172 7 cm) (01/10/21 1735)  Weight - Scale: 94 4 kg (208 lb 1 8 oz) (01/10/21 1735)  SpO2: 97 % (01/10/21 1735)    Physical Exam  Constitutional:       General: He is not in acute distress  Appearance: Normal appearance  HENT:      Head: Normocephalic and atraumatic  Eyes:      General:         Right eye: No discharge  Left eye: No discharge  Cardiovascular:      Rate and Rhythm: Normal rate and regular rhythm  Pulses: Normal pulses  Heart sounds: No murmur  Pulmonary:      Effort: Pulmonary effort is normal  No respiratory distress  Breath sounds: Normal breath sounds  No wheezing  Musculoskeletal:      Right lower leg: No edema  Left lower leg: No edema  Skin:     General: Skin is warm and dry  Neurological:      General: No focal deficit present  Mental Status: He is alert and oriented to person, place, and time  Mental status is at baseline     Psychiatric:         Mood and Affect: Mood normal            Additional Data:     Lab Results: I have personally reviewed pertinent reports  Results from last 7 days   Lab Units 01/10/21  1446   WBC Thousand/uL 6 20   HEMOGLOBIN g/dL 14 1   HEMATOCRIT % 42 9   PLATELETS Thousands/uL 206   NEUTROS PCT % 75*   LYMPHS PCT % 19*   MONOS PCT % 6   EOS PCT % 0     Results from last 7 days   Lab Units 01/10/21  1446   SODIUM mmol/L 136*   POTASSIUM mmol/L 3 6   CHLORIDE mmol/L 101   CO2 mmol/L 25   BUN mg/dL 17   CREATININE mg/dL 0 84   ANION GAP mmol/L 10   CALCIUM mg/dL 8 9   ALBUMIN g/dL 4 0   TOTAL BILIRUBIN mg/dL 0 50   ALK PHOS U/L 86   ALT U/L 25   AST U/L 20   GLUCOSE RANDOM mg/dL 306*                       Imaging: I have personally reviewed pertinent reports  XR chest 1 view portable   Final Result by Bettye Najjar, MD (01/10 1520)      No active pulmonary disease  Workstation performed: [id number]             EKG, Pathology, and Other Studies Reviewed on Admission:   EKG: no EKG changes  Allscripts / Epic Records Reviewed: Yes     ** Please Note: This note has been constructed using a voice recognition system   **

## 2021-01-10 NOTE — ED PROVIDER NOTES
History  Chief Complaint   Patient presents with    Chest Pain     Pt brought in by [facility]  Pt reporting racing heart and chest pressure  Pt with chest pain about 2 hours ago, pt feeling better now  Just minor pain sternal,  Pt was sweating and had headache start at same time,   Pt noticed some sob his morning while walking no chest pain earlier today           Prior to Admission Medications   Prescriptions Last Dose Informant Patient Reported? Taking? Blood Glucose Monitoring Suppl (ACURA BLOOD GLUCOSE METER) w/Device KIT  Self Yes No   Sig: by Does not apply route   Calcium Carb-Cholecalciferol (CALCIUM CARBONATE-VITAMIN D3 PO)  Self Yes No   Sig: Take 1,500 mg by mouth daily   Insulin Pen Needle 32G X 4 MM MISC  Self Yes No   Sig: Unifine Pentips Plus 32 gauge x 5/32" needle   Misc Natural Products (BLOOD SUGAR 360 PO)  Self Yes No   Sig: Use to check BG 7x/day  Glycemic fluctuations  Steroid induced hyperglycemia   E11 65   TAMSULOSIN HCL PO  Self Yes No   Sig: Take 0 4 mg by mouth 2 (two) times a day    albuterol (2 5 mg/3 mL) 0 083 % nebulizer solution  Self No No   Sig: Take 1 vial (2 5 mg total) by nebulization every 4 (four) hours as needed for shortness of breath   albuterol (PROVENTIL HFA,VENTOLIN HFA) 90 mcg/act inhaler  Self No No   Sig: Inhale 2 puffs every 4 (four) hours as needed for wheezing   amLODIPine (NORVASC) 2 5 mg tablet  Self No No   Sig: Take 1 tablet (2 5 mg total) by mouth daily   Patient not taking: Reported on 3/11/2020   atorvastatin (LIPITOR) 80 mg tablet  Self Yes No   Sig: Take 80 mg by mouth daily at bedtime   bimatoprost (LUMIGAN) 0 03 % ophthalmic drops  Self Yes No   Sig: Administer 1 drop to both eyes daily at bedtime     brimonidine (ALPHAGAN P) 0 15 % ophthalmic solution  Self Yes No   Sig: Administer 1 drop to both eyes every 8 (eight) hours     carBAMazepine (TEGRETOL) 200 mg tablet  Self Yes No   Sig: Take 2 tabs in the morning and 3 tabs at night   dupilumab (DUPIXENT) subcutaneous injection  Self Yes No   Sig: Inject under the skin every 14 (fourteen) days   finasteride (PROSCAR) 5 mg tablet  Self Yes No   Sig: Take 5 mg by mouth daily   fluticasone (FLONASE) 50 mcg/act nasal spray  Self Yes No   Si spray into each nostril daily   fluticasone-vilanterol (BREO ELLIPTA) 200-25 MCG/INH inhaler   No No   Sig: Inhale 1 puff daily Rinse mouth after use    gabapentin (NEURONTIN) 300 mg capsule  Self Yes No   Sig: Take 300 mg by mouth 3 (three) times a day   glucagon 1 MG injection  Self Yes No   Sig: as needed   guaiFENesin (MUCINEX) 600 mg 12 hr tablet  Self No No   Sig: Take 1 tablet (600 mg total) by mouth every 12 (twelve) hours   guaiFENesin 200 MG tablet  Self No No   Sig: Take 1 tablet (200 mg total) by mouth every 4 (four) hours as needed for cough   hydrOXYzine pamoate (VISTARIL) 50 mg capsule  Self Yes No   Sig: Take 50 mg by mouth Three times daily as needed   hydrocortisone (ANUSOL-HC, PROCTOSOL HC,) 2 5 % rectal cream  Self Yes No   Sig: Insert 1 application into the rectum 2 (two) times a day   insulin aspart (NovoLOG) 100 units/mL injection  Self Yes No   Sig: Inject under the skin 3 (three) times a day before meals   insulin glargine (BASAGLAR KWIKPEN) 100 units/mL injection pen  Self Yes No   Sig: Inject 35 Units under the skin daily at bedtime    insulin lispro (HumaLOG) 100 units/mL injection  Self No No   Sig: Inject 12 Units under the skin daily with breakfast   Patient not taking: Reported on 10/5/2020   insulin lispro (HumaLOG) 100 units/mL injection  Self No No   Sig: Inject 16 Units under the skin daily with lunch   Patient not taking: Reported on 10/5/2020   insulin lispro (HumaLOG) 100 units/mL injection  Self No No   Sig: Inject 16 Units under the skin daily with dinner   Patient not taking: Reported on 10/5/2020   latanoprost (XALATAN) 0 005 % ophthalmic solution  Self Yes No   Sig: Administer 1 drop to both eyes daily   loratadine (CLARITIN) 10 mg tablet  Self Yes No   Sig: loratadine 10 mg tablet   melatonin 3 mg  Self Yes No   Sig: Take 3 mg by mouth daily at bedtime   methocarbamol (ROBAXIN) 750 mg tablet   No No   Sig: Take 1 tablet (750 mg total) by mouth 3 (three) times a day as needed for muscle spasms   montelukast (SINGULAIR) 10 mg tablet  Self Yes No   Sig: Take 10 mg by mouth daily     omega-3-acid ethyl esters (LOVAZA) 1 g capsule  Self Yes No   Sig: Take 1 g by mouth daily   omeprazole (PriLOSEC) 40 MG capsule  Self Yes No   Sig: Take 40 mg by mouth daily   perphenazine 2 mg tablet  Self Yes No   Sig: Take 2 mg by mouth daily   pneumococcal 23-valent polysaccharide vaccine (Pneumovax 23)  Self Yes No   Sig: Pneumovax-23 25 mcg/0 5 mL injection syringe   polyvinyl alcohol (LIQUIFILM TEARS) 1 4 % ophthalmic solution  Self Yes No   Si drops as needed for dry eyes   predniSONE 20 mg tablet  Self No No   Sig: Take 3 tablets (60 mg total) by mouth daily   Patient not taking: Reported on 10/5/2020   predniSONE 5 mg tablet  Self Yes No   Sig: Take 5 mg by mouth every other day On Monday, Wednesday, Friday   terbinafine (LamISIL) 1 % cream  Self Yes No   Sig: terbinafine HCl 1 % topical cream      Facility-Administered Medications: None       Past Medical History:   Diagnosis Date    Asthma     [facility] disorder [facility]     Diabetes mellitus ([facility] )     Enlarged prostate     Glaucoma     Hyperlipidemia     Hypertension     Seasonal allergic rhinitis     Seizures (HCC)        Past Surgical History:   Procedure Laterality Date    KNEE SURGERY      WRIST SURGERY Left        Family History   Problem Relation Age of Onset    Arthritis Mother      I have reviewed and agree with the history as documented      E-Cigarette/Vaping    E-Cigarette Use Never User      E-Cigarette/Vaping Substances    Nicotine No     THC No     CBD No     Flavoring No     Other No     Unknown No      Social History     Tobacco Use    Smoking status: Former Smoker     Packs/day: 2 00     Years: 27 00     Pack years: 54 00     Types: Cigarettes     Start date: 35     Quit date:      Years since quittin 0    Smokeless tobacco: Never Used   Substance Use Topics    Alcohol use: Not Currently     Frequency: 4 or more times a week     Drinks per session: 10 or more     Comment: socially    Drug use: Not Currently     Types: Heroin     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       Review of Systems   Constitutional: Negative  HENT: Negative  Eyes: Negative  Respiratory: Positive for shortness of breath  Cardiovascular: Positive for chest pain  Gastrointestinal: Negative  Endocrine: Negative  Genitourinary: Negative  Musculoskeletal: Negative  Skin: Negative  Allergic/Immunologic: Negative  Neurological: Positive for headaches  Hematological: Negative  Psychiatric/Behavioral: Negative  All other systems reviewed and are negative  Physical Exam  Physical Exam  Vitals signs and nursing note reviewed  Constitutional:       Appearance: He is well-developed and normal weight  Comments: No sweating/diaphoresis in er      Consult Dr Gabrielle Chauhan cardiologist,  [id number] [address] for obs at [place]    HENT:      Head: Normocephalic and atraumatic  Eyes:      Extraocular Movements: Extraocular movements intact  Pupils: Pupils are equal, round, and reactive to light  Neck:      Musculoskeletal: Normal range of motion and neck supple  Cardiovascular:      Rate and Rhythm: Normal rate and regular rhythm  Heart sounds: Normal heart sounds  Pulmonary:      Effort: Pulmonary effort is normal       Breath sounds: Normal breath sounds  Abdominal:      Palpations: Abdomen is soft  Musculoskeletal: Normal range of motion  Skin:     General: Skin is warm  Capillary Refill: Capillary refill takes less than 2 seconds  Neurological:      General: No focal deficit present  Mental Status: He is alert           Vital Signs  ED Triage Vitals   Temperature Pulse Respirations Blood Pressure SpO2   01/10/21 1434 01/10/21 1434 01/10/21 1434 01/10/21 1434 01/10/21 1434   98 4 °F (36 9 °C) (!) 113 18 136/83 97 %      Temp Source Heart Rate Source Patient Position - Orthostatic VS BP Location FiO2 (%)   01/10/21 1434 01/10/21 1434 01/10/21 1434 01/10/21 1434 --   Oral Monitor Lying Left arm       Pain Score       01/10/21 1502       6           Vitals:    01/10/21 1434 01/10/21 1502 01/10/21 1526   BP: 136/83 130/81 94/56   Pulse: (!) 113 (!) 108 (!) 110   Patient Position - Orthostatic VS: Lying Lying Lying         Visual Acuity      ED Medications  Medications   sodium chloride 0 9 % infusion (250 mL/hr Intravenous New Bag 1/10/21 1448)   acetaminophen (TYLENOL) tablet 975 mg (has no administration in time range)   nitroglycerin (NITROSTAT) SL tablet 0 4 mg (0 4 mg Sublingual Given 1/10/21 1459)       Diagnostic Studies  Results Reviewed     Procedure Component Value Units Date/Time    COVID19, Influenza A/B, RSV PCR, [facility] [[id number]]  (Normal) Collected: 01/10/21 1446    Lab Status: Final result Specimen: Nares from Nose Updated: 01/10/21 1545     SARS-CoV-2 Negative     INFLUENZA A PCR Negative     INFLUENZA B PCR Negative     RSV PCR Negative    Narrative: This test has been authorized by FDA under an EUA (Emergency Use Assay) for use by authorized laboratories  Clinical caution and judgement should be used with the interpretation of these results with consideration of the clinical impression and other laboratory testing  Testing reported as "Positive" or "Negative" has been proven to be accurate according to standard laboratory validation requirements  All testing is performed with control materials showing appropriate reactivity at standard intervals      Troponin I [[id number]]  (Normal) Collected: 01/10/21 1446    Lab Status: Final result Specimen: Blood from Arm, Left Updated: 01/10/21 1524     Troponin I <0 01 ng/mL Carbamazepine level, total [[id number]]  (Normal) Collected: 01/10/21 1446    Lab Status: Final result Specimen: Blood from Arm, Left Updated: 01/10/21 1517     Carbamazepine Lvl 9 1 ug/mL     Ethanol [[id number]]  (Normal) Collected: 01/10/21 1446    Lab Status: Final result Specimen: Blood from Arm, Left Updated: 01/10/21 1513     Ethanol Lvl <10 mg/dL     Lipase [[id number]]  (Normal) Collected: 01/10/21 1446    Lab Status: Final result Specimen: Blood from Arm, Left Updated: 01/10/21 1512     Lipase 48 u/L     CK (with reflex to MB) [[id number]]  (Normal) Collected: 01/10/21 1446    Lab Status: Final result Specimen: Blood from Arm, Left Updated: 01/10/21 1512     Total CK 86 U/L     Comprehensive metabolic panel [[id number]]  (Abnormal) Collected: 01/10/21 1446    Lab Status: Final result Specimen: Blood from Arm, Left Updated: 01/10/21 1512     Sodium 136 mmol/L      Potassium 3 6 mmol/L      Chloride 101 mmol/L      CO2 25 mmol/L      ANION GAP 10 mmol/L      BUN 17 mg/dL      Creatinine 0 84 mg/dL      Glucose 306 mg/dL      Calcium 8 9 mg/dL      AST 20 U/L      ALT 25 U/L      Alkaline Phosphatase 86 U/L      Total Protein 6 4 g/dL      Albumin 4 0 g/dL      Total Bilirubin 0 50 mg/dL      eGFR 98 ml/min/1 73sq m     Narrative:      [place] guidelines for Chronic Kidney Disease (CKD):     Stage 1 with normal or high GFR (GFR > 90 mL/min/1 73 square meters)    Stage 2 Mild CKD (GFR = 60-89 mL/min/1 73 square meters)    Stage 3A Moderate CKD (GFR = 45-59 mL/min/1 73 square meters)    Stage 3B Moderate CKD (GFR = 30-44 mL/min/1 73 square meters)    Stage 4 Severe CKD (GFR = 15-29 mL/min/1 73 square meters)    Stage 5 End Stage CKD (GFR <15 mL/min/1 73 square meters)  Note: GFR calculation is accurate only with a steady state creatinine    D-dimer, quantitative [[id number]]  (Normal) Collected: 01/10/21 1446    Lab Status: Final result Specimen: Blood from Arm, Left Updated: 01/10/21 1510     D-Dimer, Quant 0 34 ug/ml FEU     CBC and differential [[id number]]  (Abnormal) Collected: 01/10/21 1446    Lab Status: Final result Specimen: Blood from Arm, Left Updated: 01/10/21 1503     WBC 6 20 Thousand/uL      RBC 5 01 Million/uL      Hemoglobin 14 1 g/dL      Hematocrit 42 9 %      MCV 86 fL      MCH 28 2 pg      MCHC 32 9 g/dL      RDW 13 3 %      MPV 8 0 fL      Platelets 420 Thousands/uL      Neutrophils Relative 75 %      Lymphocytes Relative 19 %      Monocytes Relative 6 %      Eosinophils Relative 0 %      Basophils Relative 0 %      Neutrophils Absolute 4 70 Thousands/µL      Lymphocytes Absolute 1 10 Thousands/µL      Monocytes Absolute 0 30 Thousand/µL      Eosinophils Absolute 0 00 Thousand/µL      Basophils Absolute 0 00 Thousands/µL     Rapid drug screen, urine [[id number]]     Lab Status: No result Specimen: Urine     UA w Reflex to Microscopic w Reflex to Culture [[id number]]     Lab Status: No result Specimen: Urine, Clean Catch                  XR chest 1 view portable   Final Result by Bettye Najjar, MD (01/10 1520)      No active pulmonary disease  Workstation performed: [id number]                    Procedures  Procedures         ED Course                             SBIRT 22yo+      Most Recent Value   SBIRT (24 yo +)   In order to provide better care to our patients, we are screening all of our patients for alcohol and drug use  Would it be okay to ask you these screening questions? Yes Filed at: 01/10/2021 1453   Initial Alcohol Screen: US AUDIT-C    1  How often do you have a drink containing alcohol?  0 Filed at: 01/10/2021 1453   2  How many drinks containing alcohol do you have on a typical day you are drinking? 0 Filed at: 01/10/2021 1453   3a  Male UNDER 65: How often do you have five or more drinks on one occasion? 0 Filed at: 01/10/2021 1453   3b  FEMALE Any Age, or MALE 65+: How often do you have 4 or more drinks on one occassion?   0 Filed at: 01/10/2021 1453   Audit-C Score  0 Filed at: 01/10/2021 1453   ROXY: How many times in the past year have you    Used an illegal drug or used a prescription medication for non-medical reasons? Never Filed at: 01/10/2021 1453                    MDM    Disposition  Final diagnoses:   Chest pain, unspecified type     Time reflects when diagnosis was documented in both MDM as applicable and the Disposition within this note     Time User Action Codes Description Comment    1/10/2021  4:13 PM Antonio Harpal  Add [R07 9] Chest pain, unspecified type       ED Disposition     ED Disposition Condition Date/Time Comment    Admit Stable Sun [place] 10, 2021  4:13 PM Case was discussed with Dr Geronimo Quiles and the patient's admission status was agreed to be Admission Status: observation status to the service of Dr Alejandro Justin     Follow-up Information    None         Patient's Medications   Discharge Prescriptions    No medications on file     No discharge procedures on file      PDMP Review       Value Time User    PDMP Reviewed  Yes 10/20/2020  4:01 PM Kourtney De La Rosa MD          ED Provider  Electronically Signed by           Karly Ortega PA-C  01/10/21 6153

## 2021-01-10 NOTE — ASSESSMENT & PLAN NOTE
Lab Results   Component Value Date    HGBA1C 7 4 (H) 02/19/2020       No results for input(s): POCGLU in the last 72 hours  Blood Sugar Average: Last 72 hrs:     Blood sugars are controlled    Continue Insulin   Diabetic diet

## 2021-01-10 NOTE — ASSESSMENT & PLAN NOTE
Admitted for ACS rule out  · 7/2019-Negative stress test no evidence of ischemia no wall motion abnormality, no EKG changes  · Last echo: 3/2020- Normal EF with some wall thickness  · 3/2020- Ambulatory Holter monitor- no arrhythmias     · Troponin trend x 3  · EKG no changes  · Will obtain aldosterone level, renin, TSH, morning cortisol, plasma metanephrine, urine metanephrines

## 2021-01-10 NOTE — PLAN OF CARE
Problem: Potential for Falls  Goal: Patient will remain free of falls  Description: INTERVENTIONS:  - Assess patient frequently for physical needs  -  Identify cognitive and physical deficits and behaviors that affect risk of falls    -  [place] fall precautions as indicated by assessment   - Educate patient/family on patient safety including physical limitations  - Instruct patient to call for assistance with activity based on assessment  - Modify environment to reduce risk of injury  - Consider OT/PT consult to assist with strengthening/mobility  Outcome: Progressing     Problem: PAIN - ADULT  Goal: Verbalizes/displays adequate comfort level or baseline comfort level  Description: Interventions:  - Encourage patient to monitor pain and request assistance  - Assess pain using appropriate pain scale  - Administer analgesics based on type and severity of pain and evaluate response  - Implement non-pharmacological measures as appropriate and evaluate response  - Consider cultural and social influences on pain and pain management  - Notify physician/advanced practitioner if interventions unsuccessful or patient reports new pain  Outcome: Progressing     Problem: INFECTION - ADULT  Goal: Absence or prevention of progression during hospitalization  Description: INTERVENTIONS:  - Assess and monitor for signs and symptoms of infection  - Monitor lab/diagnostic results  - Monitor all insertion sites, i e  indwelling lines, tubes, and drains  - Monitor endotracheal if appropriate and nasal secretions for changes in amount and color  - [place] appropriate cooling/warming therapies per order  - Administer medications as ordered  - Instruct and encourage patient and family to use good hand hygiene technique  - Identify and instruct in appropriate isolation precautions for identified infection/condition  Outcome: Progressing  Goal: Absence of fever/infection during neutropenic period  Description: INTERVENTIONS:  - Monitor WBC    Outcome: Progressing     Problem: SAFETY ADULT  Goal: Maintain or return to baseline ADL function  Description: INTERVENTIONS:  -  Assess patient's ability to carry out ADLs; assess patient's baseline for ADL function and identify physical deficits which impact ability to perform ADLs (bathing, care of mouth/teeth, toileting, grooming, dressing, etc )  - Assess/evaluate cause of self-care deficits   - Assess range of motion  - Assess patient's mobility; develop plan if impaired  - Assess patient's need for assistive devices and provide as appropriate  - Encourage maximum independence but intervene and supervise when necessary  - Involve family in performance of ADLs  - Assess for home care needs following discharge   - Consider OT consult to assist with ADL evaluation and planning for discharge  - Provide patient education as appropriate  Outcome: Progressing  Goal: Maintain or return mobility status to optimal level  Description: INTERVENTIONS:  - Assess patient's baseline mobility status (ambulation, transfers, stairs, etc )    - Identify cognitive and physical deficits and behaviors that affect mobility  - Identify mobility aids required to assist with transfers and/or ambulation (gait belt, sit-to-stand, lift, walker, cane, etc )  - [place] fall precautions as indicated by assessment  - Record patient progress and toleration of activity level on Mobility SBAR; progress patient to next Phase/Stage  - Instruct patient to call for assistance with activity based on assessment  - Consider rehabilitation consult to assist with strengthening/weightbearing, etc   Outcome: Progressing     Problem: DISCHARGE PLANNING  Goal: Discharge to home or other facility with appropriate resources  Description: INTERVENTIONS:  - Identify barriers to discharge w/patient and caregiver  - Arrange for needed discharge resources and transportation as appropriate  - Identify discharge learning needs (meds, wound care, etc )  - Arrange for interpretive services to assist at discharge as needed  - Refer to Case Management Department for coordinating discharge planning if the patient needs post-hospital services based on physician/advanced practitioner order or complex needs related to functional status, cognitive ability, or social support system  Outcome: Progressing     Problem: Knowledge Deficit  Goal: Patient/family/caregiver demonstrates understanding of disease process, treatment plan, medications, and discharge instructions  Description: Complete learning assessment and assess knowledge base    Interventions:  - Provide teaching at level of understanding  - Provide teaching via preferred learning methods  Outcome: Progressing

## 2021-01-11 ENCOUNTER — APPOINTMENT (OUTPATIENT)
Dept: NON INVASIVE DIAGNOSTICS | Facility: HOSPITAL | Age: 56
End: 2021-01-11
Attending: INTERNAL MEDICINE
Payer: COMMERCIAL

## 2021-01-11 ENCOUNTER — APPOINTMENT (OUTPATIENT)
Dept: RADIOLOGY | Facility: HOSPITAL | Age: 56
End: 2021-01-11
Attending: INTERNAL MEDICINE
Payer: COMMERCIAL

## 2021-01-11 VITALS
RESPIRATION RATE: 20 BRPM | BODY MASS INDEX: 31.54 KG/M2 | DIASTOLIC BLOOD PRESSURE: 90 MMHG | OXYGEN SATURATION: 98 % | HEART RATE: 100 BPM | TEMPERATURE: 97.4 F | SYSTOLIC BLOOD PRESSURE: 141 MMHG | HEIGHT: 68 IN | WEIGHT: 208.11 LBS

## 2021-01-11 PROBLEM — J45.50 SEVERE PERSISTENT ASTHMA WITHOUT COMPLICATION: Status: ACTIVE | Noted: 2021-01-11

## 2021-01-11 LAB
AMPHETAMINES SERPL QL SCN: NEGATIVE
ANION GAP SERPL CALCULATED.3IONS-SCNC: 5 MMOL/L (ref 5–14)
ATRIAL RATE: 105 BPM
BARBITURATES UR QL: NEGATIVE
BENZODIAZ UR QL: NEGATIVE
BILIRUB UR QL STRIP: NEGATIVE
BUN SERPL-MCNC: 12 MG/DL (ref 5–25)
CALCIUM SERPL-MCNC: 9 MG/DL (ref 8.4–10.2)
CHLORIDE SERPL-SCNC: 101 MMOL/L (ref 97–108)
CLARITY UR: CLEAR
CO2 SERPL-SCNC: 32 MMOL/L (ref 22–30)
COCAINE UR QL: NEGATIVE
COLOR UR: NORMAL
CORTIS SERPL-MCNC: 2.8 UG/DL
CREAT SERPL-MCNC: 0.89 MG/DL (ref 0.7–1.5)
ERYTHROCYTE [DISTWIDTH] IN BLOOD BY AUTOMATED COUNT: 13.5 %
EST. AVERAGE GLUCOSE BLD GHB EST-MCNC: 192 MG/DL
GFR SERPL CREATININE-BSD FRML MDRD: 96 ML/MIN/1.73SQ M
GLUCOSE P FAST SERPL-MCNC: 95 MG/DL (ref 70–99)
GLUCOSE SERPL-MCNC: 105 MG/DL (ref 65–140)
GLUCOSE SERPL-MCNC: 148 MG/DL (ref 65–140)
GLUCOSE SERPL-MCNC: 180 MG/DL (ref 65–140)
GLUCOSE SERPL-MCNC: 95 MG/DL (ref 70–99)
GLUCOSE SERPL-MCNC: 98 MG/DL (ref 65–140)
GLUCOSE UR STRIP-MCNC: NEGATIVE MG/DL
HBA1C MFR BLD: 8.3 %
HCT VFR BLD AUTO: 38.9 % (ref 41–53)
HGB BLD-MCNC: 12.9 G/DL (ref 13.5–17.5)
HGB UR QL STRIP.AUTO: NEGATIVE
KETONES UR STRIP-MCNC: NEGATIVE MG/DL
LEUKOCYTE ESTERASE UR QL STRIP: NEGATIVE
MCH RBC QN AUTO: 28.3 PG (ref 26–34)
MCHC RBC AUTO-ENTMCNC: 33.2 G/DL (ref 31–36)
MCV RBC AUTO: 85 FL (ref 80–100)
METHADONE UR QL: NEGATIVE
NITRITE UR QL STRIP: NEGATIVE
OPIATES UR QL SCN: NEGATIVE
OXYCODONE+OXYMORPHONE UR QL SCN: NEGATIVE
P AXIS: 49 DEGREES
PCP UR QL: NEGATIVE
PH UR STRIP.AUTO: 6.5 [PH]
PLATELET # BLD AUTO: 213 THOUSANDS/UL (ref 150–450)
PMV BLD AUTO: 8.2 FL (ref 8.9–12.7)
POTASSIUM SERPL-SCNC: 3.8 MMOL/L (ref 3.6–5)
PR INTERVAL: 172 MS
PROT UR STRIP-MCNC: NEGATIVE MG/DL
QRS AXIS: -17 DEGREES
QRSD INTERVAL: 86 MS
QT INTERVAL: 348 MS
QTC INTERVAL: 459 MS
RBC # BLD AUTO: 4.57 MILLION/UL (ref 4.5–5.9)
SODIUM SERPL-SCNC: 138 MMOL/L (ref 137–147)
SP GR UR STRIP.AUTO: 1.01 (ref 1–1.04)
T WAVE AXIS: 19 DEGREES
THC UR QL: NEGATIVE
UROBILINOGEN UA: NEGATIVE MG/DL
VENTRICULAR RATE: 105 BPM
WBC # BLD AUTO: 8.8 THOUSAND/UL (ref 4.5–11)

## 2021-01-11 PROCEDURE — 94760 N-INVAS EAR/PLS OXIMETRY 1: CPT

## 2021-01-11 PROCEDURE — 83835 ASSAY OF METANEPHRINES: CPT | Performed by: INTERNAL MEDICINE

## 2021-01-11 PROCEDURE — 99217 PR OBSERVATION CARE DISCHARGE MANAGEMENT: CPT | Performed by: INTERNAL MEDICINE

## 2021-01-11 PROCEDURE — 80307 DRUG TEST PRSMV CHEM ANLYZR: CPT | Performed by: INTERNAL MEDICINE

## 2021-01-11 PROCEDURE — 82948 REAGENT STRIP/BLOOD GLUCOSE: CPT

## 2021-01-11 PROCEDURE — 82533 TOTAL CORTISOL: CPT | Performed by: INTERNAL MEDICINE

## 2021-01-11 PROCEDURE — 85027 COMPLETE CBC AUTOMATED: CPT | Performed by: INTERNAL MEDICINE

## 2021-01-11 PROCEDURE — 78452 HT MUSCLE IMAGE SPECT MULT: CPT

## 2021-01-11 PROCEDURE — G1004 CDSM NDSC: HCPCS

## 2021-01-11 PROCEDURE — 78452 HT MUSCLE IMAGE SPECT MULT: CPT | Performed by: INTERNAL MEDICINE

## 2021-01-11 PROCEDURE — 80048 BASIC METABOLIC PNL TOTAL CA: CPT | Performed by: INTERNAL MEDICINE

## 2021-01-11 PROCEDURE — 81003 URINALYSIS AUTO W/O SCOPE: CPT | Performed by: INTERNAL MEDICINE

## 2021-01-11 PROCEDURE — 99243 OFF/OP CNSLTJ NEW/EST LOW 30: CPT | Performed by: INTERNAL MEDICINE

## 2021-01-11 PROCEDURE — 93010 ELECTROCARDIOGRAM REPORT: CPT | Performed by: INTERNAL MEDICINE

## 2021-01-11 PROCEDURE — 93016 CV STRESS TEST SUPVJ ONLY: CPT | Performed by: INTERNAL MEDICINE

## 2021-01-11 PROCEDURE — A9502 TC99M TETROFOSMIN: HCPCS

## 2021-01-11 PROCEDURE — 93017 CV STRESS TEST TRACING ONLY: CPT

## 2021-01-11 PROCEDURE — 93018 CV STRESS TEST I&R ONLY: CPT | Performed by: INTERNAL MEDICINE

## 2021-01-11 RX ADMIN — GUAIFENESIN 600 MG: 600 TABLET ORAL at 08:21

## 2021-01-11 RX ADMIN — HEPARIN SODIUM 5000 UNITS: 5000 INJECTION INTRAVENOUS; SUBCUTANEOUS at 14:36

## 2021-01-11 RX ADMIN — MONTELUKAST 10 MG: 10 TABLET, FILM COATED ORAL at 08:21

## 2021-01-11 RX ADMIN — INSULIN LISPRO 1 UNITS: 100 INJECTION, SOLUTION INTRAVENOUS; SUBCUTANEOUS at 16:52

## 2021-01-11 RX ADMIN — BRIMONIDINE TARTRATE 1 DROP: 1.5 SOLUTION OPHTHALMIC at 05:01

## 2021-01-11 RX ADMIN — Medication 1000 MG: at 08:21

## 2021-01-11 RX ADMIN — GABAPENTIN 300 MG: 300 CAPSULE ORAL at 16:52

## 2021-01-11 RX ADMIN — GABAPENTIN 300 MG: 300 CAPSULE ORAL at 08:20

## 2021-01-11 RX ADMIN — HEPARIN SODIUM 5000 UNITS: 5000 INJECTION INTRAVENOUS; SUBCUTANEOUS at 05:01

## 2021-01-11 RX ADMIN — BRIMONIDINE TARTRATE 1 DROP: 1.5 SOLUTION OPHTHALMIC at 14:36

## 2021-01-11 RX ADMIN — PANTOPRAZOLE SODIUM 40 MG: 40 TABLET, DELAYED RELEASE ORAL at 05:01

## 2021-01-11 RX ADMIN — FINASTERIDE 5 MG: 5 TABLET, FILM COATED ORAL at 08:21

## 2021-01-11 NOTE — ASSESSMENT & PLAN NOTE
Lab Results   Component Value Date    HGBA1C 8 3 (H) 01/10/2021       Recent Labs     01/10/21  2112 01/11/21  0156 01/11/21  0537   POCGLU 202* 105 98       Blood Sugar Average: Last 72 hrs:  (P) 135   Blood sugars are controlled    Continue Insulin   Diabetic diet  Outpatient endocrine follow-up

## 2021-01-11 NOTE — ASSESSMENT & PLAN NOTE
Patient has history of severe persistent asthma and has been maintained on albuterol, Breo, Singulair, prednisone and Dexilant  Continue with outpatient Pulmonary follow-up  He has recently been on antibiotics for URI  Chest x-ray on admission was negative for consolidation    He does not have any tachycardia, wheezing or shortness of breath at rest or exertion to suggest acute exacerbation

## 2021-01-11 NOTE — DISCHARGE INSTRUCTIONS

## 2021-01-11 NOTE — CONSULTS
Consultation - Cardiology   Awilda Casey 63 y o  male MRN: [id number]  Unit/Bed#: 7T [facility] 706-02 Encounter: [id number]    Physician Requesting Consult: Sharon Gutierrez MD  Reason for Consult / Principal Problem:    Chest pain  Consulting physician:  Serge Esteban MD      Assessment/Plan     Assessment:  1  Chest pain  2  Hypertension, controlled  3  Hyperlipidemia, last results 1 5 years ago but demonstrated good control  4  Diabetes mellitus type 2  5  Family history of heart disease    Plan:  1  Nuclear stress test      History of Present Illness   HPI: Awilda Casey is a 63y o  year old male who presents with substernal, pressure-like chest discomfort which lasted for a number of hours  Patient is presently pain-free  EKG demonstrates a questionable old inferior wall myocardial infarction  Troponins x3 have been normal   Patient had a similar episode 2 years ago but no discomfort since that time  No history compatible with exertional angina pectoris  Patient has a history of hypertension, diabetes mellitus type 2, and hyperlipidemia  He has a family history of heart disease/myocardial infarction  He smoked cigarettes for 5 years but quit many years ago      Patient Active Problem List    Diagnosis Date Noted    URI, acute 10/05/2020     Priority: Low    Intermittent chest pain 02/19/2020     Priority: Low    Right flank pain 02/19/2020     Priority: Low    Right hydrocele 02/19/2020     Priority: Low    Corticosteroid dependence ([address] ) 09/23/2019     Priority: Low    Chest pain 07/28/2019     Priority: Low    Respiratory arrest ([address] ) 02/07/2019     Priority: Low    Hypokalemia 11/14/2018     Priority: Low    Severe asthma 10/25/2018     Priority: Low    Acute bronchitis 10/25/2018     Priority: Low    HTN (hypertension) 10/25/2018     Priority: Low    Type 2 diabetes mellitus, with long-term current use of insulin ([address] ) 10/25/2018     Priority: Low    Seizure ([address] ) 10/25/2018     Priority: Low    Glaucoma 10/25/2018     Priority: Low    BPH (benign prostatic hyperplasia) 10/25/2018     Priority: Low    Diabetic neuropathy [facility] 10/25/2018     Priority: Low    HLD (hyperlipidemia) 10/25/2018     Priority: Low    ARPITA (obstructive sleep apnea) 10/25/2018     Priority: Low    GERD (gastroesophageal reflux disease) 10/25/2018     Priority: Low       Historical Information   Past Medical History:   Diagnosis Date    Asthma     Bipolar disorder ([place] [address] )     Diabetes mellitus ([place] [address] )     Enlarged prostate     Glaucoma     Hyperlipidemia     Hypertension     Psychiatric disorder     Seasonal allergic rhinitis     Seizures (HCC)      Past Surgical History:   Procedure Laterality Date    KNEE SURGERY      WRIST SURGERY Left      Past Surgical History:   Procedure Laterality Date    KNEE SURGERY      WRIST SURGERY Left      Social History:  Social History     Substance and Sexual Activity   Alcohol Use Not Currently    Frequency: 4 or more times a week    Drinks per session: 10 or more    Comment: socially     Social History     Substance and Sexual Activity   Drug Use Not Currently    Types: Heroin    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     Social History     Tobacco Use   Smoking Status Former Smoker    Packs/day: 2 00    Years: 27 00    Pack years: 54 00    Types: Cigarettes    Start date: 35    Quit date: 12    Years since quittin 0   Smokeless Tobacco Never Used     Social History     Socioeconomic History    Marital status: /Civil Union     Spouse name: Not on file    Number of children: Not on file    Years of education: Not on file    Highest education level: Not on file   Occupational History    Not on file   Social Needs    Financial resource strain: Not on file    Food insecurity     Worry: Not on file     Inability: Not on file   [employer] needs     Medical: Not on file     Non-medical: Not on file   Tobacco Use    Smoking status: Former Smoker Packs/day: 2 00     Years: 27 00     Pack years: 54 00     Types: Cigarettes     Start date: 35     Quit date:      Years since quittin 0    Smokeless tobacco: Never Used   Substance and Sexual Activity    Alcohol use: Not Currently     Frequency: 4 or more times a week     Drinks per session: 10 or more     Comment: socially    Drug use: Not Currently     Types: Heroin     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    Sexual activity: Not on file   Lifestyle    Physical activity     Days per week: Not on file     Minutes per session: Not on file    Stress: Not on file   Relationships    Social connections     Talks on phone: Not on file     Gets together: Not on file     Attends Taoist service: Not on file     Active member of club or organization: Not on file     Attends meetings of clubs or organizations: Not on file     Relationship status: Not on file    Intimate partner violence     Fear of current or ex partner: Not on file     Emotionally abused: Not on file     Physically abused: Not on file     Forced sexual activity: Not on file   Other Topics Concern    Not on file   Social History Narrative    Not on file      Family History:   family history includes Arthritis in his mother      Meds/Allergies   Current Facility-Administered Medications   Medication Dose Route Frequency    acetaminophen (TYLENOL) tablet 650 mg  650 mg Oral Q6H PRN    albuterol (PROVENTIL HFA,VENTOLIN HFA) inhaler 2 puff  2 puff Inhalation Q4H PRN    atorvastatin (LIPITOR) tablet 80 mg  80 mg Oral HS    bimatoprost (LUMIGAN) 0 01 % ophthalmic solution 1 drop  1 drop Both Eyes HS    brimonidine (ALPHAGAN P) 0 15 % ophthalmic solution 1 drop  1 drop Both Eyes Q8H [address]    finasteride (PROSCAR) tablet 5 mg  5 mg Oral Daily    fish oil capsule 1,000 mg  1,000 mg Oral Daily    gabapentin (NEURONTIN) capsule 300 mg  300 mg Oral TID    guaiFENesin (MUCINEX) 12 hr tablet 600 mg  600 mg Oral Q12H OPAL    heparin (porcine) subcutaneous injection 5,000 Units  5,000 Units Subcutaneous Q8H [address]    hydrOXYzine HCL (ATARAX) tablet 10 mg  10 mg Oral TID PRN    insulin glargine (LANTUS) subcutaneous injection 35 Units 0 35 mL  35 Units Subcutaneous HS    insulin lispro (HumaLOG) 100 units/mL subcutaneous injection 1-5 Units  1-5 Units Subcutaneous HS    insulin lispro (HumaLOG) 100 units/mL subcutaneous injection 1-6 Units  1-6 Units Subcutaneous TID AC    melatonin tablet 3 mg  3 mg Oral HS    methocarbamol (ROBAXIN) tablet 750 mg  750 mg Oral TID PRN    montelukast (SINGULAIR) tablet 10 mg  10 mg Oral Daily    ondansetron (ZOFRAN) injection 4 mg  4 mg Intravenous Q6H PRN    pantoprazole (PROTONIX) EC tablet 40 mg  40 mg Oral Early Morning     Allergies   Allergen Reactions    Aspirin GI Bleeding    Ibuprofen GI Bleeding    Licorice Flavor [Flavoring Agent] Hives    Penicillins Hives    Propoxyphene Hives     Propoxyphene N-Acetaminophen---hives & palpitations    Tramadol Palpitations     Other reaction(s): heart pumps real fast    Bee Venom     Chocolate     Glycyrrhiza     Haloperidol Other (See Comments)     "bent out of shape" - dystonia    Lithium Other (See Comments)     edema    Molds & Smuts     Other     Wasp Venom          Review of Systems   Constitutional: Negative  HENT: Negative  Eyes: Negative  Respiratory: Negative for chest tightness and shortness of breath  Cardiovascular: Positive for chest pain  Negative for palpitations and leg swelling  Gastrointestinal: Negative  Endocrine: Negative  Musculoskeletal: Negative  Skin: Negative  Allergic/Immunologic: Negative  Neurological: Negative  Hematological: Negative  Psychiatric/Behavioral: Negative  Objective   Vitals: Blood pressure 107/84, pulse 72, temperature (!) 97 2 °F (36 2 °C), temperature source Temporal, resp  rate 16, height 5' 8" (1 727 m), weight 94 4 kg (208 lb 1 8 oz), SpO2 98 %    Orthostatic Blood Pressures      Most Recent Value   Blood Pressure  107/84 filed at 01/11/2021 0006   Patient Position - Orthostatic VS  Lying filed at 01/11/2021 0006        No intake or output data in the 24 hours ending 01/11/21 0852  Invasive Devices     Peripheral Intravenous Line            Peripheral IV 01/10/21 Left Wrist 1 day                Physical Exam  Constitutional:       Appearance: He is well-developed  HENT:      Head: Normocephalic and atraumatic  Neck:      Musculoskeletal: Normal range of motion and neck supple  Vascular: No carotid bruit or JVD  Trachea: No tracheal deviation  Cardiovascular:      Rate and Rhythm: Normal rate and regular rhythm  Pulses: Normal pulses  Heart sounds: Normal heart sounds  No murmur  No friction rub  No gallop  Pulmonary:      Effort: Pulmonary effort is normal  No respiratory distress  Breath sounds: Normal breath sounds  No wheezing, rhonchi or rales  Abdominal:      General: Bowel sounds are normal       Palpations: Abdomen is soft  Musculoskeletal:      Right lower leg: No edema  Left lower leg: No edema  Skin:     General: Skin is warm and dry  Neurological:      General: No focal deficit present  Mental Status: He is alert and oriented to person, place, and time  Psychiatric:         Mood and Affect: Mood normal          Behavior: Behavior normal          Thought Content: Thought content normal          Judgment: Judgment normal            ----------------------------------------------------------------------------------------------  NUCLEAR STRESS TEST: No results found for this or any previous visit    Results for orders placed during the hospital encounter of 07/28/19   NM Myocardial Perfusion Spect (Pharmacological Induced Stress and/or Rest)    Narrative [facility]  [address] [place] [address], [address] [place] [address]    Rest/Stress Gated SPECT Myocardial Perfusion Imaging After Regadenoson    Patient: Sheryle Bolder  MR number: [id number]  Account number: [de-identified]  : 1965  Age: 53 years  Gender: Male  Status: Inpatient  Location: [address] [place] [address]  Height: 70 in  Weight: 207 lb  BP: 138/ 93 mmHg    Diagnosis: I10  - Essential (primary) hypertension, R07 9 - Chest pain, unspecified    Primary Physician:  Linda Garcia MD  RN:  Diego Cm RN  Referring Physician:  Chloé Stern MD  Group:  [facility]  Report Prepared By[de-identified]  Diego Cm RN  Interpreting Physician:  Galindo Knott DO    INDICATIONS: Coronary artery disease  HISTORY: The patient is a 53year old  male  Chest pain status: recent onset chest pain  Coronary artery disease risk factors: dyslipidemia, hypertension, former smoker, and diabetes mellitus  Cardiovascular history: none  significant  Co-morbidity: history of lung disease and obesity  Medications: a calcium channel blocker and a lipid lowering agent  Acute coronary syndrome: acute myocardial infarction recently ruled out  PHYSICAL EXAM: Baseline physical exam screening: no wheezes audible  REST ECG: Normal sinus rhythm  Normal baseline ECG  PROCEDURE: The study was performed in the the [facility]  A regadenoson infusion pharmacologic stress test was performed  Gated SPECT myocardial perfusion imaging was performed after stress and at rest  Systolic blood pressure was  138 mmHg, at the start of the study  Diastolic blood pressure was 93 mmHg, at the start of the study  The heart rate was 75 bpm, at the start of the study  Regadenoson protocol:  HR bpm SBP mmHg DBP mmHg Symptoms  Baseline 75 138 93 none  1 min 97 151 95 dyspnea  2 min 94 135 79 subsiding  3 min 86 140 85 none  4 min 88 136 85 none  5 min 84 -- -- none  6 min 85 138 93 none  7 min 86 137 88 none  No medications or fluids given  STRESS SUMMARY: Duration of pharmacologic stress was 3 min   Maximal heart rate during stress was 105 bpm  The heart rate response to stress was normal  Normal blood pressure response to adenosine  The rate-pressure product for the peak  heart rate and blood pressure was 89025  There was no chest pain during stress  The stress test was terminated due to protocol completion  The stress ECG was negative for ischemia and normal  There were no stress arrhythmias or conduction  abnormalities  ISOTOPE ADMINISTRATION:  Resting isotope administration Stress isotope administration  Agent Tetrofosmin Tetrofosmin  Dose 10 6 mCi 30 8 mCi  Date 07/29/2019 07/29/2019  Injection time 10:20 11:30  Imaging time 10:50 12:15  Injection-image interval 30 min 45 min    The radiopharmaceutical was injected at the peak effect of pharmacologic stress  MYOCARDIAL PERFUSION IMAGING:  The image quality was good  Left ventricular size was normal  The TID ratio was 1 08  PERFUSION DEFECTS:  -  There were no perfusion defects  GATED SPECT:  The calculated left ventricular ejection fraction was 68 %  Left ventricular ejection fraction was within normal limits by visual estimate  There was no left ventricular regional abnormality  SUMMARY:  -  Stress results: There was no chest pain during stress  -  ECG conclusions: The stress ECG was negative for ischemia and normal   -  Perfusion imaging: There were no perfusion defects   -  Gated SPECT: The calculated left ventricular ejection fraction was 68 %  Left ventricular ejection fraction was within normal limits by visual estimate  There was no left ventricular regional abnormality  IMPRESSIONS: Normal study after pharmacologic vasodilation  Myocardial perfusion imaging was normal at rest and with stress   Left ventricular systolic function was normal     Prepared and signed by    Patrice Selby DO  Signed 07/29/2019 15:17:46         --------------------------------------------------------------------------------  CATH:  No results found for this or any previous visit   --------------------------------------------------------------------------------  ECHO:   Results for orders placed during the hospital encounter of 20   Echo complete with contrast if indicated    Narrative [facility] [address]    Transthoracic Echocardiogram  2D, M-mode, Doppler, and Color Doppler    Study date:  2020    Patient: Pat Butcher  MR number: [id number]  Account number: [de-identified]  : 1965  Age: 63 years  Gender: Male  Status: Outpatient  Location: [facility]  Height: 68 in  Weight: 199 5 lb  BP: 136/ 95 mmHg    Indications: Chest pain    Diagnoses: R07 9 - Chest pain, unspecified    Sonographer:  Franklin Fitch RDCS  Primary Physician:  Surekha Martinez MD  Referring Physician:  Froy Rider MD  [facility]  [facility]  Interpreting Physician:  Reji Valerio MD    SUMMARY    LEFT VENTRICLE:  Systolic function was normal  Ejection fraction was estimated to be 60 %  There were no regional wall motion abnormalities  Wall thickness was mildly increased  MITRAL VALVE:  There was mild regurgitation  TRICUSPID VALVE:  There was mild to moderate regurgitation  PULMONIC VALVE:  There was trace regurgitation  HISTORY: PRIOR HISTORY: Heroine abuse Risk factors: hypertension, diabetes, and hypercholesterolemia  PROCEDURE: The study was performed in the [place] [address]  This was a routine study  The transthoracic approach was used  The study included complete 2D imaging, M-mode, complete spectral Doppler, and color Doppler  The heart rate was 74  bpm, at the start of the study  Image quality was adequate  LEFT VENTRICLE: Size was normal  Systolic function was normal  Ejection fraction was estimated to be 60 %  There were no regional wall motion abnormalities  Wall thickness was mildly increased   DOPPLER: The transmitral flow pattern was  normal     RIGHT VENTRICLE: The size was normal  Systolic function was normal  Wall thickness was normal     LEFT ATRIUM: Size was normal     RIGHT ATRIUM: Size was normal     MITRAL VALVE: Valve structure was normal  There was normal leaflet separation  DOPPLER: The transmitral velocity was within the normal range  There was no evidence for stenosis  There was mild regurgitation  AORTIC VALVE: The valve was trileaflet  Leaflets exhibited normal thickness and normal cuspal separation  DOPPLER: Transaortic velocity was within the normal range  There was no evidence for stenosis  There was no regurgitation  TRICUSPID VALVE: The valve structure was normal  There was normal leaflet separation  DOPPLER: The transtricuspid velocity was within the normal range  There was no evidence for stenosis  There was mild to moderate regurgitation  Pulmonary  artery systolic pressure was within the normal range  Estimated peak PA pressure was 28 mmHg  PULMONIC VALVE: Leaflets exhibited normal thickness, no calcification, and normal cuspal separation  DOPPLER: The transpulmonic velocity was within the normal range  There was trace regurgitation  PERICARDIUM: There was no pericardial effusion  The pericardium was normal in appearance  AORTA: The root exhibited normal size  SYSTEMIC VEINS: IVC: The inferior vena cava was normal in size and course   Respirophasic changes were normal     SYSTEM MEASUREMENT TABLES    2D  Ao Diam: 3 5 cm  IVSd: 1 1 cm  LA Diam: 3 6 cm  LVIDd: 4 8 cm  LVIDs: 3 2 cm  LVPWd: 1 1 cm    CW  TR Vmax: 2 41 m/s  TR maxP 25 mmHg    PW  E': 0 1 m/s  E/E': 7 44  MV A Steven: 0 69 m/s  MV Dec [place]: 3 98 m/s2  MV DecT: 191 81 ms  MV E Steven: 0 76 m/s  MV E/A Ratio: 1 11    [facility] Accredited [facility]ch[facility]    Prepared and electronically signed by    Low Carter MD  Signed [id number] 11:17:24       ======================================================    Lab Results   Component Value Date    WBC 8 80 2021    HGB 12 9 (L) 01/11/2021    HCT 38 9 (L) 01/11/2021    MCV 85 01/11/2021     01/11/2021      Lab Results   Component Value Date    SODIUM 138 01/11/2021    K 3 8 01/11/2021     01/11/2021    CO2 32 (H) 01/11/2021    BUN 12 01/11/2021    CREATININE 0 89 01/11/2021    GLUC 95 01/11/2021    CALCIUM 9 0 01/11/2021      Lab Results   Component Value Date    HGBA1C 7 4 (H) 02/19/2020        Lab Results   Component Value Date    INR 1 07 02/28/2020    INR 0 92 10/11/2019    INR 1 02 02/07/2019    PROTIME 14 0 02/28/2020    PROTIME 10 1 10/11/2019    PROTIME 10 8 02/07/2019        Imaging:   I have personally reviewed pertinent reports  EKG:Sinus tachycardia  Left anterior fascicular block  Cannot rule out Inferior infarct (cited on or before 10-IBIS-2021)  Abnormal ECG    Portions of the record may have been created with voice recognition software  Occasional wrong word or "sound a like" substitutions may have occurred due to the inherent limitations of voice recognition software  Read the chart carefully and recognize, using context, where substitutions have occurred

## 2021-01-11 NOTE — ASSESSMENT & PLAN NOTE
Admitted for ACS rule out  · 7/2019-Negative stress test no evidence of ischemia no wall motion abnormality, no EKG changes  · Last echo: 3/2020- Normal EF with some wall thickness  · 3/2020- Ambulatory Holter monitor- no arrhythmias  · Serial troponins were negative for acute coronary syndrome  · EKG no changes  Overnight telemetry monitor did not reveal any tachy or Francis arrhythmias  · L[facility] score on admission was 2  · Patient is undergoing nuclear stress test   If negative will be stable for discharge home today  Cardiology input appreciated

## 2021-01-11 NOTE — UTILIZATION REVIEW
Initial Clinical Review    Admission: Date/Time/Statement:   Admission Orders (From admission, onward)     Ordered        01/10/21 1611  Place in Observation  Once                   Orders Placed This Encounter   Procedures    Place in Observation     Standing Status:   Standing     Number of Occurrences:   1     Order Specific Question:   Admitting Physician     Answer:   Aditya Peña [[id number]]     Order Specific Question:   Level of Care     Answer:   Med Surg [[id number]]     ED Arrival Information     Expected Arrival Acuity Means of Arrival Escorted By Service Admission Type    - 1/10/2021 14:22 Urgent Ambulance [facility] ([address]) General Medicine Urgent    Arrival Complaint    chest pain        Chief Complaint   Patient presents with    Chest Pain     Pt brought in by [facility]  Pt reporting racing heart and chest pressure  Assessment/Plan:  64 yo male presents to ED with chest [pain x 2 hours with sweating, headache and some SOB noted with walking earlier  PMH of HTN, HLD, DM  Initial trop negative,  Glu 306,  Na 136  CXR no acute findings  EKG Sinus tach  Admitted to Observation with Chest Pain R/O ACS and plan is for Telemetry, acs r/o with serial trops, ekg, Cardio Consult, check aldosterone level, renin, TSH, morning cortisol, plasma metanephrine, urine metanephrines  Home Amlodipine on hold now due to hypotension    1/11 Per Cardio: 63y o  year old male presents with substernal, pressure-like chest discomfort which lasted for a number of hours; presently pain-free  EKG demonstrates a questionable old inferior wall myocardial infarction  Troponins x3 normal   Patient had a similar episode 2 years ago but no discomfort since that time  Plan is for Nuclear stress test    Perfusion Stress Test - results pending    ED Triage Vitals   Temperature Pulse Respirations Blood Pressure SpO2   01/10/21 1434 01/10/21 1434 01/10/21 1434 01/10/21 1434 01/10/21 1434   98 4 °F (36 9 °C) (!) 113 18 136/83 97 %      Temp Source Heart Rate Source Patient Position - Orthostatic VS BP Location FiO2 (%)   01/10/21 1434 01/10/21 1434 01/10/21 1434 01/10/21 1434 01/10/21 2340   Oral Monitor Lying Left arm 21      Pain Score       01/10/21 1502       6          Wt Readings from Last 1 Encounters:   01/10/21 94 4 kg (208 lb 1 8 oz)     Additional Vital Signs:   01/11/21 0240  --  72  16  --  --  98 %  21  Other (comment)   --   O2 Device: CPAP at 01/11/21 0240   01/11/21 0006  97 2 °F (36 2 °C)Abnormal   84  20  107/84  --  98 %  --  --  Lying   01/10/21 2340  --  81  17  --  --  98 %  21  Other (comment)   --   O2 Device: CPAP at 01/10/21 2340   01/10/21 2020  --  102  18  --  --  98 %  --  None (Room air)  --   01/10/21 1735  97 8 °F (36 6 °C)  107Abnormal   18  121/84  91  97 %  --  None (Room air)  Lying   01/10/21 1700  --  104  18  130/84  --  99 %  --  None (Room air)  Lying   01/10/21 1630  --  114Abnormal   18  108/71  --  --  --  --  Lying   01/10/21 1600  --  110Abnormal   18  134/72  --  97 %  --  None (Room air)  Lying   01/10/21 1530  --  117Abnormal   18  117/89  --  98 %  --  None (Room air)  Lying   01/10/21 1526  --  110Abnormal   16  94/56  --  98 %  --  None (Room air)  Lying   01/10/21 1502  --  108Abnormal   18  130/81  --  98 %  --  None (Room air)  Lying       Pertinent Labs/Diagnostic Test Results:   Results from last 7 days   Lab Units 01/10/21  1446   SARS-COV-2  Negative     Results from last 7 days   Lab Units 01/11/21  0503 01/10/21  1446   WBC Thousand/uL 8 80 6 20   HEMOGLOBIN g/dL 12 9* 14 1   HEMATOCRIT % 38 9* 42 9   PLATELETS Thousands/uL 213 206   NEUTROS ABS Thousands/µL  --  4 70     Results from last 7 days   Lab Units 01/11/21  0503 01/10/21  1446   SODIUM mmol/L 138 136*   POTASSIUM mmol/L 3 8 3 6   CHLORIDE mmol/L 101 101   CO2 mmol/L 32* 25   ANION GAP mmol/L 5 10   BUN mg/dL 12 17   CREATININE mg/dL 0 89 0 84   EGFR ml/min/1 73sq m 96 98   CALCIUM mg/dL 9 0 8 9     Results from last 7 days   Lab Units 01/10/21  1446   AST U/L 20   ALT U/L 25   ALK PHOS U/L 86   TOTAL PROTEIN g/dL 6 4   ALBUMIN g/dL 4 0   TOTAL BILIRUBIN mg/dL 0 50     Results from last 7 days   Lab Units 01/11/21  0537 01/11/21  0156 01/10/21  2112   POC GLUCOSE mg/dl 98 105 202*     Results from last 7 days   Lab Units 01/11/21  0503 01/10/21  1446   GLUCOSE RANDOM mg/dL 95 306*     Results from last 7 days   Lab Units 01/10/21  1446   CK TOTAL U/L 86     Results from last 7 days   Lab Units 01/10/21  2128 01/10/21  1903 01/10/21  1446   TROPONIN I ng/mL <0 01 <0 01 <0 01     Results from last 7 days   Lab Units 01/10/21  1446   D-DIMER QUANTITATIVE ug/ml FEU 0 34     Results from last 7 days   Lab Units 01/10/21  1903   TSH 3RD GENERATON uIU/mL 0 655     Results from last 7 days   Lab Units 01/10/21  1446   LIPASE u/L 48     Results from last 7 days   Lab Units 01/11/21  0504   CLARITY UA  Clear   COLOR UA  Straw   SPEC GRAV UA  1 010   PH UA  6 5   GLUCOSE UA mg/dl Negative   KETONES UA mg/dl Negative   BLOOD UA  Negative   PROTEIN UA mg/dl Negative   NITRITE UA  Negative   BILIRUBIN UA  Negative   UROBILINOGEN UA mg/dL Negative   LEUKOCYTES UA  Negative     Results from last 7 days   Lab Units 01/10/21  1446   INFLUENZA A PCR  Negative   INFLUENZA B PCR  Negative   RSV PCR  Negative     Results from last 7 days   Lab Units 01/11/21  0502   AMPH/METH  Negative   BARBITURATE UR  Negative   BENZODIAZEPINE UR  Negative   COCAINE UR  Negative   METHADONE URINE  Negative   OPIATE UR  Negative   PCP UR  Negative   THC UR  Negative     Results from last 7 days   Lab Units 01/10/21  1446   ETHANOL LVL mg/dL <10     1/10 CXR: No active pulmonary disease    1/10 EKG: Sinus tachycardia , Left anterior fascicular block     ED Treatment:   Medication Administration from 01/10/2021 1422 to 01/10/2021 1727       Date/Time Order Dose Route Action     01/10/2021 1448 sodium chloride 0 9 % infusion 250 mL/hr Intravenous New Bag 01/10/2021 1459 nitroglycerin (NITROSTAT) SL tablet 0 4 mg 0 4 mg Sublingual Given     01/10/2021 1625 acetaminophen (TYLENOL) tablet 975 mg 975 mg Oral Given     01/10/2021 1640 ondansetron (ZOFRAN) injection 4 mg 4 mg Intravenous Given        Past Medical History:   Diagnosis Date    Asthma     [facility] disorder ([facility] )     Diabetes mellitus ([facility] )     Enlarged prostate     Glaucoma     Hyperlipidemia     Hypertension     Psychiatric disorder     Seasonal allergic rhinitis     Seizures (HCC)      Present on Admission:   Chest pain   HTN (hypertension)   BPH (benign prostatic hyperplasia)   HLD (hyperlipidemia)      Admitting Diagnosis: Chest pain [R07 9]  Chest pain, unspecified type [R07 9]  Age/Sex: 63 y o  male  Admission Orders:  Scheduled Medications:  atorvastatin, 80 mg, Oral, HS  bimatoprost, 1 drop, Both Eyes, HS  brimonidine, 1 drop, Both Eyes, Q8H OPAL  finasteride, 5 mg, Oral, Daily  fish oil, 1,000 mg, Oral, Daily  gabapentin, 300 mg, Oral, TID  guaiFENesin, 600 mg, Oral, Q12H OPAL  heparin (porcine), 5,000 Units, Subcutaneous, Q8H OPAL  insulin glargine, 35 Units, Subcutaneous, HS  insulin lispro, 1-5 Units, Subcutaneous, HS  insulin lispro, 1-6 Units, Subcutaneous, TID AC  melatonin, 3 mg, Oral, HS  montelukast, 10 mg, Oral, Daily  pantoprazole, 40 mg, Oral, Early Morning    Continuous IV Infusions:     PRN Meds:  acetaminophen, 650 mg, Oral, Q6H PRN  albuterol, 2 puff, Inhalation, Q4H PRN  hydrOXYzine HCL, 10 mg, Oral, TID PRN  methocarbamol, 750 mg, Oral, TID PRN  ondansetron, 4 mg, Intravenous, Q6H PRN    Telemetry  SCD's  CPAP @ hs  IP CONSULT TO CARDIOLOGY    Network Utilization Review Department  ATTENTION: Please call with any questions or concerns to [phone] and carefully listen to the prompts so that you are directed to the right person   All voicemails are confidential   Jovanna Aguirre all requests for admission clinical reviews, approved or denied determinations and any other requests to dedicated fax number below belonging to the campus where the patient is receiving treatment   List of dedicated fax numbers for the Facilities:  [address] [place] [address] DENIALS (Administrative/Medical Necessity) [phone]   [address] (Maternity/NICU/Pediatrics) [phone] [address] Willi Pinzon 9566 (  [place] [address]) [address] [address] Syed Jennifer 1481 [address] [facility]) [address] [place], [place] [address] [phone]

## 2021-01-11 NOTE — NURSING NOTE
The 24 hour urine collection has been cancelled due to patient voiding in the bathroom in [facility]VC during stress test

## 2021-01-11 NOTE — DISCHARGE SUMMARY
Discharge- Deanna Patricia 1965, 63 y o  male MRN: [id number]    Unit/Bed#: 7T [facility] 702-01 Encounter: [id number]    Primary Care Provider: Sangita León MD   Date and time admitted to hospital: 1/10/2021  2:32 PM        * Chest pain  Assessment & Plan  Admitted for ACS rule out  · 7/2019-Negative stress test no evidence of ischemia no wall motion abnormality, no EKG changes  · Last echo: 3/2020- Normal EF with some wall thickness  · 3/2020- Ambulatory Holter monitor- no arrhythmias  · Serial troponins were negative for acute coronary syndrome  · EKG no changes  Overnight telemetry monitor did not reveal any tachy or Francis arrhythmias  · LUH score on admission was 2  · Patient is undergoing nuclear stress test   If negative will be stable for discharge home today  Cardiology input appreciated  Severe persistent asthma without complication  Assessment & Plan  Patient has history of severe persistent asthma and has been maintained on albuterol, Breo, Singulair, prednisone and Dexilant  Continue with outpatient Pulmonary follow-up  He has recently been on antibiotics for URI  Chest x-ray on admission was negative for consolidation  He does not have any tachycardia, wheezing or shortness of breath at rest or exertion to suggest acute exacerbation    HLD (hyperlipidemia)  Assessment & Plan  Continue atorvastatin 80 mg tablet     BPH (benign prostatic hyperplasia)  Assessment & Plan  Continue tamsulosin    Type 2 diabetes mellitus, with long-term current use of insulin [facility])  Assessment & Plan  Lab Results   Component Value Date    HGBA1C 8 3 (H) 01/10/2021       Recent Labs     01/10/21  2112 01/11/21  0156 01/11/21  0537   POCGLU 202* 105 98       Blood Sugar Average: Last 72 hrs:  (P) 135   Blood sugars are controlled    Continue Insulin   Diabetic diet  Outpatient endocrine follow-up    HTN (hypertension)  Assessment & Plan  Amlodipine 5 mg daily however on hold now due to hypotension      Discharging Physician / Practitioner: Maged Cintron MD  PCP: Сергей Contreras MD  Admission Date:   Admission Orders (From admission, onward)     Ordered        01/10/21 1611  Place in Observation  Once                   Discharge Date: 01/11/21    Resolved Problems  Date Reviewed: 3/11/2020    None          Consultations During Hospital Stay:  · Cardiology    Procedures Performed:   · Nuclear stress test    Significant Findings / Test Results:   · See above    Incidental Findings:   · None    Test Results Pending at Discharge (will require follow up):   · Plasma metanephrines, cortisol, renin     Outpatient Tests Requested:  · Endocrine follow-up    Complications:  None    Reason for Admission:  Chest pain  Hospital Course: Pavithra Thompson is a 63 y o  male patient who originally presented to the hospital on 1/10/2021 due to sudden onset of chest pain at rest   LUH score on admission was 2  His EKG on admission was unremarkable for any ischemic changes  He was observed overnight on the telemetry monitoring did not reveal any tachy or Francis arrhythmias  Serial cardiac enzyme negative for acute coronary syndrome  P was seen by Cardiology and underwent a nuclear stress test   This was negative for ischemia  He was deemed stable for discharge in a stable condition  Of note his hemoglobin A1c was 8 3  Blood sugars remained stable during hospitalization  Outpatient endocrine follow-up was recommended  Please see above list of diagnoses and related plan for additional information  Condition at Discharge: stable     Discharge Day Visit / Exam:     Subjective:  Complains of mild headache otherwise denies any chest pain or shortness of breath  Remains afebrile and hemodynamically stable    Vitals: Blood Pressure: 104/68 (01/11/21 0800)  Pulse: 72 (01/11/21 0800)  Temperature: (!) 96 5 °F (35 8 °C) (01/11/21 0800)  Temp Source: Temporal (01/11/21 0800)  Respirations: 18 (01/11/21 0800)  Height: 5' 8" (172 7 cm) (01/10/21 1735)  Weight - Scale: 94 4 kg (208 lb 1 8 oz) (01/10/21 1735)  SpO2: 98 % (01/11/21 0800)  Exam:   Physical Exam  Constitutional:       General: He is not in acute distress  Appearance: Normal appearance  He is obese  HENT:      Head: Normocephalic  Nose: Nose normal       Mouth/Throat:      Mouth: Mucous membranes are moist    Eyes:      Extraocular Movements: Extraocular movements intact  Pupils: Pupils are equal, round, and reactive to light  Neck:      Musculoskeletal: Normal range of motion  Cardiovascular:      Pulses: Normal pulses  Heart sounds: Normal heart sounds  Pulmonary:      Effort: Pulmonary effort is normal  No respiratory distress  Breath sounds: Normal breath sounds  No wheezing or rales  Abdominal:      General: Abdomen is flat  Palpations: Abdomen is soft  Musculoskeletal:         General: No deformity  Right lower leg: No edema  Left lower leg: No edema  Skin:     General: Skin is warm  Neurological:      General: No focal deficit present  Mental Status: He is alert and oriented to person, place, and time  Mental status is at baseline  Cranial Nerves: No cranial nerve deficit  Discussion with Family:  discussed with patient at length at bedside  Discharge instructions/Information to patient and family:   See after visit summary for information provided to patient and family  Provisions for Follow-Up Care:  See after visit summary for information related to follow-up care and any pertinent home health orders  Disposition:     Home    For Discharges to [facility] SNF:   · Not Applicable to this Patient - Not Applicable to this Patient    Planned Readmission: No     Discharge Statement:  I spent 35 minutes discharging the patient  This time was spent on the day of discharge  I had direct contact with the patient on the day of discharge   Greater than 50% of the total time was spent examining patient, answering all patient questions, arranging and discussing plan of care with patient as well as directly providing post-discharge instructions  Additional time then spent on discharge activities  Discharge Medications:  See after visit summary for reconciled discharge medications provided to patient and family        ** Please Note: This note has been constructed using a voice recognition system **

## 2021-01-11 NOTE — RESPIRATORY THERAPY NOTE
RT Protocol Note  Mariam Mary 63 y o  male MRN: [id number]  Unit/Bed#: 7T [facility] 706-02 Encounter: [id number]    Assessment    Principal Problem:    Chest pain  Active Problems:    HTN (hypertension)    Type 2 diabetes mellitus, with long-term current use of insulin (HCC)    BPH (benign prostatic hyperplasia)    HLD (hyperlipidemia)      Home Pulmonary Medications:    Home Devices/Therapy: Other (Comment)(Alb  nebs Q4PRN for SOB, and Alb   MDI 2p Q4PRN for wheezing)    Past Medical History:   Diagnosis Date    Asthma     B[facility] disorder ([facility] )     Diabetes mellitus ([facility] )     Enlarged prostate     Glaucoma     Hyperlipidemia     Hypertension     Psychiatric disorder     Seasonal allergic rhinitis     Seizures (HCC)      Social History     Socioeconomic History    Marital status: /Civil Union     Spouse name: None    Number of children: None    Years of education: None    Highest education level: None   Occupational History    None   Social Needs    Financial resource strain: None    Food insecurity     Worry: None     Inability: None    Transportation needs     Medical: None     Non-medical: None   Tobacco Use    Smoking status: Former Smoker     Packs/day: 2 00     Years: 27 00     Pack years: 54 00     Types: Cigarettes     Start date:      Quit date:      Years since quittin 0    Smokeless tobacco: Never Used   Substance and Sexual Activity    Alcohol use: Not Currently     Frequency: 4 or more times a week     Drinks per session: 10 or more     Comment: socially    Drug use: Not Currently     Types: Heroin     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    Sexual activity: None   Lifestyle    Physical activity     Days per week: None     Minutes per session: None    Stress: None   Relationships    Social connections     Talks on phone: None     Gets together: None     Attends Islam service: None     Active member of club or organization: None     Attends meetings of clubs or organizations: None     Relationship status: None    Intimate partner violence     Fear of current or ex partner: None     Emotionally abused: None     Physically abused: None     Forced sexual activity: None   Other Topics Concern    None   Social History Narrative    None       Subjective         Objective    Physical Exam:   Assessment Type: Assess only  General Appearance: Alert, Awake  Respiratory Pattern: Normal  Chest Assessment: Chest expansion symmetrical  Bilateral Breath Sounds: Clear, Diminished  O2 Device: RA    Vitals:  Blood pressure 121/84, pulse 102, temperature 97 8 °F (36 6 °C), temperature source Temporal, resp  rate 18, height 5' 8" (1 727 m), weight 94 4 kg (208 lb 1 8 oz), SpO2 98 %  Imaging and other studies: I have personally reviewed pertinent reports  O2 Device: RA     Plan    Respiratory Plan: No distress/Pulmonary history, Home Bronchodilator Patient pathway        Resp Comments: Patient admitted to R/O ACS, and has a respiratory history significant for ARPITA  and asthma  Per patient, he has a home CPAP machine and that he uses routinely  Patient takes UDN and MDI bronchodilators as needed for SOB and/or wheezing  Patient assessed at this time and is in [facility]  Will follow patient's home regimen, ordered albuterol MDI PRN for SOB and wheezing and hs CPAP 10 - RA  (pertinent labs reviewed and results are negative)  Will monitor as per respiratory protocol

## 2021-01-12 NOTE — NURSING NOTE
Discharge instructions given both written and verbally to the patient with details regarding medications and next dose due  Reviewed f/u apts to review stress test results  IV d/c by me with catheter intact  Dressed in street clothes and medication from pharmacy returned to patient  vicente called and waiver signed by patient

## 2021-01-13 LAB
CHEST PAIN STATEMENT: NORMAL
MAX DIASTOLIC BP: 78 MMHG
MAX HEART RATE: 148 BPM
MAX PREDICTED HEART RATE: 164 BPM
MAX. SYSTOLIC BP: 224 MMHG
PROTOCOL NAME: NORMAL
TARGET HR FORMULA: NORMAL
TEST INDICATION: NORMAL
TIME IN EXERCISE PHASE: NORMAL

## 2021-01-14 ENCOUNTER — TELEPHONE (OUTPATIENT)
Dept: OTHER | Facility: HOSPITAL | Age: 56
End: 2021-01-14

## 2021-01-14 NOTE — TELEPHONE ENCOUNTER
----- Message from Marian Bird sent at 1/12/2021 11:38 AM EST -----    ----- Message -----  From: Interface, Radiology Results In  Sent: 1/12/2021   7:09 AM EST  To: Sl Slim Results Routing      Called and left a voicemail with stress test result on 1/14/21

## 2021-01-16 LAB — ALDOST SERPL-MCNC: 2.9 NG/DL (ref 0–30)

## 2021-01-19 ENCOUNTER — TELEMEDICINE (OUTPATIENT)
Dept: PULMONOLOGY | Facility: CLINIC | Age: 56
End: 2021-01-19
Payer: COMMERCIAL

## 2021-01-19 VITALS
HEART RATE: 94 BPM | HEIGHT: 69 IN | SYSTOLIC BLOOD PRESSURE: 127 MMHG | WEIGHT: 210 LBS | DIASTOLIC BLOOD PRESSURE: 83 MMHG | BODY MASS INDEX: 31.1 KG/M2

## 2021-01-19 DIAGNOSIS — K21.9 GASTROESOPHAGEAL REFLUX DISEASE WITHOUT ESOPHAGITIS: ICD-10-CM

## 2021-01-19 DIAGNOSIS — F19.20 CORTICOSTEROID DEPENDENCE (HCC): ICD-10-CM

## 2021-01-19 DIAGNOSIS — G47.33 OSA (OBSTRUCTIVE SLEEP APNEA): Primary | ICD-10-CM

## 2021-01-19 DIAGNOSIS — R09.2 RESPIRATORY ARREST (HCC): ICD-10-CM

## 2021-01-19 DIAGNOSIS — J45.50 SEVERE PERSISTENT ASTHMA WITHOUT COMPLICATION: ICD-10-CM

## 2021-01-19 LAB
METANEPH FREE SERPL-MCNC: 27.7 PG/ML (ref 0–88)
NORMETANEPHRINE SERPL-MCNC: 73.8 PG/ML (ref 0–136.8)

## 2021-01-19 PROCEDURE — 99214 OFFICE O/P EST MOD 30 MIN: CPT | Performed by: INTERNAL MEDICINE

## 2021-01-19 NOTE — PROGRESS NOTES
Virtual Regular Visit      Assessment/Plan:    Problem List Items Addressed This Visit        Digestive    GERD (gastroesophageal reflux disease)       Respiratory    ARPITA (obstructive sleep apnea) - Primary     Continue CPAP q h s  Respiratory arrest ([address] )    Severe persistent asthma without complication     Continue Dulera twice daily, continue albuterol  Continue Singulair  Also I told patient to notify me if he does not get his Dupixent  I informed him that I need to see him in the office next visit to examine him  Other    Corticosteroid dependence (HCC)     Continue prednisone 2 5 daily for 2 weeks after he receives his Dupixent, then to wean to 2 5 mg every other day for another 2 weeks then stop  Patient verbalized understanding  Reason for visit is   Chief Complaint   Patient presents with    Virtual Regular Visit        Encounter provider Alejandra Devi MD    Provider located at [address] [place] [id number]      Recent Visits  No visits were found meeting these conditions  Showing recent visits within past 7 days and meeting all other requirements     Today's Visits  Date Type Provider Dept   01/19/21 Telemedicine Alejandra Devi MD [facility] [place]   Showing today's visits and meeting all other requirements     Future Appointments  No visits were found meeting these conditions  Showing future appointments within next 150 days and meeting all other requirements        The patient was identified by name and date of birth  Raine Hung was informed that this is a telemedicine visit and that the visit is being conducted through [facility] and patient was informed that this is a secure, HIPAA-compliant platform  He agrees to proceed     My office door was closed  No one else was in the room  He acknowledged consent and understanding of privacy and security of the video platform  The patient has agreed to participate and understands they can discontinue the visit at any time  Patient is aware this is a billable service  Subjective  Johnathan Torres is a 63 y o  male with history of severe persistent asthma complicated in the past with cardiac arrest who is currently on inhaled corticosteroids with Dulera and was on Breo at some point, also on budesonide nebulizer therapy and p r n  Albuterol and Singulair  Patient also is on Dupilumab prescribed by his allergist, and that helped in his asthma control  He states that for the past few weeks he did not receive this medication and he feels that his respiratory symptoms are worse, he complains of mild dyspnea on exertion but no significant wheezing, denies exacerbation of his asthma but he has cough in the morning with clear sputum production that he needs to use his nebulizer to clear his chest and congestion  Denies fever or chills or night sweats  He called his allergist to get the medication again  Patient has obstructive sleep apnea and he is compliant with his CPAP currently  A week ago he was admitted to the hospital after having an episode of chest pain and he reports having low blood pressure and sleeping with food in his mouth due to sleep apnea and was almost choking with food  He was admitted to the hospital and had workup by Cardiology that was negative and was discharged  Patient is on chronic prednisone in the past that I start tapering down then he had couple of fluctuation in the dose but currently he is on 2 5 mg daily and he was on the same dose prior to this recent admission  Patient has GERD that is controlled with omeprazole        Past Medical History:   Diagnosis Date    Asthma     [facility] disorder [facility] )     [facility] mellitus [facility] )     Enlarged prostate     Glaucoma     Hyperlipidemia     Hypertension     Psychiatric disorder     Seasonal allergic rhinitis     Seizures (HCC)        Past Surgical History:   Procedure Laterality Date    KNEE SURGERY      WRIST SURGERY Left        Current Outpatient Medications   Medication Sig Dispense Refill    albuterol (2 5 mg/3 mL) 0 083 % nebulizer solution Take 1 vial (2 5 mg total) by nebulization every 4 (four) hours as needed for shortness of breath 25 vial 0    albuterol (PROVENTIL HFA,VENTOLIN HFA) 90 mcg/act inhaler Inhale 2 puffs every 4 (four) hours as needed for wheezing 1 Inhaler 0    amLODIPine (NORVASC) 5 mg tablet Take 5 mg by mouth daily      atorvastatin (LIPITOR) 80 mg tablet Take 80 mg by mouth daily at bedtime      bimatoprost (LUMIGAN) 0 03 % ophthalmic drops Administer 1 drop to both eyes daily at bedtime        Blood Glucose Monitoring Suppl (ACURA BLOOD GLUCOSE METER) w/Device KIT by Does not apply route      brimonidine (ALPHAGAN P) 0 15 % ophthalmic solution Administer 1 drop to both eyes every 8 (eight) hours        Calcium Carb-Cholecalciferol (CALCIUM CARBONATE-VITAMIN D3 PO) Take 1,500 mg by mouth daily      carBAMazepine (TEGRETOL) 200 mg tablet Take 2 tabs in the morning and 3 tabs at night      dupilumab (DUPIXENT) subcutaneous injection Inject under the skin every 14 (fourteen) days      finasteride (PROSCAR) 5 mg tablet Take 5 mg by mouth daily      gabapentin (NEURONTIN) 300 mg capsule Take 300 mg by mouth 3 (three) times a day      glucagon 1 MG injection as needed      guaiFENesin (MUCINEX) 600 mg 12 hr tablet Take 1 tablet (600 mg total) by mouth every 12 (twelve) hours 12 tablet 0    hydrOXYzine pamoate (VISTARIL) 50 mg capsule Take 50 mg by mouth Three times daily as needed      insulin aspart (NovoLOG) 100 units/mL injection Inject under the skin 3 (three) times a day before meals      insulin glargine (BASAGLAR KWIKPEN) 100 units/mL injection pen Inject 35 Units under the skin daily at bedtime       Insulin Pen Needle 32G X 4 MM MISC Unifine Pentips Plus 32 gauge x 5/32" needle      latanoprost (XALATAN) 0 005 % ophthalmic solution Administer 1 drop to both eyes daily      loratadine (CLARITIN) 10 mg tablet loratadine 10 mg tablet      melatonin 3 mg Take 3 mg by mouth daily at bedtime      methocarbamol (ROBAXIN) 750 mg tablet Take 1 tablet (750 mg total) by mouth 3 (three) times a day as needed for muscle spasms 42 tablet 0    montelukast (SINGULAIR) 10 mg tablet Take 10 mg by mouth daily        omega-3-acid ethyl esters (LOVAZA) 1 g capsule Take 1 g by mouth daily      omeprazole (PriLOSEC) 40 MG capsule Take 40 mg by mouth daily      perphenazine 2 mg tablet Take 2 mg by mouth daily      polyvinyl alcohol (LIQUIFILM TEARS) 1 4 % ophthalmic solution 2 drops as needed for dry eyes      predniSONE 5 mg tablet Take 2 5 mg by mouth every other day On Monday, Wednesday, Friday       TAMSULOSIN HCL PO Take 0 4 mg by mouth daily       terbinafine (LamISIL) 1 % cream terbinafine HCl 1 % topical cream      fluticasone (FLONASE) 50 mcg/act nasal spray 1 spray into each nostril daily      fluticasone-vilanterol (BREO ELLIPTA) 200-25 MCG/INH inhaler Inhale 1 puff daily Rinse mouth after use  (Patient not taking: Reported on 1/10/2021) 1 Inhaler 6     No current facility-administered medications for this visit  Allergies   Allergen Reactions    Aspirin GI Bleeding    Ibuprofen GI Bleeding    Licorice Flavor [Flavoring Agent] Hives    Penicillins Hives    Propoxyphene Hives     Propoxyphene N-Acetaminophen---hives & palpitations    Tramadol Palpitations     Other reaction(s): heart pumps real fast    Bee Venom     Chocolate     Glycyrrhiza     Haloperidol Other (See Comments)     "bent out of shape" - dystonia    Lithium Other (See Comments)     edema    Molds & Smuts     Other     Wasp Venom        Review of Systems   Constitutional: Negative  HENT: Negative  Eyes: Negative  Respiratory:        As HPI   Cardiovascular: Negative  Gastrointestinal: Negative  Endocrine: Negative  Genitourinary: Negative  Musculoskeletal: Negative  Skin: Negative  Allergic/Immunologic: Negative  Neurological: Negative  Hematological: Negative  Psychiatric/Behavioral: Negative  Video Exam    Vitals:    01/19/21 1023   BP: 127/83   Pulse: 94   Weight: 95 3 kg (210 lb)   Height: 5' 8 5" (1 74 m)       Physical Exam  Vitals signs reviewed  Constitutional:       General: He is not in acute distress  Appearance: Normal appearance  He is normal weight  He is not ill-appearing, toxic-appearing or diaphoretic  HENT:      Head: Normocephalic  Eyes:      Extraocular Movements: Extraocular movements intact  Neck:      Musculoskeletal: Normal range of motion  Pulmonary:      Effort: Pulmonary effort is normal    Skin:     Coloration: Skin is not jaundiced or pale  Neurological:      Mental Status: He is alert and oriented to person, place, and time  Mental status is at baseline  Psychiatric:         Mood and Affect: Mood normal          Behavior: Behavior normal          Thought Content: Thought content normal          Judgment: Judgment normal         Chest x-ray from January 2021 reviewed on PACs:  Clear lungs      Nuclear cardiac stress test:IMPRESSIONS: 1  Fair exercise tolerance  2  Mild resting hypertension with exaggerated hypertensive response to exercise  3  Exercise induced chest discomfort relieved in recovery spontaneously without electrocardiographic changes of ischemia  4  Normal left ventricular systolic function, EF 35%  5  Normal tomographic perfusion series without evidence of prior myocardial infarction or exercise induced myocardial ischemia        Labs reviewed:  Creatinine 0 89, CO2 32, hemoglobin 12 9, random cortisol 2 8        I spent 20 minutes with patient today in which greater than 50% of the time was spent in counseling/coordination of care regarding His asthma management and also has corticosteroids tapering      VIRTUAL VISIT DISCLAIMER    Shanita Hannah acknowledges that he has consented to an online visit or consultation  He understands that the online visit is based solely on information provided by him, and that, in the absence of a face-to-face physical evaluation by the physician, the diagnosis he receives is both limited and provisional in terms of accuracy and completeness  This is not intended to replace a full medical face-to-face evaluation by the physician  Shanita Hannah understands and accepts these terms

## 2021-01-19 NOTE — ASSESSMENT & PLAN NOTE
Continue Dulera twice daily, continue albuterol  Continue Singulair  Also I told patient to notify me if he does not get his Dupixent  I informed him that I need to see him in the office next visit to examine him

## 2021-01-28 ENCOUNTER — HOSPITAL ENCOUNTER (EMERGENCY)
Facility: HOSPITAL | Age: 56
Discharge: HOME | End: 2021-01-28
Attending: EMERGENCY MEDICINE
Payer: COMMERCIAL

## 2021-01-28 VITALS
DIASTOLIC BLOOD PRESSURE: 56 MMHG | OXYGEN SATURATION: 98 % | BODY MASS INDEX: 30.05 KG/M2 | SYSTOLIC BLOOD PRESSURE: 105 MMHG | TEMPERATURE: 98 F | RESPIRATION RATE: 18 BRPM | HEART RATE: 98 BPM | WEIGHT: 200.56 LBS

## 2021-01-28 DIAGNOSIS — T40.1X1A HEROIN OVERDOSE (HCC): Primary | ICD-10-CM

## 2021-01-28 PROCEDURE — 99284 EMERGENCY DEPT VISIT MOD MDM: CPT

## 2021-01-28 PROCEDURE — 99282 EMERGENCY DEPT VISIT SF MDM: CPT | Performed by: EMERGENCY MEDICINE

## 2021-01-28 RX ORDER — NALOXONE HYDROCHLORIDE 1 MG/ML
2 INJECTION INTRAMUSCULAR; INTRAVENOUS; SUBCUTANEOUS ONCE
Status: COMPLETED | OUTPATIENT
Start: 2021-01-28 | End: 2021-01-28

## 2021-01-28 NOTE — ED PROVIDER NOTES
History  Chief Complaint   Patient presents with    Overdose - Accidental     clean 4 months; today bored and snorted 1/5 bag heiron; wife said he stated he didn't feel good and lay down and was out  EMS called; awoke with narcan administration IVP  Denies SI     Patient is a 58-year-old male known to this department in [facility] with a history substance abuse who presents via [facility] today after an accidental overdose on heroin  Patient states that he has been clean for last 4 months when today he snorted a half a bag of heroin because he was bored    Denies any suicide ideation  No complaints this time  Patient was found unresponsive by [facility] pinpoint pupils and ankle respirations  Given 2 mg of IV Narcan with resolution of symptoms  Patient was witnessed passing out by wife at home  No reported trauma  Accu-Chek per EMS was 246  Prior to Admission Medications   Prescriptions Last Dose Informant Patient Reported? Taking?    Blood Glucose Monitoring Suppl (ACURA BLOOD GLUCOSE METER) w/Device KIT  Self Yes No   Sig: by Does not apply route   Calcium Carb-Cholecalciferol (CALCIUM CARBONATE-VITAMIN D3 PO)  Self Yes No   Sig: Take 1,500 mg by mouth daily   Insulin Pen Needle 32G X 4 MM MISC  Self Yes No   Sig: Unifine Pentips Plus 32 gauge x 5/32" needle   TAMSULOSIN HCL PO  Self Yes No   Sig: Take 0 4 mg by mouth daily    albuterol (2 5 mg/3 mL) 0 083 % nebulizer solution  Self No No   Sig: Take 1 vial (2 5 mg total) by nebulization every 4 (four) hours as needed for shortness of breath   albuterol (PROVENTIL HFA,VENTOLIN HFA) 90 mcg/act inhaler  Self No No   Sig: Inhale 2 puffs every 4 (four) hours as needed for wheezing   amLODIPine (NORVASC) 5 mg tablet  Self Yes No   Sig: Take 5 mg by mouth daily   atorvastatin (LIPITOR) 80 mg tablet  Self Yes No   Sig: Take 80 mg by mouth daily at bedtime   bimatoprost (LUMIGAN) 0 03 % ophthalmic drops  Self Yes No   Sig: Administer 1 drop to both eyes daily at bedtime     brimonidine (ALPHAGAN P) 0 15 % ophthalmic solution  Self Yes No   Sig: Administer 1 drop to both eyes every 8 (eight) hours     carBAMazepine (TEGRETOL) 200 mg tablet  Self Yes No   Sig: Take 2 tabs in the morning and 3 tabs at night   dupilumab (DUPIXENT) subcutaneous injection  Self Yes No   Sig: Inject under the skin every 14 (fourteen) days   finasteride (PROSCAR) 5 mg tablet  Self Yes No   Sig: Take 5 mg by mouth daily   fluticasone (FLONASE) 50 mcg/act nasal spray  Self Yes No   Si spray into each nostril daily   gabapentin (NEURONTIN) 300 mg capsule  Self Yes No   Sig: Take 300 mg by mouth 3 (three) times a day   glucagon 1 MG injection  Self Yes No   Sig: as needed   guaiFENesin (MUCINEX) 600 mg 12 hr tablet  Self No No   Sig: Take 1 tablet (600 mg total) by mouth every 12 (twelve) hours   hydrOXYzine pamoate (VISTARIL) 50 mg capsule  Self Yes No   Sig: Take 50 mg by mouth Three times daily as needed   insulin aspart (NovoLOG) 100 units/mL injection  Self Yes No   Sig: Inject under the skin 3 (three) times a day before meals   insulin glargine (BASAGLAR KWIKPEN) 100 units/mL injection pen  Self Yes No   Sig: Inject 35 Units under the skin daily at bedtime    latanoprost (XALATAN) 0 005 % ophthalmic solution  Self Yes No   Sig: Administer 1 drop to both eyes daily   loratadine (CLARITIN) 10 mg tablet  Self Yes No   Sig: loratadine 10 mg tablet   melatonin 3 mg  Self Yes No   Sig: Take 3 mg by mouth daily at bedtime   methocarbamol (ROBAXIN) 750 mg tablet  Self No No   Sig: Take 1 tablet (750 mg total) by mouth 3 (three) times a day as needed for muscle spasms   montelukast (SINGULAIR) 10 mg tablet  Self Yes No   Sig: Take 10 mg by mouth daily     omega-3-acid ethyl esters (LOVAZA) 1 g capsule  Self Yes No   Sig: Take 1 g by mouth daily   omeprazole (PriLOSEC) 40 MG capsule  Self Yes No   Sig: Take 40 mg by mouth daily   perphenazine 2 mg tablet  Self Yes No   Sig: Take 2 mg by mouth daily   polyvinyl alcohol (LIQUIFILM TEARS) 1 4 % ophthalmic solution  Self Yes No   Si drops as needed for dry eyes   predniSONE 5 mg tablet  Self Yes No   Sig: Take 2 5 mg by mouth every other day On Monday, Wednesday, Friday    terbinafine (LamISIL) 1 % cream  Self Yes No   Sig: terbinafine HCl 1 % topical cream      Facility-Administered Medications: None       Past Medical History:   Diagnosis Date    Asthma     [facility] disorder ([facility] )     Diabetes mellitus ([facility] )     Enlarged prostate     Glaucoma     Hyperlipidemia     Hypertension     Psychiatric disorder     Seasonal allergic rhinitis     Seizures (HCC)        Past Surgical History:   Procedure Laterality Date    KNEE SURGERY      WRIST SURGERY Left        Family History   Problem Relation Age of Onset    Arthritis Mother      I have reviewed and agree with the history as documented  E-Cigarette/Vaping    E-Cigarette Use Never User      E-Cigarette/Vaping Substances    Nicotine No     THC No     CBD No     Flavoring No     Other No     Unknown No      Social History     Tobacco Use    Smoking status: Former Smoker     Packs/day: 2 00     Years: 27 00     Pack years: 54 00     Types: Cigarettes     Start date:      Quit date:      Years since quittin 0    Smokeless tobacco: Never Used   Substance Use Topics    Alcohol use: Not Currently     Frequency: 4 or more times a week     Drinks per session: 10 or more     Comment: socially    Drug use: Not Currently     Types: Heroin     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       Review of Systems   Constitutional: Negative  HENT: Negative  Eyes: Negative  Respiratory: Negative  Cardiovascular: Negative  Gastrointestinal: Negative  Endocrine: Negative  Genitourinary: Negative  Musculoskeletal: Negative  Skin: Negative  Allergic/Immunologic: Negative  Neurological: Negative  Hematological: Negative  Psychiatric/Behavioral: Negative  All other systems reviewed and are negative  Physical Exam  Physical Exam  Vitals signs and nursing note reviewed  Constitutional:       Appearance: Normal appearance  He is obese  HENT:      Head: Normocephalic and atraumatic  Nose: Nose normal       Mouth/Throat:      Mouth: Mucous membranes are moist       Pharynx: Oropharynx is clear  Eyes:      Extraocular Movements: Extraocular movements intact  Conjunctiva/sclera: Conjunctivae normal       Pupils: Pupils are equal, round, and reactive to light  Neck:      Musculoskeletal: Normal range of motion and neck supple  Cardiovascular:      Rate and Rhythm: Normal rate and regular rhythm  Pulses: Normal pulses  Heart sounds: Normal heart sounds  Pulmonary:      Effort: Pulmonary effort is normal       Breath sounds: Normal breath sounds  Abdominal:      General: Bowel sounds are normal       Palpations: Abdomen is soft  Musculoskeletal: Normal range of motion  Skin:     General: Skin is warm and dry  Capillary Refill: Capillary refill takes less than 2 seconds  Neurological:      General: No focal deficit present  Mental Status: He is alert and oriented to person, place, and time     Psychiatric:         Mood and Affect: Mood normal          Behavior: Behavior normal          Vital Signs  ED Triage Vitals [01/28/21 1709]   Temperature Pulse Respirations Blood Pressure SpO2   98 °F (36 7 °C) 98 16 133/68 98 %      Temp Source Heart Rate Source Patient Position - Orthostatic VS BP Location FiO2 (%)   Tympanic Monitor Lying Left arm --      Pain Score       --           Vitals:    01/28/21 1709   BP: 133/68   Pulse: 98   Patient Position - Orthostatic VS: Lying         Visual Acuity      ED Medications  Medications   naloxone (NARCAN) injection 2 mg (0 mg Intravenous Given to EMS 1/28/21 1710)       Diagnostic Studies  Results Reviewed     None                 No orders to display Procedures  Procedures         ED Course  ED Course as of Jan 28 1800   Thu Jan 28, 2021   1759 Patient awake alert oriented  No signs of opiate toxidrome  Will discharge  SBIRT 20yo+      Most Recent Value   SBIRT (22 yo +)   In order to provide better care to our patients, we are screening all of our patients for alcohol and drug use  Would it be okay to ask you these screening questions? No Filed at: 01/28/2021 1711                    MDM  Number of Diagnoses or Management Options  Heroin overdose [facility]):      Amount and/or Complexity of Data Reviewed  Obtain history from someone other than the patient: yes  Review and summarize past medical records: yes        Disposition  Final diagnoses:   Heroin overdose ([address] )     Time reflects when diagnosis was documented in both MDM as applicable and the Disposition within this note     Time User Action Codes Description Comment    1/28/2021  5:06 PM Alverto Brunson Heroin overdose [facility])       ED Disposition     ED Disposition Condition Date/Time Comment    Discharge Stable Thu Jan 28, 2021  6:00 PM Brynn Po discharge to home/self care  Follow-up Information     Follow up With Specialties Details Why [address], [address]  [phone]            Patient's Medications   Discharge Prescriptions    No medications on file     No discharge procedures on file      PDMP Review       Value Time User    PDMP Reviewed  Yes 10/20/2020  4:01 PM Dimitri Baeza MD          ED Provider  Electronically Signed by           Shanda Herzog MD  01/28/21 1800

## 2021-03-13 ENCOUNTER — HOSPITAL ENCOUNTER (EMERGENCY)
Facility: HOSPITAL | Age: 56
Discharge: HOME | End: 2021-03-13
Attending: EMERGENCY MEDICINE | Admitting: EMERGENCY MEDICINE
Payer: COMMERCIAL

## 2021-03-13 VITALS
SYSTOLIC BLOOD PRESSURE: 139 MMHG | OXYGEN SATURATION: 96 % | DIASTOLIC BLOOD PRESSURE: 80 MMHG | HEART RATE: 104 BPM | RESPIRATION RATE: 14 BRPM | TEMPERATURE: 96.2 F | WEIGHT: 224.87 LBS | BODY MASS INDEX: 33.69 KG/M2

## 2021-03-13 DIAGNOSIS — T40.1X1D ACCIDENTAL OVERDOSE OF HEROIN, SUBSEQUENT ENCOUNTER: Primary | ICD-10-CM

## 2021-03-13 PROCEDURE — 99284 EMERGENCY DEPT VISIT MOD MDM: CPT | Performed by: EMERGENCY MEDICINE

## 2021-03-13 PROCEDURE — 99284 EMERGENCY DEPT VISIT MOD MDM: CPT

## 2021-03-13 PROCEDURE — 96374 THER/PROPH/DIAG INJ IV PUSH: CPT

## 2021-03-13 RX ORDER — ONDANSETRON 4 MG/1
4 TABLET, ORALLY DISINTEGRATING ORAL ONCE
Status: COMPLETED | OUTPATIENT
Start: 2021-03-13 | End: 2021-03-13

## 2021-03-13 RX ORDER — NALOXONE HYDROCHLORIDE 1 MG/ML
2 INJECTION INTRAMUSCULAR; INTRAVENOUS; SUBCUTANEOUS ONCE
Status: COMPLETED | OUTPATIENT
Start: 2021-03-13 | End: 2021-03-13

## 2021-03-13 RX ADMIN — ONDANSETRON 4 MG: 4 TABLET, ORALLY DISINTEGRATING ORAL at 18:04

## 2021-03-13 RX ADMIN — NALOXONE HYDROCHLORIDE 2 MG: 1 INJECTION INTRAMUSCULAR; INTRAVENOUS; SUBCUTANEOUS at 17:49

## 2021-03-13 NOTE — ED NOTES
Pt's daughter "John Lees" called for care update on pt  Pt consented for this RN to provide a full report on pt's status and circumstances around his arrival in the ED        Alida Valladares RN  03/13/21 5548

## 2021-03-13 NOTE — ED PROVIDER NOTES
History  Chief Complaint   Patient presents with    Heroin Overdose - Accidental     Pt snorted one bag of heroin per [facility]  Pt agress to same during triage  No other complaints      Patient is a 50-year-old male with known heroin abuse who was last seen by me here on 01/28/2021 patient was found unresponsive at home patient was given 2 mg of intranasal Narcan by EMS with resolution of symptoms  Patient is to snorting 1 bag of heroin today  Denies any suicide ideation  Cannot states his last use  Currently no complaints this time  Prior to Admission Medications   Prescriptions Last Dose Informant Patient Reported? Taking?    Blood Glucose Monitoring Suppl (ACURA BLOOD GLUCOSE METER) w/Device KIT  Self Yes No   Sig: by Does not apply route   Calcium Carb-Cholecalciferol (CALCIUM CARBONATE-VITAMIN D3 PO)  Self Yes No   Sig: Take 1,500 mg by mouth daily   Insulin Pen Needle 32G X 4 MM MISC  Self Yes No   Sig: Unifine Pentips Plus 32 gauge x 5/32" needle   TAMSULOSIN HCL PO  Self Yes No   Sig: Take 0 4 mg by mouth daily    albuterol (2 5 mg/3 mL) 0 083 % nebulizer solution  Self No No   Sig: Take 1 vial (2 5 mg total) by nebulization every 4 (four) hours as needed for shortness of breath   albuterol (PROVENTIL HFA,VENTOLIN HFA) 90 mcg/act inhaler  Self No No   Sig: Inhale 2 puffs every 4 (four) hours as needed for wheezing   amLODIPine (NORVASC) 5 mg tablet  Self Yes No   Sig: Take 5 mg by mouth daily   atorvastatin (LIPITOR) 80 mg tablet  Self Yes No   Sig: Take 80 mg by mouth daily at bedtime   bimatoprost (LUMIGAN) 0 03 % ophthalmic drops  Self Yes No   Sig: Administer 1 drop to both eyes daily at bedtime     brimonidine (ALPHAGAN P) 0 15 % ophthalmic solution  Self Yes No   Sig: Administer 1 drop to both eyes every 8 (eight) hours     carBAMazepine (TEGRETOL) 200 mg tablet  Self Yes No   Sig: Take 2 tabs in the morning and 3 tabs at night   dupilumab (DUPIXENT) subcutaneous injection  Self Yes No   Sig: Inject under the skin every 14 (fourteen) days   finasteride (PROSCAR) 5 mg tablet  Self Yes No   Sig: Take 5 mg by mouth daily   fluticasone (FLONASE) 50 mcg/act nasal spray  Self Yes No   Si spray into each nostril daily   gabapentin (NEURONTIN) 300 mg capsule  Self Yes No   Sig: Take 300 mg by mouth 3 (three) times a day   glucagon 1 MG injection  Self Yes No   Sig: as needed   guaiFENesin (MUCINEX) 600 mg 12 hr tablet  Self No No   Sig: Take 1 tablet (600 mg total) by mouth every 12 (twelve) hours   hydrOXYzine pamoate (VISTARIL) 50 mg capsule  Self Yes No   Sig: Take 50 mg by mouth Three times daily as needed   insulin aspart (NovoLOG) 100 units/mL injection  Self Yes No   Sig: Inject under the skin 3 (three) times a day before meals   insulin glargine (BASAGLAR KWIKPEN) 100 units/mL injection pen  Self Yes No   Sig: Inject 35 Units under the skin daily at bedtime    latanoprost (XALATAN) 0 005 % ophthalmic solution  Self Yes No   Sig: Administer 1 drop to both eyes daily   loratadine (CLARITIN) 10 mg tablet  Self Yes No   Sig: loratadine 10 mg tablet   melatonin 3 mg  Self Yes No   Sig: Take 3 mg by mouth daily at bedtime   methocarbamol (ROBAXIN) 750 mg tablet  Self No No   Sig: Take 1 tablet (750 mg total) by mouth 3 (three) times a day as needed for muscle spasms   montelukast (SINGULAIR) 10 mg tablet  Self Yes No   Sig: Take 10 mg by mouth daily     omega-3-acid ethyl esters (LOVAZA) 1 g capsule  Self Yes No   Sig: Take 1 g by mouth daily   omeprazole (PriLOSEC) 40 MG capsule  Self Yes No   Sig: Take 40 mg by mouth daily   perphenazine 2 mg tablet  Self Yes No   Sig: Take 2 mg by mouth daily   polyvinyl alcohol (LIQUIFILM TEARS) 1 4 % ophthalmic solution  Self Yes No   Si drops as needed for dry eyes   predniSONE 5 mg tablet  Self Yes No   Sig: Take 2 5 mg by mouth every other day On Monday, Wednesday, Friday    terbinafine (LamISIL) 1 % cream  Self Yes No   Sig: terbinafine HCl 1 % topical cream Facility-Administered Medications: None       Past Medical History:   Diagnosis Date    Asthma     [facility] disorder ([facility] )     Diabetes mellitus ([facility] )     Enlarged prostate     Glaucoma     Hyperlipidemia     Hypertension     Psychiatric disorder     Seasonal allergic rhinitis     Seizures (HCC)        Past Surgical History:   Procedure Laterality Date    KNEE SURGERY      WRIST SURGERY Left        Family History   Problem Relation Age of Onset    Arthritis Mother      I have reviewed and agree with the history as documented  E-Cigarette/Vaping    E-Cigarette Use Never User      E-Cigarette/Vaping Substances    Nicotine No     THC No     CBD No     Flavoring No     Other No     Unknown No      Social History     Tobacco Use    Smoking status: Former Smoker     Packs/day: 2 00     Years: 27 00     Pack years: 54 00     Types: Cigarettes     Start date: [address]     Quit date:      Years since quittin 2    Smokeless tobacco: Never Used   Substance Use Topics    Alcohol use: Not Currently     Frequency: 4 or more times a week     Drinks per session: 10 or more     Comment: socially    Drug use: Yes     Types: Heroin     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       Review of Systems   Constitutional: Positive for activity change  HENT: Negative  Eyes: Negative  Respiratory: Negative  Cardiovascular: Negative  Gastrointestinal: Negative  Endocrine: Negative  Genitourinary: Negative  Musculoskeletal: Negative  Skin: Negative  Allergic/Immunologic: Negative  Neurological: Negative  Hematological: Negative  Psychiatric/Behavioral: Negative  All other systems reviewed and are negative  Physical Exam  Physical Exam  Vitals signs and nursing note reviewed  Constitutional:       Appearance: Normal appearance  He is obese  HENT:      Head: Normocephalic and atraumatic        Right Ear: Tympanic membrane, ear canal and external ear normal       Left Ear: Tympanic membrane, ear canal and external ear normal       Nose: Nose normal       Mouth/Throat:      Mouth: Mucous membranes are moist       Pharynx: Oropharynx is clear  Eyes:      Extraocular Movements: Extraocular movements intact  Conjunctiva/sclera: Conjunctivae normal       Pupils: Pupils are equal, round, and reactive to light  Neck:      Musculoskeletal: Normal range of motion and neck supple  Cardiovascular:      Rate and Rhythm: Normal rate and regular rhythm  Pulses: Normal pulses  Heart sounds: Normal heart sounds  Pulmonary:      Effort: Pulmonary effort is normal       Breath sounds: Normal breath sounds  Abdominal:      General: Bowel sounds are normal       Palpations: Abdomen is soft  Musculoskeletal: Normal range of motion  Skin:     General: Skin is warm and dry  Capillary Refill: Capillary refill takes less than 2 seconds  Neurological:      General: No focal deficit present  Mental Status: He is alert and oriented to person, place, and time  Comments: Slow to respond     Psychiatric:         Mood and Affect: Mood normal          Behavior: Behavior normal          Vital Signs  ED Triage Vitals [03/13/21 1740]   Temperature Pulse Respirations Blood Pressure SpO2   (!) 96 2 °F (35 7 °C) 104 14 139/80 96 %      Temp Source Heart Rate Source Patient Position - Orthostatic VS BP Location FiO2 (%)   Tympanic Monitor Lying Left arm --      Pain Score       --           Vitals:    03/13/21 1740   BP: 139/80   Pulse: 104   Patient Position - Orthostatic VS: Lying         Visual Acuity      ED Medications  Medications   naloxone (NARCAN) injection 2 mg (2 mg Intravenous Given 3/13/21 1749)   ondansetron (ZOFRAN-ODT) dispersible tablet 4 mg (4 mg Oral Given 3/13/21 1804)       Diagnostic Studies  Results Reviewed     None                 No orders to display              Procedures  Procedures         ED Course  ED Course as of Mar 13 1832   Sat Mar 13, 2021 1804 Patient still with intermittent episodes falling asleep  Given 2 more mg of intranasal Narcan with resolution of symptoms  1832 Patient awake alert oriented x3  No signs of any clinical opiate toxidrome  Will discharge  Advised patient to stop abusing narcotics again  SBIRT 22yo+      Most Recent Value   SBIRT (24 yo +)   In order to provide better care to our patients, we are screening all of our patients for alcohol and drug use  Would it be okay to ask you these screening questions? Yes Filed at: 03/13/2021 1753   Initial Alcohol Screen: US AUDIT-C    1  How often do you have a drink containing alcohol?  0 Filed at: 03/13/2021 1753   2  How many drinks containing alcohol do you have on a typical day you are drinking? 0 Filed at: 03/13/2021 1753   3a  Male UNDER 65: How often do you have five or more drinks on one occasion? 0 Filed at: 03/13/2021 1753   Audit-C Score  0 Filed at: 03/13/2021 1753   ROXY: How many times in the past year have you    Used an illegal drug or used a prescription medication for non-medical reasons? Once or Twice Filed at: 03/13/2021 1753   DAST-10: In the past 12 months      1  Have you used drugs other than those required for medical reasons? 1 Filed at: 03/13/2021 1753   2  Do you use more than one drug at a time? 0 Filed at: 03/13/2021 1753   3  Have you had medical problems as a result of your drug use (e g , memory loss, hepatitis, convulsions, bleeding, etc )? 0 Filed at: 03/13/2021 1753   4  Have you had "blackouts" or "flashbacks" as a result of drug use? YesNo  1 Filed at: 03/13/2021 1753   5  Do you ever feel bad or guilty about your drug use? 1 Filed at: 03/13/2021 1753   6  Does your spouse (or parent) ever complain about your involvement with drugs? 0 Filed at: 03/13/2021 1753   7  Have you neglected your family because of your use of drugs? 0 Filed at: 03/13/2021 1753   8   Have you engaged in illegal activities in order to obtain drugs? 0 Filed at: 03/13/2021 1753   9  Have you ever experienced withdrawal symptoms (felt sick) when you stopped taking drugs? 0 Filed at: 03/13/2021 1753   10  Are you always able to stop using drugs when you want to?  0 Filed at: 03/13/2021 1753   DAST-10 Score  3 Filed at: 03/13/2021 1753                    MDM  Number of Diagnoses or Management Options  Accidental overdose of heroin, subsequent encounter:      Amount and/or Complexity of Data Reviewed  Obtain history from someone other than the patient: yes  Review and summarize past medical records: yes        Disposition  Final diagnoses:   Accidental overdose of heroin, subsequent encounter     Time reflects when diagnosis was documented in both MDM as applicable and the Disposition within this note     Time User Action Codes Description Comment    3/13/2021  6:29 PM Tawnya Meigs Add [T40 [id number]] Accidental overdose of heroin, subsequent encounter       ED Disposition     ED Disposition Condition Date/Time Comment    Discharge Stable Sat Mar 13, 2021  6:30 PM Eusebio Banks discharge to home/self care  Follow-up Information     Follow up With Specialties Details Why [address], [address]  [phone]            Patient's Medications   Discharge Prescriptions    No medications on file     No discharge procedures on file      PDMP Review       Value Time User    PDMP Reviewed  Yes 10/20/2020  4:01 PM Cory Mireles MD          ED Provider  Electronically Signed by           Alysa Trejo MD  03/13/21 5198

## 2021-03-15 ENCOUNTER — HOSPITAL ENCOUNTER (EMERGENCY)
Facility: HOSPITAL | Age: 56
Discharge: HOME | End: 2021-03-15
Attending: EMERGENCY MEDICINE | Admitting: EMERGENCY MEDICINE
Payer: COMMERCIAL

## 2021-03-15 ENCOUNTER — APPOINTMENT (EMERGENCY)
Dept: ULTRASOUND IMAGING | Facility: HOSPITAL | Age: 56
End: 2021-03-15
Payer: COMMERCIAL

## 2021-03-15 VITALS
BODY MASS INDEX: 31.26 KG/M2 | OXYGEN SATURATION: 97 % | TEMPERATURE: 97.6 F | HEART RATE: 82 BPM | SYSTOLIC BLOOD PRESSURE: 159 MMHG | WEIGHT: 208.6 LBS | DIASTOLIC BLOOD PRESSURE: 72 MMHG | RESPIRATION RATE: 18 BRPM

## 2021-03-15 DIAGNOSIS — N50.819 TESTICULAR PAIN: Primary | ICD-10-CM

## 2021-03-15 DIAGNOSIS — N41.9 PROSTATITIS: ICD-10-CM

## 2021-03-15 DIAGNOSIS — R30.0 DYSURIA: ICD-10-CM

## 2021-03-15 DIAGNOSIS — K40.90 INGUINAL HERNIA: ICD-10-CM

## 2021-03-15 LAB
BILIRUB UR QL STRIP: NEGATIVE
CLARITY UR: CLEAR
COLOR UR: YELLOW
GLUCOSE UR STRIP-MCNC: NEGATIVE MG/DL
HGB UR QL STRIP.AUTO: NEGATIVE
KETONES UR STRIP-MCNC: NEGATIVE MG/DL
LEUKOCYTE ESTERASE UR QL STRIP: NEGATIVE
NITRITE UR QL STRIP: NEGATIVE
PH UR STRIP.AUTO: 5 [PH]
PROT UR STRIP-MCNC: NEGATIVE MG/DL
SP GR UR STRIP.AUTO: 1.02 (ref 1–1.04)
UROBILINOGEN UA: NEGATIVE MG/DL

## 2021-03-15 PROCEDURE — 76870 US EXAM SCROTUM: CPT

## 2021-03-15 PROCEDURE — 99284 EMERGENCY DEPT VISIT MOD MDM: CPT

## 2021-03-15 PROCEDURE — 81003 URINALYSIS AUTO W/O SCOPE: CPT | Performed by: EMERGENCY MEDICINE

## 2021-03-15 PROCEDURE — 96372 THER/PROPH/DIAG INJ SC/IM: CPT

## 2021-03-15 PROCEDURE — 99285 EMERGENCY DEPT VISIT HI MDM: CPT | Performed by: EMERGENCY MEDICINE

## 2021-03-15 RX ORDER — CIPROFLOXACIN 500 MG/1
500 TABLET, FILM COATED ORAL 2 TIMES DAILY
Qty: 42 TABLET | Refills: 0 | Status: SHIPPED | OUTPATIENT
Start: 2021-03-15 | End: 2021-04-05

## 2021-03-15 RX ORDER — OXYCODONE HYDROCHLORIDE AND ACETAMINOPHEN 5; 325 MG/1; MG/1
1 TABLET ORAL EVERY 4 HOURS PRN
Qty: 10 TABLET | Refills: 0 | Status: SHIPPED | OUTPATIENT
Start: 2021-03-15 | End: 2021-05-14

## 2021-03-15 RX ADMIN — MORPHINE SULFATE 2 MG: 2 INJECTION, SOLUTION INTRAMUSCULAR; INTRAVENOUS at 12:27

## 2021-03-15 NOTE — ED NOTES
Patient transported to [address] [place] [address],[address] via stretcher with Phyllistine Outhouse from transport        [facility]  03/15/21 0046

## 2021-03-15 NOTE — ED NOTES
Patient not back from [address] [place] [address] [address] at this time        [facility]  03/15/21 7944

## 2021-03-15 NOTE — ED NOTES
Patient given ice water upon discharge  Patient left water in room        [facility]  03/15/21 5002

## 2021-03-17 LAB — RENIN PLAS-CCNC: 1.19 NG/ML/HR (ref 0.17–5.38)

## 2021-04-15 ENCOUNTER — HOSPITAL ENCOUNTER (EMERGENCY)
Facility: HOSPITAL | Age: 56
Discharge: HOME | End: 2021-04-15
Attending: EMERGENCY MEDICINE | Admitting: EMERGENCY MEDICINE
Payer: COMMERCIAL

## 2021-04-15 ENCOUNTER — APPOINTMENT (EMERGENCY)
Dept: RADIOLOGY | Facility: HOSPITAL | Age: 56
End: 2021-04-15
Payer: COMMERCIAL

## 2021-04-15 ENCOUNTER — APPOINTMENT (EMERGENCY)
Dept: CT IMAGING | Facility: HOSPITAL | Age: 56
End: 2021-04-15
Payer: COMMERCIAL

## 2021-04-15 VITALS
BODY MASS INDEX: 31.48 KG/M2 | OXYGEN SATURATION: 100 % | SYSTOLIC BLOOD PRESSURE: 160 MMHG | WEIGHT: 210.13 LBS | TEMPERATURE: 95.8 F | DIASTOLIC BLOOD PRESSURE: 96 MMHG | HEART RATE: 79 BPM | RESPIRATION RATE: 12 BRPM

## 2021-04-15 DIAGNOSIS — R10.9 FLANK PAIN: ICD-10-CM

## 2021-04-15 DIAGNOSIS — M79.641 RIGHT HAND PAIN: Primary | ICD-10-CM

## 2021-04-15 LAB
ALBUMIN SERPL BCP-MCNC: 4.1 G/DL (ref 3–5.2)
ALP SERPL-CCNC: 76 U/L (ref 43–122)
ALT SERPL W P-5'-P-CCNC: 25 U/L
ANION GAP SERPL CALCULATED.3IONS-SCNC: 6 MMOL/L (ref 5–14)
AST SERPL W P-5'-P-CCNC: 25 U/L (ref 17–59)
BASOPHILS # BLD AUTO: 0.1 THOUSANDS/ΜL (ref 0–0.1)
BASOPHILS NFR BLD AUTO: 1 % (ref 0–1)
BILIRUB SERPL-MCNC: 0.4 MG/DL
BILIRUB UR QL STRIP: NEGATIVE
BUN SERPL-MCNC: 13 MG/DL (ref 5–25)
CALCIUM SERPL-MCNC: 9.5 MG/DL (ref 8.4–10.2)
CHLORIDE SERPL-SCNC: 100 MMOL/L (ref 97–108)
CLARITY UR: CLEAR
CO2 SERPL-SCNC: 31 MMOL/L (ref 22–30)
COLOR UR: YELLOW
CREAT SERPL-MCNC: 0.8 MG/DL (ref 0.7–1.5)
EOSINOPHIL # BLD AUTO: 0.2 THOUSAND/ΜL (ref 0–0.4)
EOSINOPHIL NFR BLD AUTO: 3 % (ref 0–6)
ERYTHROCYTE [DISTWIDTH] IN BLOOD BY AUTOMATED COUNT: 13.3 %
GFR SERPL CREATININE-BSD FRML MDRD: 99 ML/MIN/1.73SQ M
GLUCOSE SERPL-MCNC: 159 MG/DL (ref 70–99)
GLUCOSE UR STRIP-MCNC: NEGATIVE MG/DL
HCT VFR BLD AUTO: 43.4 % (ref 41–53)
HGB BLD-MCNC: 14.7 G/DL (ref 13.5–17.5)
HGB UR QL STRIP.AUTO: NEGATIVE
KETONES UR STRIP-MCNC: NEGATIVE MG/DL
LEUKOCYTE ESTERASE UR QL STRIP: NEGATIVE
LYMPHOCYTES # BLD AUTO: 1.3 THOUSANDS/ΜL (ref 0.5–4)
LYMPHOCYTES NFR BLD AUTO: 26 % (ref 25–45)
MCH RBC QN AUTO: 28.8 PG (ref 26–34)
MCHC RBC AUTO-ENTMCNC: 33.8 G/DL (ref 31–36)
MCV RBC AUTO: 85 FL (ref 80–100)
MONOCYTES # BLD AUTO: 0.4 THOUSAND/ΜL (ref 0.2–0.9)
MONOCYTES NFR BLD AUTO: 9 % (ref 1–10)
NEUTROPHILS # BLD AUTO: 3.1 THOUSANDS/ΜL (ref 1.8–7.8)
NEUTS SEG NFR BLD AUTO: 62 % (ref 45–65)
NITRITE UR QL STRIP: NEGATIVE
PH UR STRIP.AUTO: 7 [PH]
PLATELET # BLD AUTO: 240 THOUSANDS/UL (ref 150–450)
PMV BLD AUTO: 8 FL (ref 8.9–12.7)
POTASSIUM SERPL-SCNC: 4.1 MMOL/L (ref 3.6–5)
PROT SERPL-MCNC: 6.8 G/DL (ref 5.9–8.4)
PROT UR STRIP-MCNC: NEGATIVE MG/DL
RBC # BLD AUTO: 5.1 MILLION/UL (ref 4.5–5.9)
SODIUM SERPL-SCNC: 137 MMOL/L (ref 137–147)
SP GR UR STRIP.AUTO: 1.01 (ref 1–1.04)
UROBILINOGEN UA: NEGATIVE MG/DL
WBC # BLD AUTO: 5 THOUSAND/UL (ref 4.5–11)

## 2021-04-15 PROCEDURE — 85025 COMPLETE CBC W/AUTO DIFF WBC: CPT | Performed by: EMERGENCY MEDICINE

## 2021-04-15 PROCEDURE — 99284 EMERGENCY DEPT VISIT MOD MDM: CPT | Performed by: EMERGENCY MEDICINE

## 2021-04-15 PROCEDURE — 99284 EMERGENCY DEPT VISIT MOD MDM: CPT

## 2021-04-15 PROCEDURE — 96361 HYDRATE IV INFUSION ADD-ON: CPT

## 2021-04-15 PROCEDURE — 80053 COMPREHEN METABOLIC PANEL: CPT | Performed by: EMERGENCY MEDICINE

## 2021-04-15 PROCEDURE — 74176 CT ABD & PELVIS W/O CONTRAST: CPT

## 2021-04-15 PROCEDURE — 96360 HYDRATION IV INFUSION INIT: CPT

## 2021-04-15 PROCEDURE — 36415 COLL VENOUS BLD VENIPUNCTURE: CPT | Performed by: EMERGENCY MEDICINE

## 2021-04-15 PROCEDURE — 73130 X-RAY EXAM OF HAND: CPT

## 2021-04-15 RX ORDER — ACETAMINOPHEN 325 MG/1
975 TABLET ORAL EVERY 6 HOURS PRN
Status: DISCONTINUED | OUTPATIENT
Start: 2021-04-15 | End: 2021-04-15 | Stop reason: HOSPADM

## 2021-04-15 RX ORDER — ACETAMINOPHEN 325 MG/1
650 TABLET ORAL ONCE
Status: DISCONTINUED | OUTPATIENT
Start: 2021-04-15 | End: 2021-04-15

## 2021-04-15 RX ADMIN — SODIUM CHLORIDE 1000 ML: 0.9 INJECTION, SOLUTION INTRAVENOUS at 12:03

## 2021-04-15 RX ADMIN — ACETAMINOPHEN 975 MG: 325 TABLET, FILM COATED ORAL at 13:14

## 2021-04-15 NOTE — ED PROVIDER NOTES
History  Chief Complaint   Patient presents with    Flank Pain     states right side that goes toward the groin pain like a burning states for past week; comes and goes   Hand Pain     states right hand pain for past month with swelling, maybe from when OD and might have laid on it wrong? 48-year-old gentleman presents with complaint of right flank pain over the past several days  He states that the pain radiates from his right groin area around to his right flank  Describes a burning sensation but denies overt dysuria or any gross hematuria  Contacted his primary care provider who referred him to the ER for further evaluation  He also is complaining of several weeks pain along the radial/palmar surface of his right hand  This pain is worse with palpation and with forming a fist         Abdominal Pain  Pain location:  R flank  Pain quality: aching and burning    Pain severity:  Mild  Onset quality:  Gradual  Timing:  Constant  Progression:  Waxing and waning  Chronicity:  New  Relieved by:  Nothing  Worsened by:  Nothing  Ineffective treatments:  None tried  Associated symptoms: no anorexia, no belching, no chest pain, no chills, no constipation, no cough, no diarrhea, no dysuria, no fatigue, no fever, no flatus, no hematuria, no melena, no nausea, no shortness of breath, no sore throat and no vomiting    Hand Pain  Onset quality:  Gradual  Duration:  2 weeks  Timing:  Constant  Progression:  Partially resolved  Chronicity:  New  Relieved by:  Nothing  Worsened by: Movement/palpation  Associated symptoms: abdominal pain    Associated symptoms: no chest pain, no congestion, no cough, no diarrhea, no ear pain, no fatigue, no fever, no headaches, no loss of consciousness, no myalgias, no nausea, no rash, no rhinorrhea, no shortness of breath, no sore throat, no vomiting and no wheezing        Prior to Admission Medications   Prescriptions Last Dose Informant Patient Reported? Taking?    Blood Glucose Monitoring Suppl [facility] BLOOD GLUCOSE METER) w/Device KIT  Self Yes No   Sig: by Does not apply route   Calcium Carb-Cholecalciferol (CALCIUM CARBONATE-VITAMIN D3 PO)  Self Yes No   Sig: Take 1,500 mg by mouth daily   Insulin Pen Needle 32G X 4 MM MISC  Self Yes No   Sig: Unifine Pentips Plus 32 gauge x 5/32" needle   TAMSULOSIN HCL PO  Self Yes No   Sig: Take 0 4 mg by mouth daily    albuterol (2 5 mg/3 mL) 0 083 % nebulizer solution  Self No No   Sig: Take 1 vial (2 5 mg total) by nebulization every 4 (four) hours as needed for shortness of breath   albuterol (PROVENTIL HFA,VENTOLIN HFA) 90 mcg/act inhaler  Self No No   Sig: Inhale 2 puffs every 4 (four) hours as needed for wheezing   amLODIPine (NORVASC) 5 mg tablet  Self Yes No   Sig: Take 5 mg by mouth daily   atorvastatin (LIPITOR) 80 mg tablet  Self Yes No   Sig: Take 80 mg by mouth daily at bedtime   bimatoprost (LUMIGAN) 0 03 % ophthalmic drops  Self Yes No   Sig: Administer 1 drop to both eyes daily at bedtime     brimonidine (ALPHAGAN P) 0 15 % ophthalmic solution  Self Yes No   Sig: Administer 1 drop to both eyes every 8 (eight) hours     carBAMazepine (TEGRETOL) 200 mg tablet  Self Yes No   Sig: Take 2 tabs in the morning and 3 tabs at night   dupilumab (DUPIXENT) subcutaneous injection  Self Yes No   Sig: Inject under the skin every 14 (fourteen) days   finasteride (PROSCAR) 5 mg tablet  Self Yes No   Sig: Take 5 mg by mouth daily   fluticasone (FLONASE) 50 mcg/act nasal spray  Self Yes No   Si spray into each nostril daily   gabapentin (NEURONTIN) 300 mg capsule  Self Yes No   Sig: Take 300 mg by mouth 3 (three) times a day   glucagon 1 MG injection  Self Yes No   Sig: as needed   guaiFENesin (MUCINEX) 600 mg 12 hr tablet  Self No No   Sig: Take 1 tablet (600 mg total) by mouth every 12 (twelve) hours   hydrOXYzine pamoate (VISTARIL) 50 mg capsule  Self Yes No   Sig: Take 50 mg by mouth Three times daily as needed   insulin aspart (NovoLOG) 100 units/mL injection  Self Yes No   Sig: Inject under the skin 3 (three) times a day before meals   insulin glargine (BASAGLAR KWIKPEN) 100 units/mL injection pen  Self Yes No   Sig: Inject 35 Units under the skin daily at bedtime    latanoprost (XALATAN) 0 005 % ophthalmic solution  Self Yes No   Sig: Administer 1 drop to both eyes daily   loratadine (CLARITIN) 10 mg tablet  Self Yes No   Sig: loratadine 10 mg tablet   melatonin 3 mg  Self Yes No   Sig: Take 3 mg by mouth daily at bedtime   methocarbamol (ROBAXIN) 750 mg tablet  Self No No   Sig: Take 1 tablet (750 mg total) by mouth 3 (three) times a day as needed for muscle spasms   montelukast (SINGULAIR) 10 mg tablet  Self Yes No   Sig: Take 10 mg by mouth daily     omega-3-acid ethyl esters (LOVAZA) 1 g capsule  Self Yes No   Sig: Take 1 g by mouth daily   omeprazole (PriLOSEC) 40 MG capsule  Self Yes No   Sig: Take 40 mg by mouth daily   oxyCODONE-acetaminophen (PERCOCET) 5-325 mg per tablet   No No   Sig: Take 1 tablet by mouth every 4 (four) hours as needed for moderate pain for up to 10 dosesMax Daily Amount: 6 tablets   perphenazine 2 mg tablet  Self Yes No   Sig: Take 2 mg by mouth daily   polyvinyl alcohol (LIQUIFILM TEARS) 1 4 % ophthalmic solution  Self Yes No   Si drops as needed for dry eyes   predniSONE 5 mg tablet  Self Yes No   Sig: Take 2 5 mg by mouth every other day On Monday, Wednesday, Friday    terbinafine (LamISIL) 1 % cream  Self Yes No   Sig: terbinafine HCl 1 % topical cream      Facility-Administered Medications: None       Past Medical History:   Diagnosis Date    Asthma     [facility]     [facility]     Enlarged prostate     Glaucoma     Hyperlipidemia     Hypertension     Overdose of heroin, accidental or unintentional, sequela     Psychiatric disorder     Seasonal allergic rhinitis     Seizures (HCC)        Past Surgical History:   Procedure Laterality Date    KNEE SURGERY      WRIST SURGERY Left        Family History   Problem Relation Age of Onset    Arthritis Mother      I have reviewed and agree with the history as documented  E-Cigarette/Vaping    E-Cigarette Use Never User      E-Cigarette/Vaping Substances    Nicotine No     THC No     CBD No     Flavoring No     Other No     Unknown No      Social History     Tobacco Use    Smoking status: Former Smoker     Packs/day: 2 00     Years: 27 00     Pack years: 54 00     Types: Cigarettes     Start date:      Quit date:      Years since quittin 3    Smokeless tobacco: Never Used   Substance Use Topics    Alcohol use: Not Currently     Frequency: 4 or more times a week     Drinks per session: 10 or more     Comment: socially    Drug use: Yes     Types: Heroin, Fentanyl     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       Review of Systems   Constitutional: Negative for activity change, chills, fatigue and fever  HENT: Negative  Negative for congestion, ear pain, postnasal drip, rhinorrhea, sinus pain, sore throat and trouble swallowing  Eyes: Negative  Respiratory: Negative  Negative for cough, shortness of breath and wheezing  Cardiovascular: Negative for chest pain  Gastrointestinal: Positive for abdominal pain and blood in stool (mild, chronic, from hemorrhoids)  Negative for anorexia, constipation, diarrhea, flatus, melena, nausea, rectal pain and vomiting  Endocrine: Negative  Genitourinary: Positive for flank pain  Negative for decreased urine volume, difficulty urinating, discharge, dysuria, frequency, genital sores, hematuria, penile pain, penile swelling, scrotal swelling, testicular pain and urgency  Musculoskeletal: Negative for arthralgias, back pain and myalgias  Skin: Negative  Negative for rash  Allergic/Immunologic: Negative  Neurological: Negative  Negative for loss of consciousness and headaches  Hematological: Negative  Psychiatric/Behavioral: Negative      All other systems reviewed and are negative  Physical Exam  Physical Exam  Vitals signs and nursing note reviewed  Constitutional:       General: He is not in acute distress  Appearance: Normal appearance  He is well-developed  He is not ill-appearing, toxic-appearing or diaphoretic  HENT:      Head: Normocephalic and atraumatic  Right Ear: External ear normal       Left Ear: External ear normal       Nose: Nose normal       Mouth/Throat:      Mouth: Mucous membranes are moist       Pharynx: Oropharynx is clear  Eyes:      Conjunctiva/sclera: Conjunctivae normal       Pupils: Pupils are equal, round, and reactive to light  Neck:      Musculoskeletal: Neck supple  No neck rigidity  Cardiovascular:      Rate and Rhythm: Normal rate and regular rhythm  Heart sounds: Normal heart sounds  Pulmonary:      Effort: Pulmonary effort is normal  No respiratory distress  Breath sounds: Normal breath sounds  Abdominal:      General: Bowel sounds are normal  There is no distension  Palpations: Abdomen is soft  Tenderness: There is abdominal tenderness (mild, right flank)  There is no guarding  Musculoskeletal: Normal range of motion  General: No deformity  Right hand: He exhibits tenderness  He exhibits normal range of motion, normal capillary refill, no deformity and no laceration  Normal sensation noted  Hands:       Right lower leg: No edema  Left lower leg: No edema  Skin:     General: Skin is warm and dry  Capillary Refill: Capillary refill takes less than 2 seconds  Neurological:      General: No focal deficit present  Mental Status: He is alert and oriented to person, place, and time  Sensory: No sensory deficit     Psychiatric:         Mood and Affect: Mood normal          Behavior: Behavior normal          Vital Signs  ED Triage Vitals [04/15/21 1132]   Temperature Pulse Respirations Blood Pressure SpO2   (!) 96 8 °F (36 °C) 89 16 157/89 97 % Temp Source Heart Rate Source Patient Position - Orthostatic VS BP Location FiO2 (%)   Tympanic Monitor Sitting Left arm --      Pain Score       8           Vitals:    04/15/21 1132   BP: 157/89   Pulse: 89   Patient Position - Orthostatic VS: Sitting         Visual Acuity      ED Medications  Medications   acetaminophen (TYLENOL) tablet 975 mg (975 mg Oral Given 4/15/21 1314)   sodium chloride 0 9 % bolus 1,000 mL (1,000 mL Intravenous New Bag 4/15/21 1203)       Diagnostic Studies  Results Reviewed     Procedure Component Value Units Date/Time    UA (URINE) with reflex to Scope [[id number]]  (Normal) Collected: 04/15/21 1157    Lab Status: Final result Specimen: Urine, Other Updated: 04/15/21 1258     Color, UA Yellow     Clarity, UA Clear     Specific Gravity, UA 1 015     pH, UA 7 0     Leukocytes, UA Negative     Nitrite, UA Negative     Protein, UA Negative mg/dl      Glucose, UA Negative mg/dl      Ketones, UA Negative mg/dl      Bilirubin, UA Negative     Blood, UA Negative     UROBILINOGEN UA Negative mg/dL     Comprehensive metabolic panel [[id number]]  (Abnormal) Collected: 04/15/21 1202    Lab Status: Final result Specimen: Blood from Arm, Right Updated: 04/15/21 1255     Sodium 137 mmol/L      Potassium 4 1 mmol/L      Chloride 100 mmol/L      CO2 31 mmol/L      ANION GAP 6 mmol/L      BUN 13 mg/dL      Creatinine 0 80 mg/dL      Glucose 159 mg/dL      Calcium 9 5 mg/dL      AST 25 U/L      ALT 25 U/L      Alkaline Phosphatase 76 U/L      Total Protein 6 8 g/dL      Albumin 4 1 g/dL      Total Bilirubin 0 40 mg/dL      eGFR 99 ml/min/1 73sq m     Narrative:      Raquel guidelines for Chronic Kidney Disease (CKD):     Stage 1 with normal or high GFR (GFR > 90 mL/min/1 73 square meters)    Stage 2 Mild CKD (GFR = 60-89 mL/min/1 73 square meters)    Stage 3A Moderate CKD (GFR = 45-59 mL/min/1 73 square meters)    Stage 3B Moderate CKD (GFR = 30-44 mL/min/1 73 square meters)    Stage 4 Severe CKD (GFR = 15-29 mL/min/1 73 square meters)    Stage 5 End Stage CKD (GFR <15 mL/min/1 73 square meters)  Note: GFR calculation is accurate only with a steady state creatinine    CBC and differential [[id number]]  (Abnormal) Collected: 04/15/21 1202    Lab Status: Final result Specimen: Blood from Arm, Right Updated: 04/15/21 1249     WBC 5 00 Thousand/uL      RBC 5 10 Million/uL      Hemoglobin 14 7 g/dL      Hematocrit 43 4 %      MCV 85 fL      MCH 28 8 pg      MCHC 33 8 g/dL      RDW 13 3 %      MPV 8 0 fL      Platelets 299 Thousands/uL      Neutrophils Relative 62 %      Lymphocytes Relative 26 %      Monocytes Relative 9 %      Eosinophils Relative 3 %      Basophils Relative 1 %      Neutrophils Absolute 3 10 Thousands/µL      Lymphocytes Absolute 1 30 Thousands/µL      Monocytes Absolute 0 40 Thousand/µL      Eosinophils Absolute 0 20 Thousand/µL      Basophils Absolute 0 10 Thousands/µL                  CT abdomen pelvis wo contrast   Final Result by Tory Mg MD (04/15 1440)      No acute findings  Workstation performed: [id number]         XR hand 3+ views RIGHT    (Results Pending)              Procedures  Procedures         ED Course                                           MDM  Number of Diagnoses or Management Options  Flank pain:   Right hand pain:   Diagnosis management comments: 25-year-old gentleman presents with complaint of right hand pain and right flank pain  His workup was unremarkable and his pain resolved after dose of Tylenol  Patient has follow-up with his primary care clinician in approximately one weeks time  He is aware of the importance of keeping that follow-up appointment along with reasons return to the ER         Amount and/or Complexity of Data Reviewed  Clinical lab tests: ordered and reviewed  Tests in the radiology section of CPT®: ordered and reviewed        Disposition  Final diagnoses:   Right hand pain   Flank pain Time reflects when diagnosis was documented in both MDM as applicable and the Disposition within this note     Time User Action Codes Description Comment    4/15/2021  2:46 PM Pollie Ply Add [I45 843] Right hand pain     4/15/2021  2:46 PM Pollie Ply Add [R10 9] Flank pain       ED Disposition     ED Disposition Condition Date/Time Comment    Discharge Stable Thu Apr 15, 2021  2:46 PM Josselyn Candler discharge to home/self care  Follow-up Information     Follow up With Specialties Details Why Rommel Rico MD Family Medicine Schedule an appointment as soon as possible for a visit   [address] [facility] [employer] [facility]  [phone]            Patient's Medications   Discharge Prescriptions    No medications on file     No discharge procedures on file      PDMP Review       Value Time User    PDMP Reviewed  Yes 10/20/2020  4:01 PM Hao Livingston MD          ED Provider  Electronically Signed by           Cierra Rucker DO  04/15/21 0259

## 2021-05-14 ENCOUNTER — APPOINTMENT (EMERGENCY)
Dept: RADIOLOGY | Facility: HOSPITAL | Age: 56
End: 2021-05-14
Payer: COMMERCIAL

## 2021-05-14 ENCOUNTER — HOSPITAL ENCOUNTER (EMERGENCY)
Facility: HOSPITAL | Age: 56
Discharge: HOME | End: 2021-05-14
Attending: EMERGENCY MEDICINE | Admitting: EMERGENCY MEDICINE
Payer: COMMERCIAL

## 2021-05-14 VITALS
BODY MASS INDEX: 30.99 KG/M2 | OXYGEN SATURATION: 96 % | RESPIRATION RATE: 18 BRPM | SYSTOLIC BLOOD PRESSURE: 130 MMHG | HEART RATE: 89 BPM | TEMPERATURE: 97.3 F | WEIGHT: 206.79 LBS | DIASTOLIC BLOOD PRESSURE: 79 MMHG

## 2021-05-14 DIAGNOSIS — M25.559 HIP PAIN: ICD-10-CM

## 2021-05-14 DIAGNOSIS — M25.519 SHOULDER PAIN: ICD-10-CM

## 2021-05-14 DIAGNOSIS — M77.8 TENDONITIS OF RIGHT HAND: Primary | ICD-10-CM

## 2021-05-14 PROCEDURE — 99283 EMERGENCY DEPT VISIT LOW MDM: CPT

## 2021-05-14 PROCEDURE — 99284 EMERGENCY DEPT VISIT MOD MDM: CPT | Performed by: PHYSICIAN ASSISTANT

## 2021-05-14 PROCEDURE — 73130 X-RAY EXAM OF HAND: CPT

## 2021-05-14 PROCEDURE — 73502 X-RAY EXAM HIP UNI 2-3 VIEWS: CPT

## 2021-05-14 PROCEDURE — 73030 X-RAY EXAM OF SHOULDER: CPT

## 2021-05-14 RX ORDER — CETIRIZINE HYDROCHLORIDE 10 MG/1
10 TABLET ORAL DAILY
COMMUNITY

## 2021-05-14 RX ORDER — LIDOCAINE 50 MG/G
1 PATCH TOPICAL DAILY
Qty: 10 PATCH | Refills: 0 | Status: SHIPPED | OUTPATIENT
Start: 2021-05-14

## 2021-05-14 RX ORDER — PROPRANOLOL/HYDROCHLOROTHIAZID 40 MG-25MG
TABLET ORAL
COMMUNITY

## 2021-05-14 RX ORDER — AMPICILLIN TRIHYDRATE 250 MG
500 CAPSULE ORAL DAILY
COMMUNITY

## 2021-05-14 RX ORDER — TOLNAFTATE 1 G/100G
1 POWDER TOPICAL 2 TIMES DAILY
COMMUNITY

## 2021-05-14 NOTE — Clinical Note
Rebecca Amin was seen and treated in our emergency department on 5/14/2021  No work until cleared by Family Doctor/Orthopedics    No heavy lifting or labor work until cleared by orthopedics    Diagnosis:     Luis Dorado    He may return on this date: If you have any questions or concerns, please don't hesitate to call        Neelam Marte PA-C    ______________________________           _______________          _______________  Hospital Representative                              Date                                Time

## 2021-05-14 NOTE — ED PROVIDER NOTES
History  Chief Complaint   Patient presents with    Shoulder Pain     right hand, shoulder and hip pain  Right hand pain x1 month, shoulder and hip since this AM  Tylenol taken last night  69-year-old male presenting for evaluation of right hand right shoulder and right hip pain  Patient states he has had right hand pain for months and does admit to writing frequently which exacerbates his pain in the right hand  Patient states he was active yesterday walking around and completing multiple tasks on his to do list  Pt denies any overhead work or lifting  No trauma, injury or falls  Prior to Admission Medications   Prescriptions Last Dose Informant Patient Reported? Taking?    Blood Glucose Monitoring Suppl ([facility] BLOOD GLUCOSE METER) w/Device KIT  Self Yes No   Sig: by Does not apply route   Calcium Carb-Cholecalciferol (CALCIUM CARBONATE-VITAMIN D3 PO)  Self Yes Yes   Sig: Take 1,500 mg by mouth daily   Cinnamon 500 MG capsule   Yes Yes   Sig: Take 500 mg by mouth daily   FIBER PO   Yes Yes   Sig: Take by mouth   Insulin Pen Needle 32G X 4 MM MISC  Self Yes No   Sig: Unifine Pentips Plus 32 gauge x 5/32" needle   TAMSULOSIN HCL PO  Self Yes Yes   Sig: Take 0 4 mg by mouth daily    Turmeric 500 MG CAPS   Yes Yes   Sig: Take by mouth   VITAMIN D PO   Yes Yes   Sig: Take by mouth   albuterol (2 5 mg/3 mL) 0 083 % nebulizer solution  Self No Yes   Sig: Take 1 vial (2 5 mg total) by nebulization every 4 (four) hours as needed for shortness of breath   albuterol (PROVENTIL HFA,VENTOLIN HFA) 90 mcg/act inhaler  Self No Yes   Sig: Inhale 2 puffs every 4 (four) hours as needed for wheezing   amLODIPine (NORVASC) 5 mg tablet  Self Yes Yes   Sig: Take 5 mg by mouth daily   atorvastatin (LIPITOR) 80 mg tablet  Self Yes Yes   Sig: Take 80 mg by mouth daily at bedtime   bimatoprost (LUMIGAN) 0 03 % ophthalmic drops  Self Yes Yes   Sig: Administer 1 drop to both eyes daily at bedtime     brimonidine (ALPHAGAN P) 0 15 % ophthalmic solution  Self Yes Yes   Sig: Administer 1 drop to both eyes every 8 (eight) hours     carBAMazepine (TEGRETOL) 200 mg tablet  Self Yes Yes   Sig: Take 2 tabs in the morning and 3 tabs at night   cetirizine (ZyrTEC) 10 mg tablet   Yes Yes   Sig: Take 10 mg by mouth daily   dupilumab (DUPIXENT) subcutaneous injection  Self Yes Yes   Sig: Inject under the skin every 14 (fourteen) days   finasteride (PROSCAR) 5 mg tablet  Self Yes Yes   Sig: Take 5 mg by mouth daily   fluticasone (FLONASE) 50 mcg/act nasal spray  Self Yes Yes   Si spray into each nostril daily   gabapentin (NEURONTIN) 300 mg capsule Not Taking at Unknown time Self Yes No   Sig: Take 300 mg by mouth 3 (three) times a day   glucagon 1 MG injection  Self Yes Yes   Sig: as needed   guaiFENesin (MUCINEX) 600 mg 12 hr tablet  Self No Yes   Sig: Take 1 tablet (600 mg total) by mouth every 12 (twelve) hours   hydrOXYzine pamoate (VISTARIL) 50 mg capsule  Self Yes Yes   Sig: Take 50 mg by mouth Three times daily as needed   insulin aspart (NovoLOG) 100 units/mL injection  Self Yes Yes   Sig: Inject under the skin 3 (three) times a day before meals   insulin glargine (BASAGLAR KWIKPEN) 100 units/mL injection pen  Self Yes Yes   Sig: Inject 43 Units under the skin daily at bedtime    latanoprost (XALATAN) 0 005 % ophthalmic solution  Self Yes Yes   Sig: Administer 1 drop to both eyes daily   montelukast (SINGULAIR) 10 mg tablet  Self Yes Yes   Sig: Take 10 mg by mouth daily     omega-3-acid ethyl esters (LOVAZA) 1 g capsule  Self Yes Yes   Sig: Take 1 g by mouth daily   omeprazole (PriLOSEC) 40 MG capsule  Self Yes Yes   Sig: Take 40 mg by mouth daily   perphenazine 2 mg tablet  Self Yes Yes   Sig: Take 2 mg by mouth daily   polyvinyl alcohol (LIQUIFILM TEARS) 1 4 % ophthalmic solution  Self Yes Yes   Si drops as needed for dry eyes   predniSONE 5 mg tablet  Self Yes Yes   Sig: Take 2 5 mg by mouth every other day On Monday, Wednesday, Friday    tolnaftate (TINACTIN) 1 % powder   Yes Yes   Sig: Apply 1 application topically 2 (two) times a day      Facility-Administered Medications: None       Past Medical History:   Diagnosis Date    Asthma     [facility]     [facility] ([facility]     Enlarged prostate     Glaucoma     Hyperlipidemia     Hypertension     Overdose of heroin, accidental or unintentional, sequela     Psychiatric disorder     Seasonal allergic rhinitis     Seizures (HCC)        Past Surgical History:   Procedure Laterality Date    KNEE SURGERY      WRIST SURGERY Left        Family History   Problem Relation Age of Onset    Arthritis Mother      I have reviewed and agree with the history as documented  E-Cigarette/Vaping    E-Cigarette Use Never User      E-Cigarette/Vaping Substances    Nicotine No     THC No     CBD No     Flavoring No     Other No     Unknown No      Social History     Tobacco Use    Smoking status: Former Smoker     Packs/day: 2 00     Years: 27 00     Pack years: 54 00     Types: Cigarettes     Start date:      Quit date:      Years since quittin 3    Smokeless tobacco: Never Used   Substance Use Topics    Alcohol use: Not Currently     Frequency: 4 or more times a week     Drinks per session: 10 or more     Comment: socially    Drug use: Not Currently     Types: Heroin, Fentanyl     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       Review of Systems   All other systems reviewed and are negative  Physical Exam  Physical Exam  Vitals signs and nursing note reviewed  Constitutional:       General: He is not in acute distress  Appearance: He is well-developed  HENT:      Head: Normocephalic and atraumatic  Eyes:      Conjunctiva/sclera: Conjunctivae normal       Comments: EOM grossly intact   Neck:      Musculoskeletal: Normal range of motion and neck supple  Vascular: No JVD  Cardiovascular:      Rate and Rhythm: Normal rate     Pulmonary: Effort: Pulmonary effort is normal    Abdominal:      Palpations: Abdomen is soft  Musculoskeletal:        Arms:         Legs:       Comments: FROM, steady gait, cap refill brisk, strength and sensation grossly intact throughout   Skin:     General: Skin is warm and dry  Capillary Refill: Capillary refill takes less than 2 seconds  Neurological:      Mental Status: He is alert and oriented to person, place, and time  Psychiatric:         Behavior: Behavior normal          Vital Signs  ED Triage Vitals [05/14/21 0848]   Temperature Pulse Respirations Blood Pressure SpO2   (!) 97 3 °F (36 3 °C) 89 18 130/79 96 %      Temp Source Heart Rate Source Patient Position - Orthostatic VS BP Location FiO2 (%)   Tympanic Monitor Lying Left arm --      Pain Score       --           Vitals:    05/14/21 0848   BP: 130/79   Pulse: 89   Patient Position - Orthostatic VS: Lying         Visual Acuity      ED Medications  Medications - No data to display    Diagnostic Studies  Results Reviewed     None                 XR hand 3+ views RIGHT    (Results Pending)   XR shoulder 2+ views RIGHT    (Results Pending)   XR hip/pelv 2-3 vws right if performed    (Results Pending)              Procedures  Procedures         ED Course                             SBIRT 20yo+      Most Recent Value   SBIRT (22 yo +)   In order to provide better care to our patients, we are screening all of our patients for alcohol and drug use  Would it be okay to ask you these screening questions? Yes Filed at: 05/14/2021 0853   Initial Alcohol Screen: US AUDIT-C    1  How often do you have a drink containing alcohol?  0 Filed at: 05/14/2021 0853   2  How many drinks containing alcohol do you have on a typical day you are drinking? 0 Filed at: 05/14/2021 0853   3a  Male UNDER 65: How often do you have five or more drinks on one occasion?   0 Filed at: 05/14/2021 0853   Audit-C Score  0 Filed at: 05/14/2021 0772   ROXY: How many times in the past year have you    Used an illegal drug or used a prescription medication for non-medical reasons? Never [reports has stopped since last OD] Filed at: 05/14/2021 0853                    [facility]  Number of Diagnoses or Management Options  Hip pain:   Shoulder pain:   Tendonitis of right hand:   Diagnosis management comments: 16-year-old male presenting for evaluation of chronic right hand pain and new onset R shoulder and R hip pain, likely secondary to increased activity yesterday and overuse R hand injury secondary to writing  Pt reports he is supposed to be putting new nathanael down for local [place] and needs a note saying he is unable to do that right now due to his pain, he is distally neurovascularly intact, advised supportive treatment at home, follow-up with PCP and Orthopedics as an outpatient    All imaging discussed with patient, strict return to ED precautions discussed  Pt verbalizes understanding and agrees with plan  Pt is stable for discharge    Portions of the record may have been created with voice recognition software  Occasional wrong word or "sound a like" substitutions may have occurred due to the inherent limitations of voice recognition software  Read the chart carefully and recognize, using context, where substitutions have occurred  Disposition  Final diagnoses:   Tendonitis of right hand   Shoulder pain   Hip pain     Time reflects when diagnosis was documented in both MDM as applicable and the Disposition within this note     Time User Action Codes Description Comment    5/14/2021  9:42 AM Liane GLOVER Add [M77 8] Tendonitis of right hand     5/14/2021  9:42 AM Liane GLOVER Add [M25 519] Shoulder pain     5/14/2021  9:42 AM Citlali Bain Add [M25 559] Hip pain       ED Disposition     ED Disposition Condition Date/Time Comment    Discharge Stable Fri May 14, 2021  9:42 AM Jimmie Beckman discharge to home/self care              Follow-up Information     Follow up With Specialties Details Why Contact Info Additional Information    Ignacio Alicia MD Family Medicine Call in 1 day  [address] [facility] Emergency Medicine Go to  If symptoms worsen [address] [phone] [address] [facility] Emergency Department    Ngozi Treadwell MD Orthopedic Surgery, Hand Surgery Call in 1 day  [facility]  Artis Tapia   [address]             Patient's Medications   Discharge Prescriptions    DICLOFENAC SODIUM (VOLTAREN) 1 %    Apply 2 g topically 4 (four) times a day       Start Date: 5/14/2021 End Date: --       Order Dose: 2 g       Quantity: 50 g    Refills: 0    LIDOCAINE (LIDODERM) 5 %    Apply 1 patch topically daily Remove & Discard patch within 12 hours or as directed by MD       Start Date: 5/14/2021 End Date: --       Order Dose: 1 patch       Quantity: 10 patch    Refills: 0     No discharge procedures on file      PDMP Review       Value Time User    PDMP Reviewed  Yes 10/20/2020  4:01 PM Keiar Stevenson MD          ED Provider  Electronically Signed by           Niko Pond PA-C  05/14/21 9467

## 2021-06-28 ENCOUNTER — HOSPITAL ENCOUNTER (EMERGENCY)
Facility: HOSPITAL | Age: 56
Discharge: HOME | End: 2021-06-28
Attending: EMERGENCY MEDICINE | Admitting: EMERGENCY MEDICINE
Payer: COMMERCIAL

## 2021-06-28 ENCOUNTER — APPOINTMENT (EMERGENCY)
Dept: ULTRASOUND IMAGING | Facility: HOSPITAL | Age: 56
End: 2021-06-28
Payer: COMMERCIAL

## 2021-06-28 ENCOUNTER — APPOINTMENT (EMERGENCY)
Dept: CT IMAGING | Facility: HOSPITAL | Age: 56
End: 2021-06-28
Payer: COMMERCIAL

## 2021-06-28 VITALS
TEMPERATURE: 97.5 F | DIASTOLIC BLOOD PRESSURE: 81 MMHG | SYSTOLIC BLOOD PRESSURE: 120 MMHG | WEIGHT: 206.79 LBS | OXYGEN SATURATION: 99 % | RESPIRATION RATE: 16 BRPM | HEART RATE: 76 BPM | BODY MASS INDEX: 30.99 KG/M2

## 2021-06-28 DIAGNOSIS — R10.2 SUPRAPUBIC PAIN: ICD-10-CM

## 2021-06-28 DIAGNOSIS — R10.9 RIGHT FLANK PAIN: ICD-10-CM

## 2021-06-28 DIAGNOSIS — N50.819 TESTICULAR PAIN: Primary | ICD-10-CM

## 2021-06-28 DIAGNOSIS — N43.3 HYDROCELE: ICD-10-CM

## 2021-06-28 DIAGNOSIS — K40.90 INGUINAL HERNIA: ICD-10-CM

## 2021-06-28 LAB
ALBUMIN SERPL BCP-MCNC: 3.6 G/DL (ref 3–5.2)
ALP SERPL-CCNC: 66 U/L (ref 43–122)
ALT SERPL W P-5'-P-CCNC: 24 U/L
ANION GAP SERPL CALCULATED.3IONS-SCNC: 6 MMOL/L (ref 5–14)
AST SERPL W P-5'-P-CCNC: 23 U/L (ref 17–59)
BASOPHILS # BLD AUTO: 0 THOUSANDS/ΜL (ref 0–0.1)
BASOPHILS NFR BLD AUTO: 1 % (ref 0–1)
BILIRUB SERPL-MCNC: 0.19 MG/DL
BILIRUB UR QL STRIP: NEGATIVE
BUN SERPL-MCNC: 13 MG/DL (ref 5–25)
CALCIUM SERPL-MCNC: 9.1 MG/DL (ref 8.4–10.2)
CHLORIDE SERPL-SCNC: 106 MMOL/L (ref 97–108)
CLARITY UR: CLEAR
CO2 SERPL-SCNC: 28 MMOL/L (ref 22–30)
COLOR UR: ABNORMAL
CREAT SERPL-MCNC: 0.8 MG/DL (ref 0.7–1.5)
EOSINOPHIL # BLD AUTO: 0.2 THOUSAND/ΜL (ref 0–0.4)
EOSINOPHIL NFR BLD AUTO: 5 % (ref 0–6)
ERYTHROCYTE [DISTWIDTH] IN BLOOD BY AUTOMATED COUNT: 13.5 %
GFR SERPL CREATININE-BSD FRML MDRD: 99 ML/MIN/1.73SQ M
GLUCOSE SERPL-MCNC: 154 MG/DL (ref 70–99)
GLUCOSE UR STRIP-MCNC: ABNORMAL MG/DL
HCT VFR BLD AUTO: 41.8 % (ref 41–53)
HGB BLD-MCNC: 14 G/DL (ref 13.5–17.5)
HGB UR QL STRIP.AUTO: NEGATIVE
KETONES UR STRIP-MCNC: ABNORMAL MG/DL
LEUKOCYTE ESTERASE UR QL STRIP: NEGATIVE
LYMPHOCYTES # BLD AUTO: 1.2 THOUSANDS/ΜL (ref 0.5–4)
LYMPHOCYTES NFR BLD AUTO: 25 % (ref 25–45)
MCH RBC QN AUTO: 28.5 PG (ref 26–34)
MCHC RBC AUTO-ENTMCNC: 33.5 G/DL (ref 31–36)
MCV RBC AUTO: 85 FL (ref 80–100)
MONOCYTES # BLD AUTO: 0.5 THOUSAND/ΜL (ref 0.2–0.9)
MONOCYTES NFR BLD AUTO: 10 % (ref 1–10)
NEUTROPHILS # BLD AUTO: 2.9 THOUSANDS/ΜL (ref 1.8–7.8)
NEUTS SEG NFR BLD AUTO: 59 % (ref 45–65)
NITRITE UR QL STRIP: NEGATIVE
PH UR STRIP.AUTO: 5 [PH]
PLATELET # BLD AUTO: 246 THOUSANDS/UL (ref 150–450)
PMV BLD AUTO: 7.6 FL (ref 8.9–12.7)
POTASSIUM SERPL-SCNC: 3.9 MMOL/L (ref 3.6–5)
PROT SERPL-MCNC: 6.2 G/DL (ref 5.9–8.4)
PROT UR STRIP-MCNC: NEGATIVE MG/DL
RBC # BLD AUTO: 4.91 MILLION/UL (ref 4.5–5.9)
SODIUM SERPL-SCNC: 140 MMOL/L (ref 137–147)
SP GR UR STRIP.AUTO: 1.02 (ref 1–1.04)
UROBILINOGEN UA: NEGATIVE MG/DL
WBC # BLD AUTO: 4.8 THOUSAND/UL (ref 4.5–11)

## 2021-06-28 PROCEDURE — NC001 PR NO CHARGE: Performed by: EMERGENCY MEDICINE

## 2021-06-28 PROCEDURE — 96361 HYDRATE IV INFUSION ADD-ON: CPT

## 2021-06-28 PROCEDURE — 81003 URINALYSIS AUTO W/O SCOPE: CPT | Performed by: EMERGENCY MEDICINE

## 2021-06-28 PROCEDURE — 96374 THER/PROPH/DIAG INJ IV PUSH: CPT

## 2021-06-28 PROCEDURE — 74177 CT ABD & PELVIS W/CONTRAST: CPT

## 2021-06-28 PROCEDURE — 76870 US EXAM SCROTUM: CPT

## 2021-06-28 PROCEDURE — 80053 COMPREHEN METABOLIC PANEL: CPT | Performed by: EMERGENCY MEDICINE

## 2021-06-28 PROCEDURE — 36415 COLL VENOUS BLD VENIPUNCTURE: CPT | Performed by: EMERGENCY MEDICINE

## 2021-06-28 PROCEDURE — 99284 EMERGENCY DEPT VISIT MOD MDM: CPT | Performed by: EMERGENCY MEDICINE

## 2021-06-28 PROCEDURE — 99284 EMERGENCY DEPT VISIT MOD MDM: CPT

## 2021-06-28 PROCEDURE — 85025 COMPLETE CBC W/AUTO DIFF WBC: CPT | Performed by: EMERGENCY MEDICINE

## 2021-06-28 RX ORDER — CIPROFLOXACIN 500 MG/1
500 TABLET, FILM COATED ORAL 2 TIMES DAILY
Qty: 14 TABLET | Refills: 0 | Status: SHIPPED | OUTPATIENT
Start: 2021-06-28 | End: 2021-07-05

## 2021-06-28 RX ORDER — CIPROFLOXACIN 250 MG/1
500 TABLET, FILM COATED ORAL ONCE
Status: COMPLETED | OUTPATIENT
Start: 2021-06-28 | End: 2021-06-28

## 2021-06-28 RX ADMIN — SODIUM CHLORIDE 1000 ML: 0.9 INJECTION, SOLUTION INTRAVENOUS at 09:21

## 2021-06-28 RX ADMIN — IOHEXOL 50 ML: 240 INJECTION, SOLUTION INTRATHECAL; INTRAVASCULAR; INTRAVENOUS; ORAL at 10:52

## 2021-06-28 RX ADMIN — MORPHINE SULFATE 2 MG: 2 INJECTION, SOLUTION INTRAMUSCULAR; INTRAVENOUS at 09:24

## 2021-06-28 RX ADMIN — CIPROFLOXACIN 500 MG: 250 TABLET, FILM COATED ORAL at 15:55

## 2021-06-28 RX ADMIN — IOHEXOL 100 ML: 350 INJECTION, SOLUTION INTRAVENOUS at 12:46

## 2021-06-28 NOTE — ED PROVIDER NOTES
Patient signed out to me by Dr Letitia Neri  Patient 61year old male pending US  Patient with lower abdominal discomfort  Reviewed labs and CT no acute pathology  3:33 PM  Patient with no acute pathology on US  Will dc home with empiric Cipro  Will need follow up with Urology as well as PCP     3:37 PM  Patient resting in bed  Updated on [address], and labs  Patient states he has appointment on Wednesday with urology   will refer to urology and dc home  On exam, patient is talking on the phone in no distress  Ambulating around ED without ataxia  EOMI  PERRLA  No respiratory distress       Labs Reviewed   CBC AND DIFFERENTIAL - Abnormal       Result Value Ref Range Status    WBC 4 80  4 50 - 11 00 Thousand/uL Final    RBC 4 91  4 50 - 5 90 Million/uL Final    Hemoglobin 14 0  13 5 - 17 5 g/dL Final    Hematocrit 41 8  41 0 - 53 0 % Final    MCV 85  80 - 100 fL Final    MCH 28 5  26 0 - 34 0 pg Final    MCHC 33 5  31 0 - 36 0 g/dL Final    RDW 13 5  <15 3 % Final    MPV 7 6 (*) 8 9 - 12 7 fL Final    Platelets 294  531 - 450 Thousands/uL Final    Neutrophils Relative 59  45 - 65 % Final    Lymphocytes Relative 25  25 - 45 % Final    Monocytes Relative 10  1 - 10 % Final    Eosinophils Relative 5  0 - 6 % Final    Basophils Relative 1  0 - 1 % Final    Neutrophils Absolute 2 90  1 80 - 7 80 Thousands/µL Final    Lymphocytes Absolute 1 20  0 50 - 4 00 Thousands/µL Final    Monocytes Absolute 0 50  0 20 - 0 90 Thousand/µL Final    Eosinophils Absolute 0 20  0 00 - 0 40 Thousand/µL Final    Basophils Absolute 0 00  0 00 - 0 10 Thousands/µL Final   COMPREHENSIVE METABOLIC PANEL - Abnormal    Sodium 140  137 - 147 mmol/L Final    Potassium 3 9  3 6 - 5 0 mmol/L Final    Chloride 106  97 - 108 mmol/L Final    CO2 28  22 - 30 mmol/L Final    ANION GAP 6  5 - 14 mmol/L Final    BUN 13  5 - 25 mg/dL Final    Creatinine 0 80  0 70 - 1 50 mg/dL Final    Comment: Standardized to IDMS reference method    Glucose 154 (*) 70 - 99 mg/dL Final    Comment: If the patient is fasting, the ADA then defines impaired fasting glucose as > 100 mg/dL and diabetes as > or equal to 123 mg/dL  Specimen collection should occur prior to Sulfasalazine administration due to the potential for falsely depressed results  Specimen collection should occur prior to Sulfapyridine administration due to the potential for falsely elevated results  Calcium 9 1  8 4 - 10 2 mg/dL Final    AST 23  17 - 59 U/L Final    Comment: Specimen collection should occur prior to Sulfasalazine administration due to the potential for falsely depressed results  ALT 24  <50 U/L Final    Comment: Specimen collection should occur prior to Sulfasalazine administration due to the potential for falsely depressed results       Alkaline Phosphatase 66  43 - 122 U/L Final    Total Protein 6 2  5 9 - 8 4 g/dL Final    Albumin 3 6  3 0 - 5 2 g/dL Final    Total Bilirubin 0 19  <1 30 mg/dL Final    eGFR 99  >60 ml/min/1 73sq m Final    Narrative:     National Kidney Disease Foundation guidelines for Chronic Kidney Disease (CKD):     Stage 1 with normal or high GFR (GFR > 90 mL/min/1 73 square meters)    Stage 2 Mild CKD (GFR = 60-89 mL/min/1 73 square meters)    Stage 3A Moderate CKD (GFR = 45-59 mL/min/1 73 square meters)    Stage 3B Moderate CKD (GFR = 30-44 mL/min/1 73 square meters)    Stage 4 Severe CKD (GFR = 15-29 mL/min/1 73 square meters)    Stage 5 End Stage CKD (GFR <15 mL/min/1 73 square meters)  Note: GFR calculation is accurate only with a steady state creatinine   UA W REFLEX TO MICROSCOPIC WITH REFLEX TO CULTURE - Abnormal    Color, UA Hina (*) Straw, Yellow, Pale Yellow Final    Clarity, UA Clear  Clear, Other Final    Specific Gravity, UA 1 025  1 003 - 1 040 Final    pH, UA 5 0  4 5, 5 0, 5 5, 6 0, 6 5, 7 0, 7 5, 8 0 Final    Leukocytes, UA Negative  Negative Final    Nitrite, UA Negative  Negative Final    Protein, UA Negative  Negative mg/dl Final    Glucose, UA 50 (1/25%) (*) Negative mg/dl Final    Ketones, UA 5 (Trace) (*) Negative mg/dl Final    Bilirubin, UA Negative  Negative Final    Blood, UA Negative  Negative Final    UROBILINOGEN UA Negative  1 0, Negative mg/dL Final          Bernardino Barker DO  06/28/21 1539

## 2021-06-28 NOTE — ED PROVIDER NOTES
History  No chief complaint on file  23-year-old gentleman presents with complaint of dysuria along with lower abdominal discomfort specifically in the right lower quadrant  He has had discolored and smelly urine and feels though he is not able to completely empty his bladder  He has a history of prostatitis and states that it feels very similar to that again  He has been successfully treated in the past with Cipro and analgesia  He denies any fever/chills, chest pain, difficulty breathing, GI symptoms, or other acute issues at this time  He states that Tylenol and ibuprofen do not work for him  At times he is having abdominal pain that is radiating into his testicles  Abdominal Pain  Pain location:  LLQ, RLQ and suprapubic  Pain quality: aching and dull    Pain severity:  Moderate  Onset quality:  Gradual  Timing:  Constant  Progression:  Worsening  Chronicity:  Recurrent  Relieved by:  Nothing  Worsened by:  Nothing  Ineffective treatments:  None tried  Associated symptoms: dysuria    Associated symptoms: no anorexia, no belching, no chest pain, no chills, no constipation, no cough, no diarrhea, no fatigue, no fever, no flatus, no hematemesis, no hematochezia, no hematuria, no melena, no nausea, no shortness of breath, no sore throat and no vomiting        Cannot display prior to admission medications because the patient has not been admitted in this contact         Past Medical History:   Diagnosis Date    Asthma     [facility]     [facility]     Enlarged prostate     Glaucoma     Hyperlipidemia     Hypertension     Overdose of heroin, accidental or unintentional, sequela     Psychiatric disorder     Seasonal allergic rhinitis     Seizures (HCC)        Past Surgical History:   Procedure Laterality Date    KNEE SURGERY      WRIST SURGERY Left        Family History   Problem Relation Age of Onset    Arthritis Mother      I have reviewed and agree with the history as documented  E-Cigarette/Vaping    E-Cigarette Use Never User      E-Cigarette/Vaping Substances    Nicotine No     THC No     CBD No     Flavoring No     Other No     Unknown No      Social History     Tobacco Use    Smoking status: Former Smoker     Packs/day: 2 00     Years: 27 00     Pack years: 54 00     Types: Cigarettes     Start date: 35     Quit date:      Years since quittin 5    Smokeless tobacco: Never Used   Vaping Use    Vaping Use: Never used   Substance Use Topics    Alcohol use: Not Currently     Comment: socially    Drug use: Not Currently     Types: Heroin, Fentanyl     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       Review of Systems   Constitutional: Negative for activity change, chills, fatigue and fever  HENT: Negative  Negative for congestion, postnasal drip, rhinorrhea, sinus pain, sore throat and trouble swallowing  Eyes: Negative  Respiratory: Negative  Negative for cough and shortness of breath  Cardiovascular: Negative for chest pain  Gastrointestinal: Positive for abdominal pain  Negative for anorexia, constipation, diarrhea, flatus, hematemesis, hematochezia, melena, nausea and vomiting  Endocrine: Negative  Genitourinary: Positive for difficulty urinating, dysuria, frequency, testicular pain (when abd pain is severe) and urgency  Negative for discharge, hematuria, penile pain, penile swelling and scrotal swelling  Musculoskeletal: Negative for arthralgias, back pain and myalgias  Skin: Negative  Allergic/Immunologic: Negative  Neurological: Negative  Hematological: Negative  Psychiatric/Behavioral: Negative  All other systems reviewed and are negative  Physical Exam  Physical Exam  Vitals and nursing note reviewed  Constitutional:       General: He is not in acute distress  Appearance: Normal appearance  He is well-developed  He is not ill-appearing, toxic-appearing or diaphoretic     HENT:      Head: Normocephalic and atraumatic  Right Ear: External ear normal       Left Ear: External ear normal       Nose: Nose normal       Mouth/Throat:      Mouth: Mucous membranes are moist       Pharynx: Oropharynx is clear  Eyes:      Conjunctiva/sclera: Conjunctivae normal       Pupils: Pupils are equal, round, and reactive to light  Cardiovascular:      Rate and Rhythm: Normal rate and regular rhythm  Heart sounds: Normal heart sounds  Pulmonary:      Effort: Pulmonary effort is normal  No respiratory distress  Breath sounds: Normal breath sounds  Abdominal:      General: Bowel sounds are normal  There is no distension  Palpations: Abdomen is soft  Tenderness: There is no abdominal tenderness  There is no guarding  Musculoskeletal:         General: Normal range of motion  Cervical back: Neck supple  No rigidity  Right lower leg: No edema  Left lower leg: No edema  Skin:     General: Skin is warm and dry  Capillary Refill: Capillary refill takes less than 2 seconds  Neurological:      General: No focal deficit present  Mental Status: He is alert and oriented to person, place, and time  Psychiatric:         Mood and Affect: Mood normal          Behavior: Behavior normal          Vital Signs  ED Triage Vitals   Temp Pulse Resp BP SpO2   -- -- -- -- --      Temp src Heart Rate Source Patient Position - Orthostatic VS BP Location FiO2 (%)   -- -- -- -- --      Pain Score       --           There were no vitals filed for this visit  Visual Acuity      ED Medications  Medications - No data to display    Diagnostic Studies  Results Reviewed     None                 No orders to display              Procedures  Procedures         ED Course  ED Course as of Jun 29 1332 Mon Jun 28, 2021   1354 Patient resting comfortably in his bed  Will obtain an ultrasound to rule out testicular pathology                                                MDM    Disposition  Final diagnoses: None     ED Disposition     None      Follow-up Information    None         Patient's Medications   Discharge Prescriptions    No medications on file     No discharge procedures on file      PDMP Review       Value Time User    PDMP Reviewed  Yes 10/20/2020  4:01 PM Claudio Sherman MD          ED Provider  Electronically Signed by           Rowena Leyden, DO  06/29/21 8233

## 2021-07-12 ENCOUNTER — HOSPITAL ENCOUNTER (EMERGENCY)
Facility: HOSPITAL | Age: 56
Discharge: HOME | End: 2021-07-12
Attending: EMERGENCY MEDICINE
Payer: COMMERCIAL

## 2021-07-12 VITALS
WEIGHT: 206.79 LBS | SYSTOLIC BLOOD PRESSURE: 149 MMHG | HEART RATE: 82 BPM | RESPIRATION RATE: 16 BRPM | BODY MASS INDEX: 30.99 KG/M2 | TEMPERATURE: 97.7 F | DIASTOLIC BLOOD PRESSURE: 89 MMHG | OXYGEN SATURATION: 99 %

## 2021-07-12 DIAGNOSIS — N41.1 CHRONIC PROSTATITIS WITHOUT HEMATURIA: Primary | ICD-10-CM

## 2021-07-12 LAB
BILIRUB UR QL STRIP: NEGATIVE
CLARITY UR: CLEAR
COLOR UR: YELLOW
GLUCOSE UR STRIP-MCNC: NEGATIVE MG/DL
HGB UR QL STRIP.AUTO: NEGATIVE
KETONES UR STRIP-MCNC: NEGATIVE MG/DL
LEUKOCYTE ESTERASE UR QL STRIP: NEGATIVE
NITRITE UR QL STRIP: NEGATIVE
PH UR STRIP.AUTO: 5.5 [PH] (ref 4.5–8)
PROT UR STRIP-MCNC: NEGATIVE MG/DL
SP GR UR STRIP.AUTO: >=1.03 (ref 1–1.03)
UROBILINOGEN UR QL STRIP.AUTO: 0.2 E.U./DL

## 2021-07-12 PROCEDURE — 81003 URINALYSIS AUTO W/O SCOPE: CPT

## 2021-07-12 PROCEDURE — 99284 EMERGENCY DEPT VISIT MOD MDM: CPT | Performed by: EMERGENCY MEDICINE

## 2021-07-12 PROCEDURE — 99284 EMERGENCY DEPT VISIT MOD MDM: CPT

## 2021-07-12 RX ORDER — MISOPROSTOL 200 UG/1
200 TABLET ORAL 3 TIMES DAILY
Qty: 30 TABLET | Refills: 0 | Status: SHIPPED | OUTPATIENT
Start: 2021-07-12 | End: 2021-07-22

## 2021-07-12 RX ORDER — CIPROFLOXACIN 500 MG/1
500 TABLET, FILM COATED ORAL EVERY 12 HOURS SCHEDULED
Qty: 56 TABLET | Refills: 0 | Status: SHIPPED | OUTPATIENT
Start: 2021-07-12 | End: 2021-08-09

## 2021-07-12 RX ORDER — OXYCODONE HYDROCHLORIDE 5 MG/1
5 TABLET ORAL ONCE
Status: COMPLETED | OUTPATIENT
Start: 2021-07-12 | End: 2021-07-12

## 2021-07-12 RX ADMIN — OXYCODONE HYDROCHLORIDE 5 MG: 5 TABLET ORAL at 08:10

## 2021-07-12 NOTE — ED ATTENDING ATTESTATION
7/12/2021  IEugenia DO, saw and evaluated the patient  I have discussed the patient with the resident/non-physician practitioner and agree with the resident's/non-physician practitioner's findings, Plan of Care, and MDM as documented in the resident's/non-physician practitioner's note, except where noted  All available labs and Radiology studies were reviewed  I was present for key portions of any procedure(s) performed by the resident/non-physician practitioner and I was immediately available to provide assistance  At this point I agree with the current assessment done in the Emergency Department  I have conducted an independent evaluation of this patient a history and physical is as follows: 59y M w/ hx of chronic prostatits  Here w/ lower abd discomfort, testicular pain, and foul smelling urine - feels like previous episodes of prostatitis  Seen at [facility] two wks ago for same  Saw urology who recommended symptomatic treatment  Was only placed on a week of abx - symptoms returned after abx completed  Exam wnwd m appears uncomfortable, mmm, neck supple, lcta, hrrr, abd ntnd, no cvat, no cce, skin warm, dry, neuro non-focal, normal mood A/P Chronic prostatits  Will ck labs and re-eval    ED Course     No orders to display     Labs Reviewed   URINE MACROSCOPIC, POC       Result Value Ref Range Status    Color, UA Yellow   Final    Clarity, UA Clear   Final    pH, UA 5 5  4 5 - 8 0 Final    Leukocytes, UA Negative  Negative Final    Nitrite, UA Negative  Negative Final    Protein, UA Negative  Negative mg/dl Final    Glucose, UA Negative  Negative mg/dl Final    Ketones, UA Negative  Negative mg/dl Final    Urobilinogen, UA 0 2  0 2, 1 0 E U /dl E U /dl Final    Bilirubin, UA Negative  Negative Final    Blood, UA Negative  Negative Final    Specific Gravity, UA >=1 030  1 003 - 1 030 Final    Narrative:     CLINITEK RESULT         Critical Care Time  Procedures  1   Chronic prostatitis without hematuria  misoprostol (CYTOTEC) 200 mcg tablet    diclofenac sodium (VOLTAREN) 50 mg EC tablet    ciprofloxacin (CIPRO) 500 mg tablet     Time reflects when diagnosis was documented in both MDM as applicable and the Disposition within this note     Time User Action Codes Description Comment    7/12/2021  8:50 AM Mary Kate Farrell Add [N41 1] Chronic prostatitis without hematuria       ED Disposition     ED Disposition Condition Date/Time Comment    Discharge Stable Mon Jul 12, 2021  8:50 AM Tayo Hunter discharge to home/self care              Follow-up Information     Follow up With Specialties Details Why Contact Info Additional Information    Julian Meza MD Family Medicine Call in 1 day For follow-up [address] [facility] Emergency Department Emergency Medicine Go to  If symptoms worsen [facility] [address]  [address] [facility] Emergency Department, [address] [facility] [address]  , [place], [place], Dennys Chaves, PALindseyC Urology Call in 1 day For follow-up [address] [facility] [address]  [phone]

## 2021-07-12 NOTE — ED PROVIDER NOTES
History  Chief Complaint   Patient presents with    Abdominal Pain     Right sided abdominal pain and b/l testicular pain x 2 days  Recently finished antibiotics for prostatitis however a week after finishing them the pain restarted  Burning and odor with urination  Denies hematuria  Denies penile discharge   Testicle Pain     62 y/o male with hx of HTN, DM, and chronic prostatitis presents to the ED for evaluation of lower abdominal pain, bilateral testicular pain, and malodorous urine x 2 days  He states that his symptoms feel similar to his previous prostatitis  He was seen at [facility] weeks ago for similar symptoms and followed up with urology for symptomatic treatment  No other symptoms or complaints  Prior to Admission Medications   Prescriptions Last Dose Informant Patient Reported? Taking?    Blood Glucose Monitoring Suppl (ACURA BLOOD GLUCOSE METER) w/Device KIT  Self Yes No   Sig: by Does not apply route   Calcium Carb-Cholecalciferol (CALCIUM CARBONATE-VITAMIN D3 PO)  Self Yes No   Sig: Take 1,500 mg by mouth daily   Cinnamon 500 MG capsule   Yes No   Sig: Take 500 mg by mouth daily   Diclofenac Sodium (VOLTAREN) 1 %   No No   Sig: Apply 2 g topically 4 (four) times a day   FIBER PO   Yes No   Sig: Take by mouth   Insulin Pen Needle 32G X 4 MM MISC  Self Yes No   Sig: Unifine Pentips Plus 32 gauge x 5/32" needle   TAMSULOSIN HCL PO  Self Yes No   Sig: Take 0 4 mg by mouth daily    Turmeric 500 MG CAPS   Yes No   Sig: Take by mouth   VITAMIN D PO   Yes No   Sig: Take by mouth   albuterol (2 5 mg/3 mL) 0 083 % nebulizer solution  Self No No   Sig: Take 1 vial (2 5 mg total) by nebulization every 4 (four) hours as needed for shortness of breath   albuterol (PROVENTIL HFA,VENTOLIN HFA) 90 mcg/act inhaler  Self No No   Sig: Inhale 2 puffs every 4 (four) hours as needed for wheezing   amLODIPine (NORVASC) 5 mg tablet  Self Yes No   Sig: Take 5 mg by mouth daily   atorvastatin (LIPITOR) 80 mg tablet Self Yes No   Sig: Take 80 mg by mouth daily at bedtime   bimatoprost (LUMIGAN) 0 03 % ophthalmic drops  Self Yes No   Sig: Administer 1 drop to both eyes daily at bedtime     brimonidine (ALPHAGAN P) 0 15 % ophthalmic solution  Self Yes No   Sig: Administer 1 drop to both eyes every 8 (eight) hours     carBAMazepine (TEGRETOL) 200 mg tablet  Self Yes No   Sig: Take 2 tabs in the morning and 3 tabs at night   cetirizine (ZyrTEC) 10 mg tablet   Yes No   Sig: Take 10 mg by mouth daily   dupilumab (DUPIXENT) subcutaneous injection  Self Yes No   Sig: Inject under the skin every 14 (fourteen) days   finasteride (PROSCAR) 5 mg tablet  Self Yes No   Sig: Take 5 mg by mouth daily   fluticasone (FLONASE) 50 mcg/act nasal spray  Self Yes No   Si spray into each nostril daily   gabapentin (NEURONTIN) 300 mg capsule  Self Yes No   Sig: Take 300 mg by mouth 3 (three) times a day   glucagon 1 MG injection  Self Yes No   Sig: as needed   guaiFENesin (MUCINEX) 600 mg 12 hr tablet  Self No No   Sig: Take 1 tablet (600 mg total) by mouth every 12 (twelve) hours   hydrOXYzine pamoate (VISTARIL) 50 mg capsule  Self Yes No   Sig: Take 50 mg by mouth Three times daily as needed   insulin aspart (NovoLOG) 100 units/mL injection  Self Yes No   Sig: Inject under the skin 3 (three) times a day before meals   insulin glargine (BASAGLAR KWIKPEN) 100 units/mL injection pen  Self Yes No   Sig: Inject 43 Units under the skin daily at bedtime    latanoprost (XALATAN) 0 005 % ophthalmic solution  Self Yes No   Sig: Administer 1 drop to both eyes daily   lidocaine (LIDODERM) 5 %   No No   Sig: Apply 1 patch topically daily Remove & Discard patch within 12 hours or as directed by MD   montelukast (SINGULAIR) 10 mg tablet  Self Yes No   Sig: Take 10 mg by mouth daily     omega-3-acid ethyl esters (LOVAZA) 1 g capsule  Self Yes No   Sig: Take 1 g by mouth daily   omeprazole (PriLOSEC) 40 MG capsule  Self Yes No   Sig: Take 40 mg by mouth daily perphenazine 2 mg tablet  Self Yes No   Sig: Take 2 mg by mouth daily   polyvinyl alcohol (LIQUIFILM TEARS) 1 4 % ophthalmic solution  Self Yes No   Si drops as needed for dry eyes   predniSONE 5 mg tablet  Self Yes No   Sig: Take 2 5 mg by mouth every other day On Monday, Wednesday, Friday    tolnaftate (TINACTIN) 1 % powder   Yes No   Sig: Apply 1 application topically 2 (two) times a day      Facility-Administered Medications: None       Past Medical History:   Diagnosis Date    Asthma     Bipolar disorder ([place] [address] )     Diabetes mellitus ([place] [address] )     Enlarged prostate     Glaucoma     Hyperlipidemia     Hypertension     Overdose of heroin, accidental or unintentional, sequela     Psychiatric disorder     Seasonal allergic rhinitis     Seizures ([place] [address] )        Past Surgical History:   Procedure Laterality Date    CIRCUMCISION      KNEE SURGERY      WRIST SURGERY Left        Family History   Problem Relation Age of Onset    Arthritis Mother      I have reviewed and agree with the history as documented  E-Cigarette/Vaping    E-Cigarette Use Never User      E-Cigarette/Vaping Substances    Nicotine No     THC No     CBD No     Flavoring No     Other No     Unknown No      Social History     Tobacco Use    Smoking status: Former Smoker     Packs/day: 2 00     Years: 27 00     Pack years: 54 00     Types: Cigarettes     Start date: 35     Quit date:      Years since quittin 5    Smokeless tobacco: Never Used   Vaping Use    Vaping Use: Never used   Substance Use Topics    Alcohol use: Not Currently     Comment: socially    Drug use: Never     Types: Heroin, Fentanyl     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        Review of Systems   Constitutional: Negative for chills and fever  HENT: Negative for congestion, rhinorrhea and sore throat  Respiratory: Negative for cough and shortness of breath  Cardiovascular: Negative for chest pain and palpitations     Gastrointestinal: Positive for abdominal pain  Negative for diarrhea, nausea and vomiting  Genitourinary: Positive for testicular pain  Negative for dysuria, flank pain, hematuria and scrotal swelling  Malodorous urine   Musculoskeletal: Negative for back pain and neck pain  Neurological: Negative for weakness, light-headedness, numbness and headaches  All other systems reviewed and are negative  Physical Exam  ED Triage Vitals [07/12/21 0720]   Temperature Pulse Respirations Blood Pressure SpO2   97 7 °F (36 5 °C) 82 16 149/89 99 %      Temp Source Heart Rate Source Patient Position - Orthostatic VS BP Location FiO2 (%)   Oral -- -- -- --      Pain Score       9             Orthostatic Vital Signs  Vitals:    07/12/21 0720   BP: 149/89   Pulse: 82       Physical Exam  Vitals and nursing note reviewed  Constitutional:       General: He is not in acute distress  Appearance: Normal appearance  He is normal weight  HENT:      Head: Normocephalic and atraumatic  Right Ear: External ear normal       Left Ear: External ear normal       Nose: Nose normal       Mouth/Throat:      Mouth: Mucous membranes are moist       Pharynx: Oropharynx is clear  No oropharyngeal exudate or posterior oropharyngeal erythema  Eyes:      Extraocular Movements: Extraocular movements intact  Conjunctiva/sclera: Conjunctivae normal       Pupils: Pupils are equal, round, and reactive to light  Cardiovascular:      Rate and Rhythm: Normal rate and regular rhythm  Pulses: Normal pulses  Heart sounds: Normal heart sounds  Pulmonary:      Effort: Pulmonary effort is normal  No respiratory distress  Breath sounds: Normal breath sounds  No wheezing or rales  Abdominal:      General: Abdomen is flat  Bowel sounds are normal  There is no distension  Palpations: Abdomen is soft  Tenderness: There is no abdominal tenderness  There is no right CVA tenderness, left CVA tenderness or guarding     Genitourinary: Comments: No scrotal swelling or tenderness  Normal cremasteric reflex bilaterally  Musculoskeletal:         General: No swelling or tenderness  Normal range of motion  Cervical back: Normal range of motion and neck supple  No tenderness  Skin:     General: Skin is warm and dry  Neurological:      General: No focal deficit present  Mental Status: He is alert and oriented to person, place, and time  ED Medications  Medications   oxyCODONE (ROXICODONE) IR tablet 5 mg (5 mg Oral Given 7/12/21 0810)       Diagnostic Studies  Results Reviewed     Procedure Component Value Units Date/Time    Urine Macroscopic, POC [[id number]] Collected: 07/12/21 0813    Lab Status: Final result Specimen: Urine Updated: 07/12/21 0814     Color, UA Yellow     Clarity, UA Clear     pH, UA 5 5     Leukocytes, UA Negative     Nitrite, UA Negative     Protein, UA Negative mg/dl      Glucose, UA Negative mg/dl      Ketones, UA Negative mg/dl      Urobilinogen, UA 0 2 E U /dl      Bilirubin, UA Negative     Blood, UA Negative     Specific Gravity, UA >=1 030    Narrative:      CLINITEK RESULT                 No orders to display         Procedures  Procedures      ED Course                                       MDM  Number of Diagnoses or Management Options  Chronic prostatitis without hematuria  Diagnosis management comments: 62 y/o male with hx of HTN, DM, and chronic prostatitis presents to the ED for evaluation of lower abdominal pain, bilateral testicular pain, and malodorous urine x 2 days  He states that his symptoms feel similar to his previous prostatitis  He was seen at [facility] 2 weeks ago for similar symptoms and followed up with urology for symptomatic treatment  No other symptoms or complaints  Afebrile, VSS  Heart RRR, lungs cta b/l, abdomen soft and nontender  No scrotal swelling or tenderness  Normal cremasteric reflex bilaterally  Likely recurrence of chronic prostatitis   Will check UA and treat symptomatically  Oxycodone for pain  UA with no signs of infection  Review of records reveals that the patient only received 1 week of abx for his last episode of prostatitis, likely under-treated  Will prescribed 4 week course of Cipro  Discussed all findings, treatment, red flags/return precautions, and outpatient urology follow-up and the patient understands and agrees  Stable for discharge  Disposition  Final diagnoses:   Chronic prostatitis without hematuria     Time reflects when diagnosis was documented in both MDM as applicable and the Disposition within this note     Time User Action Codes Description Comment    7/12/2021  8:50 AM Yennifer Jimenes Add [N41 1] Chronic prostatitis without hematuria       ED Disposition     ED Disposition Condition Date/Time Comment    Discharge Stable Mon Jul 12, 2021  8:50 AM Genia Spine discharge to home/self care              Follow-up Information     Follow up With Specialties Details Why Contact Info Additional Information    Jojo Carpenter MD Family Medicine Call in 1 day For follow-up [address]  [address] [facility] [address] Emergency Department Emergency Medicine Go to  If symptoms worsen [facility] [address]  [address] [facility] Emergency Department, [address] , Leidy Belcher, Dennys Bradley [address], PA-C Urology Call in 1 day For follow-up [address] [facility] [address]  [phone]             Discharge Medication List as of 7/12/2021  8:57 AM      START taking these medications    Details   ciprofloxacin (CIPRO) 500 mg tablet Take 1 tablet (500 mg total) by mouth every 12 (twelve) hours for 28 days, Starting Mon 7/12/2021, Until Mon 8/9/2021, Normal      diclofenac sodium (VOLTAREN) 50 mg EC tablet Take 1 tablet (50 mg total) by mouth 2 (two) times a day for 10 days, Starting Mon 7/12/2021, Until Thu 7/22/2021, Normal misoprostol (CYTOTEC) 200 mcg tablet Take 1 tablet (200 mcg total) by mouth 3 (three) times a day for 10 days, Starting Mon 7/12/2021, Until Thu 7/22/2021, Normal         CONTINUE these medications which have NOT CHANGED    Details   albuterol (2 5 mg/3 mL) 0 083 % nebulizer solution Take 1 vial (2 5 mg total) by nebulization every 4 (four) hours as needed for shortness of breath, Starting Thu 11/15/2018, Normal      albuterol (PROVENTIL HFA,VENTOLIN HFA) 90 mcg/act inhaler Inhale 2 puffs every 4 (four) hours as needed for wheezing, Starting Thu 11/15/2018, Normal      amLODIPine (NORVASC) 5 mg tablet Take 5 mg by mouth daily, Historical Med      atorvastatin (LIPITOR) 80 mg tablet Take 80 mg by mouth daily at bedtime, Starting Thu 9/8/2016, Historical Med      bimatoprost (LUMIGAN) 0 03 % ophthalmic drops Administer 1 drop to both eyes daily at bedtime  , Starting Thu 6/5/2008, Historical Med      Blood Glucose Monitoring Suppl (ACURA BLOOD GLUCOSE METER) w/Device KIT by Does not apply route, Historical Med      brimonidine (ALPHAGAN P) 0 15 % ophthalmic solution Administer 1 drop to both eyes every 8 (eight) hours  , Starting Thu 7/2/2015, Historical Med      Calcium Carb-Cholecalciferol (CALCIUM CARBONATE-VITAMIN D3 PO) Take 1,500 mg by mouth daily, Starting Fri 9/20/2013, Historical Med      carBAMazepine (TEGRETOL) 200 mg tablet Take 2 tabs in the morning and 3 tabs at night, Historical Med      cetirizine (ZyrTEC) 10 mg tablet Take 10 mg by mouth daily, Historical Med      Cinnamon 500 MG capsule Take 500 mg by mouth daily, Historical Med      Diclofenac Sodium (VOLTAREN) 1 % Apply 2 g topically 4 (four) times a day, Starting Fri 5/14/2021, Normal      dupilumab (DUPIXENT) subcutaneous injection Inject under the skin every 14 (fourteen) days, Historical Med      FIBER PO Take by mouth, Historical Med      finasteride (PROSCAR) 5 mg tablet Take 5 mg by mouth daily, Starting Tue 4/26/2016, Historical Med fluticasone (FLONASE) 50 mcg/act nasal spray 1 spray into each nostril daily, Historical Med      gabapentin (NEURONTIN) 300 mg capsule Take 300 mg by mouth 3 (three) times a day, Starting Thu 9/8/2016, Historical Med      glucagon 1 MG injection as needed, Starting Fri 8/5/2016, Historical Med      guaiFENesin (MUCINEX) 600 mg 12 hr tablet Take 1 tablet (600 mg total) by mouth every 12 (twelve) hours, Starting Fri 5/3/2019, Print      hydrOXYzine pamoate (VISTARIL) 50 mg capsule Take 50 mg by mouth Three times daily as needed, Starting Thu 8/8/2019, Historical Med      insulin aspart (NovoLOG) 100 units/mL injection Inject under the skin 3 (three) times a day before meals, Historical Med      insulin glargine (BASAGLAR KWIKPEN) 100 units/mL injection pen Inject 43 Units under the skin daily at bedtime , Historical Med      Insulin Pen Needle 32G X 4 MM MISC Unifine Pentips Plus 32 gauge x 5/32" needle, Historical Med      latanoprost (XALATAN) 0 005 % ophthalmic solution Administer 1 drop to both eyes daily, Historical Med      lidocaine (LIDODERM) 5 % Apply 1 patch topically daily Remove & Discard patch within 12 hours or as directed by MD, Starting Fri 5/14/2021, Normal      montelukast (SINGULAIR) 10 mg tablet Take 10 mg by mouth daily  , Starting Tue 8/25/2015, Historical Med      omega-3-acid ethyl esters (LOVAZA) 1 g capsule Take 1 g by mouth daily, Historical Med      omeprazole (PriLOSEC) 40 MG capsule Take 40 mg by mouth daily, Starting Tue 4/26/2016, Historical Med      perphenazine 2 mg tablet Take 2 mg by mouth daily, Historical Med      polyvinyl alcohol (LIQUIFILM TEARS) 1 4 % ophthalmic solution 2 drops as needed for dry eyes, Historical Med      predniSONE 5 mg tablet Take 2 5 mg by mouth every other day On Monday, Wednesday, Friday , Historical Med      TAMSULOSIN HCL PO Take 0 4 mg by mouth daily , Historical Med      tolnaftate (TINACTIN) 1 % powder Apply 1 application topically 2 (two) times a day, Historical Med      Turmeric 500 MG CAPS Take by mouth, Historical Med      VITAMIN D PO Take by mouth, Historical Med           No discharge procedures on file  PDMP Review       Value Time User    PDMP Reviewed  Yes 7/12/2021  7:56 AM Kourtney Khan DO           ED Provider  Attending physically available and evaluated Kaylynn Nissen  I managed the patient along with the ED Attending      Electronically Signed by         Lula Bravo MD  07/22/21 2706

## 2021-08-09 NOTE — ASSESSMENT & PLAN NOTE
August 9, 2021        Callie Dentonelton      Your employee/student was seen in our clinic today.  A concern for COVID-19 has been identified and testing is in progress.     We are asking you to excuse absences while following self-isolation protocol per Center for Disease Control (CDC) guidelines.  Your employee/student (and/or their parent) will be able to access test results through our electronic delivery system called [employer].     If the results of testing are negative, and once there has been no fever (temperature >100.4 F) for at least 72 hours without treatment, and no vomiting or diarrhea for at least 48 hours, then return to work/school is approved.    If the results of testing are positive then your employee/student will be contacted by the [facility] or [facility] for further instructions on duration of self-isolation and return to work protocol. In general, this will also follow the CDC guidelines with a minimum of 10 days from the onset of symptoms and without fever, vomiting, or diarrhea as above.     In general, repeat testing is not necessary and not offered through our [facility].     This is the only note that will be provided from [facility] for this visit.  Your employee/student will require an appointment with a primary care provider if FMLA or disability forms are required.    If you have any questions please do not hesitate to call me at the phone number listed below.    Sincerely,      Karla Castro, APRNAZIA  [phone]  Electronically Signed       Very noncompliant with CPAP but possibly has mild obstructive sleep apnea  I encouraged him to use CPAP routinely

## 2021-10-12 ENCOUNTER — HOSPITAL ENCOUNTER (EMERGENCY)
Facility: HOSPITAL | Age: 56
Discharge: HOME | End: 2021-10-12
Attending: EMERGENCY MEDICINE
Payer: COMMERCIAL

## 2021-10-12 ENCOUNTER — APPOINTMENT (EMERGENCY)
Dept: RADIOLOGY | Facility: HOSPITAL | Age: 56
End: 2021-10-12
Payer: COMMERCIAL

## 2021-10-12 VITALS
HEART RATE: 99 BPM | RESPIRATION RATE: 18 BRPM | SYSTOLIC BLOOD PRESSURE: 132 MMHG | TEMPERATURE: 98 F | OXYGEN SATURATION: 98 % | DIASTOLIC BLOOD PRESSURE: 86 MMHG

## 2021-10-12 DIAGNOSIS — J18.9 PNEUMONIA: Primary | ICD-10-CM

## 2021-10-12 LAB
ALBUMIN SERPL BCP-MCNC: 3.9 G/DL (ref 3.5–5)
ALP SERPL-CCNC: 98 U/L (ref 46–116)
ALT SERPL W P-5'-P-CCNC: 43 U/L (ref 12–78)
ANION GAP SERPL CALCULATED.3IONS-SCNC: 10 MMOL/L (ref 4–13)
AST SERPL W P-5'-P-CCNC: 19 U/L (ref 5–45)
ATRIAL RATE: 94 BPM
BASOPHILS # BLD AUTO: 0.04 THOUSANDS/ΜL (ref 0–0.1)
BASOPHILS NFR BLD AUTO: 1 % (ref 0–1)
BILIRUB SERPL-MCNC: 0.38 MG/DL (ref 0.2–1)
BUN SERPL-MCNC: 17 MG/DL (ref 5–25)
CALCIUM SERPL-MCNC: 9.1 MG/DL (ref 8.3–10.1)
CHLORIDE SERPL-SCNC: 103 MMOL/L (ref 100–108)
CO2 SERPL-SCNC: 30 MMOL/L (ref 21–32)
CREAT SERPL-MCNC: 1.05 MG/DL (ref 0.6–1.3)
EOSINOPHIL # BLD AUTO: 0.32 THOUSAND/ΜL (ref 0–0.61)
EOSINOPHIL NFR BLD AUTO: 4 % (ref 0–6)
ERYTHROCYTE [DISTWIDTH] IN BLOOD BY AUTOMATED COUNT: 12.6 % (ref 11.6–15.1)
FLUAV RNA RESP QL NAA+PROBE: NEGATIVE
FLUBV RNA RESP QL NAA+PROBE: NEGATIVE
GFR SERPL CREATININE-BSD FRML MDRD: 78 ML/MIN/1.73SQ M
GLUCOSE SERPL-MCNC: 169 MG/DL (ref 65–140)
HCT VFR BLD AUTO: 44.5 % (ref 36.5–49.3)
HGB BLD-MCNC: 15.3 G/DL (ref 12–17)
IMM GRANULOCYTES # BLD AUTO: 0.02 THOUSAND/UL (ref 0–0.2)
IMM GRANULOCYTES NFR BLD AUTO: 0 % (ref 0–2)
LYMPHOCYTES # BLD AUTO: 1.53 THOUSANDS/ΜL (ref 0.6–4.47)
LYMPHOCYTES NFR BLD AUTO: 19 % (ref 14–44)
MCH RBC QN AUTO: 29.1 PG (ref 26.8–34.3)
MCHC RBC AUTO-ENTMCNC: 34.4 G/DL (ref 31.4–37.4)
MCV RBC AUTO: 85 FL (ref 82–98)
MONOCYTES # BLD AUTO: 0.89 THOUSAND/ΜL (ref 0.17–1.22)
MONOCYTES NFR BLD AUTO: 11 % (ref 4–12)
NEUTROPHILS # BLD AUTO: 5.49 THOUSANDS/ΜL (ref 1.85–7.62)
NEUTS SEG NFR BLD AUTO: 65 % (ref 43–75)
NRBC BLD AUTO-RTO: 0 /100 WBCS
NT-PROBNP SERPL-MCNC: 7 PG/ML
P AXIS: 68 DEGREES
PLATELET # BLD AUTO: 209 THOUSANDS/UL (ref 149–390)
PMV BLD AUTO: 9.4 FL (ref 8.9–12.7)
POTASSIUM SERPL-SCNC: 3.3 MMOL/L (ref 3.5–5.3)
PR INTERVAL: 162 MS
PROT SERPL-MCNC: 7 G/DL (ref 6.4–8.2)
QRS AXIS: 54 DEGREES
QRSD INTERVAL: 80 MS
QT INTERVAL: 320 MS
QTC INTERVAL: 400 MS
RBC # BLD AUTO: 5.25 MILLION/UL (ref 3.88–5.62)
RSV RNA RESP QL NAA+PROBE: NEGATIVE
SARS-COV-2 RNA RESP QL NAA+PROBE: NEGATIVE
SODIUM SERPL-SCNC: 143 MMOL/L (ref 136–145)
T WAVE AXIS: 47 DEGREES
TROPONIN I SERPL-MCNC: <0.02 NG/ML
VENTRICULAR RATE: 94 BPM
WBC # BLD AUTO: 8.29 THOUSAND/UL (ref 4.31–10.16)

## 2021-10-12 PROCEDURE — 36415 COLL VENOUS BLD VENIPUNCTURE: CPT | Performed by: PHYSICIAN ASSISTANT

## 2021-10-12 PROCEDURE — 80053 COMPREHEN METABOLIC PANEL: CPT | Performed by: PHYSICIAN ASSISTANT

## 2021-10-12 PROCEDURE — 93005 ELECTROCARDIOGRAM TRACING: CPT

## 2021-10-12 PROCEDURE — 99285 EMERGENCY DEPT VISIT HI MDM: CPT | Performed by: PHYSICIAN ASSISTANT

## 2021-10-12 PROCEDURE — 83880 ASSAY OF NATRIURETIC PEPTIDE: CPT | Performed by: PHYSICIAN ASSISTANT

## 2021-10-12 PROCEDURE — 84484 ASSAY OF TROPONIN QUANT: CPT | Performed by: PHYSICIAN ASSISTANT

## 2021-10-12 PROCEDURE — 0241U HB NFCT DS VIR RESP RNA 4 TRGT: CPT | Performed by: PHYSICIAN ASSISTANT

## 2021-10-12 PROCEDURE — 99285 EMERGENCY DEPT VISIT HI MDM: CPT

## 2021-10-12 PROCEDURE — 93010 ELECTROCARDIOGRAM REPORT: CPT | Performed by: INTERNAL MEDICINE

## 2021-10-12 PROCEDURE — 71045 X-RAY EXAM CHEST 1 VIEW: CPT

## 2021-10-12 PROCEDURE — 94640 AIRWAY INHALATION TREATMENT: CPT

## 2021-10-12 PROCEDURE — 85025 COMPLETE CBC W/AUTO DIFF WBC: CPT | Performed by: PHYSICIAN ASSISTANT

## 2021-10-12 RX ORDER — DOXYCYCLINE HYCLATE 100 MG/1
100 CAPSULE ORAL EVERY 12 HOURS SCHEDULED
Qty: 20 CAPSULE | Refills: 0 | Status: SHIPPED | OUTPATIENT
Start: 2021-10-12 | End: 2021-10-22

## 2021-10-12 RX ORDER — PREDNISONE 20 MG/1
TABLET ORAL
Qty: 20 TABLET | Refills: 0 | Status: SHIPPED | OUTPATIENT
Start: 2021-10-12 | End: 2021-12-10

## 2021-10-12 RX ORDER — SODIUM CHLORIDE FOR INHALATION 0.9 %
12 VIAL, NEBULIZER (ML) INHALATION ONCE
Status: COMPLETED | OUTPATIENT
Start: 2021-10-12 | End: 2021-10-12

## 2021-10-12 RX ORDER — DOXYCYCLINE HYCLATE 100 MG/1
100 CAPSULE ORAL ONCE
Status: COMPLETED | OUTPATIENT
Start: 2021-10-12 | End: 2021-10-12

## 2021-10-12 RX ORDER — POTASSIUM CHLORIDE 20 MEQ/1
40 TABLET, EXTENDED RELEASE ORAL ONCE
Status: COMPLETED | OUTPATIENT
Start: 2021-10-12 | End: 2021-10-12

## 2021-10-12 RX ADMIN — ISODIUM CHLORIDE 12 ML: 0.03 SOLUTION RESPIRATORY (INHALATION) at 08:08

## 2021-10-12 RX ADMIN — PREDNISONE 50 MG: 20 TABLET ORAL at 09:40

## 2021-10-12 RX ADMIN — IPRATROPIUM BROMIDE 1 MG: 0.5 SOLUTION RESPIRATORY (INHALATION) at 08:08

## 2021-10-12 RX ADMIN — ALBUTEROL SULFATE 10 MG: 2.5 SOLUTION RESPIRATORY (INHALATION) at 08:08

## 2021-10-12 RX ADMIN — DOXYCYCLINE 100 MG: 100 CAPSULE ORAL at 09:40

## 2021-10-12 RX ADMIN — POTASSIUM CHLORIDE 40 MEQ: 1500 TABLET, EXTENDED RELEASE ORAL at 09:40

## 2021-12-10 ENCOUNTER — APPOINTMENT (EMERGENCY)
Dept: RADIOLOGY | Facility: HOSPITAL | Age: 56
End: 2021-12-10
Payer: COMMERCIAL

## 2021-12-10 ENCOUNTER — HOSPITAL ENCOUNTER (EMERGENCY)
Facility: HOSPITAL | Age: 56
Discharge: HOME | End: 2021-12-10
Attending: EMERGENCY MEDICINE | Admitting: EMERGENCY MEDICINE
Payer: COMMERCIAL

## 2021-12-10 VITALS
HEART RATE: 80 BPM | TEMPERATURE: 98.8 F | HEIGHT: 69 IN | WEIGHT: 171.96 LBS | RESPIRATION RATE: 19 BRPM | DIASTOLIC BLOOD PRESSURE: 94 MMHG | BODY MASS INDEX: 25.47 KG/M2 | OXYGEN SATURATION: 97 % | SYSTOLIC BLOOD PRESSURE: 137 MMHG

## 2021-12-10 DIAGNOSIS — R05.9 COUGH: Primary | ICD-10-CM

## 2021-12-10 DIAGNOSIS — J40 BRONCHITIS: ICD-10-CM

## 2021-12-10 DIAGNOSIS — R07.9 CHEST PAIN: ICD-10-CM

## 2021-12-10 LAB
ALBUMIN SERPL BCP-MCNC: 3.7 G/DL (ref 3.5–5)
ALP SERPL-CCNC: 89 U/L (ref 46–116)
ALT SERPL W P-5'-P-CCNC: 30 U/L (ref 12–78)
ANION GAP SERPL CALCULATED.3IONS-SCNC: 15 MMOL/L (ref 4–13)
APTT PPP: 34 SECONDS (ref 23–37)
AST SERPL W P-5'-P-CCNC: 16 U/L (ref 5–45)
ATRIAL RATE: 82 BPM
BASOPHILS # BLD AUTO: 0.06 THOUSANDS/ΜL (ref 0–0.1)
BASOPHILS NFR BLD AUTO: 1 % (ref 0–1)
BILIRUB SERPL-MCNC: 0.27 MG/DL (ref 0.2–1)
BUN SERPL-MCNC: 13 MG/DL (ref 5–25)
CALCIUM SERPL-MCNC: 9.2 MG/DL (ref 8.3–10.1)
CARDIAC TROPONIN I PNL SERPL HS: <2 NG/L
CHLORIDE SERPL-SCNC: 101 MMOL/L (ref 100–108)
CO2 SERPL-SCNC: 22 MMOL/L (ref 21–32)
CREAT SERPL-MCNC: 0.96 MG/DL (ref 0.6–1.3)
EOSINOPHIL # BLD AUTO: 0.28 THOUSAND/ΜL (ref 0–0.61)
EOSINOPHIL NFR BLD AUTO: 3 % (ref 0–6)
ERYTHROCYTE [DISTWIDTH] IN BLOOD BY AUTOMATED COUNT: 12.4 % (ref 11.6–15.1)
FLUAV RNA RESP QL NAA+PROBE: NEGATIVE
FLUBV RNA RESP QL NAA+PROBE: NEGATIVE
GFR SERPL CREATININE-BSD FRML MDRD: 87 ML/MIN/1.73SQ M
GLUCOSE SERPL-MCNC: 217 MG/DL (ref 65–140)
HCT VFR BLD AUTO: 43.1 % (ref 36.5–49.3)
HGB BLD-MCNC: 14.9 G/DL (ref 12–17)
IMM GRANULOCYTES # BLD AUTO: 0.03 THOUSAND/UL (ref 0–0.2)
IMM GRANULOCYTES NFR BLD AUTO: 0 % (ref 0–2)
INR PPP: 1.08 (ref 0.84–1.19)
LYMPHOCYTES # BLD AUTO: 1.29 THOUSANDS/ΜL (ref 0.6–4.47)
LYMPHOCYTES NFR BLD AUTO: 14 % (ref 14–44)
MCH RBC QN AUTO: 29.2 PG (ref 26.8–34.3)
MCHC RBC AUTO-ENTMCNC: 34.6 G/DL (ref 31.4–37.4)
MCV RBC AUTO: 85 FL (ref 82–98)
MONOCYTES # BLD AUTO: 0.74 THOUSAND/ΜL (ref 0.17–1.22)
MONOCYTES NFR BLD AUTO: 8 % (ref 4–12)
NEUTROPHILS # BLD AUTO: 6.67 THOUSANDS/ΜL (ref 1.85–7.62)
NEUTS SEG NFR BLD AUTO: 74 % (ref 43–75)
NRBC BLD AUTO-RTO: 0 /100 WBCS
NT-PROBNP SERPL-MCNC: 9 PG/ML
P AXIS: 70 DEGREES
PLATELET # BLD AUTO: 226 THOUSANDS/UL (ref 149–390)
PMV BLD AUTO: 9.6 FL (ref 8.9–12.7)
POTASSIUM SERPL-SCNC: 3.9 MMOL/L (ref 3.5–5.3)
PR INTERVAL: 166 MS
PROT SERPL-MCNC: 6.6 G/DL (ref 6.4–8.2)
PROTHROMBIN TIME: 13.8 SECONDS (ref 11.6–14.5)
QRS AXIS: 258 DEGREES
QRSD INTERVAL: 90 MS
QT INTERVAL: 344 MS
QTC INTERVAL: 401 MS
RBC # BLD AUTO: 5.1 MILLION/UL (ref 3.88–5.62)
RSV RNA RESP QL NAA+PROBE: NEGATIVE
SARS-COV-2 RNA RESP QL NAA+PROBE: NEGATIVE
SODIUM SERPL-SCNC: 138 MMOL/L (ref 136–145)
T WAVE AXIS: 60 DEGREES
VENTRICULAR RATE: 82 BPM
WBC # BLD AUTO: 9.07 THOUSAND/UL (ref 4.31–10.16)

## 2021-12-10 PROCEDURE — 94640 AIRWAY INHALATION TREATMENT: CPT

## 2021-12-10 PROCEDURE — 36415 COLL VENOUS BLD VENIPUNCTURE: CPT | Performed by: EMERGENCY MEDICINE

## 2021-12-10 PROCEDURE — 84484 ASSAY OF TROPONIN QUANT: CPT | Performed by: EMERGENCY MEDICINE

## 2021-12-10 PROCEDURE — 93005 ELECTROCARDIOGRAM TRACING: CPT

## 2021-12-10 PROCEDURE — 85730 THROMBOPLASTIN TIME PARTIAL: CPT | Performed by: EMERGENCY MEDICINE

## 2021-12-10 PROCEDURE — 99285 EMERGENCY DEPT VISIT HI MDM: CPT | Performed by: EMERGENCY MEDICINE

## 2021-12-10 PROCEDURE — 93010 ELECTROCARDIOGRAM REPORT: CPT | Performed by: INTERNAL MEDICINE

## 2021-12-10 PROCEDURE — 85610 PROTHROMBIN TIME: CPT | Performed by: EMERGENCY MEDICINE

## 2021-12-10 PROCEDURE — 80053 COMPREHEN METABOLIC PANEL: CPT | Performed by: EMERGENCY MEDICINE

## 2021-12-10 PROCEDURE — 83880 ASSAY OF NATRIURETIC PEPTIDE: CPT | Performed by: EMERGENCY MEDICINE

## 2021-12-10 PROCEDURE — 96374 THER/PROPH/DIAG INJ IV PUSH: CPT

## 2021-12-10 PROCEDURE — 0241U HB NFCT DS VIR RESP RNA 4 TRGT: CPT | Performed by: EMERGENCY MEDICINE

## 2021-12-10 PROCEDURE — 85025 COMPLETE CBC W/AUTO DIFF WBC: CPT | Performed by: EMERGENCY MEDICINE

## 2021-12-10 PROCEDURE — 71045 X-RAY EXAM CHEST 1 VIEW: CPT

## 2021-12-10 PROCEDURE — 99285 EMERGENCY DEPT VISIT HI MDM: CPT

## 2021-12-10 RX ORDER — AZITHROMYCIN 250 MG/1
250 TABLET, FILM COATED ORAL DAILY
Qty: 6 TABLET | Refills: 0 | Status: SHIPPED | OUTPATIENT
Start: 2021-12-10 | End: 2021-12-15

## 2021-12-10 RX ORDER — ALBUTEROL SULFATE 2.5 MG/3ML
5 SOLUTION RESPIRATORY (INHALATION) ONCE
Status: COMPLETED | OUTPATIENT
Start: 2021-12-10 | End: 2021-12-10

## 2021-12-10 RX ORDER — PREDNISONE 20 MG/1
40 TABLET ORAL DAILY
Qty: 10 TABLET | Refills: 0 | Status: SHIPPED | OUTPATIENT
Start: 2021-12-10 | End: 2021-12-15

## 2021-12-10 RX ORDER — ALBUTEROL SULFATE 90 UG/1
2 AEROSOL, METERED RESPIRATORY (INHALATION) EVERY 6 HOURS PRN
Qty: 8.5 G | Refills: 0 | Status: SHIPPED | OUTPATIENT
Start: 2021-12-10

## 2021-12-10 RX ORDER — METHYLPREDNISOLONE SODIUM SUCCINATE 125 MG/2ML
125 INJECTION, POWDER, LYOPHILIZED, FOR SOLUTION INTRAMUSCULAR; INTRAVENOUS ONCE
Status: COMPLETED | OUTPATIENT
Start: 2021-12-10 | End: 2021-12-10

## 2021-12-10 RX ADMIN — IPRATROPIUM BROMIDE 0.5 MG: 0.5 SOLUTION RESPIRATORY (INHALATION) at 08:50

## 2021-12-10 RX ADMIN — ALBUTEROL SULFATE 5 MG: 2.5 SOLUTION RESPIRATORY (INHALATION) at 08:50

## 2021-12-10 RX ADMIN — METHYLPREDNISOLONE SODIUM SUCCINATE 125 MG: 125 INJECTION, POWDER, FOR SOLUTION INTRAMUSCULAR; INTRAVENOUS at 08:44

## 2022-02-01 ENCOUNTER — TELEPHONE (OUTPATIENT)
Dept: PULMONOLOGY | Facility: CLINIC | Age: 57
End: 2022-02-01

## 2022-02-01 NOTE — TELEPHONE ENCOUNTER
Pt called stating he has a migraine and cant use aspirin products due to GI bleeding  What migraine med can he use?   Please advise: [phone]

## 2022-03-10 ENCOUNTER — HOSPITAL ENCOUNTER (EMERGENCY)
Facility: HOSPITAL | Age: 57
Discharge: HOME | End: 2022-03-10
Attending: EMERGENCY MEDICINE | Admitting: EMERGENCY MEDICINE
Payer: COMMERCIAL

## 2022-03-10 ENCOUNTER — APPOINTMENT (EMERGENCY)
Dept: RADIOLOGY | Facility: HOSPITAL | Age: 57
End: 2022-03-10
Payer: COMMERCIAL

## 2022-03-10 VITALS
WEIGHT: 215 LBS | SYSTOLIC BLOOD PRESSURE: 135 MMHG | OXYGEN SATURATION: 100 % | TEMPERATURE: 97.6 F | RESPIRATION RATE: 19 BRPM | BODY MASS INDEX: 32.22 KG/M2 | HEART RATE: 81 BPM | DIASTOLIC BLOOD PRESSURE: 65 MMHG

## 2022-03-10 DIAGNOSIS — J45.41 MODERATE PERSISTENT ASTHMA WITH EXACERBATION: Primary | ICD-10-CM

## 2022-03-10 PROCEDURE — 99284 EMERGENCY DEPT VISIT MOD MDM: CPT | Performed by: EMERGENCY MEDICINE

## 2022-03-10 PROCEDURE — 99284 EMERGENCY DEPT VISIT MOD MDM: CPT

## 2022-03-10 PROCEDURE — 94760 N-INVAS EAR/PLS OXIMETRY 1: CPT

## 2022-03-10 PROCEDURE — 94640 AIRWAY INHALATION TREATMENT: CPT

## 2022-03-10 PROCEDURE — 94664 DEMO&/EVAL PT USE INHALER: CPT

## 2022-03-10 PROCEDURE — 71045 X-RAY EXAM CHEST 1 VIEW: CPT

## 2022-03-10 RX ORDER — IPRATROPIUM BROMIDE AND ALBUTEROL SULFATE .5; 3 MG/3ML; MG/3ML
1 SOLUTION RESPIRATORY (INHALATION) ONCE
Status: COMPLETED | OUTPATIENT
Start: 2022-03-10 | End: 2022-03-10

## 2022-03-10 RX ORDER — IPRATROPIUM BROMIDE AND ALBUTEROL SULFATE 2.5; .5 MG/3ML; MG/3ML
3 SOLUTION RESPIRATORY (INHALATION)
Status: DISCONTINUED | OUTPATIENT
Start: 2022-03-10 | End: 2022-03-10 | Stop reason: HOSPADM

## 2022-03-10 RX ORDER — PREDNISONE 50 MG/1
50 TABLET ORAL DAILY
Qty: 4 TABLET | Refills: 0 | Status: SHIPPED | OUTPATIENT
Start: 2022-03-10 | End: 2022-03-14

## 2022-03-10 RX ADMIN — PREDNISONE 50 MG: 20 TABLET ORAL at 08:01

## 2022-03-10 RX ADMIN — IPRATROPIUM BROMIDE AND ALBUTEROL SULFATE 3 ML: 2.5; .5 SOLUTION RESPIRATORY (INHALATION) at 08:06

## 2022-03-10 NOTE — ED PROVIDER NOTES
History  Chief Complaint   Patient presents with    Asthma     asthma exacerbation, using meds at home with no relief     62-year-old male with history of asthma presented emergency room with shortness of breath  Patient notes that this started over the last 48 hours, took 2 albuterol nebulizations at home within the past 2 hours without improvement  Patient notes that the breathing treatment EMS administered has markedly improved his symptoms  Notes productive cough with yellow sputum  Otherwise denies fevers or chills  Denies chest pain  History provided by:  Patient  Shortness of Breath  Severity:  Moderate  Onset quality:  Gradual  Duration:  2 days  Timing:  Constant  Progression:  Worsening  Chronicity:  New  Context: URI    Relieved by:  Nothing  Worsened by:  Nothing  Ineffective treatments:  Inhaler  Associated symptoms: cough and wheezing    Associated symptoms: no abdominal pain, no chest pain, no ear pain, no fever, no rash, no sore throat and no vomiting        Prior to Admission Medications   Prescriptions Last Dose Informant Patient Reported? Taking?    Blood Glucose Monitoring Suppl (ACURA BLOOD GLUCOSE METER) w/Device KIT  Self Yes No   Sig: by Does not apply route   Calcium Carb-Cholecalciferol (CALCIUM CARBONATE-VITAMIN D3 PO)  Self Yes No   Sig: Take 1,500 mg by mouth daily   Cinnamon 500 MG capsule   Yes No   Sig: Take 500 mg by mouth daily   Diclofenac Sodium (VOLTAREN) 1 %   No No   Sig: Apply 2 g topically 4 (four) times a day   FIBER PO   Yes No   Sig: Take by mouth   Insulin Pen Needle 32G X 4 MM MISC  Self Yes No   Sig: Unifine Pentips Plus 32 gauge x 5/32" needle   TAMSULOSIN HCL PO  Self Yes No   Sig: Take 0 4 mg by mouth daily    Turmeric 500 MG CAPS   Yes No   Sig: Take by mouth   VITAMIN D PO   Yes No   Sig: Take by mouth   albuterol (2 5 mg/3 mL) 0 083 % nebulizer solution  Self No No   Sig: Take 1 vial (2 5 mg total) by nebulization every 4 (four) hours as needed for shortness of breath   albuterol (PROVENTIL HFA,VENTOLIN HFA) 90 mcg/act inhaler  Self No No   Sig: Inhale 2 puffs every 4 (four) hours as needed for wheezing   albuterol (ProAir HFA) 90 mcg/act inhaler   No No   Sig: Inhale 2 puffs every 6 (six) hours as needed for wheezing   amLODIPine (NORVASC) 5 mg tablet  Self Yes No   Sig: Take 5 mg by mouth daily   atorvastatin (LIPITOR) 80 mg tablet  Self Yes No   Sig: Take 80 mg by mouth daily at bedtime   bimatoprost (LUMIGAN) 0 03 % ophthalmic drops  Self Yes No   Sig: Administer 1 drop to both eyes daily at bedtime     brimonidine (ALPHAGAN P) 0 15 % ophthalmic solution  Self Yes No   Sig: Administer 1 drop to both eyes every 8 (eight) hours     carBAMazepine (TEGRETOL) 200 mg tablet  Self Yes No   Sig: Take 2 tabs in the morning and 3 tabs at night   cetirizine (ZyrTEC) 10 mg tablet   Yes No   Sig: Take 10 mg by mouth daily   diclofenac sodium (VOLTAREN) 50 mg EC tablet   No No   Sig: Take 1 tablet (50 mg total) by mouth 2 (two) times a day for 10 days   dupilumab (DUPIXENT) subcutaneous injection  Self Yes No   Sig: Inject under the skin every 14 (fourteen) days   finasteride (PROSCAR) 5 mg tablet  Self Yes No   Sig: Take 5 mg by mouth daily   fluticasone (FLONASE) 50 mcg/act nasal spray  Self Yes No   Si spray into each nostril daily   gabapentin (NEURONTIN) 300 mg capsule  Self Yes No   Sig: Take 300 mg by mouth 3 (three) times a day   glucagon 1 MG injection  Self Yes No   Sig: as needed   guaiFENesin (MUCINEX) 600 mg 12 hr tablet  Self No No   Sig: Take 1 tablet (600 mg total) by mouth every 12 (twelve) hours   hydrOXYzine pamoate (VISTARIL) 50 mg capsule  Self Yes No   Sig: Take 50 mg by mouth Three times daily as needed   insulin aspart (NovoLOG) 100 units/mL injection  Self Yes No   Sig: Inject under the skin 3 (three) times a day before meals   insulin glargine (BASAGLAR KWIKPEN) 100 units/mL injection pen  Self Yes No   Sig: Inject 43 Units under the skin daily at bedtime    latanoprost (XALATAN) 0 005 % ophthalmic solution  Self Yes No   Sig: Administer 1 drop to both eyes daily   lidocaine (LIDODERM) 5 %   No No   Sig: Apply 1 patch topically daily Remove & Discard patch within 12 hours or as directed by MD   misoprostol (CYTOTEC) 200 mcg tablet   No No   Sig: Take 1 tablet (200 mcg total) by mouth 3 (three) times a day for 10 days   montelukast (SINGULAIR) 10 mg tablet  Self Yes No   Sig: Take 10 mg by mouth daily     omega-3-acid ethyl esters (LOVAZA) 1 g capsule  Self Yes No   Sig: Take 1 g by mouth daily   omeprazole (PriLOSEC) 40 MG capsule  Self Yes No   Sig: Take 40 mg by mouth daily   perphenazine 2 mg tablet  Self Yes No   Sig: Take 2 mg by mouth daily   polyvinyl alcohol (LIQUIFILM TEARS) 1 4 % ophthalmic solution  Self Yes No   Si drops as needed for dry eyes   predniSONE 5 mg tablet  Self Yes No   Sig: Take 2 5 mg by mouth every other day On Monday, Wednesday, Friday    tolnaftate (TINACTIN) 1 % powder   Yes No   Sig: Apply 1 application topically 2 (two) times a day      Facility-Administered Medications: None       Past Medical History:   Diagnosis Date    Asthma     [facility] disorder [facility] [address] [facility]     [facility]     Enlarged prostate     Glaucoma     Hyperlipidemia     Hypertension     Overdose of heroin, accidental or unintentional, sequela     Psychiatric disorder     Seasonal allergic rhinitis     Seizures (HCC)        Past Surgical History:   Procedure Laterality Date    CIRCUMCISION      KNEE SURGERY      WRIST SURGERY Left        Family History   Problem Relation Age of Onset    Arthritis Mother      I have reviewed and agree with the history as documented      E-Cigarette/Vaping    E-Cigarette Use Never User      E-Cigarette/Vaping Substances    Nicotine No     THC No     CBD No     Flavoring No     Other No     Unknown No      Social History     Tobacco Use    Smoking status: Former Smoker     Packs/day: 2 00 Years: 27 00     Pack years: 54 00     Types: Cigarettes     Start date: 35     Quit date:      Years since quittin 2    Smokeless tobacco: Never Used   Vaping Use    Vaping Use: Never used   Substance Use Topics    Alcohol use: Yes     Comment: socially    Drug use: Not Currently     Types: Heroin, Fentanyl     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       Review of Systems   Constitutional: Negative for chills and fever  HENT: Negative for ear pain and sore throat  Eyes: Negative for pain and visual disturbance  Respiratory: Positive for cough, shortness of breath and wheezing  Cardiovascular: Negative for chest pain and palpitations  Gastrointestinal: Negative for abdominal pain and vomiting  Genitourinary: Negative for dysuria and hematuria  Musculoskeletal: Negative for arthralgias and back pain  Skin: Negative for color change and rash  Neurological: Negative for seizures and syncope  All other systems reviewed and are negative  Physical Exam  Physical Exam  Vitals and nursing note reviewed  Constitutional:       Appearance: He is well-developed  HENT:      Head: Normocephalic and atraumatic  Nose: Nose normal       Mouth/Throat:      Mouth: Mucous membranes are moist    Eyes:      Conjunctiva/sclera: Conjunctivae normal    Cardiovascular:      Rate and Rhythm: Normal rate and regular rhythm  Heart sounds: No murmur heard  Pulmonary:      Effort: Pulmonary effort is normal  No respiratory distress  Breath sounds: Wheezing present  Abdominal:      General: Abdomen is flat  Palpations: Abdomen is soft  Tenderness: There is no abdominal tenderness  Musculoskeletal:         General: No swelling or tenderness  Normal range of motion  Cervical back: Neck supple  Skin:     General: Skin is warm and dry  Capillary Refill: Capillary refill takes less than 2 seconds     Neurological:      Mental Status: He is alert and oriented to person, place, and time  Vital Signs  ED Triage Vitals [03/10/22 0755]   Temperature Pulse Respirations Blood Pressure SpO2   97 6 °F (36 4 °C) 85 20 135/65 99 %      Temp Source Heart Rate Source Patient Position - Orthostatic VS BP Location FiO2 (%)   Tympanic Monitor Sitting Left arm --      Pain Score       --           Vitals:    03/10/22 0755 03/10/22 0758   BP: 135/65    Pulse: 85 81   Patient Position - Orthostatic VS: Sitting          Visual Acuity      ED Medications  Medications   ipratropium-albuterol (DUO-NEB) 0 5-2 5 mg/3 mL inhalation solution 3 mL (3 mL Nebulization Given 3/10/22 0806)   predniSONE tablet 50 mg (50 mg Oral Given 3/10/22 0801)   ipratropium-albuterol (FOR EMS ONLY) (DUO-NEB) 0 5-2 5 mg/3 mL inhalation solution 3 mL (0 mL Does not apply Given to EMS 3/10/22 0758)       Diagnostic Studies  Results Reviewed     None                 XR chest 1 view portable   ED Interpretation by Noel Gonzalez MD (03/10 4336)   No pna, no ptx      Final Result by Nohemi Medeiros MD (03/10 1617)      No acute cardiopulmonary disease  Workstation performed: [id number]                    Procedures  Procedures         ED Course                               SBIRT 20yo+      Most Recent Value   SBIRT (22 yo +)    In order to provide better care to our patients, we are screening all of our patients for alcohol and drug use  Would it be okay to ask you these screening questions? Yes Filed at: 03/10/2022 1862   Initial Alcohol Screen: US AUDIT-C     1  How often do you have a drink containing alcohol? 0 Filed at: 03/10/2022 0758   2  How many drinks containing alcohol do you have on a typical day you are drinking? 0 Filed at: 03/10/2022 0758   3a  Male UNDER 65: How often do you have five or more drinks on one occasion? 0 Filed at: 03/10/2022 0758   3b  FEMALE Any Age, or MALE 65+: How often do you have 4 or more drinks on one occassion?  0 Filed at: 03/10/2022 9109 Audit-C Score 0 Filed at: 03/10/2022 7925   ROXY: How many times in the past year have you    Used an illegal drug or used a prescription medication for non-medical reasons? Never Filed at: 03/10/2022 0585                    [facility]  Number of Diagnoses or Management Options  Moderate persistent asthma with exacerbation  Diagnosis management comments: Patient feels much better after DuoNeb  Chest x-ray without signs of infection or pneumothorax  Prednisone prescribed  Will give him Atrovent prescription as this is what helps him durimg exacerbations  PCP follow-up recommended  Amount and/or Complexity of Data Reviewed  Tests in the radiology section of CPT®: ordered and reviewed        Disposition  Final diagnoses: Moderate persistent asthma with exacerbation     Time reflects when diagnosis was documented in both MDM as applicable and the Disposition within this note     Time User Action Codes Description Comment    3/10/2022  8:42 AM Slava Nick [[id number]] Moderate persistent asthma with exacerbation       ED Disposition     ED Disposition Condition Date/Time Comment    Discharge Stable Thu Mar 10, 2022  8:42 AM Zhang Hill discharge to home/self care              Follow-up Information     Follow up With Specialties Details Why Korey Salinas MD Family Medicine In 3 days As needed [address] [place] [address]  [phone]            Patient's Medications   Discharge Prescriptions    IPRATROPIUM (ATROVENT) 0 02 % NEBULIZER SOLUTION    Take 2 5 mL (0 5 mg total) by nebulization every 6 (six) hours as needed for wheezing or shortness of breath       Start Date: 3/10/2022 End Date: 4/9/2022       Order Dose: 0 5 mg       Quantity: 75 mL    Refills: 0    PREDNISONE 50 MG TABLET    Take 1 tablet (50 mg total) by mouth daily for 4 days       Start Date: 3/10/2022 End Date: 3/14/2022       Order Dose: 50 mg       Quantity: 4 tablet    Refills: 0       No discharge procedures on file     PDMP Review       Value Time User    PDMP Reviewed  Yes 7/12/2021  7:56 AM Kathi Edmondson DO          ED Provider  Electronically Signed by           David Jo MD  03/10/22 1985

## 2022-03-10 NOTE — Clinical Note
Vesta Whalen was seen and treated in our emergency department on 3/10/2022  Diagnosis:     Russell Stallion    He may return on this date: 03/12/2022         If you have any questions or concerns, please don't hesitate to call        Marcella De La Paz MD    ______________________________           _______________          _______________  Hospital Representative                              Date                                Time

## 2022-03-10 NOTE — ED NOTES
Respiratory therapist at bedside to administer nebulizer treatment     Megha Vallejo RN  03/10/22 4209

## 2022-03-15 ENCOUNTER — APPOINTMENT (EMERGENCY)
Dept: RADIOLOGY | Facility: HOSPITAL | Age: 57
End: 2022-03-15
Payer: COMMERCIAL

## 2022-03-15 ENCOUNTER — HOSPITAL ENCOUNTER (EMERGENCY)
Facility: HOSPITAL | Age: 57
Discharge: HOME | End: 2022-03-15
Attending: EMERGENCY MEDICINE | Admitting: EMERGENCY MEDICINE
Payer: COMMERCIAL

## 2022-03-15 VITALS
RESPIRATION RATE: 16 BRPM | OXYGEN SATURATION: 98 % | HEIGHT: 69 IN | SYSTOLIC BLOOD PRESSURE: 141 MMHG | WEIGHT: 216.05 LBS | DIASTOLIC BLOOD PRESSURE: 72 MMHG | BODY MASS INDEX: 32 KG/M2 | TEMPERATURE: 97.6 F | HEART RATE: 88 BPM

## 2022-03-15 DIAGNOSIS — J45.901 ASTHMA EXACERBATION: Primary | ICD-10-CM

## 2022-03-15 LAB
ANION GAP SERPL CALCULATED.3IONS-SCNC: 9 MMOL/L (ref 4–13)
ATRIAL RATE: 78 BPM
BASOPHILS # BLD AUTO: 0.06 THOUSANDS/ΜL (ref 0–0.1)
BASOPHILS NFR BLD AUTO: 1 % (ref 0–1)
BUN SERPL-MCNC: 13 MG/DL (ref 5–25)
CALCIUM SERPL-MCNC: 8.3 MG/DL (ref 8.3–10.1)
CARDIAC TROPONIN I PNL SERPL HS: <2 NG/L
CHLORIDE SERPL-SCNC: 104 MMOL/L (ref 100–108)
CO2 SERPL-SCNC: 29 MMOL/L (ref 21–32)
CREAT SERPL-MCNC: 1.01 MG/DL (ref 0.6–1.3)
EOSINOPHIL # BLD AUTO: 0.11 THOUSAND/ΜL (ref 0–0.61)
EOSINOPHIL NFR BLD AUTO: 2 % (ref 0–6)
ERYTHROCYTE [DISTWIDTH] IN BLOOD BY AUTOMATED COUNT: 12.2 % (ref 11.6–15.1)
GFR SERPL CREATININE-BSD FRML MDRD: 81 ML/MIN/1.73SQ M
GLUCOSE SERPL-MCNC: 165 MG/DL (ref 65–140)
HCT VFR BLD AUTO: 42.7 % (ref 36.5–49.3)
HGB BLD-MCNC: 14.9 G/DL (ref 12–17)
IMM GRANULOCYTES # BLD AUTO: 0.01 THOUSAND/UL (ref 0–0.2)
IMM GRANULOCYTES NFR BLD AUTO: 0 % (ref 0–2)
LYMPHOCYTES # BLD AUTO: 2.35 THOUSANDS/ΜL (ref 0.6–4.47)
LYMPHOCYTES NFR BLD AUTO: 40 % (ref 14–44)
MCH RBC QN AUTO: 28.9 PG (ref 26.8–34.3)
MCHC RBC AUTO-ENTMCNC: 34.9 G/DL (ref 31.4–37.4)
MCV RBC AUTO: 83 FL (ref 82–98)
MONOCYTES # BLD AUTO: 0.51 THOUSAND/ΜL (ref 0.17–1.22)
MONOCYTES NFR BLD AUTO: 9 % (ref 4–12)
NEUTROPHILS # BLD AUTO: 2.83 THOUSANDS/ΜL (ref 1.85–7.62)
NEUTS SEG NFR BLD AUTO: 48 % (ref 43–75)
NRBC BLD AUTO-RTO: 0 /100 WBCS
P AXIS: 84 DEGREES
PLATELET # BLD AUTO: 251 THOUSANDS/UL (ref 149–390)
PMV BLD AUTO: 9.5 FL (ref 8.9–12.7)
POTASSIUM SERPL-SCNC: 3.7 MMOL/L (ref 3.5–5.3)
PR INTERVAL: 166 MS
QRS AXIS: 76 DEGREES
QRSD INTERVAL: 86 MS
QT INTERVAL: 354 MS
QTC INTERVAL: 403 MS
RBC # BLD AUTO: 5.16 MILLION/UL (ref 3.88–5.62)
SODIUM SERPL-SCNC: 142 MMOL/L (ref 136–145)
T WAVE AXIS: 63 DEGREES
VENTRICULAR RATE: 78 BPM
WBC # BLD AUTO: 5.87 THOUSAND/UL (ref 4.31–10.16)

## 2022-03-15 PROCEDURE — 99285 EMERGENCY DEPT VISIT HI MDM: CPT | Performed by: EMERGENCY MEDICINE

## 2022-03-15 PROCEDURE — 93005 ELECTROCARDIOGRAM TRACING: CPT

## 2022-03-15 PROCEDURE — 71045 X-RAY EXAM CHEST 1 VIEW: CPT

## 2022-03-15 PROCEDURE — 80048 BASIC METABOLIC PNL TOTAL CA: CPT | Performed by: INTERNAL MEDICINE

## 2022-03-15 PROCEDURE — 93010 ELECTROCARDIOGRAM REPORT: CPT | Performed by: INTERNAL MEDICINE

## 2022-03-15 PROCEDURE — 84484 ASSAY OF TROPONIN QUANT: CPT | Performed by: INTERNAL MEDICINE

## 2022-03-15 PROCEDURE — 36415 COLL VENOUS BLD VENIPUNCTURE: CPT | Performed by: INTERNAL MEDICINE

## 2022-03-15 PROCEDURE — 85025 COMPLETE CBC W/AUTO DIFF WBC: CPT | Performed by: INTERNAL MEDICINE

## 2022-03-15 PROCEDURE — 94644 CONT INHLJ TX 1ST HOUR: CPT

## 2022-03-15 PROCEDURE — 99284 EMERGENCY DEPT VISIT MOD MDM: CPT

## 2022-03-15 PROCEDURE — 96375 TX/PRO/DX INJ NEW DRUG ADDON: CPT

## 2022-03-15 PROCEDURE — 96365 THER/PROPH/DIAG IV INF INIT: CPT

## 2022-03-15 RX ORDER — SODIUM CHLORIDE 9 MG/ML
3 INJECTION INTRAVENOUS
Status: DISCONTINUED | OUTPATIENT
Start: 2022-03-15 | End: 2022-03-15 | Stop reason: HOSPADM

## 2022-03-15 RX ORDER — METHYLPREDNISOLONE SODIUM SUCCINATE 125 MG/2ML
125 INJECTION, POWDER, LYOPHILIZED, FOR SOLUTION INTRAMUSCULAR; INTRAVENOUS ONCE
Status: COMPLETED | OUTPATIENT
Start: 2022-03-15 | End: 2022-03-15

## 2022-03-15 RX ORDER — PREDNISONE 50 MG/1
50 TABLET ORAL DAILY
Qty: 4 TABLET | Refills: 0 | Status: SHIPPED | OUTPATIENT
Start: 2022-03-16

## 2022-03-15 RX ORDER — MAGNESIUM SULFATE HEPTAHYDRATE 40 MG/ML
2 INJECTION, SOLUTION INTRAVENOUS ONCE
Status: COMPLETED | OUTPATIENT
Start: 2022-03-15 | End: 2022-03-15

## 2022-03-15 RX ORDER — ALBUTEROL SULFATE 2.5 MG/3ML
1 SOLUTION RESPIRATORY (INHALATION) ONCE
Status: COMPLETED | OUTPATIENT
Start: 2022-03-15 | End: 2022-03-15

## 2022-03-15 RX ORDER — SODIUM CHLORIDE FOR INHALATION 0.9 %
12 VIAL, NEBULIZER (ML) INHALATION ONCE
Status: COMPLETED | OUTPATIENT
Start: 2022-03-15 | End: 2022-03-15

## 2022-03-15 RX ADMIN — ISODIUM CHLORIDE 12 ML: 0.03 SOLUTION RESPIRATORY (INHALATION) at 10:11

## 2022-03-15 RX ADMIN — IPRATROPIUM BROMIDE 1 MG: 0.5 SOLUTION RESPIRATORY (INHALATION) at 10:12

## 2022-03-15 RX ADMIN — ALBUTEROL SULFATE 10 MG: 2.5 SOLUTION RESPIRATORY (INHALATION) at 10:12

## 2022-03-15 RX ADMIN — MAGNESIUM SULFATE HEPTAHYDRATE 2 G: 40 INJECTION, SOLUTION INTRAVENOUS at 10:03

## 2022-03-15 RX ADMIN — METHYLPREDNISOLONE SODIUM SUCCINATE 125 MG: 125 INJECTION, POWDER, FOR SOLUTION INTRAMUSCULAR; INTRAVENOUS at 10:00

## 2022-03-15 NOTE — ED PROVIDER NOTES
History  Chief Complaint   Patient presents with    Asthma     hx of asthma, last intubated 2019, taking duo nebs and using MDI with minimal relief, reports just finished course of steroids yesterday, 2 5mg albuterol given by EMS      HPI  58-year-old man with history of asthma presents the ED with worsening shortness of breath  He reports he was seen in the ER on 03/10 and started on steroids  He reports worsening shortness of breath despite steroids and using nebulizer as directed  Patient reports history of intubation in 2013 and 2910  He reports hospitalization for asthma in the past   He denies any cigarette smoking or drug use  He also reports dyspnea on exertion that has been ongoing for 8 months  He reports intermittent chest tightness and chest pain associated with it  He denies any known cardiac disease but has not had a stress test in years  He reports a cough that is productive  Patient denies headache, visual changes, dizziness, fevers, chills, palpitations, abdominal pain, diarrhea, melena, hematochezia, dysuria, new skin rashes or numbness or tingling of the extremities  Prior to Admission Medications   Prescriptions Last Dose Informant Patient Reported? Taking?    Blood Glucose Monitoring Suppl (ACURA BLOOD GLUCOSE METER) w/Device KIT  Self Yes No   Sig: by Does not apply route   Calcium Carb-Cholecalciferol (CALCIUM CARBONATE-VITAMIN D3 PO)  Self Yes No   Sig: Take 1,500 mg by mouth daily   Cinnamon 500 MG capsule   Yes No   Sig: Take 500 mg by mouth daily   Diclofenac Sodium (VOLTAREN) 1 %   No No   Sig: Apply 2 g topically 4 (four) times a day   FIBER PO   Yes No   Sig: Take by mouth   Insulin Pen Needle 32G X 4 MM MISC  Self Yes No   Sig: Unifine Pentips Plus 32 gauge x 5/32" needle   TAMSULOSIN HCL PO  Self Yes No   Sig: Take 0 4 mg by mouth daily    Turmeric 500 MG CAPS   Yes No   Sig: Take by mouth   VITAMIN D PO   Yes No   Sig: Take by mouth   albuterol (2 5 mg/3 mL) 0 083 % nebulizer solution  Self No No   Sig: Take 1 vial (2 5 mg total) by nebulization every 4 (four) hours as needed for shortness of breath   albuterol (PROVENTIL HFA,VENTOLIN HFA) 90 mcg/act inhaler  Self No No   Sig: Inhale 2 puffs every 4 (four) hours as needed for wheezing   albuterol (ProAir HFA) 90 mcg/act inhaler   No No   Sig: Inhale 2 puffs every 6 (six) hours as needed for wheezing   amLODIPine (NORVASC) 5 mg tablet  Self Yes No   Sig: Take 5 mg by mouth daily   atorvastatin (LIPITOR) 80 mg tablet  Self Yes No   Sig: Take 80 mg by mouth daily at bedtime   bimatoprost (LUMIGAN) 0 03 % ophthalmic drops  Self Yes No   Sig: Administer 1 drop to both eyes daily at bedtime     brimonidine (ALPHAGAN P) 0 15 % ophthalmic solution  Self Yes No   Sig: Administer 1 drop to both eyes every 8 (eight) hours     carBAMazepine (TEGRETOL) 200 mg tablet  Self Yes No   Sig: Take 2 tabs in the morning and 3 tabs at night   cetirizine (ZyrTEC) 10 mg tablet   Yes No   Sig: Take 10 mg by mouth daily   diclofenac sodium (VOLTAREN) 50 mg EC tablet   No No   Sig: Take 1 tablet (50 mg total) by mouth 2 (two) times a day for 10 days   dupilumab (DUPIXENT) subcutaneous injection  Self Yes No   Sig: Inject under the skin every 14 (fourteen) days   finasteride (PROSCAR) 5 mg tablet  Self Yes No   Sig: Take 5 mg by mouth daily   fluticasone (FLONASE) 50 mcg/act nasal spray  Self Yes No   Si spray into each nostril daily   gabapentin (NEURONTIN) 300 mg capsule  Self Yes No   Sig: Take 300 mg by mouth 3 (three) times a day   glucagon 1 MG injection  Self Yes No   Sig: as needed   guaiFENesin (MUCINEX) 600 mg 12 hr tablet  Self No No   Sig: Take 1 tablet (600 mg total) by mouth every 12 (twelve) hours   hydrOXYzine pamoate (VISTARIL) 50 mg capsule  Self Yes No   Sig: Take 50 mg by mouth Three times daily as needed   insulin aspart (NovoLOG) 100 units/mL injection  Self Yes No   Sig: Inject under the skin 3 (three) times a day before meals insulin glargine (BASAGLAR KWIKPEN) 100 units/mL injection pen  Self Yes No   Sig: Inject 43 Units under the skin daily at bedtime    ipratropium (ATROVENT) 0 02 % nebulizer solution   No No   Sig: Take 2 5 mL (0 5 mg total) by nebulization every 6 (six) hours as needed for wheezing or shortness of breath   latanoprost (XALATAN) 0 005 % ophthalmic solution  Self Yes No   Sig: Administer 1 drop to both eyes daily   lidocaine (LIDODERM) 5 %   No No   Sig: Apply 1 patch topically daily Remove & Discard patch within 12 hours or as directed by MD   misoprostol (CYTOTEC) 200 mcg tablet   No No   Sig: Take 1 tablet (200 mcg total) by mouth 3 (three) times a day for 10 days   montelukast (SINGULAIR) 10 mg tablet  Self Yes No   Sig: Take 10 mg by mouth daily     omega-3-acid ethyl esters (LOVAZA) 1 g capsule  Self Yes No   Sig: Take 1 g by mouth daily   omeprazole (PriLOSEC) 40 MG capsule  Self Yes No   Sig: Take 40 mg by mouth daily   perphenazine 2 mg tablet  Self Yes No   Sig: Take 2 mg by mouth daily   polyvinyl alcohol (LIQUIFILM TEARS) 1 4 % ophthalmic solution  Self Yes No   Si drops as needed for dry eyes   predniSONE 5 mg tablet  Self Yes No   Sig: Take 2 5 mg by mouth every other day On Monday, Wednesday, Friday    predniSONE 50 mg tablet   No No   Sig: Take 1 tablet (50 mg total) by mouth daily for 4 days   tolnaftate (TINACTIN) 1 % powder   Yes No   Sig: Apply 1 application topically 2 (two) times a day      Facility-Administered Medications: None       Past Medical History:   Diagnosis Date    Asthma     [facility]ipolar disorder ([facility] [address] )     Diabetes mellitus ([facility] [address] [facility]     Enlarged prostate     Glaucoma     Hyperlipidemia     Hypertension     Overdose of heroin, accidental or unintentional, sequela     Psychiatric disorder     Seasonal allergic rhinitis     Seizures (HCC)        Past Surgical History:   Procedure Laterality Date    CIRCUMCISION      KNEE SURGERY      WRIST SURGERY Left        Family History Problem Relation Age of Onset    Arthritis Mother      I have reviewed and agree with the history as documented  E-Cigarette/Vaping    E-Cigarette Use Never User      E-Cigarette/Vaping Substances    Nicotine No     THC No     CBD No     Flavoring No     Other No     Unknown No      Social History     Tobacco Use    Smoking status: Former Smoker     Packs/day: 2 00     Years: 27 00     Pack years: 54 00     Types: Cigarettes     Start date: 35     Quit date:      Years since quittin 2    Smokeless tobacco: Never Used   Vaping Use    Vaping Use: Never used   Substance Use Topics    Alcohol use: Yes     Comment: socially    Drug use: Not Currently     Types: Heroin, Fentanyl     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        Review of Systems   All other systems reviewed and are negative  Physical Exam  ED Triage Vitals [03/15/22 09]   Temperature Pulse Respirations Blood Pressure SpO2   97 6 °F (36 4 °C) 71 (!) 24 154/73 98 %      Temp Source Heart Rate Source Patient Position - Orthostatic VS BP Location FiO2 (%)   Oral -- -- Right arm --      Pain Score       3             Orthostatic Vital Signs  Vitals:    03/15/22 0919 03/15/22 1100   BP: 154/73 141/72   Pulse: 71 88       Physical Exam   PHYSICAL EXAM    Constitutional:  Well developed, well nourished, no acute distress, non-toxic appearance    HEENT:  Conjunctiva normal  Oropharynx moist  Respiratory:  No respiratory distress, scattered wheezes  Cardiovascular:  Normal rate, normal rhythm, no murmurs  GI:  Soft, nondistended, normal bowel sounds, nontender  :  No costovertebral angle tenderness   Musculoskeletal:  No edema, no tenderness, no deformities     Integument:  Well hydrated, no rash   Lymphatic:  No lymphadenopathy noted   Neurologic:  Alert & oriented, normal motor function, normal sensory function, no focal deficits noted   Psychiatric:  Speech and behavior appropriate     ED Medications  Medications   sodium chloride (PF) 0 9 % injection 3 mL (has no administration in time range)   albuterol (FOR EMS ONLY) (2 5 mg/3 mL) 0 083 % inhalation solution 2 5 mg (0 mg Does not apply Given to EMS 3/15/22 0929)   albuterol inhalation solution 10 mg (10 mg Nebulization Given 3/15/22 1012)   ipratropium (ATROVENT) 0 02 % inhalation solution 1 mg (1 mg Nebulization Given 3/15/22 1012)   sodium chloride 0 9 % inhalation solution 12 mL (12 mL Nebulization Given 3/15/22 1011)   magnesium sulfate 2 g/50 mL IVPB (premix) 2 g (0 g Intravenous Stopped 3/15/22 1023)   methylPREDNISolone sodium succinate (Solu-MEDROL) injection 125 mg (125 mg Intravenous Given 3/15/22 1000)       Diagnostic Studies  Results Reviewed     Procedure Component Value Units Date/Time    HS Troponin 0hr (reflex protocol) [[id number]]  (Normal) Collected: 03/15/22 1002    Lab Status: Final result Specimen: Blood from Arm, Right Updated: 03/15/22 1039     hs TnI 0hr <2 ng/L     Basic metabolic panel [[id number]]  (Abnormal) Collected: 03/15/22 1002    Lab Status: Final result Specimen: Blood from Arm, Right Updated: 03/15/22 1027     Sodium 142 mmol/L      Potassium 3 7 mmol/L      Chloride 104 mmol/L      CO2 29 mmol/L      ANION GAP 9 mmol/L      BUN 13 mg/dL      Creatinine 1 01 mg/dL      Glucose 165 mg/dL      Calcium 8 3 mg/dL      eGFR 81 ml/min/1 73sq m     Narrative:      Raquel guidelines for Chronic Kidney Disease (CKD):     Stage 1 with normal or high GFR (GFR > 90 mL/min/1 73 square meters)    Stage 2 Mild CKD (GFR = 60-89 mL/min/1 73 square meters)    Stage 3A Moderate CKD (GFR = 45-59 mL/min/1 73 square meters)    Stage 3B Moderate CKD (GFR = 30-44 mL/min/1 73 square meters)    Stage 4 Severe CKD (GFR = 15-29 mL/min/1 73 square meters)    Stage 5 End Stage CKD (GFR <15 mL/min/1 73 square meters)  Note: GFR calculation is accurate only with a steady state creatinine    CBC and differential [[id number]] Collected: 03/15/22 1002 Lab Status: Final result Specimen: Blood from Arm, Right Updated: 03/15/22 1014     WBC 5 87 Thousand/uL      RBC 5 16 Million/uL      Hemoglobin 14 9 g/dL      Hematocrit 42 7 %      MCV 83 fL      MCH 28 9 pg      MCHC 34 9 g/dL      RDW 12 2 %      MPV 9 5 fL      Platelets 888 Thousands/uL      nRBC 0 /100 WBCs      Neutrophils Relative 48 %      Immat GRANS % 0 %      Lymphocytes Relative 40 %      Monocytes Relative 9 %      Eosinophils Relative 2 %      Basophils Relative 1 %      Neutrophils Absolute 2 83 Thousands/µL      Immature Grans Absolute 0 01 Thousand/uL      Lymphocytes Absolute 2 35 Thousands/µL      Monocytes Absolute 0 51 Thousand/µL      Eosinophils Absolute 0 11 Thousand/µL      Basophils Absolute 0 06 Thousands/µL                  X-ray chest 1 view portable    (Results Pending)         Procedures  Procedures      ED Course  ED Course as of 03/15/22 1147   Tue Mar 15, 2022   1051 EKG: normal EKG, normal sinus rhythm, no NENA               HEART Risk Score      Most Recent Value   Heart Score Risk Calculator    History 0 Filed at: 03/15/2022 1047   ECG 0 Filed at: 03/15/2022 1047   Age 1 Filed at: 03/15/2022 1047   Risk Factors 1 Filed at: 03/15/2022 1047   Troponin 0 Filed at: 03/15/2022 1047   HEART Score 2 Filed at: 03/15/2022 1047                      SBIRT 20yo+      Most Recent Value   SBIRT (25 yo +)    In order to provide better care to our patients, we are screening all of our patients for alcohol and drug use  Would it be okay to ask you these screening questions? Yes Filed at: 03/15/2022 3073   Initial Alcohol Screen: US AUDIT-C     1  How often do you have a drink containing alcohol? 0 Filed at: 03/15/2022 0924   2  How many drinks containing alcohol do you have on a typical day you are drinking? 0 Filed at: 03/15/2022 0924   3a  Male UNDER 65: How often do you have five or more drinks on one occasion? 0 Filed at: 03/15/2022 0924   3b  FEMALE Any Age, or MALE 65+:  How often do you have 4 or more drinks on one occassion? 0 Filed at: 03/15/2022 0924   Audit-C Score 0 Filed at: 03/15/2022 6270   ROXY: How many times in the past year have you    Used an illegal drug or used a prescription medication for non-medical reasons? Never Filed at: 03/15/2022 9306                [facility]  Number of Diagnoses or Management Options  Asthma exacerbation  Diagnosis management comments: 58-year-old man with history of asthma presents the ED with worsening shortness of breath despite steroid, dyspnea on exertion, intermittent chest tightness and chest pain  Cardiac workup unremarkable  Heart score 2  Patient treated with heart neb, Solu-Medrol and magnesium  Patient has significant improvement of symptoms  Patient was offered admission but stated he felt lot better  Patient discharged home with steroids, pulmonary follow-up which is scheduled for tomorrow and strict return precautions  Amount and/or Complexity of Data Reviewed  Clinical lab tests: ordered and reviewed  Tests in the radiology section of CPT®: ordered and reviewed  Discussion of test results with the performing providers: yes  Review and summarize past medical records: yes  Discuss the patient with other providers: yes    Risk of Complications, Morbidity, and/or Mortality  Presenting problems: moderate  Diagnostic procedures: moderate  Management options: low    Patient Progress  Patient progress: improved      Disposition  Final diagnoses:   Asthma exacerbation     Time reflects when diagnosis was documented in both MDM as applicable and the Disposition within this note     Time User Action Codes Description Comment    3/15/2022 11:16 AM Alexis Kaba Add [[id number] [id number]] Asthma exacerbation       ED Disposition     ED Disposition Condition Date/Time Comment    Discharge Stable Tue Mar 15, 2022 11:16 AM Darien Matthews discharge to home/self care              Follow-up Information     Follow up With Specialties Details Why Contact Info Jose D Wilson MD Critical Care Medicine, Pulmonology, Pulmonary Disease Schedule an appointment as soon as possible for a visit   [address] [place] [id number]  [phone]            Patient's Medications   Discharge Prescriptions    PREDNISONE 50 MG TABLET    Take 1 tablet (50 mg total) by mouth daily       Start Date: 3/16/2022 End Date: --       Order Dose: 50 mg       Quantity: 4 tablet    Refills: 0     No discharge procedures on file  PDMP Review       Value Time User    PDMP Reviewed  Yes 7/12/2021  7:56 AM Ede Ojeda DO           ED Provider  Attending physically available and evaluated Josselyn Song  I managed the patient along with the ED Attending      Electronically Signed by         Amber Lawson MD  03/15/22 7315

## 2022-03-15 NOTE — ED NOTES
Patient states that all home medications should be updated in the computer       Jazmyn Villegas RN  03/15/22 6421

## 2022-03-28 ENCOUNTER — HOSPITAL ENCOUNTER (EMERGENCY)
Facility: HOSPITAL | Age: 57
Discharge: HOME | End: 2022-03-28
Attending: EMERGENCY MEDICINE
Payer: COMMERCIAL

## 2022-03-28 VITALS
BODY MASS INDEX: 32.74 KG/M2 | SYSTOLIC BLOOD PRESSURE: 139 MMHG | OXYGEN SATURATION: 97 % | TEMPERATURE: 97.8 F | HEART RATE: 74 BPM | DIASTOLIC BLOOD PRESSURE: 89 MMHG | HEIGHT: 68 IN | RESPIRATION RATE: 15 BRPM | WEIGHT: 216.05 LBS

## 2022-03-28 DIAGNOSIS — G44.209 ACUTE NON INTRACTABLE TENSION-TYPE HEADACHE: ICD-10-CM

## 2022-03-28 DIAGNOSIS — J45.901 ASTHMA EXACERBATION: Primary | ICD-10-CM

## 2022-03-28 LAB
ATRIAL RATE: 74 BPM
P AXIS: 74 DEGREES
PR INTERVAL: 168 MS
QRS AXIS: 42 DEGREES
QRSD INTERVAL: 86 MS
QT INTERVAL: 380 MS
QTC INTERVAL: 421 MS
T WAVE AXIS: 33 DEGREES
VENTRICULAR RATE: 74 BPM

## 2022-03-28 PROCEDURE — 94640 AIRWAY INHALATION TREATMENT: CPT

## 2022-03-28 PROCEDURE — 93005 ELECTROCARDIOGRAM TRACING: CPT

## 2022-03-28 PROCEDURE — 99284 EMERGENCY DEPT VISIT MOD MDM: CPT | Performed by: EMERGENCY MEDICINE

## 2022-03-28 PROCEDURE — 99285 EMERGENCY DEPT VISIT HI MDM: CPT

## 2022-03-28 PROCEDURE — 93010 ELECTROCARDIOGRAM REPORT: CPT | Performed by: INTERNAL MEDICINE

## 2022-03-28 RX ORDER — BUTALBITAL, ACETAMINOPHEN AND CAFFEINE 50; 325; 40 MG/1; MG/1; MG/1
1 TABLET ORAL ONCE
Status: COMPLETED | OUTPATIENT
Start: 2022-03-28 | End: 2022-03-28

## 2022-03-28 RX ORDER — BUTALBITAL, ACETAMINOPHEN AND CAFFEINE 50; 325; 40 MG/1; MG/1; MG/1
1 TABLET ORAL EVERY 4 HOURS PRN
Qty: 30 TABLET | Refills: 0 | Status: SHIPPED | OUTPATIENT
Start: 2022-03-28 | End: 2022-04-07

## 2022-03-28 RX ORDER — ALBUTEROL SULFATE 2.5 MG/3ML
1 SOLUTION RESPIRATORY (INHALATION) ONCE
Status: COMPLETED | OUTPATIENT
Start: 2022-03-28 | End: 2022-03-28

## 2022-03-28 RX ORDER — IPRATROPIUM BROMIDE AND ALBUTEROL SULFATE .5; 3 MG/3ML; MG/3ML
1 SOLUTION RESPIRATORY (INHALATION) ONCE
Status: COMPLETED | OUTPATIENT
Start: 2022-03-28 | End: 2022-03-28

## 2022-03-28 RX ORDER — PREDNISONE 20 MG/1
60 TABLET ORAL ONCE
Status: COMPLETED | OUTPATIENT
Start: 2022-03-28 | End: 2022-03-28

## 2022-03-28 RX ORDER — ALBUTEROL SULFATE 2.5 MG/3ML
5 SOLUTION RESPIRATORY (INHALATION) ONCE
Status: COMPLETED | OUTPATIENT
Start: 2022-03-28 | End: 2022-03-28

## 2022-03-28 RX ORDER — PREDNISONE 20 MG/1
60 TABLET ORAL DAILY
Qty: 15 TABLET | Refills: 0 | Status: SHIPPED | OUTPATIENT
Start: 2022-03-28

## 2022-03-28 RX ADMIN — PREDNISONE 60 MG: 20 TABLET ORAL at 04:40

## 2022-03-28 RX ADMIN — IPRATROPIUM BROMIDE 0.5 MG: 0.5 SOLUTION RESPIRATORY (INHALATION) at 04:40

## 2022-03-28 RX ADMIN — ALBUTEROL SULFATE 5 MG: 2.5 SOLUTION RESPIRATORY (INHALATION) at 04:40

## 2022-03-28 RX ADMIN — BUTALBITAL, ACETAMINOPHEN, AND CAFFEINE 1 TABLET: 50; 325; 40 TABLET ORAL at 04:40

## 2022-03-28 NOTE — ED PROVIDER NOTES
History  Chief Complaint   Patient presents with    Shortness of Breath     Patient c/o shortness of breath that started 2 days ago  Pt is a 61year old male with a PMH of asthma presenting with asthma exacerbation  States 2 days ago he got caught in the rain and since has been having worsening cough, chest tightness, wheezing, and SOB  Took a breathing treatment at home prior to calling EMS  States he has been intubated in the past for asthma and did not want to take chances  Denies fevers, chills, sweats, chest pain, n/v/d  Also complaining of left sided occipital headache over this time  Tylenol improves pain, but does not relieve it  History provided by:  Patient   used: No    Shortness of Breath  Severity:  Severe  Onset quality:  Gradual  Duration:  2 days  Timing:  Constant  Progression:  Worsening  Chronicity:  New  Context: URI    Relieved by:  Nothing  Worsened by:  Nothing  Ineffective treatments: albuterol treatments  Associated symptoms: cough, headaches and wheezing    Risk factors: no hx of cancer, no hx of PE/DVT, no obesity and no tobacco use        Prior to Admission Medications   Prescriptions Last Dose Informant Patient Reported? Taking?    Blood Glucose Monitoring Suppl (ACURA BLOOD GLUCOSE METER) w/Device KIT  Self Yes Yes   Sig: by Does not apply route   Calcium Carb-Cholecalciferol (CALCIUM CARBONATE-VITAMIN D3 PO)  Self Yes Yes   Sig: Take 1,500 mg by mouth daily   Cinnamon 500 MG capsule   Yes Yes   Sig: Take 500 mg by mouth daily   Diclofenac Sodium (VOLTAREN) 1 %   No Yes   Sig: Apply 2 g topically 4 (four) times a day   FIBER PO   Yes No   Sig: Take by mouth   Insulin Pen Needle 32G X 4 MM MISC  Self Yes No   Sig: Unifine Pentips Plus 32 gauge x 5/32" needle   TAMSULOSIN HCL PO  Self Yes No   Sig: Take 0 4 mg by mouth daily    Turmeric 500 MG CAPS   Yes No   Sig: Take by mouth   VITAMIN D PO   Yes No   Sig: Take by mouth   albuterol (2 5 mg/3 mL) 0 083 % nebulizer solution  Self No Yes   Sig: Take 1 vial (2 5 mg total) by nebulization every 4 (four) hours as needed for shortness of breath   albuterol (PROVENTIL HFA,VENTOLIN HFA) 90 mcg/act inhaler  Self No No   Sig: Inhale 2 puffs every 4 (four) hours as needed for wheezing   albuterol (ProAir HFA) 90 mcg/act inhaler   No Yes   Sig: Inhale 2 puffs every 6 (six) hours as needed for wheezing   amLODIPine (NORVASC) 5 mg tablet  Self Yes Yes   Sig: Take 5 mg by mouth daily   atorvastatin (LIPITOR) 80 mg tablet  Self Yes Yes   Sig: Take 80 mg by mouth daily at bedtime   bimatoprost (LUMIGAN) 0 03 % ophthalmic drops  Self Yes Yes   Sig: Administer 1 drop to both eyes daily at bedtime     brimonidine (ALPHAGAN P) 0 15 % ophthalmic solution  Self Yes Yes   Sig: Administer 1 drop to both eyes every 8 (eight) hours     carBAMazepine (TEGRETOL) 200 mg tablet  Self Yes Yes   Sig: Take 2 tabs in the morning and 3 tabs at night   cetirizine (ZyrTEC) 10 mg tablet   Yes Yes   Sig: Take 10 mg by mouth daily   diclofenac sodium (VOLTAREN) 50 mg EC tablet   No Yes   Sig: Take 1 tablet (50 mg total) by mouth 2 (two) times a day for 10 days   dupilumab (DUPIXENT) subcutaneous injection  Self Yes No   Sig: Inject under the skin every 14 (fourteen) days   finasteride (PROSCAR) 5 mg tablet  Self Yes No   Sig: Take 5 mg by mouth daily   fluticasone (FLONASE) 50 mcg/act nasal spray  Self Yes No   Si spray into each nostril daily   gabapentin (NEURONTIN) 300 mg capsule  Self Yes No   Sig: Take 300 mg by mouth 3 (three) times a day   glucagon 1 MG injection  Self Yes No   Sig: as needed   guaiFENesin (MUCINEX) 600 mg 12 hr tablet  Self No No   Sig: Take 1 tablet (600 mg total) by mouth every 12 (twelve) hours   hydrOXYzine pamoate (VISTARIL) 50 mg capsule  Self Yes No   Sig: Take 50 mg by mouth Three times daily as needed   insulin aspart (NovoLOG) 100 units/mL injection  Self Yes No   Sig: Inject under the skin 3 (three) times a day before meals   insulin glargine (BASAGLAR KWIKPEN) 100 units/mL injection pen  Self Yes No   Sig: Inject 43 Units under the skin daily at bedtime    ipratropium (ATROVENT) 0 02 % nebulizer solution   No No   Sig: Take 2 5 mL (0 5 mg total) by nebulization every 6 (six) hours as needed for wheezing or shortness of breath   latanoprost (XALATAN) 0 005 % ophthalmic solution  Self Yes No   Sig: Administer 1 drop to both eyes daily   lidocaine (LIDODERM) 5 %   No No   Sig: Apply 1 patch topically daily Remove & Discard patch within 12 hours or as directed by MD   misoprostol (CYTOTEC) 200 mcg tablet   No No   Sig: Take 1 tablet (200 mcg total) by mouth 3 (three) times a day for 10 days   montelukast (SINGULAIR) 10 mg tablet  Self Yes No   Sig: Take 10 mg by mouth daily     omega-3-acid ethyl esters (LOVAZA) 1 g capsule  Self Yes No   Sig: Take 1 g by mouth daily   omeprazole (PriLOSEC) 40 MG capsule  Self Yes No   Sig: Take 40 mg by mouth daily   perphenazine 2 mg tablet  Self Yes No   Sig: Take 2 mg by mouth daily   polyvinyl alcohol (LIQUIFILM TEARS) 1 4 % ophthalmic solution  Self Yes No   Si drops as needed for dry eyes   predniSONE 5 mg tablet  Self Yes No   Sig: Take 2 5 mg by mouth every other day On Monday, Wednesday, Friday    predniSONE 50 mg tablet   No No   Sig: Take 1 tablet (50 mg total) by mouth daily   tolnaftate (TINACTIN) 1 % powder   Yes No   Sig: Apply 1 application topically 2 (two) times a day      Facility-Administered Medications: None       Past Medical History:   Diagnosis Date    Asthma     Bipolar disorder [facility]     COPD [facility]chronic obstructive pulmonary disease) [facility] [facility]     [facility]     Enlarged prostate     Glaucoma     Hyperlipidemia     Hypertension     Overdose of heroin, accidental or unintentional, sequela     Psychiatric disorder     Seasonal allergic rhinitis     Seizures (HCC)        Past Surgical History:   Procedure Laterality Date    CIRCUMCISION      KNEE SURGERY      WRIST SURGERY Left        Family History   Problem Relation Age of Onset    Arthritis Mother      I have reviewed and agree with the history as documented  E-Cigarette/Vaping    E-Cigarette Use Never User      E-Cigarette/Vaping Substances    Nicotine No     THC No     CBD No     Flavoring No     Other No     Unknown No      Social History     Tobacco Use    Smoking status: Former Smoker     Packs/day: 2 00     Years: 27 00     Pack years: 54 00     Types: Cigarettes     Start date: 35     Quit date:      Years since quittin 2    Smokeless tobacco: Never Used   Vaping Use    Vaping Use: Never used   Substance Use Topics    Alcohol use: Yes     Comment: socially    Drug use: Not Currently     Types: Heroin, Fentanyl     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       Review of Systems   Constitutional: Negative  HENT: Negative  Respiratory: Positive for cough, chest tightness, shortness of breath and wheezing  Cardiovascular: Negative  Gastrointestinal: Negative  Genitourinary: Negative  Musculoskeletal: Negative  Neurological: Positive for headaches  Negative for dizziness, syncope, facial asymmetry, speech difficulty, weakness, light-headedness and numbness  All other systems reviewed and are negative  Physical Exam  Physical Exam  Constitutional:       General: He is not in acute distress  Appearance: He is well-developed  He is not ill-appearing or diaphoretic  HENT:      Head: Normocephalic and atraumatic  Right Ear: External ear normal       Left Ear: External ear normal       Nose: Nose normal       Mouth/Throat:      Mouth: Mucous membranes are moist       Pharynx: Oropharynx is clear  No oropharyngeal exudate or posterior oropharyngeal erythema  Eyes:      General: No scleral icterus  Right eye: No discharge  Left eye: No discharge  Extraocular Movements: Extraocular movements intact        Conjunctiva/sclera: Conjunctivae normal    Cardiovascular:      Rate and Rhythm: Normal rate and regular rhythm  Heart sounds: Normal heart sounds  Pulmonary:      Effort: Pulmonary effort is normal       Breath sounds: Examination of the right-upper field reveals wheezing  Examination of the left-upper field reveals wheezing  Wheezing present  Abdominal:      General: Abdomen is flat  Bowel sounds are normal  There is no distension  Palpations: Abdomen is soft  Tenderness: There is no abdominal tenderness  Musculoskeletal:         General: Normal range of motion  Cervical back: Normal range of motion and neck supple  Skin:     General: Skin is warm and dry  Neurological:      General: No focal deficit present  Mental Status: He is alert and oriented to person, place, and time  Mental status is at baseline     Psychiatric:         Mood and Affect: Mood normal          Behavior: Behavior normal          Vital Signs  ED Triage Vitals   Temperature Pulse Respirations Blood Pressure SpO2   03/28/22 0427 03/28/22 0426 03/28/22 0426 03/28/22 0426 03/28/22 0426   97 8 °F (36 6 °C) 77 18 (!) 130/101 98 %      Temp Source Heart Rate Source Patient Position - Orthostatic VS BP Location FiO2 (%)   03/28/22 0426 03/28/22 0426 -- -- --   Oral Monitor         Pain Score       03/28/22 0440       8           Vitals:    03/28/22 0515 03/28/22 0530 03/28/22 0545 03/28/22 0600   BP: 133/76 143/75 133/76 139/89   Pulse: 80 78 74 74         Visual Acuity      ED Medications  Medications   albuterol (FOR EMS ONLY) (2 5 mg/3 mL) 0 083 % inhalation solution 2 5 mg (0 mg Does not apply Given to EMS 3/28/22 0426)   ipratropium-albuterol (FOR EMS ONLY) (DUO-NEB) 0 5-2 5 mg/3 mL inhalation solution 3 mL (0 mL Does not apply Given to EMS 3/28/22 0426)   butalbital-acetaminophen-caffeine (FIORICET,ESGIC) -40 mg per tablet 1 tablet (1 tablet Oral Given 3/28/22 0440)   predniSONE tablet 60 mg (60 mg Oral Given 3/28/22 0440) albuterol inhalation solution 5 mg (5 mg Nebulization Given 3/28/22 0440)   ipratropium (ATROVENT) 0 02 % inhalation solution 0 5 mg (0 5 mg Nebulization Given 3/28/22 0440)       Diagnostic Studies  Results Reviewed     None                 No orders to display              Procedures  ECG 12 Lead Documentation Only    Date/Time: 3/28/2022 4:39 AM  Performed by: Pauly Catalan PA-C  Authorized by: Pauly Catalan PA-C     ECG reviewed by me, the ED Provider: yes    Patient location:  ED  Previous ECG:     Previous ECG:  Compared to current    Similarity:  No change    Comparison to cardiac monitor: No    Interpretation:     Interpretation: normal    Rate:     ECG rate:  74    ECG rate assessment: normal    Rhythm:     Rhythm: sinus rhythm    Ectopy:     Ectopy: none    QRS:     QRS axis:  Normal    QRS intervals:  Normal  Conduction:     Conduction: normal    ST segments:     ST segments:  Normal  T waves:     T waves: normal               ED Course  ED Course as of 03/28/22 0626   Mon Mar 28, 2022   0605 Pt improved with medications in the ED  Stable for discharge  Follow up with PCP  Educated on return precautions  SBIRT 20yo+      Most Recent Value   SBIRT (24 yo +)    In order to provide better care to our patients, we are screening all of our patients for alcohol and drug use  Would it be okay to ask you these screening questions? No Filed at: 03/28/2022 3968   Initial Alcohol Screen: US AUDIT-C     1  How often do you have a drink containing alcohol? 0 Filed at: 03/28/2022 0429   2  How many drinks containing alcohol do you have on a typical day you are drinking? 0 Filed at: 03/28/2022 0429   3a  Male UNDER 65: How often do you have five or more drinks on one occasion? 0 Filed at: 03/28/2022 0429   3b  FEMALE Any Age, or MALE 65+: How often do you have 4 or more drinks on one occassion?  0 Filed at: 03/28/2022 0429   Audit-C Score 0 Filed at: 03/28/2022 6709   ROXY: How many times in the past year have you    Used an illegal drug or used a prescription medication for non-medical reasons? Never Filed at: 03/28/2022 0429                    [facility]  Number of Diagnoses or Management Options  Acute non intractable tension-type headache: new and requires workup  Asthma exacerbation: new and requires workup     Amount and/or Complexity of Data Reviewed  Clinical lab tests: ordered  Review and summarize past medical records: yes    Risk of Complications, Morbidity, and/or Mortality  Presenting problems: moderate  Management options: moderate    Patient Progress  Patient progress: improved      Disposition  Final diagnoses:   Asthma exacerbation   Acute non intractable tension-type headache     Time reflects when diagnosis was documented in both MDM as applicable and the Disposition within this note     Time User Action Codes Description Comment    3/28/2022  6:06 AM Amanda Nick [J45 901] Asthma exacerbation     3/28/2022  6:06 AM Amanda Nick [G44 [id number]] Acute non intractable tension-type headache       ED Disposition     ED Disposition Condition Date/Time Comment    Discharge Good Mon Mar 28, 2022  6:05 AM Roxana Ibrahim discharge to home/self care  Follow-up Information    None         Discharge Medication List as of 3/28/2022  6:07 AM      START taking these medications    Details   butalbital-acetaminophen-caffeine (Esgic) -40 mg per tablet Take 1 tablet by mouth every 4 (four) hours as needed for headaches for up to 10 days, Starting Mon 3/28/2022, Until Thu 4/7/2022 at 2359, Normal      !! ipratropium (ATROVENT) 0 02 % nebulizer solution Take 2 5 mL (0 5 mg total) by nebulization 4 (four) times a day, Starting Mon 3/28/2022, Normal      !! predniSONE 20 mg tablet Take 3 tablets (60 mg total) by mouth daily, Starting Mon 3/28/2022, Normal       !! - Potential duplicate medications found  Please discuss with provider        CONTINUE these medications which have NOT CHANGED    Details   albuterol (2 5 mg/3 mL) 0 083 % nebulizer solution Take 1 vial (2 5 mg total) by nebulization every 4 (four) hours as needed for shortness of breath, Starting Thu 11/15/2018, Normal      !! albuterol (ProAir HFA) 90 mcg/act inhaler Inhale 2 puffs every 6 (six) hours as needed for wheezing, Starting Fri 12/10/2021, Normal      amLODIPine (NORVASC) 5 mg tablet Take 5 mg by mouth daily, Historical Med      atorvastatin (LIPITOR) 80 mg tablet Take 80 mg by mouth daily at bedtime, Starting Thu 9/8/2016, Historical Med      bimatoprost (LUMIGAN) 0 03 % ophthalmic drops Administer 1 drop to both eyes daily at bedtime  , Starting Thu 6/5/2008, Historical Med      Blood Glucose Monitoring Suppl (ACURA BLOOD GLUCOSE METER) w/Device KIT by Does not apply route, Historical Med      brimonidine (ALPHAGAN P) 0 15 % ophthalmic solution Administer 1 drop to both eyes every 8 (eight) hours  , Starting Th 7/2/2015, Historical Med      Calcium Carb-Cholecalciferol (CALCIUM CARBONATE-VITAMIN D3 PO) Take 1,500 mg by mouth daily, Starting Fri 9/20/2013, Historical Med      carBAMazepine (TEGRETOL) 200 mg tablet Take 2 tabs in the morning and 3 tabs at night, Historical Med      cetirizine (ZyrTEC) 10 mg tablet Take 10 mg by mouth daily, Historical Med      Cinnamon 500 MG capsule Take 500 mg by mouth daily, Historical Med      Diclofenac Sodium (VOLTAREN) 1 % Apply 2 g topically 4 (four) times a day, Starting Fri 5/14/2021, Normal      diclofenac sodium (VOLTAREN) 50 mg EC tablet Take 1 tablet (50 mg total) by mouth 2 (two) times a day for 10 days, Starting Mon 7/12/2021, Until Mon 3/28/2022, Normal      !! albuterol (PROVENTIL HFA,VENTOLIN HFA) 90 mcg/act inhaler Inhale 2 puffs every 4 (four) hours as needed for wheezing, Starting Thu 11/15/2018, Normal      dupilumab (DUPIXENT) subcutaneous injection Inject under the skin every 14 (fourteen) days, Historical Med      FIBER PO Take by mouth, Historical Med finasteride (PROSCAR) 5 mg tablet Take 5 mg by mouth daily, Starting Tue 4/26/2016, Historical Med      fluticasone (FLONASE) 50 mcg/act nasal spray 1 spray into each nostril daily, Historical Med      gabapentin (NEURONTIN) 300 mg capsule Take 300 mg by mouth 3 (three) times a day, Starting Thu 9/8/2016, Historical Med      glucagon 1 MG injection as needed, Starting Fri 8/5/2016, Historical Med      guaiFENesin (MUCINEX) 600 mg 12 hr tablet Take 1 tablet (600 mg total) by mouth every 12 (twelve) hours, Starting Fri 5/3/2019, Print      hydrOXYzine pamoate (VISTARIL) 50 mg capsule Take 50 mg by mouth Three times daily as needed, Starting Thu 8/8/2019, Historical Med      insulin aspart (NovoLOG) 100 units/mL injection Inject under the skin 3 (three) times a day before meals, Historical Med      insulin glargine (BASAGLAR KWIKPEN) 100 units/mL injection pen Inject 43 Units under the skin daily at bedtime , Historical Med      Insulin Pen Needle 32G X 4 MM MISC Unifine Pentips Plus 32 gauge x 5/32" needle, Historical Med      !! ipratropium (ATROVENT) 0 02 % nebulizer solution Take 2 5 mL (0 5 mg total) by nebulization every 6 (six) hours as needed for wheezing or shortness of breath, Starting Thu 3/10/2022, Until Sat 4/9/2022 at 2359, Normal      latanoprost (XALATAN) 0 005 % ophthalmic solution Administer 1 drop to both eyes daily, Historical Med      lidocaine (LIDODERM) 5 % Apply 1 patch topically daily Remove & Discard patch within 12 hours or as directed by MD, Starting Fri 5/14/2021, Normal      misoprostol (CYTOTEC) 200 mcg tablet Take 1 tablet (200 mcg total) by mouth 3 (three) times a day for 10 days, Starting Mon 7/12/2021, Until Thu 7/22/2021, Normal      montelukast (SINGULAIR) 10 mg tablet Take 10 mg by mouth daily  , Starting Tue 8/25/2015, Historical Med      omega-3-acid ethyl esters (LOVAZA) 1 g capsule Take 1 g by mouth daily, Historical Med      omeprazole (PriLOSEC) 40 MG capsule Take 40 mg by mouth daily, Starting Tue 4/26/2016, Historical Med      perphenazine 2 mg tablet Take 2 mg by mouth daily, Historical Med      polyvinyl alcohol (LIQUIFILM TEARS) 1 4 % ophthalmic solution 2 drops as needed for dry eyes, Historical Med      !! predniSONE 5 mg tablet Take 2 5 mg by mouth every other day On Monday, Wednesday, Friday , Historical Med      !! predniSONE 50 mg tablet Take 1 tablet (50 mg total) by mouth daily, Starting Wed 3/16/2022, Normal      TAMSULOSIN HCL PO Take 0 4 mg by mouth daily , Historical Med      tolnaftate (TINACTIN) 1 % powder Apply 1 application topically 2 (two) times a day, Historical Med      Turmeric 500 MG CAPS Take by mouth, Historical Med      VITAMIN D PO Take by mouth, Historical Med       !! - Potential duplicate medications found  Please discuss with provider  No discharge procedures on file      PDMP Review       Value Time User    PDMP Reviewed  Yes 7/12/2021  7:56 AM Arvella Curling, DO          ED Provider  Electronically Signed by           Yazmin Nieves PA-C  03/28/22 4261

## 2022-04-01 ENCOUNTER — HOSPITAL ENCOUNTER (EMERGENCY)
Facility: HOSPITAL | Age: 57
Discharge: HOME | End: 2022-04-02
Attending: EMERGENCY MEDICINE
Payer: COMMERCIAL

## 2022-04-01 ENCOUNTER — APPOINTMENT (EMERGENCY)
Dept: RADIOLOGY | Facility: HOSPITAL | Age: 57
End: 2022-04-01
Payer: COMMERCIAL

## 2022-04-01 DIAGNOSIS — F19.10 SUBSTANCE ABUSE (HCC): ICD-10-CM

## 2022-04-01 DIAGNOSIS — J45.901 EXACERBATION OF ASTHMA, UNSPECIFIED ASTHMA SEVERITY, UNSPECIFIED WHETHER PERSISTENT: Primary | ICD-10-CM

## 2022-04-01 LAB
ALBUMIN SERPL BCP-MCNC: 4.4 G/DL (ref 3–5.2)
ALP SERPL-CCNC: 71 U/L (ref 43–122)
ALT SERPL W P-5'-P-CCNC: 23 U/L
ANION GAP SERPL CALCULATED.3IONS-SCNC: 6 MMOL/L (ref 5–14)
AST SERPL W P-5'-P-CCNC: 23 U/L (ref 17–59)
BASOPHILS # BLD AUTO: 0.04 THOUSANDS/ΜL (ref 0–0.1)
BASOPHILS NFR BLD AUTO: 0 % (ref 0–1)
BILIRUB SERPL-MCNC: 0.47 MG/DL
BUN SERPL-MCNC: 15 MG/DL (ref 5–25)
CALCIUM SERPL-MCNC: 9.1 MG/DL (ref 8.4–10.2)
CARDIAC TROPONIN I PNL SERPL HS: 3 NG/L
CHLORIDE SERPL-SCNC: 100 MMOL/L (ref 97–108)
CO2 SERPL-SCNC: 32 MMOL/L (ref 22–30)
CREAT SERPL-MCNC: 1.08 MG/DL (ref 0.7–1.5)
EOSINOPHIL # BLD AUTO: 0.2 THOUSAND/ΜL (ref 0–0.61)
EOSINOPHIL NFR BLD AUTO: 2 % (ref 0–6)
ERYTHROCYTE [DISTWIDTH] IN BLOOD BY AUTOMATED COUNT: 12.2 % (ref 11.6–15.1)
GFR SERPL CREATININE-BSD FRML MDRD: 75 ML/MIN/1.73SQ M
GLUCOSE SERPL-MCNC: 245 MG/DL (ref 70–99)
HCT VFR BLD AUTO: 42.6 % (ref 36.5–49.3)
HGB BLD-MCNC: 14.6 G/DL (ref 12–17)
IMM GRANULOCYTES # BLD AUTO: 0.02 THOUSAND/UL (ref 0–0.2)
IMM GRANULOCYTES NFR BLD AUTO: 0 % (ref 0–2)
LYMPHOCYTES # BLD AUTO: 1.64 THOUSANDS/ΜL (ref 0.6–4.47)
LYMPHOCYTES NFR BLD AUTO: 18 % (ref 14–44)
MCH RBC QN AUTO: 28.5 PG (ref 26.8–34.3)
MCHC RBC AUTO-ENTMCNC: 34.3 G/DL (ref 31.4–37.4)
MCV RBC AUTO: 83 FL (ref 82–98)
MONOCYTES # BLD AUTO: 0.53 THOUSAND/ΜL (ref 0.17–1.22)
MONOCYTES NFR BLD AUTO: 6 % (ref 4–12)
NEUTROPHILS # BLD AUTO: 6.6 THOUSANDS/ΜL (ref 1.85–7.62)
NEUTS SEG NFR BLD AUTO: 74 % (ref 43–75)
NRBC BLD AUTO-RTO: 0 /100 WBCS
NT-PROBNP SERPL-MCNC: 23.7 PG/ML (ref 0–299)
PLATELET # BLD AUTO: 218 THOUSANDS/UL (ref 149–390)
PMV BLD AUTO: 9.2 FL (ref 8.9–12.7)
POTASSIUM SERPL-SCNC: 3.6 MMOL/L (ref 3.6–5)
PROT SERPL-MCNC: 7 G/DL (ref 5.9–8.4)
RBC # BLD AUTO: 5.12 MILLION/UL (ref 3.88–5.62)
SODIUM SERPL-SCNC: 138 MMOL/L (ref 137–147)
WBC # BLD AUTO: 9.03 THOUSAND/UL (ref 4.31–10.16)

## 2022-04-01 PROCEDURE — 80053 COMPREHEN METABOLIC PANEL: CPT

## 2022-04-01 PROCEDURE — 94760 N-INVAS EAR/PLS OXIMETRY 1: CPT

## 2022-04-01 PROCEDURE — 71045 X-RAY EXAM CHEST 1 VIEW: CPT

## 2022-04-01 PROCEDURE — 85025 COMPLETE CBC W/AUTO DIFF WBC: CPT

## 2022-04-01 PROCEDURE — 94644 CONT INHLJ TX 1ST HOUR: CPT

## 2022-04-01 PROCEDURE — 93005 ELECTROCARDIOGRAM TRACING: CPT

## 2022-04-01 PROCEDURE — 96360 HYDRATION IV INFUSION INIT: CPT

## 2022-04-01 PROCEDURE — 84484 ASSAY OF TROPONIN QUANT: CPT

## 2022-04-01 PROCEDURE — 36415 COLL VENOUS BLD VENIPUNCTURE: CPT

## 2022-04-01 PROCEDURE — 83880 ASSAY OF NATRIURETIC PEPTIDE: CPT

## 2022-04-01 PROCEDURE — 99285 EMERGENCY DEPT VISIT HI MDM: CPT

## 2022-04-01 RX ORDER — METHYLPREDNISOLONE SODIUM SUCCINATE 125 MG/2ML
125 INJECTION, POWDER, LYOPHILIZED, FOR SOLUTION INTRAMUSCULAR; INTRAVENOUS ONCE
Status: COMPLETED | OUTPATIENT
Start: 2022-04-01 | End: 2022-04-01

## 2022-04-01 RX ORDER — ALBUTEROL SULFATE 2.5 MG/3ML
1 SOLUTION RESPIRATORY (INHALATION) ONCE
Status: COMPLETED | OUTPATIENT
Start: 2022-04-01 | End: 2022-04-01

## 2022-04-01 RX ORDER — METHYLPREDNISOLONE SODIUM SUCCINATE 125 MG/2ML
125 INJECTION, POWDER, LYOPHILIZED, FOR SOLUTION INTRAMUSCULAR; INTRAVENOUS ONCE
Status: DISCONTINUED | OUTPATIENT
Start: 2022-04-01 | End: 2022-04-01

## 2022-04-01 RX ORDER — NALOXONE HYDROCHLORIDE 1 MG/ML
1 INJECTION PARENTERAL ONCE
Status: COMPLETED | OUTPATIENT
Start: 2022-04-01 | End: 2022-04-01

## 2022-04-01 RX ORDER — ONDANSETRON 2 MG/ML
1 INJECTION INTRAMUSCULAR; INTRAVENOUS ONCE
Status: COMPLETED | OUTPATIENT
Start: 2022-04-01 | End: 2022-04-01

## 2022-04-01 RX ORDER — SODIUM CHLORIDE FOR INHALATION 0.9 %
3 VIAL, NEBULIZER (ML) INHALATION ONCE
Status: COMPLETED | OUTPATIENT
Start: 2022-04-01 | End: 2022-04-01

## 2022-04-01 RX ORDER — NALOXONE HYDROCHLORIDE 4 MG/.1ML
SPRAY NASAL
Qty: 1 EACH | Refills: 0 | Status: SHIPPED | OUTPATIENT
Start: 2022-04-01

## 2022-04-01 RX ORDER — SODIUM CHLORIDE 9 MG/ML
3 INJECTION INTRAVENOUS
Status: DISCONTINUED | OUTPATIENT
Start: 2022-04-01 | End: 2022-04-02 | Stop reason: HOSPADM

## 2022-04-01 RX ADMIN — ALBUTEROL SULFATE 10 MG: 2.5 SOLUTION RESPIRATORY (INHALATION) at 21:46

## 2022-04-01 RX ADMIN — IPRATROPIUM BROMIDE 1 MG: 0.5 SOLUTION RESPIRATORY (INHALATION) at 21:46

## 2022-04-01 RX ADMIN — SODIUM CHLORIDE 1000 ML: 0.9 INJECTION, SOLUTION INTRAVENOUS at 22:39

## 2022-04-01 RX ADMIN — ISODIUM CHLORIDE 3 ML: 0.03 SOLUTION RESPIRATORY (INHALATION) at 21:46

## 2022-04-02 ENCOUNTER — HOSPITAL ENCOUNTER (EMERGENCY)
Facility: HOSPITAL | Age: 57
Discharge: HOME | End: 2022-04-02
Attending: EMERGENCY MEDICINE | Admitting: EMERGENCY MEDICINE
Payer: COMMERCIAL

## 2022-04-02 VITALS
OXYGEN SATURATION: 99 % | TEMPERATURE: 97.3 F | WEIGHT: 203.04 LBS | RESPIRATION RATE: 18 BRPM | BODY MASS INDEX: 30.87 KG/M2 | SYSTOLIC BLOOD PRESSURE: 136 MMHG | DIASTOLIC BLOOD PRESSURE: 76 MMHG | HEART RATE: 82 BPM

## 2022-04-02 VITALS
BODY MASS INDEX: 31.1 KG/M2 | SYSTOLIC BLOOD PRESSURE: 132 MMHG | RESPIRATION RATE: 18 BRPM | OXYGEN SATURATION: 95 % | WEIGHT: 210 LBS | HEART RATE: 88 BPM | TEMPERATURE: 98.6 F | HEIGHT: 69 IN | DIASTOLIC BLOOD PRESSURE: 72 MMHG

## 2022-04-02 DIAGNOSIS — T40.1X1A HEROIN OVERDOSE (HCC): Primary | ICD-10-CM

## 2022-04-02 LAB
ATRIAL RATE: 83 BPM
P AXIS: 67 DEGREES
PR INTERVAL: 162 MS
QRS AXIS: 19 DEGREES
QRSD INTERVAL: 92 MS
QT INTERVAL: 366 MS
QTC INTERVAL: 430 MS
T WAVE AXIS: 45 DEGREES
VENTRICULAR RATE: 83 BPM

## 2022-04-02 PROCEDURE — 99284 EMERGENCY DEPT VISIT MOD MDM: CPT | Performed by: PHYSICIAN ASSISTANT

## 2022-04-02 PROCEDURE — 93010 ELECTROCARDIOGRAM REPORT: CPT | Performed by: INTERNAL MEDICINE

## 2022-04-02 PROCEDURE — 99284 EMERGENCY DEPT VISIT MOD MDM: CPT

## 2022-04-02 RX ORDER — ALBUTEROL SULFATE 90 UG/1
1-2 AEROSOL, METERED RESPIRATORY (INHALATION) EVERY 6 HOURS PRN
Qty: 8 G | Refills: 0 | Status: SHIPPED | OUTPATIENT
Start: 2022-04-02

## 2022-04-02 RX ORDER — NALOXONE HYDROCHLORIDE 1 MG/ML
1 INJECTION PARENTERAL ONCE
Status: COMPLETED | OUTPATIENT
Start: 2022-04-02 | End: 2022-04-02

## 2022-04-02 RX ORDER — NALOXONE HYDROCHLORIDE 1 MG/ML
2 INJECTION PARENTERAL ONCE
Status: DISCONTINUED | OUTPATIENT
Start: 2022-04-02 | End: 2022-04-02

## 2022-04-02 RX ADMIN — NALOXONE HYDROCHLORIDE 4 MG: 4 SPRAY NASAL at 22:50

## 2022-04-02 RX ADMIN — NALOXONE HYDROCHLORIDE 4 MG: 4 SPRAY NASAL at 00:11

## 2022-04-02 NOTE — ED CARE HANDOFF
Emergency Department Sign Out Note        Sign out and transfer of care from [facility]  See Separate Emergency Department note  The patient, Brynn Escamilla, was evaluated by the previous provider for asthma exacerbation, heroin overdose  Workup Completed:  Please see prior note    ED Course / Workup Pending (followup):  Pending MULLIGAN neb treatment  Patient feeling better  Walking pulse ox remains 97-98%  Requesting albuterol inhaler refill  Given naloxone  States he has an upcoming PCP appointment  Will DC    Patient verbalizes understanding and agrees with plan  The management plan was discussed in detail with the patient at bedside and all questions were answered  Prior to discharge, I provided both verbal and written instructions  I discussed with the patient the signs and symptoms for which to return to the emergency department  All questions were answered and patient was comfortable with the plan of care and discharged to home  The patient agrees to return to the Emergency Department for concerns and/or progression of illness  Procedures  MDM        Disposition  Final diagnoses:   Exacerbation of asthma, unspecified asthma severity, unspecified whether persistent   Substance abuse ([facility] [address] )     Time reflects when diagnosis was documented in both MDM as applicable and the Disposition within this note     Time User Action Codes Description Comment    4/1/2022 10:12 PM Bekah Later Add [J45 901] Exacerbation of asthma, unspecified asthma severity, unspecified whether persistent     4/1/2022 10:12 PM Bekah Later Add [F19 10] Substance abuse [facility])       ED Disposition     ED Disposition Condition Date/Time Comment    Discharge Stable Fri Apr 1, 2022 10:12 PM Brynn Escamilla discharge to home/self care              Follow-up Information     Follow up With Specialties Details Why Contact Info Additional [address], [address] [facility] [place] [address]  [address] [facility] Emergency Department Emergency Medicine  As needed, If symptoms worsen [address] [facility] [id number] [address] [facility] Emergency Department        Patient's Medications   Discharge Prescriptions    ALBUTEROL (VENTOLIN HFA) 90 MCG/ACT INHALER    Inhale 1-2 puffs every 6 (six) hours as needed for wheezing       Start Date: 4/2/2022  End Date: --       Order Dose: 1-2 puffs       Quantity: 8 g    Refills: 0    NALOXONE (NARCAN) 4 MG/0 1 ML NASAL SPRAY    Administer 1 spray into a nostril  If no response after 2-3 minutes, give another dose in the other nostril using a new spray  Start Date: 4/1/2022  End Date: --       Order Dose: --       Quantity: 1 each    Refills: 0     No discharge procedures on file         ED Provider  Electronically Signed by     Inge Medina PA-C  04/02/22 0009

## 2022-04-02 NOTE — ED NOTES
Per EMS patient originally complained of feeling short of breath and then when he was like dosing off and not able to stay awake he did tell them he did heroin tonight  This is the first time he has done heroin in a year       Ubaldo Doss RN  04/01/22 [id number]

## 2022-04-02 NOTE — ED PROVIDER NOTES
History  Chief Complaint   Patient presents with    Shortness of Breath     Short of breath for a few days with wheezing tonight, see more notes  Patient is a 62-year-old male with past medical history of asthma, coming in for complaint of shortness of breath  Patient has been seen for previous complaints 3 times the last 3 weeks  Patient states that he was feeling shortness of breath, approximately 2 hours ago he has had a take a pinch of heroin  Patient states that he has not done heroin about a year, so started small  Patient is in no acute distress and denies any other complaints      History provided by:  Patient   used: No    Shortness of Breath  Severity:  Mild  Duration:  3 days  Timing:  Constant  Chronicity:  Recurrent  Associated symptoms: wheezing    Associated symptoms: no abdominal pain, no chest pain, no cough, no ear pain, no fever, no headaches and no neck pain        Prior to Admission Medications   Prescriptions Last Dose Informant Patient Reported? Taking?    Blood Glucose Monitoring Suppl (ACURA BLOOD GLUCOSE METER) w/Device KIT  Self Yes No   Sig: by Does not apply route   Calcium Carb-Cholecalciferol (CALCIUM CARBONATE-VITAMIN D3 PO)  Self Yes No   Sig: Take 1,500 mg by mouth daily   Cinnamon 500 MG capsule   Yes No   Sig: Take 500 mg by mouth daily   Diclofenac Sodium (VOLTAREN) 1 %   No No   Sig: Apply 2 g topically 4 (four) times a day   FIBER PO   Yes No   Sig: Take by mouth   Insulin Pen Needle 32G X 4 MM MISC  Self Yes No   Sig: Unifine Pentips Plus 32 gauge x 5/32" needle   TAMSULOSIN HCL PO  Self Yes No   Sig: Take 0 4 mg by mouth daily    Turmeric 500 MG CAPS   Yes No   Sig: Take by mouth   VITAMIN D PO   Yes No   Sig: Take by mouth   albuterol (2 5 mg/3 mL) 0 083 % nebulizer solution  Self No No   Sig: Take 1 vial (2 5 mg total) by nebulization every 4 (four) hours as needed for shortness of breath   albuterol (PROVENTIL HFA,VENTOLIN HFA) 90 mcg/act inhaler  Self No No   Sig: Inhale 2 puffs every 4 (four) hours as needed for wheezing   albuterol (ProAir HFA) 90 mcg/act inhaler   No No   Sig: Inhale 2 puffs every 6 (six) hours as needed for wheezing   amLODIPine (NORVASC) 5 mg tablet  Self Yes No   Sig: Take 5 mg by mouth daily   atorvastatin (LIPITOR) 80 mg tablet  Self Yes No   Sig: Take 80 mg by mouth daily at bedtime   bimatoprost (LUMIGAN) 0 03 % ophthalmic drops  Self Yes No   Sig: Administer 1 drop to both eyes daily at bedtime     brimonidine (ALPHAGAN P) 0 15 % ophthalmic solution  Self Yes No   Sig: Administer 1 drop to both eyes every 8 (eight) hours     butalbital-acetaminophen-caffeine (Esgic) -40 mg per tablet   No No   Sig: Take 1 tablet by mouth every 4 (four) hours as needed for headaches for up to 10 days   carBAMazepine (TEGRETOL) 200 mg tablet  Self Yes No   Sig: Take 2 tabs in the morning and 3 tabs at night   cetirizine (ZyrTEC) 10 mg tablet   Yes No   Sig: Take 10 mg by mouth daily   diclofenac sodium (VOLTAREN) 50 mg EC tablet   No No   Sig: Take 1 tablet (50 mg total) by mouth 2 (two) times a day for 10 days   dupilumab (DUPIXENT) subcutaneous injection  Self Yes No   Sig: Inject under the skin every 14 (fourteen) days   finasteride (PROSCAR) 5 mg tablet  Self Yes No   Sig: Take 5 mg by mouth daily   fluticasone (FLONASE) 50 mcg/act nasal spray  Self Yes No   Si spray into each nostril daily   gabapentin (NEURONTIN) 300 mg capsule  Self Yes No   Sig: Take 300 mg by mouth 3 (three) times a day   glucagon 1 MG injection  Self Yes No   Sig: as needed   guaiFENesin (MUCINEX) 600 mg 12 hr tablet  Self No No   Sig: Take 1 tablet (600 mg total) by mouth every 12 (twelve) hours   hydrOXYzine pamoate (VISTARIL) 50 mg capsule  Self Yes No   Sig: Take 50 mg by mouth Three times daily as needed   insulin aspart (NovoLOG) 100 units/mL injection  Self Yes No   Sig: Inject under the skin 3 (three) times a day before meals   insulin glargine [facility]) 100 units/mL injection pen  Self Yes No   Sig: Inject 43 Units under the skin daily at bedtime    ipratropium (ATROVENT) 0 02 % nebulizer solution   No No   Sig: Take 2 5 mL (0 5 mg total) by nebulization every 6 (six) hours as needed for wheezing or shortness of breath   ipratropium (ATROVENT) 0 02 % nebulizer solution   No No   Sig: Take 2 5 mL (0 5 mg total) by nebulization 4 (four) times a day   latanoprost (XALATAN) 0 005 % ophthalmic solution  Self Yes No   Sig: Administer 1 drop to both eyes daily   lidocaine (LIDODERM) 5 %   No No   Sig: Apply 1 patch topically daily Remove & Discard patch within 12 hours or as directed by MD   misoprostol (CYTOTEC) 200 mcg tablet   No No   Sig: Take 1 tablet (200 mcg total) by mouth 3 (three) times a day for 10 days   montelukast (SINGULAIR) 10 mg tablet  Self Yes No   Sig: Take 10 mg by mouth daily     omega-3-acid ethyl esters (LOVAZA) 1 g capsule  Self Yes No   Sig: Take 1 g by mouth daily   omeprazole (PriLOSEC) 40 MG capsule  Self Yes No   Sig: Take 40 mg by mouth daily   perphenazine 2 mg tablet  Self Yes No   Sig: Take 2 mg by mouth daily   polyvinyl alcohol (LIQUIFILM TEARS) 1 4 % ophthalmic solution  Self Yes No   Si drops as needed for dry eyes   predniSONE 20 mg tablet   No No   Sig: Take 3 tablets (60 mg total) by mouth daily   predniSONE 5 mg tablet  Self Yes No   Sig: Take 2 5 mg by mouth every other day On Monday, Wednesday, Friday    predniSONE 50 mg tablet   No No   Sig: Take 1 tablet (50 mg total) by mouth daily   tolnaftate (TINACTIN) 1 % powder   Yes No   Sig: Apply 1 application topically 2 (two) times a day      Facility-Administered Medications: None       Past Medical History:   Diagnosis Date    Asthma     B[facility] disorder ([facility] [address] [facility]     COPD (chronic obstructive pulmonary disease) (HCC)     Diabetes mellitus ([facility] [address] )     Enlarged prostate     Glaucoma     Hyperlipidemia     Hypertension     Overdose of heroin, accidental or unintentional, sequela     Psychiatric disorder     Seasonal allergic rhinitis     Seizures (HCC)        Past Surgical History:   Procedure Laterality Date    CIRCUMCISION      KNEE SURGERY      WRIST SURGERY Left        Family History   Problem Relation Age of Onset    Arthritis Mother      I have reviewed and agree with the history as documented  E-Cigarette/Vaping    E-Cigarette Use Never User      E-Cigarette/Vaping Substances    Nicotine No     THC No     CBD No     Flavoring No     Other No     Unknown No      Social History     Tobacco Use    Smoking status: Former Smoker     Packs/day: 2 00     Years: 27 00     Pack years: 54 00     Types: Cigarettes     Start date:      Quit date:      Years since quittin 2    Smokeless tobacco: Never Used   Vaping Use    Vaping Use: Never used   Substance Use Topics    Alcohol use: Yes     Comment: socially    Drug use: Not Currently     Types: Heroin, Fentanyl     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       Review of Systems   Constitutional: Negative  Negative for activity change, appetite change, chills and fever  HENT: Negative for ear pain  Eyes: Negative for pain, redness and visual disturbance  Respiratory: Positive for shortness of breath and wheezing  Negative for cough and chest tightness  Cardiovascular: Negative  Negative for chest pain  Gastrointestinal: Negative  Negative for abdominal pain  Genitourinary: Negative  Musculoskeletal: Negative  Negative for neck pain  Skin: Negative  Neurological: Negative  Negative for dizziness and headaches  Psychiatric/Behavioral: Negative  Physical Exam  Physical Exam  Vitals reviewed  Constitutional:       Appearance: He is well-developed and normal weight  HENT:      Head: Normocephalic and atraumatic        Right Ear: External ear normal       Left Ear: External ear normal       Mouth/Throat:      Mouth: Mucous membranes are moist    Eyes: Conjunctiva/sclera: Conjunctivae normal    Cardiovascular:      Rate and Rhythm: Normal rate  Pulmonary:      Effort: Pulmonary effort is normal    Abdominal:      Palpations: Abdomen is soft  Musculoskeletal:         General: Normal range of motion  Cervical back: Normal range of motion  Skin:     General: Skin is warm and dry  Capillary Refill: Capillary refill takes less than 2 seconds  Neurological:      Mental Status: He is alert  Vital Signs  ED Triage Vitals [04/01/22 2123]   Temperature Pulse Respirations Blood Pressure SpO2   (!) 97 3 °F (36 3 °C) 87 16 140/80 99 %      Temp Source Heart Rate Source Patient Position - Orthostatic VS BP Location FiO2 (%)   Tympanic Monitor Lying Left arm --      Pain Score       No Pain           Vitals:    04/01/22 2123 04/02/22 0011   BP: 140/80 136/76   Pulse: 87 82   Patient Position - Orthostatic VS: Lying Lying         Visual Acuity      ED Medications  Medications   albuterol (FOR EMS ONLY) (2 5 mg/3 mL) 0 083 % inhalation solution 2 5 mg (0 mg Does not apply Given to EMS 4/1/22 2124)   naloxone (FOR EMS ONLY) [facility]) 2 MG/2ML injection 2 mg (0 mg Does not apply Given to EMS 4/1/22 2124)   ondansetron (FOR EMS ONLY) (ZOFRAN) 4 mg/2 mL injection 4 mg (0 mg Does not apply Given to EMS 4/1/22 2125)   albuterol inhalation solution 10 mg (10 mg Nebulization Given 4/1/22 2146)     And   ipratropium (ATROVENT) 0 02 % inhalation solution 1 mg (1 mg Nebulization Given 4/1/22 2146)     And   sodium chloride 0 9 % inhalation solution 3 mL (3 mL Nebulization Given 4/1/22 2146)   methylPREDNISolone sodium succinate (Solu-MEDROL) injection 125 mg (0 mg Intravenous Given to EMS 4/1/22 2126)   sodium chloride 0 9 % bolus 1,000 mL (0 mL Intravenous Stopped 4/2/22 0007)   naloxone nasal- Given to patient by provider at discharge   (NARCAN) 4 mg/0 1 mL nasal spray 4 mg (4 mg Does not apply Given by Other 4/2/22 0011)       Diagnostic Studies  Results Reviewed Procedure Component Value Units Date/Time    HS Troponin 0hr (reflex protocol) [[id number]]  (Normal) Collected: 04/01/22 2131    Lab Status: Final result Specimen: Blood from Arm, Right Updated: 04/01/22 2203     hs TnI 0hr 3 ng/L     NT-BNP PRO [[id number]]  (Normal) Collected: 04/01/22 2131    Lab Status: Final result Specimen: Blood from Arm, Right Updated: 04/01/22 2202     NT-proBNP 23 7 pg/mL     Comprehensive metabolic panel [[id number]]  (Abnormal) Collected: 04/01/22 2131    Lab Status: Final result Specimen: Blood from Arm, Right Updated: 04/01/22 2152     Sodium 138 mmol/L      Potassium 3 6 mmol/L      Chloride 100 mmol/L      CO2 32 mmol/L      ANION GAP 6 mmol/L      BUN 15 mg/dL      Creatinine 1 08 mg/dL      Glucose 245 mg/dL      Calcium 9 1 mg/dL      AST 23 U/L      ALT 23 U/L      Alkaline Phosphatase 71 U/L      Total Protein 7 0 g/dL      Albumin 4 4 g/dL      Total Bilirubin 0 47 mg/dL      eGFR 75 ml/min/1 73sq m     Narrative:      [facility] guidelines for Chronic Kidney Disease (CKD):     Stage 1 with normal or high GFR (GFR > 90 mL/min/1 73 square meters)    Stage 2 Mild CKD (GFR = 60-89 mL/min/1 73 square meters)    Stage 3A Moderate CKD (GFR = 45-59 mL/min/1 73 square meters)    Stage 3B Moderate CKD (GFR = 30-44 mL/min/1 73 square meters)    Stage 4 Severe CKD (GFR = 15-29 mL/min/1 73 square meters)    Stage 5 End Stage CKD (GFR <15 mL/min/1 73 square meters)  Note: GFR calculation is accurate only with a steady state creatinine    CBC and differential [[id number]] Collected: 04/01/22 2131    Lab Status: Final result Specimen: Blood from Arm, Right Updated: 04/01/22 2142     WBC 9 03 Thousand/uL      RBC 5 12 Million/uL      Hemoglobin 14 6 g/dL      Hematocrit 42 6 %      MCV 83 fL      MCH 28 5 pg      MCHC 34 3 g/dL      RDW 12 2 %      MPV 9 2 fL      Platelets 365 Thousands/uL      nRBC 0 /100 WBCs      Neutrophils Relative 74 %      Immat GRANS % 0 %      Lymphocytes Relative 18 %      Monocytes Relative 6 %      Eosinophils Relative 2 %      Basophils Relative 0 %      Neutrophils Absolute 6 60 Thousands/µL      Immature Grans Absolute 0 02 Thousand/uL      Lymphocytes Absolute 1 64 Thousands/µL      Monocytes Absolute 0 53 Thousand/µL      Eosinophils Absolute 0 20 Thousand/µL      Basophils Absolute 0 04 Thousands/µL                  X-ray chest 1 view portable   ED Interpretation by Buffy Ratliff PA-C (04/01 2149)   No acute cardiopulmonary disease      Final Result by Macrina Lin MD (04/02 1224)      No acute cardiopulmonary disease  Workstation performed: [id number]                    Procedures  Procedures         ED Course  ED Course as of 04/02/22 1310   Fri Apr 01, 2022 2121 Ventricular rate 83 beats per minute  HI interval 162 milliseconds  QRS duration 92 milliseconds  QT//430 milliseconds  PRT axes 67, 19, 45,    ECG interpretation-"NSR, Normal ECG"     2154 Glucose, Random(!): 245   2202 NT-proBNP: 23 7   2203 hs TnI 0hr: 3  Denies CP  Will discontinue 2 and 3rd trop   2206 Anion Gap: 6  No sign of DKA                               SBIRT 22yo+      Most Recent Value   SBIRT (23 yo +)    In order to provide better care to our patients, we are screening all of our patients for alcohol and drug use  Would it be okay to ask you these screening questions? No Filed at: 04/01/2022 2133                    MDM  Number of Diagnoses or Management Options  Exacerbation of asthma, unspecified asthma severity, unspecified whether persistent: new and does not require workup  Substance abuse [facility]): new and does not require workup  Diagnosis management comments: Patient is a 68-year-old male comes in for shortness of breath again  Patient states that he had used heroin today  Patient has recently been started on prednisone, and is a diabetic, so we will not give another prescription for steroid at this time    Patient has no sign of cardiac involvement, was discharged home by collegaue    Counseling: I had a detailed discussion with the patient and/or guardian regarding: the historical points, exam findings, and any diagnostic results supporting the discharge diagnosis, lab results, radiology results, discharge instructions reviewed with patient and/or family/caregiver and understanding was verbalized  Instructions given to return to the emergency department if symptoms worsen or persist, or if there are any questions or concerns that arise at home       All labs reviewed and utilized in the medical decision making process     All radiology studies independently viewed by me and interpreted by the radiologist     Portions of the record may have been created with voice recognition software   Occasional wrong word or "sound a like" substitutions may have occurred due to the inherent limitations of voice recognition software   Read the chart carefully and recognize, using context, where substitutions have occurred           Amount and/or Complexity of Data Reviewed  Clinical lab tests: ordered and reviewed  Tests in the radiology section of CPT®: ordered and reviewed    Risk of Complications, Morbidity, and/or Mortality  Presenting problems: low  Diagnostic procedures: minimal  Management options: minimal    Patient Progress  Patient progress: stable      Disposition  Final diagnoses:   Exacerbation of asthma, unspecified asthma severity, unspecified whether persistent   Substance abuse ([facility] [address] )     Time reflects when diagnosis was documented in both MDM as applicable and the Disposition within this note     Time User Action Codes Description Comment    4/1/2022 10:12 PM Dolph Donate Add [J45 901] Exacerbation of asthma, unspecified asthma severity, unspecified whether persistent     4/1/2022 10:12 PM Dolph Donate Add [F19 10] Substance abuse [facility])       ED Disposition     ED Disposition Condition Date/Time Comment    Discharge Stable Fri Apr 1, 2022 10:12 PM Joanna Romero discharge to home/self care  Follow-up Information     Follow up With Specialties Details Why [address] [facility], [address] [place] [address] Heart Emergency Department Emergency Medicine  As needed, If symptoms worsen [address] [facility] [id number] [address] [facility] Heart Emergency Department          Discharge Medication List as of 4/2/2022 12:07 AM      START taking these medications    Details   !! albuterol (Ventolin HFA) 90 mcg/act inhaler Inhale 1-2 puffs every 6 (six) hours as needed for wheezing, Starting Sat 4/2/2022, Normal      naloxone (NARCAN) 4 mg/0 1 mL nasal spray Administer 1 spray into a nostril  If no response after 2-3 minutes, give another dose in the other nostril using a new spray , Normal       !! - Potential duplicate medications found  Please discuss with provider        CONTINUE these medications which have NOT CHANGED    Details   albuterol (2 5 mg/3 mL) 0 083 % nebulizer solution Take 1 vial (2 5 mg total) by nebulization every 4 (four) hours as needed for shortness of breath, Starting Thu 11/15/2018, Normal      !! albuterol (ProAir HFA) 90 mcg/act inhaler Inhale 2 puffs every 6 (six) hours as needed for wheezing, Starting Fri 12/10/2021, Normal      !! albuterol (PROVENTIL HFA,VENTOLIN HFA) 90 mcg/act inhaler Inhale 2 puffs every 4 (four) hours as needed for wheezing, Starting Thu 11/15/2018, Normal      amLODIPine (NORVASC) 5 mg tablet Take 5 mg by mouth daily, Historical Med      atorvastatin (LIPITOR) 80 mg tablet Take 80 mg by mouth daily at bedtime, Starting Thu 9/8/2016, Historical Med      bimatoprost (LUMIGAN) 0 03 % ophthalmic drops Administer 1 drop to both eyes daily at bedtime  , Starting Thu 6/5/2008, Historical Med      Blood Glucose Monitoring Suppl (ACURA BLOOD GLUCOSE METER) w/Device KIT by Does not apply route, Historical Med      brimonidine (ALPHAGAN P) 0 15 % ophthalmic solution Administer 1 drop to both eyes every 8 (eight) hours  , Starting Thu 7/2/2015, Historical Med      butalbital-acetaminophen-caffeine (Esgic) -40 mg per tablet Take 1 tablet by mouth every 4 (four) hours as needed for headaches for up to 10 days, Starting Mon 3/28/2022, Until Thu 4/7/2022 at 2359, Normal      Calcium Carb-Cholecalciferol (CALCIUM CARBONATE-VITAMIN D3 PO) Take 1,500 mg by mouth daily, Starting Fri 9/20/2013, Historical Med      carBAMazepine (TEGRETOL) 200 mg tablet Take 2 tabs in the morning and 3 tabs at night, Historical Med      cetirizine (ZyrTEC) 10 mg tablet Take 10 mg by mouth daily, Historical Med      Cinnamon 500 MG capsule Take 500 mg by mouth daily, Historical Med      Diclofenac Sodium (VOLTAREN) 1 % Apply 2 g topically 4 (four) times a day, Starting Fri 5/14/2021, Normal      diclofenac sodium (VOLTAREN) 50 mg EC tablet Take 1 tablet (50 mg total) by mouth 2 (two) times a day for 10 days, Starting Mon 7/12/2021, Until Mon 3/28/2022, Normal      dupilumab (DUPIXENT) subcutaneous injection Inject under the skin every 14 (fourteen) days, Historical Med      FIBER PO Take by mouth, Historical Med      finasteride (PROSCAR) 5 mg tablet Take 5 mg by mouth daily, Starting Tue 4/26/2016, Historical Med      fluticasone (FLONASE) 50 mcg/act nasal spray 1 spray into each nostril daily, Historical Med      gabapentin (NEURONTIN) 300 mg capsule Take 300 mg by mouth 3 (three) times a day, Starting Thu 9/8/2016, Historical Med      glucagon 1 MG injection as needed, Starting Fri 8/5/2016, Historical Med      guaiFENesin (MUCINEX) 600 mg 12 hr tablet Take 1 tablet (600 mg total) by mouth every 12 (twelve) hours, Starting Fri 5/3/2019, Print      hydrOXYzine pamoate (VISTARIL) 50 mg capsule Take 50 mg by mouth Three times daily as needed, Starting Thu 8/8/2019, Historical Med      insulin aspart (NovoLOG) 100 units/mL injection Inject under the skin 3 (three) times a day before meals, Historical Med      insulin glargine (BASAGLAR KWIKPEN) 100 units/mL injection pen Inject 43 Units under the skin daily at bedtime , Historical Med      Insulin Pen Needle 32G X 4 MM MISC Unifine Pentips Plus 32 gauge x 5/32" needle, Historical Med      !! ipratropium (ATROVENT) 0 02 % nebulizer solution Take 2 5 mL (0 5 mg total) by nebulization every 6 (six) hours as needed for wheezing or shortness of breath, Starting Thu 3/10/2022, Until Sat 4/9/2022 at 2359, Normal      !! ipratropium (ATROVENT) 0 02 % nebulizer solution Take 2 5 mL (0 5 mg total) by nebulization 4 (four) times a day, Starting Mon 3/28/2022, Normal      latanoprost (XALATAN) 0 005 % ophthalmic solution Administer 1 drop to both eyes daily, Historical Med      lidocaine (LIDODERM) 5 % Apply 1 patch topically daily Remove & Discard patch within 12 hours or as directed by MD, Starting Fri 5/14/2021, Normal      misoprostol (CYTOTEC) 200 mcg tablet Take 1 tablet (200 mcg total) by mouth 3 (three) times a day for 10 days, Starting Mon 7/12/2021, Until Thu 7/22/2021, Normal      montelukast (SINGULAIR) 10 mg tablet Take 10 mg by mouth daily  , Starting Tue 8/25/2015, Historical Med      omega-3-acid ethyl esters (LOVAZA) 1 g capsule Take 1 g by mouth daily, Historical Med      omeprazole (PriLOSEC) 40 MG capsule Take 40 mg by mouth daily, Starting Tue 4/26/2016, Historical Med      perphenazine 2 mg tablet Take 2 mg by mouth daily, Historical Med      polyvinyl alcohol (LIQUIFILM TEARS) 1 4 % ophthalmic solution 2 drops as needed for dry eyes, Historical Med      !! predniSONE 20 mg tablet Take 3 tablets (60 mg total) by mouth daily, Starting Mon 3/28/2022, Normal      !! predniSONE 5 mg tablet Take 2 5 mg by mouth every other day On Monday, Wednesday, Friday , Historical Med      !! predniSONE 50 mg tablet Take 1 tablet (50 mg total) by mouth daily, Starting Wed 3/16/2022, Normal      TAMSULOSIN HCL PO Take 0 4 mg by mouth daily , Historical Med      tolnaftate (TINACTIN) 1 % powder Apply 1 application topically 2 (two) times a day, Historical Med      Turmeric 500 MG CAPS Take by mouth, Historical Med      VITAMIN D PO Take by mouth, Historical Med       !! - Potential duplicate medications found  Please discuss with provider  No discharge procedures on file      PDMP Review       Value Time User    PDMP Reviewed  Yes 7/12/2021  7:56 AM Mp Merrill DO          ED Provider  Electronically Signed by           Jonn Lane PA-C  04/02/22 4135

## 2022-04-03 NOTE — ED PROVIDER NOTES
History  Chief Complaint   Patient presents with    Overdose - Accidental     snorted one bag of heroin  EMS showed up was arousable to pain  then when in truck became more unarousable  one round of IV narc an given  pt is now AAOx 3     Patient presents for an evaluation of heroin overdose  States he snorted one bag  EMS arrived where he was arousable to loud verbal and painful stimuli  EMS administered 1mg Narcan  Patient now AOx3  Complaining of mild nausea  Denies other symptoms  Overdose was accidental  Denies any SI/ HI  Denies any other physical complaints  Prior to Admission Medications   Prescriptions Last Dose Informant Patient Reported? Taking?    Blood Glucose Monitoring Suppl (ACVerdezyne BLOOD GLUCOSE METER) w/Device KIT  Self Yes Yes   Sig: by Does not apply route   Calcium Carb-Cholecalciferol (CALCIUM CARBONATE-VITAMIN D3 PO)  Self Yes Yes   Sig: Take 1,500 mg by mouth daily   Cinnamon 500 MG capsule   Yes Yes   Sig: Take 500 mg by mouth daily   Diclofenac Sodium (VOLTAREN) 1 %   No Yes   Sig: Apply 2 g topically 4 (four) times a day   FIBER PO   Yes Yes   Sig: Take by mouth   Insulin Pen Needle 32G X 4 MM MISC  Self Yes Yes   Sig: Unifine Pentips Plus 32 gauge x 5/32" needle   TAMSULOSIN HCL PO  Self Yes Yes   Sig: Take 0 4 mg by mouth daily    Turmeric 500 MG CAPS   Yes Yes   Sig: Take by mouth   VITAMIN D PO   Yes Yes   Sig: Take by mouth   albuterol (2 5 mg/3 mL) 0 083 % nebulizer solution  Self No Yes   Sig: Take 1 vial (2 5 mg total) by nebulization every 4 (four) hours as needed for shortness of breath   albuterol (PROVENTIL HFA,VENTOLIN HFA) 90 mcg/act inhaler  Self No Yes   Sig: Inhale 2 puffs every 4 (four) hours as needed for wheezing   albuterol (ProAir HFA) 90 mcg/act inhaler   No Yes   Sig: Inhale 2 puffs every 6 (six) hours as needed for wheezing   albuterol (Ventolin HFA) 90 mcg/act inhaler   No Yes   Sig: Inhale 1-2 puffs every 6 (six) hours as needed for wheezing   amLODIPine (NORVASC) 5 mg tablet  Self Yes Yes   Sig: Take 5 mg by mouth daily   atorvastatin (LIPITOR) 80 mg tablet  Self Yes Yes   Sig: Take 80 mg by mouth daily at bedtime   bimatoprost (LUMIGAN) 0 03 % ophthalmic drops  Self Yes Yes   Sig: Administer 1 drop to both eyes daily at bedtime     brimonidine (ALPHAGAN P) 0 15 % ophthalmic solution  Self Yes Yes   Sig: Administer 1 drop to both eyes every 8 (eight) hours     butalbital-acetaminophen-caffeine (Esgic) -40 mg per tablet   No Yes   Sig: Take 1 tablet by mouth every 4 (four) hours as needed for headaches for up to 10 days   carBAMazepine (TEGRETOL) 200 mg tablet  Self Yes Yes   Sig: Take 2 tabs in the morning and 3 tabs at night   cetirizine (ZyrTEC) 10 mg tablet   Yes Yes   Sig: Take 10 mg by mouth daily   diclofenac sodium (VOLTAREN) 50 mg EC tablet   No No   Sig: Take 1 tablet (50 mg total) by mouth 2 (two) times a day for 10 days   dupilumab (DUPIXENT) subcutaneous injection  Self Yes Yes   Sig: Inject under the skin every 14 (fourteen) days   finasteride (PROSCAR) 5 mg tablet  Self Yes Yes   Sig: Take 5 mg by mouth daily   fluticasone (FLONASE) 50 mcg/act nasal spray  Self Yes Yes   Si spray into each nostril daily   gabapentin (NEURONTIN) 300 mg capsule  Self Yes Yes   Sig: Take 300 mg by mouth 3 (three) times a day   glucagon 1 MG injection  Self Yes Yes   Sig: as needed   guaiFENesin (MUCINEX) 600 mg 12 hr tablet  Self No Yes   Sig: Take 1 tablet (600 mg total) by mouth every 12 (twelve) hours   hydrOXYzine pamoate (VISTARIL) 50 mg capsule  Self Yes Yes   Sig: Take 50 mg by mouth Three times daily as needed   insulin aspart (NovoLOG) 100 units/mL injection  Self Yes Yes   Sig: Inject under the skin 3 (three) times a day before meals   insulin glargine (BASAGLAR KWIKPEN) 100 units/mL injection pen  Self Yes Yes   Sig: Inject 43 Units under the skin daily at bedtime    ipratropium (ATROVENT) 0 02 % nebulizer solution   No Yes   Sig: Take 2 5 mL (0 5 mg total) by nebulization every 6 (six) hours as needed for wheezing or shortness of breath   ipratropium (ATROVENT) 0 02 % nebulizer solution   No Yes   Sig: Take 2 5 mL (0 5 mg total) by nebulization 4 (four) times a day   latanoprost (XALATAN) 0 005 % ophthalmic solution  Self Yes Yes   Sig: Administer 1 drop to both eyes daily   lidocaine (LIDODERM) 5 %   No Yes   Sig: Apply 1 patch topically daily Remove & Discard patch within 12 hours or as directed by MD   misoprostol (CYTOTEC) 200 mcg tablet   No No   Sig: Take 1 tablet (200 mcg total) by mouth 3 (three) times a day for 10 days   montelukast (SINGULAIR) 10 mg tablet  Self Yes Yes   Sig: Take 10 mg by mouth daily     naloxone (NARCAN) 4 mg/0 1 mL nasal spray   No Yes   Sig: Administer 1 spray into a nostril  If no response after 2-3 minutes, give another dose in the other nostril using a new spray     omega-3-acid ethyl esters (LOVAZA) 1 g capsule  Self Yes Yes   Sig: Take 1 g by mouth daily   omeprazole (PriLOSEC) 40 MG capsule  Self Yes Yes   Sig: Take 40 mg by mouth daily   perphenazine 2 mg tablet  Self Yes Yes   Sig: Take 2 mg by mouth daily   polyvinyl alcohol (LIQUIFILM TEARS) 1 4 % ophthalmic solution  Self Yes Yes   Si drops as needed for dry eyes   predniSONE 20 mg tablet   No Yes   Sig: Take 3 tablets (60 mg total) by mouth daily   predniSONE 5 mg tablet  Self Yes Yes   Sig: Take 2 5 mg by mouth every other day On Monday, Wednesday, Friday    predniSONE 50 mg tablet   No Yes   Sig: Take 1 tablet (50 mg total) by mouth daily   tolnaftate (TINACTIN) 1 % powder   Yes Yes   Sig: Apply 1 application topically 2 (two) times a day      Facility-Administered Medications: None       Past Medical History:   Diagnosis Date    Asthma     [facility]ipolar disorder [facility] )     COPD [facility]chronic obstructive pulmonary disease) ([facility] )     Diabetes mellitus ([facility] )     Enlarged prostate     Glaucoma     Hyperlipidemia     Hypertension     Overdose of heroin, accidental or unintentional, sequela     Psychiatric disorder     Seasonal allergic rhinitis     Seizures (HCC)        Past Surgical History:   Procedure Laterality Date    CIRCUMCISION      KNEE SURGERY      WRIST SURGERY Left        Family History   Problem Relation Age of Onset    Arthritis Mother      I have reviewed and agree with the history as documented  E-Cigarette/Vaping    E-Cigarette Use Never User      E-Cigarette/Vaping Substances    Nicotine No     THC No     CBD No     Flavoring No     Other No     Unknown No      Social History     Tobacco Use    Smoking status: Former Smoker     Packs/day: 2 00     Years: 27 00     Pack years: 54 00     Types: Cigarettes     Start date:      Quit date:      Years since quittin 2    Smokeless tobacco: Never Used   Vaping Use    Vaping Use: Never used   Substance Use Topics    Alcohol use: Yes     Comment: socially    Drug use: Not Currently     Types: Heroin, Fentanyl     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       Review of Systems   Constitutional: Negative for chills and fever  HENT: Negative for congestion, ear pain and sore throat  Eyes: Negative for pain  Respiratory: Negative for cough and shortness of breath  Cardiovascular: Negative for chest pain  Gastrointestinal: Positive for nausea  Negative for abdominal pain and vomiting  Genitourinary: Negative for dysuria  Musculoskeletal: Negative for back pain  Skin: Negative for rash  Neurological: Negative for dizziness, weakness and numbness  Psychiatric/Behavioral: Negative for suicidal ideas  All other systems reviewed and are negative  Physical Exam  Physical Exam  Vitals reviewed  Constitutional:       General: He is not in acute distress  Appearance: Normal appearance  He is well-developed  He is not ill-appearing or diaphoretic  HENT:      Head: Normocephalic and atraumatic        Right Ear: External ear normal       Left Ear: External ear normal  Nose: Nose normal       Mouth/Throat:      Mouth: Mucous membranes are moist       Pharynx: Oropharynx is clear  Eyes:      Pupils: Pupils are equal, round, and reactive to light  Cardiovascular:      Rate and Rhythm: Normal rate and regular rhythm  Heart sounds: Normal heart sounds  Pulmonary:      Effort: Pulmonary effort is normal       Breath sounds: Normal breath sounds  Abdominal:      General: Bowel sounds are normal       Palpations: Abdomen is soft  Tenderness: There is no abdominal tenderness  Musculoskeletal:         General: Normal range of motion  Cervical back: Normal range of motion and neck supple  Skin:     General: Skin is warm and dry  Neurological:      Mental Status: He is alert and oriented to person, place, and time  Vital Signs  ED Triage Vitals   Temperature Pulse Respirations Blood Pressure SpO2   04/02/22 2142 04/02/22 2142 04/02/22 2142 04/02/22 2142 04/02/22 2142   98 6 °F (37 °C) 89 18 155/83 98 %      Temp src Heart Rate Source Patient Position - Orthostatic VS BP Location FiO2 (%)   -- 04/02/22 2200 04/02/22 2142 04/02/22 2142 --    Monitor Lying Right arm       Pain Score       04/02/22 2142       No Pain           Vitals:    04/02/22 2142 04/02/22 2200 04/02/22 2230 04/02/22 2245   BP: 155/83 152/87 132/72    Pulse: 89 87 85 88   Patient Position - Orthostatic VS: Lying Lying Lying          Visual Acuity      ED Medications  Medications   naloxone (FOR EMS ONLY) (NARCAN) 2 MG/2ML injection 2 mg (0 mg Does not apply Given to EMS 4/2/22 2147)   naloxone nasal- Given to patient by provider at discharge  (NARCAN) 4 mg/0 1 mL nasal spray 4 mg (4 mg Does not apply Given by Other 4/2/22 2250)       Diagnostic Studies  Results Reviewed     None                 No orders to display              Procedures  Procedures         ED Course  ED Course as of 04/02/22 2256   Sat Apr 02, 2022 2224 Resting comfortably  Vital signs remain stable   Will continue to monitor                               SBIRT 20yo+      Most Recent Value   SBIRT (22 yo +)    In order to provide better care to our patients, we are screening all of our patients for alcohol and drug use  Would it be okay to ask you these screening questions? No Filed at: 04/02/2022 2143                    [facility]  Number of Diagnoses or Management Options  Heroin overdose ([facility] )  Diagnosis management comments: Observed over one hour  No longer nauseous  Feels back to baseline  Declined outpatient resources and detox  Will accept community narcan  States he has a visit with his  on Monday where he plans on trying rehab  Agreeable with discharge    Patient verbalizes understanding and agrees with plan  The management plan was discussed in detail with the patient at bedside and all questions were answered  Prior to discharge, I provided both verbal and written instructions  I discussed with the patient the signs and symptoms for which to return to the emergency department  All questions were answered and patient was comfortable with the plan of care and discharged to home  The patient agrees to return to the Emergency Department for concerns and/or progression of illness  Disposition  Final diagnoses:   Heroin overdose ([facility] )     Time reflects when diagnosis was documented in both MDM as applicable and the Disposition within this note     Time User Action Codes Description Comment    4/2/2022 10:46 PM Irineo Doe Add [T40 1X1A] Heroin overdose [facility])       ED Disposition     ED Disposition Condition Date/Time Comment    Discharge Stable Sat Apr 2, 2022 10:46 PM Jarrell Olmos discharge to home/self care              Follow-up Information     Follow up With Specialties Details Why [address], [address] [place] [address]  [phone]            Patient's Medications   Discharge Prescriptions    No medications on file       No discharge procedures on file     PDMP Review       Value Time User    PDMP Reviewed  Yes 7/12/2021  7:56 AM Winfield Canavan, DO          ED Provider  Electronically Signed by           Lydia Dial PA-C  04/02/22 3553

## 2022-06-06 ENCOUNTER — HOSPITAL ENCOUNTER (EMERGENCY)
Facility: HOSPITAL | Age: 57
Discharge: HOME | End: 2022-06-06
Attending: EMERGENCY MEDICINE | Admitting: EMERGENCY MEDICINE
Payer: COMMERCIAL

## 2022-06-06 VITALS
RESPIRATION RATE: 18 BRPM | SYSTOLIC BLOOD PRESSURE: 183 MMHG | OXYGEN SATURATION: 97 % | TEMPERATURE: 98.1 F | BODY MASS INDEX: 30.45 KG/M2 | WEIGHT: 205.6 LBS | HEART RATE: 98 BPM | HEIGHT: 69 IN | DIASTOLIC BLOOD PRESSURE: 59 MMHG

## 2022-06-06 DIAGNOSIS — M25.561 ACUTE PAIN OF RIGHT KNEE: Primary | ICD-10-CM

## 2022-06-06 PROCEDURE — 99284 EMERGENCY DEPT VISIT MOD MDM: CPT | Performed by: EMERGENCY MEDICINE

## 2022-06-06 PROCEDURE — 99283 EMERGENCY DEPT VISIT LOW MDM: CPT

## 2022-06-06 RX ORDER — IBUPROFEN 400 MG/1
800 TABLET ORAL ONCE
Status: COMPLETED | OUTPATIENT
Start: 2022-06-06 | End: 2022-06-06

## 2022-06-06 RX ADMIN — IBUPROFEN 800 MG: 400 TABLET ORAL at 10:42

## 2022-06-06 NOTE — ED PROVIDER NOTES
History  Chief Complaint   Patient presents with    Knee Pain     States woke with right knee pain-over top of knee and it has some numbness going down from it toward shin  History provided by:  Patient  Knee Pain  Location:  Knee  Time since incident:  3 days  Injury: no    Knee location:  R knee  Pain details:     Quality:  Aching    Radiates to:  Does not radiate    Severity:  Moderate    Onset quality:  Gradual    Timing:  Constant    Progression:  Worsening  Relieved by:  Nothing  Worsened by:  Nothing  Ineffective treatments:  None tried  Associated symptoms: no fever        Prior to Admission Medications   Prescriptions Last Dose Informant Patient Reported? Taking?    Blood Glucose Monitoring Suppl (ACURA BLOOD GLUCOSE METER) w/Device KIT  Self Yes No   Sig: by Does not apply route   Calcium Carb-Cholecalciferol (CALCIUM CARBONATE-VITAMIN D3 PO)  Self Yes No   Sig: Take 1,500 mg by mouth daily   Cinnamon 500 MG capsule  Self Yes No   Sig: Take 500 mg by mouth daily   Diclofenac Sodium (VOLTAREN) 1 %  Self No No   Sig: Apply 2 g topically 4 (four) times a day   FIBER PO  Self Yes No   Sig: Take by mouth   Insulin Pen Needle 32G X 4 MM MISC  Self Yes No   Sig: Unifine Pentips Plus 32 gauge x 5/32" needle   TAMSULOSIN HCL PO  Self Yes No   Sig: Take 0 4 mg by mouth daily    Turmeric 500 MG CAPS  Self Yes No   Sig: Take by mouth   VITAMIN D PO  Self Yes No   Sig: Take by mouth   albuterol (2 5 mg/3 mL) 0 083 % nebulizer solution  Self No No   Sig: Take 1 vial (2 5 mg total) by nebulization every 4 (four) hours as needed for shortness of breath   albuterol (PROVENTIL HFA,VENTOLIN HFA) 90 mcg/act inhaler  Self No No   Sig: Inhale 2 puffs every 4 (four) hours as needed for wheezing   albuterol (ProAir HFA) 90 mcg/act inhaler  Self No No   Sig: Inhale 2 puffs every 6 (six) hours as needed for wheezing   albuterol (Ventolin HFA) 90 mcg/act inhaler  Self No No   Sig: Inhale 1-2 puffs every 6 (six) hours as needed for wheezing   amLODIPine (NORVASC) 5 mg tablet  Self Yes No   Sig: Take 5 mg by mouth daily   atorvastatin (LIPITOR) 80 mg tablet  Self Yes No   Sig: Take 80 mg by mouth daily at bedtime   bimatoprost (LUMIGAN) 0 03 % ophthalmic drops  Self Yes No   Sig: Administer 1 drop to both eyes daily at bedtime     brimonidine (ALPHAGAN P) 0 15 % ophthalmic solution  Self Yes No   Sig: Administer 1 drop to both eyes every 8 (eight) hours     carBAMazepine (TEGRETOL) 200 mg tablet  Self Yes No   Sig: Take 2 tabs in the morning and 3 tabs at night   cetirizine (ZyrTEC) 10 mg tablet  Self Yes No   Sig: Take 10 mg by mouth daily   diclofenac sodium (VOLTAREN) 50 mg EC tablet   No No   Sig: Take 1 tablet (50 mg total) by mouth 2 (two) times a day for 10 days   dupilumab (DUPIXENT) subcutaneous injection  Self Yes No   Sig: Inject under the skin every 14 (fourteen) days   finasteride (PROSCAR) 5 mg tablet  Self Yes No   Sig: Take 5 mg by mouth daily   fluticasone (FLONASE) 50 mcg/act nasal spray  Self Yes No   Si spray into each nostril daily   gabapentin (NEURONTIN) 300 mg capsule  Self Yes No   Sig: Take 300 mg by mouth 3 (three) times a day   glucagon 1 MG injection  Self Yes No   Sig: as needed   guaiFENesin (MUCINEX) 600 mg 12 hr tablet  Self No No   Sig: Take 1 tablet (600 mg total) by mouth every 12 (twelve) hours   hydrOXYzine pamoate (VISTARIL) 50 mg capsule  Self Yes No   Sig: Take 50 mg by mouth Three times daily as needed   insulin aspart (NovoLOG) 100 units/mL injection  Self Yes No   Sig: Inject under the skin 3 (three) times a day before meals   insulin glargine (BASAGLAR KWIKPEN) 100 units/mL injection pen  Self Yes No   Sig: Inject 43 Units under the skin daily at bedtime    ipratropium (ATROVENT) 0 02 % nebulizer solution   No No   Sig: Take 2 5 mL (0 5 mg total) by nebulization every 6 (six) hours as needed for wheezing or shortness of breath   ipratropium (ATROVENT) 0 02 % nebulizer solution  Self No No Sig: Take 2 5 mL (0 5 mg total) by nebulization 4 (four) times a day   latanoprost (XALATAN) 0 005 % ophthalmic solution  Self Yes No   Sig: Administer 1 drop to both eyes daily   lidocaine (LIDODERM) 5 %  Self No No   Sig: Apply 1 patch topically daily Remove & Discard patch within 12 hours or as directed by MD   misoprostol (CYTOTEC) 200 mcg tablet   No No   Sig: Take 1 tablet (200 mcg total) by mouth 3 (three) times a day for 10 days   montelukast (SINGULAIR) 10 mg tablet  Self Yes No   Sig: Take 10 mg by mouth daily     naloxone (NARCAN) 4 mg/0 1 mL nasal spray  Self No No   Sig: Administer 1 spray into a nostril  If no response after 2-3 minutes, give another dose in the other nostril using a new spray     omega-3-acid ethyl esters (LOVAZA) 1 g capsule  Self Yes No   Sig: Take 1 g by mouth daily   omeprazole (PriLOSEC) 40 MG capsule  Self Yes No   Sig: Take 40 mg by mouth daily   perphenazine 2 mg tablet  Self Yes No   Sig: Take 2 mg by mouth daily   polyvinyl alcohol (LIQUIFILM TEARS) 1 4 % ophthalmic solution  Self Yes No   Si drops as needed for dry eyes   predniSONE 20 mg tablet  Self No No   Sig: Take 3 tablets (60 mg total) by mouth daily   Patient not taking: Reported on 2022    predniSONE 5 mg tablet  Self Yes No   Sig: Take 2 5 mg by mouth every other day On Monday, Wednesday, Friday    Patient not taking: Reported on 2022    predniSONE 50 mg tablet  Self No No   Sig: Take 1 tablet (50 mg total) by mouth daily   Patient not taking: Reported on 2022    tolnaftate (TINACTIN) 1 % powder  Self Yes No   Sig: Apply 1 application topically 2 (two) times a day      Facility-Administered Medications: None       Past Medical History:   Diagnosis Date    Asthma     [facility]     COPD [facility]chronic obstructive pulmonary disease) [facility]     Diabetes mellitus ([facility] )     Enlarged prostate     Glaucoma     Hyperlipidemia     Hypertension     Overdose of heroin, accidental or unintentional, sequela     Psychiatric disorder     Seasonal allergic rhinitis     Seizures (HCC)        Past Surgical History:   Procedure Laterality Date    CIRCUMCISION      KNEE SURGERY      WRIST SURGERY Left        Family History   Problem Relation Age of Onset    Arthritis Mother      I have reviewed and agree with the history as documented  E-Cigarette/Vaping    E-Cigarette Use Never User      E-Cigarette/Vaping Substances    Nicotine No     THC No     CBD No     Flavoring No     Other No     Unknown No      Social History     Tobacco Use    Smoking status: Former Smoker     Packs/day: 2 00     Years: 27 00     Pack years: 54 00     Types: Cigarettes     Start date:      Quit date:      Years since quittin 4    Smokeless tobacco: Never Used   Vaping Use    Vaping Use: Never used   Substance Use Topics    Alcohol use: Yes     Comment: socially    Drug use: Not Currently     Types: Heroin, Fentanyl     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       Review of Systems   Constitutional: Negative for chills and fever  HENT: Negative for rhinorrhea, sore throat and trouble swallowing  Eyes: Negative for pain  Respiratory: Negative for cough, shortness of breath, wheezing and stridor  Cardiovascular: Negative for chest pain and leg swelling  Gastrointestinal: Negative for abdominal pain, diarrhea and nausea  Endocrine: Negative for polyuria  Genitourinary: Negative for dysuria, flank pain and urgency  Musculoskeletal: Negative for joint swelling, myalgias and neck stiffness  Skin: Negative for rash  Allergic/Immunologic: Negative for immunocompromised state  Neurological: Negative for dizziness, syncope, weakness, numbness and headaches  Psychiatric/Behavioral: Negative for confusion and suicidal ideas  All other systems reviewed and are negative  Physical Exam  Physical Exam  Vitals and nursing note reviewed     Constitutional:       Appearance: He is well-developed  HENT:      Head: Normocephalic and atraumatic  Eyes:      Pupils: Pupils are equal, round, and reactive to light  Cardiovascular:      Rate and Rhythm: Normal rate and regular rhythm  Heart sounds: Normal heart sounds  No murmur heard  No friction rub  Pulmonary:      Effort: Pulmonary effort is normal  No respiratory distress  Breath sounds: No wheezing or rales  Abdominal:      General: Bowel sounds are normal       Palpations: Abdomen is soft  There is no mass  Tenderness: There is no abdominal tenderness  There is no guarding  Musculoskeletal:         General: Tenderness present  No signs of injury  Cervical back: Normal range of motion and neck supple  Right knee: Bony tenderness present  Tenderness present over the lateral joint line and patellar tendon  Skin:     General: Skin is warm  Findings: No rash  Neurological:      Mental Status: He is alert and oriented to person, place, and time  Vital Signs  ED Triage Vitals [06/06/22 1028]   Temperature Pulse Respirations Blood Pressure SpO2   98 1 °F (36 7 °C) 98 18 (!) 183/59 97 %      Temp Source Heart Rate Source Patient Position - Orthostatic VS BP Location FiO2 (%)   Oral Monitor Sitting Left arm --      Pain Score       5           Vitals:    06/06/22 1028   BP: (!) 183/59   Pulse: 98   Patient Position - Orthostatic VS: Sitting         Visual Acuity      ED Medications  Medications   ibuprofen (MOTRIN) tablet 800 mg (800 mg Oral Given 6/6/22 1042)       Diagnostic Studies  Results Reviewed     None                 No orders to display              Procedures  Procedures         ED Course                               SBIRT 20yo+    Flowsheet Row Most Recent Value   SBIRT (25 yo +)    In order to provide better care to our patients, we are screening all of our patients for alcohol and drug use  Would it be okay to ask you these screening questions?  No Filed at: 06/06/2022 1032 MDM  Number of Diagnoses or Management Options  Acute pain of right knee: new and requires workup  Diagnosis management comments: 57M with right knee pain, no injury noted, mild numbness noted on the right lateral aspect, TTP, no objective numbness noted  No indication of xray at this time  Will need ace wrap and crutches and ortho f/u outpatient  Pt re-examined and evaluated after testing and treatment  Spoke with the patient and feeling improved and sxs have resolved  Will discharge home with close f/u with pcp and instructed to return to the ED if sxs worsen or continue  Pt agrees with the plan for discharge and feels comfortable to go home with proper f/u  Advised to return for worsening or additional problems  Diagnostic tests were reviewed and questions answered  Diagnosis, care plan and treatment options were discussed  The patient understand instructions and will follow up as directed  Counseling: I had a detailed discussion with the patient and/or guardian regarding: the historical points, exam findings, and any diagnostic results supporting the discharge diagnosis, lab results, radiology results, discharge instructions reviewed with patient and/or family/caregiver and understanding was verbalized  Instructions given to return to the emergency department if symptoms worsen or persist, or if there are any questions or concerns that arise at home       All labs reviewed and utilized in the medical decision making process    All radiology studies independently viewed by me and interpreted by the radiologist           Disposition  Final diagnoses:   Acute pain of right knee     Time reflects when diagnosis was documented in both MDM as applicable and the Disposition within this note     Time User Action Codes Description Comment    6/6/2022 10:35 AM Dave Zladivar Add [[id number]] Acute pain of right knee       ED Disposition     ED Disposition   Discharge    Condition   Stable Date/Time   Mon Jun 6, 2022 [address] [place] discharge to home/self care                 Follow-up Information    None         Discharge Medication List as of 6/6/2022 10:36 AM      CONTINUE these medications which have NOT CHANGED    Details   albuterol (2 5 mg/3 mL) 0 083 % nebulizer solution Take 1 vial (2 5 mg total) by nebulization every 4 (four) hours as needed for shortness of breath, Starting Thu 11/15/2018, Normal      !! albuterol (ProAir HFA) 90 mcg/act inhaler Inhale 2 puffs every 6 (six) hours as needed for wheezing, Starting Fri 12/10/2021, Normal      !! albuterol (PROVENTIL HFA,VENTOLIN HFA) 90 mcg/act inhaler Inhale 2 puffs every 4 (four) hours as needed for wheezing, Starting Thu 11/15/2018, Normal      !! albuterol (Ventolin HFA) 90 mcg/act inhaler Inhale 1-2 puffs every 6 (six) hours as needed for wheezing, Starting Sat 4/2/2022, Normal      amLODIPine (NORVASC) 5 mg tablet Take 5 mg by mouth daily, Historical Med      atorvastatin (LIPITOR) 80 mg tablet Take 80 mg by mouth daily at bedtime, Starting Thu 9/8/2016, Historical Med      bimatoprost (LUMIGAN) 0 03 % ophthalmic drops Administer 1 drop to both eyes daily at bedtime  , Starting Thu 6/5/2008, Historical Med      Blood Glucose Monitoring Suppl (ACURA BLOOD GLUCOSE METER) w/Device KIT by Does not apply route, Historical Med      brimonidine (ALPHAGAN P) 0 15 % ophthalmic solution Administer 1 drop to both eyes every 8 (eight) hours  , Starting Thu 7/2/2015, Historical Med      Calcium Carb-Cholecalciferol (CALCIUM CARBONATE-VITAMIN D3 PO) Take 1,500 mg by mouth daily, Starting Fri 9/20/2013, Historical Med      carBAMazepine (TEGRETOL) 200 mg tablet Take 2 tabs in the morning and 3 tabs at night, Historical Med      cetirizine (ZyrTEC) 10 mg tablet Take 10 mg by mouth daily, Historical Med      Cinnamon 500 MG capsule Take 500 mg by mouth daily, Historical Med      Diclofenac Sodium (VOLTAREN) 1 % Apply 2 g topically 4 (four) times a day, Starting Fri 5/14/2021, Normal      diclofenac sodium (VOLTAREN) 50 mg EC tablet Take 1 tablet (50 mg total) by mouth 2 (two) times a day for 10 days, Starting Mon 7/12/2021, Until Mon 3/28/2022, Normal      dupilumab (DUPIXENT) subcutaneous injection Inject under the skin every 14 (fourteen) days, Historical Med      FIBER PO Take by mouth, Historical Med      finasteride (PROSCAR) 5 mg tablet Take 5 mg by mouth daily, Starting Tue 4/26/2016, Historical Med      fluticasone (FLONASE) 50 mcg/act nasal spray 1 spray into each nostril daily, Historical Med      gabapentin (NEURONTIN) 300 mg capsule Take 300 mg by mouth 3 (three) times a day, Starting Thu 9/8/2016, Historical Med      glucagon 1 MG injection as needed, Starting Fri 8/5/2016, Historical Med      guaiFENesin (MUCINEX) 600 mg 12 hr tablet Take 1 tablet (600 mg total) by mouth every 12 (twelve) hours, Starting Fri 5/3/2019, Print      hydrOXYzine pamoate (VISTARIL) 50 mg capsule Take 50 mg by mouth Three times daily as needed, Starting Thu 8/8/2019, Historical Med      insulin aspart (NovoLOG) 100 units/mL injection Inject under the skin 3 (three) times a day before meals, Historical Med      insulin glargine (BASAGLAR KWIKPEN) 100 units/mL injection pen Inject 43 Units under the skin daily at bedtime , Historical Med      Insulin Pen Needle 32G X 4 MM MISC Unifine Pentips Plus 32 gauge x 5/32" needle, Historical Med      ipratropium (ATROVENT) 0 02 % nebulizer solution Take 2 5 mL (0 5 mg total) by nebulization 4 (four) times a day, Starting Mon 3/28/2022, Normal      latanoprost (XALATAN) 0 005 % ophthalmic solution Administer 1 drop to both eyes daily, Historical Med      lidocaine (LIDODERM) 5 % Apply 1 patch topically daily Remove & Discard patch within 12 hours or as directed by MD, Starting Fri 5/14/2021, Normal      misoprostol (CYTOTEC) 200 mcg tablet Take 1 tablet (200 mcg total) by mouth 3 (three) times a day for 10 days, Starting Mon 7/12/2021, Until Thu 7/22/2021, Normal      montelukast (SINGULAIR) 10 mg tablet Take 10 mg by mouth daily  , Starting Tue 8/25/2015, Historical Med      naloxone (NARCAN) 4 mg/0 1 mL nasal spray Administer 1 spray into a nostril  If no response after 2-3 minutes, give another dose in the other nostril using a new spray , Normal      omega-3-acid ethyl esters (LOVAZA) 1 g capsule Take 1 g by mouth daily, Historical Med      omeprazole (PriLOSEC) 40 MG capsule Take 40 mg by mouth daily, Starting Tue 4/26/2016, Historical Med      perphenazine 2 mg tablet Take 2 mg by mouth daily, Historical Med      polyvinyl alcohol (LIQUIFILM TEARS) 1 4 % ophthalmic solution 2 drops as needed for dry eyes, Historical Med      !! predniSONE 20 mg tablet Take 3 tablets (60 mg total) by mouth daily, Starting Mon 3/28/2022, Normal      !! predniSONE 5 mg tablet Take 2 5 mg by mouth every other day On Monday, Wednesday, Friday , Historical Med      !! predniSONE 50 mg tablet Take 1 tablet (50 mg total) by mouth daily, Starting Wed 3/16/2022, Normal      TAMSULOSIN HCL PO Take 0 4 mg by mouth daily , Historical Med      tolnaftate (TINACTIN) 1 % powder Apply 1 application topically 2 (two) times a day, Historical Med      Turmeric 500 MG CAPS Take by mouth, Historical Med      VITAMIN D PO Take by mouth, Historical Med       !! - Potential duplicate medications found  Please discuss with provider  No discharge procedures on file      PDMP Review       Value Time User    PDMP Reviewed  Yes 7/12/2021  7:56 AM Christopher Ramirez DO          ED Provider  Electronically Signed by           Steven Nice DO  06/06/22 8617

## 2022-07-08 ENCOUNTER — HOSPITAL ENCOUNTER (EMERGENCY)
Facility: HOSPITAL | Age: 57
Discharge: HOME | End: 2022-07-08
Attending: EMERGENCY MEDICINE
Payer: COMMERCIAL

## 2022-07-08 VITALS
TEMPERATURE: 98.1 F | SYSTOLIC BLOOD PRESSURE: 143 MMHG | OXYGEN SATURATION: 100 % | WEIGHT: 209.88 LBS | RESPIRATION RATE: 15 BRPM | HEART RATE: 105 BPM | DIASTOLIC BLOOD PRESSURE: 87 MMHG | BODY MASS INDEX: 31.45 KG/M2

## 2022-07-08 DIAGNOSIS — T40.1X1A HEROIN OVERDOSE (HCC): Primary | ICD-10-CM

## 2022-07-08 LAB — GLUCOSE SERPL-MCNC: 265 MG/DL (ref 65–140)

## 2022-07-08 PROCEDURE — 99284 EMERGENCY DEPT VISIT MOD MDM: CPT | Performed by: PHYSICIAN ASSISTANT

## 2022-07-08 PROCEDURE — 99284 EMERGENCY DEPT VISIT MOD MDM: CPT

## 2022-07-08 PROCEDURE — 82948 REAGENT STRIP/BLOOD GLUCOSE: CPT

## 2022-07-08 RX ORDER — NALOXONE HYDROCHLORIDE 1 MG/ML
2 INJECTION INTRAMUSCULAR; INTRAVENOUS; SUBCUTANEOUS ONCE
Status: COMPLETED | OUTPATIENT
Start: 2022-07-08 | End: 2022-07-08

## 2022-07-08 RX ORDER — NALOXONE HYDROCHLORIDE 1 MG/ML
1 INJECTION PARENTERAL ONCE
Status: COMPLETED | OUTPATIENT
Start: 2022-07-08 | End: 2022-07-08

## 2022-07-08 RX ADMIN — NALOXONE HYDROCHLORIDE 2 MG: 1 INJECTION INTRAMUSCULAR; INTRAVENOUS; SUBCUTANEOUS at 20:54

## 2022-07-09 NOTE — ED PROVIDER NOTES
HPI: Patient is a 61 y o  male who presents with 1 hour of drug overdose  which the patient describes at moderate The patient has been given narcan 1mg IV  Patient reports he used nasal Heroin  Daily use of none  This was not an intentional overdose but recreational no plans or thoughts of suicide  Patient declines rehabilitation states he was clean and relapsed today however has access to resources  Social History     Substance and Sexual Activity   Drug Use Not Currently    Types: Heroin, Fentanyl    Comment: OD 7/8/22 heroin      Nursing notes reviewed  Physical Exam:  ED Triage Vitals [07/08/22 2059]   Temperature Pulse Respirations Blood Pressure SpO2   98 1 °F (36 7 °C) (!) 113 16 132/80 96 %      Temp Source Heart Rate Source Patient Position - Orthostatic VS BP Location FiO2 (%)   Oral Monitor Lying Left arm --      Pain Score       --           ROS: Positive for none, the remainder of a 10 organ system ROS was otherwise unremarkable  General: awake, alert, no acute distress    Head: normocephalic, atraumatic    Eyes: no scleral icterus  Ears: external ears normal, hearing grossly intact  Nose: external exam grossly normal, Negative nasal discharge  Neck: symmetric, No JVD noted, trachea midline  Pulmonary: no respiratory distress, no tachypnea noted  Cardiovascular: appears well perfused  Abdomen: no distention noted  Musculoskeletal: no deformities noted, tone normal  Neuro: grossly non-focal  Psych: mood and affect appropriate      Discussed with patient about their substance abuse, offered rehabilitation services to the patient including host referral and warm handoff  Patient declines at this point time but is requesting outpatient resources  Counseled on the importance sobriety and following up as outpatient  Patient was observed in the emergency department after receiving pre-hospital naloxone    The patient was medically stable for discharge after a period of 1 hour of observation  Patient was deemed low risk for adverse events after naloxone administration because they met following six criteria based upon Hospital Observation Upon Reversal(HOUR) rule:    Can mobilize as usual  Have a normal [id number] (>95%)  Have a normal respiratory rate (>10 and <20 breaths/min)  Have a normal temperature (>35 0 and <37 5°C)  Have a normal heart rate (>50 and <100 beats/min)  Have a GCS score of 15    [facility]  2019 Jan;26(1):7-15  doi: 10 1111/[facility]  [id number]  Epub 2018 Dec 28  Procedure  Procedures         Pt re-examined and evaluated after testing and treatment  Spoke with the patient and feeling improved and sxs have resolved  Will discharge home with close f/u with pcp and instructed to return to the ED if sxs worsen or continue  Pt agrees with the plan for discharge and feels comfortable to go home with proper f/u  Advised to return for worsening or additional problems  Diagnostic tests were reviewed and questions answered  Diagnosis, care plan and treatment options were discussed  The patient understand instructions and will follow up as directed  Counseling: I had a detailed discussion with the patient and/or guardian regarding: the historical points, exam findings, and any diagnostic results supporting the discharge diagnosis, lab results, radiology results, discharge instructions reviewed with patient and/or family/caregiver and understanding was verbalized  Instructions given to return to the emergency department if symptoms worsen or persist, or if there are any questions or concerns that arise at home       All labs reviewed and utilized in the medical decision making process    All radiology studies independently viewed by me and interpreted by the radiologist       Medications   naloxone [facility]) intranasal 2 mg (2 mg Nasal Given 7/8/22 2054)   naloxone (FOR EMS ONLY) [facility]) 2 MG/2ML injection 2 mg (0 mg Does not apply Given to EMS 7/8/22 2055)     Final diagnoses:   Heroin overdose ([address] )     Time reflects when diagnosis was documented in both MDM as applicable and the Disposition within this note     Time User Action Codes Description Comment    7/8/2022  8:53 PM Camila Kessler [facility]  Heroin overdose [facility])       ED Disposition     ED Disposition   Discharge    Condition   Good    Date/Time   Fri Jul 8, 2022  8:53 PM    Comment   Apple Olvera discharge to home/self care                 Follow-up Information     Follow up With Specialties Details Why Contact Info Additional Information    [facility] Medication[facility]Assisted [facility] Schedule an appointment as soon as possible for a visit in 1 day for assistance with addiction / drug use [address] [place] [phone]  [facility] Medication-Assisted Treatment [place] [place], [address] [facility], [address], [place], [place], [address], [phone]        Discharge Medication List as of 7/8/2022  8:54 PM      CONTINUE these medications which have NOT CHANGED    Details   albuterol (2 5 mg/3 mL) 0 083 % nebulizer solution Take 1 vial (2 5 mg total) by nebulization every 4 (four) hours as needed for shortness of breath, Starting Thu 11/15/2018, Normal      !! albuterol (ProAir HFA) 90 mcg/act inhaler Inhale 2 puffs every 6 (six) hours as needed for wheezing, Starting Fri 12/10/2021, Normal      !! albuterol (PROVENTIL HFA,VENTOLIN HFA) 90 mcg/act inhaler Inhale 2 puffs every 4 (four) hours as needed for wheezing, Starting Thu 11/15/2018, Normal      !! albuterol (Ventolin HFA) 90 mcg/act inhaler Inhale 1-2 puffs every 6 (six) hours as needed for wheezing, Starting Sat 4/2/2022, Normal      amLODIPine (NORVASC) 5 mg tablet Take 5 mg by mouth daily, Historical Med      atorvastatin (LIPITOR) 80 mg tablet Take 80 mg by mouth daily at bedtime, Starting Thu 9/8/2016, Historical Med      bimatoprost (LUMIGAN) 0 03 % ophthalmic drops Administer 1 drop to both eyes daily at bedtime  , Starting Thu 6/5/2008, Historical Med      Blood Glucose Monitoring Suppl (ACURA BLOOD GLUCOSE METER) w/Device KIT by Does not apply route, Historical Med      brimonidine (ALPHAGAN P) 0 15 % ophthalmic solution Administer 1 drop to both eyes every 8 (eight) hours  , Starting Thu 7/2/2015, Historical Med      Calcium Carb-Cholecalciferol (CALCIUM CARBONATE-VITAMIN D3 PO) Take 1,500 mg by mouth daily, Starting Fri 9/20/2013, Historical Med      carBAMazepine (TEGRETOL) 200 mg tablet Take 2 tabs in the morning and 3 tabs at night, Historical Med      cetirizine (ZyrTEC) 10 mg tablet Take 10 mg by mouth daily, Historical Med      Cinnamon 500 MG capsule Take 500 mg by mouth daily, Historical Med      Diclofenac Sodium (VOLTAREN) 1 % Apply 2 g topically 4 (four) times a day, Starting Fri 5/14/2021, Normal      diclofenac sodium (VOLTAREN) 50 mg EC tablet Take 1 tablet (50 mg total) by mouth 2 (two) times a day for 10 days, Starting Mon 7/12/2021, Until Mon 3/28/2022, Normal      dupilumab (DUPIXENT) subcutaneous injection Inject under the skin every 14 (fourteen) days, Historical Med      FIBER PO Take by mouth, Historical Med      finasteride (PROSCAR) 5 mg tablet Take 5 mg by mouth daily, Starting Tue 4/26/2016, Historical Med      fluticasone (FLONASE) 50 mcg/act nasal spray 1 spray into each nostril daily, Historical Med      gabapentin (NEURONTIN) 300 mg capsule Take 300 mg by mouth 3 (three) times a day, Starting Thu 9/8/2016, Historical Med      glucagon 1 MG injection as needed, Starting Fri 8/5/2016, Historical Med      guaiFENesin (MUCINEX) 600 mg 12 hr tablet Take 1 tablet (600 mg total) by mouth every 12 (twelve) hours, Starting Fri 5/3/2019, Print      hydrOXYzine pamoate (VISTARIL) 50 mg capsule Take 50 mg by mouth Three times daily as needed, Starting Thu 8/8/2019, Historical Med      insulin aspart (NovoLOG) 100 units/mL injection Inject under the skin 3 (three) times a day before meals, Historical Med      insulin glargine (BASAGLAR KWIKPEN) 100 units/mL injection pen Inject 43 Units under the skin daily at bedtime , Historical Med      Insulin Pen Needle 32G X 4 MM MISC Unifine Pentips Plus 32 gauge x 5/32" needle, Historical Med      ipratropium (ATROVENT) 0 02 % nebulizer solution Take 2 5 mL (0 5 mg total) by nebulization 4 (four) times a day, Starting Mon 3/28/2022, Normal      latanoprost (XALATAN) 0 005 % ophthalmic solution Administer 1 drop to both eyes daily, Historical Med      lidocaine (LIDODERM) 5 % Apply 1 patch topically daily Remove & Discard patch within 12 hours or as directed by MD, Starting Fri 5/14/2021, Normal      misoprostol (CYTOTEC) 200 mcg tablet Take 1 tablet (200 mcg total) by mouth 3 (three) times a day for 10 days, Starting Mon 7/12/2021, Until Thu 7/22/2021, Normal      montelukast (SINGULAIR) 10 mg tablet Take 10 mg by mouth daily  , Starting Tue 8/25/2015, Historical Med      naloxone (NARCAN) 4 mg/0 1 mL nasal spray Administer 1 spray into a nostril   If no response after 2-3 minutes, give another dose in the other nostril using a new spray , Normal      omega-3-acid ethyl esters (LOVAZA) 1 g capsule Take 1 g by mouth daily, Historical Med      omeprazole (PriLOSEC) 40 MG capsule Take 40 mg by mouth daily, Starting Tue 4/26/2016, Historical Med      perphenazine 2 mg tablet Take 2 mg by mouth daily, Historical Med      polyvinyl alcohol (LIQUIFILM TEARS) 1 4 % ophthalmic solution 2 drops as needed for dry eyes, Historical Med      !! predniSONE 20 mg tablet Take 3 tablets (60 mg total) by mouth daily, Starting Mon 3/28/2022, Normal      !! predniSONE 5 mg tablet Take 2 5 mg by mouth every other day On Monday, Wednesday, Friday , Historical Med      !! predniSONE 50 mg tablet Take 1 tablet (50 mg total) by mouth daily, Starting Wed 3/16/2022, Normal      TAMSULOSIN HCL PO Take 0 4 mg by mouth daily , Historical Med      tolnaftate (TINACTIN) 1 % powder Apply 1 application topically 2 (two) times a day, Historical Med      Turmeric 500 MG CAPS Take by mouth, Historical Med      VITAMIN D PO Take by mouth, Historical Med       !! - Potential duplicate medications found  Please discuss with provider  No discharge procedures on file      Electronically Signed by         Martínez Rueda PA-C  07/12/22 3750

## 2022-08-23 NOTE — ED NOTES
CC updated program tasks and targets for [employer] launch.     Shruthi Martins RN, N  [facility]  [phone]     Patient sitting up talking with family  Complaining of feeling nauseated  Dr Chago Gibson paged for same       Nay Lewis RN  02/07/19 4229

## 2022-10-13 ENCOUNTER — HOSPITAL ENCOUNTER (EMERGENCY)
Facility: HOSPITAL | Age: 57
Discharge: HOME | End: 2022-10-13
Attending: STUDENT IN AN ORGANIZED HEALTH CARE EDUCATION/TRAINING PROGRAM
Payer: COMMERCIAL

## 2022-10-13 ENCOUNTER — APPOINTMENT (OUTPATIENT)
Dept: RADIOLOGY | Facility: HOSPITAL | Age: 57
End: 2022-10-13
Payer: COMMERCIAL

## 2022-10-13 VITALS
WEIGHT: 211.7 LBS | DIASTOLIC BLOOD PRESSURE: 80 MMHG | TEMPERATURE: 98.4 F | HEART RATE: 88 BPM | OXYGEN SATURATION: 98 % | SYSTOLIC BLOOD PRESSURE: 145 MMHG | BODY MASS INDEX: 31.72 KG/M2 | RESPIRATION RATE: 20 BRPM

## 2022-10-13 DIAGNOSIS — M79.641 RIGHT HAND PAIN: Primary | ICD-10-CM

## 2022-10-13 PROCEDURE — 99283 EMERGENCY DEPT VISIT LOW MDM: CPT

## 2022-10-13 PROCEDURE — 73130 X-RAY EXAM OF HAND: CPT

## 2022-10-13 PROCEDURE — 99284 EMERGENCY DEPT VISIT MOD MDM: CPT | Performed by: PHYSICIAN ASSISTANT

## 2022-10-13 RX ORDER — ACETAMINOPHEN 325 MG/1
650 TABLET ORAL ONCE
Status: COMPLETED | OUTPATIENT
Start: 2022-10-13 | End: 2022-10-13

## 2022-10-13 RX ADMIN — ACETAMINOPHEN 650 MG: 325 TABLET, FILM COATED ORAL at 12:39

## 2022-10-13 NOTE — ED PROVIDER NOTES
History  Chief Complaint   Patient presents with   • Hand Pain     Bilat - for 1 month cannot make a fist      69-year-old male with past medical history of diabetes, asthma, COPD, hyperlipidemia, hypertension presenting for evaluation of right hand pain  Patient states he woke up this morning feeling his right hand is swollen and it is difficult to make a closed fist   Denies any trauma or falls  He is unsure of any history of arthritis  Denies any medications at home prior to arrival           Prior to Admission Medications   Prescriptions Last Dose Informant Patient Reported? Taking?    Blood Glucose Monitoring Suppl (ACURA BLOOD GLUCOSE METER) w/Device KIT  Self Yes No   Sig: by Does not apply route   Calcium Carb-Cholecalciferol (CALCIUM CARBONATE-VITAMIN D3 PO)  Self Yes No   Sig: Take 1,500 mg by mouth daily   Cinnamon 500 MG capsule  Self Yes No   Sig: Take 500 mg by mouth daily   Diclofenac Sodium (VOLTAREN) 1 %  Self No No   Sig: Apply 2 g topically 4 (four) times a day   FIBER PO  Self Yes No   Sig: Take by mouth   Insulin Pen Needle 32G X 4 MM MISC  Self Yes No   Sig: Unifine Pentips Plus 32 gauge x 5/32" needle   TAMSULOSIN HCL PO  Self Yes No   Sig: Take 0 4 mg by mouth daily    Turmeric 500 MG CAPS  Self Yes No   Sig: Take by mouth   VITAMIN D PO  Self Yes No   Sig: Take by mouth   albuterol (2 5 mg/3 mL) 0 083 % nebulizer solution  Self No No   Sig: Take 1 vial (2 5 mg total) by nebulization every 4 (four) hours as needed for shortness of breath   albuterol (PROVENTIL HFA,VENTOLIN HFA) 90 mcg/act inhaler  Self No No   Sig: Inhale 2 puffs every 4 (four) hours as needed for wheezing   albuterol (ProAir HFA) 90 mcg/act inhaler  Self No No   Sig: Inhale 2 puffs every 6 (six) hours as needed for wheezing   albuterol (Ventolin HFA) 90 mcg/act inhaler  Self No No   Sig: Inhale 1-2 puffs every 6 (six) hours as needed for wheezing   amLODIPine (NORVASC) 5 mg tablet  Self Yes No   Sig: Take 5 mg by mouth daily   atorvastatin (LIPITOR) 80 mg tablet  Self Yes No   Sig: Take 80 mg by mouth daily at bedtime   bimatoprost (LUMIGAN) 0 03 % ophthalmic drops  Self Yes No   Sig: Administer 1 drop to both eyes daily at bedtime     brimonidine (ALPHAGAN P) 0 15 % ophthalmic solution  Self Yes No   Sig: Administer 1 drop to both eyes every 8 (eight) hours     carBAMazepine (TEGRETOL) 200 mg tablet  Self Yes No   Sig: Take 2 tabs in the morning and 3 tabs at night   cetirizine (ZyrTEC) 10 mg tablet  Self Yes No   Sig: Take 10 mg by mouth daily   diclofenac sodium (VOLTAREN) 50 mg EC tablet   No No   Sig: Take 1 tablet (50 mg total) by mouth 2 (two) times a day for 10 days   dupilumab (DUPIXENT) subcutaneous injection  Self Yes No   Sig: Inject under the skin every 14 (fourteen) days   finasteride (PROSCAR) 5 mg tablet  Self Yes No   Sig: Take 5 mg by mouth daily   fluticasone (FLONASE) 50 mcg/act nasal spray  Self Yes No   Si spray into each nostril daily   gabapentin (NEURONTIN) 300 mg capsule  Self Yes No   Sig: Take 300 mg by mouth 3 (three) times a day   glucagon 1 MG injection  Self Yes No   Sig: as needed   guaiFENesin (MUCINEX) 600 mg 12 hr tablet  Self No No   Sig: Take 1 tablet (600 mg total) by mouth every 12 (twelve) hours   hydrOXYzine pamoate (VISTARIL) 50 mg capsule  Self Yes No   Sig: Take 50 mg by mouth Three times daily as needed   insulin aspart (NovoLOG) 100 units/mL injection  Self Yes No   Sig: Inject under the skin 3 (three) times a day before meals   insulin glargine (BASAGLAR KWIKPEN) 100 units/mL injection pen  Self Yes No   Sig: Inject 43 Units under the skin daily at bedtime    ipratropium (ATROVENT) 0 02 % nebulizer solution   No No   Sig: Take 2 5 mL (0 5 mg total) by nebulization every 6 (six) hours as needed for wheezing or shortness of breath   ipratropium (ATROVENT) 0 02 % nebulizer solution  Self No No   Sig: Take 2 5 mL (0 5 mg total) by nebulization 4 (four) times a day   latanoprost (XALATAN) 0 005 % ophthalmic solution  Self Yes No   Sig: Administer 1 drop to both eyes daily   lidocaine (LIDODERM) 5 %  Self No No   Sig: Apply 1 patch topically daily Remove & Discard patch within 12 hours or as directed by MD   misoprostol (CYTOTEC) 200 mcg tablet   No No   Sig: Take 1 tablet (200 mcg total) by mouth 3 (three) times a day for 10 days   montelukast (SINGULAIR) 10 mg tablet  Self Yes No   Sig: Take 10 mg by mouth daily     naloxone (NARCAN) 4 mg/0 1 mL nasal spray  Self No No   Sig: Administer 1 spray into a nostril  If no response after 2-3 minutes, give another dose in the other nostril using a new spray     omega-3-acid ethyl esters (LOVAZA) 1 g capsule  Self Yes No   Sig: Take 1 g by mouth daily   omeprazole (PriLOSEC) 40 MG capsule  Self Yes No   Sig: Take 40 mg by mouth daily   perphenazine 2 mg tablet  Self Yes No   Sig: Take 2 mg by mouth daily   polyvinyl alcohol (LIQUIFILM TEARS) 1 4 % ophthalmic solution  Self Yes No   Si drops as needed for dry eyes   predniSONE 20 mg tablet  Self No No   Sig: Take 3 tablets (60 mg total) by mouth daily   Patient not taking: Reported on 2022    predniSONE 5 mg tablet  Self Yes No   Sig: Take 2 5 mg by mouth every other day On Monday, Wednesday, Friday    Patient not taking: Reported on 2022    predniSONE 50 mg tablet  Self No No   Sig: Take 1 tablet (50 mg total) by mouth daily   Patient not taking: Reported on 2022    tolnaftate (TINACTIN) 1 % powder  Self Yes No   Sig: Apply 1 application topically 2 (two) times a day      Facility-Administered Medications: None       Past Medical History:   Diagnosis Date   • Asthma    • Bipolar disorder ([facility] )    • COPD (chronic obstructive pulmonary disease) ([facility] )    • Diabetes mellitus ([facility] )    • Enlarged prostate    • Glaucoma    • Hyperlipidemia    • Hypertension    • Overdose of heroin, accidental or unintentional, sequela    • Psychiatric disorder    • Seasonal allergic rhinitis    • Seizures ([facility] )        Past Surgical History:   Procedure Laterality Date   • CIRCUMCISION     • KNEE SURGERY     • WRIST SURGERY Left        Family History   Problem Relation Age of Onset   • Arthritis Mother      I have reviewed and agree with the history as documented  E-Cigarette/Vaping   • E-Cigarette Use Never User      E-Cigarette/Vaping Substances   • Nicotine No    • THC No    • CBD No    • Flavoring No    • Other No    • Unknown No      Social History     Tobacco Use   • Smoking status: Former Smoker     Packs/day: 2 00     Years: 27 00     Pack years: 54 00     Types: Cigarettes     Start date: 35     Quit date:      Years since quittin 8   • Smokeless tobacco: Never Used   Vaping Use   • Vaping Use: Never used   Substance Use Topics   • Alcohol use: Yes     Comment: socially   • Drug use: Not Currently     Types: Heroin, Fentanyl     Comment: OD 21 heroin       Review of Systems   All other systems reviewed and are negative  Physical Exam  Physical Exam  Vitals and nursing note reviewed  Constitutional:       General: He is not in acute distress  Appearance: He is well-developed  HENT:      Head: Normocephalic and atraumatic  Eyes:      Conjunctiva/sclera: Conjunctivae normal       Comments: EOM grossly intact   Neck:      Vascular: No JVD  Cardiovascular:      Rate and Rhythm: Normal rate  Pulmonary:      Effort: Pulmonary effort is normal    Abdominal:      Palpations: Abdomen is soft  Musculoskeletal:      Cervical back: Normal range of motion and neck supple  Comments: FROM, steady gait, cap refill brisk, strength and sensation grossly intact throughout, very slight edema to the R hand, makes a fist without difficulty, radial andulnar pulses intact   Skin:     General: Skin is warm and dry  Capillary Refill: Capillary refill takes less than 2 seconds  Neurological:      Mental Status: He is alert and oriented to person, place, and time     Psychiatric: Behavior: Behavior normal          Vital Signs  ED Triage Vitals   Temperature Pulse Respirations Blood Pressure SpO2   10/13/22 1133 10/13/22 1133 10/13/22 1133 10/13/22 1133 10/13/22 1133   98 4 °F (36 9 °C) 88 20 145/80 98 %      Temp Source Heart Rate Source Patient Position - Orthostatic VS BP Location FiO2 (%)   10/13/22 1133 10/13/22 1133 10/13/22 1133 10/13/22 1133 --   Oral Monitor Sitting Left arm       Pain Score       10/13/22 1239       7           Vitals:    10/13/22 1133   BP: 145/80   Pulse: 88   Patient Position - Orthostatic VS: Sitting         Visual Acuity      ED Medications  Medications   acetaminophen (TYLENOL) tablet 650 mg (650 mg Oral Given 10/13/22 1239)       Diagnostic Studies  Results Reviewed     None                 XR hand 3+ views RIGHT    (Results Pending)              Procedures  Procedures         ED Course                                             MDM  Number of Diagnoses or Management Options  Diagnosis management comments: 60 yo M presenting for evaluation of atraumatic R hand swelling and tightness  Pt has no hx of gout or arthritis, no acute fx, will advise to use nsaids, rest and ice, possible tendonitis vs arthropathy, f/u with pcp as an outpatient    All imaging discussed with patient, strict return to ED precautions discussed  Pt verbalizes understanding and agrees with plan  Pt is stable for discharge    Portions of the record may have been created with voice recognition software  Occasional wrong word or "sound a like" substitutions may have occurred due to the inherent limitations of voice recognition software  Read the chart carefully and recognize, using context, where substitutions have occurred          Disposition  Final diagnoses:   Right hand pain     Time reflects when diagnosis was documented in both MDM as applicable and the Disposition within this note     Time User Action Codes Description Comment    10/13/2022 12:46 PM Yadi Nick [[id number]] Right hand pain       ED Disposition     ED Disposition   Discharge    Condition   Stable    Date/Time   Thu Oct 13, 2022 12:46 PM    Comment   Salima Harrell discharge to home/self care  Follow-up Information     Follow up With Specialties Details Why Contact Info Additional Information    Yane Cartagena MD Family Medicine Call in 1 day  [address] Emergency Department Emergency Medicine Go to  If symptoms worsen [address] [address] [address] [facility] Emergency Department          Patient's Medications   Discharge Prescriptions    No medications on file       No discharge procedures on file      PDMP Review       Value Time User    PDMP Reviewed  Yes 7/12/2021  7:56 AM Max Bryant DO          ED Provider  Electronically Signed by           Priya Main PA-C  10/13/22 1241

## 2022-12-16 ENCOUNTER — TELEPHONE (OUTPATIENT)
Dept: PSYCHIATRY | Facility: CLINIC | Age: 57
End: 2022-12-16

## 2022-12-16 NOTE — TELEPHONE ENCOUNTER
Patient has been added to the Non Referral wait list  Confirmed insurance, needs of service, and location preferences

## 2022-12-18 ENCOUNTER — APPOINTMENT (EMERGENCY)
Dept: CT IMAGING | Facility: HOSPITAL | Age: 57
End: 2022-12-18

## 2022-12-18 ENCOUNTER — HOSPITAL ENCOUNTER (EMERGENCY)
Facility: HOSPITAL | Age: 57
Discharge: HOME | End: 2022-12-18
Attending: EMERGENCY MEDICINE

## 2022-12-18 VITALS
HEART RATE: 72 BPM | TEMPERATURE: 98 F | WEIGHT: 194 LBS | SYSTOLIC BLOOD PRESSURE: 131 MMHG | DIASTOLIC BLOOD PRESSURE: 77 MMHG | RESPIRATION RATE: 18 BRPM | BODY MASS INDEX: 29.07 KG/M2 | OXYGEN SATURATION: 99 %

## 2022-12-18 DIAGNOSIS — N41.9 PROSTATITIS: Primary | ICD-10-CM

## 2022-12-18 LAB
ANION GAP SERPL CALCULATED.3IONS-SCNC: 5 MMOL/L (ref 5–14)
BASOPHILS # BLD AUTO: 0.04 THOUSANDS/ÂΜL (ref 0–0.1)
BASOPHILS NFR BLD AUTO: 1 % (ref 0–1)
BILIRUB UR QL STRIP: NEGATIVE
BUN SERPL-MCNC: 13 MG/DL (ref 5–25)
CALCIUM SERPL-MCNC: 9.4 MG/DL (ref 8.4–10.2)
CHLORIDE SERPL-SCNC: 103 MMOL/L (ref 96–108)
CLARITY UR: CLEAR
CO2 SERPL-SCNC: 29 MMOL/L (ref 21–32)
COLOR UR: YELLOW
CREAT SERPL-MCNC: 0.76 MG/DL (ref 0.7–1.5)
EOSINOPHIL # BLD AUTO: 0.18 THOUSAND/ÂΜL (ref 0–0.61)
EOSINOPHIL NFR BLD AUTO: 3 % (ref 0–6)
ERYTHROCYTE [DISTWIDTH] IN BLOOD BY AUTOMATED COUNT: 12.9 % (ref 11.6–15.1)
GFR SERPL CREATININE-BSD FRML MDRD: 100 ML/MIN/1.73SQ M
GLUCOSE SERPL-MCNC: 198 MG/DL (ref 70–99)
GLUCOSE UR STRIP-MCNC: ABNORMAL MG/DL
HCT VFR BLD AUTO: 46.1 % (ref 36.5–49.3)
HGB BLD-MCNC: 15.5 G/DL (ref 12–17)
HGB UR QL STRIP.AUTO: NEGATIVE
IMM GRANULOCYTES # BLD AUTO: 0.02 THOUSAND/UL (ref 0–0.2)
IMM GRANULOCYTES NFR BLD AUTO: 0 % (ref 0–2)
KETONES UR STRIP-MCNC: NEGATIVE MG/DL
LEUKOCYTE ESTERASE UR QL STRIP: NEGATIVE
LYMPHOCYTES # BLD AUTO: 1.33 THOUSANDS/ÂΜL (ref 0.6–4.47)
LYMPHOCYTES NFR BLD AUTO: 18 % (ref 14–44)
MCH RBC QN AUTO: 27.9 PG (ref 26.8–34.3)
MCHC RBC AUTO-ENTMCNC: 33.6 G/DL (ref 31.4–37.4)
MCV RBC AUTO: 83 FL (ref 82–98)
MONOCYTES # BLD AUTO: 0.55 THOUSAND/ÂΜL (ref 0.17–1.22)
MONOCYTES NFR BLD AUTO: 8 % (ref 4–12)
NEUTROPHILS # BLD AUTO: 5.21 THOUSANDS/ÂΜL (ref 1.85–7.62)
NEUTS SEG NFR BLD AUTO: 70 % (ref 43–75)
NITRITE UR QL STRIP: NEGATIVE
NRBC BLD AUTO-RTO: 0 /100 WBCS
PH UR STRIP.AUTO: 6.5 [PH]
PLATELET # BLD AUTO: 220 THOUSANDS/UL (ref 149–390)
PMV BLD AUTO: 9 FL (ref 8.9–12.7)
POTASSIUM SERPL-SCNC: 4 MMOL/L (ref 3.5–5.3)
PROT UR STRIP-MCNC: NEGATIVE MG/DL
RBC # BLD AUTO: 5.55 MILLION/UL (ref 3.88–5.62)
SODIUM SERPL-SCNC: 137 MMOL/L (ref 135–147)
SP GR UR STRIP.AUTO: 1.02 (ref 1–1.04)
UROBILINOGEN UA: NEGATIVE MG/DL
WBC # BLD AUTO: 7.33 THOUSAND/UL (ref 4.31–10.16)

## 2022-12-18 RX ORDER — SULFAMETHOXAZOLE AND TRIMETHOPRIM 800; 160 MG/1; MG/1
1 TABLET ORAL EVERY 12 HOURS SCHEDULED
Qty: 56 TABLET | Refills: 0 | Status: SHIPPED | OUTPATIENT
Start: 2022-12-18 | End: 2023-01-15

## 2022-12-18 RX ORDER — KETOROLAC TROMETHAMINE 30 MG/ML
15 INJECTION, SOLUTION INTRAMUSCULAR; INTRAVENOUS ONCE
Status: COMPLETED | OUTPATIENT
Start: 2022-12-18 | End: 2022-12-18

## 2022-12-18 RX ADMIN — KETOROLAC TROMETHAMINE 15 MG: 30 INJECTION, SOLUTION INTRAMUSCULAR; INTRAVENOUS at 15:54

## 2022-12-18 NOTE — ED PROVIDER NOTES
History  Chief Complaint   Patient presents with   • Testicle Pain     Pt reports testicle pain that radiates to low back     Patient is a 59-year-old male who presents with a 3 day history of lower abdominal, testicular pain and urinary hesitancy  States has a history of prostatitis and feels like previous episodes  No n/v/d  No f/s/c  Taken naproxen  H/o opiate use  States clean and doing well off  Prior to Admission Medications   Prescriptions Last Dose Informant Patient Reported? Taking?    Blood Glucose Monitoring Suppl (ACURA BLOOD GLUCOSE METER) w/Device KIT   Yes No   Sig: by Does not apply route   Calcium Carb-Cholecalciferol (CALCIUM CARBONATE-VITAMIN D3 PO)   Yes No   Sig: Take 1,500 mg by mouth daily   Cinnamon 500 MG capsule   Yes No   Sig: Take 500 mg by mouth daily   Diclofenac Sodium (VOLTAREN) 1 %   No No   Sig: Apply 2 g topically 4 (four) times a day   FIBER PO   Yes No   Sig: Take by mouth   Insulin Pen Needle 32G X 4 MM MISC   Yes No   Sig: Unifine Pentips Plus 32 gauge x 5/32" needle   TAMSULOSIN HCL PO   Yes No   Sig: Take 0 4 mg by mouth daily    Turmeric 500 MG CAPS   Yes No   Sig: Take by mouth   VITAMIN D PO   Yes No   Sig: Take by mouth   albuterol (2 5 mg/3 mL) 0 083 % nebulizer solution   No No   Sig: Take 1 vial (2 5 mg total) by nebulization every 4 (four) hours as needed for shortness of breath   albuterol (PROVENTIL HFA,VENTOLIN HFA) 90 mcg/act inhaler   No No   Sig: Inhale 2 puffs every 4 (four) hours as needed for wheezing   albuterol (ProAir HFA) 90 mcg/act inhaler   No No   Sig: Inhale 2 puffs every 6 (six) hours as needed for wheezing   albuterol (Ventolin HFA) 90 mcg/act inhaler   No No   Sig: Inhale 1-2 puffs every 6 (six) hours as needed for wheezing   amLODIPine (NORVASC) 5 mg tablet   Yes No   Sig: Take 5 mg by mouth daily   atorvastatin (LIPITOR) 80 mg tablet   Yes No   Sig: Take 80 mg by mouth daily at bedtime   bimatoprost (LUMIGAN) 0 03 % ophthalmic drops   Yes No   Sig: Administer 1 drop to both eyes daily at bedtime     brimonidine (ALPHAGAN P) 0 15 % ophthalmic solution   Yes No   Sig: Administer 1 drop to both eyes every 8 (eight) hours     carBAMazepine (TEGRETOL) 200 mg tablet   Yes No   Sig: Take 2 tabs in the morning and 3 tabs at night   cetirizine (ZyrTEC) 10 mg tablet   Yes No   Sig: Take 10 mg by mouth daily   diclofenac sodium (VOLTAREN) 50 mg EC tablet   No No   Sig: Take 1 tablet (50 mg total) by mouth 2 (two) times a day for 10 days   dupilumab (DUPIXENT) subcutaneous injection   Yes No   Sig: Inject under the skin every 14 (fourteen) days   finasteride (PROSCAR) 5 mg tablet   Yes No   Sig: Take 5 mg by mouth daily   fluticasone (FLONASE) 50 mcg/act nasal spray   Yes No   Si spray into each nostril daily   gabapentin (NEURONTIN) 300 mg capsule   Yes No   Sig: Take 300 mg by mouth 3 (three) times a day   glucagon 1 MG injection   Yes No   Sig: as needed   guaiFENesin (MUCINEX) 600 mg 12 hr tablet   No No   Sig: Take 1 tablet (600 mg total) by mouth every 12 (twelve) hours   hydrOXYzine pamoate (VISTARIL) 50 mg capsule   Yes No   Sig: Take 50 mg by mouth Three times daily as needed   insulin aspart (NovoLOG) 100 units/mL injection   Yes No   Sig: Inject under the skin 3 (three) times a day before meals   insulin glargine (BASAGLAR KWIKPEN) 100 units/mL injection pen   Yes No   Sig: Inject 43 Units under the skin daily at bedtime    ipratropium (ATROVENT) 0 02 % nebulizer solution   No No   Sig: Take 2 5 mL (0 5 mg total) by nebulization every 6 (six) hours as needed for wheezing or shortness of breath   ipratropium (ATROVENT) 0 02 % nebulizer solution   No No   Sig: Take 2 5 mL (0 5 mg total) by nebulization 4 (four) times a day   latanoprost (XALATAN) 0 005 % ophthalmic solution   Yes No   Sig: Administer 1 drop to both eyes daily   lidocaine (LIDODERM) 5 %   No No   Sig: Apply 1 patch topically daily Remove & Discard patch within 12 hours or as directed by MD   misoprostol (CYTOTEC) 200 mcg tablet   No No   Sig: Take 1 tablet (200 mcg total) by mouth 3 (three) times a day for 10 days   montelukast (SINGULAIR) 10 mg tablet   Yes No   Sig: Take 10 mg by mouth daily     naloxone (NARCAN) 4 mg/0 1 mL nasal spray   No No   Sig: Administer 1 spray into a nostril  If no response after 2-3 minutes, give another dose in the other nostril using a new spray     omega-3-acid ethyl esters (LOVAZA) 1 g capsule   Yes No   Sig: Take 1 g by mouth daily   omeprazole (PriLOSEC) 40 MG capsule   Yes No   Sig: Take 40 mg by mouth daily   perphenazine 2 mg tablet   Yes No   Sig: Take 2 mg by mouth daily   polyvinyl alcohol (LIQUIFILM TEARS) 1 4 % ophthalmic solution   Yes No   Si drops as needed for dry eyes   predniSONE 20 mg tablet   No No   Sig: Take 3 tablets (60 mg total) by mouth daily   Patient not taking: Reported on 2022    predniSONE 5 mg tablet   Yes No   Sig: Take 2 5 mg by mouth every other day On Monday, Wednesday, Friday    Patient not taking: Reported on 2022    predniSONE 50 mg tablet   No No   Sig: Take 1 tablet (50 mg total) by mouth daily   Patient not taking: Reported on 2022    tolnaftate (TINACTIN) 1 % powder   Yes No   Sig: Apply 1 application topically 2 (two) times a day      Facility-Administered Medications: None       Past Medical History:   Diagnosis Date   • Asthma    • Bipolar disorder ([facility] )    • COPD (chronic obstructive pulmonary disease) ([facility] )    • Diabetes mellitus ([facility] )    • Enlarged prostate    • Glaucoma    • Hyperlipidemia    • Hypertension    • Overdose of heroin, accidental or unintentional, sequela    • Psychiatric disorder    • Seasonal allergic rhinitis    • Seizures ([facility] )        Past Surgical History:   Procedure Laterality Date   • CIRCUMCISION     • KNEE SURGERY     • WRIST SURGERY Left        Family History   Problem Relation Age of Onset   • Arthritis Mother      I have reviewed and agree with the history as documented  E-Cigarette/Vaping   • E-Cigarette Use Never User      E-Cigarette/Vaping Substances   • Nicotine No    • THC No    • CBD No    • Flavoring No    • Other No    • Unknown No      Social History     Tobacco Use   • Smoking status: Former     Packs/day: 2 00     Years: 27 00     Pack years: 54 00     Types: Cigarettes     Start date: 35     Quit date:      Years since quittin 9   • Smokeless tobacco: Never   Vaping Use   • Vaping Use: Never used   Substance Use Topics   • Alcohol use: Yes     Comment: socially   • Drug use: Not Currently     Types: Heroin, Fentanyl     Comment: OD 21 heroin       Review of Systems   Constitutional: Negative  HENT: Negative  Eyes: Negative  Respiratory: Negative  Cardiovascular: Negative  Gastrointestinal: Positive for abdominal pain  Endocrine: Negative  Genitourinary: Positive for dysuria, frequency and urgency  Musculoskeletal: Negative  Skin: Negative  Allergic/Immunologic: Negative  Neurological: Negative  Hematological: Negative  Psychiatric/Behavioral: Negative  All other systems reviewed and are negative  Physical Exam  Physical Exam  Vitals and nursing note reviewed  Constitutional:       Appearance: Normal appearance  He is normal weight  HENT:      Head: Normocephalic and atraumatic  Nose: Nose normal       Mouth/Throat:      Mouth: Mucous membranes are moist       Pharynx: Oropharynx is clear  Cardiovascular:      Rate and Rhythm: Normal rate and regular rhythm  Pulses: Normal pulses  Heart sounds: Normal heart sounds  Pulmonary:      Effort: Pulmonary effort is normal       Breath sounds: Normal breath sounds  Abdominal:      General: Bowel sounds are normal       Tenderness: There is no abdominal tenderness  There is no right CVA tenderness, left CVA tenderness, guarding or rebound  Musculoskeletal:         General: Normal range of motion        Cervical back: Normal range of motion and neck supple  Skin:     General: Skin is warm and dry  Capillary Refill: Capillary refill takes less than 2 seconds  Neurological:      General: No focal deficit present  Mental Status: He is alert and oriented to person, place, and time     Psychiatric:         Mood and Affect: Mood normal          Behavior: Behavior normal          Vital Signs  ED Triage Vitals [12/18/22 1538]   Temperature Pulse Respirations Blood Pressure SpO2   98 °F (36 7 °C) 93 14 165/87 98 %      Temp Source Heart Rate Source Patient Position - Orthostatic VS BP Location FiO2 (%)   Oral Monitor Lying Left arm --      Pain Score       --           Vitals:    12/18/22 1538 12/18/22 1716   BP: 165/87 131/77   Pulse: 93 72   Patient Position - Orthostatic VS: Lying Lying         Visual Acuity      ED Medications  Medications   ketorolac (TORADOL) injection 15 mg (15 mg Intravenous Given 12/18/22 1554)       Diagnostic Studies  Results Reviewed     Procedure Component Value Units Date/Time    Basic metabolic panel [[id number]]  (Abnormal) Collected: 12/18/22 1549    Lab Status: Final result Specimen: Blood from Arm, Right Updated: 12/18/22 1615     Sodium 137 mmol/L      Potassium 4 0 mmol/L      Chloride 103 mmol/L      CO2 29 mmol/L      ANION GAP 5 mmol/L      BUN 13 mg/dL      Creatinine 0 76 mg/dL      Glucose 198 mg/dL      Calcium 9 4 mg/dL      eGFR 100 ml/min/1 73sq m     Narrative:      Raquel guidelines for Chronic Kidney Disease (CKD):   •  Stage 1 with normal or high GFR (GFR > 90 mL/min/1 73 square meters)  •  Stage 2 Mild CKD (GFR = 60-89 mL/min/1 73 square meters)  •  Stage 3A Moderate CKD (GFR = 45-59 mL/min/1 73 square meters)  •  Stage 3B Moderate CKD (GFR = 30-44 mL/min/1 73 square meters)  •  Stage 4 Severe CKD (GFR = 15-29 mL/min/1 73 square meters)  •  Stage 5 End Stage CKD (GFR <15 mL/min/1 73 square meters)  Note: GFR calculation is accurate only with a steady state creatinine    UA w Reflex to Microscopic w Reflex to Culture [[id number]]  (Abnormal) Collected: 12/18/22 1550    Lab Status: Final result Specimen: Urine, Clean Catch Updated: 12/18/22 1609     Color, UA Yellow     Clarity, UA Clear     Specific [place], UA 1 020     pH, UA 6 5     Leukocytes, UA Negative     Nitrite, UA Negative     Protein, UA Negative mg/dl      Glucose, UA 50 (1/25%) mg/dl      Ketones, UA Negative mg/dl      Bilirubin, UA Negative     Occult Blood, UA Negative     UROBILINOGEN UA Negative mg/dL     CBC and differential [[id number]] Collected: 12/18/22 1549    Lab Status: Final result Specimen: Blood from Arm, Right Updated: 12/18/22 1559     WBC 7 33 Thousand/uL      RBC 5 55 Million/uL      Hemoglobin 15 5 g/dL      Hematocrit 46 1 %      MCV 83 fL      MCH 27 9 pg      MCHC 33 6 g/dL      RDW 12 9 %      MPV 9 0 fL      Platelets 181 Thousands/uL      nRBC 0 /100 WBCs      Neutrophils Relative 70 %      Immat GRANS % 0 %      Lymphocytes Relative 18 %      Monocytes Relative 8 %      Eosinophils Relative 3 %      Basophils Relative 1 %      Neutrophils Absolute 5 21 Thousands/µL      Immature Grans Absolute 0 02 Thousand/uL      Lymphocytes Absolute 1 33 Thousands/µL      Monocytes Absolute 0 55 Thousand/µL      Eosinophils Absolute 0 18 Thousand/µL      Basophils Absolute 0 04 Thousands/µL                  CT renal stone study abdomen pelvis wo contrast   Final Result by Georgie Wallis MD (12/18 1726)      No acute abdominal or pelvic pathology within limitation of a noncontrast study  In particular, no intrarenal, ureteral or bladder calculi  No hydronephrosis  Workstation performed: [id number]                    Procedures  Procedures         ED Course                               SBIRT 20yo+    Flowsheet Row Most Recent Value   SBIRT (25 yo +)    In order to provide better care to our patients, we are screening all of our patients for alcohol and drug use   Would it be okay to ask you these screening questions? Yes Filed at: 12/18/2022 1611   Initial Alcohol Screen: US AUDIT-C     1  How often do you have a drink containing alcohol? 0 Filed at: 12/18/2022 1611   2  How many drinks containing alcohol do you have on a typical day you are drinking? 0 Filed at: 12/18/2022 1611   3a  Male UNDER 65: How often do you have five or more drinks on one occasion? 0 Filed at: 12/18/2022 1611   Audit-C Score 0 Filed at: 12/18/2022 1611   ROXY: How many times in the past year have you    Used an illegal drug or used a prescription medication for non-medical reasons? Never Filed at: 12/18/2022 1611                    MDM  Number of Diagnoses or Management Options     Amount and/or Complexity of Data Reviewed  Clinical lab tests: ordered and reviewed  Tests in the radiology section of CPT®: ordered and reviewed  Review and summarize past medical records: yes  Independent visualization of images, tracings, or specimens: yes        Disposition  Final diagnoses:   Prostatitis     Time reflects when diagnosis was documented in both MDM as applicable and the Disposition within this note     Time User Action Codes Description Comment    12/18/2022  5:53 PM Stacie Mcarthur Add [N41 9] Prostatitis       ED Disposition     ED Disposition   Discharge    Condition   Stable    Date/Time   Sun Dec 18, 2022  5:53 PM    Comment   Annmarie Lang discharge to home/self care                 Follow-up Information     Follow up With Specialties Details Why [address] [address] [phone]            Patient's Medications   Discharge Prescriptions    SULFAMETHOXAZOLE-TRIMETHOPRIM (BACTRIM DS) 800-160 MG PER TABLET    Take 1 tablet by mouth every 12 (twelve) hours for 28 days smx-tmp DS (BACTRIM) 800-160 mg tabs (1tab q12 D10)       Start Date: 12/18/2022End Date: 1/15/2023       Order Dose: 1 tablet       Quantity: 56 tablet    Refills: 0 No discharge procedures on file      PDMP Review       Value Time User    PDMP Reviewed  Yes 7/12/2021  7:56 AM Juan Carlos Montes DO          ED Provider  Electronically Signed by           Azeem Alarcon MD  12/18/22 [phone]

## 2023-02-06 ENCOUNTER — TELEPHONE (OUTPATIENT)
Age: 58
End: 2023-02-06

## 2023-02-06 NOTE — TELEPHONE ENCOUNTER
PT called to schedule colonoscopy, per PT last done approx 3 yrs ago by Dr Blayne Voss  Per PT he's due for colonoscopy due to hx of polyps  PT's info verified

## 2023-02-20 ENCOUNTER — PREP FOR PROCEDURE (OUTPATIENT)
Age: 58
End: 2023-02-20

## 2023-02-20 DIAGNOSIS — Z86.010 PERSONAL HISTORY OF COLONIC POLYPS: Primary | ICD-10-CM

## 2023-03-22 ENCOUNTER — HOSPITAL ENCOUNTER (OUTPATIENT)
Dept: GASTROENTEROLOGY | Facility: HOSPITAL | Age: 58
Setting detail: OUTPATIENT SURGERY
Discharge: HOME | End: 2023-03-22
Attending: COLON & RECTAL SURGERY

## 2023-03-22 ENCOUNTER — ANESTHESIA (OUTPATIENT)
Dept: GASTROENTEROLOGY | Facility: HOSPITAL | Age: 58
End: 2023-03-22

## 2023-03-22 ENCOUNTER — ANESTHESIA EVENT (OUTPATIENT)
Dept: GASTROENTEROLOGY | Facility: HOSPITAL | Age: 58
End: 2023-03-22

## 2023-03-22 VITALS
HEIGHT: 69 IN | TEMPERATURE: 96.8 F | HEART RATE: 112 BPM | BODY MASS INDEX: 29.03 KG/M2 | SYSTOLIC BLOOD PRESSURE: 103 MMHG | RESPIRATION RATE: 18 BRPM | DIASTOLIC BLOOD PRESSURE: 67 MMHG | OXYGEN SATURATION: 98 % | WEIGHT: 196 LBS

## 2023-03-22 DIAGNOSIS — Z86.010 PERSONAL HISTORY OF COLONIC POLYPS: ICD-10-CM

## 2023-03-22 PROBLEM — F19.11 HISTORY OF DRUG ABUSE (HCC): Status: ACTIVE | Noted: 2017-01-19

## 2023-03-22 PROBLEM — K60.2 ANAL FISSURE: Status: ACTIVE | Noted: 2019-08-16

## 2023-03-22 PROBLEM — N41.9 PROSTATITIS: Status: ACTIVE | Noted: 2021-08-17

## 2023-03-22 PROBLEM — M79.641 PAIN OF RIGHT HAND: Status: ACTIVE | Noted: 2022-10-27

## 2023-03-22 LAB — GLUCOSE SERPL-MCNC: 219 MG/DL (ref 65–140)

## 2023-03-22 RX ORDER — PROPOFOL 10 MG/ML
INJECTION, EMULSION INTRAVENOUS AS NEEDED
Status: DISCONTINUED | OUTPATIENT
Start: 2023-03-22 | End: 2023-03-22

## 2023-03-22 RX ORDER — KETAMINE HCL IN NACL, ISO-OSM 50 MG/5 ML
SYRINGE (ML) INTRAVENOUS AS NEEDED
Status: DISCONTINUED | OUTPATIENT
Start: 2023-03-22 | End: 2023-03-22

## 2023-03-22 RX ORDER — ALBUTEROL SULFATE 90 UG/1
AEROSOL, METERED RESPIRATORY (INHALATION) AS NEEDED
Status: DISCONTINUED | OUTPATIENT
Start: 2023-03-22 | End: 2023-03-22

## 2023-03-22 RX ORDER — SODIUM CHLORIDE 9 MG/ML
INJECTION, SOLUTION INTRAVENOUS CONTINUOUS PRN
Status: DISCONTINUED | OUTPATIENT
Start: 2023-03-22 | End: 2023-03-22

## 2023-03-22 RX ORDER — PROPOFOL 10 MG/ML
INJECTION, EMULSION INTRAVENOUS CONTINUOUS PRN
Status: DISCONTINUED | OUTPATIENT
Start: 2023-03-22 | End: 2023-03-22

## 2023-03-22 RX ORDER — LIDOCAINE HYDROCHLORIDE 10 MG/ML
INJECTION, SOLUTION EPIDURAL; INFILTRATION; INTRACAUDAL; PERINEURAL AS NEEDED
Status: DISCONTINUED | OUTPATIENT
Start: 2023-03-22 | End: 2023-03-22

## 2023-03-22 RX ORDER — GLYCOPYRROLATE 0.2 MG/ML
INJECTION INTRAMUSCULAR; INTRAVENOUS AS NEEDED
Status: DISCONTINUED | OUTPATIENT
Start: 2023-03-22 | End: 2023-03-22

## 2023-03-22 RX ADMIN — Medication 25 MG: at 09:52

## 2023-03-22 RX ADMIN — GLYCOPYRROLATE 0.2 MG: 0.2 INJECTION, SOLUTION INTRAMUSCULAR; INTRAVENOUS at 09:48

## 2023-03-22 RX ADMIN — ALBUTEROL SULFATE 2 PUFF: 90 AEROSOL, METERED RESPIRATORY (INHALATION) at 09:45

## 2023-03-22 RX ADMIN — Medication 25 MG: at 09:48

## 2023-03-22 RX ADMIN — PROPOFOL 100 MG: 10 INJECTION, EMULSION INTRAVENOUS at 09:48

## 2023-03-22 RX ADMIN — PROPOFOL 50 MCG/KG/MIN: 10 INJECTION, EMULSION INTRAVENOUS at 09:48

## 2023-03-22 RX ADMIN — PROPOFOL 50 MG: 10 INJECTION, EMULSION INTRAVENOUS at 09:52

## 2023-03-22 RX ADMIN — PROPOFOL 50 MG: 10 INJECTION, EMULSION INTRAVENOUS at 09:53

## 2023-03-22 RX ADMIN — SODIUM CHLORIDE: 9 INJECTION, SOLUTION INTRAVENOUS at 09:46

## 2023-03-22 RX ADMIN — LIDOCAINE HYDROCHLORIDE 50 MG: 10 INJECTION, SOLUTION EPIDURAL; INFILTRATION; INTRACAUDAL; PERINEURAL at 09:48

## 2023-03-22 NOTE — H&P
History and Physical   Colon and Rectal Surgery   Ankur Delgado 60 y o  male MRN: [id number]  Unit/Bed#:  Encounter: [id number]  03/22/23   9:36 AM      CC:  History of polyps    History of Present Illness   HPI:  Ankur Delgado is a 60 y o  male with no GI symptoms    Historical Information   Past Medical History:   Diagnosis Date   • Asthma    • Bipolar disorder ([place] [address] )    • COPD (chronic obstructive pulmonary disease) ([place] [address] )    • Diabetes mellitus ([place] [address] )    • Enlarged prostate    • Glaucoma    • Hyperlipidemia    • Hypertension    • Overdose of heroin, accidental or unintentional, sequela    • Psychiatric disorder    • Seasonal allergic rhinitis    • Seizures ([place] [address] )      Past Surgical History:   Procedure Laterality Date   • CIRCUMCISION     • KNEE SURGERY     • WRIST SURGERY Left        Meds/Allergies     (Not in a hospital admission)        Current Outpatient Medications:   •  albuterol (2 5 mg/3 mL) 0 083 % nebulizer solution, Take 1 vial (2 5 mg total) by nebulization every 4 (four) hours as needed for shortness of breath, Disp: 25 vial, Rfl: 0  •  albuterol (PROVENTIL HFA,VENTOLIN HFA) 90 mcg/act inhaler, Inhale 2 puffs every 4 (four) hours as needed for wheezing, Disp: 1 Inhaler, Rfl: 0  •  amLODIPine (NORVASC) 5 mg tablet, Take 5 mg by mouth daily, Disp: , Rfl:   •  atorvastatin (LIPITOR) 80 mg tablet, Take 80 mg by mouth daily at bedtime, Disp: , Rfl:   •  Calcium Carb-Cholecalciferol (CALCIUM CARBONATE-VITAMIN D3 PO), Take 1,500 mg by mouth daily, Disp: , Rfl:   •  carBAMazepine (TEGretol) 200 mg tablet, Take 2 tabs in the morning and 3 tabs at night, Disp: , Rfl:   •  cetirizine (ZyrTEC) 10 mg tablet, Take 10 mg by mouth daily, Disp: , Rfl:   •  gabapentin (NEURONTIN) 300 mg capsule, Take 300 mg by mouth 3 (three) times a day, Disp: , Rfl:   •  guaiFENesin (MUCINEX) 600 mg 12 hr tablet, Take 1 tablet (600 mg total) by mouth every 12 (twelve) hours, Disp: 12 tablet, Rfl: 0  •  hydrOXYzine pamoate (VISTARIL) "50 mg capsule, Take 50 mg by mouth Three times daily as needed, Disp: , Rfl:   •  insulin aspart (NovoLOG) 100 units/mL injection, Inject under the skin 3 (three) times a day before meals, Disp: , Rfl:   •  montelukast (SINGULAIR) 10 mg tablet, Take 10 mg by mouth daily  , Disp: , Rfl:   •  omeprazole (PriLOSEC) 40 MG capsule, Take 40 mg by mouth daily, Disp: , Rfl:   •  albuterol (ProAir HFA) 90 mcg/act inhaler, Inhale 2 puffs every 6 (six) hours as needed for wheezing, Disp: 8 5 g, Rfl: 0  •  albuterol (Ventolin HFA) 90 mcg/act inhaler, Inhale 1-2 puffs every 6 (six) hours as needed for wheezing, Disp: 8 g, Rfl: 0  •  bimatoprost (LUMIGAN) 0 03 % ophthalmic drops, Administer 1 drop to both eyes daily at bedtime  , Disp: , Rfl:   •  Blood Glucose Monitoring Suppl (ACURA BLOOD GLUCOSE METER) w/Device KIT, by Does not apply route, Disp: , Rfl:   •  brimonidine (ALPHAGAN P) 0 15 % ophthalmic solution, Administer 1 drop to both eyes every 8 (eight) hours  , Disp: , Rfl:   •  Cinnamon 500 MG capsule, Take 500 mg by mouth daily, Disp: , Rfl:   •  Diclofenac Sodium (VOLTAREN) 1 %, Apply 2 g topically 4 (four) times a day, Disp: 50 g, Rfl: 0  •  diclofenac sodium (VOLTAREN) 50 mg EC tablet, Take 1 tablet (50 mg total) by mouth 2 (two) times a day for 10 days, Disp: 20 tablet, Rfl: 0  •  dupilumab (DUPIXENT) subcutaneous injection, Inject under the skin every 14 (fourteen) days, Disp: , Rfl:   •  FIBER PO, Take by mouth, Disp: , Rfl:   •  finasteride (PROSCAR) 5 mg tablet, Take 5 mg by mouth daily, Disp: , Rfl:   •  fluticasone (FLONASE) 50 mcg/act nasal spray, 1 spray into each nostril daily, Disp: , Rfl:   •  glucagon 1 MG injection, as needed, Disp: , Rfl:   •  insulin glargine (BASAGLAR KWIKPEN) 100 units/mL injection pen, Inject 43 Units under the skin daily at bedtime , Disp: , Rfl:   •  Insulin Pen Needle 32G X 4 MM MISC, Unifine Pentips Plus 32 gauge x 5/32\" needle, Disp: , Rfl:   •  ipratropium (ATROVENT) 0 02 % " nebulizer solution, Take 2 5 mL (0 5 mg total) by nebulization every 6 (six) hours as needed for wheezing or shortness of breath, Disp: 75 mL, Rfl: 0  •  ipratropium (ATROVENT) 0 02 % nebulizer solution, Take 2 5 mL (0 5 mg total) by nebulization 4 (four) times a day, Disp: 100 mL, Rfl: 0  •  latanoprost (XALATAN) 0 005 % ophthalmic solution, Administer 1 drop to both eyes daily, Disp: , Rfl:   •  lidocaine (LIDODERM) 5 %, Apply 1 patch topically daily Remove & Discard patch within 12 hours or as directed by MD, Disp: 10 patch, Rfl: 0  •  misoprostol (CYTOTEC) 200 mcg tablet, Take 1 tablet (200 mcg total) by mouth 3 (three) times a day for 10 days, Disp: 30 tablet, Rfl: 0  •  naloxone (NARCAN) 4 mg/0 1 mL nasal spray, Administer 1 spray into a nostril   If no response after 2-3 minutes, give another dose in the other nostril using a new spray , Disp: 1 each, Rfl: 0  •  omega-3-acid ethyl esters (LOVAZA) 1 g capsule, Take 1 g by mouth daily, Disp: , Rfl:   •  perphenazine 2 mg tablet, Take 2 mg by mouth daily, Disp: , Rfl:   •  polyvinyl alcohol (LIQUIFILM TEARS) 1 4 % ophthalmic solution, 2 drops as needed for dry eyes, Disp: , Rfl:   •  predniSONE 20 mg tablet, Take 3 tablets (60 mg total) by mouth daily (Patient not taking: Reported on 4/13/2022 ), Disp: 15 tablet, Rfl: 0  •  predniSONE 5 mg tablet, Take 2 5 mg by mouth every other day On Monday, Wednesday, Friday  (Patient not taking: Reported on 4/13/2022 ), Disp: , Rfl:   •  predniSONE 50 mg tablet, Take 1 tablet (50 mg total) by mouth daily (Patient not taking: Reported on 4/13/2022 ), Disp: 4 tablet, Rfl: 0  •  TAMSULOSIN HCL PO, Take 0 4 mg by mouth daily , Disp: , Rfl:   •  tolnaftate (TINACTIN) 1 % powder, Apply 1 application topically 2 (two) times a day, Disp: , Rfl:   •  Turmeric 500 MG CAPS, Take by mouth, Disp: , Rfl:   •  VITAMIN D PO, Take by mouth, Disp: , Rfl:     Allergies   Allergen Reactions   • Aspirin GI Bleeding   • Ibuprofen GI Bleeding   • "Licorice Flavor [Flavoring Agent - Food Allergy] Hives   • Penicillins Hives   • Propoxyphene Hives     Propoxyphene N-Acetaminophen---hives & palpitations   • Tramadol Palpitations     Other reaction(s): heart pumps real fast   • Bee Venom    • Glycyrrhiza    • Haloperidol Other (See Comments)     \"bent out of shape\" - dystonia   • Lithium Other (See Comments)     edema   • Molds & Smuts    • Other    • Wasp Venom          Social History   Social History     Substance and Sexual Activity   Alcohol Use Not Currently     Social History     Substance and Sexual Activity   Drug Use Not Currently   • Types: Heroin, Fentanyl    Comment: OD 21 heroin     Social History     Tobacco Use   Smoking Status Former   • Packs/day: 2 00   • Years: 27 00   • Pack years: 54 00   • Types: Cigarettes   • Start date: 35   • Quit date: 11   • Years since quittin 2   Smokeless Tobacco Never         Family History:   Family History   Problem Relation Age of Onset   • Arthritis Mother          Objective     Current Vitals:   Blood Pressure: 131/72 (23)  Pulse: 72 (23)  Temperature: (!) 96 6 °F (35 9 °C) (23)  Temp Source: Tympanic (23)  Respirations: 18 (23)  Height: 5' 8 5\" (174 cm) (23)  Weight - Scale: 88 9 kg (196 lb) (23)  SpO2: 99 % (23)  No intake or output data in the 24 hours ending 23    Physical Exam:  General:  Well nourished, no distress  Neuro: Alert and oriented  Eyes:Sclera anicteric, conjunctiva pink  Pulm: Clear to auscultation bilaterally  No respiratory Distress  CV:  Regular rate and rhythm  No murmurs  Abdomen:  Soft, flat, non-tender, without masses or hepatosplenomegaly  Lab Results:       ASSESSMENT:  Livan Mccloud is a 60 y o  male for surveillance  PLAN:  Colonoscopy  Risks , including, but not limited to, bleeding, perforation, missed lesions, and potential need for surgery, were reviewed   " Alternatives to colonoscopy were discussed    Suyapa Krause MD

## 2023-03-22 NOTE — ANESTHESIA POSTPROCEDURE EVALUATION
Post-Op Assessment Note    CV Status:  Stable  Pain Score: 0    Pain management: adequate     Mental Status:  Arousable and sleepy   Hydration Status:  Euvolemic   PONV Controlled:  Controlled   Airway Patency:  Patent      Post Op Vitals Reviewed: Yes      Staff: Anesthesiologist, CRNA         There were no known notable events for this encounter      /53    Temp (!) 96 8 °F (36 °C) (03/22/23 1006)    Pulse 90 (03/22/23 1006)   Resp 20 (03/22/23 1006)    SpO2 100 % (03/22/23 1006)

## 2023-03-22 NOTE — ANESTHESIA PREPROCEDURE EVALUATION
Procedure:  COLONOSCOPY    Relevant Problems   CARDIO   (+) Chest pain   (+) HLD (hyperlipidemia)   (+) HTN (hypertension)   (+) Intermittent chest pain      ENDO   (+) Type 2 diabetes mellitus, with long-term current use of insulin (HCC)      GI/HEPATIC   (+) GERD (gastroesophageal reflux disease)      /RENAL   (+) BPH (benign prostatic hyperplasia)   (+) Prostatitis      NEURO/PSYCH   (+) Diabetic neuropathy (HCC)   (+) Diabetic polyneuropathy associated with diabetes mellitus due to underlying condition (HCC)   (+) History of drug abuse ([address] )   (+) Seizure (HCC)      PULMONARY   (+) ARPITA (obstructive sleep apnea)   (+) Respiratory arrest ([facility])   (+) Severe asthma   (+) Severe persistent asthma without complication   (+) URI, acute      Respiratory   (+) Acute bronchitis      Genitourinary   (+) Right hydrocele      Other   (+) Glaucoma   (+) Right flank pain      Lab Results   Component Value Date     (L) 12/11/2014    SODIUM 137 12/18/2022    K 4 0 12/18/2022     12/18/2022    CO2 29 12/18/2022    ANIONGAP 8 12/11/2014    AGAP 5 12/18/2022    BUN 13 12/18/2022    CREATININE 0 76 12/18/2022    GLUC 198 (H) 12/18/2022    GLUF 95 01/11/2021    CALCIUM 9 4 12/18/2022    AST 23 04/01/2022    ALT 23 04/01/2022    ALKPHOS 71 04/01/2022    PROT 6 5 10/01/2014    TP 7 0 04/01/2022    BILITOT 0 3 10/01/2014    TBILI 0 47 04/01/2022    EGFR 100 12/18/2022     Lab Results   Component Value Date    WBC 7 33 12/18/2022    HGB 15 5 12/18/2022    HCT 46 1 12/18/2022    MCV 83 12/18/2022     12/18/2022         Physical Exam    Airway    Mallampati score: II  TM Distance: >3 FB  Neck ROM: full     Dental       Cardiovascular      Pulmonary      Other Findings        Anesthesia Plan  ASA Score- 3     Anesthesia Type- IV sedation with anesthesia with ASA Monitors  Additional Monitors:   Airway Plan:           Plan Factors-Exercise tolerance (METS): >4 METS  Chart reviewed  EKG reviewed   Imaging results reviewed  Existing labs reviewed  Patient summary reviewed  Induction- intravenous  Postoperative Plan-     Informed Consent- Anesthetic plan and risks discussed with patient  I personally reviewed this patient with the CRNA  Discussed and agreed on the Anesthesia Plan with the CRNA  Abilio Mendez

## 2023-05-29 ENCOUNTER — APPOINTMENT (EMERGENCY)
Dept: CT IMAGING | Facility: HOSPITAL | Age: 58
End: 2023-05-29

## 2023-05-29 ENCOUNTER — HOSPITAL ENCOUNTER (EMERGENCY)
Facility: HOSPITAL | Age: 58
Discharge: HOME | End: 2023-05-29
Attending: EMERGENCY MEDICINE

## 2023-05-29 VITALS
DIASTOLIC BLOOD PRESSURE: 77 MMHG | HEART RATE: 82 BPM | TEMPERATURE: 98.7 F | SYSTOLIC BLOOD PRESSURE: 162 MMHG | RESPIRATION RATE: 20 BRPM | OXYGEN SATURATION: 98 %

## 2023-05-29 DIAGNOSIS — K40.20 BILATERAL INGUINAL HERNIA: ICD-10-CM

## 2023-05-29 DIAGNOSIS — N43.3 HYDROCELE, BILATERAL: Primary | ICD-10-CM

## 2023-05-29 LAB
ALBUMIN SERPL BCP-MCNC: 4.4 G/DL (ref 3.5–5)
ALP SERPL-CCNC: 84 U/L (ref 34–104)
ALT SERPL W P-5'-P-CCNC: 14 U/L (ref 7–52)
ANION GAP SERPL CALCULATED.3IONS-SCNC: 8 MMOL/L (ref 4–13)
AST SERPL W P-5'-P-CCNC: 13 U/L (ref 13–39)
BACTERIA UR QL AUTO: NORMAL /HPF
BASOPHILS # BLD AUTO: 0.05 THOUSANDS/ÂΜL (ref 0–0.1)
BASOPHILS NFR BLD AUTO: 1 % (ref 0–1)
BILIRUB SERPL-MCNC: 0.41 MG/DL (ref 0.2–1)
BILIRUB UR QL STRIP: NEGATIVE
BUN SERPL-MCNC: 18 MG/DL (ref 5–25)
CALCIUM SERPL-MCNC: 9.4 MG/DL (ref 8.4–10.2)
CHLORIDE SERPL-SCNC: 102 MMOL/L (ref 96–108)
CLARITY UR: CLEAR
CO2 SERPL-SCNC: 27 MMOL/L (ref 21–32)
COLOR UR: ABNORMAL
CREAT SERPL-MCNC: 1.05 MG/DL (ref 0.6–1.3)
EOSINOPHIL # BLD AUTO: 0.44 THOUSAND/ÂΜL (ref 0–0.61)
EOSINOPHIL NFR BLD AUTO: 6 % (ref 0–6)
ERYTHROCYTE [DISTWIDTH] IN BLOOD BY AUTOMATED COUNT: 12.3 % (ref 11.6–15.1)
GFR SERPL CREATININE-BSD FRML MDRD: 77 ML/MIN/1.73SQ M
GLUCOSE SERPL-MCNC: 203 MG/DL (ref 65–140)
GLUCOSE UR STRIP-MCNC: ABNORMAL MG/DL
HCT VFR BLD AUTO: 45.3 % (ref 36.5–49.3)
HGB BLD-MCNC: 15.7 G/DL (ref 12–17)
HGB UR QL STRIP.AUTO: NEGATIVE
IMM GRANULOCYTES # BLD AUTO: 0.02 THOUSAND/UL (ref 0–0.2)
IMM GRANULOCYTES NFR BLD AUTO: 0 % (ref 0–2)
KETONES UR STRIP-MCNC: ABNORMAL MG/DL
LEUKOCYTE ESTERASE UR QL STRIP: NEGATIVE
LIPASE SERPL-CCNC: 38 U/L (ref 11–82)
LYMPHOCYTES # BLD AUTO: 2.28 THOUSANDS/ÂΜL (ref 0.6–4.47)
LYMPHOCYTES NFR BLD AUTO: 32 % (ref 14–44)
MCH RBC QN AUTO: 28.3 PG (ref 26.8–34.3)
MCHC RBC AUTO-ENTMCNC: 34.7 G/DL (ref 31.4–37.4)
MCV RBC AUTO: 82 FL (ref 82–98)
MONOCYTES # BLD AUTO: 0.57 THOUSAND/ÂΜL (ref 0.17–1.22)
MONOCYTES NFR BLD AUTO: 8 % (ref 4–12)
NEUTROPHILS # BLD AUTO: 3.75 THOUSANDS/ÂΜL (ref 1.85–7.62)
NEUTS SEG NFR BLD AUTO: 53 % (ref 43–75)
NITRITE UR QL STRIP: NEGATIVE
NON-SQ EPI CELLS URNS QL MICRO: NORMAL /HPF
NRBC BLD AUTO-RTO: 0 /100 WBCS
PH UR STRIP.AUTO: 6 [PH]
PLATELET # BLD AUTO: 246 THOUSANDS/UL (ref 149–390)
PMV BLD AUTO: 9.6 FL (ref 8.9–12.7)
POTASSIUM SERPL-SCNC: 3.6 MMOL/L (ref 3.5–5.3)
PROT SERPL-MCNC: 6.5 G/DL (ref 6.4–8.4)
PROT UR STRIP-MCNC: NEGATIVE MG/DL
RBC # BLD AUTO: 5.54 MILLION/UL (ref 3.88–5.62)
RBC #/AREA URNS AUTO: NORMAL /HPF
SODIUM SERPL-SCNC: 137 MMOL/L (ref 135–147)
SP GR UR STRIP.AUTO: 1.02 (ref 1–1.04)
UROBILINOGEN UA: NEGATIVE MG/DL
WBC # BLD AUTO: 7.11 THOUSAND/UL (ref 4.31–10.16)
WBC #/AREA URNS AUTO: NORMAL /HPF

## 2023-05-29 RX ORDER — NAPROXEN 375 MG/1
375 TABLET, DELAYED RELEASE ORAL 2 TIMES DAILY WITH MEALS
Qty: 20 TABLET | Refills: 0 | Status: SHIPPED | OUTPATIENT
Start: 2023-05-29 | End: 2024-05-28

## 2023-05-29 RX ORDER — ONDANSETRON 2 MG/ML
4 INJECTION INTRAMUSCULAR; INTRAVENOUS ONCE
Status: DISCONTINUED | OUTPATIENT
Start: 2023-05-29 | End: 2023-05-29 | Stop reason: HOSPADM

## 2023-05-29 RX ORDER — KETOROLAC TROMETHAMINE 30 MG/ML
15 INJECTION, SOLUTION INTRAMUSCULAR; INTRAVENOUS ONCE
Status: COMPLETED | OUTPATIENT
Start: 2023-05-29 | End: 2023-05-29

## 2023-05-29 RX ADMIN — IOHEXOL 100 ML: 350 INJECTION, SOLUTION INTRAVENOUS at 09:24

## 2023-05-29 RX ADMIN — SODIUM CHLORIDE 1000 ML: 0.9 INJECTION, SOLUTION INTRAVENOUS at 08:34

## 2023-05-29 RX ADMIN — KETOROLAC TROMETHAMINE 15 MG: 30 INJECTION, SOLUTION INTRAMUSCULAR; INTRAVENOUS at 08:36

## 2023-05-29 NOTE — ED NOTES
Patient transported to [facility] [address] [place] [address]  05/29/23 6263

## 2023-05-29 NOTE — ED PROVIDER NOTES
"History  Chief Complaint   Patient presents with   • Abdominal Pain   • Testicle Pain     Pt comes to the ED this AM with testicle pain that began this AM  Pt adds that he has also had abdominal pain x 3 weeks  He describes 10 Lb weight loss in 2 months with a decrease in appetite  Feels weak and tired  Hx prostatitis follows with urology  Patient is a 53-year-old male reports past medical history significant for diabetes, prostatitis, bilateral inguinal hernias presenting for evaluation of suprapubic abdominal discomfort, intermittent dysuria, left testicular pain  Patient denies any traumatic inciting events  Patient reports his abdominal pain has been ongoing and intermittent over the past 3 weeks and states his testicular pain began this morning around 4 AM   Patient denies penile discharge, hematuria or flank pain  Patient reports he has had a kidney stone in the past however reports this was over 5 years ago and reports he did not require any procedures for removal   Patient reports he has had subjective fevers and chills  Patient reports nausea however denies vomiting reports no diarrhea  Patient denies any abdominal surgical history  Prior to Admission Medications   Prescriptions Last Dose Informant Patient Reported? Taking?    Blood Glucose Monitoring Suppl (AC[employer] BLOOD GLUCOSE METER) w/Device KIT  Self Yes No   Sig: by Does not apply route   Calcium Carb-Cholecalciferol (CALCIUM CARBONATE-VITAMIN D3 PO)  Self Yes No   Sig: Take 1,500 mg by mouth daily   Cinnamon 500 MG capsule  Self Yes No   Sig: Take 500 mg by mouth daily   Diclofenac Sodium (VOLTAREN) 1 %  Self No No   Sig: Apply 2 g topically 4 (four) times a day   FIBER PO  Self Yes No   Sig: Take by mouth   Insulin Pen Needle 32G X 4 MM MISC  Self Yes No   Sig: [employer]ifine Pentips Plus 32 gauge x 5/32\" needle   TAMSULOSIN HCL PO  Self Yes No   Sig: Take 0 4 mg by mouth daily    Turmeric 500 MG CAPS  Self Yes No   Sig: Take by mouth " VITAMIN D PO  Self Yes No   Sig: Take by mouth   albuterol (2 5 mg/3 mL) 0 083 % nebulizer solution  Self No No   Sig: Take 1 vial (2 5 mg total) by nebulization every 4 (four) hours as needed for shortness of breath   albuterol (PROVENTIL HFA,VENTOLIN HFA) 90 mcg/act inhaler  Self No No   Sig: Inhale 2 puffs every 4 (four) hours as needed for wheezing   albuterol (ProAir HFA) 90 mcg/act inhaler  Self No No   Sig: Inhale 2 puffs every 6 (six) hours as needed for wheezing   albuterol (Ventolin HFA) 90 mcg/act inhaler  Self No No   Sig: Inhale 1-2 puffs every 6 (six) hours as needed for wheezing   amLODIPine (NORVASC) 5 mg tablet  Self Yes No   Sig: Take 5 mg by mouth daily   atorvastatin (LIPITOR) 80 mg tablet  Self Yes No   Sig: Take 80 mg by mouth daily at bedtime   bimatoprost (LUMIGAN) 0 03 % ophthalmic drops  Self Yes No   Sig: Administer 1 drop to both eyes daily at bedtime     brimonidine (ALPHAGAN P) 0 15 % ophthalmic solution  Self Yes No   Sig: Administer 1 drop to both eyes every 8 (eight) hours     carBAMazepine (TEGretol) 200 mg tablet  Self Yes No   Sig: Take 2 tabs in the morning and 3 tabs at night   cetirizine (ZyrTEC) 10 mg tablet  Self Yes No   Sig: Take 10 mg by mouth daily   diclofenac sodium (VOLTAREN) 50 mg EC tablet   No No   Sig: Take 1 tablet (50 mg total) by mouth 2 (two) times a day for 10 days   dupilumab (DUPIXENT) subcutaneous injection  Self Yes No   Sig: Inject under the skin every 14 (fourteen) days   finasteride (PROSCAR) 5 mg tablet  Self Yes No   Sig: Take 5 mg by mouth daily   fluticasone (FLONASE) 50 mcg/act nasal spray  Self Yes No   Si spray into each nostril daily   gabapentin (NEURONTIN) 300 mg capsule  Self Yes No   Sig: Take 300 mg by mouth 3 (three) times a day   glucagon 1 MG injection  Self Yes No   Sig: as needed   guaiFENesin (MUCINEX) 600 mg 12 hr tablet  Self No No   Sig: Take 1 tablet (600 mg total) by mouth every 12 (twelve) hours   hydrOXYzine pamoate (VISTARIL) 50 mg capsule  Self Yes No   Sig: Take 50 mg by mouth Three times daily as needed   insulin aspart (NovoLOG) 100 units/mL injection  Self Yes No   Sig: Inject under the skin 3 (three) times a day before meals   insulin glargine (BASAGLAR KWIKPEN) 100 units/mL injection pen  Self Yes No   Sig: Inject 43 Units under the skin daily at bedtime    ipratropium (ATROVENT) 0 02 % nebulizer solution  Self No No   Sig: Take 2 5 mL (0 5 mg total) by nebulization 4 (four) times a day   latanoprost (XALATAN) 0 005 % ophthalmic solution  Self Yes No   Sig: Administer 1 drop to both eyes daily   lidocaine (LIDODERM) 5 %  Self No No   Sig: Apply 1 patch topically daily Remove & Discard patch within 12 hours or as directed by MD   misoprostol (CYTOTEC) 200 mcg tablet   No No   Sig: Take 1 tablet (200 mcg total) by mouth 3 (three) times a day for 10 days   montelukast (SINGULAIR) 10 mg tablet  Self Yes No   Sig: Take 10 mg by mouth daily     naloxone (NARCAN) 4 mg/0 1 mL nasal spray  Self No No   Sig: Administer 1 spray into a nostril  If no response after 2-3 minutes, give another dose in the other nostril using a new spray     omega-3-acid ethyl esters (LOVAZA) 1 g capsule  Self Yes No   Sig: Take 1 g by mouth daily   omeprazole (PriLOSEC) 40 MG capsule  Self Yes No   Sig: Take 40 mg by mouth daily   perphenazine 2 mg tablet  Self Yes No   Sig: Take 2 mg by mouth daily   polyvinyl alcohol (LIQUIFILM TEARS) 1 4 % ophthalmic solution  Self Yes No   Si drops as needed for dry eyes   predniSONE 20 mg tablet  Self No No   Sig: Take 3 tablets (60 mg total) by mouth daily   Patient not taking: Reported on 2022    predniSONE 5 mg tablet  Self Yes No   Sig: Take 2 5 mg by mouth every other day On Monday, Wednesday, Friday    Patient not taking: Reported on 2022    predniSONE 50 mg tablet  Self No No   Sig: Take 1 tablet (50 mg total) by mouth daily   Patient not taking: Reported on 2022    tolnaftate (Deepak Brandon) 1 % powder  Self Yes No   Sig: Apply 1 application topically 2 (two) times a day      Facility-Administered Medications: None       Past Medical History:   Diagnosis Date   • Asthma    • Bipolar disorder ([place] [address] )    • COPD (chronic obstructive pulmonary disease) ([facility])    • Diabetes mellitus ([place] [address] )    • Enlarged prostate    • Glaucoma    • Hyperlipidemia    • Hypertension    • Overdose of heroin, accidental or unintentional, sequela    • Psychiatric disorder    • Seasonal allergic rhinitis    • Seizures ([place] [address] )        Past Surgical History:   Procedure Laterality Date   • CIRCUMCISION     • KNEE SURGERY     • WRIST SURGERY Left        Family History   Problem Relation Age of Onset   • Arthritis Mother      I have reviewed and agree with the history as documented  E-Cigarette/Vaping   • E-Cigarette Use Never User      E-Cigarette/Vaping Substances   • Nicotine No    • THC No    • CBD No    • Flavoring No    • Other No    • Unknown No      Social History     Tobacco Use   • Smoking status: Former     Packs/day: 2 00     Years: 27 00     Total pack years: 54 00     Types: Cigarettes     Start date:      Quit date:      Years since quittin 4   • Smokeless tobacco: Never   Vaping Use   • Vaping Use: Never used   Substance Use Topics   • Alcohol use: Not Currently   • Drug use: Not Currently     Types: Heroin, Fentanyl     Comment: OD 21 heroin       Review of Systems   Constitutional: Negative for chills, fatigue and fever  HENT: Negative for congestion, ear pain, rhinorrhea and sore throat  Eyes: Negative for redness  Respiratory: Negative for chest tightness and shortness of breath  Cardiovascular: Negative for chest pain and palpitations  Gastrointestinal: Positive for abdominal pain and nausea  Negative for vomiting  Genitourinary: Positive for testicular pain  Negative for dysuria and hematuria  Musculoskeletal: Negative  Skin: Negative for rash     Neurological: Negative for dizziness, syncope, light-headedness and numbness  Physical Exam  Physical Exam  Vitals and nursing note reviewed  Constitutional:       Appearance: Normal appearance  He is well-developed  HENT:      Head: Normocephalic and atraumatic  Eyes:      General: No scleral icterus  Pupils: Pupils are equal, round, and reactive to light  Cardiovascular:      Rate and Rhythm: Normal rate and regular rhythm  Pulses: Normal pulses  Pulmonary:      Effort: Pulmonary effort is normal  No respiratory distress  Breath sounds: No stridor  Abdominal:      General: There is no distension  Palpations: There is no mass  Tenderness: There is abdominal tenderness in the suprapubic area  Genitourinary:     Penis: Normal        Testes:         Right: Swelling not present  Left: Tenderness present  Swelling not present  Musculoskeletal:      Cervical back: Normal range of motion  Skin:     General: Skin is warm and dry  Capillary Refill: Capillary refill takes less than 2 seconds  Coloration: Skin is not jaundiced  Neurological:      Mental Status: He is alert and oriented to person, place, and time        Gait: Gait normal    Psychiatric:         Mood and Affect: Mood normal          Vital Signs  ED Triage Vitals   Temperature Pulse Respirations Blood Pressure SpO2   05/29/23 0820 05/29/23 0820 05/29/23 0820 05/29/23 0820 05/29/23 0820   98 7 °F (37 1 °C) 88 18 143/73 96 %      Temp Source Heart Rate Source Patient Position - Orthostatic VS BP Location FiO2 (%)   05/29/23 0820 05/29/23 0820 05/29/23 0820 05/29/23 0820 --   Tympanic Monitor Sitting Right arm       Pain Score       05/29/23 0836       9           Vitals:    05/29/23 0820 05/29/23 1018   BP: 143/73 162/77   Pulse: 88 82   Patient Position - Orthostatic VS: Sitting Lying         Visual Acuity      ED Medications  Medications   ondansetron (ZOFRAN) injection 4 mg (4 mg Intravenous Not Given 5/29/23 0837) sodium chloride 0 9 % bolus 1,000 mL (0 mL Intravenous Stopped 5/29/23 1016)   ketorolac (TORADOL) injection 15 mg (15 mg Intravenous Given 5/29/23 0836)   iohexol (OMNIPAQUE) 350 MG/ML injection (SINGLE-DOSE) 100 mL (100 mL Intravenous Given 5/29/23 0924)       Diagnostic Studies  Results Reviewed     Procedure Component Value Units Date/Time    Urine Microscopic [[id number]]  (Normal) Collected: 05/29/23 0833    Lab Status: Final result Specimen: Urine, Clean Catch Updated: 05/29/23 0906     RBC, UA 0-1 /hpf      WBC, UA 0-1 /hpf      Epithelial Cells Occasional /hpf      Bacteria, UA None Seen /hpf     Comprehensive metabolic panel [[id number]]  (Abnormal) Collected: 05/29/23 0833    Lab Status: Final result Specimen: Blood from Arm, Right Updated: 05/29/23 0900     Sodium 137 mmol/L      Potassium 3 6 mmol/L      Chloride 102 mmol/L      CO2 27 mmol/L      ANION GAP 8 mmol/L      BUN 18 mg/dL      Creatinine 1 05 mg/dL      Glucose 203 mg/dL      Calcium 9 4 mg/dL      AST 13 U/L      ALT 14 U/L      Alkaline Phosphatase 84 U/L      Total Protein 6 5 g/dL      Albumin 4 4 g/dL      Total Bilirubin 0 41 mg/dL      eGFR 77 ml/min/1 73sq m     Narrative:      [place] guidelines for Chronic Kidney Disease (CKD):   •  Stage 1 with normal or high GFR (GFR > 90 mL/min/1 73 square meters)  •  Stage 2 Mild CKD (GFR = 60-89 mL/min/1 73 square meters)  •  Stage 3A Moderate CKD (GFR = 45-59 mL/min/1 73 square meters)  •  Stage 3B Moderate CKD (GFR = 30-44 mL/min/1 73 square meters)  •  Stage 4 Severe CKD (GFR = 15-29 mL/min/1 73 square meters)  •  Stage 5 End Stage CKD (GFR <15 mL/min/1 73 square meters)  Note: GFR calculation is accurate only with a steady state creatinine    Lipase [[id number]]  (Normal) Collected: 05/29/23 0833    Lab Status: Final result Specimen: Blood from Arm, Right Updated: 05/29/23 0900     Lipase 38 u/L     UA w Reflex to Microscopic w Reflex to Culture [[id number]] (Abnormal) Collected: 05/29/23 0833    Lab Status: Final result Specimen: Urine, Clean Catch Updated: 05/29/23 0848     Color, UA Hina     Clarity, UA Clear     Specific Gravity, UA 1 025     pH, UA 6 0     Leukocytes, UA Negative     Nitrite, UA Negative     Protein, UA Negative mg/dl      Glucose, UA >=1000 (1%) mg/dl      Ketones, UA 5 (Trace) mg/dl      Bilirubin, UA Negative     Occult Blood, UA Negative     UROBILINOGEN UA Negative mg/dL     CBC and differential [[id number]] Collected: 05/29/23 0833    Lab Status: Final result Specimen: Blood from Arm, Right Updated: 05/29/23 0843     WBC 7 11 Thousand/uL      RBC 5 54 Million/uL      Hemoglobin 15 7 g/dL      Hematocrit 45 3 %      MCV 82 fL      MCH 28 3 pg      MCHC 34 7 g/dL      RDW 12 3 %      MPV 9 6 fL      Platelets 177 Thousands/uL      nRBC 0 /100 WBCs      Neutrophils Relative 53 %      Immat GRANS % 0 %      Lymphocytes Relative 32 %      Monocytes Relative 8 %      Eosinophils Relative 6 %      Basophils Relative 1 %      Neutrophils Absolute 3 75 Thousands/µL      Immature Grans Absolute 0 02 Thousand/uL      Lymphocytes Absolute 2 28 Thousands/µL      Monocytes Absolute 0 57 Thousand/µL      Eosinophils Absolute 0 44 Thousand/µL      Basophils Absolute 0 05 Thousands/µL                  CT abdomen pelvis with contrast   Final Result by Jennie Keating MD (05/29 1014)      1  No acute findings in the abdomen/pelvis  Workstation performed: [id number]                    Procedures  Procedures         ED Course  ED Course as of 05/29/23 1023   Mon May 29, 2023   1016    ABDOMINAL WALL/INGUINAL REGIONS: Small fat-containing bilateral inguinal hernias  Partially visualized bilateral scrotal hydroceles      OSSEOUS STRUCTURES:  No acute fracture or destructive osseous lesion      IMPRESSION:     1  No acute findings in the abdomen/pelvis       1021 Patient reports complete resolution of pain after Toradol administration    Patient was reexamined at this time and was found to be stable for discharge  Return to emergency department criteria was reviewed with the patient who verbalized understanding and was agreeable to discharge and the treatment plan at this time  Medical Decision Making  69-year-old male reports past medical history significant for diabetes, prostatitis, bilateral inguinal hernias, prior nephrolithiasis presenting for evaluation of suprapubic abdominal pain x3 weeks and 1 day of left-sided testicular pain  Patient denies direct traumatic inciting events, fevers, chills, penile discharge reports he is not concerned for STDs at this time  Differential diagnosis includes but is not limited to epididymitis, testicular torsion, strangulated hernia/incarcerated hernia, intra-abdominal infection such as diverticulitis, colitis, nephro/ureterolithiasis, urinary tract infection and others  Physical exam is reassuring without any external skin changes, evidence of varicocele/mass, hernia palpated  Work-up to include blood work, CBC as marker of infectivity, hemoconcentration for hydration status, CMP to check for electrolytes, renal function, liver function, Lipase to check for pancreatitis, CT scan to evaluate for potential intra-abdominal surgical pathology  Workup reveals some glucose urea without evidence for significant complications secondary to this no evidence of bacteriuria, otherwise normal lab work  CT scan shows bilateral inguinal hernias which is known for the patient as well as bilateral hydroceles  Patient with resolution of pain after Toradol administration and on physical exam patient did not have any horizontal lie of the testicle or other concerning abnormalities for potential torsion    In the setting of resolved pain and reassuring physical exam no other significant abnormalities on CT scan patient advised to follow-up with urology outpatient and strict return "precautions were given  Patient also educated on the use of scrotal support and NSAIDs for pain relief  All imaging and/or lab testing discussed with patient, strict return to ED precautions discussed  Patient recommended to follow up promptly with appropriate outpatient provider  Patient and/or family members verbalizes understanding and agrees with plan  Patient is stable for discharge      Portions of the record may have been created with voice recognition software  Occasional wrong word or \"sound a like\" substitutions may have occurred due to the inherent limitations of voice recognition software  Read the chart carefully and recognize, using context, where substitutions have occurred  Disposition  Final diagnoses:   Hydrocele, bilateral   Bilateral inguinal hernia     Time reflects when diagnosis was documented in both MDM as applicable and the Disposition within this note     Time User Action Codes Description Comment    5/29/2023 10:18 AM Sandip Curb Add [N43 3] Hydrocele, bilateral     5/29/2023 10:18 AM Sandip Curb Add [K40 20] Bilateral inguinal hernia       ED Disposition     ED Disposition   Discharge    Condition   Good    Date/Time   Mon May 29, 2023 10:17 AM    Comment   Edwige Gomez discharge to home/self care                 Follow-up Information     Follow up With Specialties Details Why Contact Info Additional [address] For Urology [facility] Urology Schedule an appointment as soon as possible for a visit in 2 days  [place] [phone]  [address]  [facility], [address], [facility], [place], [address]   [address] [facility] Emergency Department Emergency Medicine  If symptoms worsen [address] [phone] [address] [facility] Heart Emergency Department          Patient's " Medications   Discharge Prescriptions    NAPROXEN (EC NAPROSYN) 375 MG TBEC    Take 1 tablet (375 mg total) by mouth 2 (two) times a day with meals       Start Date: 5/29/2023 End Date: 5/28/2024       Order Dose: 375 mg       Quantity: 20 tablet    Refills: 0       No discharge procedures on file      PDMP Review       Value Time User    PDMP Reviewed  Yes 7/12/2021  7:56 AM Tony Mojica DO          ED Provider  Electronically Signed by           Devaughn Jennings PA-C  05/29/23 6680

## 2023-07-20 ENCOUNTER — APPOINTMENT (EMERGENCY)
Dept: RADIOLOGY | Facility: HOSPITAL | Age: 58
End: 2023-07-20
Payer: COMMERCIAL

## 2023-07-20 ENCOUNTER — HOSPITAL ENCOUNTER (EMERGENCY)
Facility: HOSPITAL | Age: 58
Discharge: HOME | End: 2023-07-21
Attending: EMERGENCY MEDICINE | Admitting: EMERGENCY MEDICINE
Payer: COMMERCIAL

## 2023-07-20 DIAGNOSIS — J45.901 ASTHMA EXACERBATION: ICD-10-CM

## 2023-07-20 DIAGNOSIS — T50.901A ACCIDENTAL DRUG OVERDOSE, INITIAL ENCOUNTER: Primary | ICD-10-CM

## 2023-07-20 PROCEDURE — 94644 CONT INHLJ TX 1ST HOUR: CPT

## 2023-07-20 PROCEDURE — 99284 EMERGENCY DEPT VISIT MOD MDM: CPT

## 2023-07-20 PROCEDURE — 99284 EMERGENCY DEPT VISIT MOD MDM: CPT | Performed by: PHYSICIAN ASSISTANT

## 2023-07-20 PROCEDURE — 94640 AIRWAY INHALATION TREATMENT: CPT

## 2023-07-20 PROCEDURE — 71045 X-RAY EXAM CHEST 1 VIEW: CPT

## 2023-07-20 PROCEDURE — 94760 N-INVAS EAR/PLS OXIMETRY 1: CPT

## 2023-07-20 RX ORDER — ONDANSETRON 2 MG/ML
1 INJECTION INTRAMUSCULAR; INTRAVENOUS ONCE
Status: COMPLETED | OUTPATIENT
Start: 2023-07-20 | End: 2023-07-20

## 2023-07-20 RX ORDER — ALBUTEROL SULFATE 2.5 MG/3ML
10 SOLUTION RESPIRATORY (INHALATION) ONCE
Status: COMPLETED | OUTPATIENT
Start: 2023-07-20 | End: 2023-07-20

## 2023-07-20 RX ORDER — KETOROLAC TROMETHAMINE 30 MG/ML
1 INJECTION, SOLUTION INTRAMUSCULAR; INTRAVENOUS ONCE
Status: DISCONTINUED | OUTPATIENT
Start: 2023-07-20 | End: 2023-07-20

## 2023-07-20 RX ADMIN — IPRATROPIUM BROMIDE 1 MG: 0.5 SOLUTION RESPIRATORY (INHALATION) at 23:08

## 2023-07-20 RX ADMIN — ALBUTEROL SULFATE 10 MG: 2.5 SOLUTION RESPIRATORY (INHALATION) at 23:08

## 2023-07-20 RX ADMIN — PREDNISONE 50 MG: 20 TABLET ORAL at 22:52

## 2023-07-21 VITALS
BODY MASS INDEX: 26.61 KG/M2 | TEMPERATURE: 97.9 F | DIASTOLIC BLOOD PRESSURE: 72 MMHG | WEIGHT: 177.6 LBS | HEART RATE: 114 BPM | SYSTOLIC BLOOD PRESSURE: 139 MMHG | RESPIRATION RATE: 20 BRPM | OXYGEN SATURATION: 98 %

## 2023-07-21 RX ORDER — ALBUTEROL SULFATE 90 UG/1
2 AEROSOL, METERED RESPIRATORY (INHALATION) EVERY 6 HOURS PRN
Qty: 8.5 G | Refills: 0 | Status: SHIPPED | OUTPATIENT
Start: 2023-07-21

## 2023-07-21 RX ORDER — PREDNISONE 20 MG/1
50 TABLET ORAL DAILY
Qty: 8 TABLET | Refills: 0 | Status: SHIPPED | OUTPATIENT
Start: 2023-07-21 | End: 2023-07-24

## 2023-07-21 RX ADMIN — NALOXONE HYDROCHLORIDE 4 MG: 4 SPRAY NASAL at 00:30

## 2023-07-21 NOTE — ED PROVIDER NOTES
History  Chief Complaint   Patient presents with   • Overdose - Accidental     Pt reports he snorted 1 bag of heroin. Pt was unresponsive and given 4mg intranasal narcan by family. Pt given 1 mg IV narcan by EMS. Pt given 4 mg IVP zofran by EMS. Pt reports shortness of breath. Patient with PMH asthma presents for an evaluation of heroin overdose. Patient reports he has been clean for the past year however states he had a relapse tonight. He was found unresponsive and given 1mg IV Narcan which produced immediate response. Now AOx4. Reports that shortly after Narcan administration, he began feeling like he was having an asthma exacerbation. States his inhaler is at home. No other complaints. Prior to Admission Medications   Prescriptions Last Dose Informant Patient Reported? Taking?    Blood Glucose Monitoring Suppl (ACURA BLOOD GLUCOSE METER) w/Device KIT  Self Yes No   Sig: by Does not apply route   Calcium Carb-Cholecalciferol (CALCIUM CARBONATE-VITAMIN D3 PO)  Self Yes No   Sig: Take 1,500 mg by mouth daily   Cinnamon 500 MG capsule  Self Yes No   Sig: Take 500 mg by mouth daily   Diclofenac Sodium (VOLTAREN) 1 %  Self No No   Sig: Apply 2 g topically 4 (four) times a day   FIBER PO  Self Yes No   Sig: Take by mouth   Insulin Pen Needle 32G X 4 MM MISC  Self Yes No   Sig: Unifine Pentips Plus 32 gauge x 5/32" needle   TAMSULOSIN HCL PO  Self Yes No   Sig: Take 0.4 mg by mouth daily    Turmeric 500 MG CAPS  Self Yes No   Sig: Take by mouth   VITAMIN D PO  Self Yes No   Sig: Take by mouth   albuterol (2.5 mg/3 mL) 0.083 % nebulizer solution  Self No No   Sig: Take 1 vial (2.5 mg total) by nebulization every 4 (four) hours as needed for shortness of breath   albuterol (PROVENTIL HFA,VENTOLIN HFA) 90 mcg/act inhaler  Self No No   Sig: Inhale 2 puffs every 4 (four) hours as needed for wheezing   albuterol (ProAir HFA) 90 mcg/act inhaler  Self No No   Sig: Inhale 2 puffs every 6 (six) hours as needed for wheezing   albuterol (Ventolin HFA) 90 mcg/act inhaler  Self No No   Sig: Inhale 1-2 puffs every 6 (six) hours as needed for wheezing   amLODIPine (NORVASC) 5 mg tablet  Self Yes No   Sig: Take 5 mg by mouth daily   atorvastatin (LIPITOR) 80 mg tablet  Self Yes No   Sig: Take 80 mg by mouth daily at bedtime   bimatoprost (LUMIGAN) 0.03 % ophthalmic drops  Self Yes No   Sig: Administer 1 drop to both eyes daily at bedtime     brimonidine (ALPHAGAN P) 0.15 % ophthalmic solution  Self Yes No   Sig: Administer 1 drop to both eyes every 8 (eight) hours     carBAMazepine (TEGretol) 200 mg tablet  Self Yes No   Sig: Take 2 tabs in the morning and 3 tabs at night   cetirizine (ZyrTEC) 10 mg tablet  Self Yes No   Sig: Take 10 mg by mouth daily   diclofenac sodium (VOLTAREN) 50 mg EC tablet   No No   Sig: Take 1 tablet (50 mg total) by mouth 2 (two) times a day for 10 days   dupilumab (DUPIXENT) subcutaneous injection  Self Yes No   Sig: Inject under the skin every 14 (fourteen) days   finasteride (PROSCAR) 5 mg tablet  Self Yes No   Sig: Take 5 mg by mouth daily   fluticasone (FLONASE) 50 mcg/act nasal spray  Self Yes No   Si spray into each nostril daily   gabapentin (NEURONTIN) 300 mg capsule  Self Yes No   Sig: Take 300 mg by mouth 3 (three) times a day   glucagon 1 MG injection  Self Yes No   Sig: as needed   guaiFENesin (MUCINEX) 600 mg 12 hr tablet  Self No No   Sig: Take 1 tablet (600 mg total) by mouth every 12 (twelve) hours   hydrOXYzine pamoate (VISTARIL) 50 mg capsule  Self Yes No   Sig: Take 50 mg by mouth Three times daily as needed   insulin aspart (NovoLOG) 100 units/mL injection  Self Yes No   Sig: Inject under the skin 3 (three) times a day before meals   insulin glargine (BASAGLAR KWIKPEN) 100 units/mL injection pen  Self Yes No   Sig: Inject 43 Units under the skin daily at bedtime    ipratropium (ATROVENT) 0.02 % nebulizer solution  Self No No   Sig: Take 2.5 mL (0.5 mg total) by nebulization 4 (four) times a day   latanoprost (XALATAN) 0.005 % ophthalmic solution  Self Yes No   Sig: Administer 1 drop to both eyes daily   lidocaine (LIDODERM) 5 %  Self No No   Sig: Apply 1 patch topically daily Remove & Discard patch within 12 hours or as directed by MD   misoprostol (CYTOTEC) 200 mcg tablet   No No   Sig: Take 1 tablet (200 mcg total) by mouth 3 (three) times a day for 10 days   montelukast (SINGULAIR) 10 mg tablet  Self Yes No   Sig: Take 10 mg by mouth daily     naloxone (NARCAN) 4 mg/0.1 mL nasal spray  Self No No   Sig: Administer 1 spray into a nostril. If no response after 2-3 minutes, give another dose in the other nostril using a new spray.    naproxen (EC NAPROSYN) 375 MG TBEC   No No   Sig: Take 1 tablet (375 mg total) by mouth 2 (two) times a day with meals   omega-3-acid ethyl esters (LOVAZA) 1 g capsule  Self Yes No   Sig: Take 1 g by mouth daily   omeprazole (PriLOSEC) 40 MG capsule  Self Yes No   Sig: Take 40 mg by mouth daily   perphenazine 2 mg tablet  Self Yes No   Sig: Take 2 mg by mouth daily   polyvinyl alcohol (LIQUIFILM TEARS) 1.4 % ophthalmic solution  Self Yes No   Si drops as needed for dry eyes   predniSONE 20 mg tablet  Self No No   Sig: Take 3 tablets (60 mg total) by mouth daily   Patient not taking: Reported on 2022    predniSONE 5 mg tablet  Self Yes No   Sig: Take 2.5 mg by mouth every other day On Monday, Wednesday, Friday    Patient not taking: Reported on 2022    predniSONE 50 mg tablet  Self No No   Sig: Take 1 tablet (50 mg total) by mouth daily   Patient not taking: Reported on 2022    tolnaftate (TINACTIN) 1 % powder  Self Yes No   Sig: Apply 1 application topically 2 (two) times a day      Facility-Administered Medications: None       Past Medical History:   Diagnosis Date   • Asthma    • Bipolar disorder ([address])    • COPD (chronic obstructive pulmonary disease) ([facility])    • Diabetes mellitus ([address])    • Enlarged prostate    • Glaucoma    • Hyperlipidemia    • Hypertension    • Overdose of heroin, accidental or unintentional, sequela    • Psychiatric disorder    • Seasonal allergic rhinitis    • Seizures ([address])        Past Surgical History:   Procedure Laterality Date   • CIRCUMCISION     • KNEE SURGERY     • WRIST SURGERY Left        Family History   Problem Relation Age of Onset   • Arthritis Mother      I have reviewed and agree with the history as documented. E-Cigarette/Vaping   • E-Cigarette Use Never User      E-Cigarette/Vaping Substances   • Nicotine No    • THC No    • CBD No    • Flavoring No    • Other No    • Unknown No      Social History     Tobacco Use   • Smoking status: Former     Packs/day: 2.00     Years: 27.00     Total pack years: 54.00     Types: Cigarettes     Start date: 35     Quit date:      Years since quittin.5   • Smokeless tobacco: Never   Vaping Use   • Vaping Use: Never used   Substance Use Topics   • Alcohol use: Not Currently   • Drug use: Not Currently     Types: Heroin, Fentanyl     Comment: OD 21 heroin       Review of Systems   Constitutional: Negative for chills and fever. HENT: Negative for congestion, ear pain and sore throat. Eyes: Negative for pain. Respiratory: Positive for shortness of breath and wheezing. Negative for cough. Cardiovascular: Negative for chest pain. Gastrointestinal: Negative for abdominal pain, nausea and vomiting. Genitourinary: Negative for dysuria. Musculoskeletal: Negative for back pain. Skin: Negative for rash. Neurological: Negative for dizziness, weakness and numbness. Psychiatric/Behavioral: Negative for suicidal ideas. All other systems reviewed and are negative. Physical Exam  Physical Exam  Vitals reviewed. Constitutional:       General: He is not in acute distress. Appearance: He is well-developed. He is not diaphoretic. HENT:      Head: Normocephalic and atraumatic.       Right Ear: External ear normal.      Left Ear: External ear normal.      Nose: Nose normal.      Mouth/Throat:      Mouth: Mucous membranes are moist.      Pharynx: Oropharynx is clear. Eyes:      Pupils: Pupils are equal, round, and reactive to light. Cardiovascular:      Rate and Rhythm: Normal rate and regular rhythm. Heart sounds: Normal heart sounds. Pulmonary:      Effort: Pulmonary effort is normal.      Breath sounds: Decreased breath sounds and wheezing present. Comments: Wheezes noted throughout lung fields  Abdominal:      General: Bowel sounds are normal.      Palpations: Abdomen is soft. Tenderness: There is no abdominal tenderness. Musculoskeletal:         General: Normal range of motion. Cervical back: Normal range of motion and neck supple. Skin:     General: Skin is warm and dry. Neurological:      Mental Status: He is alert and oriented to person, place, and time. Vital Signs  ED Triage Vitals [07/20/23 2236]   Temperature Pulse Respirations Blood Pressure SpO2   97.9 °F (36.6 °C) 103 20 127/83 95 %      Temp Source Heart Rate Source Patient Position - Orthostatic VS BP Location FiO2 (%)   Oral Monitor Lying Left arm --      Pain Score       --           Vitals:    07/20/23 2236   BP: 127/83   Pulse: 103   Patient Position - Orthostatic VS: Lying         Visual Acuity      ED Medications  Medications   naloxone nasal- Given to patient by provider at discharge.  (NARCAN) 4 mg/0.1 mL nasal spray 4 mg (has no administration in time range)   ondansetron (FOR EMS ONLY) (ZOFRAN) 4 mg/2 mL injection 4 mg (0 mg Does not apply Given to EMS 7/20/23 2241)   albuterol inhalation solution 10 mg (10 mg Nebulization Given 7/20/23 2308)   ipratropium (ATROVENT) 0.02 % inhalation solution 1 mg (1 mg Nebulization Given 7/20/23 2308)   predniSONE tablet 50 mg (50 mg Oral Given 7/20/23 2252)       Diagnostic Studies  Results Reviewed     None                 XR chest portable    (Results Pending) Procedures  Procedures         ED Course  ED Course as of 07/21/23 0014   Fri Jul 21, 2023   0000 Wheezes resolved. Feeling better. Not interested in detox/ rehab. Will add referral for  and DC with Narcan                                             Medical Decision Making  Amount and/or Complexity of Data Reviewed  Radiology: ordered. Risk  Prescription drug management. Disposition  Final diagnoses:   Accidental drug overdose, initial encounter   Asthma exacerbation     Time reflects when diagnosis was documented in both MDM as applicable and the Disposition within this note     Time User Action Codes Description Comment    7/21/2023 12:03 AM Sonia Doe Add [T50.901A] Accidental drug overdose, initial encounter     7/21/2023 12:04 AM Wilfredo Doe Add [J45.909] Asthma     7/21/2023 12:04 AM Sonia Doe Remove [J45.909] Asthma     7/21/2023 12:04 AM Sonia Doe Add [J45.901] Asthma exacerbation       ED Disposition     ED Disposition   Discharge    Condition   Stable    Date/Time   Fri Jul 21, 2023 12:03 AM    Comment   Alejandro Pipes discharge to home/self care. Follow-up Information     Follow up With Specialties Details Why [address] [place] [address]  [phone]            Patient's Medications   Discharge Prescriptions    ALBUTEROL (PROAIR HFA) 90 MCG/ACT INHALER    Inhale 2 puffs every 6 (six) hours as needed for wheezing       Start Date: 7/21/2023 End Date: --       Order Dose: 2 puffs       Quantity: 8.5 g    Refills: 0    PREDNISONE 20 MG TABLET    Take 2.5 tablets (50 mg total) by mouth daily for 3 days       Start Date: 7/21/2023 End Date: 7/24/2023       Order Dose: 50 mg       Quantity: 8 tablet    Refills: 0       No discharge procedures on file.     PDMP Review       Value Time User    PDMP Reviewed  Yes 7/12/2021  7:56 AM Tony Alcantar DO          ED Provider  Electronically Signed by           Alex Galvez PA-C  07/21/23 6030

## 2023-07-21 NOTE — ED NOTES
This nurse [address] via [facility] Text to ask for nebulizer to be administered as ordered.       Lefty Araujo, RN  07/20/23 8161

## 2023-07-21 NOTE — DISCHARGE INSTRUCTIONS
Please follow up with your family doctor. If you do not have one, I have provided you with a referral. Use medication as prescribed. Please return to the ER with any worsening symptoms.

## 2023-07-23 ENCOUNTER — HOSPITAL ENCOUNTER (EMERGENCY)
Facility: HOSPITAL | Age: 58
Discharge: HOME | End: 2023-07-24
Attending: INTERNAL MEDICINE
Payer: COMMERCIAL

## 2023-07-23 VITALS
HEART RATE: 88 BPM | RESPIRATION RATE: 18 BRPM | OXYGEN SATURATION: 96 % | DIASTOLIC BLOOD PRESSURE: 90 MMHG | SYSTOLIC BLOOD PRESSURE: 141 MMHG | TEMPERATURE: 97.4 F

## 2023-07-23 DIAGNOSIS — R10.9 RIGHT FLANK PAIN: Primary | ICD-10-CM

## 2023-07-23 PROCEDURE — 99284 EMERGENCY DEPT VISIT MOD MDM: CPT

## 2023-07-24 ENCOUNTER — APPOINTMENT (EMERGENCY)
Dept: CT IMAGING | Facility: HOSPITAL | Age: 58
End: 2023-07-24
Payer: COMMERCIAL

## 2023-07-24 LAB
ALBUMIN SERPL BCP-MCNC: 4 G/DL (ref 3.5–5)
ALP SERPL-CCNC: 77 U/L (ref 34–104)
ALT SERPL W P-5'-P-CCNC: 18 U/L (ref 7–52)
ANION GAP SERPL CALCULATED.3IONS-SCNC: 9 MMOL/L
AST SERPL W P-5'-P-CCNC: 12 U/L (ref 13–39)
BACTERIA UR QL AUTO: ABNORMAL /HPF
BASOPHILS # BLD AUTO: 0.04 THOUSANDS/ÂΜL (ref 0–0.1)
BASOPHILS NFR BLD AUTO: 1 % (ref 0–1)
BILIRUB SERPL-MCNC: 0.4 MG/DL (ref 0.2–1)
BILIRUB UR QL STRIP: NEGATIVE
BUN SERPL-MCNC: 14 MG/DL (ref 5–25)
CALCIUM SERPL-MCNC: 8.7 MG/DL (ref 8.4–10.2)
CHLORIDE SERPL-SCNC: 99 MMOL/L (ref 96–108)
CLARITY UR: CLEAR
CO2 SERPL-SCNC: 24 MMOL/L (ref 21–32)
COLOR UR: ABNORMAL
CREAT SERPL-MCNC: 0.87 MG/DL (ref 0.6–1.3)
EOSINOPHIL # BLD AUTO: 0.22 THOUSAND/ÂΜL (ref 0–0.61)
EOSINOPHIL NFR BLD AUTO: 4 % (ref 0–6)
ERYTHROCYTE [DISTWIDTH] IN BLOOD BY AUTOMATED COUNT: 12.1 % (ref 11.6–15.1)
GFR SERPL CREATININE-BSD FRML MDRD: 94 ML/MIN/1.73SQ M
GLUCOSE SERPL-MCNC: 396 MG/DL (ref 65–140)
GLUCOSE UR STRIP-MCNC: ABNORMAL MG/DL
HCT VFR BLD AUTO: 41 % (ref 36.5–49.3)
HGB BLD-MCNC: 14.3 G/DL (ref 12–17)
HGB UR QL STRIP.AUTO: NEGATIVE
IMM GRANULOCYTES # BLD AUTO: 0.01 THOUSAND/UL (ref 0–0.2)
IMM GRANULOCYTES NFR BLD AUTO: 0 % (ref 0–2)
KETONES UR STRIP-MCNC: ABNORMAL MG/DL
LEUKOCYTE ESTERASE UR QL STRIP: NEGATIVE
LIPASE SERPL-CCNC: 25 U/L (ref 11–82)
LYMPHOCYTES # BLD AUTO: 1.53 THOUSANDS/ÂΜL (ref 0.6–4.47)
LYMPHOCYTES NFR BLD AUTO: 27 % (ref 14–44)
MCH RBC QN AUTO: 28.3 PG (ref 26.8–34.3)
MCHC RBC AUTO-ENTMCNC: 34.9 G/DL (ref 31.4–37.4)
MCV RBC AUTO: 81 FL (ref 82–98)
MONOCYTES # BLD AUTO: 0.56 THOUSAND/ÂΜL (ref 0.17–1.22)
MONOCYTES NFR BLD AUTO: 10 % (ref 4–12)
NEUTROPHILS # BLD AUTO: 3.27 THOUSANDS/ÂΜL (ref 1.85–7.62)
NEUTS SEG NFR BLD AUTO: 58 % (ref 43–75)
NITRITE UR QL STRIP: NEGATIVE
NON-SQ EPI CELLS URNS QL MICRO: ABNORMAL /HPF
NRBC BLD AUTO-RTO: 0 /100 WBCS
PH UR STRIP.AUTO: 5 [PH]
PLATELET # BLD AUTO: 206 THOUSANDS/UL (ref 149–390)
PMV BLD AUTO: 9.7 FL (ref 8.9–12.7)
POTASSIUM SERPL-SCNC: 3.3 MMOL/L (ref 3.5–5.3)
PROT SERPL-MCNC: 5.8 G/DL (ref 6.4–8.4)
PROT UR STRIP-MCNC: NEGATIVE MG/DL
RBC # BLD AUTO: 5.06 MILLION/UL (ref 3.88–5.62)
RBC #/AREA URNS AUTO: ABNORMAL /HPF
SODIUM SERPL-SCNC: 132 MMOL/L (ref 135–147)
SP GR UR STRIP.AUTO: 1.02 (ref 1–1.04)
UROBILINOGEN UA: NEGATIVE MG/DL
WBC # BLD AUTO: 5.63 THOUSAND/UL (ref 4.31–10.16)
WBC #/AREA URNS AUTO: ABNORMAL /HPF

## 2023-07-24 PROCEDURE — 36415 COLL VENOUS BLD VENIPUNCTURE: CPT

## 2023-07-24 PROCEDURE — 74177 CT ABD & PELVIS W/CONTRAST: CPT

## 2023-07-24 PROCEDURE — 81003 URINALYSIS AUTO W/O SCOPE: CPT

## 2023-07-24 PROCEDURE — 85025 COMPLETE CBC W/AUTO DIFF WBC: CPT

## 2023-07-24 PROCEDURE — 81001 URINALYSIS AUTO W/SCOPE: CPT

## 2023-07-24 PROCEDURE — 87086 URINE CULTURE/COLONY COUNT: CPT

## 2023-07-24 PROCEDURE — 96361 HYDRATE IV INFUSION ADD-ON: CPT

## 2023-07-24 PROCEDURE — 83690 ASSAY OF LIPASE: CPT

## 2023-07-24 PROCEDURE — 96374 THER/PROPH/DIAG INJ IV PUSH: CPT

## 2023-07-24 PROCEDURE — G1004 CDSM NDSC: HCPCS

## 2023-07-24 PROCEDURE — 80053 COMPREHEN METABOLIC PANEL: CPT

## 2023-07-24 RX ORDER — KETOROLAC TROMETHAMINE 30 MG/ML
15 INJECTION, SOLUTION INTRAMUSCULAR; INTRAVENOUS ONCE
Status: COMPLETED | OUTPATIENT
Start: 2023-07-24 | End: 2023-07-24

## 2023-07-24 RX ORDER — LIDOCAINE 50 MG/G
2 PATCH TOPICAL ONCE
Status: DISCONTINUED | OUTPATIENT
Start: 2023-07-24 | End: 2023-07-24 | Stop reason: HOSPADM

## 2023-07-24 RX ADMIN — SODIUM CHLORIDE 1000 ML: 0.9 INJECTION, SOLUTION INTRAVENOUS at 00:28

## 2023-07-24 RX ADMIN — KETOROLAC TROMETHAMINE 15 MG: 30 INJECTION, SOLUTION INTRAMUSCULAR; INTRAVENOUS at 00:28

## 2023-07-24 RX ADMIN — LIDOCAINE 2 PATCH: 700 PATCH TOPICAL at 03:59

## 2023-07-24 RX ADMIN — IOHEXOL 85 ML: 350 INJECTION, SOLUTION INTRAVENOUS at 01:41

## 2023-07-24 NOTE — ED PROVIDER NOTES
History  Chief Complaint   Patient presents with   • Flank Pain     R flank pain x48h. Took aleve at 9:30am and naproxen at 2:30pm. Denies urinary issues. The patient is a 69-year-old male with a past medical history of diabetes mellitus, COPD, hypertension, hyperlipidemia, seizures, and an enlarged prostate, who presents for evaluation of right-sided flank pain. He reports 2 days of right flank pain, that significantly worsened over the last 24 hours. No associated nausea, vomiting, diarrhea, constipation, dysuria, urinary frequency, urgency, hematuria, chest pain, or shortness of breath. No known injury to the area. He took Aleve at 9:30 AM and 2:30 PM without significant relief. Prior to Admission Medications   Prescriptions Last Dose Informant Patient Reported? Taking?    Blood Glucose Monitoring Suppl (ACURA BLOOD GLUCOSE METER) w/Device KIT  Self Yes No   Sig: by Does not apply route   Calcium Carb-Cholecalciferol (CALCIUM CARBONATE-VITAMIN D3 PO)  Self Yes No   Sig: Take 1,500 mg by mouth daily   Cinnamon 500 MG capsule  Self Yes No   Sig: Take 500 mg by mouth daily   Diclofenac Sodium (VOLTAREN) 1 %  Self No No   Sig: Apply 2 g topically 4 (four) times a day   FIBER PO  Self Yes No   Sig: Take by mouth   Insulin Pen Needle 32G X 4 MM MISC  Self Yes No   Sig: Unifine Pentips Plus 32 gauge x 5/32" needle   TAMSULOSIN HCL PO  Self Yes No   Sig: Take 0.4 mg by mouth daily    Turmeric 500 MG CAPS  Self Yes No   Sig: Take by mouth   VITAMIN D PO  Self Yes No   Sig: Take by mouth   albuterol (2.5 mg/3 mL) 0.083 % nebulizer solution  Self No No   Sig: Take 1 vial (2.5 mg total) by nebulization every 4 (four) hours as needed for shortness of breath   albuterol (PROVENTIL HFA,VENTOLIN HFA) 90 mcg/act inhaler  Self No No   Sig: Inhale 2 puffs every 4 (four) hours as needed for wheezing   albuterol (ProAir HFA) 90 mcg/act inhaler  Self No No   Sig: Inhale 2 puffs every 6 (six) hours as needed for wheezing albuterol (ProAir HFA) 90 mcg/act inhaler   No No   Sig: Inhale 2 puffs every 6 (six) hours as needed for wheezing   albuterol (Ventolin HFA) 90 mcg/act inhaler  Self No No   Sig: Inhale 1-2 puffs every 6 (six) hours as needed for wheezing   amLODIPine (NORVASC) 5 mg tablet  Self Yes No   Sig: Take 5 mg by mouth daily   atorvastatin (LIPITOR) 80 mg tablet  Self Yes No   Sig: Take 80 mg by mouth daily at bedtime   bimatoprost (LUMIGAN) 0.03 % ophthalmic drops  Self Yes No   Sig: Administer 1 drop to both eyes daily at bedtime     brimonidine (ALPHAGAN P) 0.15 % ophthalmic solution  Self Yes No   Sig: Administer 1 drop to both eyes every 8 (eight) hours     carBAMazepine (TEGretol) 200 mg tablet  Self Yes No   Sig: Take 2 tabs in the morning and 3 tabs at night   cetirizine (ZyrTEC) 10 mg tablet  Self Yes No   Sig: Take 10 mg by mouth daily   diclofenac sodium (VOLTAREN) 50 mg EC tablet   No No   Sig: Take 1 tablet (50 mg total) by mouth 2 (two) times a day for 10 days   dupilumab (DUPIXENT) subcutaneous injection  Self Yes No   Sig: Inject under the skin every 14 (fourteen) days   finasteride (PROSCAR) 5 mg tablet  Self Yes No   Sig: Take 5 mg by mouth daily   fluticasone (FLONASE) 50 mcg/act nasal spray  Self Yes No   Si spray into each nostril daily   gabapentin (NEURONTIN) 300 mg capsule  Self Yes No   Sig: Take 300 mg by mouth 3 (three) times a day   glucagon 1 MG injection  Self Yes No   Sig: as needed   guaiFENesin (MUCINEX) 600 mg 12 hr tablet  Self No No   Sig: Take 1 tablet (600 mg total) by mouth every 12 (twelve) hours   hydrOXYzine pamoate (VISTARIL) 50 mg capsule  Self Yes No   Sig: Take 50 mg by mouth Three times daily as needed   insulin aspart (NovoLOG) 100 units/mL injection  Self Yes No   Sig: Inject under the skin 3 (three) times a day before meals   insulin glargine (BASAGLAR KWIKPEN) 100 units/mL injection pen  Self Yes No   Sig: Inject 43 Units under the skin daily at bedtime ipratropium (ATROVENT) 0.02 % nebulizer solution  Self No No   Sig: Take 2.5 mL (0.5 mg total) by nebulization 4 (four) times a day   latanoprost (XALATAN) 0.005 % ophthalmic solution  Self Yes No   Sig: Administer 1 drop to both eyes daily   lidocaine (LIDODERM) 5 %  Self No No   Sig: Apply 1 patch topically daily Remove & Discard patch within 12 hours or as directed by MD   misoprostol (CYTOTEC) 200 mcg tablet   No No   Sig: Take 1 tablet (200 mcg total) by mouth 3 (three) times a day for 10 days   montelukast (SINGULAIR) 10 mg tablet  Self Yes No   Sig: Take 10 mg by mouth daily     naloxone (NARCAN) 4 mg/0.1 mL nasal spray  Self No No   Sig: Administer 1 spray into a nostril. If no response after 2-3 minutes, give another dose in the other nostril using a new spray.    naproxen (EC NAPROSYN) 375 MG TBEC   No No   Sig: Take 1 tablet (375 mg total) by mouth 2 (two) times a day with meals   omega-3-acid ethyl esters (LOVAZA) 1 g capsule  Self Yes No   Sig: Take 1 g by mouth daily   omeprazole (PriLOSEC) 40 MG capsule  Self Yes No   Sig: Take 40 mg by mouth daily   perphenazine 2 mg tablet  Self Yes No   Sig: Take 2 mg by mouth daily   polyvinyl alcohol (LIQUIFILM TEARS) 1.4 % ophthalmic solution  Self Yes No   Si drops as needed for dry eyes   predniSONE 20 mg tablet  Self No No   Sig: Take 3 tablets (60 mg total) by mouth daily   Patient not taking: Reported on 2022    predniSONE 20 mg tablet   No No   Sig: Take 2.5 tablets (50 mg total) by mouth daily for 3 days   predniSONE 5 mg tablet  Self Yes No   Sig: Take 2.5 mg by mouth every other day On Monday, Wednesday, Friday    Patient not taking: Reported on 2022    predniSONE 50 mg tablet  Self No No   Sig: Take 1 tablet (50 mg total) by mouth daily   Patient not taking: Reported on 2022    tolnaftate (TINACTIN) 1 % powder  Self Yes No   Sig: Apply 1 application topically 2 (two) times a day      Facility-Administered Medications: None Past Medical History:   Diagnosis Date   • Asthma    • Bipolar disorder ([facility])    • COPD (chronic obstructive pulmonary disease) ([facility])    • Diabetes mellitus ([facility])    • Enlarged prostate    • Glaucoma    • Hyperlipidemia    • Hypertension    • Overdose of heroin, accidental or unintentional, sequela    • Psychiatric disorder    • Seasonal allergic rhinitis    • Seizures ([address])        Past Surgical History:   Procedure Laterality Date   • CIRCUMCISION     • KNEE SURGERY     • WRIST SURGERY Left        Family History   Problem Relation Age of Onset   • Arthritis Mother      I have reviewed and agree with the history as documented. E-Cigarette/Vaping   • E-Cigarette Use Never User      E-Cigarette/Vaping Substances   • Nicotine No    • THC No    • CBD No    • Flavoring No    • Other No    • Unknown No      Social History     Tobacco Use   • Smoking status: Former     Packs/day: 2.00     Years: 27.00     Total pack years: 54.00     Types: Cigarettes     Start date: 35     Quit date:      Years since quittin.5   • Smokeless tobacco: Never   Vaping Use   • Vaping Use: Never used   Substance Use Topics   • Alcohol use: Not Currently   • Drug use: Yes     Types: Heroin, Fentanyl     Comment: OD 21 heroin       Review of Systems   Constitutional: Negative for chills and fever. HENT: Negative for ear pain and sore throat. Eyes: Negative for pain and visual disturbance. Respiratory: Negative for cough and shortness of breath. Cardiovascular: Negative for chest pain and palpitations. Gastrointestinal: Positive for abdominal pain. Negative for constipation, diarrhea, nausea and vomiting. Genitourinary: Positive for flank pain. Negative for difficulty urinating, dysuria, frequency, hematuria and urgency. Musculoskeletal: Negative for arthralgias, back pain and myalgias. Skin: Negative for color change and rash. Neurological: Negative for seizures, syncope, light-headedness and headaches. All other systems reviewed and are negative. Physical Exam  Physical Exam  HENT:      Head: Normocephalic and atraumatic. Right Ear: External ear normal.      Left Ear: External ear normal.      Nose: Nose normal.      Mouth/Throat:      Lips: Pink. Mouth: Mucous membranes are moist.      Pharynx: Oropharynx is clear. Uvula midline. Eyes:      General: Lids are normal. Vision grossly intact. Gaze aligned appropriately. Conjunctiva/sclera: Conjunctivae normal.      Pupils: Pupils are equal, round, and reactive to light. Cardiovascular:      Rate and Rhythm: Normal rate and regular rhythm. Heart sounds: S1 normal and S2 normal. No murmur heard. No friction rub. No gallop. Pulmonary:      Effort: Pulmonary effort is normal. No respiratory distress. Breath sounds: Normal breath sounds and air entry. No decreased breath sounds, wheezing, rhonchi or rales. Chest:      Chest wall: Tenderness present. No deformity or crepitus. Abdominal:      General: Abdomen is flat. Palpations: Abdomen is soft. Tenderness: There is abdominal tenderness. There is no right CVA tenderness, left CVA tenderness, guarding or rebound. Comments: Tenderness to palpation in the right lower quadrant and right flank. Musculoskeletal:      Cervical back: Full passive range of motion without pain and neck supple. Back:    Skin:     General: Skin is warm and dry. Capillary Refill: Capillary refill takes less than 2 seconds. Coloration: Skin is not jaundiced or pale. Findings: No bruising, ecchymosis or rash. Neurological:      Mental Status: He is alert and oriented to person, place, and time. Psychiatric:         Behavior: Behavior is cooperative.          Vital Signs  ED Triage Vitals   Temperature Pulse Respirations Blood Pressure SpO2   07/23/23 2341 07/23/23 2341 07/23/23 2341 07/23/23 2341 07/23/23 2341   (!) 97.4 °F (36.3 °C) 88 18 141/90 96 %      Temp Source Heart Rate Source Patient Position - Orthostatic VS BP Location FiO2 (%)   07/23/23 2341 07/23/23 2341 07/23/23 2341 07/23/23 2341 --   Tympanic Monitor Sitting Left arm       Pain Score       07/24/23 0028       10 - Worst Possible Pain           Vitals:    07/23/23 2341   BP: 141/90   Pulse: 88   Patient Position - Orthostatic VS: Sitting         ED Medications  Medications   lidocaine (LIDODERM) 5 % patch 2 patch (has no administration in time range)   sodium chloride 0.9 % bolus 1,000 mL (0 mL Intravenous Stopped 7/24/23 0135)   ketorolac (TORADOL) injection 15 mg (15 mg Intravenous Given 7/24/23 0028)   iohexol (OMNIPAQUE) 350 MG/ML injection (SINGLE-DOSE) 85 mL (85 mL Intravenous Given 7/24/23 0141)       Diagnostic Studies  Results Reviewed     Procedure Component Value Units Date/Time    Urine culture [[id number]] Collected: 07/24/23 0029    Lab Status:  In process Specimen: Urine, Clean Catch Updated: 07/24/23 0338    Comprehensive metabolic panel [[id number]]  (Abnormal) Collected: 07/24/23 0025    Lab Status: Final result Specimen: Blood from Line, Venous Updated: 07/24/23 0053     Sodium 132 mmol/L      Potassium 3.3 mmol/L      Chloride 99 mmol/L      CO2 24 mmol/L      ANION GAP 9 mmol/L      BUN 14 mg/dL      Creatinine 0.87 mg/dL      Glucose 396 mg/dL      Calcium 8.7 mg/dL      AST 12 U/L      ALT 18 U/L      Alkaline Phosphatase 77 U/L      Total Protein 5.8 g/dL      Albumin 4.0 g/dL      Total Bilirubin 0.40 mg/dL      eGFR 94 ml/min/1.73sq m     Narrative:      [place] guidelines for Chronic Kidney Disease (CKD):   •  Stage 1 with normal or high GFR (GFR > 90 mL/min/1.73 square meters)  •  Stage 2 Mild CKD (GFR = 60-89 mL/min/1.73 square meters)  •  Stage 3A Moderate CKD (GFR = 45-59 mL/min/1.73 square meters)  •  Stage 3B Moderate CKD (GFR = 30-44 mL/min/1.73 square meters)  •  Stage 4 Severe CKD (GFR = 15-29 mL/min/1.73 square meters)  •  Stage 5 End Stage CKD (GFR <15 mL/min/1.73 square meters)  Note: GFR calculation is accurate only with a steady state creatinine    Lipase [[id number]]  (Normal) Collected: 07/24/23 0025    Lab Status: Final result Specimen: Blood from Line, Venous Updated: 07/24/23 0053     Lipase 25 u/L     Urine Microscopic [[id number]]  (Abnormal) Collected: 07/24/23 0029    Lab Status: Final result Specimen: Urine, Clean Catch Updated: 07/24/23 0048     RBC, UA None Seen /hpf      WBC, UA 4-10 /hpf      Epithelial Cells Occasional /hpf      Bacteria, UA Occasional /hpf     UA w Reflex to Microscopic w Reflex to Culture [[id number]]  (Abnormal) Collected: 07/24/23 0029    Lab Status: Final result Specimen: Urine, Clean Catch Updated: 07/24/23 0037     Color, UA Straw     Clarity, UA Clear     Specific Gravity, UA 1.020     pH, UA 5.0     Leukocytes, UA Negative     Nitrite, UA Negative     Protein, UA Negative mg/dl      Glucose, UA >=1000 (1%) mg/dl      Ketones, UA 15 (1+) mg/dl      Bilirubin, UA Negative     Occult Blood, UA Negative     UROBILINOGEN UA Negative mg/dL     CBC and differential [[id number]]  (Abnormal) Collected: 07/24/23 0025    Lab Status: Final result Specimen: Blood from Line, Venous Updated: 07/24/23 0036     WBC 5.63 Thousand/uL      RBC 5.06 Million/uL      Hemoglobin 14.3 g/dL      Hematocrit 41.0 %      MCV 81 fL      MCH 28.3 pg      MCHC 34.9 g/dL      RDW 12.1 %      MPV 9.7 fL      Platelets 599 Thousands/uL      nRBC 0 /100 WBCs      Neutrophils Relative 58 %      Immat GRANS % 0 %      Lymphocytes Relative 27 %      Monocytes Relative 10 %      Eosinophils Relative 4 %      Basophils Relative 1 %      Neutrophils Absolute 3.27 Thousands/µL      Immature Grans Absolute 0.01 Thousand/uL      Lymphocytes Absolute 1.53 Thousands/µL      Monocytes Absolute 0.56 Thousand/µL      Eosinophils Absolute 0.22 Thousand/µL      Basophils Absolute 0.04 Thousands/µL                  CT abdomen pelvis with contrast   Final Result by Denis Escobedo, MD (07/24 1777)      No acute pathology visualized on CT of the abdomen and pelvis with IV without oral contrast.            Workstation performed: [id number]                    Procedures  Procedures         ED Course  ED Course as of 07/24/23 0359   Mon Jul 24, 2023   0044 WBC: 5.63   0044 Leukocytes, UA: Negative   0044 Nitrite, UA: Negative   0044 Glucose, UA(!): >=1000 (1%)   0044 Ketones, UA(!): 15 (1+)   0314 RBC, UA: None Seen   0314 WBC, UA(!): 4-10   0331 CT abdomen pelvis with contrast  IMPRESSION:  No acute pathology visualized on CT of the abdomen and pelvis with IV without oral contrast.         SBIRT 22yo+    Flowsheet Row Most Recent Value   Initial Alcohol Screen: US AUDIT-C     1. How often do you have a drink containing alcohol? 0 Filed at: 07/24/2023 0028   2. How many drinks containing alcohol do you have on a typical day you are drinking? 0 Filed at: 07/24/2023 0028   3a. Male UNDER 65: How often do you have five or more drinks on one occasion? 0 Filed at: 07/24/2023 0028   Audit-C Score 0 Filed at: 07/24/2023 0028   ROXY: How many times in the past year have you. .. Used an illegal drug or used a prescription medication for non-medical reasons? Never Filed at: 07/24/2023 [facility]            Medical Decision Making  Patient presents for right flank pain.  He is afebrile and nontoxic-appearing.  On exam there is tenderness to palpation of the right mid-lower abdomen and in the right flank. No rebound, guarding, rigidity, or no CVA tenderness. Remainder of the physical exam is unremarkable. Differential diagnosis includes but is not limited to renal colic, UTI, pyelonephritis, IBS, ileus, colitis, diverticulitis, appendicitis, mesenteric adenitis, pelvic etiology, or musculoskeletal pain.  Labs revealed hyperglycemia, but were otherwise unremarkable. UA with 4-10 WBCs/hpf and occasional bacteria; low suspicion for UTI. Urine culture sent. CT of the abdomen and pelvis unremarkable.   Patient reported significant relief with the Toradol. Will also apply lidocaine patches for topical relief. Reviewed all results with the patient and all of his questions were answered to the best of my ability. Melinda Phan return precautions discussed and he verbalized understanding.  Follow up with PCP, and return to the ED in the interim with new or worsening symptoms. Right flank pain: acute illness or injury  Amount and/or Complexity of Data Reviewed  Labs: ordered. Decision-making details documented in ED Course. Radiology: ordered. Decision-making details documented in ED Course. Risk  Prescription drug management. Disposition  Final diagnoses:   Right flank pain     Time reflects when diagnosis was documented in both MDM as applicable and the Disposition within this note     Time User Action Codes Description Comment    7/24/2023  3:46 AM [facility] [R10.9[facility] Right flank pain       ED Disposition     ED Disposition   Discharge    Condition   Stable    Date/Time   Mon Jul 24, 2023  3:46 AM    Comment   Lara Mccormick discharge to home/self care. Follow-up Information     Follow up With Specialties Details Why [address] [place] [address], [address] [place] [address]  [phone]            Patient's Medications   Discharge Prescriptions    No medications on file       No discharge procedures on file.     PDMP Review       Value Time User    PDMP Reviewed  Yes 7/12/2021  7:56 AM Bobby Patricia DO          ED Provider  Electronically Signed by           Diana Carvalho PA-C  07/24/23 5321

## 2023-07-25 LAB — BACTERIA UR CULT: NORMAL

## 2023-08-04 ENCOUNTER — HOSPITAL ENCOUNTER (EMERGENCY)
Facility: HOSPITAL | Age: 58
Discharge: HOME | End: 2023-08-04
Attending: EMERGENCY MEDICINE
Payer: COMMERCIAL

## 2023-08-04 VITALS
SYSTOLIC BLOOD PRESSURE: 109 MMHG | WEIGHT: 182.2 LBS | HEART RATE: 77 BPM | OXYGEN SATURATION: 98 % | BODY MASS INDEX: 27.3 KG/M2 | RESPIRATION RATE: 18 BRPM | TEMPERATURE: 98.4 F | DIASTOLIC BLOOD PRESSURE: 85 MMHG

## 2023-08-04 DIAGNOSIS — T78.40XA ALLERGIC REACTION, INITIAL ENCOUNTER: Primary | ICD-10-CM

## 2023-08-04 LAB
ALBUMIN SERPL BCP-MCNC: 4.2 G/DL (ref 3.5–5)
ALP SERPL-CCNC: 90 U/L (ref 34–104)
ALT SERPL W P-5'-P-CCNC: 14 U/L (ref 7–52)
ANION GAP SERPL CALCULATED.3IONS-SCNC: 10 MMOL/L
AST SERPL W P-5'-P-CCNC: 11 U/L (ref 13–39)
ATRIAL RATE: 85 BPM
BASOPHILS # BLD AUTO: 0.02 THOUSANDS/ÂΜL (ref 0–0.1)
BASOPHILS NFR BLD AUTO: 0 % (ref 0–1)
BILIRUB SERPL-MCNC: 0.52 MG/DL (ref 0.2–1)
BUN SERPL-MCNC: 18 MG/DL (ref 5–25)
CALCIUM SERPL-MCNC: 9.4 MG/DL (ref 8.4–10.2)
CARDIAC TROPONIN I PNL SERPL HS: <2 NG/L (ref 8–18)
CHLORIDE SERPL-SCNC: 98 MMOL/L (ref 96–108)
CO2 SERPL-SCNC: 26 MMOL/L (ref 21–32)
CREAT SERPL-MCNC: 1 MG/DL (ref 0.6–1.3)
EOSINOPHIL # BLD AUTO: 0.05 THOUSAND/ÂΜL (ref 0–0.61)
EOSINOPHIL NFR BLD AUTO: 1 % (ref 0–6)
ERYTHROCYTE [DISTWIDTH] IN BLOOD BY AUTOMATED COUNT: 12.5 % (ref 11.6–15.1)
GFR SERPL CREATININE-BSD FRML MDRD: 82 ML/MIN/1.73SQ M
GLUCOSE SERPL-MCNC: 493 MG/DL (ref 65–140)
HCT VFR BLD AUTO: 45 % (ref 36.5–49.3)
HGB BLD-MCNC: 15.4 G/DL (ref 12–17)
IMM GRANULOCYTES # BLD AUTO: 0.02 THOUSAND/UL (ref 0–0.2)
IMM GRANULOCYTES NFR BLD AUTO: 0 % (ref 0–2)
LYMPHOCYTES # BLD AUTO: 1.18 THOUSANDS/ÂΜL (ref 0.6–4.47)
LYMPHOCYTES NFR BLD AUTO: 18 % (ref 14–44)
MCH RBC QN AUTO: 28.6 PG (ref 26.8–34.3)
MCHC RBC AUTO-ENTMCNC: 34.2 G/DL (ref 31.4–37.4)
MCV RBC AUTO: 84 FL (ref 82–98)
MONOCYTES # BLD AUTO: 0.5 THOUSAND/ÂΜL (ref 0.17–1.22)
MONOCYTES NFR BLD AUTO: 8 % (ref 4–12)
NEUTROPHILS # BLD AUTO: 4.89 THOUSANDS/ÂΜL (ref 1.85–7.62)
NEUTS SEG NFR BLD AUTO: 73 % (ref 43–75)
NRBC BLD AUTO-RTO: 0 /100 WBCS
P AXIS: 69 DEGREES
PLATELET # BLD AUTO: 225 THOUSANDS/UL (ref 149–390)
PMV BLD AUTO: 9.8 FL (ref 8.9–12.7)
POTASSIUM SERPL-SCNC: 3.3 MMOL/L (ref 3.5–5.3)
PR INTERVAL: 162 MS
PROT SERPL-MCNC: 6.4 G/DL (ref 6.4–8.4)
QRS AXIS: 15 DEGREES
QRSD INTERVAL: 92 MS
QT INTERVAL: 378 MS
QTC INTERVAL: 449 MS
RBC # BLD AUTO: 5.38 MILLION/UL (ref 3.88–5.62)
SODIUM SERPL-SCNC: 134 MMOL/L (ref 135–147)
T WAVE AXIS: 52 DEGREES
VENTRICULAR RATE: 85 BPM
WBC # BLD AUTO: 6.66 THOUSAND/UL (ref 4.31–10.16)

## 2023-08-04 PROCEDURE — 93005 ELECTROCARDIOGRAM TRACING: CPT

## 2023-08-04 PROCEDURE — 36415 COLL VENOUS BLD VENIPUNCTURE: CPT | Performed by: EMERGENCY MEDICINE

## 2023-08-04 PROCEDURE — 85025 COMPLETE CBC W/AUTO DIFF WBC: CPT | Performed by: EMERGENCY MEDICINE

## 2023-08-04 PROCEDURE — 96374 THER/PROPH/DIAG INJ IV PUSH: CPT

## 2023-08-04 PROCEDURE — 93010 ELECTROCARDIOGRAM REPORT: CPT | Performed by: INTERNAL MEDICINE

## 2023-08-04 PROCEDURE — 80053 COMPREHEN METABOLIC PANEL: CPT | Performed by: EMERGENCY MEDICINE

## 2023-08-04 PROCEDURE — 84484 ASSAY OF TROPONIN QUANT: CPT | Performed by: EMERGENCY MEDICINE

## 2023-08-04 PROCEDURE — 99284 EMERGENCY DEPT VISIT MOD MDM: CPT

## 2023-08-04 RX ORDER — BETAMETHASONE DIPROPIONATE 0.05 %
OINTMENT (GRAM) TOPICAL 2 TIMES DAILY
Qty: 30 G | Refills: 0 | Status: SHIPPED | OUTPATIENT
Start: 2023-08-04

## 2023-08-04 RX ORDER — DEXAMETHASONE 4 MG/1
12 TABLET ORAL ONCE
Qty: 3 TABLET | Refills: 0 | Status: SHIPPED | OUTPATIENT
Start: 2023-08-04 | End: 2023-08-04

## 2023-08-04 RX ORDER — DIPHENHYDRAMINE HYDROCHLORIDE 50 MG/ML
25 INJECTION INTRAMUSCULAR; INTRAVENOUS ONCE
Status: COMPLETED | OUTPATIENT
Start: 2023-08-04 | End: 2023-08-04

## 2023-08-04 RX ORDER — EPINEPHRINE 0.3 MG/.3ML
0.3 INJECTION SUBCUTANEOUS ONCE
Qty: 0.6 ML | Refills: 0 | Status: SHIPPED | OUTPATIENT
Start: 2023-08-04 | End: 2023-08-04

## 2023-08-04 RX ADMIN — Medication 10 MG: at 10:10

## 2023-08-04 RX ADMIN — DIPHENHYDRAMINE HYDROCHLORIDE 25 MG: 50 INJECTION, SOLUTION INTRAMUSCULAR; INTRAVENOUS at 10:16

## 2023-08-04 NOTE — Clinical Note
Ramsey Cabrera was seen and treated in our emergency department on 8/4/2023. Diagnosis:     Maiadel Neri  . He may return on this date: 08/07/2023         If you have any questions or concerns, please don't hesitate to call.       Sobia Miller, DO    ______________________________           _______________          _______________  Hospital Representative                              Date                                Time

## 2023-08-07 NOTE — ED PROVIDER NOTES
History  Chief Complaint   Patient presents with   • Oral Swelling     Pt feels his throat is swollen. He was working in the yard yesterday in shorts, has rash on ankles and is spreading to top of legs. Patient is a 70-year-old male who presented for concerns of oral swelling. 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 He states that he had other people who are out there where he was working stated he did not look well, so he left and arrested. Symptoms resolved. No known history of anaphylaxis although as noted in his medical chart and reviewed by me multiple allergies including to aspirin, ibuprofen, licorice and other food agents. Denies any contact with any of these. Prior to Admission Medications   Prescriptions Last Dose Informant Patient Reported? Taking?    Blood Glucose Monitoring Suppl (ACURA BLOOD GLUCOSE METER) w/Device KIT  Self Yes No   Sig: by Does not apply route   Calcium Carb-Cholecalciferol (CALCIUM CARBONATE-VITAMIN D3 PO)  Self Yes No   Sig: Take 1,500 mg by mouth daily   Cinnamon 500 MG capsule  Self Yes No   Sig: Take 500 mg by mouth daily   Diclofenac Sodium (VOLTAREN) 1 %  Self No No   Sig: Apply 2 g topically 4 (four) times a day   FIBER PO  Self Yes No   Sig: Take by mouth   Insulin Pen Needle 32G X 4 MM MISC  Self Yes No   Sig: Unifine Pentips Plus 32 gauge x 5/32" needle   TAMSULOSIN HCL PO  Self Yes No   Sig: Take 0.4 mg by mouth daily    Turmeric 500 MG CAPS  Self Yes No   Sig: Take by mouth   VITAMIN D PO  Self Yes No   Sig: Take by mouth   albuterol (2.5 mg/3 mL) 0.083 % nebulizer solution  Self No No   Sig: Take 1 vial (2.5 mg total) by nebulization every 4 (four) hours as needed for shortness of breath   albuterol (PROVENTIL HFA,VENTOLIN HFA) 90 mcg/act inhaler  Self No No   Sig: Inhale 2 puffs every 4 (four) hours as needed for wheezing   albuterol (ProAir HFA) 90 mcg/act inhaler  Self No No   Sig: Inhale 2 puffs every 6 (six) hours as needed for wheezing   albuterol (ProAir HFA) 90 mcg/act inhaler   No No   Sig: Inhale 2 puffs every 6 (six) hours as needed for wheezing   albuterol (Ventolin HFA) 90 mcg/act inhaler  Self No No   Sig: Inhale 1-2 puffs every 6 (six) hours as needed for wheezing   amLODIPine (NORVASC) 5 mg tablet  Self Yes No   Sig: Take 5 mg by mouth daily   atorvastatin (LIPITOR) 80 mg tablet  Self Yes No   Sig: Take 80 mg by mouth daily at bedtime   bimatoprost (LUMIGAN) 0.03 % ophthalmic drops  Self Yes No   Sig: Administer 1 drop to both eyes daily at bedtime     brimonidine (ALPHAGAN P) 0.15 % ophthalmic solution  Self Yes No   Sig: Administer 1 drop to both eyes every 8 (eight) hours     carBAMazepine (TEGretol) 200 mg tablet  Self Yes No   Sig: Take 2 tabs in the morning and 3 tabs at night   cetirizine (ZyrTEC) 10 mg tablet  Self Yes No   Sig: Take 10 mg by mouth daily   diclofenac sodium (VOLTAREN) 50 mg EC tablet   No No   Sig: Take 1 tablet (50 mg total) by mouth 2 (two) times a day for 10 days   dupilumab (DUPIXENT) subcutaneous injection  Self Yes No   Sig: Inject under the skin every 14 (fourteen) days   finasteride (PROSCAR) 5 mg tablet  Self Yes No   Sig: Take 5 mg by mouth daily   fluticasone (FLONASE) 50 mcg/act nasal spray  Self Yes No   Si spray into each nostril daily   gabapentin (NEURONTIN) 300 mg capsule  Self Yes No   Sig: Take 300 mg by mouth 3 (three) times a day   glucagon 1 MG injection  Self Yes No   Sig: as needed   guaiFENesin (MUCINEX) 600 mg 12 hr tablet  Self No No   Sig: Take 1 tablet (600 mg total) by mouth every 12 (twelve) hours   hydrOXYzine pamoate (VISTARIL) 50 mg capsule  Self Yes No   Sig: Take 50 mg by mouth Three times daily as needed   insulin aspart (NovoLOG) 100 units/mL injection  Self Yes No   Sig: Inject under the skin 3 (three) times a day before meals   insulin glargine (BASAGLAR KWIKPEN) 100 units/mL injection pen  Self Yes No   Sig: Inject 43 Units under the skin daily at bedtime    ipratropium (ATROVENT) 0.02 % nebulizer solution  Self No No   Sig: Take 2.5 mL (0.5 mg total) by nebulization 4 (four) times a day   latanoprost (XALATAN) 0.005 % ophthalmic solution  Self Yes No   Sig: Administer 1 drop to both eyes daily   lidocaine (LIDODERM) 5 %  Self No No   Sig: Apply 1 patch topically daily Remove & Discard patch within 12 hours or as directed by MD   misoprostol (CYTOTEC) 200 mcg tablet   No No   Sig: Take 1 tablet (200 mcg total) by mouth 3 (three) times a day for 10 days   montelukast (SINGULAIR) 10 mg tablet  Self Yes No   Sig: Take 10 mg by mouth daily     naloxone (NARCAN) 4 mg/0.1 mL nasal spray  Self No No   Sig: Administer 1 spray into a nostril. If no response after 2-3 minutes, give another dose in the other nostril using a new spray.    naproxen (EC NAPROSYN) 375 MG TBEC   No No   Sig: Take 1 tablet (375 mg total) by mouth 2 (two) times a day with meals   omega-3-acid ethyl esters (LOVAZA) 1 g capsule  Self Yes No   Sig: Take 1 g by mouth daily   omeprazole (PriLOSEC) 40 MG capsule  Self Yes No   Sig: Take 40 mg by mouth daily   perphenazine 2 mg tablet  Self Yes No   Sig: Take 2 mg by mouth daily   polyvinyl alcohol (LIQUIFILM TEARS) 1.4 % ophthalmic solution  Self Yes No   Si drops as needed for dry eyes   predniSONE 20 mg tablet  Self No No   Sig: Take 3 tablets (60 mg total) by mouth daily   Patient not taking: Reported on 2022    predniSONE 5 mg tablet  Self Yes No   Sig: Take 2.5 mg by mouth every other day On Monday, Wednesday, Friday    Patient not taking: Reported on 2022    predniSONE 50 mg tablet  Self No No   Sig: Take 1 tablet (50 mg total) by mouth daily   Patient not taking: Reported on 2022    tolnaftate (TINACTIN) 1 % powder  Self Yes No   Sig: Apply 1 application topically 2 (two) times a day      Facility-Administered Medications: None       Past Medical History:   Diagnosis Date   • Asthma    • Bipolar disorder ([address])    • COPD (chronic obstructive pulmonary disease) ([address])    • Diabetes mellitus ([address])    • Enlarged prostate    • Glaucoma    • Hyperlipidemia    • Hypertension    • Overdose of heroin, accidental or unintentional, sequela    • Psychiatric disorder    • Seasonal allergic rhinitis    • Seizures ([address])        Past Surgical History:   Procedure Laterality Date   • CIRCUMCISION     • KNEE SURGERY     • WRIST SURGERY Left        Family History   Problem Relation Age of Onset   • Arthritis Mother      I have reviewed and agree with the history as documented. E-Cigarette/Vaping   • E-Cigarette Use Never User      E-Cigarette/Vaping Substances   • Nicotine No    • THC No    • CBD No    • Flavoring No    • Other No    • Unknown No      Social History     Tobacco Use   • Smoking status: Former     Packs/day: 2.00     Years: 27.00     Total pack years: 54.00     Types: Cigarettes     Start date: 35     Quit date:      Years since quittin.6   • Smokeless tobacco: Never   Vaping Use   • Vaping Use: Never used   Substance Use Topics   • Alcohol use: Not Currently   • Drug use: Yes     Types: Heroin, Fentanyl     Comment: OD 21 heroin       Review of Systems   Constitutional: Positive for diaphoresis. Negative for chills and fever. HENT: Negative. Negative for rhinorrhea, sore throat, trouble swallowing and voice change. Eyes: Negative. Negative for pain and visual disturbance. Respiratory: Positive for shortness of breath. Negative for cough and wheezing. Cardiovascular: Negative. Negative for chest pain and palpitations. Gastrointestinal: Positive for nausea. Negative for abdominal pain, diarrhea and vomiting. Genitourinary: Negative. Negative for dysuria and frequency. Musculoskeletal: Negative. Negative for neck pain and neck stiffness. Skin: Negative. Negative for rash. Neurological: Negative. Negative for dizziness, speech difficulty, weakness, light-headedness and numbness. Physical Exam  Physical Exam  Vitals and nursing note reviewed. Constitutional:       General: He is not in acute distress. Appearance: He is well-developed. HENT:      Head: Normocephalic and atraumatic. Eyes:      Conjunctiva/sclera: Conjunctivae normal.      Pupils: Pupils are equal, round, and reactive to light. Neck:      Trachea: No tracheal deviation. Cardiovascular:      Rate and Rhythm: Normal rate and regular rhythm. Pulmonary:      Effort: Pulmonary effort is normal. No respiratory distress. Breath sounds: Normal breath sounds. No wheezing or rales. Abdominal:      General: Bowel sounds are normal. There is no distension. Palpations: Abdomen is soft. Tenderness: There is no abdominal tenderness. There is no guarding or rebound. Musculoskeletal:         General: No tenderness or deformity. Normal range of motion. Cervical back: Normal range of motion and neck supple. Skin:     General: Skin is warm and dry. Capillary Refill: Capillary refill takes less than 2 seconds. Findings: No rash. Neurological:      Mental Status: He is alert and oriented to person, place, and time.    Psychiatric:         Behavior: Behavior normal.         Vital Signs  ED Triage Vitals [08/04/23 0925]   Temperature Pulse Respirations Blood Pressure SpO2   98.4 °F (36.9 °C) 95 18 111/57 95 %      Temp Source Heart Rate Source Patient Position - Orthostatic VS BP Location FiO2 (%)   Tympanic Monitor Sitting Left arm --      Pain Score       --           Vitals:    08/04/23 0925 08/04/23 1157   BP: 111/57 109/85   Pulse: 95 77   Patient Position - Orthostatic VS: Sitting Lying         Visual Acuity      ED Medications  Medications   dexamethasone oral liquid 10 mg 1 mL (10 mg Oral Given 8/4/23 1010)   diphenhydrAMINE (BENADRYL) injection 25 mg (25 mg Intravenous Given 8/4/23 1016)       Diagnostic Studies  Results Reviewed     Procedure Component Value Units Date/Time    High Sensitivity Troponin I Random [[id number]]  (Abnormal) Collected: 08/04/23 1021    Lab Status: Final result Specimen: Blood from Arm, Right Updated: 08/04/23 1113     HS TnI random <2 ng/L     Comprehensive metabolic panel [[id number]]  (Abnormal) Collected: 08/04/23 1021    Lab Status: Final result Specimen: Blood from Arm, Right Updated: 08/04/23 1107     Sodium 134 mmol/L      Potassium 3.3 mmol/L      Chloride 98 mmol/L      CO2 26 mmol/L      ANION GAP 10 mmol/L      BUN 18 mg/dL      Creatinine 1.00 mg/dL      Glucose 493 mg/dL      Calcium 9.4 mg/dL      AST 11 U/L      ALT 14 U/L      Alkaline Phosphatase 90 U/L      Total Protein 6.4 g/dL      Albumin 4.2 g/dL      Total Bilirubin 0.52 mg/dL      eGFR 82 ml/min/1.73sq m     Narrative:      [facility] guidelines for Chronic Kidney Disease (CKD):   •  Stage 1 with normal or high GFR (GFR > 90 mL/min/1.73 square meters)  •  Stage 2 Mild CKD (GFR = 60-89 mL/min/1.73 square meters)  •  Stage 3A Moderate CKD (GFR = 45-59 mL/min/1.73 square meters)  •  Stage 3B Moderate CKD (GFR = 30-44 mL/min/1.73 square meters)  •  Stage 4 Severe CKD (GFR = 15-29 mL/min/1.73 square meters)  •  Stage 5 End Stage CKD (GFR <15 mL/min/1.73 square meters)  Note: GFR calculation is accurate only with a steady state creatinine    CBC and differential [[id number]] Collected: 08/04/23 1021    Lab Status: Final result Specimen: Blood from Arm, Right Updated: 08/04/23 1044     WBC 6.66 Thousand/uL      RBC 5.38 Million/uL      Hemoglobin 15.4 g/dL      Hematocrit 45.0 %      MCV 84 fL      MCH 28.6 pg      MCHC 34.2 g/dL      RDW 12.5 %      MPV 9.8 fL      Platelets 377 Thousands/uL      nRBC 0 /100 WBCs      Neutrophils Relative 73 %      Immat GRANS % 0 %      Lymphocytes Relative 18 %      Monocytes Relative 8 %      Eosinophils Relative 1 %      Basophils Relative 0 %      Neutrophils Absolute 4.89 Thousands/µL      Immature Grans Absolute 0.02 Thousand/uL      Lymphocytes Absolute 1.18 Thousands/µL      Monocytes Absolute 0.50 Thousand/µL      Eosinophils Absolute 0.05 Thousand/µL      Basophils Absolute 0.02 Thousands/µL                  No orders to display              Procedures  Procedures         ED Course  ED Course as of 08/07/23 1313   Fri Aug 04, 2023   1018 Procedure Note: EKG  Date/Time: 08/04/23 10:18 AM   Performed by: Jumana Martin  Authorized by: Jumana Martin  ECG interpreted by me, the ED Provider: yes   The EKG demonstrates:  Rate 85  Rhythm sinus  QTc 449  No ST elevations/depressions                                   SBIRT 22yo+    Flowsheet Row Most Recent Value   Initial Alcohol Screen: US AUDIT-C     1. How often do you have a drink containing alcohol? 0 Filed at: 08/04/2023 0933   2. How many drinks containing alcohol do you have on a typical day you are drinking? 0 Filed at: 08/04/2023 0933   3a. Male UNDER 65: How often do you have five or more drinks on one occasion? 0 Filed at: 08/04/2023 0933   3b. FEMALE Any Age, or MALE 65+: How often do you have 4 or more drinks on one occassion? 0 Filed at: 08/04/2023 0933   Audit-C Score 0 Filed at: 08/04/2023 6028   ROXY: How many times in the past year have you. .. Used an illegal drug or used a prescription medication for non-medical reasons? Never Filed at: 08/04/2023 7403                    Medical Decision Making     Reviewed past medical records: Yes, no history of anaphylaxis noted     History Provided by: The patient     Differential considered: Acute coronary syndrome, anaphylaxis, allergic reaction, heat exertion     Consideration of tests: Patient's EKG and blood work including troponin were negative for acute pathology. Vitals remained stable, his symptoms have not returned.   Does not appear to have any angioedema and nor does he appear to take an angiotensin-converting enzyme inhibitor medication which could be the source of his resolved facial swelling. Will treat as allergic reaction, started on steroids, monitored, no evidence of anaphylaxis in ED. Patient will be given EpiPen prescription for safety, or use reviewed. Patient had elevated glucose, no increased AG or low CO2. Suspicion for DKA low, offered continued treatment in ED. Patient prefers to pursue dietary modification for tighter glycemic control. I have reviewed the patient's visit and any testing done in the emergency department. They have verbalized their understanding of any testing done today and have no further questions or concerns regarding their care in the emergency room. They will follow up with their primary care physician as well as with any specialist in their discharge instructions. Strict return precautions were discussed. Amount and/or Complexity of Data Reviewed  Labs: ordered. Risk  Prescription drug management. Disposition  Final diagnoses: Allergic reaction, initial encounter     Time reflects when diagnosis was documented in both MDM as applicable and the Disposition within this note     Time User Action Codes Description Comment    8/4/2023 11:36 AM Woo Duvall Add [T78.40XA] Allergic reaction, initial encounter       ED Disposition     ED Disposition   Discharge    Condition   Stable    Date/Time   Fri Aug 4, 2023 [address] discharge to home/self care.                Follow-up Information     Follow up With Specialties Details Why [address] [facility], [address]  [phone]            Discharge Medication List as of 8/4/2023 11:38 AM      START taking these medications    Details   betamethasone dipropionate (DIPROSONE) 0.05 % ointment Apply topically 2 (two) times a day, Starting Fri 8/4/2023, Normal      dexamethasone (DECADRON) 4 mg tablet Take 3 tablets (12 mg total) by mouth 1 (one) time for 1 dose, Starting Fri 8/4/2023, Normal      EPINEPHrine (EPIPEN) 0.3 mg/0.3 mL SOAJ Inject 0.3 mL (0.3 mg total) into a muscle once for 1 dose, Starting Fri 8/4/2023, Normal         CONTINUE these medications which have NOT CHANGED    Details   albuterol (2.5 mg/3 mL) 0.083 % nebulizer solution Take 1 vial (2.5 mg total) by nebulization every 4 (four) hours as needed for shortness of breath, Starting Thu 11/15/2018, Normal      !! albuterol (ProAir HFA) 90 mcg/act inhaler Inhale 2 puffs every 6 (six) hours as needed for wheezing, Starting Fri 12/10/2021, Normal      !! albuterol (ProAir HFA) 90 mcg/act inhaler Inhale 2 puffs every 6 (six) hours as needed for wheezing, Starting Fri 7/21/2023, Normal      !! albuterol (PROVENTIL HFA,VENTOLIN HFA) 90 mcg/act inhaler Inhale 2 puffs every 4 (four) hours as needed for wheezing, Starting Thu 11/15/2018, Normal      !! albuterol (Ventolin HFA) 90 mcg/act inhaler Inhale 1-2 puffs every 6 (six) hours as needed for wheezing, Starting Sat 4/2/2022, Normal      amLODIPine (NORVASC) 5 mg tablet Take 5 mg by mouth daily, Historical Med      atorvastatin (LIPITOR) 80 mg tablet Take 80 mg by mouth daily at bedtime, Starting Thu 9/8/2016, Historical Med      bimatoprost (LUMIGAN) 0.03 % ophthalmic drops Administer 1 drop to both eyes daily at bedtime  , Starting Thu 6/5/2008, Historical Med      Blood Glucose Monitoring Suppl (ACURA BLOOD GLUCOSE METER) w/Device KIT by Does not apply route, Historical Med      brimonidine (ALPHAGAN P) 0.15 % ophthalmic solution Administer 1 drop to both eyes every 8 (eight) hours  , Starting Th 7/2/2015, Historical Med      Calcium Carb-Cholecalciferol (CALCIUM CARBONATE-VITAMIN D3 PO) Take 1,500 mg by mouth daily, Starting Fri 9/20/2013, Historical Med      carBAMazepine (TEGretol) 200 mg tablet Take 2 tabs in the morning and 3 tabs at night, Historical Med      cetirizine (ZyrTEC) 10 mg tablet Take 10 mg by mouth daily, Historical Med      Cinnamon 500 MG capsule Take 500 mg by mouth daily, Historical Med      Diclofenac Sodium (VOLTAREN) 1 % Apply 2 g topically 4 (four) times a day, Starting Fri 5/14/2021, Normal      diclofenac sodium (VOLTAREN) 50 mg EC tablet Take 1 tablet (50 mg total) by mouth 2 (two) times a day for 10 days, Starting Mon 7/12/2021, Until Mon 3/28/2022, Normal      dupilumab (DUPIXENT) subcutaneous injection Inject under the skin every 14 (fourteen) days, Historical Med      FIBER PO Take by mouth, Historical Med      finasteride (PROSCAR) 5 mg tablet Take 5 mg by mouth daily, Starting Tue 4/26/2016, Historical Med      fluticasone (FLONASE) 50 mcg/act nasal spray 1 spray into each nostril daily, Historical Med      gabapentin (NEURONTIN) 300 mg capsule Take 300 mg by mouth 3 (three) times a day, Starting Thu 9/8/2016, Historical Med      glucagon 1 MG injection as needed, Starting Fri 8/5/2016, Historical Med      guaiFENesin (MUCINEX) 600 mg 12 hr tablet Take 1 tablet (600 mg total) by mouth every 12 (twelve) hours, Starting Fri 5/3/2019, Print      hydrOXYzine pamoate (VISTARIL) 50 mg capsule Take 50 mg by mouth Three times daily as needed, Starting Thu 8/8/2019, Historical Med      insulin aspart (NovoLOG) 100 units/mL injection Inject under the skin 3 (three) times a day before meals, Historical Med      insulin glargine (BASAGLAR KWIKPEN) 100 units/mL injection pen Inject 43 Units under the skin daily at bedtime , Historical Med      Insulin Pen Needle 32G X 4 MM MISC Unifine Pentips Plus 32 gauge x 5/32" needle, Historical Med      ipratropium (ATROVENT) 0.02 % nebulizer solution Take 2.5 mL (0.5 mg total) by nebulization 4 (four) times a day, Starting Mon 3/28/2022, Normal      latanoprost (XALATAN) 0.005 % ophthalmic solution Administer 1 drop to both eyes daily, Historical Med      lidocaine (LIDODERM) 5 % Apply 1 patch topically daily Remove & Discard patch within 12 hours or as directed by MD, Starting Fri 5/14/2021, Normal      misoprostol (CYTOTEC) 200 mcg tablet Take 1 tablet (200 mcg total) by mouth 3 (three) times a day for 10 days, Starting Mon 7/12/2021, Until Thu 7/22/2021, Normal      montelukast (SINGULAIR) 10 mg tablet Take 10 mg by mouth daily  , Starting Tue 8/25/2015, Historical Med      naloxone (NARCAN) 4 mg/0.1 mL nasal spray Administer 1 spray into a nostril. If no response after 2-3 minutes, give another dose in the other nostril using a new spray., Normal      naproxen (EC NAPROSYN) 375 MG TBEC Take 1 tablet (375 mg total) by mouth 2 (two) times a day with meals, Starting Mon 5/29/2023, Until Tue 5/28/2024, Normal      omega-3-acid ethyl esters (LOVAZA) 1 g capsule Take 1 g by mouth daily, Historical Med      omeprazole (PriLOSEC) 40 MG capsule Take 40 mg by mouth daily, Starting Tue 4/26/2016, Historical Med      perphenazine 2 mg tablet Take 2 mg by mouth daily, Historical Med      polyvinyl alcohol (LIQUIFILM TEARS) 1.4 % ophthalmic solution 2 drops as needed for dry eyes, Historical Med      !! predniSONE 20 mg tablet Take 3 tablets (60 mg total) by mouth daily, Starting Mon 3/28/2022, Normal      !! predniSONE 5 mg tablet Take 2.5 mg by mouth every other day On Monday, Wednesday, Friday , Historical Med      !! predniSONE 50 mg tablet Take 1 tablet (50 mg total) by mouth daily, Starting Wed 3/16/2022, Normal      TAMSULOSIN HCL PO Take 0.4 mg by mouth daily , Historical Med      tolnaftate (TINACTIN) 1 % powder Apply 1 application topically 2 (two) times a day, Historical Med      Turmeric 500 MG CAPS Take by mouth, Historical Med      VITAMIN D PO Take by mouth, Historical Med       !! - Potential duplicate medications found. Please discuss with provider. No discharge procedures on file.     PDMP Review       Value Time User    PDMP Reviewed  Yes 7/12/2021  7:56 AM Garo Mitchell DO          ED Provider  Electronically Signed by           Esthela Walters Jean-Paul Garrett,   08/07/23 3229

## 2023-10-24 ENCOUNTER — HOSPITAL ENCOUNTER (EMERGENCY)
Facility: HOSPITAL | Age: 58
Discharge: HOME | End: 2023-10-24
Attending: EMERGENCY MEDICINE | Admitting: EMERGENCY MEDICINE
Payer: COMMERCIAL

## 2023-10-24 VITALS
DIASTOLIC BLOOD PRESSURE: 93 MMHG | RESPIRATION RATE: 16 BRPM | BODY MASS INDEX: 30.42 KG/M2 | HEART RATE: 90 BPM | SYSTOLIC BLOOD PRESSURE: 160 MMHG | WEIGHT: 203 LBS | TEMPERATURE: 97.8 F

## 2023-10-24 DIAGNOSIS — H00.014 HORDEOLUM EXTERNUM OF LEFT UPPER EYELID: Primary | ICD-10-CM

## 2023-10-24 PROCEDURE — 99284 EMERGENCY DEPT VISIT MOD MDM: CPT | Performed by: EMERGENCY MEDICINE

## 2023-10-24 PROCEDURE — 99282 EMERGENCY DEPT VISIT SF MDM: CPT

## 2023-10-24 RX ORDER — ERYTHROMYCIN 5 MG/G
OINTMENT OPHTHALMIC
Qty: 1 G | Refills: 0 | Status: SHIPPED | OUTPATIENT
Start: 2023-10-24

## 2023-10-24 NOTE — ASSESSMENT & PLAN NOTE
Continue prednisone 2 5 daily for 2 weeks after he receives his Dupixent, then to wean to 2 5 mg every other day for another 2 weeks then stop  Patient verbalized understanding  4/5

## 2023-10-24 NOTE — ED PROVIDER NOTES
History  Chief Complaint   Patient presents with    Eye Problem     upper left eye lid pain/swelling      44-year-old male, presents with small area of swelling to left upper eyelid that has been getting worse over the past 2 weeks. Has mild pain. Denies any pain in eye or visual changes. History provided by:  Patient   used: No    Eye Problem  Associated symptoms: no redness        Prior to Admission Medications   Prescriptions Last Dose Informant Patient Reported? Taking?    Blood Glucose Monitoring Suppl (ACURA BLOOD GLUCOSE METER) w/Device KIT  Self Yes No   Sig: by Does not apply route   Calcium Carb-Cholecalciferol (CALCIUM CARBONATE-VITAMIN D3 PO)  Self Yes No   Sig: Take 1,500 mg by mouth daily   Cinnamon 500 MG capsule  Self Yes No   Sig: Take 500 mg by mouth daily   Diclofenac Sodium (VOLTAREN) 1 %  Self No No   Sig: Apply 2 g topically 4 (four) times a day   EPINEPHrine (EPIPEN) 0.3 mg/0.3 mL SOAJ   No No   Sig: Inject 0.3 mL (0.3 mg total) into a muscle once for 1 dose   FIBER PO  Self Yes No   Sig: Take by mouth   Insulin Pen Needle 32G X 4 MM MISC  Self Yes No   Sig: Unifine Pentips Plus 32 gauge x 5/32" needle   TAMSULOSIN HCL PO  Self Yes No   Sig: Take 0.4 mg by mouth daily    Turmeric 500 MG CAPS  Self Yes No   Sig: Take by mouth   VITAMIN D PO  Self Yes No   Sig: Take by mouth   albuterol (2.5 mg/3 mL) 0.083 % nebulizer solution  Self No No   Sig: Take 1 vial (2.5 mg total) by nebulization every 4 (four) hours as needed for shortness of breath   albuterol (PROVENTIL HFA,VENTOLIN HFA) 90 mcg/act inhaler  Self No No   Sig: Inhale 2 puffs every 4 (four) hours as needed for wheezing   albuterol (ProAir HFA) 90 mcg/act inhaler  Self No No   Sig: Inhale 2 puffs every 6 (six) hours as needed for wheezing   albuterol (ProAir HFA) 90 mcg/act inhaler   No No   Sig: Inhale 2 puffs every 6 (six) hours as needed for wheezing   albuterol (Ventolin HFA) 90 mcg/act inhaler  Self No No Sig: Inhale 1-2 puffs every 6 (six) hours as needed for wheezing   amLODIPine (NORVASC) 5 mg tablet  Self Yes No   Sig: Take 5 mg by mouth daily   atorvastatin (LIPITOR) 80 mg tablet  Self Yes No   Sig: Take 80 mg by mouth daily at bedtime   betamethasone dipropionate (DIPROSONE) 0.05 % ointment   No No   Sig: Apply topically 2 (two) times a day   bimatoprost (LUMIGAN) 0.03 % ophthalmic drops  Self Yes No   Sig: Administer 1 drop to both eyes daily at bedtime     brimonidine (ALPHAGAN P) 0.15 % ophthalmic solution  Self Yes No   Sig: Administer 1 drop to both eyes every 8 (eight) hours     carBAMazepine (TEGretol) 200 mg tablet  Self Yes No   Sig: Take 2 tabs in the morning and 3 tabs at night   cetirizine (ZyrTEC) 10 mg tablet  Self Yes No   Sig: Take 10 mg by mouth daily   diclofenac sodium (VOLTAREN) 50 mg EC tablet   No No   Sig: Take 1 tablet (50 mg total) by mouth 2 (two) times a day for 10 days   dupilumab (DUPIXENT) subcutaneous injection  Self Yes No   Sig: Inject under the skin every 14 (fourteen) days   finasteride (PROSCAR) 5 mg tablet  Self Yes No   Sig: Take 5 mg by mouth daily   fluticasone (FLONASE) 50 mcg/act nasal spray  Self Yes No   Si spray into each nostril daily   gabapentin (NEURONTIN) 300 mg capsule  Self Yes No   Sig: Take 300 mg by mouth 3 (three) times a day   glucagon 1 MG injection  Self Yes No   Sig: as needed   guaiFENesin (MUCINEX) 600 mg 12 hr tablet  Self No No   Sig: Take 1 tablet (600 mg total) by mouth every 12 (twelve) hours   hydrOXYzine pamoate (VISTARIL) 50 mg capsule  Self Yes No   Sig: Take 50 mg by mouth Three times daily as needed   insulin aspart (NovoLOG) 100 units/mL injection  Self Yes No   Sig: Inject under the skin 3 (three) times a day before meals   insulin glargine (BASAGLAR KWIKPEN) 100 units/mL injection pen  Self Yes No   Sig: Inject 43 Units under the skin daily at bedtime    ipratropium (ATROVENT) 0.02 % nebulizer solution  Self No No   Sig: Take 2.5 mL (0.5 mg total) by nebulization 4 (four) times a day   latanoprost (XALATAN) 0.005 % ophthalmic solution  Self Yes No   Sig: Administer 1 drop to both eyes daily   lidocaine (LIDODERM) 5 %  Self No No   Sig: Apply 1 patch topically daily Remove & Discard patch within 12 hours or as directed by MD   misoprostol (CYTOTEC) 200 mcg tablet   No No   Sig: Take 1 tablet (200 mcg total) by mouth 3 (three) times a day for 10 days   montelukast (SINGULAIR) 10 mg tablet  Self Yes No   Sig: Take 10 mg by mouth daily     naloxone (NARCAN) 4 mg/0.1 mL nasal spray  Self No No   Sig: Administer 1 spray into a nostril. If no response after 2-3 minutes, give another dose in the other nostril using a new spray.    naproxen (EC NAPROSYN) 375 MG TBEC   No No   Sig: Take 1 tablet (375 mg total) by mouth 2 (two) times a day with meals   omega-3-acid ethyl esters (LOVAZA) 1 g capsule  Self Yes No   Sig: Take 1 g by mouth daily   omeprazole (PriLOSEC) 40 MG capsule  Self Yes No   Sig: Take 40 mg by mouth daily   perphenazine 2 mg tablet  Self Yes No   Sig: Take 2 mg by mouth daily   polyvinyl alcohol (LIQUIFILM TEARS) 1.4 % ophthalmic solution  Self Yes No   Si drops as needed for dry eyes   predniSONE 20 mg tablet  Self No No   Sig: Take 3 tablets (60 mg total) by mouth daily   Patient not taking: Reported on 2022    predniSONE 5 mg tablet  Self Yes No   Sig: Take 2.5 mg by mouth every other day On Monday, Wednesday, Friday    Patient not taking: Reported on 2022    predniSONE 50 mg tablet  Self No No   Sig: Take 1 tablet (50 mg total) by mouth daily   Patient not taking: Reported on 2022    tolnaftate (TINACTIN) 1 % powder  Self Yes No   Sig: Apply 1 application topically 2 (two) times a day      Facility-Administered Medications: None       Past Medical History:   Diagnosis Date    Asthma     Bipolar disorder (HCC)     COPD (chronic obstructive pulmonary disease) (HCC)     Diabetes mellitus (HCC)     Enlarged prostate Glaucoma     Hyperlipidemia     Hypertension     Overdose of heroin, accidental or unintentional, sequela     Psychiatric disorder     Seasonal allergic rhinitis     Seizures ([address])        Past Surgical History:   Procedure Laterality Date    CIRCUMCISION      KNEE SURGERY      WRIST SURGERY Left        Family History   Problem Relation Age of Onset    Arthritis Mother      I have reviewed and agree with the history as documented. E-Cigarette/Vaping    E-Cigarette Use Never User      E-Cigarette/Vaping Substances    Nicotine No     THC No     CBD No     Flavoring No     Other No     Unknown No      Social History     Tobacco Use    Smoking status: Former     Packs/day: 2.00     Years: 27.00     Total pack years: 54.00     Types: Cigarettes     Start date: 35     Quit date:      Years since quittin.8    Smokeless tobacco: Never   Vaping Use    Vaping Use: Never used   Substance Use Topics    Alcohol use: Not Currently    Drug use: Yes     Types: Heroin, Fentanyl     Comment: OD 21 heroin       Review of Systems   Constitutional: Negative. Negative for fever. Eyes:  Negative for redness and visual disturbance. Neurological: Negative. Physical Exam  Physical Exam  Vitals and nursing note reviewed. Constitutional:       General: He is not in acute distress. HENT:      Head: Normocephalic and atraumatic. Eyes:      General: Vision grossly intact. Left eye: Hordeolum present. Conjunctiva/sclera: Conjunctivae normal.      Comments: Small, swollen area noted to inner edge of left upper eyelid. Cardiovascular:      Rate and Rhythm: Normal rate. Pulmonary:      Effort: Pulmonary effort is normal.   Skin:     General: Skin is warm and dry. Neurological:      General: No focal deficit present. Mental Status: He is alert and oriented to person, place, and time.          Vital Signs  ED Triage Vitals   Temperature Pulse Respirations Blood Pressure SpO2   10/24/23 1124 10/24/23 1121 10/24/23 1121 10/24/23 1121 --   97.8 °F (36.6 °C) 90 16 160/93       Temp Source Heart Rate Source Patient Position - Orthostatic VS BP Location FiO2 (%)   10/24/23 1124 10/24/23 1121 -- -- --   Tympanic Monitor         Pain Score       --                  Vitals:    10/24/23 1121   BP: 160/93   Pulse: 90         Visual Acuity      ED Medications  Medications - No data to display    Diagnostic Studies  Results Reviewed       None                   No orders to display              Procedures  Procedures         ED Course                                             Medical Decision Making  43-year-old male, presents with area pain and swelling to left upper eyelid. Differential diagnosis includes stye, abscess, foreign body among other diagnoses. Patient has findings on exam consistent with stye, no foreign body noted, conjunctiva normal.  Discussed with patient use of warm compresses, will prescribe erythromycin eye ointment. Patient instructed to follow-up or return for any worsening or new concerning symptoms. Risk  Prescription drug management. Disposition  Final diagnoses:   Hordeolum externum of left upper eyelid     Time reflects when diagnosis was documented in both MDM as applicable and the Disposition within this note       Time User Action Codes Description Comment    10/24/2023 11:30 AM Yahir Palafox Add [W57.208] Hordeolum externum of left upper eyelid           ED Disposition       ED Disposition   Discharge    Condition   Stable    Date/Time   Tue Oct 24, 2023 11:31 AM    Comment   Rosalee John discharge to home/self care. Follow-up Information    None         Patient's Medications   Discharge Prescriptions    ERYTHROMYCIN (ILOTYCIN) OPHTHALMIC OINTMENT    Place a 1/2 inch ribbon of ointment into the lower eyelid.        Start Date: 10/24/2023End Date: --       Order Dose: --       Quantity: 1 g    Refills: 0       No discharge procedures on file.    PDMP Review         Value Time User    PDMP Reviewed  Yes 7/12/2021  7:56 AM Lamont Pitts DO            ED Provider  Electronically Signed by             Corrinne Artist, MD  10/24/23 1227

## 2023-10-25 ENCOUNTER — TELEPHONE (OUTPATIENT)
Dept: PSYCHIATRY | Facility: CLINIC | Age: 58
End: 2023-10-25

## 2023-11-12 ENCOUNTER — APPOINTMENT (EMERGENCY)
Dept: RADIOLOGY | Facility: HOSPITAL | Age: 58
End: 2023-11-12
Payer: COMMERCIAL

## 2023-11-12 ENCOUNTER — HOSPITAL ENCOUNTER (EMERGENCY)
Facility: HOSPITAL | Age: 58
Discharge: HOME | End: 2023-11-12
Attending: EMERGENCY MEDICINE
Payer: COMMERCIAL

## 2023-11-12 VITALS
WEIGHT: 201.5 LBS | OXYGEN SATURATION: 98 % | RESPIRATION RATE: 17 BRPM | HEART RATE: 90 BPM | TEMPERATURE: 97.8 F | BODY MASS INDEX: 30.19 KG/M2 | SYSTOLIC BLOOD PRESSURE: 155 MMHG | DIASTOLIC BLOOD PRESSURE: 80 MMHG

## 2023-11-12 DIAGNOSIS — J45.901 ASTHMA EXACERBATION: Primary | ICD-10-CM

## 2023-11-12 LAB
FLUAV RNA RESP QL NAA+PROBE: NEGATIVE
FLUBV RNA RESP QL NAA+PROBE: NEGATIVE
RSV RNA RESP QL NAA+PROBE: NEGATIVE
SARS-COV-2 RNA RESP QL NAA+PROBE: NEGATIVE

## 2023-11-12 PROCEDURE — 93005 ELECTROCARDIOGRAM TRACING: CPT

## 2023-11-12 PROCEDURE — 99285 EMERGENCY DEPT VISIT HI MDM: CPT | Performed by: EMERGENCY MEDICINE

## 2023-11-12 PROCEDURE — 71046 X-RAY EXAM CHEST 2 VIEWS: CPT

## 2023-11-12 PROCEDURE — 94640 AIRWAY INHALATION TREATMENT: CPT

## 2023-11-12 PROCEDURE — 96374 THER/PROPH/DIAG INJ IV PUSH: CPT

## 2023-11-12 PROCEDURE — 0241U HB NFCT DS VIR RESP RNA 4 TRGT: CPT

## 2023-11-12 PROCEDURE — 99285 EMERGENCY DEPT VISIT HI MDM: CPT

## 2023-11-12 RX ORDER — METHYLPREDNISOLONE SODIUM SUCCINATE 125 MG/2ML
80 INJECTION, POWDER, LYOPHILIZED, FOR SOLUTION INTRAMUSCULAR; INTRAVENOUS ONCE
Status: COMPLETED | OUTPATIENT
Start: 2023-11-12 | End: 2023-11-12

## 2023-11-12 RX ORDER — PREDNISONE 20 MG/1
60 TABLET ORAL DAILY
Qty: 15 TABLET | Refills: 0 | Status: SHIPPED | OUTPATIENT
Start: 2023-11-12 | End: 2023-11-17

## 2023-11-12 RX ORDER — ALBUTEROL SULFATE 2.5 MG/3ML
1 SOLUTION RESPIRATORY (INHALATION) ONCE
Status: COMPLETED | OUTPATIENT
Start: 2023-11-12 | End: 2023-11-12

## 2023-11-12 RX ORDER — IPRATROPIUM BROMIDE AND ALBUTEROL SULFATE 2.5; .5 MG/3ML; MG/3ML
3 SOLUTION RESPIRATORY (INHALATION) ONCE
Status: COMPLETED | OUTPATIENT
Start: 2023-11-12 | End: 2023-11-12

## 2023-11-12 RX ORDER — PREDNISONE 20 MG/1
40 TABLET ORAL DAILY
Qty: 10 TABLET | Refills: 0 | Status: CANCELLED | OUTPATIENT
Start: 2023-11-12 | End: 2023-11-17

## 2023-11-12 RX ADMIN — IPRATROPIUM BROMIDE AND ALBUTEROL SULFATE 3 ML: 2.5; .5 SOLUTION RESPIRATORY (INHALATION) at 08:25

## 2023-11-12 RX ADMIN — METHYLPREDNISOLONE SODIUM SUCCINATE 80 MG: 125 INJECTION, POWDER, FOR SOLUTION INTRAMUSCULAR; INTRAVENOUS at 08:25

## 2023-11-12 NOTE — ED PROVIDER NOTES
History  Chief Complaint   Patient presents with    Shortness of Breath     Pt reports increased SOB x3 days with productive cough. States asthma is also worse unrelieved by inhaler. 58YO M w/PMHx of asthma, T2D, HLD, HTN who presents with shortness of breath. Past week, traveled to [facility] to visit kelly, denies any recent sick contacts. 6 days ago pt started having a wet cough, sore throat, rhinorrhea and congestion. 2 days ago, pt states he started experiencing progressively worsening SOB, requiring albuterol nebs. Pt used his albuterol nebulizer x4 last 24hrs. Has not had an asthma exacerbation requiring hospitalization in 4 years per pt report. At baseline uses Dupixent (Dupilumab) injections every 2 weeks; however, has not had any in 2 months, but denies increased usage of prn albuterol until last few days. Denies any fevers, chills, chest pains, palpitations, headaches, blurry visions. Prior to Admission Medications   Prescriptions Last Dose Informant Patient Reported? Taking?    Blood Glucose Monitoring Suppl (ACURA BLOOD GLUCOSE METER) w/Device KIT  Self Yes No   Sig: by Does not apply route   Calcium Carb-Cholecalciferol (CALCIUM CARBONATE-VITAMIN D3 PO)  Self Yes No   Sig: Take 1,500 mg by mouth daily   Cinnamon 500 MG capsule  Self Yes No   Sig: Take 500 mg by mouth daily   Diclofenac Sodium (VOLTAREN) 1 %  Self No No   Sig: Apply 2 g topically 4 (four) times a day   EPINEPHrine (EPIPEN) 0.3 mg/0.3 mL SOAJ   No No   Sig: Inject 0.3 mL (0.3 mg total) into a muscle once for 1 dose   FIBER PO  Self Yes No   Sig: Take by mouth   Insulin Pen Needle 32G X 4 MM MISC  Self Yes No   Sig: Unifine Pentips Plus 32 gauge x 5/32" needle   TAMSULOSIN HCL PO  Self Yes No   Sig: Take 0.4 mg by mouth daily    Turmeric 500 MG CAPS  Self Yes No   Sig: Take by mouth   VITAMIN D PO  Self Yes No   Sig: Take by mouth   albuterol (2.5 mg/3 mL) 0.083 % nebulizer solution  Self No No   Sig: Take 1 vial (2.5 mg total) by nebulization every 4 (four) hours as needed for shortness of breath   albuterol (PROVENTIL HFA,VENTOLIN HFA) 90 mcg/act inhaler  Self No No   Sig: Inhale 2 puffs every 4 (four) hours as needed for wheezing   albuterol (ProAir HFA) 90 mcg/act inhaler  Self No No   Sig: Inhale 2 puffs every 6 (six) hours as needed for wheezing   albuterol (ProAir HFA) 90 mcg/act inhaler   No No   Sig: Inhale 2 puffs every 6 (six) hours as needed for wheezing   albuterol (Ventolin HFA) 90 mcg/act inhaler 2023 Self No Yes   Sig: Inhale 1-2 puffs every 6 (six) hours as needed for wheezing   amLODIPine (NORVASC) 5 mg tablet  Self Yes No   Sig: Take 5 mg by mouth daily   atorvastatin (LIPITOR) 80 mg tablet  Self Yes No   Sig: Take 80 mg by mouth daily at bedtime   betamethasone dipropionate (DIPROSONE) 0.05 % ointment   No No   Sig: Apply topically 2 (two) times a day   bimatoprost (LUMIGAN) 0.03 % ophthalmic drops  Self Yes No   Sig: Administer 1 drop to both eyes daily at bedtime     brimonidine (ALPHAGAN P) 0.15 % ophthalmic solution  Self Yes No   Sig: Administer 1 drop to both eyes every 8 (eight) hours     carBAMazepine (TEGretol) 200 mg tablet  Self Yes No   Sig: Take 2 tabs in the morning and 3 tabs at night   cetirizine (ZyrTEC) 10 mg tablet  Self Yes No   Sig: Take 10 mg by mouth daily   diclofenac sodium (VOLTAREN) 50 mg EC tablet   No No   Sig: Take 1 tablet (50 mg total) by mouth 2 (two) times a day for 10 days   dupilumab (DUPIXENT) subcutaneous injection  Self Yes No   Sig: Inject under the skin every 14 (fourteen) days   erythromycin (ILOTYCIN) ophthalmic ointment   No No   Sig: Place a 1/2 inch ribbon of ointment into the lower eyelid.    finasteride (PROSCAR) 5 mg tablet  Self Yes No   Sig: Take 5 mg by mouth daily   fluticasone (FLONASE) 50 mcg/act nasal spray  Self Yes No   Si spray into each nostril daily   gabapentin (NEURONTIN) 300 mg capsule  Self Yes No   Sig: Take 300 mg by mouth 3 (three) times a day glucagon 1 MG injection  Self Yes No   Sig: as needed   guaiFENesin (MUCINEX) 600 mg 12 hr tablet  Self No No   Sig: Take 1 tablet (600 mg total) by mouth every 12 (twelve) hours   hydrOXYzine pamoate (VISTARIL) 50 mg capsule  Self Yes No   Sig: Take 50 mg by mouth Three times daily as needed   insulin aspart (NovoLOG) 100 units/mL injection  Self Yes No   Sig: Inject under the skin 3 (three) times a day before meals   insulin glargine (BASAGLAR KWIKPEN) 100 units/mL injection pen  Self Yes No   Sig: Inject 43 Units under the skin daily at bedtime    ipratropium (ATROVENT) 0.02 % nebulizer solution  Self No No   Sig: Take 2.5 mL (0.5 mg total) by nebulization 4 (four) times a day   latanoprost (XALATAN) 0.005 % ophthalmic solution  Self Yes No   Sig: Administer 1 drop to both eyes daily   lidocaine (LIDODERM) 5 %  Self No No   Sig: Apply 1 patch topically daily Remove & Discard patch within 12 hours or as directed by MD   misoprostol (CYTOTEC) 200 mcg tablet   No No   Sig: Take 1 tablet (200 mcg total) by mouth 3 (three) times a day for 10 days   montelukast (SINGULAIR) 10 mg tablet  Self Yes No   Sig: Take 10 mg by mouth daily     naloxone (NARCAN) 4 mg/0.1 mL nasal spray  Self No No   Sig: Administer 1 spray into a nostril. If no response after 2-3 minutes, give another dose in the other nostril using a new spray.    naproxen (EC NAPROSYN) 375 MG TBEC   No No   Sig: Take 1 tablet (375 mg total) by mouth 2 (two) times a day with meals   omega-3-acid ethyl esters (LOVAZA) 1 g capsule  Self Yes No   Sig: Take 1 g by mouth daily   omeprazole (PriLOSEC) 40 MG capsule  Self Yes No   Sig: Take 40 mg by mouth daily   perphenazine 2 mg tablet  Self Yes No   Sig: Take 2 mg by mouth daily   polyvinyl alcohol (LIQUIFILM TEARS) 1.4 % ophthalmic solution  Self Yes No   Si drops as needed for dry eyes   predniSONE 20 mg tablet  Self No No   Sig: Take 3 tablets (60 mg total) by mouth daily   Patient not taking: Reported on 2022    predniSONE 5 mg tablet  Self Yes No   Sig: Take 2.5 mg by mouth every other day On Monday, Wednesday, Friday    Patient not taking: Reported on 2022    predniSONE 50 mg tablet  Self No No   Sig: Take 1 tablet (50 mg total) by mouth daily   Patient not taking: Reported on 2022    tolnaftate (TINACTIN) 1 % powder  Self Yes No   Sig: Apply 1 application topically 2 (two) times a day      Facility-Administered Medications: None       Past Medical History:   Diagnosis Date    Asthma     Bipolar disorder ([facility])     COPD (chronic obstructive pulmonary disease) ([facility])     Diabetes mellitus ([address])     Enlarged prostate     Glaucoma     Hyperlipidemia     Hypertension     Overdose of heroin, accidental or unintentional, sequela     Psychiatric disorder     Seasonal allergic rhinitis     Seizures ([facility])        Past Surgical History:   Procedure Laterality Date    CIRCUMCISION      KNEE SURGERY      WRIST SURGERY Left        Family History   Problem Relation Age of Onset    Arthritis Mother      I have reviewed and agree with the history as documented. E-Cigarette/Vaping    E-Cigarette Use Never User      E-Cigarette/Vaping Substances    Nicotine No     THC No     CBD No     Flavoring No     Other No     Unknown No      Social History     Tobacco Use    Smoking status: Former     Packs/day: 2.00     Years: 27.00     Total pack years: 54.00     Types: Cigarettes     Start date: 35     Quit date:      Years since quittin.8    Smokeless tobacco: Never   Vaping Use    Vaping Use: Never used   Substance Use Topics    Alcohol use: Not Currently    Drug use: Not Currently     Types: Heroin, Fentanyl     Comment: OD 21 heroin        Review of Systems   Constitutional:  Negative for chills and fever. HENT:  Positive for congestion, rhinorrhea and sore throat. Negative for hearing loss, sinus pressure and sinus pain. Eyes:  Negative for visual disturbance.    Respiratory:  Positive for shortness of breath and wheezing. Negative for apnea, cough and chest tightness. Cardiovascular:  Negative for chest pain, palpitations and leg swelling. Gastrointestinal:  Negative for abdominal pain, blood in stool, constipation, diarrhea, nausea and vomiting. Genitourinary:  Negative for dysuria and hematuria. Skin:  Negative for rash. Neurological:  Negative for dizziness, light-headedness, numbness and headaches. Physical Exam  ED Triage Vitals   Temperature Pulse Respirations Blood Pressure SpO2   11/12/23 0727 11/12/23 0724 11/12/23 0724 11/12/23 0724 11/12/23 0724   97.8 °F (36.6 °C) (!) 111 16 151/77 97 %      Temp Source Heart Rate Source Patient Position - Orthostatic VS BP Location FiO2 (%)   11/12/23 0727 11/12/23 0724 11/12/23 0724 11/12/23 0724 --   Oral Monitor Sitting Right arm       Pain Score       --                    Orthostatic Vital Signs  Vitals:    11/12/23 0724 11/12/23 0800   BP: 151/77 169/83   Pulse: (!) 111 94   Patient Position - Orthostatic VS: Sitting Sitting       Physical Exam  Vitals reviewed. Constitutional:       General: He is not in acute distress. Appearance: He is not ill-appearing, toxic-appearing or diaphoretic. HENT:      Head: Normocephalic and atraumatic. Nose: Nose normal. No congestion or rhinorrhea. Mouth/Throat:      Mouth: Mucous membranes are moist.      Pharynx: Oropharynx is clear. No pharyngeal swelling, oropharyngeal exudate or posterior oropharyngeal erythema. Eyes:      General:         Right eye: No discharge. Left eye: No discharge. Conjunctiva/sclera: Conjunctivae normal.   Neck:      Thyroid: No thyromegaly. Vascular: No carotid bruit or JVD. Trachea: No tracheal deviation. Cardiovascular:      Rate and Rhythm: Normal rate and regular rhythm. Pulses: Normal pulses. Heart sounds: Normal heart sounds. No murmur heard. No friction rub. No gallop.    Pulmonary:      Effort: Pulmonary effort is normal. No respiratory distress. Breath sounds: No stridor. Examination of the right-upper field reveals wheezing. Examination of the left-upper field reveals wheezing. Examination of the right-middle field reveals wheezing. Examination of the left-middle field reveals wheezing. Examination of the right-lower field reveals wheezing. Examination of the left-lower field reveals wheezing. Wheezing present. No rhonchi or rales. Chest:      Chest wall: No tenderness. Abdominal:      General: Abdomen is flat. Bowel sounds are normal. There is no distension. Palpations: Abdomen is soft. There is no mass. Tenderness: There is no abdominal tenderness. There is no guarding or rebound. Hernia: No hernia is present. Musculoskeletal:      Cervical back: Normal range of motion and neck supple. No rigidity or tenderness. Right lower leg: No tenderness. No edema. Left lower leg: No tenderness. No edema. Comments: Pedal pulses 2+ bilaterally   Lymphadenopathy:      Cervical: No cervical adenopathy. Skin:     General: Skin is warm and dry. Capillary Refill: Capillary refill takes less than 2 seconds. Neurological:      General: No focal deficit present. Mental Status: He is alert and oriented to person, place, and time.       Comments: Conversant    Psychiatric:         Mood and Affect: Mood normal.         Behavior: Behavior normal.         ED Medications  Medications   albuterol (FOR EMS ONLY) (2.5 mg/3 mL) 0.083 % inhalation solution 2.5 mg (0 mg Does not apply Given to EMS 11/12/23 0728)   ipratropium-albuterol (DUO-NEB) 0.5-2.5 mg/3 mL inhalation solution 3 mL (3 mL Nebulization Given 11/12/23 0825)   methylPREDNISolone sodium succinate (Solu-MEDROL) injection 80 mg (80 mg Intravenous Given 11/12/23 0825)       Diagnostic Studies  Results Reviewed       Procedure Component Value Units Date/Time    FLU/RSV/COVID - if FLU/RSV clinically relevant [[id number]]  (Normal) Collected: 11/12/23 0824    Lab Status: Final result Specimen: Nares from Nose Updated: 11/12/23 1005     SARS-CoV-2 Negative     INFLUENZA A PCR Negative     INFLUENZA B PCR Negative     RSV PCR Negative    Narrative:      FOR PEDIATRIC PATIENTS - copy/paste COVID Guidelines URL to browser: [email or web address]    SARS-CoV-2 assay is a Nucleic Acid Amplification assay intended for the  qualitative detection of nucleic acid from SARS-CoV-2 in nasopharyngeal  swabs. Results are for the presumptive identification of SARS-CoV-2 RNA. Positive results are indicative of infection with SARS-CoV-2, the virus  causing COVID-19, but do not rule out bacterial infection or co-infection  with other viruses. Laboratories within the [facility] and its  territories are required to report all positive results to the appropriate  public health authorities. Negative results do not preclude SARS-CoV-2  infection and should not be used as the sole basis for treatment or other  patient management decisions. Negative results must be combined with  clinical observations, patient history, and epidemiological information. This test has not been FDA cleared or approved. This test has been authorized by FDA under an Emergency Use Authorization  (EUA). This test is only authorized for the duration of time the  declaration that circumstances exist justifying the authorization of the  emergency use of an in vitro diagnostic tests for detection of SARS-CoV-2  virus and/or diagnosis of COVID-19 infection under section 564(b)(1) of  the Act, 21 U. S.C. 493EYV-2(T)(2), unless the authorization is terminated  or revoked sooner. The test has been validated but independent review by FDA  and CLIA is pending. Test performed using [employer]: This RT-PCR assay targets N2,  a region unique to SARS-CoV-2.  A conserved region in the E-gene was chosen  for pan-Sarbecovirus detection which includes SARS-CoV-2. According to CMS-2020-01-R, this platform meets the definition of high-throughput technology. XR chest 2 views   ED Interpretation by Guilherme Smith DO (11/12 0833)   nad            Procedures  Procedures      ED Course                                       Medical Decision Making  58YO M who presents with 6 day h/o wet cough, congestion, rhinorrhea and 2 day h/o progressively worsening SOB. Asthma has been well controlled with biologic injections every 2 weeks, although has not had them in 2 months. Last asthma exacerbation requiring ED visit/hospitalization was back about 4 years ago. Rarely uses prn albuterol. Denies any recent fevers, chills, chest pains, palpitations, headaches, blurry vision. Per pt reports, albuterol neb x4 last 24hrs. Received albuterol neb (2.5mg) x1 while being transported. Pt is currently HD stable with mild tachycardia 104. Wheezing throughout bilateral lung fields. No bilateral edema. However, not in any respiratory distress on initial evaluation. Differentials include asthma exacerbation in setting of upper respiratory infection, vs lower respiratory infection, COPD exacerbation, CHF exacerbation. Ordered CXR, EKG, respiratory panel, duoneb x1, IV solumedrol x1. Reassessed in an hour and in 2hrs; pt reported improvement in symptoms. Wheezing significantly improved bilaterally. Discharged on 5 days of prednisone. Advised to follow-up with pulmonologist and PCP. Amount and/or Complexity of Data Reviewed  Radiology: ordered and independent interpretation performed. Risk  Prescription drug management.           Disposition  Final diagnoses:   Asthma exacerbation     Time reflects when diagnosis was documented in both MDM as applicable and the Disposition within this note       Time User Action Codes Description Comment    11/12/2023 10:47 AM Madelin Villafana Add [J45.901] Asthma exacerbation           ED Disposition       ED Disposition   Discharge Condition   Stable    Date/Time   Sun Nov 12, 2023 10:47 AM    Comment   Kaylie Esposito discharge to home/self care. Follow-up Information       Follow up With Specialties Details Why Contact Info    Lawrence Goodrich MD Critical Care Medicine, Pulmonology, Pulmonary Disease   [address]  [phone]              Patient's Medications   Discharge Prescriptions    PREDNISONE 20 MG TABLET    Take 3 tablets (60 mg total) by mouth daily for 5 days       Start Date: 11/12/2023End Date: 11/17/2023       Order Dose: 60 mg       Quantity: 15 tablet    Refills: 0     No discharge procedures on file. PDMP Review         Value Time User    PDMP Reviewed  Yes 7/12/2021  7:56 AM Hesham Berger DO             ED Provider  Attending physically available and evaluated Kaylie Esposito. I managed the patient along with the ED Attending.     Electronically Signed by           Susan Montes DO  11/12/23 5030

## 2023-11-12 NOTE — ED ATTENDING ATTESTATION
11/12/2023  IMarianela DO, saw and evaluated the patient. I have discussed the patient with the resident/non-physician practitioner and agree with the resident's/non-physician practitioner's findings, Plan of Care, and MDM as documented in the resident's/non-physician practitioner's note, except where noted. All available labs and Radiology studies were reviewed. I was present for key portions of any procedure(s) performed by the resident/non-physician practitioner and I was immediately available to provide assistance. At this point I agree with the current assessment done in the Emergency Department. I have conducted an independent evaluation of this patient a history and physical is as follows:    ED Course         Critical Care Time  Procedures    79-year-old male with history of asthma, last admission about 2 years ago, prior intubation about 4 years ago presents to the emergency department with URI symptoms as well as asthma exacerbation which started about 2 days ago. Patient states he has had a productive cough of green sputum and increased wheezing and shortness of breath. He has used his inhaler and nebulizer without relief. No fevers or chills. He did recently visit his grandchildren and states they were sleeping with the windows open which she believes may have caused his symptoms. On exam he is alert in no acute distress. No conversational dyspnea. He does have diffuse wheezing on exam.  No respiratory distress. No known cardiac history. Suspect acute asthma exacerbation and URI. Will give additional albuterol/Atrovent/IV Solu-Medrol. Will check chest x-ray to rule out pneumonia. Will obtain viral swab rule out flu/RSV/COVID. Will reassess. n/a

## 2023-11-12 NOTE — ED PROCEDURE NOTE
PROCEDURE  ECG 12 Lead Documentation Only    Date/Time: 11/12/2023 8:08 AM    Performed by: Shannan Diaz DO  Authorized by: Shannan Diaz DO    Indications / Diagnosis:  Sob  ECG reviewed by me, the ED Provider: yes    Patient location:  ED  Interpretation:     Interpretation: abnormal    Rate:     ECG rate:  104    ECG rate assessment: tachycardic    Rhythm:     Rhythm: sinus tachycardia    Ectopy:     Ectopy: none    Conduction:     Conduction: normal    ST segments:     ST segments:  Non-specific  T waves:     T waves: normal         Shannan Diaz DO  11/12/23 5897

## 2023-11-13 LAB
ATRIAL RATE: 104 BPM
P AXIS: 80 DEGREES
PR INTERVAL: 160 MS
QRS AXIS: 122 DEGREES
QRSD INTERVAL: 86 MS
QT INTERVAL: 308 MS
QTC INTERVAL: 405 MS
T WAVE AXIS: 14 DEGREES
VENTRICULAR RATE: 104 BPM

## 2023-11-13 PROCEDURE — 93010 ELECTROCARDIOGRAM REPORT: CPT | Performed by: INTERNAL MEDICINE

## 2023-12-26 ENCOUNTER — HOSPITAL ENCOUNTER (EMERGENCY)
Facility: HOSPITAL | Age: 58
Discharge: HOME | End: 2023-12-26
Attending: EMERGENCY MEDICINE
Payer: COMMERCIAL

## 2023-12-26 ENCOUNTER — APPOINTMENT (EMERGENCY)
Dept: RADIOLOGY | Facility: HOSPITAL | Age: 58
End: 2023-12-26
Payer: COMMERCIAL

## 2023-12-26 VITALS
SYSTOLIC BLOOD PRESSURE: 163 MMHG | OXYGEN SATURATION: 97 % | RESPIRATION RATE: 20 BRPM | DIASTOLIC BLOOD PRESSURE: 84 MMHG | HEART RATE: 91 BPM | WEIGHT: 217.2 LBS | BODY MASS INDEX: 32.54 KG/M2 | TEMPERATURE: 98 F

## 2023-12-26 DIAGNOSIS — J45.909 ASTHMATIC BRONCHITIS: Primary | ICD-10-CM

## 2023-12-26 LAB
ALBUMIN SERPL BCP-MCNC: 4.1 G/DL (ref 3.5–5)
ALP SERPL-CCNC: 60 U/L (ref 34–104)
ALT SERPL W P-5'-P-CCNC: 12 U/L (ref 7–52)
ANION GAP SERPL CALCULATED.3IONS-SCNC: 6 MMOL/L
AST SERPL W P-5'-P-CCNC: 12 U/L (ref 13–39)
ATRIAL RATE: 85 BPM
BASOPHILS # BLD AUTO: 0.04 THOUSANDS/ÂΜL (ref 0–0.1)
BASOPHILS NFR BLD AUTO: 1 % (ref 0–1)
BILIRUB SERPL-MCNC: 0.35 MG/DL (ref 0.2–1)
BUN SERPL-MCNC: 17 MG/DL (ref 5–25)
CALCIUM SERPL-MCNC: 9.3 MG/DL (ref 8.4–10.2)
CARDIAC TROPONIN I PNL SERPL HS: <2 NG/L
CHLORIDE SERPL-SCNC: 103 MMOL/L (ref 96–108)
CO2 SERPL-SCNC: 30 MMOL/L (ref 21–32)
CREAT SERPL-MCNC: 1.03 MG/DL (ref 0.6–1.3)
EOSINOPHIL # BLD AUTO: 0.38 THOUSAND/ÂΜL (ref 0–0.61)
EOSINOPHIL NFR BLD AUTO: 6 % (ref 0–6)
ERYTHROCYTE [DISTWIDTH] IN BLOOD BY AUTOMATED COUNT: 12.8 % (ref 11.6–15.1)
FLUAV RNA RESP QL NAA+PROBE: NEGATIVE
FLUBV RNA RESP QL NAA+PROBE: NEGATIVE
GFR SERPL CREATININE-BSD FRML MDRD: 79 ML/MIN/1.73SQ M
GLUCOSE SERPL-MCNC: 162 MG/DL (ref 65–140)
GLUCOSE SERPL-MCNC: 164 MG/DL (ref 65–140)
HCT VFR BLD AUTO: 45.7 % (ref 36.5–49.3)
HGB BLD-MCNC: 15.8 G/DL (ref 12–17)
IMM GRANULOCYTES # BLD AUTO: 0.02 THOUSAND/UL (ref 0–0.2)
IMM GRANULOCYTES NFR BLD AUTO: 0 % (ref 0–2)
LYMPHOCYTES # BLD AUTO: 1.43 THOUSANDS/ÂΜL (ref 0.6–4.47)
LYMPHOCYTES NFR BLD AUTO: 22 % (ref 14–44)
MCH RBC QN AUTO: 29 PG (ref 26.8–34.3)
MCHC RBC AUTO-ENTMCNC: 34.6 G/DL (ref 31.4–37.4)
MCV RBC AUTO: 84 FL (ref 82–98)
MONOCYTES # BLD AUTO: 0.57 THOUSAND/ÂΜL (ref 0.17–1.22)
MONOCYTES NFR BLD AUTO: 9 % (ref 4–12)
NEUTROPHILS # BLD AUTO: 4.06 THOUSANDS/ÂΜL (ref 1.85–7.62)
NEUTS SEG NFR BLD AUTO: 62 % (ref 43–75)
NRBC BLD AUTO-RTO: 0 /100 WBCS
P AXIS: 77 DEGREES
PLATELET # BLD AUTO: 239 THOUSANDS/UL (ref 149–390)
PMV BLD AUTO: 9.5 FL (ref 8.9–12.7)
POTASSIUM SERPL-SCNC: 3.8 MMOL/L (ref 3.5–5.3)
PR INTERVAL: 170 MS
PROT SERPL-MCNC: 5.8 G/DL (ref 6.4–8.4)
QRS AXIS: 54 DEGREES
QRSD INTERVAL: 84 MS
QT INTERVAL: 362 MS
QTC INTERVAL: 430 MS
RBC # BLD AUTO: 5.44 MILLION/UL (ref 3.88–5.62)
RSV RNA RESP QL NAA+PROBE: NEGATIVE
SARS-COV-2 RNA RESP QL NAA+PROBE: NEGATIVE
SODIUM SERPL-SCNC: 139 MMOL/L (ref 135–147)
T WAVE AXIS: 50 DEGREES
VENTRICULAR RATE: 85 BPM
WBC # BLD AUTO: 6.5 THOUSAND/UL (ref 4.31–10.16)

## 2023-12-26 PROCEDURE — 99285 EMERGENCY DEPT VISIT HI MDM: CPT

## 2023-12-26 PROCEDURE — 94640 AIRWAY INHALATION TREATMENT: CPT

## 2023-12-26 PROCEDURE — 94664 DEMO&/EVAL PT USE INHALER: CPT

## 2023-12-26 PROCEDURE — 94644 CONT INHLJ TX 1ST HOUR: CPT

## 2023-12-26 PROCEDURE — 96374 THER/PROPH/DIAG INJ IV PUSH: CPT

## 2023-12-26 PROCEDURE — 82948 REAGENT STRIP/BLOOD GLUCOSE: CPT

## 2023-12-26 PROCEDURE — 0241U HB NFCT DS VIR RESP RNA 4 TRGT: CPT | Performed by: EMERGENCY MEDICINE

## 2023-12-26 PROCEDURE — 71045 X-RAY EXAM CHEST 1 VIEW: CPT

## 2023-12-26 PROCEDURE — 85025 COMPLETE CBC W/AUTO DIFF WBC: CPT | Performed by: EMERGENCY MEDICINE

## 2023-12-26 PROCEDURE — 99285 EMERGENCY DEPT VISIT HI MDM: CPT | Performed by: EMERGENCY MEDICINE

## 2023-12-26 PROCEDURE — 93005 ELECTROCARDIOGRAM TRACING: CPT

## 2023-12-26 PROCEDURE — 84484 ASSAY OF TROPONIN QUANT: CPT | Performed by: EMERGENCY MEDICINE

## 2023-12-26 PROCEDURE — 80053 COMPREHEN METABOLIC PANEL: CPT | Performed by: EMERGENCY MEDICINE

## 2023-12-26 PROCEDURE — 36415 COLL VENOUS BLD VENIPUNCTURE: CPT | Performed by: EMERGENCY MEDICINE

## 2023-12-26 RX ORDER — AZITHROMYCIN 250 MG/1
TABLET, FILM COATED ORAL
Qty: 6 TABLET | Refills: 0 | Status: SHIPPED | OUTPATIENT
Start: 2023-12-26 | End: 2023-12-30

## 2023-12-26 RX ORDER — METHYLPREDNISOLONE SODIUM SUCCINATE 125 MG/2ML
125 INJECTION, POWDER, LYOPHILIZED, FOR SOLUTION INTRAMUSCULAR; INTRAVENOUS ONCE
Status: COMPLETED | OUTPATIENT
Start: 2023-12-26 | End: 2023-12-26

## 2023-12-26 RX ORDER — SODIUM CHLORIDE FOR INHALATION 0.9 %
12 VIAL, NEBULIZER (ML) INHALATION ONCE
Status: COMPLETED | OUTPATIENT
Start: 2023-12-26 | End: 2023-12-26

## 2023-12-26 RX ORDER — ALBUTEROL SULFATE 2.5 MG/3ML
2.5 SOLUTION RESPIRATORY (INHALATION) EVERY 6 HOURS PRN
Qty: 75 ML | Refills: 0 | Status: SHIPPED | OUTPATIENT
Start: 2023-12-26

## 2023-12-26 RX ORDER — PREDNISONE 20 MG/1
60 TABLET ORAL DAILY
Qty: 15 TABLET | Refills: 0 | Status: SHIPPED | OUTPATIENT
Start: 2023-12-26 | End: 2023-12-31

## 2023-12-26 RX ADMIN — ALBUTEROL SULFATE 10 MG: 2.5 SOLUTION RESPIRATORY (INHALATION) at 11:58

## 2023-12-26 RX ADMIN — METHYLPREDNISOLONE SODIUM SUCCINATE 125 MG: 125 INJECTION, POWDER, FOR SOLUTION INTRAMUSCULAR; INTRAVENOUS at 11:52

## 2023-12-26 RX ADMIN — IPRATROPIUM BROMIDE 1 MG: 0.5 SOLUTION RESPIRATORY (INHALATION) at 11:58

## 2023-12-26 RX ADMIN — Medication 12 ML: at 11:59

## 2023-12-26 NOTE — Clinical Note
Moises Ordaz was seen and treated in our emergency department on 12/26/2023.                Diagnosis:     Moises  may return to work on return date.    He may return on this date: 12/29/2023         If you have any questions or concerns, please don't hesitate to call.      Slim Orellana MD    ______________________________           _______________          _______________  Hospital Representative                              Date                                Time

## 2023-12-26 NOTE — ED PROVIDER NOTES
"History  Chief Complaint   Patient presents with    Chest Pain     PT came to ER with midsternal non radiating CP for three days. Pt has been dyspneic and has been taking steroids as prescribed. Pt denies dizziness, nausea, and vomiting.      Patient is a 58-year-old male.  Cardiac risk factors include diabetes, hyperlipidemia, and possibly hypertension.  He does not smoke.  No family history of early coronary artery disease in a first-degree relative.  Presents to the emergency room with a 3-day history of chest pain.  He feels short of breath.  Does have a history of asthma.  No fever or chills.  He does have a cough.  The pain does not radiate.  It is a tightness.  Is not exertional.  No back pain or migration.  No calf pain or unilateral leg swelling.  No hemoptysis.  No nausea or diaphoresis.  Patient has no personal history of coronary artery disease or thromboembolic disease.        Prior to Admission Medications   Prescriptions Last Dose Informant Patient Reported? Taking?   Blood Glucose Monitoring Suppl (ACURA BLOOD GLUCOSE METER) w/Device KIT  Self Yes No   Sig: by Does not apply route   Calcium Carb-Cholecalciferol (CALCIUM CARBONATE-VITAMIN D3 PO)  Self Yes No   Sig: Take 1,500 mg by mouth daily   Cinnamon 500 MG capsule  Self Yes No   Sig: Take 500 mg by mouth daily   Diclofenac Sodium (VOLTAREN) 1 %  Self No No   Sig: Apply 2 g topically 4 (four) times a day   EPINEPHrine (EPIPEN) 0.3 mg/0.3 mL SOAJ   No No   Sig: Inject 0.3 mL (0.3 mg total) into a muscle once for 1 dose   FIBER PO  Self Yes No   Sig: Take by mouth   Insulin Pen Needle 32G X 4 MM MISC  Self Yes No   Sig: Unifine Pentips Plus 32 gauge x 5/32\" needle   TAMSULOSIN HCL PO  Self Yes No   Sig: Take 0.4 mg by mouth daily    Turmeric 500 MG CAPS  Self Yes No   Sig: Take by mouth   VITAMIN D PO  Self Yes No   Sig: Take by mouth   albuterol (2.5 mg/3 mL) 0.083 % nebulizer solution  Self No No   Sig: Take 1 vial (2.5 mg total) by nebulization " every 4 (four) hours as needed for shortness of breath   albuterol (PROVENTIL HFA,VENTOLIN HFA) 90 mcg/act inhaler  Self No No   Sig: Inhale 2 puffs every 4 (four) hours as needed for wheezing   albuterol (ProAir HFA) 90 mcg/act inhaler  Self No No   Sig: Inhale 2 puffs every 6 (six) hours as needed for wheezing   albuterol (ProAir HFA) 90 mcg/act inhaler   No No   Sig: Inhale 2 puffs every 6 (six) hours as needed for wheezing   albuterol (Ventolin HFA) 90 mcg/act inhaler  Self No No   Sig: Inhale 1-2 puffs every 6 (six) hours as needed for wheezing   amLODIPine (NORVASC) 5 mg tablet  Self Yes No   Sig: Take 5 mg by mouth daily   atorvastatin (LIPITOR) 80 mg tablet  Self Yes No   Sig: Take 80 mg by mouth daily at bedtime   betamethasone dipropionate (DIPROSONE) 0.05 % ointment   No No   Sig: Apply topically 2 (two) times a day   bimatoprost (LUMIGAN) 0.03 % ophthalmic drops  Self Yes No   Sig: Administer 1 drop to both eyes daily at bedtime     brimonidine (ALPHAGAN P) 0.15 % ophthalmic solution  Self Yes No   Sig: Administer 1 drop to both eyes every 8 (eight) hours     carBAMazepine (TEGretol) 200 mg tablet  Self Yes No   Sig: Take 2 tabs in the morning and 3 tabs at night   cetirizine (ZyrTEC) 10 mg tablet  Self Yes No   Sig: Take 10 mg by mouth daily   diclofenac sodium (VOLTAREN) 50 mg EC tablet   No No   Sig: Take 1 tablet (50 mg total) by mouth 2 (two) times a day for 10 days   dupilumab (DUPIXENT) subcutaneous injection  Self Yes No   Sig: Inject under the skin every 14 (fourteen) days   erythromycin (ILOTYCIN) ophthalmic ointment   No No   Sig: Place a 1/2 inch ribbon of ointment into the lower eyelid.   finasteride (PROSCAR) 5 mg tablet  Self Yes No   Sig: Take 5 mg by mouth daily   fluticasone (FLONASE) 50 mcg/act nasal spray  Self Yes No   Si spray into each nostril daily   gabapentin (NEURONTIN) 300 mg capsule  Self Yes No   Sig: Take 300 mg by mouth 3 (three) times a day   glucagon 1 MG injection   Self Yes No   Sig: as needed   guaiFENesin (MUCINEX) 600 mg 12 hr tablet  Self No No   Sig: Take 1 tablet (600 mg total) by mouth every 12 (twelve) hours   hydrOXYzine pamoate (VISTARIL) 50 mg capsule  Self Yes No   Sig: Take 50 mg by mouth Three times daily as needed   insulin aspart (NovoLOG) 100 units/mL injection  Self Yes No   Sig: Inject under the skin 3 (three) times a day before meals   insulin glargine (BASAGLAR KWIKPEN) 100 units/mL injection pen  Self Yes No   Sig: Inject 43 Units under the skin daily at bedtime    ipratropium (ATROVENT) 0.02 % nebulizer solution  Self No No   Sig: Take 2.5 mL (0.5 mg total) by nebulization 4 (four) times a day   latanoprost (XALATAN) 0.005 % ophthalmic solution  Self Yes No   Sig: Administer 1 drop to both eyes daily   lidocaine (LIDODERM) 5 %  Self No No   Sig: Apply 1 patch topically daily Remove & Discard patch within 12 hours or as directed by MD   misoprostol (CYTOTEC) 200 mcg tablet   No No   Sig: Take 1 tablet (200 mcg total) by mouth 3 (three) times a day for 10 days   montelukast (SINGULAIR) 10 mg tablet  Self Yes No   Sig: Take 10 mg by mouth daily     naloxone (NARCAN) 4 mg/0.1 mL nasal spray  Self No No   Sig: Administer 1 spray into a nostril. If no response after 2-3 minutes, give another dose in the other nostril using a new spray.   naproxen (EC NAPROSYN) 375 MG TBEC   No No   Sig: Take 1 tablet (375 mg total) by mouth 2 (two) times a day with meals   omega-3-acid ethyl esters (LOVAZA) 1 g capsule  Self Yes No   Sig: Take 1 g by mouth daily   omeprazole (PriLOSEC) 40 MG capsule  Self Yes No   Sig: Take 40 mg by mouth daily   perphenazine 2 mg tablet  Self Yes No   Sig: Take 2 mg by mouth daily   polyvinyl alcohol (LIQUIFILM TEARS) 1.4 % ophthalmic solution  Self Yes No   Si drops as needed for dry eyes   predniSONE 20 mg tablet  Self No No   Sig: Take 3 tablets (60 mg total) by mouth daily   Patient not taking: Reported on 2022    predniSONE 5 mg  tablet  Self Yes No   Sig: Take 2.5 mg by mouth every other day On Monday, Wednesday, Friday    Patient not taking: Reported on 2022    predniSONE 50 mg tablet  Self No No   Sig: Take 1 tablet (50 mg total) by mouth daily   Patient not taking: Reported on 2022    tolnaftate (TINACTIN) 1 % powder  Self Yes No   Sig: Apply 1 application topically 2 (two) times a day      Facility-Administered Medications: None       Past Medical History:   Diagnosis Date    Asthma     Bipolar disorder (HCC)     COPD (chronic obstructive pulmonary disease) (HCC)     Diabetes mellitus (HCC)     Enlarged prostate     Glaucoma     Hyperlipidemia     Hypertension     Overdose of heroin, accidental or unintentional, sequela     Psychiatric disorder     Seasonal allergic rhinitis     Seizures ([facility])        Past Surgical History:   Procedure Laterality Date    CIRCUMCISION      KNEE SURGERY      WRIST SURGERY Left        Family History   Problem Relation Age of Onset    Arthritis Mother      I have reviewed and agree with the history as documented.    E-Cigarette/Vaping    E-Cigarette Use Never User      E-Cigarette/Vaping Substances    Nicotine No     THC No     CBD No     Flavoring No     Other No     Unknown No      Social History     Tobacco Use    Smoking status: Former     Current packs/day: 0.00     Average packs/day: 2.0 packs/day for 27.0 years (54.0 ttl pk-yrs)     Types: Cigarettes     Start date:      Quit date:      Years since quittin.0    Smokeless tobacco: Never   Vaping Use    Vaping status: Never Used   Substance Use Topics    Alcohol use: Not Currently    Drug use: Not Currently     Types: Heroin, Fentanyl     Comment: OD 21 heroin       Review of Systems   Constitutional:  Negative for chills and fever.   HENT:  Negative for rhinorrhea and sore throat.    Eyes:  Negative for pain, redness and visual disturbance.   Respiratory:  Positive for cough, shortness of breath and wheezing.     Cardiovascular:  Positive for chest pain. Negative for leg swelling.   Gastrointestinal:  Negative for abdominal pain, diarrhea and vomiting.   Endocrine: Negative for polydipsia and polyuria.   Genitourinary:  Negative for dysuria, frequency and hematuria.   Musculoskeletal:  Negative for back pain and neck pain.   Skin:  Negative for rash and wound.   Allergic/Immunologic: Negative for immunocompromised state.   Neurological:  Negative for weakness, numbness and headaches.   Psychiatric/Behavioral:  Negative for hallucinations and suicidal ideas.    All other systems reviewed and are negative.      Physical Exam  Physical Exam  Vitals reviewed.   Constitutional:       General: He is not in acute distress.     Appearance: He is not toxic-appearing.   HENT:      Head: Normocephalic and atraumatic.      Nose: Nose normal.      Mouth/Throat:      Mouth: Mucous membranes are moist.   Eyes:      General:         Right eye: No discharge.         Left eye: No discharge.      Conjunctiva/sclera: Conjunctivae normal.   Cardiovascular:      Rate and Rhythm: Normal rate and regular rhythm.      Pulses: Normal pulses.      Heart sounds: Normal heart sounds. No murmur heard.     No friction rub. No gallop.   Pulmonary:      Effort: Pulmonary effort is normal. No respiratory distress.      Breath sounds: No stridor. Wheezing present. No rhonchi or rales.   Abdominal:      General: Bowel sounds are normal. There is no distension.      Palpations: Abdomen is soft.      Tenderness: There is no abdominal tenderness. There is no right CVA tenderness, left CVA tenderness, guarding or rebound.   Musculoskeletal:         General: No swelling, tenderness, deformity or signs of injury. Normal range of motion.      Cervical back: Normal range of motion and neck supple. No rigidity.      Right lower leg: No edema.      Left lower leg: No edema.      Comments: No calf pain or unilateral leg swelling   Skin:     General: Skin is warm and  dry.      Coloration: Skin is not jaundiced or pale.      Findings: No bruising, erythema or rash.   Neurological:      General: No focal deficit present.      Mental Status: He is alert and oriented to person, place, and time.      Cranial Nerves: No facial asymmetry.      Sensory: No sensory deficit.      Motor: Motor function is intact.   Psychiatric:         Mood and Affect: Mood normal.         Behavior: Behavior normal.         Vital Signs  ED Triage Vitals [12/26/23 1133]   Temperature Pulse Respirations Blood Pressure SpO2   98 °F (36.7 °C) 91 20 163/84 97 %      Temp Source Heart Rate Source Patient Position - Orthostatic VS BP Location FiO2 (%)   Oral Monitor Sitting Left arm --      Pain Score       --           Vitals:    12/26/23 1133   BP: 163/84   Pulse: 91   Patient Position - Orthostatic VS: Sitting         Visual Acuity      ED Medications  Medications   albuterol inhalation solution 10 mg (10 mg Nebulization Given 12/26/23 1158)   ipratropium (ATROVENT) 0.02 % inhalation solution 1 mg (1 mg Nebulization Given 12/26/23 1158)   sodium chloride 0.9 % inhalation solution 12 mL (12 mL Nebulization Given 12/26/23 1159)   methylPREDNISolone sodium succinate (Solu-MEDROL) injection 125 mg (125 mg Intravenous Given 12/26/23 1152)       Diagnostic Studies  Results Reviewed       Procedure Component Value Units Date/Time    FLU/RSV/COVID - if FLU/RSV clinically relevant [[id number]]  (Normal) Collected: 12/26/23 1152    Lab Status: Final result Specimen: Nares from Nose Updated: 12/26/23 1236     SARS-CoV-2 Negative     INFLUENZA A PCR Negative     INFLUENZA B PCR Negative     RSV PCR Negative    Narrative:      FOR PEDIATRIC PATIENTS - copy/paste COVID Guidelines URL to browser: [email or web address]    SARS-CoV-2 assay is a Nucleic Acid Amplification assay intended for the  qualitative detection of nucleic acid from SARS-CoV-2 in nasopharyngeal  swabs.  Results are for the presumptive identification of SARS-CoV-2 RNA.    Positive results are indicative of infection with SARS-CoV-2, the virus  causing COVID-19, but do not rule out bacterial infection or co-infection  with other viruses. Laboratories within the [place] and its  territories are required to report all positive results to the appropriate  public health authorities. Negative results do not preclude SARS-CoV-2  infection and should not be used as the sole basis for treatment or other  patient management decisions. Negative results must be combined with  clinical observations, patient history, and epidemiological information.  This test has not been FDA cleared or approved.    This test has been authorized by FDA under an Emergency Use Authorization  (EUA). This test is only authorized for the duration of time the  declaration that circumstances exist justifying the authorization of the  emergency use of an in vitro diagnostic tests for detection of SARS-CoV-2  virus and/or diagnosis of COVID-19 infection under section 564(b)(1) of  the Act, 21 U.S.C. 360bbb-3(b)(1), unless the authorization is terminated  or revoked sooner. The test has been validated but independent review by FDA  and CLIA is pending.    Test performed using [employer] GeneXpert: This RT-PCR assay targets N2,  a region unique to SARS-CoV-2. A conserved region in the E-gene was chosen  for pan-Sarbecovirus detection which includes SARS-CoV-2.    According to CMS-2020-01-R, this platform meets the definition of high-throughput technology.    HS Troponin 0hr (reflex protocol) [[id number]]  (Normal) Collected: 12/26/23 1152    Lab Status: Final result Specimen: Blood from Arm, Right Updated: 12/26/23 1225     hs TnI 0hr <2 ng/L     Comprehensive metabolic panel [[id number]]  (Abnormal) Collected: 12/26/23 1152    Lab Status: Final result Specimen: Blood from Arm, Right Updated: 12/26/23 1217     Sodium 139 mmol/L      Potassium 3.8 mmol/L       Chloride 103 mmol/L      CO2 30 mmol/L      ANION GAP 6 mmol/L      BUN 17 mg/dL      Creatinine 1.03 mg/dL      Glucose 162 mg/dL      Calcium 9.3 mg/dL      AST 12 U/L      ALT 12 U/L      Alkaline Phosphatase 60 U/L      Total Protein 5.8 g/dL      Albumin 4.1 g/dL      Total Bilirubin 0.35 mg/dL      eGFR 79 ml/min/1.73sq m     Narrative:      National Kidney Disease Foundation guidelines for Chronic Kidney Disease (CKD):     Stage 1 with normal or high GFR (GFR > 90 mL/min/1.73 square meters)    Stage 2 Mild CKD (GFR = 60-89 mL/min/1.73 square meters)    Stage 3A Moderate CKD (GFR = 45-59 mL/min/1.73 square meters)    Stage 3B Moderate CKD (GFR = 30-44 mL/min/1.73 square meters)    Stage 4 Severe CKD (GFR = 15-29 mL/min/1.73 square meters)    Stage 5 End Stage CKD (GFR <15 mL/min/1.73 square meters)  Note: GFR calculation is accurate only with a steady state creatinine    CBC and differential [[id number]] Collected: 12/26/23 1152    Lab Status: Final result Specimen: Blood from Arm, Right Updated: 12/26/23 1159     WBC 6.50 Thousand/uL      RBC 5.44 Million/uL      Hemoglobin 15.8 g/dL      Hematocrit 45.7 %      MCV 84 fL      MCH 29.0 pg      MCHC 34.6 g/dL      RDW 12.8 %      MPV 9.5 fL      Platelets 239 Thousands/uL      nRBC 0 /100 WBCs      Neutrophils Relative 62 %      Immat GRANS % 0 %      Lymphocytes Relative 22 %      Monocytes Relative 9 %      Eosinophils Relative 6 %      Basophils Relative 1 %      Neutrophils Absolute 4.06 Thousands/µL      Immature Grans Absolute 0.02 Thousand/uL      Lymphocytes Absolute 1.43 Thousands/µL      Monocytes Absolute 0.57 Thousand/µL      Eosinophils Absolute 0.38 Thousand/µL      Basophils Absolute 0.04 Thousands/µL     Fingerstick Glucose (POCT) [[id number]]  (Abnormal) Collected: 12/26/23 1129    Lab Status: Final result Updated: 12/26/23 1130     POC Glucose 164 mg/dl                    XR chest 1 view portable   ED Interpretation by Slim Orellana MD  (12/26 1234)   No infiltrates.  No CHF.  No pneumothorax.                 Procedures  ECG 12 Lead Documentation Only    Date/Time: 12/26/2023 12:23 PM    Performed by: Slim Orellana MD  Authorized by: Slim Orellana MD    ECG reviewed by me, the ED Provider: yes    Patient location:  ED  Interpretation:     Interpretation: normal    Rate:     ECG rate assessment: normal    Rhythm:     Rhythm: sinus rhythm    Ectopy:     Ectopy: none    QRS:     QRS axis:  Normal  Conduction:     Conduction: normal    ST segments:     ST segments:  Normal  T waves:     T waves: normal             ED Course             HEART Risk Score      Flowsheet Row Most Recent Value   Heart Score Risk Calculator    History 0 Filed at: 12/26/2023 1320   ECG 0 Filed at: 12/26/2023 1320   Age 1 Filed at: 12/26/2023 1320   Risk Factors 2 Filed at: 12/26/2023 1320   Troponin 0 Filed at: 12/26/2023 1320   HEART Score 3 Filed at: 12/26/2023 1320                          SBIRT 20yo+      Flowsheet Row Most Recent Value   Initial Alcohol Screen: US AUDIT-C     1. How often do you have a drink containing alcohol? 0 Filed at: 12/26/2023 1130   2. How many drinks containing alcohol do you have on a typical day you are drinking?  0 Filed at: 12/26/2023 1130   3a. Male UNDER 65: How often do you have five or more drinks on one occasion? 0 Filed at: 12/26/2023 1130   3b. FEMALE Any Age, or MALE 65+: How often do you have 4 or more drinks on one occassion? 0 Filed at: 12/26/2023 1130   Audit-C Score 0 Filed at: 12/26/2023 1130   ROXY: How many times in the past year have you...    Used an illegal drug or used a prescription medication for non-medical reasons? Never Filed at: 12/26/2023 1130            Wells' Criteria for PE      Flowsheet Row Most Recent Value   Wells' Criteria for PE    Clinical signs and symptoms of DVT 0 Filed at: 12/26/2023 1321   PE is primary diagnosis or equally likely 0 Filed at: 12/26/2023 1321   HR >100 0 Filed at: 12/26/2023  1321   Immobilization at least 3 days or Surgery in the previous 4 weeks 0 Filed at: 12/26/2023 1321   Previous, objectively diagnosed PE or DVT 0 Filed at: 12/26/2023 1321   Hemoptysis 0 Filed at: 12/26/2023 1321   Malignancy with treatment within 6 months or palliative 0 Filed at: 12/26/2023 1321   Wells' Criteria Total 0 Filed at: 12/26/2023 1321                  Medical Decision Making  Patient feels much improved after ED treatment.  Patient symptoms are likely secondary to bronchospasm.  Patient likely has asthmatic bronchitis.  This is not acute coronary syndrome.  EKG and troponin are normal.  X-rays negative for pneumonia or pneumothorax.  No back pain or migration to suggest dissection.  Wells score is low.  This is not pulmonary embolism.  Nothing on EKG or troponin to suggest pericarditis or myocarditis.  Appropriate for discharge and outpatient management.  Patient is COVID and flu negative.  Negative RSV.    Amount and/or Complexity of Data Reviewed  Labs: ordered. Decision-making details documented in ED Course.  Radiology: ordered and independent interpretation performed. Decision-making details documented in ED Course.  ECG/medicine tests: ordered and independent interpretation performed. Decision-making details documented in ED Course.    Risk  Prescription drug management.  Decision regarding hospitalization.             Disposition  Final diagnoses:   Asthmatic bronchitis     Time reflects when diagnosis was documented in both MDM as applicable and the Disposition within this note       Time User Action Codes Description Comment    12/26/2023  1:18 PM Slim Orellana Add [J45.909] Asthmatic bronchitis           ED Disposition       ED Disposition   Discharge    Condition   Stable    Date/Time   Tue Dec 26, 2023 1318    Comment   Moises Ordaz discharge to home/self care.                   Follow-up Information       Follow up With Specialties Details Why Contact Info    Agustín Sethi MD Family  Medicine In 1 week  [address] [place] [address]  [phone]              Patient's Medications   Discharge Prescriptions    ALBUTEROL (2.5 MG/3 ML) 0.083 % NEBULIZER SOLUTION    Take 3 mL (2.5 mg total) by nebulization every 6 (six) hours as needed for wheezing or shortness of breath       Start Date: 12/26/2023End Date: --       Order Dose: 2.5 mg       Quantity: 75 mL    Refills: 0    AZITHROMYCIN (ZITHROMAX Z-MIKE) 250 MG TABLET    Take 2 tablets today then 1 tablet daily x 4 days       Start Date: 12/26/2023End Date: 12/30/2023       Order Dose: --       Quantity: 6 tablet    Refills: 0    PREDNISONE 20 MG TABLET    Take 3 tablets (60 mg total) by mouth daily for 5 days       Start Date: 12/26/2023End Date: 12/31/2023       Order Dose: 60 mg       Quantity: 15 tablet    Refills: 0       No discharge procedures on file.    PDMP Review         Value Time User    PDMP Reviewed  Yes 7/12/2021  7:56 AM Eugenia Ruiz DO            ED Provider  Electronically Signed by             Slim Orellana MD  12/26/23 1322       Slim Orellana MD  12/26/23 1799

## 2024-01-06 NOTE — TELEPHONE ENCOUNTER
Submitted Prior Auth to insurance via [employer]  Received email stating patient is [address],   William Ruano of [facility] has received a PA determination from your patient's insurance  The PA request for your patient has been archived and marked as 'Approved'      Request Key: [id number]  PA created date: 03/15/2019  Outcome: Approved English

## 2024-01-08 ENCOUNTER — TELEPHONE (OUTPATIENT)
Age: 59
End: 2024-01-08

## 2024-02-07 ENCOUNTER — HOSPITAL ENCOUNTER (EMERGENCY)
Facility: HOSPITAL | Age: 59
Discharge: HOME | End: 2024-02-07
Attending: EMERGENCY MEDICINE
Payer: COMMERCIAL

## 2024-02-07 ENCOUNTER — APPOINTMENT (EMERGENCY)
Dept: RADIOLOGY | Facility: HOSPITAL | Age: 59
End: 2024-02-07
Payer: COMMERCIAL

## 2024-02-07 VITALS
OXYGEN SATURATION: 100 % | SYSTOLIC BLOOD PRESSURE: 139 MMHG | TEMPERATURE: 97.9 F | RESPIRATION RATE: 18 BRPM | BODY MASS INDEX: 32.57 KG/M2 | WEIGHT: 217.37 LBS | DIASTOLIC BLOOD PRESSURE: 81 MMHG | HEART RATE: 80 BPM

## 2024-02-07 DIAGNOSIS — M25.569 CHRONIC KNEE PAIN: ICD-10-CM

## 2024-02-07 DIAGNOSIS — J44.1 COPD WITH ACUTE EXACERBATION (HCC): Primary | ICD-10-CM

## 2024-02-07 DIAGNOSIS — G89.29 CHRONIC KNEE PAIN: ICD-10-CM

## 2024-02-07 LAB
ANION GAP SERPL CALCULATED.3IONS-SCNC: 7 MMOL/L
BASOPHILS # BLD AUTO: 0.05 THOUSANDS/ÂΜL (ref 0–0.1)
BASOPHILS NFR BLD AUTO: 1 % (ref 0–1)
BNP SERPL-MCNC: 7 PG/ML (ref 0–100)
BUN SERPL-MCNC: 15 MG/DL (ref 5–25)
CALCIUM SERPL-MCNC: 9.2 MG/DL (ref 8.4–10.2)
CARDIAC TROPONIN I PNL SERPL HS: <2 NG/L
CHLORIDE SERPL-SCNC: 103 MMOL/L (ref 96–108)
CO2 SERPL-SCNC: 29 MMOL/L (ref 21–32)
CREAT SERPL-MCNC: 1.02 MG/DL (ref 0.6–1.3)
EOSINOPHIL # BLD AUTO: 0.58 THOUSAND/ÂΜL (ref 0–0.61)
EOSINOPHIL NFR BLD AUTO: 9 % (ref 0–6)
ERYTHROCYTE [DISTWIDTH] IN BLOOD BY AUTOMATED COUNT: 13.1 % (ref 11.6–15.1)
FLUAV RNA RESP QL NAA+PROBE: NEGATIVE
FLUBV RNA RESP QL NAA+PROBE: NEGATIVE
GFR SERPL CREATININE-BSD FRML MDRD: 79 ML/MIN/1.73SQ M
GLUCOSE SERPL-MCNC: 126 MG/DL (ref 65–140)
HCT VFR BLD AUTO: 43.8 % (ref 36.5–49.3)
HGB BLD-MCNC: 14.6 G/DL (ref 12–17)
IMM GRANULOCYTES # BLD AUTO: 0.01 THOUSAND/UL (ref 0–0.2)
IMM GRANULOCYTES NFR BLD AUTO: 0 % (ref 0–2)
LYMPHOCYTES # BLD AUTO: 1.62 THOUSANDS/ÂΜL (ref 0.6–4.47)
LYMPHOCYTES NFR BLD AUTO: 25 % (ref 14–44)
MCH RBC QN AUTO: 28.5 PG (ref 26.8–34.3)
MCHC RBC AUTO-ENTMCNC: 33.3 G/DL (ref 31.4–37.4)
MCV RBC AUTO: 85 FL (ref 82–98)
MONOCYTES # BLD AUTO: 0.57 THOUSAND/ÂΜL (ref 0.17–1.22)
MONOCYTES NFR BLD AUTO: 9 % (ref 4–12)
NEUTROPHILS # BLD AUTO: 3.75 THOUSANDS/ÂΜL (ref 1.85–7.62)
NEUTS SEG NFR BLD AUTO: 56 % (ref 43–75)
NRBC BLD AUTO-RTO: 0 /100 WBCS
PLATELET # BLD AUTO: 229 THOUSANDS/UL (ref 149–390)
PMV BLD AUTO: 9.7 FL (ref 8.9–12.7)
POTASSIUM SERPL-SCNC: 3.9 MMOL/L (ref 3.5–5.3)
RBC # BLD AUTO: 5.13 MILLION/UL (ref 3.88–5.62)
RSV RNA RESP QL NAA+PROBE: NEGATIVE
SARS-COV-2 RNA RESP QL NAA+PROBE: NEGATIVE
SODIUM SERPL-SCNC: 139 MMOL/L (ref 135–147)
WBC # BLD AUTO: 6.58 THOUSAND/UL (ref 4.31–10.16)

## 2024-02-07 PROCEDURE — 36415 COLL VENOUS BLD VENIPUNCTURE: CPT

## 2024-02-07 PROCEDURE — 85025 COMPLETE CBC W/AUTO DIFF WBC: CPT

## 2024-02-07 PROCEDURE — 99285 EMERGENCY DEPT VISIT HI MDM: CPT

## 2024-02-07 PROCEDURE — 0241U HB NFCT DS VIR RESP RNA 4 TRGT: CPT

## 2024-02-07 PROCEDURE — 71046 X-RAY EXAM CHEST 2 VIEWS: CPT

## 2024-02-07 PROCEDURE — 93005 ELECTROCARDIOGRAM TRACING: CPT

## 2024-02-07 PROCEDURE — 84484 ASSAY OF TROPONIN QUANT: CPT

## 2024-02-07 PROCEDURE — 83880 ASSAY OF NATRIURETIC PEPTIDE: CPT

## 2024-02-07 PROCEDURE — 80048 BASIC METABOLIC PNL TOTAL CA: CPT

## 2024-02-07 RX ORDER — SENNOSIDES 8.6 MG
650 CAPSULE ORAL EVERY 8 HOURS PRN
Qty: 30 TABLET | Refills: 0 | Status: SHIPPED | OUTPATIENT
Start: 2024-02-07

## 2024-02-07 RX ORDER — AZITHROMYCIN 250 MG/1
TABLET, FILM COATED ORAL
Qty: 6 TABLET | Refills: 0 | Status: SHIPPED | OUTPATIENT
Start: 2024-02-07 | End: 2024-02-11

## 2024-02-07 RX ORDER — PREDNISONE 20 MG/1
40 TABLET ORAL DAILY
Qty: 8 TABLET | Refills: 0 | Status: SHIPPED | OUTPATIENT
Start: 2024-02-08 | End: 2024-02-12

## 2024-02-07 RX ORDER — PREDNISONE 20 MG/1
60 TABLET ORAL ONCE
Status: COMPLETED | OUTPATIENT
Start: 2024-02-07 | End: 2024-02-07

## 2024-02-07 RX ORDER — IPRATROPIUM BROMIDE AND ALBUTEROL SULFATE .5; 3 MG/3ML; MG/3ML
1 SOLUTION RESPIRATORY (INHALATION) ONCE
Status: COMPLETED | OUTPATIENT
Start: 2024-02-07 | End: 2024-02-07

## 2024-02-07 RX ADMIN — PREDNISONE 60 MG: 20 TABLET ORAL at 13:41

## 2024-02-07 NOTE — DISCHARGE INSTRUCTIONS
Follow-up with your primary care provider, pulmonologist.  Return to ED for new or worsening symptoms as discussed.

## 2024-02-07 NOTE — ED PROVIDER NOTES
"History  Chief Complaint   Patient presents with    Shortness of Breath     Duo tc done at home and one done in the ambulance.      59 y.o. M PMH of COPD, DM presents to ED c/o Cough x 8 days. Yellow sputum in AM, then clear. COLÓN x 4 days. <1 block. Intermittent cp x 1 month. Last this AM during breathing treatment lasting 35 seconds. Denies leg swelling. Did a duo neb treatment at home without relief, then had another one in the ambulance given by EMS, with improvement of sob, chest tightness. States he feels much better.     Follows with Pulmonology.       History provided by:  Patient  Shortness of Breath  Associated symptoms: cough and wheezing    Associated symptoms: no abdominal pain, no chest pain, no fever, no neck pain, no rash, no sore throat and no vomiting        Prior to Admission Medications   Prescriptions Last Dose Informant Patient Reported? Taking?   Blood Glucose Monitoring Suppl (ACURA BLOOD GLUCOSE METER) w/Device KIT  Self Yes No   Sig: by Does not apply route   Calcium Carb-Cholecalciferol (CALCIUM CARBONATE-VITAMIN D3 PO)  Self Yes No   Sig: Take 1,500 mg by mouth daily   Cinnamon 500 MG capsule  Self Yes No   Sig: Take 500 mg by mouth daily   Diclofenac Sodium (VOLTAREN) 1 %  Self No No   Sig: Apply 2 g topically 4 (four) times a day   EPINEPHrine (EPIPEN) 0.3 mg/0.3 mL SOAJ   No No   Sig: Inject 0.3 mL (0.3 mg total) into a muscle once for 1 dose   FIBER PO  Self Yes No   Sig: Take by mouth   Insulin Pen Needle 32G X 4 MM MISC  Self Yes No   Sig: Unifine Pentips Plus 32 gauge x 5/32\" needle   TAMSULOSIN HCL PO  Self Yes No   Sig: Take 0.4 mg by mouth daily    Turmeric 500 MG CAPS  Self Yes No   Sig: Take by mouth   VITAMIN D PO  Self Yes No   Sig: Take by mouth   albuterol (2.5 mg/3 mL) 0.083 % nebulizer solution  Self No No   Sig: Take 1 vial (2.5 mg total) by nebulization every 4 (four) hours as needed for shortness of breath   albuterol (2.5 mg/3 mL) 0.083 % nebulizer solution   No No "   Sig: Take 3 mL (2.5 mg total) by nebulization every 6 (six) hours as needed for wheezing or shortness of breath   albuterol (PROVENTIL HFA,VENTOLIN HFA) 90 mcg/act inhaler  Self No No   Sig: Inhale 2 puffs every 4 (four) hours as needed for wheezing   albuterol (ProAir HFA) 90 mcg/act inhaler  Self No No   Sig: Inhale 2 puffs every 6 (six) hours as needed for wheezing   albuterol (ProAir HFA) 90 mcg/act inhaler   No No   Sig: Inhale 2 puffs every 6 (six) hours as needed for wheezing   albuterol (Ventolin HFA) 90 mcg/act inhaler  Self No No   Sig: Inhale 1-2 puffs every 6 (six) hours as needed for wheezing   amLODIPine (NORVASC) 5 mg tablet  Self Yes No   Sig: Take 5 mg by mouth daily   atorvastatin (LIPITOR) 80 mg tablet  Self Yes No   Sig: Take 80 mg by mouth daily at bedtime   betamethasone dipropionate (DIPROSONE) 0.05 % ointment   No No   Sig: Apply topically 2 (two) times a day   bimatoprost (LUMIGAN) 0.03 % ophthalmic drops  Self Yes No   Sig: Administer 1 drop to both eyes daily at bedtime     brimonidine (ALPHAGAN P) 0.15 % ophthalmic solution  Self Yes No   Sig: Administer 1 drop to both eyes every 8 (eight) hours     carBAMazepine (TEGretol) 200 mg tablet  Self Yes No   Sig: Take 2 tabs in the morning and 3 tabs at night   cetirizine (ZyrTEC) 10 mg tablet  Self Yes No   Sig: Take 10 mg by mouth daily   diclofenac sodium (VOLTAREN) 50 mg EC tablet   No No   Sig: Take 1 tablet (50 mg total) by mouth 2 (two) times a day for 10 days   dupilumab (DUPIXENT) subcutaneous injection  Self Yes No   Sig: Inject under the skin every 14 (fourteen) days   erythromycin (ILOTYCIN) ophthalmic ointment   No No   Sig: Place a 1/2 inch ribbon of ointment into the lower eyelid.   finasteride (PROSCAR) 5 mg tablet  Self Yes No   Sig: Take 5 mg by mouth daily   fluticasone (FLONASE) 50 mcg/act nasal spray  Self Yes No   Si spray into each nostril daily   gabapentin (NEURONTIN) 300 mg capsule  Self Yes No   Sig: Take 300 mg  by mouth 3 (three) times a day   glucagon 1 MG injection  Self Yes No   Sig: as needed   guaiFENesin (MUCINEX) 600 mg 12 hr tablet  Self No No   Sig: Take 1 tablet (600 mg total) by mouth every 12 (twelve) hours   hydrOXYzine pamoate (VISTARIL) 50 mg capsule  Self Yes No   Sig: Take 50 mg by mouth Three times daily as needed   insulin aspart (NovoLOG) 100 units/mL injection  Self Yes No   Sig: Inject under the skin 3 (three) times a day before meals   insulin glargine (BASAGLAR KWIKPEN) 100 units/mL injection pen  Self Yes No   Sig: Inject 43 Units under the skin daily at bedtime    ipratropium (ATROVENT) 0.02 % nebulizer solution  Self No No   Sig: Take 2.5 mL (0.5 mg total) by nebulization 4 (four) times a day   latanoprost (XALATAN) 0.005 % ophthalmic solution  Self Yes No   Sig: Administer 1 drop to both eyes daily   lidocaine (LIDODERM) 5 %  Self No No   Sig: Apply 1 patch topically daily Remove & Discard patch within 12 hours or as directed by MD   misoprostol (CYTOTEC) 200 mcg tablet   No No   Sig: Take 1 tablet (200 mcg total) by mouth 3 (three) times a day for 10 days   montelukast (SINGULAIR) 10 mg tablet  Self Yes No   Sig: Take 10 mg by mouth daily     naloxone (NARCAN) 4 mg/0.1 mL nasal spray  Self No No   Sig: Administer 1 spray into a nostril. If no response after 2-3 minutes, give another dose in the other nostril using a new spray.   naproxen (EC NAPROSYN) 375 MG TBEC   No No   Sig: Take 1 tablet (375 mg total) by mouth 2 (two) times a day with meals   omega-3-acid ethyl esters (LOVAZA) 1 g capsule  Self Yes No   Sig: Take 1 g by mouth daily   omeprazole (PriLOSEC) 40 MG capsule  Self Yes No   Sig: Take 40 mg by mouth daily   perphenazine 2 mg tablet  Self Yes No   Sig: Take 2 mg by mouth daily   polyvinyl alcohol (LIQUIFILM TEARS) 1.4 % ophthalmic solution  Self Yes No   Si drops as needed for dry eyes   predniSONE 20 mg tablet  Self No No   Sig: Take 3 tablets (60 mg total) by mouth daily    Patient not taking: Reported on 2022    predniSONE 5 mg tablet  Self Yes No   Sig: Take 2.5 mg by mouth every other day On Monday, Wednesday, Friday    Patient not taking: Reported on 2022    predniSONE 50 mg tablet  Self No No   Sig: Take 1 tablet (50 mg total) by mouth daily   Patient not taking: Reported on 2022    tolnaftate (TINACTIN) 1 % powder  Self Yes No   Sig: Apply 1 application topically 2 (two) times a day      Facility-Administered Medications: None       Past Medical History:   Diagnosis Date    Asthma     Bipolar disorder (HCC)     COPD (chronic obstructive pulmonary disease) ([facility])     Diabetes mellitus (HCC)     Enlarged prostate     Glaucoma     Hyperlipidemia     Hypertension     Overdose of heroin, accidental or unintentional, sequela     Psychiatric disorder     Seasonal allergic rhinitis     Seizures ([facility])        Past Surgical History:   Procedure Laterality Date    CIRCUMCISION      KNEE SURGERY      WRIST SURGERY Left        Family History   Problem Relation Age of Onset    Arthritis Mother      I have reviewed and agree with the history as documented.    E-Cigarette/Vaping    E-Cigarette Use Never User      E-Cigarette/Vaping Substances    Nicotine No     THC No     CBD No     Flavoring No     Other No     Unknown No      Social History     Tobacco Use    Smoking status: Former     Current packs/day: 0.00     Average packs/day: 2.0 packs/day for 27.0 years (54.0 ttl pk-yrs)     Types: Cigarettes     Start date:      Quit date:      Years since quittin.1    Smokeless tobacco: Never   Vaping Use    Vaping status: Never Used   Substance Use Topics    Alcohol use: Not Currently    Drug use: Not Currently     Types: Heroin, Fentanyl     Comment: OD 21 heroin       Review of Systems   Constitutional:  Negative for chills and fever.   HENT:  Negative for congestion and sore throat.    Eyes:  Negative for visual disturbance.   Respiratory:  Positive for cough, chest  tightness, shortness of breath and wheezing.    Cardiovascular:  Negative for chest pain, palpitations and leg swelling.   Gastrointestinal:  Negative for abdominal pain, nausea and vomiting.   Genitourinary:  Negative for decreased urine volume.   Musculoskeletal:  Negative for back pain and neck pain. Arthralgias: chronic.  Skin:  Negative for color change and rash.   Neurological:  Negative for dizziness, syncope and weakness.   All other systems reviewed and are negative.      Physical Exam  Physical Exam  Vitals and nursing note reviewed.   Constitutional:       General: He is awake. He is not in acute distress.     Appearance: Normal appearance. He is not ill-appearing, toxic-appearing or diaphoretic.   HENT:      Head: Normocephalic.      Mouth/Throat:      Lips: Pink.      Mouth: Mucous membranes are moist.      Pharynx: Oropharynx is clear.   Eyes:      General: Vision grossly intact.   Cardiovascular:      Rate and Rhythm: Normal rate and regular rhythm.      Heart sounds: Normal heart sounds.   Pulmonary:      Effort: Pulmonary effort is normal. No tachypnea, bradypnea, accessory muscle usage or respiratory distress.      Breath sounds: No stridor. Wheezing (mild expiratory) present. No decreased breath sounds, rhonchi or rales.      Comments: No decreased air movement. Patient in no respiratory distress, speaking in full sentences, managing oral secretions without difficulty, no accessory muscle use, retractions, or belly breathing noted, no adventitious lung sounds auscultated bilaterally.      Chest:      Chest wall: No tenderness or crepitus.   Abdominal:      General: There is no distension.      Palpations: Abdomen is soft.      Tenderness: There is no abdominal tenderness.   Musculoskeletal:      Right lower leg: No edema.      Left lower leg: No edema.   Skin:     General: Skin is warm and dry.      Capillary Refill: Capillary refill takes less than 2 seconds.      Findings: No rash.    Neurological:      Mental Status: He is alert.      Gait: Gait normal.         Vital Signs  ED Triage Vitals   Temperature Pulse Respirations Blood Pressure SpO2   02/07/24 1314 02/07/24 1314 02/07/24 1314 02/07/24 1314 02/07/24 1314   97.9 °F (36.6 °C) 86 18 163/88 96 %      Temp src Heart Rate Source Patient Position - Orthostatic VS BP Location FiO2 (%)   -- 02/07/24 1513 02/07/24 1513 02/07/24 1513 --    Monitor Sitting Left arm       Pain Score       02/07/24 1314       7           Vitals:    02/07/24 1314 02/07/24 1513   BP: 163/88 139/81   Pulse: 86 80   Patient Position - Orthostatic VS:  Sitting         Visual Acuity      ED Medications  Medications   ipratropium-albuterol (FOR EMS ONLY) (DUO-NEB) 0.5-2.5 mg/3 mL inhalation solution 3 mL (0 mL Does not apply Given to EMS 2/7/24 1312)   predniSONE tablet 60 mg (60 mg Oral Given 2/7/24 1341)       Diagnostic Studies  Results Reviewed       Procedure Component Value Units Date/Time    FLU/RSV/COVID - if FLU/RSV clinically relevant [[id number]]  (Normal) Collected: 02/07/24 1341    Lab Status: Final result Specimen: Nares from Nose Updated: 02/07/24 1431     SARS-CoV-2 Negative     INFLUENZA A PCR Negative     INFLUENZA B PCR Negative     RSV PCR Negative    Narrative:      FOR PEDIATRIC PATIENTS - copy/paste COVID Guidelines URL to browser: [email or web address]    SARS-CoV-2 assay is a Nucleic Acid Amplification assay intended for the  qualitative detection of nucleic acid from SARS-CoV-2 in nasopharyngeal  swabs. Results are for the presumptive identification of SARS-CoV-2 RNA.    Positive results are indicative of infection with SARS-CoV-2, the virus  causing COVID-19, but do not rule out bacterial infection or co-infection  with other viruses. Laboratories within the [place] and its  territories are required to report all positive results to the appropriate  public health authorities. Negative  results do not preclude SARS-CoV-2  infection and should not be used as the sole basis for treatment or other  patient management decisions. Negative results must be combined with  clinical observations, patient history, and epidemiological information.  This test has not been FDA cleared or approved.    This test has been authorized by FDA under an Emergency Use Authorization  (EUA). This test is only authorized for the duration of time the  declaration that circumstances exist justifying the authorization of the  emergency use of an in vitro diagnostic tests for detection of SARS-CoV-2  virus and/or diagnosis of COVID-19 infection under section 564(b)(1) of  the Act, 21 U.S.C. 360bbb-3(b)(1), unless the authorization is terminated  or revoked sooner. The test has been validated but independent review by FDA  and CLIA is pending.    Test performed using Fingerprintpert: This RT-PCR assay targets N2,  a region unique to SARS-CoV-2. A conserved region in the E-gene was chosen  for pan-Sarbecovirus detection which includes SARS-CoV-2.    According to CMS-2020-01-R, this platform meets the definition of high-throughput technology.    Basic metabolic panel [[id number]] Collected: 02/07/24 1348    Lab Status: Final result Specimen: Blood from Arm, Right Updated: 02/07/24 1430     Sodium 139 mmol/L      Potassium 3.9 mmol/L      Chloride 103 mmol/L      CO2 29 mmol/L      ANION GAP 7 mmol/L      BUN 15 mg/dL      Creatinine 1.02 mg/dL      Glucose 126 mg/dL      Calcium 9.2 mg/dL      eGFR 79 ml/min/1.73sq m     Narrative:      National Kidney Disease Foundation guidelines for Chronic Kidney Disease (CKD):     Stage 1 with normal or high GFR (GFR > 90 mL/min/1.73 square meters)    Stage 2 Mild CKD (GFR = 60-89 mL/min/1.73 square meters)    Stage 3A Moderate CKD (GFR = 45-59 mL/min/1.73 square meters)    Stage 3B Moderate CKD (GFR = 30-44 mL/min/1.73 square meters)    Stage 4 Severe CKD (GFR = 15-29 mL/min/1.73 square  meters)    Stage 5 End Stage CKD (GFR <15 mL/min/1.73 square meters)  Note: GFR calculation is accurate only with a steady state creatinine    HS Troponin 0hr (reflex protocol) [[id number]]  (Normal) Collected: 02/07/24 1348    Lab Status: Final result Specimen: Blood from Arm, Right Updated: 02/07/24 1428     hs TnI 0hr <2 ng/L     B-Type Natriuretic Peptide(BNP) [[id number]]  (Normal) Collected: 02/07/24 1348    Lab Status: Final result Specimen: Blood from Arm, Right Updated: 02/07/24 1426     BNP 7 pg/mL     CBC and differential [[id number]]  (Abnormal) Collected: 02/07/24 1348    Lab Status: Final result Specimen: Blood from Arm, Right Updated: 02/07/24 1405     WBC 6.58 Thousand/uL      RBC 5.13 Million/uL      Hemoglobin 14.6 g/dL      Hematocrit 43.8 %      MCV 85 fL      MCH 28.5 pg      MCHC 33.3 g/dL      RDW 13.1 %      MPV 9.7 fL      Platelets 229 Thousands/uL      nRBC 0 /100 WBCs      Neutrophils Relative 56 %      Immat GRANS % 0 %      Lymphocytes Relative 25 %      Monocytes Relative 9 %      Eosinophils Relative 9 %      Basophils Relative 1 %      Neutrophils Absolute 3.75 Thousands/µL      Immature Grans Absolute 0.01 Thousand/uL      Lymphocytes Absolute 1.62 Thousands/µL      Monocytes Absolute 0.57 Thousand/µL      Eosinophils Absolute 0.58 Thousand/µL      Basophils Absolute 0.05 Thousands/µL                    XR chest 2 views   Final Result by Gatito Lazo MD (02/08 0551)      No acute cardiopulmonary disease.            Workstation performed: [id number]                    Procedures  Procedures         ED Course  ED Course as of 02/10/24 1644   Wed Feb 07, 2024   1329 Cough x 8 days. Yellow sputum in AM, then clear. COLÓN x 4 days. <1 block. Intermittent cp x 1 month. Last this AM during breathing treatment lasting 35 seconds. Denies leg swelling.    1330 Some improvement after 2 duo nebs, on in ambulance. Expiratory wheezing lower lung base only. States feels okay now, chest tightness has  not yet returned.    1333 Procedure Note: EKG  Date/Time: 02/07/24 1:33 PM   Performed by: Ave Pitts   Authorized by: Ave Pitts  ECG interpreted by me, the ED Provider: yes   The EKG demonstrates:  Rate 85 bpm  Rhythm NSR   QTc 442 ms   No ST elevations/depressions     1513 Patient reports improvement of symptoms.  Is also asking for refill of his naproxen for his chronic arthritis pain and will see his PCP for the same in few days.  Has appointment with pulmonology in 1 week.   1516 Has enough albuterol at home.                                SBIRT 22yo+      Flowsheet Row Most Recent Value   Initial Alcohol Screen: US AUDIT-C     1. How often do you have a drink containing alcohol? 0 Filed at: 02/07/2024 1313   2. How many drinks containing alcohol do you have on a typical day you are drinking?  0 Filed at: 02/07/2024 1313   3a. Male UNDER 65: How often do you have five or more drinks on one occasion? 0 Filed at: 02/07/2024 1313   3b. FEMALE Any Age, or MALE 65+: How often do you have 4 or more drinks on one occassion? 0 Filed at: 02/07/2024 1313   Audit-C Score 0 Filed at: 02/07/2024 1313   ROXY: How many times in the past year have you...    Used an illegal drug or used a prescription medication for non-medical reasons? Never Filed at: 02/07/2024 1313                      Medical Decision Making  Ddx includes but not limited to COPD exacerbation, PNA, CHF.     ECG without acute ischemic changes.  Chest x-ray without cardiopulmonary disease on my wet read.  No findings concerning for CHF.  High-sensitivity troponin less than 2, do not suspect ACS at this time.  Patient feels much improved after the DuoNeb, steroid.  Plan to treat for COPD exacerbation.  He is not interested in admission, but understands return precautions.     All imaging and/or lab testing discussed with patient, strict return to ED precautions discussed. Patient recommended to follow up promptly with appropriate outpatient provider.  "Patient and/or family members verbalizes understanding and agrees with plan. Patient and/or family members were given opportunity to ask questions, all questions were answered at this time. Patient is stable for discharge.     Portions of the record may have been created with voice recognition software. Occasional wrong word or \"sound a like\" substitutions may have occurred due to the inherent limitations of voice recognition software. Read the chart carefully and recognize, using context, where substitutions have occurred.       Amount and/or Complexity of Data Reviewed  Labs: ordered.  Radiology: ordered.    Risk  OTC drugs.  Prescription drug management.             Disposition  Final diagnoses:   COPD with acute exacerbation (HCC)   Chronic knee pain     Time reflects when diagnosis was documented in both MDM as applicable and the Disposition within this note       Time User Action Codes Description Comment    2/7/2024  3:14 PM Ave Pitts [J44.1] COPD with acute exacerbation (HCC)     2/7/2024  3:17 PM Ave Pitts [M25.569,  G89.29] Chronic knee pain           ED Disposition       ED Disposition   Discharge    Condition   Stable    Date/Time   Wed Feb 7, 2024 1516    Comment   Moises Ordaz discharge to home/self care.                   Follow-up Information       Follow up With Specialties Details Why Contact Info    Agustín Sethi MD Family Medicine Schedule an appointment as soon as possible for a visit  For follow up regarding your symptoms [address] [facility] [id number] [phone]              Discharge Medication List as of 2/7/2024  3:18 PM        START taking these medications    Details   acetaminophen (TYLENOL) 650 mg CR tablet Take 1 tablet (650 mg total) by mouth every 8 (eight) hours as needed for mild pain, Starting Wed 2/7/2024, Normal      azithromycin (ZITHROMAX) 250 mg tablet Take 2 tablets today then 1 tablet daily x 4 days, Normal      !! Diclofenac Sodium (VOLTAREN) 1 % " Apply 2 g topically 4 (four) times a day, Starting Wed 2/7/2024, Normal      !! predniSONE 20 mg tablet Take 2 tablets (40 mg total) by mouth daily for 4 days Do not start before February 8, 2024., Starting Thu 2/8/2024, Until Mon 2/12/2024, Normal       !! - Potential duplicate medications found. Please discuss with provider.        CONTINUE these medications which have NOT CHANGED    Details   !! albuterol (2.5 mg/3 mL) 0.083 % nebulizer solution Take 1 vial (2.5 mg total) by nebulization every 4 (four) hours as needed for shortness of breath, Starting Thu 11/15/2018, Normal      !! albuterol (2.5 mg/3 mL) 0.083 % nebulizer solution Take 3 mL (2.5 mg total) by nebulization every 6 (six) hours as needed for wheezing or shortness of breath, Starting Tue 12/26/2023, Normal      !! albuterol (ProAir HFA) 90 mcg/act inhaler Inhale 2 puffs every 6 (six) hours as needed for wheezing, Starting Fri 12/10/2021, Normal      !! albuterol (ProAir HFA) 90 mcg/act inhaler Inhale 2 puffs every 6 (six) hours as needed for wheezing, Starting Fri 7/21/2023, Normal      !! albuterol (PROVENTIL HFA,VENTOLIN HFA) 90 mcg/act inhaler Inhale 2 puffs every 4 (four) hours as needed for wheezing, Starting Thu 11/15/2018, Normal      !! albuterol (Ventolin HFA) 90 mcg/act inhaler Inhale 1-2 puffs every 6 (six) hours as needed for wheezing, Starting Sat 4/2/2022, Normal      amLODIPine (NORVASC) 5 mg tablet Take 5 mg by mouth daily, Historical Med      atorvastatin (LIPITOR) 80 mg tablet Take 80 mg by mouth daily at bedtime, Starting Thu 9/8/2016, Historical Med      betamethasone dipropionate (DIPROSONE) 0.05 % ointment Apply topically 2 (two) times a day, Starting Fri 8/4/2023, Normal      bimatoprost (LUMIGAN) 0.03 % ophthalmic drops Administer 1 drop to both eyes daily at bedtime  , Starting Thu 6/5/2008, Historical Med      Blood Glucose Monitoring Suppl (ACURA BLOOD GLUCOSE METER) w/Device KIT by Does not apply route, Historical Med       brimonidine (ALPHAGAN P) 0.15 % ophthalmic solution Administer 1 drop to both eyes every 8 (eight) hours  , Starting Thu 7/2/2015, Historical Med      Calcium Carb-Cholecalciferol (CALCIUM CARBONATE-VITAMIN D3 PO) Take 1,500 mg by mouth daily, Starting Fri 9/20/2013, Historical Med      carBAMazepine (TEGretol) 200 mg tablet Take 2 tabs in the morning and 3 tabs at night, Historical Med      cetirizine (ZyrTEC) 10 mg tablet Take 10 mg by mouth daily, Historical Med      Cinnamon 500 MG capsule Take 500 mg by mouth daily, Historical Med      !! Diclofenac Sodium (VOLTAREN) 1 % Apply 2 g topically 4 (four) times a day, Starting Fri 5/14/2021, Normal      diclofenac sodium (VOLTAREN) 50 mg EC tablet Take 1 tablet (50 mg total) by mouth 2 (two) times a day for 10 days, Starting Mon 7/12/2021, Until Mon 3/28/2022, Normal      dupilumab (DUPIXENT) subcutaneous injection Inject under the skin every 14 (fourteen) days, Historical Med      EPINEPHrine (EPIPEN) 0.3 mg/0.3 mL SOAJ Inject 0.3 mL (0.3 mg total) into a muscle once for 1 dose, Starting Fri 8/4/2023, Normal      erythromycin (ILOTYCIN) ophthalmic ointment Place a 1/2 inch ribbon of ointment into the lower eyelid., Normal      FIBER PO Take by mouth, Historical Med      finasteride (PROSCAR) 5 mg tablet Take 5 mg by mouth daily, Starting Tue 4/26/2016, Historical Med      fluticasone (FLONASE) 50 mcg/act nasal spray 1 spray into each nostril daily, Historical Med      gabapentin (NEURONTIN) 300 mg capsule Take 300 mg by mouth 3 (three) times a day, Starting Thu 9/8/2016, Historical Med      glucagon 1 MG injection as needed, Starting Fri 8/5/2016, Historical Med      guaiFENesin (MUCINEX) 600 mg 12 hr tablet Take 1 tablet (600 mg total) by mouth every 12 (twelve) hours, Starting Fri 5/3/2019, Print      hydrOXYzine pamoate (VISTARIL) 50 mg capsule Take 50 mg by mouth Three times daily as needed, Starting Thu 8/8/2019, Historical Med      insulin aspart (NovoLOG)  "100 units/mL injection Inject under the skin 3 (three) times a day before meals, Historical Med      insulin glargine (BASAGLAR KWIKPEN) 100 units/mL injection pen Inject 43 Units under the skin daily at bedtime , Historical Med      Insulin Pen Needle 32G X 4 MM MISC Unifine Pentips Plus 32 gauge x 5/32\" needle, Historical Med      ipratropium (ATROVENT) 0.02 % nebulizer solution Take 2.5 mL (0.5 mg total) by nebulization 4 (four) times a day, Starting Mon 3/28/2022, Normal      latanoprost (XALATAN) 0.005 % ophthalmic solution Administer 1 drop to both eyes daily, Historical Med      lidocaine (LIDODERM) 5 % Apply 1 patch topically daily Remove & Discard patch within 12 hours or as directed by MD, Starting Fri 5/14/2021, Normal      misoprostol (CYTOTEC) 200 mcg tablet Take 1 tablet (200 mcg total) by mouth 3 (three) times a day for 10 days, Starting Mon 7/12/2021, Until Thu 7/22/2021, Normal      montelukast (SINGULAIR) 10 mg tablet Take 10 mg by mouth daily  , Starting Tue 8/25/2015, Historical Med      naloxone (NARCAN) 4 mg/0.1 mL nasal spray Administer 1 spray into a nostril. If no response after 2-3 minutes, give another dose in the other nostril using a new spray., Normal      naproxen (EC NAPROSYN) 375 MG TBEC Take 1 tablet (375 mg total) by mouth 2 (two) times a day with meals, Starting Mon 5/29/2023, Until Tue 5/28/2024, Normal      omega-3-acid ethyl esters (LOVAZA) 1 g capsule Take 1 g by mouth daily, Historical Med      omeprazole (PriLOSEC) 40 MG capsule Take 40 mg by mouth daily, Starting Tue 4/26/2016, Historical Med      perphenazine 2 mg tablet Take 2 mg by mouth daily, Historical Med      polyvinyl alcohol (LIQUIFILM TEARS) 1.4 % ophthalmic solution 2 drops as needed for dry eyes, Historical Med      !! predniSONE 20 mg tablet Take 3 tablets (60 mg total) by mouth daily, Starting Mon 3/28/2022, Normal      !! predniSONE 5 mg tablet Take 2.5 mg by mouth every other day On Monday, Wednesday, " Friday , Historical Med      !! predniSONE 50 mg tablet Take 1 tablet (50 mg total) by mouth daily, Starting Wed 3/16/2022, Normal      TAMSULOSIN HCL PO Take 0.4 mg by mouth daily , Historical Med      tolnaftate (TINACTIN) 1 % powder Apply 1 application topically 2 (two) times a day, Historical Med      Turmeric 500 MG CAPS Take by mouth, Historical Med      VITAMIN D PO Take by mouth, Historical Med       !! - Potential duplicate medications found. Please discuss with provider.          No discharge procedures on file.    PDMP Review         Value Time User    PDMP Reviewed  Yes 7/12/2021  7:56 AM Eugenia Ruiz DO            ED Provider  Electronically Signed by             Ave Pitts PA-C  02/10/24 [id number]

## 2024-02-08 LAB
ATRIAL RATE: 85 BPM
P AXIS: 60 DEGREES
PR INTERVAL: 172 MS
QRS AXIS: 7 DEGREES
QRSD INTERVAL: 86 MS
QT INTERVAL: 372 MS
QTC INTERVAL: 442 MS
T WAVE AXIS: 16 DEGREES
VENTRICULAR RATE: 85 BPM

## 2024-02-08 PROCEDURE — 93010 ELECTROCARDIOGRAM REPORT: CPT

## 2024-02-21 PROBLEM — J06.9 URI, ACUTE: Status: RESOLVED | Noted: 2020-10-05 | Resolved: 2024-02-21

## 2024-03-20 ENCOUNTER — CONSULT (OUTPATIENT)
Dept: PULMONOLOGY | Facility: CLINIC | Age: 59
End: 2024-03-20
Payer: COMMERCIAL

## 2024-03-20 ENCOUNTER — TELEPHONE (OUTPATIENT)
Age: 59
End: 2024-03-20

## 2024-03-20 VITALS
HEIGHT: 69 IN | DIASTOLIC BLOOD PRESSURE: 78 MMHG | OXYGEN SATURATION: 98 % | HEART RATE: 91 BPM | TEMPERATURE: 97.8 F | SYSTOLIC BLOOD PRESSURE: 138 MMHG | WEIGHT: 211.4 LBS | BODY MASS INDEX: 31.31 KG/M2

## 2024-03-20 DIAGNOSIS — K21.9 GASTROESOPHAGEAL REFLUX DISEASE WITHOUT ESOPHAGITIS: ICD-10-CM

## 2024-03-20 DIAGNOSIS — J30.9 ALLERGIC RHINITIS WITH POSTNASAL DRIP: ICD-10-CM

## 2024-03-20 DIAGNOSIS — G47.33 OSA (OBSTRUCTIVE SLEEP APNEA): ICD-10-CM

## 2024-03-20 DIAGNOSIS — J45.50 SEVERE PERSISTENT ASTHMA WITHOUT COMPLICATION: Primary | ICD-10-CM

## 2024-03-20 DIAGNOSIS — T78.40XA ALLERGIC REACTION, INITIAL ENCOUNTER: ICD-10-CM

## 2024-03-20 DIAGNOSIS — R91.1 PULMONARY NODULE: ICD-10-CM

## 2024-03-20 DIAGNOSIS — R09.82 ALLERGIC RHINITIS WITH POSTNASAL DRIP: ICD-10-CM

## 2024-03-20 PROBLEM — F19.20 CORTICOSTEROID DEPENDENCE (HCC): Status: RESOLVED | Noted: 2019-09-23 | Resolved: 2024-03-20

## 2024-03-20 PROBLEM — J45.909 SEVERE ASTHMA: Status: RESOLVED | Noted: 2018-10-25 | Resolved: 2024-03-20

## 2024-03-20 PROBLEM — J20.9 ACUTE BRONCHITIS: Status: RESOLVED | Noted: 2018-10-25 | Resolved: 2024-03-20

## 2024-03-20 PROCEDURE — 99204 OFFICE O/P NEW MOD 45 MIN: CPT | Performed by: INTERNAL MEDICINE

## 2024-03-20 RX ORDER — PREDNISONE 20 MG/1
20 TABLET ORAL DAILY
Qty: 5 TABLET | Refills: 0 | Status: SHIPPED | OUTPATIENT
Start: 2024-03-20

## 2024-03-20 RX ORDER — FLUTICASONE PROPIONATE 50 MCG
1 SPRAY, SUSPENSION (ML) NASAL DAILY
Qty: 16 G | Refills: 5 | Status: SHIPPED | OUTPATIENT
Start: 2024-03-20

## 2024-03-20 RX ORDER — FLUTICASONE FUROATE AND VILANTEROL 200; 25 UG/1; UG/1
1 POWDER RESPIRATORY (INHALATION) DAILY
Qty: 60 BLISTER | Refills: 5 | Status: SHIPPED | OUTPATIENT
Start: 2024-03-20 | End: 2024-09-16

## 2024-03-20 RX ORDER — FEXOFENADINE HCL 60 MG/1
60 TABLET, FILM COATED ORAL DAILY
Qty: 30 TABLET | Refills: 5 | Status: SHIPPED | OUTPATIENT
Start: 2024-03-20

## 2024-03-20 RX ORDER — EPINEPHRINE 0.3 MG/.3ML
0.3 INJECTION SUBCUTANEOUS ONCE
Qty: 0.6 ML | Refills: 0 | Status: SHIPPED | OUTPATIENT
Start: 2024-03-20 | End: 2024-03-20

## 2024-03-20 NOTE — PROGRESS NOTES
Consultation - Pulmonary Medicine   Moises Ordaz 59 y.o. male MRN: [id number]    Physician Requesting Consult: self referred  Reason for Consult: Severe asthma    Severe persistent asthma without complication  Patient has some wheezing currently and he feels congested, will give prednisone for 5 days.  I will give him low-dose with 20 mg daily due to diabetes and hyperglycemia.  Will restart inhaled corticosteroids with beta agonist, I decided to try Breo 200/25 once daily, he will let me know if it is not covered by his insurance to switch to Dulera 200/5, 2 puffs twice daily    Will consider PFTs in the future  Continue as needed albuterol  Will restart Dupixent given eosinophilic phenotype with elevated eosinophils and in the past did very well on Dupixent and tolerated without side effects    Pulmonary nodule  Patient was concerned about it but this has been stable since 2014.  No further follow-up    ARPITA (obstructive sleep apnea)  Encouraged patient to start using his CPAP but he reports feeling fine without it for long time.  He will reach out to his sleep specialist for follow-up and probably repeat sleep study if indicated    Allergic rhinitis with postnasal drip  Continue as needed Flonase and Allegra during the allergy season    GERD (gastroesophageal reflux disease)  Currently controlled with omeprazole before breakfast, continue      Return in about 4 months (around 7/20/2024).    Diagnoses and all orders for this visit:    Severe persistent asthma without complication  -     fluticasone-vilanterol (Breo Ellipta) 200-25 mcg/actuation inhaler; Inhale 1 puff daily Rinse mouth after use.  -     predniSONE 20 mg tablet; Take 1 tablet (20 mg total) by mouth daily  -     dupilumab (DUPIXENT) subcutaneous injection; Inject 2 mL (300 mg total) under the skin every 14 (fourteen) days    ARPITA (obstructive sleep apnea)    Gastroesophageal reflux disease without esophagitis    Pulmonary nodule    Allergic  rhinitis with postnasal drip    Allergic reaction, initial encounter  -     EPINEPHrine (EPIPEN) 0.3 mg/0.3 mL SOAJ; Inject 0.3 mL (0.3 mg total) into a muscle once for 1 dose  -     fluticasone (FLONASE) 50 mcg/act nasal spray; 1 spray into each nostril daily  -     fexofenadine (ALLEGRA) 60 MG tablet; Take 1 tablet (60 mg total) by mouth daily      ______________________________________________________________________    HPI:    Moises Ordaz is a 59 y.o. male who presents to [facility] care again for severe persistent asthma.  Patient used to be our patient in the office years ago, he has severe persistent asthma with prolonged uncontrolled course and complications including respiratory arrest and intubation at least twice, he was on chronic corticosteroids for many years in the past and will try to wean him but then it was successful after starting Dupixent.  He stayed on Dupixent for couple of years and did well and even was using his Dulera as needed but he changed insurance and stopped seeing the same allergist who prescribed it so he has been off Dupixent for more than a year and he feels his symptoms got worse.  Over the past year he had 2 exacerbations of his asthma, and he continues to have significant symptoms with this and exertion, chronic cough with yellowish sputum production, chest tightness and wheezing.    Patient has obstructive sleep apnea and used to be on CPAP in the past, he follows with a sleep specialist, he stopped using his CPAP for the past 2 years and has been doing fine, it was due to the recall of his machine but currently has a new machine but has not used.  He lost significant weight since then as he states    Patient has allergic rhinitis with postnasal drip, gets worse during spring, he uses Allegra and Flonase as needed    Patient also has history of seizures remotely, last seizure was in near 2000, he is on Tegretol.    Patient has GERD but his symptoms have been controlled with  omeprazole that he takes before breakfast.  Denies dysphagia.    Currently lives alone, no pets, no occupational exposure, does not smoke cigarettes    Review of Systems:  Review of Systems   Constitutional: Negative.    HENT:  Positive for congestion and postnasal drip.    Eyes: Negative.    Respiratory:  Positive for cough, chest tightness, shortness of breath and wheezing.    Cardiovascular: Negative.    Gastrointestinal: Negative.    Endocrine: Negative.    Genitourinary: Negative.    Musculoskeletal: Negative.    Skin: Negative.    Allergic/Immunologic: Negative.    Neurological: Negative.    Hematological: Negative.    Psychiatric/Behavioral: Negative.       Aside from what is mentioned in the HPI, the review of systems otherwise negative.    Current Medications:    Current Outpatient Medications:     acetaminophen (TYLENOL) 650 mg CR tablet, Take 1 tablet (650 mg total) by mouth every 8 (eight) hours as needed for mild pain, Disp: 30 tablet, Rfl: 0    albuterol (2.5 mg/3 mL) 0.083 % nebulizer solution, Take 1 vial (2.5 mg total) by nebulization every 4 (four) hours as needed for shortness of breath, Disp: 25 vial, Rfl: 0    albuterol (2.5 mg/3 mL) 0.083 % nebulizer solution, Take 3 mL (2.5 mg total) by nebulization every 6 (six) hours as needed for wheezing or shortness of breath, Disp: 75 mL, Rfl: 0    albuterol (ProAir HFA) 90 mcg/act inhaler, Inhale 2 puffs every 6 (six) hours as needed for wheezing, Disp: 8.5 g, Rfl: 0    albuterol (ProAir HFA) 90 mcg/act inhaler, Inhale 2 puffs every 6 (six) hours as needed for wheezing, Disp: 8.5 g, Rfl: 0    albuterol (PROVENTIL HFA,VENTOLIN HFA) 90 mcg/act inhaler, Inhale 2 puffs every 4 (four) hours as needed for wheezing, Disp: 1 Inhaler, Rfl: 0    albuterol (Ventolin HFA) 90 mcg/act inhaler, Inhale 1-2 puffs every 6 (six) hours as needed for wheezing, Disp: 8 g, Rfl: 0    amLODIPine (NORVASC) 5 mg tablet, Take 5 mg by mouth daily, Disp: , Rfl:     atorvastatin  (LIPITOR) 80 mg tablet, Take 80 mg by mouth daily at bedtime, Disp: , Rfl:     betamethasone dipropionate (DIPROSONE) 0.05 % ointment, Apply topically 2 (two) times a day, Disp: 30 g, Rfl: 0    bimatoprost (LUMIGAN) 0.03 % ophthalmic drops, Administer 1 drop to both eyes daily at bedtime  , Disp: , Rfl:     Blood Glucose Monitoring Suppl (ACURA BLOOD GLUCOSE METER) w/Device KIT, by Does not apply route, Disp: , Rfl:     brimonidine (ALPHAGAN P) 0.15 % ophthalmic solution, Administer 1 drop to both eyes every 8 (eight) hours  , Disp: , Rfl:     Calcium Carb-Cholecalciferol (CALCIUM CARBONATE-VITAMIN D3 PO), Take 1,500 mg by mouth daily, Disp: , Rfl:     carBAMazepine (TEGretol) 200 mg tablet, Take 2 tabs in the morning and 3 tabs at night, Disp: , Rfl:     cetirizine (ZyrTEC) 10 mg tablet, Take 10 mg by mouth daily, Disp: , Rfl:     Cinnamon 500 MG capsule, Take 500 mg by mouth daily, Disp: , Rfl:     Diclofenac Sodium (VOLTAREN) 1 %, Apply 2 g topically 4 (four) times a day, Disp: 50 g, Rfl: 0    Diclofenac Sodium (VOLTAREN) 1 %, Apply 2 g topically 4 (four) times a day, Disp: 100 g, Rfl: 0    diclofenac sodium (VOLTAREN) 50 mg EC tablet, Take 1 tablet (50 mg total) by mouth 2 (two) times a day for 10 days, Disp: 20 tablet, Rfl: 0    dupilumab (DUPIXENT) subcutaneous injection, Inject under the skin every 14 (fourteen) days, Disp: , Rfl:     EPINEPHrine (EPIPEN) 0.3 mg/0.3 mL SOAJ, Inject 0.3 mL (0.3 mg total) into a muscle once for 1 dose, Disp: 0.6 mL, Rfl: 0    erythromycin (ILOTYCIN) ophthalmic ointment, Place a 1/2 inch ribbon of ointment into the lower eyelid., Disp: 1 g, Rfl: 0    FIBER PO, Take by mouth, Disp: , Rfl:     finasteride (PROSCAR) 5 mg tablet, Take 5 mg by mouth daily, Disp: , Rfl:     fluticasone (FLONASE) 50 mcg/act nasal spray, 1 spray into each nostril daily, Disp: , Rfl:     gabapentin (NEURONTIN) 300 mg capsule, Take 300 mg by mouth 3 (three) times a day, Disp: , Rfl:     glucagon 1 MG  "injection, as needed, Disp: , Rfl:     guaiFENesin (MUCINEX) 600 mg 12 hr tablet, Take 1 tablet (600 mg total) by mouth every 12 (twelve) hours, Disp: 12 tablet, Rfl: 0    hydrOXYzine pamoate (VISTARIL) 50 mg capsule, Take 50 mg by mouth Three times daily as needed, Disp: , Rfl:     insulin aspart (NovoLOG) 100 units/mL injection, Inject under the skin 3 (three) times a day before meals, Disp: , Rfl:     insulin glargine (BASAGLAR KWIKPEN) 100 units/mL injection pen, Inject 43 Units under the skin daily at bedtime , Disp: , Rfl:     Insulin Pen Needle 32G X 4 MM MISC, Unifine Pentips Plus 32 gauge x 5/32\" needle, Disp: , Rfl:     ipratropium (ATROVENT) 0.02 % nebulizer solution, Take 2.5 mL (0.5 mg total) by nebulization 4 (four) times a day, Disp: 100 mL, Rfl: 0    latanoprost (XALATAN) 0.005 % ophthalmic solution, Administer 1 drop to both eyes daily, Disp: , Rfl:     lidocaine (LIDODERM) 5 %, Apply 1 patch topically daily Remove & Discard patch within 12 hours or as directed by MD, Disp: 10 patch, Rfl: 0    montelukast (SINGULAIR) 10 mg tablet, Take 10 mg by mouth daily  , Disp: , Rfl:     naloxone (NARCAN) 4 mg/0.1 mL nasal spray, Administer 1 spray into a nostril. If no response after 2-3 minutes, give another dose in the other nostril using a new spray., Disp: 1 each, Rfl: 0    naproxen (EC NAPROSYN) 375 MG TBEC, Take 1 tablet (375 mg total) by mouth 2 (two) times a day with meals, Disp: 20 tablet, Rfl: 0    omega-3-acid ethyl esters (LOVAZA) 1 g capsule, Take 1 g by mouth daily, Disp: , Rfl:     omeprazole (PriLOSEC) 40 MG capsule, Take 40 mg by mouth daily, Disp: , Rfl:     perphenazine 2 mg tablet, Take 2 mg by mouth daily, Disp: , Rfl:     polyvinyl alcohol (LIQUIFILM TEARS) 1.4 % ophthalmic solution, 2 drops as needed for dry eyes, Disp: , Rfl:     TAMSULOSIN HCL PO, Take 0.4 mg by mouth daily , Disp: , Rfl:     tolnaftate (TINACTIN) 1 % powder, Apply 1 application topically 2 (two) times a day, Disp: , " "Rfl:     Turmeric 500 MG CAPS, Take by mouth, Disp: , Rfl:     VITAMIN D PO, Take by mouth, Disp: , Rfl:     misoprostol (CYTOTEC) 200 mcg tablet, Take 1 tablet (200 mcg total) by mouth 3 (three) times a day for 10 days (Patient not taking: Reported on 3/20/2024), Disp: 30 tablet, Rfl: 0    predniSONE 20 mg tablet, Take 3 tablets (60 mg total) by mouth daily (Patient not taking: Reported on 2022), Disp: 15 tablet, Rfl: 0    predniSONE 5 mg tablet, Take 2.5 mg by mouth every other day On Monday, Wednesday, Friday  (Patient not taking: Reported on 2022), Disp: , Rfl:     predniSONE 50 mg tablet, Take 1 tablet (50 mg total) by mouth daily (Patient not taking: Reported on 2022), Disp: 4 tablet, Rfl: 0    Historical Information   Past Medical History:   Diagnosis Date    Asthma     Bipolar disorder (HCC)     COPD (chronic obstructive pulmonary disease) (HCC)     Diabetes mellitus (HCC)     Enlarged prostate     Glaucoma     Hyperlipidemia     Hypertension     Overdose of heroin, accidental or unintentional, sequela     Psychiatric disorder     Seasonal allergic rhinitis     Seizures (HCC)      Past Surgical History:   Procedure Laterality Date    CIRCUMCISION      KNEE SURGERY      WRIST SURGERY Left      Social History   Social History     Tobacco Use   Smoking Status Former    Current packs/day: 0.00    Average packs/day: 2.0 packs/day for 27.0 years (54.0 ttl pk-yrs)    Types: Cigarettes    Start date:     Quit date:     Years since quittin.2   Smokeless Tobacco Never       Occupational history:  No occupational exposure    Family History:   Family History   Problem Relation Age of Onset    Arthritis Mother          PhysicalExamination:  Vitals:   /78 (BP Location: Left arm, Patient Position: Sitting, Cuff Size: Large)   Pulse 91   Temp 97.8 °F (36.6 °C) (Tympanic)   Ht 5' 8.5\" (1.74 m)   Wt 95.9 kg (211 lb 6.4 oz)   SpO2 98%   BMI 31.68 kg/m²     Gen:  Comfortable on room air.  " "No conversational dyspnea  HEENT:  Conjugate gaze.  sclerae anicteric.  Oropharynx moist, Mallampati 2  Neck: Trachea is midline. No JVD. No adenopathy  Chest: Equal breath sounds with bilateral mild wheezing, good air movement  Cardiac: S1-S2 regular. no murmur  Abdomen:  benign  Extremities: No edema  Neuro:  Normal speech and mentation      Diagnostic Data:  Labs:  I personally reviewed the most recent laboratory data pertinent to today's visit    Lab Results   Component Value Date    WBC 6.58 02/07/2024    HGB 14.6 02/07/2024    HCT 43.8 02/07/2024    MCV 85 02/07/2024     02/07/2024     Lab Results   Component Value Date    GLUCOSE 340 (H) 02/07/2019    CALCIUM 9.2 02/07/2024     (L) 12/11/2014    K 3.9 02/07/2024    CO2 29 02/07/2024     02/07/2024    BUN 15 02/07/2024    CREATININE 1.02 02/07/2024     No results found for: \"IGE\"  Lab Results   Component Value Date    ALT 12 12/26/2023    AST 12 (L) 12/26/2023    ALKPHOS 60 12/26/2023    BILITOT 0.3 10/01/2014     2/7/2024:  Eosinophils 9%/580        Imaging:  I personally reviewed the images on the PAC system pertinent to today's visit  Chest x-ray reviewed on PACS: Clear lungs    Chest CT scan 2021:LUNGS:  Bronchial wall thickening in the right lower lobe with minimal atelectasis in the posterior basal the right lower lobe with mild endobronchial debris in the right lower lobe bronchi.   No change in 6 mm left upper lobe nodule since April 2014, benign      Tenzin Liao MD  "

## 2024-03-20 NOTE — ASSESSMENT & PLAN NOTE
Encouraged patient to start using his CPAP but he reports feeling fine without it for long time.  He will reach out to his sleep specialist for follow-up and probably repeat sleep study if indicated

## 2024-03-20 NOTE — ASSESSMENT & PLAN NOTE
Patient has some wheezing currently and he feels congested, will give prednisone for 5 days.  I will give him low-dose with 20 mg daily due to diabetes and hyperglycemia.  Will restart inhaled corticosteroids with beta agonist, I decided to try Breo 200/25 once daily, he will let me know if it is not covered by his insurance to switch to Dulera 200/5, 2 puffs twice daily    Will consider PFTs in the future  Continue as needed albuterol  Will restart Dupixent given eosinophilic phenotype with elevated eosinophils and in the past did very well on Dupixent and tolerated without side effects

## 2024-03-20 NOTE — TELEPHONE ENCOUNTER
Pt calls in and states that his insurance did not cover the Breo nor the generic and was told if his insurance would not cover to call us for alternative. Please advise

## 2024-03-28 ENCOUNTER — HOSPITAL ENCOUNTER (EMERGENCY)
Facility: HOSPITAL | Age: 59
Discharge: HOME | End: 2024-03-28
Attending: EMERGENCY MEDICINE
Payer: COMMERCIAL

## 2024-03-28 VITALS
HEART RATE: 102 BPM | DIASTOLIC BLOOD PRESSURE: 90 MMHG | TEMPERATURE: 98.3 F | OXYGEN SATURATION: 96 % | RESPIRATION RATE: 20 BRPM | SYSTOLIC BLOOD PRESSURE: 170 MMHG

## 2024-03-28 DIAGNOSIS — J45.901 ASTHMA EXACERBATION: Primary | ICD-10-CM

## 2024-03-28 PROCEDURE — 96374 THER/PROPH/DIAG INJ IV PUSH: CPT

## 2024-03-28 PROCEDURE — 94640 AIRWAY INHALATION TREATMENT: CPT

## 2024-03-28 PROCEDURE — 99284 EMERGENCY DEPT VISIT MOD MDM: CPT | Performed by: EMERGENCY MEDICINE

## 2024-03-28 PROCEDURE — 99283 EMERGENCY DEPT VISIT LOW MDM: CPT

## 2024-03-28 RX ORDER — METHYLPREDNISOLONE SODIUM SUCCINATE 125 MG/2ML
100 INJECTION, POWDER, LYOPHILIZED, FOR SOLUTION INTRAMUSCULAR; INTRAVENOUS ONCE
Status: COMPLETED | OUTPATIENT
Start: 2024-03-28 | End: 2024-03-28

## 2024-03-28 RX ORDER — PREDNISONE 20 MG/1
60 TABLET ORAL DAILY
Qty: 15 TABLET | Refills: 0 | Status: SHIPPED | OUTPATIENT
Start: 2024-03-28 | End: 2024-04-02

## 2024-03-28 RX ADMIN — ALBUTEROL SULFATE 5 MG: 2.5 SOLUTION RESPIRATORY (INHALATION) at 17:56

## 2024-03-28 RX ADMIN — METHYLPREDNISOLONE SODIUM SUCCINATE 100 MG: 125 INJECTION, POWDER, FOR SOLUTION INTRAMUSCULAR; INTRAVENOUS at 18:00

## 2024-03-28 RX ADMIN — IPRATROPIUM BROMIDE 0.5 MG: 0.5 SOLUTION RESPIRATORY (INHALATION) at 17:56

## 2024-03-28 NOTE — ED PROVIDER NOTES
"History  Chief Complaint   Patient presents with    Asthma     Recently completed steroid taper and is still SOB with ambulation      Patient is a 59-year-old male with a h/o COPD who presents with continues wheezing.  Using nebs at home.  Last one just PTA.  States just finished a prednisone taper a few days ago and symptoms worsened.  No smoking.          Prior to Admission Medications   Prescriptions Last Dose Informant Patient Reported? Taking?   Blood Glucose Monitoring Suppl (ACURA BLOOD GLUCOSE METER) w/Device KIT  Self Yes No   Sig: by Does not apply route   Calcium Carb-Cholecalciferol (CALCIUM CARBONATE-VITAMIN D3 PO)  Self Yes No   Sig: Take 1,500 mg by mouth daily   Cinnamon 500 MG capsule  Self Yes No   Sig: Take 500 mg by mouth daily   Diclofenac Sodium (VOLTAREN) 1 %   No No   Sig: Apply 2 g topically 4 (four) times a day   EPINEPHrine (EPIPEN) 0.3 mg/0.3 mL SOAJ   No No   Sig: Inject 0.3 mL (0.3 mg total) into a muscle once for 1 dose   FIBER PO  Self Yes No   Sig: Take by mouth   Insulin Pen Needle 32G X 4 MM MISC  Self Yes No   Sig: Unifine Pentips Plus 32 gauge x 5/32\" needle   TAMSULOSIN HCL PO  Self Yes No   Sig: Take 0.4 mg by mouth daily    Turmeric 500 MG CAPS  Self Yes No   Sig: Take by mouth   VITAMIN D PO  Self Yes No   Sig: Take by mouth   acetaminophen (TYLENOL) 650 mg CR tablet   No No   Sig: Take 1 tablet (650 mg total) by mouth every 8 (eight) hours as needed for mild pain   albuterol (2.5 mg/3 mL) 0.083 % nebulizer solution   No No   Sig: Take 3 mL (2.5 mg total) by nebulization every 6 (six) hours as needed for wheezing or shortness of breath   albuterol (ProAir HFA) 90 mcg/act inhaler   No No   Sig: Inhale 2 puffs every 6 (six) hours as needed for wheezing   amLODIPine (NORVASC) 5 mg tablet  Self Yes No   Sig: Take 5 mg by mouth daily   atorvastatin (LIPITOR) 80 mg tablet  Self Yes No   Sig: Take 80 mg by mouth daily at bedtime   betamethasone dipropionate (DIPROSONE) 0.05 % " ointment   No No   Sig: Apply topically 2 (two) times a day   bimatoprost (LUMIGAN) 0.03 % ophthalmic drops  Self Yes No   Sig: Administer 1 drop to both eyes daily at bedtime     carBAMazepine (TEGretol) 200 mg tablet  Self Yes No   Sig: Take 2 tabs in the morning and 3 tabs at night   diclofenac sodium (VOLTAREN) 50 mg EC tablet   No No   Sig: Take 1 tablet (50 mg total) by mouth 2 (two) times a day for 10 days   dupilumab (DUPIXENT) subcutaneous injection   No No   Sig: Inject 2 mL (300 mg total) under the skin every 14 (fourteen) days   erythromycin (ILOTYCIN) ophthalmic ointment   No No   Sig: Place a 1/2 inch ribbon of ointment into the lower eyelid.   fexofenadine (ALLEGRA) 60 MG tablet   No No   Sig: Take 1 tablet (60 mg total) by mouth daily   finasteride (PROSCAR) 5 mg tablet  Self Yes No   Sig: Take 5 mg by mouth daily   fluticasone (FLONASE) 50 mcg/act nasal spray   No No   Si spray into each nostril daily   fluticasone-vilanterol (Breo Ellipta) 200-25 mcg/actuation inhaler   No No   Sig: Inhale 1 puff daily Rinse mouth after use.   gabapentin (NEURONTIN) 300 mg capsule  Self Yes No   Sig: Take 300 mg by mouth 3 (three) times a day   glucagon 1 MG injection  Self Yes No   Sig: as needed   guaiFENesin (MUCINEX) 600 mg 12 hr tablet  Self No No   Sig: Take 1 tablet (600 mg total) by mouth every 12 (twelve) hours   hydrOXYzine pamoate (VISTARIL) 50 mg capsule  Self Yes No   Sig: Take 50 mg by mouth Three times daily as needed   insulin aspart (NovoLOG) 100 units/mL injection  Self Yes No   Sig: Inject under the skin 3 (three) times a day before meals   insulin glargine (BASAGLAR KWIKPEN) 100 units/mL injection pen  Self Yes No   Sig: Inject 43 Units under the skin daily at bedtime    ipratropium (ATROVENT) 0.02 % nebulizer solution  Self No No   Sig: Take 2.5 mL (0.5 mg total) by nebulization 4 (four) times a day   latanoprost (XALATAN) 0.005 % ophthalmic solution  Self Yes No   Sig: Administer 1 drop to  both eyes daily   lidocaine (LIDODERM) 5 %  Self No No   Sig: Apply 1 patch topically daily Remove & Discard patch within 12 hours or as directed by MD   naloxone (NARCAN) 4 mg/0.1 mL nasal spray  Self No No   Sig: Administer 1 spray into a nostril. If no response after 2-3 minutes, give another dose in the other nostril using a new spray.   naproxen (EC NAPROSYN) 375 MG TBEC   No No   Sig: Take 1 tablet (375 mg total) by mouth 2 (two) times a day with meals   omega-3-acid ethyl esters (LOVAZA) 1 g capsule  Self Yes No   Sig: Take 1 g by mouth daily   omeprazole (PriLOSEC) 40 MG capsule  Self Yes No   Sig: Take 40 mg by mouth daily   perphenazine 2 mg tablet  Self Yes No   Sig: Take 2 mg by mouth daily   polyvinyl alcohol (LIQUIFILM TEARS) 1.4 % ophthalmic solution  Self Yes No   Si drops as needed for dry eyes   predniSONE 20 mg tablet   No No   Sig: Take 1 tablet (20 mg total) by mouth daily   tolnaftate (TINACTIN) 1 % powder  Self Yes No   Sig: Apply 1 application topically 2 (two) times a day      Facility-Administered Medications: None       Past Medical History:   Diagnosis Date    Asthma     Bipolar disorder (HCC)     COPD (chronic obstructive pulmonary disease) (HCC)     Diabetes mellitus (HCC)     Enlarged prostate     Glaucoma     Hyperlipidemia     Hypertension     Overdose of heroin, accidental or unintentional, sequela     Psychiatric disorder     Seasonal allergic rhinitis     Seizures (HCC)        Past Surgical History:   Procedure Laterality Date    CIRCUMCISION      KNEE SURGERY      WRIST SURGERY Left        Family History   Problem Relation Age of Onset    Arthritis Mother      I have reviewed and agree with the history as documented.    E-Cigarette/Vaping    E-Cigarette Use Never User      E-Cigarette/Vaping Substances    Nicotine No     THC No     CBD No     Flavoring No     Other No     Unknown No      Social History     Tobacco Use    Smoking status: Former     Current packs/day: 0.00      Average packs/day: 2.0 packs/day for 27.0 years (54.0 ttl pk-yrs)     Types: Cigarettes     Start date:      Quit date:      Years since quittin.2    Smokeless tobacco: Never   Vaping Use    Vaping status: Never Used   Substance Use Topics    Alcohol use: Not Currently    Drug use: Not Currently     Types: Heroin, Fentanyl     Comment: OD 22 heroin       Review of Systems   Constitutional: Negative.    HENT: Negative.     Eyes: Negative.    Respiratory:  Positive for wheezing.    Cardiovascular: Negative.    Gastrointestinal: Negative.    Endocrine: Negative.    Genitourinary: Negative.    Musculoskeletal: Negative.    Skin: Negative.    Allergic/Immunologic: Negative.    Neurological: Negative.    Hematological: Negative.    Psychiatric/Behavioral: Negative.     All other systems reviewed and are negative.      Physical Exam  Physical Exam  Vitals and nursing note reviewed.   Constitutional:       Appearance: Normal appearance. He is normal weight.   HENT:      Head: Normocephalic and atraumatic.      Right Ear: Tympanic membrane, ear canal and external ear normal.      Left Ear: Tympanic membrane, ear canal and external ear normal.      Nose: Nose normal.      Mouth/Throat:      Mouth: Mucous membranes are moist.      Pharynx: Oropharynx is clear.   Cardiovascular:      Rate and Rhythm: Normal rate and regular rhythm.      Pulses: Normal pulses.      Heart sounds: Normal heart sounds.   Pulmonary:      Breath sounds: Wheezing present.      Comments: Diffuse exp. Wheezing.  No difficulty forming sentences.    Abdominal:      General: Bowel sounds are normal.      Palpations: Abdomen is soft.   Musculoskeletal:         General: Normal range of motion.      Cervical back: Normal range of motion and neck supple.   Skin:     General: Skin is warm and dry.      Capillary Refill: Capillary refill takes less than 2 seconds.   Neurological:      General: No focal deficit present.      Mental Status: He is  alert and oriented to person, place, and time.         Vital Signs  ED Triage Vitals [03/28/24 1756]   Temperature Pulse Respirations Blood Pressure SpO2   98.3 °F (36.8 °C) 102 20 170/90 96 %      Temp Source Heart Rate Source Patient Position - Orthostatic VS BP Location FiO2 (%)   Oral Monitor Lying Left arm --      Pain Score       No Pain           Vitals:    03/28/24 1756   BP: 170/90   Pulse: 102   Patient Position - Orthostatic VS: Lying         Visual Acuity      ED Medications  Medications   albuterol inhalation solution 5 mg (5 mg Nebulization Given 3/28/24 1756)   ipratropium (ATROVENT) 0.02 % inhalation solution 0.5 mg (0.5 mg Nebulization Given 3/28/24 1756)   methylPREDNISolone sodium succinate (Solu-MEDROL) injection 100 mg (100 mg Intravenous Given 3/28/24 1800)       Diagnostic Studies  Results Reviewed       None                   No orders to display              Procedures  Procedures         ED Course                                             Medical Decision Making  Problems Addressed:  Asthma exacerbation: chronic illness or injury     Details: Ongoing issue.  Exacerbation.  Better with meds here.  No resp distress at discharge.    Risk  Prescription drug management.             Disposition  Final diagnoses:   Asthma exacerbation     Time reflects when diagnosis was documented in both MDM as applicable and the Disposition within this note       Time User Action Codes Description Comment    3/28/2024  6:37 PM Hever Pires Add [J45.901] Asthma exacerbation           ED Disposition       ED Disposition   Discharge    Condition   Stable    Date/Time   Thu Mar 28, 2024  6:37 PM    Comment   Moises Ordaz discharge to home/self care.                   Follow-up Information       Follow up With Specialties Details Why Contact Info    Agustín Sethi MD Family Medicine   [address] [place], [place] [id number] [phone]              Patient's Medications   Discharge Prescriptions    PREDNISONE 20  MG TABLET    Take 3 tablets (60 mg total) by mouth daily for 5 days       Start Date: 3/28/2024 End Date: 4/2/2024       Order Dose: 60 mg       Quantity: 15 tablet    Refills: 0       No discharge procedures on file.    PDMP Review         Value Time User    PDMP Reviewed  Yes 7/12/2021  7:56 AM Eugenia Ruiz DO            ED Provider  Electronically Signed by             Hever Pires MD  03/28/24 6151

## 2024-03-29 ENCOUNTER — TELEPHONE (OUTPATIENT)
Age: 59
End: 2024-03-29

## 2024-03-29 NOTE — TELEPHONE ENCOUNTER
Pt calling to inform Dr. Liao he was in ED yesterday. Completed the Prednisone x 5 days and 3 days later was feeling severely SOB and went to ER yesterday. He was discharged with 60 mg Prednisone daily x 5 days.

## 2024-04-10 ENCOUNTER — HOSPITAL ENCOUNTER (EMERGENCY)
Facility: HOSPITAL | Age: 59
Discharge: HOME | End: 2024-04-10
Attending: EMERGENCY MEDICINE | Admitting: EMERGENCY MEDICINE
Payer: COMMERCIAL

## 2024-04-10 ENCOUNTER — APPOINTMENT (EMERGENCY)
Dept: RADIOLOGY | Facility: HOSPITAL | Age: 59
End: 2024-04-10
Payer: COMMERCIAL

## 2024-04-10 VITALS
TEMPERATURE: 98.5 F | RESPIRATION RATE: 16 BRPM | SYSTOLIC BLOOD PRESSURE: 132 MMHG | OXYGEN SATURATION: 95 % | DIASTOLIC BLOOD PRESSURE: 88 MMHG | HEART RATE: 85 BPM | BODY MASS INDEX: 31.88 KG/M2 | WEIGHT: 212.74 LBS

## 2024-04-10 DIAGNOSIS — J44.1 COPD WITH ACUTE EXACERBATION (HCC): Primary | ICD-10-CM

## 2024-04-10 LAB
ALBUMIN SERPL BCP-MCNC: 4 G/DL (ref 3.5–5)
ALP SERPL-CCNC: 53 U/L (ref 34–104)
ALT SERPL W P-5'-P-CCNC: 19 U/L (ref 7–52)
ANION GAP SERPL CALCULATED.3IONS-SCNC: 9 MMOL/L (ref 4–13)
AST SERPL W P-5'-P-CCNC: 19 U/L (ref 13–39)
BASOPHILS # BLD AUTO: 0.06 THOUSANDS/ÂΜL (ref 0–0.1)
BASOPHILS NFR BLD AUTO: 1 % (ref 0–1)
BILIRUB SERPL-MCNC: 0.39 MG/DL (ref 0.2–1)
BUN SERPL-MCNC: 17 MG/DL (ref 5–25)
CALCIUM SERPL-MCNC: 9.3 MG/DL (ref 8.4–10.2)
CARDIAC TROPONIN I PNL SERPL HS: <2 NG/L
CHLORIDE SERPL-SCNC: 103 MMOL/L (ref 96–108)
CO2 SERPL-SCNC: 27 MMOL/L (ref 21–32)
CREAT SERPL-MCNC: 1.11 MG/DL (ref 0.6–1.3)
EOSINOPHIL # BLD AUTO: 0.57 THOUSAND/ÂΜL (ref 0–0.61)
EOSINOPHIL NFR BLD AUTO: 6 % (ref 0–6)
ERYTHROCYTE [DISTWIDTH] IN BLOOD BY AUTOMATED COUNT: 12.6 % (ref 11.6–15.1)
FLUAV RNA RESP QL NAA+PROBE: NEGATIVE
FLUBV RNA RESP QL NAA+PROBE: NEGATIVE
GFR SERPL CREATININE-BSD FRML MDRD: 71 ML/MIN/1.73SQ M
GLUCOSE SERPL-MCNC: 112 MG/DL (ref 65–140)
HCT VFR BLD AUTO: 45 % (ref 36.5–49.3)
HGB BLD-MCNC: 15.2 G/DL (ref 12–17)
IMM GRANULOCYTES # BLD AUTO: 0.04 THOUSAND/UL (ref 0–0.2)
IMM GRANULOCYTES NFR BLD AUTO: 0 % (ref 0–2)
LYMPHOCYTES # BLD AUTO: 2.59 THOUSANDS/ÂΜL (ref 0.6–4.47)
LYMPHOCYTES NFR BLD AUTO: 29 % (ref 14–44)
MCH RBC QN AUTO: 28.9 PG (ref 26.8–34.3)
MCHC RBC AUTO-ENTMCNC: 33.8 G/DL (ref 31.4–37.4)
MCV RBC AUTO: 86 FL (ref 82–98)
MONOCYTES # BLD AUTO: 1.02 THOUSAND/ÂΜL (ref 0.17–1.22)
MONOCYTES NFR BLD AUTO: 12 % (ref 4–12)
NEUTROPHILS # BLD AUTO: 4.62 THOUSANDS/ÂΜL (ref 1.85–7.62)
NEUTS SEG NFR BLD AUTO: 52 % (ref 43–75)
NRBC BLD AUTO-RTO: 0 /100 WBCS
PLATELET # BLD AUTO: 219 THOUSANDS/UL (ref 149–390)
PMV BLD AUTO: 9.4 FL (ref 8.9–12.7)
POTASSIUM SERPL-SCNC: 3.7 MMOL/L (ref 3.5–5.3)
PROT SERPL-MCNC: 6.1 G/DL (ref 6.4–8.4)
RBC # BLD AUTO: 5.26 MILLION/UL (ref 3.88–5.62)
RSV RNA RESP QL NAA+PROBE: NEGATIVE
SARS-COV-2 RNA RESP QL NAA+PROBE: NEGATIVE
SODIUM SERPL-SCNC: 139 MMOL/L (ref 135–147)
WBC # BLD AUTO: 8.9 THOUSAND/UL (ref 4.31–10.16)

## 2024-04-10 PROCEDURE — 0241U HB NFCT DS VIR RESP RNA 4 TRGT: CPT | Performed by: PHYSICIAN ASSISTANT

## 2024-04-10 PROCEDURE — 80053 COMPREHEN METABOLIC PANEL: CPT | Performed by: PHYSICIAN ASSISTANT

## 2024-04-10 PROCEDURE — 85025 COMPLETE CBC W/AUTO DIFF WBC: CPT | Performed by: PHYSICIAN ASSISTANT

## 2024-04-10 PROCEDURE — 93005 ELECTROCARDIOGRAM TRACING: CPT

## 2024-04-10 PROCEDURE — 71046 X-RAY EXAM CHEST 2 VIEWS: CPT

## 2024-04-10 PROCEDURE — 84484 ASSAY OF TROPONIN QUANT: CPT | Performed by: PHYSICIAN ASSISTANT

## 2024-04-10 PROCEDURE — 36415 COLL VENOUS BLD VENIPUNCTURE: CPT | Performed by: PHYSICIAN ASSISTANT

## 2024-04-10 RX ORDER — BENZONATATE 100 MG/1
100 CAPSULE ORAL 3 TIMES DAILY PRN
Qty: 20 CAPSULE | Refills: 0 | Status: SHIPPED | OUTPATIENT
Start: 2024-04-10

## 2024-04-10 RX ORDER — METHYLPREDNISOLONE SODIUM SUCCINATE 125 MG/2ML
100 INJECTION, POWDER, LYOPHILIZED, FOR SOLUTION INTRAMUSCULAR; INTRAVENOUS ONCE
Status: DISCONTINUED | OUTPATIENT
Start: 2024-04-10 | End: 2024-04-10

## 2024-04-10 RX ORDER — METHYLPREDNISOLONE SODIUM SUCCINATE 125 MG/2ML
INJECTION, POWDER, LYOPHILIZED, FOR SOLUTION INTRAMUSCULAR; INTRAVENOUS
Status: COMPLETED
Start: 2024-04-10 | End: 2024-04-10

## 2024-04-10 RX ORDER — IPRATROPIUM BROMIDE AND ALBUTEROL SULFATE 2.5; .5 MG/3ML; MG/3ML
SOLUTION RESPIRATORY (INHALATION)
Status: COMPLETED
Start: 2024-04-10 | End: 2024-04-10

## 2024-04-10 RX ORDER — PREDNISONE 20 MG/1
60 TABLET ORAL DAILY
Qty: 15 TABLET | Refills: 0 | Status: SHIPPED | OUTPATIENT
Start: 2024-04-10 | End: 2024-04-15

## 2024-04-10 NOTE — ED CARE HANDOFF
Emergency Department Sign Out Note        Sign out and transfer of care from [facility]. See Separate Emergency Department note.     The patient, Moises Ordaz, was evaluated by the previous provider for COPD exacerbation.    Workup Completed:  Cxr labs    ED Course / Workup Pending (followup):  Normal work up patients symptoms and wheezing completely resolved.                                     Procedures  Medical Decision Making  Amount and/or Complexity of Data Reviewed  Labs: ordered.  Radiology: ordered.    Risk  Prescription drug management.            Disposition  Final diagnoses:   COPD with acute exacerbation (HCC)     Time reflects when diagnosis was documented in both MDM as applicable and the Disposition within this note       Time User Action Codes Description Comment    4/10/2024  7:13 AM Kingston Quezada Add [J44.1] COPD with acute exacerbation (HCC)           ED Disposition       ED Disposition   Discharge    Condition   Stable    Date/Time   Wed Apr 10, 2024  7:13 AM    Comment   Moises Ordaz discharge to home/self care.                   Follow-up Information       Follow up With Specialties Details Why Contact Info    Agustín Sethi MD Family Medicine   [address] [place], [place] [id number] [phone]            Patient's Medications   Discharge Prescriptions    BENZONATATE (TESSALON PERLES) 100 MG CAPSULE    Take 1 capsule (100 mg total) by mouth 3 (three) times a day as needed for cough       Start Date: 4/10/2024 End Date: --       Order Dose: 100 mg       Quantity: 20 capsule    Refills: 0    PREDNISONE 20 MG TABLET    Take 3 tablets (60 mg total) by mouth daily for 5 days       Start Date: 4/10/2024 End Date: 4/15/2024       Order Dose: 60 mg       Quantity: 15 tablet    Refills: 0     No discharge procedures on file.       ED Provider  Electronically Signed by     Kingston Quezada MD  04/10/24 0715

## 2024-04-11 LAB
ATRIAL RATE: 87 BPM
P AXIS: 61 DEGREES
PR INTERVAL: 172 MS
QRS AXIS: 45 DEGREES
QRSD INTERVAL: 96 MS
QT INTERVAL: 376 MS
QTC INTERVAL: 452 MS
T WAVE AXIS: 42 DEGREES
VENTRICULAR RATE: 87 BPM

## 2024-04-18 ENCOUNTER — NURSE TRIAGE (OUTPATIENT)
Age: 59
End: 2024-04-18

## 2024-04-18 DIAGNOSIS — J45.50 SEVERE PERSISTENT ASTHMA WITHOUT COMPLICATION: Primary | ICD-10-CM

## 2024-04-18 RX ORDER — PREDNISONE 10 MG/1
30 TABLET ORAL DAILY
Qty: 50 TABLET | Refills: 0 | Status: SHIPPED | OUTPATIENT
Start: 2024-04-18

## 2024-04-18 NOTE — TELEPHONE ENCOUNTER
Pt stated Pulm Provider: Dr. Liao    Actionable item:     What is the reason for the call/chief complaint?    Pt c/o SOB, wheezing. Has been in the ER multiple times recently for asthma exacerbations. States that within 72 hours of completing steroids, SOB returns. Pt states he is severely SOB, unable to walk far distances due to [place]. Offered pt [facility] today in [place], pt declines and would prefer to go to ER.     Pt is awaiting Dupixent authorization and would like to be on steroids until initiation of dupixent to avoid further ER visits.

## 2024-04-18 NOTE — TELEPHONE ENCOUNTER
Called and spoke with pt. Advised Dr. Liao ordered Prednisone for him. Explained that he still should seek care in ED if feeling severely SOB. Also scheduled for next available with Dr. Liao 4/29. Advised we will contact him to update once we receive determination on the dupixent.

## 2024-04-29 ENCOUNTER — OFFICE VISIT (OUTPATIENT)
Dept: PULMONOLOGY | Facility: CLINIC | Age: 59
End: 2024-04-29
Payer: COMMERCIAL

## 2024-04-29 VITALS
DIASTOLIC BLOOD PRESSURE: 70 MMHG | SYSTOLIC BLOOD PRESSURE: 136 MMHG | WEIGHT: 212 LBS | BODY MASS INDEX: 31.4 KG/M2 | TEMPERATURE: 97.4 F | HEART RATE: 87 BPM | OXYGEN SATURATION: 97 % | HEIGHT: 69 IN

## 2024-04-29 DIAGNOSIS — K21.9 GASTROESOPHAGEAL REFLUX DISEASE WITHOUT ESOPHAGITIS: ICD-10-CM

## 2024-04-29 DIAGNOSIS — R09.82 ALLERGIC RHINITIS WITH POSTNASAL DRIP: ICD-10-CM

## 2024-04-29 DIAGNOSIS — J30.9 ALLERGIC RHINITIS WITH POSTNASAL DRIP: ICD-10-CM

## 2024-04-29 DIAGNOSIS — G47.33 OSA (OBSTRUCTIVE SLEEP APNEA): ICD-10-CM

## 2024-04-29 DIAGNOSIS — R09.2 RESPIRATORY ARREST (HCC): ICD-10-CM

## 2024-04-29 DIAGNOSIS — J45.50 SEVERE PERSISTENT ASTHMA WITHOUT COMPLICATION: Primary | ICD-10-CM

## 2024-04-29 PROCEDURE — 99214 OFFICE O/P EST MOD 30 MIN: CPT | Performed by: INTERNAL MEDICINE

## 2024-04-29 RX ORDER — PREDNISONE 5 MG/1
5 TABLET ORAL DAILY
Qty: 30 TABLET | Refills: 0 | Status: SHIPPED | OUTPATIENT
Start: 2024-04-29

## 2024-04-29 NOTE — ASSESSMENT & PLAN NOTE
Continue Dulera 200 and be on albuterol  Continue Dupixent  Will start weaning corticosteroids to stop and 2-3 weeks and monitor.

## 2024-04-29 NOTE — ASSESSMENT & PLAN NOTE
Encourage patient to start using his CPAP machine and reassured him about the new device, he will discuss with his sleep doctor.  In general he feels fine.

## 2024-04-29 NOTE — PROGRESS NOTES
Progress note - Pulmonary Medicine   Moises Ordaz 59 y.o. male MRN: [id number]       Impression & Plan:     Severe persistent asthma without complication  Continue Dulera 200 and be on albuterol  Continue Dupixent  Will start weaning corticosteroids to stop and 2-3 weeks and monitor.    Respiratory arrest (HCC)  Secondary to bee allergy and he has EpiPen at home    Allergic rhinitis with postnasal drip  Continue current medications with antihistamine and Flonase    ARPITA (obstructive sleep apnea)  Encourage patient to start using his CPAP machine and reassured him about the new device, he will discuss with his sleep doctor.  In general he feels fine.    GERD (gastroesophageal reflux disease)  Controlled on omeprazole, continue      Return in about 3 months (around 7/29/2024).    Diagnoses and all orders for this visit:    Severe persistent asthma without complication  -     predniSONE 5 mg tablet; Take 1 tablet (5 mg total) by mouth daily    ARPITA (obstructive sleep apnea)    Allergic rhinitis with postnasal drip    Respiratory arrest (HCC)    Gastroesophageal reflux disease without esophagitis      ______________________________________________________________________    HPI:    Moises Ordaz presents today for follow-up of severe persistent asthma.    Patient has been doing worse over the past several months due to running out Dupixent and not getting it renewed.  Last visit we ordered Dupixent again and he received the first shot and he started to feel slightly better.  His cough improved he denies wheezing and chest pain currently but still has some dyspnea on exertion.  He is on Dulera 200 and as needed albuterol.  He has been on chronic prednisone for the past 2 months and currently on 10 mg and he wants to get off as soon as possible due to diabetes complications.  Otherwise patient has allergic rhinitis with postnasal drip and currently takes his allergy medications during the spring season  He has  obstructive sleep apnea but noncompliant with his CPAP, he has a new CPAP machine but because it is from [facility] he is afraid to use after the recalled device he had before.  He denies significant symptoms.    Patient has history of respiratory arrest secondary to his bee allergy and he has EpiPen at home.    Also has GERD but currently controlled with omeprazole and denies dysphagia.  He has seizure disorder on Tegretol.      Current Medications:    Current Outpatient Medications:     acetaminophen (TYLENOL) 650 mg CR tablet, Take 1 tablet (650 mg total) by mouth every 8 (eight) hours as needed for mild pain, Disp: 30 tablet, Rfl: 0    albuterol (2.5 mg/3 mL) 0.083 % nebulizer solution, Take 3 mL (2.5 mg total) by nebulization every 6 (six) hours as needed for wheezing or shortness of breath, Disp: 75 mL, Rfl: 0    albuterol (ProAir HFA) 90 mcg/act inhaler, Inhale 2 puffs every 6 (six) hours as needed for wheezing, Disp: 8.5 g, Rfl: 0    amLODIPine (NORVASC) 5 mg tablet, Take 5 mg by mouth daily, Disp: , Rfl:     atorvastatin (LIPITOR) 80 mg tablet, Take 80 mg by mouth daily at bedtime, Disp: , Rfl:     benzonatate (TESSALON PERLES) 100 mg capsule, Take 1 capsule (100 mg total) by mouth 3 (three) times a day as needed for cough, Disp: 20 capsule, Rfl: 0    betamethasone dipropionate (DIPROSONE) 0.05 % ointment, Apply topically 2 (two) times a day, Disp: 30 g, Rfl: 0    bimatoprost (LUMIGAN) 0.03 % ophthalmic drops, Administer 1 drop to both eyes daily at bedtime  , Disp: , Rfl:     Blood Glucose Monitoring Suppl (ACURA BLOOD GLUCOSE METER) w/Device KIT, by Does not apply route, Disp: , Rfl:     Calcium Carb-Cholecalciferol (CALCIUM CARBONATE-VITAMIN D3 PO), Take 1,500 mg by mouth daily, Disp: , Rfl:     carBAMazepine (TEGretol) 200 mg tablet, Take 2 tabs in the morning and 3 tabs at night, Disp: , Rfl:     Cinnamon 500 MG capsule, Take 500 mg by mouth daily, Disp: , Rfl:     Diclofenac Sodium (VOLTAREN) 1 %, Apply  "2 g topically 4 (four) times a day, Disp: 100 g, Rfl: 0    dupilumab (DUPIXENT) subcutaneous injection, Inject 2 mL (300 mg total) under the skin every 14 (fourteen) days, Disp: 4 mL, Rfl: 12    EPINEPHrine (EPIPEN) 0.3 mg/0.3 mL SOAJ, Inject 0.3 mL (0.3 mg total) into a muscle once for 1 dose, Disp: 0.6 mL, Rfl: 0    erythromycin (ILOTYCIN) ophthalmic ointment, Place a 1/2 inch ribbon of ointment into the lower eyelid., Disp: 1 g, Rfl: 0    fexofenadine (ALLEGRA) 60 MG tablet, Take 1 tablet (60 mg total) by mouth daily, Disp: 30 tablet, Rfl: 5    FIBER PO, Take by mouth, Disp: , Rfl:     finasteride (PROSCAR) 5 mg tablet, Take 5 mg by mouth daily, Disp: , Rfl:     fluticasone (FLONASE) 50 mcg/act nasal spray, 1 spray into each nostril daily, Disp: 16 g, Rfl: 5    fluticasone-vilanterol (Breo Ellipta) 200-25 mcg/actuation inhaler, Inhale 1 puff daily Rinse mouth after use., Disp: 60 blister, Rfl: 5    gabapentin (NEURONTIN) 300 mg capsule, Take 300 mg by mouth 3 (three) times a day, Disp: , Rfl:     glucagon 1 MG injection, as needed, Disp: , Rfl:     guaiFENesin (MUCINEX) 600 mg 12 hr tablet, Take 1 tablet (600 mg total) by mouth every 12 (twelve) hours, Disp: 12 tablet, Rfl: 0    hydrOXYzine pamoate (VISTARIL) 50 mg capsule, Take 50 mg by mouth Three times daily as needed, Disp: , Rfl:     insulin aspart (NovoLOG) 100 units/mL injection, Inject under the skin 3 (three) times a day before meals, Disp: , Rfl:     insulin glargine (BASAGLAR KWIKPEN) 100 units/mL injection pen, Inject 43 Units under the skin daily at bedtime , Disp: , Rfl:     Insulin Pen Needle 32G X 4 MM MISC, Unifine Pentips Plus 32 gauge x 5/32\" needle, Disp: , Rfl:     ipratropium (ATROVENT) 0.02 % nebulizer solution, Take 2.5 mL (0.5 mg total) by nebulization 4 (four) times a day, Disp: 100 mL, Rfl: 0    latanoprost (XALATAN) 0.005 % ophthalmic solution, Administer 1 drop to both eyes daily, Disp: , Rfl:     lidocaine (LIDODERM) 5 %, Apply 1 " patch topically daily Remove & Discard patch within 12 hours or as directed by MD, Disp: 10 patch, Rfl: 0    naloxone (NARCAN) 4 mg/0.1 mL nasal spray, Administer 1 spray into a nostril. If no response after 2-3 minutes, give another dose in the other nostril using a new spray., Disp: 1 each, Rfl: 0    naproxen (EC NAPROSYN) 375 MG TBEC, Take 1 tablet (375 mg total) by mouth 2 (two) times a day with meals, Disp: 20 tablet, Rfl: 0    omega-3-acid ethyl esters (LOVAZA) 1 g capsule, Take 1 g by mouth daily, Disp: , Rfl:     omeprazole (PriLOSEC) 40 MG capsule, Take 40 mg by mouth daily, Disp: , Rfl:     perphenazine 2 mg tablet, Take 2 mg by mouth daily, Disp: , Rfl:     polyvinyl alcohol (LIQUIFILM TEARS) 1.4 % ophthalmic solution, 2 drops as needed for dry eyes, Disp: , Rfl:     predniSONE 10 mg tablet, Take 3 tablets (30 mg total) by mouth daily 40 mg daily for 5 days then wean by 10 mg every 2 days and stay on 10 mg until starting Dupixent, Disp: 50 tablet, Rfl: 0    TAMSULOSIN HCL PO, Take 0.4 mg by mouth daily , Disp: , Rfl:     tolnaftate (TINACTIN) 1 % powder, Apply 1 application topically 2 (two) times a day, Disp: , Rfl:     Turmeric 500 MG CAPS, Take by mouth, Disp: , Rfl:     VITAMIN D PO, Take by mouth, Disp: , Rfl:     diclofenac sodium (VOLTAREN) 50 mg EC tablet, Take 1 tablet (50 mg total) by mouth 2 (two) times a day for 10 days (Patient not taking: Reported on 4/29/2024), Disp: 20 tablet, Rfl: 0    Review of Systems:  Review of Systems   Constitutional: Negative.    HENT: Negative.     Eyes: Negative.    Respiratory:  Positive for shortness of breath.    Cardiovascular: Negative.    Gastrointestinal: Negative.    Endocrine: Negative.    Genitourinary: Negative.    Musculoskeletal: Negative.    Skin: Negative.    Allergic/Immunologic: Negative.    Neurological: Negative.    Hematological: Negative.    Psychiatric/Behavioral: Negative.       Aside from what is mentioned in the HPI, the review of systems  "is otherwise negative    Past medical history, surgical history, and family history were reviewed and updated as appropriate    Social history updates:  Social History     Tobacco Use   Smoking Status Former    Current packs/day: 0.00    Average packs/day: 2.0 packs/day for 27.0 years (54.0 ttl pk-yrs)    Types: Cigarettes    Start date:     Quit date:     Years since quittin.3   Smokeless Tobacco Never       PhysicalExamination:  Vitals:   /70 (BP Location: Left arm, Patient Position: Sitting, Cuff Size: Large)   Pulse 87   Temp (!) 97.4 °F (36.3 °C) (Tympanic)   Ht 5' 8.5\" (1.74 m)   Wt 96.2 kg (212 lb)   SpO2 97%   BMI 31.77 kg/m²     Gen:  Comfortable on room air.  No conversational dyspnea  HEENT:  Conjugate gaze.  sclerae anicteric.  Oropharynx moist  Neck: Trachea is midline. No JVD. No adenopathy  Chest: Equal breath sounds and clear to auscultation bilaterally, no wheezing or crackles  Cardiac: S1-S2 regular. no murmur  Abdomen:  benign  Extremities: No edema  Neuro:  Normal speech and mentation    Diagnostic Data:  Labs:  I personally reviewed the most recent laboratory data pertinent to today's visit    Lab Results   Component Value Date    WBC 8.90 04/10/2024    HGB 15.2 04/10/2024    HCT 45.0 04/10/2024    MCV 86 04/10/2024     04/10/2024     Lab Results   Component Value Date    SODIUM 139 04/10/2024    K 3.7 04/10/2024    CO2 27 04/10/2024     04/10/2024    BUN 17 04/10/2024    CREATININE 1.11 04/10/2024    GLUCOSE 340 (H) 2019    CALCIUM 9.3 04/10/2024          2024:  Eosinophils 9%/580           Imaging:  I personally reviewed the images on the PAC system pertinent to today's visit  Chest x-ray reviewed on PACS: Clear lungs     Chest CT scan :LUNGS:  Bronchial wall thickening in the right lower lobe with minimal atelectasis in the posterior basal the right lower lobe with mild endobronchial debris in the right lower lobe bronchi.   No change in 6 " mm left upper lobe nodule since April 2014, benign          Annak Volodymyr Liao MD

## 2024-06-21 ENCOUNTER — TELEPHONE (OUTPATIENT)
Age: 59
End: 2024-06-21

## 2024-06-21 NOTE — TELEPHONE ENCOUNTER
Patient called in stating that he will need a lyft to get to his appt on 6/25 at the [facility].

## 2024-08-07 ENCOUNTER — TELEPHONE (OUTPATIENT)
Dept: PULMONOLOGY | Facility: CLINIC | Age: 59
End: 2024-08-07

## 2024-08-14 ENCOUNTER — OFFICE VISIT (OUTPATIENT)
Dept: PULMONOLOGY | Facility: CLINIC | Age: 59
End: 2024-08-14
Payer: COMMERCIAL

## 2024-08-14 ENCOUNTER — APPOINTMENT (EMERGENCY)
Dept: CT IMAGING | Facility: HOSPITAL | Age: 59
End: 2024-08-14
Payer: COMMERCIAL

## 2024-08-14 ENCOUNTER — APPOINTMENT (EMERGENCY)
Dept: ULTRASOUND IMAGING | Facility: HOSPITAL | Age: 59
End: 2024-08-14
Payer: COMMERCIAL

## 2024-08-14 ENCOUNTER — HOSPITAL ENCOUNTER (EMERGENCY)
Facility: HOSPITAL | Age: 59
Discharge: HOME | End: 2024-08-14
Attending: EMERGENCY MEDICINE
Payer: COMMERCIAL

## 2024-08-14 VITALS
HEIGHT: 69 IN | SYSTOLIC BLOOD PRESSURE: 114 MMHG | HEART RATE: 86 BPM | WEIGHT: 219 LBS | BODY MASS INDEX: 32.44 KG/M2 | OXYGEN SATURATION: 98 % | DIASTOLIC BLOOD PRESSURE: 72 MMHG | TEMPERATURE: 97.8 F

## 2024-08-14 VITALS
DIASTOLIC BLOOD PRESSURE: 90 MMHG | HEART RATE: 88 BPM | BODY MASS INDEX: 32.97 KG/M2 | OXYGEN SATURATION: 96 % | TEMPERATURE: 98.1 F | SYSTOLIC BLOOD PRESSURE: 128 MMHG | RESPIRATION RATE: 18 BRPM | WEIGHT: 220.02 LBS

## 2024-08-14 DIAGNOSIS — R09.2 RESPIRATORY ARREST (HCC): ICD-10-CM

## 2024-08-14 DIAGNOSIS — R09.82 ALLERGIC RHINITIS WITH POSTNASAL DRIP: ICD-10-CM

## 2024-08-14 DIAGNOSIS — J30.9 ALLERGIC RHINITIS WITH POSTNASAL DRIP: ICD-10-CM

## 2024-08-14 DIAGNOSIS — J45.50 SEVERE PERSISTENT ASTHMA WITHOUT COMPLICATION: ICD-10-CM

## 2024-08-14 DIAGNOSIS — Z01.811 PREOP PULMONARY/RESPIRATORY EXAM: Primary | ICD-10-CM

## 2024-08-14 DIAGNOSIS — J45.909 ASTHMATIC BRONCHITIS: ICD-10-CM

## 2024-08-14 DIAGNOSIS — N50.811 RIGHT TESTICULAR PAIN: Primary | ICD-10-CM

## 2024-08-14 DIAGNOSIS — R10.9 RIGHT LATERAL ABDOMINAL PAIN: ICD-10-CM

## 2024-08-14 DIAGNOSIS — G47.33 OSA (OBSTRUCTIVE SLEEP APNEA): ICD-10-CM

## 2024-08-14 LAB
ALBUMIN SERPL BCG-MCNC: 4.2 G/DL (ref 3.5–5)
ALP SERPL-CCNC: 66 U/L (ref 34–104)
ALT SERPL W P-5'-P-CCNC: 17 U/L (ref 7–52)
ANION GAP SERPL CALCULATED.3IONS-SCNC: 5 MMOL/L (ref 4–13)
AST SERPL W P-5'-P-CCNC: 15 U/L (ref 13–39)
BACTERIA UR QL AUTO: NORMAL /HPF
BASOPHILS # BLD AUTO: 0.05 THOUSANDS/ÂΜL (ref 0–0.1)
BASOPHILS NFR BLD AUTO: 1 % (ref 0–1)
BILIRUB SERPL-MCNC: 0.32 MG/DL (ref 0.2–1)
BILIRUB UR QL STRIP: NEGATIVE
BUN SERPL-MCNC: 14 MG/DL (ref 5–25)
CALCIUM SERPL-MCNC: 9.2 MG/DL (ref 8.4–10.2)
CHLORIDE SERPL-SCNC: 104 MMOL/L (ref 96–108)
CLARITY UR: CLEAR
CO2 SERPL-SCNC: 31 MMOL/L (ref 21–32)
COLOR UR: ABNORMAL
CREAT SERPL-MCNC: 1.06 MG/DL (ref 0.6–1.3)
EOSINOPHIL # BLD AUTO: 0.49 THOUSAND/ÂΜL (ref 0–0.61)
EOSINOPHIL NFR BLD AUTO: 8 % (ref 0–6)
ERYTHROCYTE [DISTWIDTH] IN BLOOD BY AUTOMATED COUNT: 12.2 % (ref 11.6–15.1)
GFR SERPL CREATININE-BSD FRML MDRD: 75 ML/MIN/1.73SQ M
GLUCOSE SERPL-MCNC: 217 MG/DL (ref 65–140)
GLUCOSE UR STRIP-MCNC: NEGATIVE MG/DL
HCT VFR BLD AUTO: 40.5 % (ref 36.5–49.3)
HGB BLD-MCNC: 13.6 G/DL (ref 12–17)
HGB UR QL STRIP.AUTO: NEGATIVE
IMM GRANULOCYTES # BLD AUTO: 0.02 THOUSAND/UL (ref 0–0.2)
IMM GRANULOCYTES NFR BLD AUTO: 0 % (ref 0–2)
KETONES UR STRIP-MCNC: NEGATIVE MG/DL
LACTATE SERPL-SCNC: 0.9 MMOL/L (ref 0.5–2)
LEUKOCYTE ESTERASE UR QL STRIP: NEGATIVE
LIPASE SERPL-CCNC: 16 U/L (ref 11–82)
LYMPHOCYTES # BLD AUTO: 1.43 THOUSANDS/ÂΜL (ref 0.6–4.47)
LYMPHOCYTES NFR BLD AUTO: 23 % (ref 14–44)
MCH RBC QN AUTO: 28.8 PG (ref 26.8–34.3)
MCHC RBC AUTO-ENTMCNC: 33.6 G/DL (ref 31.4–37.4)
MCV RBC AUTO: 86 FL (ref 82–98)
MONOCYTES # BLD AUTO: 0.59 THOUSAND/ÂΜL (ref 0.17–1.22)
MONOCYTES NFR BLD AUTO: 10 % (ref 4–12)
NEUTROPHILS # BLD AUTO: 3.61 THOUSANDS/ÂΜL (ref 1.85–7.62)
NEUTS SEG NFR BLD AUTO: 58 % (ref 43–75)
NITRITE UR QL STRIP: NEGATIVE
NON-SQ EPI CELLS URNS QL MICRO: NORMAL /HPF
NRBC BLD AUTO-RTO: 0 /100 WBCS
PH UR STRIP.AUTO: 5 [PH]
PLATELET # BLD AUTO: 226 THOUSANDS/UL (ref 149–390)
PMV BLD AUTO: 9.2 FL (ref 8.9–12.7)
POTASSIUM SERPL-SCNC: 4 MMOL/L (ref 3.5–5.3)
PROT SERPL-MCNC: 6.5 G/DL (ref 6.4–8.4)
PROT UR STRIP-MCNC: ABNORMAL MG/DL
RBC # BLD AUTO: 4.73 MILLION/UL (ref 3.88–5.62)
RBC #/AREA URNS AUTO: NORMAL /HPF
SODIUM SERPL-SCNC: 140 MMOL/L (ref 135–147)
SP GR UR STRIP.AUTO: 1.01 (ref 1–1.04)
UROBILINOGEN UA: NEGATIVE MG/DL
WBC # BLD AUTO: 6.19 THOUSAND/UL (ref 4.31–10.16)
WBC #/AREA URNS AUTO: NORMAL /HPF

## 2024-08-14 PROCEDURE — 36415 COLL VENOUS BLD VENIPUNCTURE: CPT | Performed by: EMERGENCY MEDICINE

## 2024-08-14 PROCEDURE — 81001 URINALYSIS AUTO W/SCOPE: CPT | Performed by: EMERGENCY MEDICINE

## 2024-08-14 PROCEDURE — 76870 US EXAM SCROTUM: CPT

## 2024-08-14 PROCEDURE — 99285 EMERGENCY DEPT VISIT HI MDM: CPT | Performed by: EMERGENCY MEDICINE

## 2024-08-14 PROCEDURE — 99214 OFFICE O/P EST MOD 30 MIN: CPT | Performed by: NURSE PRACTITIONER

## 2024-08-14 PROCEDURE — 83605 ASSAY OF LACTIC ACID: CPT | Performed by: EMERGENCY MEDICINE

## 2024-08-14 PROCEDURE — 85025 COMPLETE CBC W/AUTO DIFF WBC: CPT | Performed by: EMERGENCY MEDICINE

## 2024-08-14 PROCEDURE — 80053 COMPREHEN METABOLIC PANEL: CPT | Performed by: EMERGENCY MEDICINE

## 2024-08-14 PROCEDURE — 74177 CT ABD & PELVIS W/CONTRAST: CPT

## 2024-08-14 PROCEDURE — 99284 EMERGENCY DEPT VISIT MOD MDM: CPT

## 2024-08-14 PROCEDURE — 83690 ASSAY OF LIPASE: CPT | Performed by: EMERGENCY MEDICINE

## 2024-08-14 PROCEDURE — 81003 URINALYSIS AUTO W/O SCOPE: CPT | Performed by: EMERGENCY MEDICINE

## 2024-08-14 RX ORDER — ALBUTEROL SULFATE 0.83 MG/ML
2.5 SOLUTION RESPIRATORY (INHALATION) EVERY 6 HOURS PRN
Qty: 90 ML | Refills: 2 | Status: SHIPPED | OUTPATIENT
Start: 2024-08-14

## 2024-08-14 RX ADMIN — IOHEXOL 100 ML: 350 INJECTION, SOLUTION INTRAVENOUS at 12:42

## 2024-08-14 NOTE — ASSESSMENT & PLAN NOTE
Refuses to use CPAP - he has concerns ongoing about [facility] on the whole and also notes machines are giving him headaches. He will call his sleep medicine specialist to get more details.

## 2024-08-14 NOTE — ASSESSMENT & PLAN NOTE
Currently symptoms are intermittent however on exam today his chest is clear. Discussed with Marvin his SOB in the night sounds more like sleep apnea rather than true asthma symptoms.  Breo 200 1 puff daily  Albuterol HFA or via nebulizer up to every 4 hours if needed  Limited use of ipratropium via nebulizer as needed - history of prostate issues  Continue Dupixent injections 300mg sq every 14 days

## 2024-08-14 NOTE — ASSESSMENT & PLAN NOTE
Patient anticipating inguinal hernia repair. On exam today he is clear and asthma regimen is currently optimized.    Pulmonary risk stratification: ARISCAT score is 3, low risk of in-hospital pulmonary complications (composite including respiratory failure, respiratory infection, pleural effusion, atelectasis, pneumothorax, bronchospasm, aspiration pneumonitis).

## 2024-08-14 NOTE — ED PROVIDER NOTES
"History  Chief Complaint   Patient presents with    Testicle Pain     R lower back pain that radiates to RLQ and R testicle - increased urination at night - burning w urination - Tylenol 0700 w no relief      HPI  Patient is a 59-year-old male presents with right flank pain that radiates down the right groin as well as right testicular pain.  Patient also reports that he has been having urinary frequency as well as burning urination.  Symptoms been ongoing for past few days.  Patient took some Tylenol this morning with minimal to no relief so decided come in for evaluation.  Patient with a history of insulin-dependent diabetes, BPH with scheduled for surgery, hernia.  Reports no abdominal surgeries.  Patient denies any bowel movement changes.  Reports no fever, chills, nausea, vomiting.  Denies any swelling of the right testicle and no pain on palpation.  Prior to Admission Medications   Prescriptions Last Dose Informant Patient Reported? Taking?   Blood Glucose Monitoring Suppl (ACURA BLOOD GLUCOSE METER) w/Device KIT  Self Yes No   Sig: by Does not apply route   Calcium Carb-Cholecalciferol (CALCIUM CARBONATE-VITAMIN D3 PO)  Self Yes No   Sig: Take 1,500 mg by mouth daily   Cinnamon 500 MG capsule  Self Yes No   Sig: Take 500 mg by mouth daily   Diclofenac Sodium (VOLTAREN) 1 %   No No   Sig: Apply 2 g topically 4 (four) times a day   EPINEPHrine (EPIPEN) 0.3 mg/0.3 mL SOAJ   No No   Sig: Inject 0.3 mL (0.3 mg total) into a muscle once for 1 dose   FIBER PO  Self Yes No   Sig: Take by mouth   Insulin Pen Needle 32G X 4 MM MISC  Self Yes No   Sig: Unifine Pentips Plus 32 gauge x 5/32\" needle   TAMSULOSIN HCL PO  Self Yes No   Sig: Take 0.4 mg by mouth daily    Turmeric 500 MG CAPS  Self Yes No   Sig: Take by mouth   VITAMIN D PO  Self Yes No   Sig: Take by mouth   acetaminophen (TYLENOL) 650 mg CR tablet   No No   Sig: Take 1 tablet (650 mg total) by mouth every 8 (eight) hours as needed for mild pain   albuterol " (2.5 mg/3 mL) 0.083 % nebulizer solution   No No   Sig: Take 3 mL (2.5 mg total) by nebulization every 6 (six) hours as needed for wheezing or shortness of breath   albuterol (ProAir HFA) 90 mcg/act inhaler   No No   Sig: Inhale 2 puffs every 6 (six) hours as needed for wheezing   amLODIPine (NORVASC) 5 mg tablet  Self Yes No   Sig: Take 5 mg by mouth daily   atorvastatin (LIPITOR) 80 mg tablet  Self Yes No   Sig: Take 80 mg by mouth daily at bedtime   benzonatate (TESSALON PERLES) 100 mg capsule   No No   Sig: Take 1 capsule (100 mg total) by mouth 3 (three) times a day as needed for cough   betamethasone dipropionate (DIPROSONE) 0.05 % ointment   No No   Sig: Apply topically 2 (two) times a day   bimatoprost (LUMIGAN) 0.03 % ophthalmic drops  Self Yes No   Sig: Administer 1 drop to both eyes daily at bedtime     carBAMazepine (TEGretol) 200 mg tablet  Self Yes No   Sig: Take 2 tabs in the morning and 3 tabs at night   diclofenac sodium (VOLTAREN) 50 mg EC tablet   No No   Sig: Take 1 tablet (50 mg total) by mouth 2 (two) times a day for 10 days   Patient not taking: Reported on 2024   dupilumab (DUPIXENT) subcutaneous injection   No No   Sig: Inject 2 mL (300 mg total) under the skin every 14 (fourteen) days   erythromycin (ILOTYCIN) ophthalmic ointment   No No   Sig: Place a 1/2 inch ribbon of ointment into the lower eyelid.   fexofenadine (ALLEGRA) 60 MG tablet   No No   Sig: Take 1 tablet (60 mg total) by mouth daily   finasteride (PROSCAR) 5 mg tablet  Self Yes No   Sig: Take 5 mg by mouth daily   fluticasone (FLONASE) 50 mcg/act nasal spray   No No   Si spray into each nostril daily   fluticasone-vilanterol (Breo Ellipta) 200-25 mcg/actuation inhaler   No No   Sig: Inhale 1 puff daily Rinse mouth after use.   gabapentin (NEURONTIN) 300 mg capsule  Self Yes No   Sig: Take 300 mg by mouth 3 (three) times a day   glucagon 1 MG injection  Self Yes No   Sig: as needed   guaiFENesin (MUCINEX) 600 mg 12 hr  tablet  Self No No   Sig: Take 1 tablet (600 mg total) by mouth every 12 (twelve) hours   hydrOXYzine pamoate (VISTARIL) 50 mg capsule  Self Yes No   Sig: Take 50 mg by mouth Three times daily as needed   insulin aspart (NovoLOG) 100 units/mL injection  Self Yes No   Sig: Inject under the skin 3 (three) times a day before meals   insulin glargine (BASAGLAR KWIKPEN) 100 units/mL injection pen  Self Yes No   Sig: Inject 43 Units under the skin daily at bedtime    ipratropium (ATROVENT) 0.02 % nebulizer solution  Self No No   Sig: Take 2.5 mL (0.5 mg total) by nebulization 4 (four) times a day   latanoprost (XALATAN) 0.005 % ophthalmic solution  Self Yes No   Sig: Administer 1 drop to both eyes daily   lidocaine (LIDODERM) 5 %  Self No No   Sig: Apply 1 patch topically daily Remove & Discard patch within 12 hours or as directed by MD   naloxone (NARCAN) 4 mg/0.1 mL nasal spray  Self No No   Sig: Administer 1 spray into a nostril. If no response after 2-3 minutes, give another dose in the other nostril using a new spray.   naproxen (EC NAPROSYN) 375 MG TBEC   No No   Sig: Take 1 tablet (375 mg total) by mouth 2 (two) times a day with meals   omega-3-acid ethyl esters (LOVAZA) 1 g capsule  Self Yes No   Sig: Take 1 g by mouth daily   omeprazole (PriLOSEC) 40 MG capsule  Self Yes No   Sig: Take 40 mg by mouth daily   perphenazine 2 mg tablet  Self Yes No   Sig: Take 2 mg by mouth daily   polyvinyl alcohol (LIQUIFILM TEARS) 1.4 % ophthalmic solution  Self Yes No   Si drops as needed for dry eyes   predniSONE 5 mg tablet   No No   Sig: Take 1 tablet (5 mg total) by mouth daily   Patient not taking: Reported on 2024   tolnaftate (TINACTIN) 1 % powder  Self Yes No   Sig: Apply 1 application topically 2 (two) times a day      Facility-Administered Medications: None       Past Medical History:   Diagnosis Date    Asthma     Bipolar disorder ([facility])     COPD (chronic obstructive pulmonary disease) ([facility])     Diabetes  mellitus ([facility])     Enlarged prostate     Glaucoma     Hyperlipidemia     Hypertension     Overdose of heroin, accidental or unintentional, sequela     Psychiatric disorder     Seasonal allergic rhinitis     Seizures ([facility])        Past Surgical History:   Procedure Laterality Date    CIRCUMCISION      KNEE SURGERY      WRIST SURGERY Left        Family History   Problem Relation Age of Onset    Arthritis Mother      I have reviewed and agree with the history as documented.    E-Cigarette/Vaping    E-Cigarette Use Never User      E-Cigarette/Vaping Substances    Nicotine No     THC No     CBD No     Flavoring No     Other No     Unknown No      Social History     Tobacco Use    Smoking status: Former     Current packs/day: 0.00     Average packs/day: 2.0 packs/day for 27.0 years (54.0 ttl pk-yrs)     Types: Cigarettes     Start date:      Quit date:      Years since quittin.6    Smokeless tobacco: Never   Vaping Use    Vaping status: Never Used   Substance Use Topics    Alcohol use: Not Currently    Drug use: Not Currently     Types: Heroin, Fentanyl     Comment: OD 21 heroin       Review of Systems   Constitutional:  Negative for chills, diaphoresis, fever and unexpected weight change.   HENT:  Negative for ear pain and sore throat.    Eyes:  Negative for visual disturbance.   Respiratory:  Negative for cough, chest tightness and shortness of breath.    Cardiovascular:  Negative for chest pain and leg swelling.   Gastrointestinal:  Positive for abdominal pain. Negative for abdominal distention, constipation, diarrhea, nausea and vomiting.   Endocrine: Negative.    Genitourinary:  Positive for flank pain and testicular pain. Negative for difficulty urinating and dysuria.   Skin: Negative.    Allergic/Immunologic: Negative.    Neurological: Negative.    Hematological: Negative.    Psychiatric/Behavioral: Negative.     All other systems reviewed and are negative.      Physical Exam  Physical Exam  Vitals  and nursing note reviewed.   Constitutional:       General: He is not in acute distress.     Appearance: Normal appearance. He is not ill-appearing.   HENT:      Head: Normocephalic and atraumatic.      Right Ear: External ear normal.      Left Ear: External ear normal.      Nose: Nose normal.      Mouth/Throat:      Mouth: Mucous membranes are moist.      Pharynx: Oropharynx is clear.   Eyes:      General: No scleral icterus.        Right eye: No discharge.         Left eye: No discharge.      Extraocular Movements: Extraocular movements intact.      Conjunctiva/sclera: Conjunctivae normal.      Pupils: Pupils are equal, round, and reactive to light.   Cardiovascular:      Rate and Rhythm: Normal rate and regular rhythm.      Pulses: Normal pulses.      Heart sounds: Normal heart sounds.   Pulmonary:      Effort: Pulmonary effort is normal.      Breath sounds: Normal breath sounds.   Abdominal:      General: Abdomen is flat. Bowel sounds are normal. There is no distension.      Palpations: Abdomen is soft.      Tenderness: There is abdominal tenderness (Right lower quadrant). There is no guarding or rebound.   Genitourinary:     Penis: Normal.       Testes: Normal.      Comments: No testicular swelling noted no testicular pain on palpation  Musculoskeletal:         General: Normal range of motion.      Cervical back: Normal range of motion and neck supple.   Skin:     General: Skin is warm and dry.      Capillary Refill: Capillary refill takes less than 2 seconds.   Neurological:      General: No focal deficit present.      Mental Status: He is alert and oriented to person, place, and time. Mental status is at baseline.   Psychiatric:         Mood and Affect: Mood normal.         Behavior: Behavior normal.         Thought Content: Thought content normal.         Judgment: Judgment normal.         Vital Signs  ED Triage Vitals [08/14/24 1124]   Temperature Pulse Respirations Blood Pressure SpO2   98.1 °F (36.7 °C)  88 18 128/90 96 %      Temp src Heart Rate Source Patient Position - Orthostatic VS BP Location FiO2 (%)   -- Monitor Sitting Right arm --      Pain Score       --           Vitals:    08/14/24 1124   BP: 128/90   Pulse: 88   Patient Position - Orthostatic VS: Sitting         Visual Acuity      ED Medications  Medications   iohexol (OMNIPAQUE) 350 MG/ML injection (MULTI-DOSE) 100 mL (100 mL Intravenous Given 8/14/24 1242)       Diagnostic Studies  Results Reviewed       Procedure Component Value Units Date/Time    Urine Microscopic [[id number]]  (Normal) Collected: 08/14/24 1329    Lab Status: Final result Specimen: Urine, Other Updated: 08/14/24 1451     RBC, UA None Seen /hpf      WBC, UA None Seen /hpf      Epithelial Cells Occasional /hpf      Bacteria, UA None Seen /hpf     UA w Reflex to Microscopic w Reflex to Culture [[id number]]  (Abnormal) Collected: 08/14/24 1329    Lab Status: Final result Specimen: Urine, Other Updated: 08/14/24 1418     Color, UA Pale Yellow     Clarity, UA Clear     Specific Gravity, UA 1.010     pH, UA 5.0     Leukocytes, UA Negative     Nitrite, UA Negative     Protein, UA 30 (1+) mg/dl      Glucose, UA Negative mg/dl      Ketones, UA Negative mg/dl      Bilirubin, UA Negative     Occult Blood, UA Negative     UROBILINOGEN UA Negative mg/dL     Lactic acid, plasma (w/reflex if result > 2.0) [[id number]]  (Normal) Collected: 08/14/24 1151    Lab Status: Final result Specimen: Blood from Arm, Left Updated: 08/14/24 1220     LACTIC ACID 0.9 mmol/L     Narrative:      Result may be elevated if tourniquet was used during collection.    Comprehensive metabolic panel [[id number]]  (Abnormal) Collected: 08/14/24 1151    Lab Status: Final result Specimen: Blood from Arm, Left Updated: 08/14/24 1218     Sodium 140 mmol/L      Potassium 4.0 mmol/L      Chloride 104 mmol/L      CO2 31 mmol/L      ANION GAP 5 mmol/L      BUN 14 mg/dL      Creatinine 1.06 mg/dL      Glucose 217 mg/dL      Calcium 9.2  mg/dL      AST 15 U/L      ALT 17 U/L      Alkaline Phosphatase 66 U/L      Total Protein 6.5 g/dL      Albumin 4.2 g/dL      Total Bilirubin 0.32 mg/dL      eGFR 75 ml/min/1.73sq m     Narrative:      National Kid[employer] Foundation guidelines for Chronic Kidney Disease (CKD):     Stage 1 with normal or high GFR (GFR > 90 mL/min/1.73 square meters)    Stage 2 Mild CKD (GFR = 60-89 mL/min/1.73 square meters)    Stage 3A Moderate CKD (GFR = 45-59 mL/min/1.73 square meters)    Stage 3B Moderate CKD (GFR = 30-44 mL/min/1.73 square meters)    Stage 4 Severe CKD (GFR = 15-29 mL/min/1.73 square meters)    Stage 5 End Stage CKD (GFR <15 mL/min/1.73 square meters)  Note: GFR calculation is accurate only with a steady state creatinine    Lipase [[id number]]  (Normal) Collected: 08/14/24 1151    Lab Status: Final result Specimen: Blood from Arm, Left Updated: 08/14/24 1218     Lipase 16 u/L     CBC and differential [[id number]]  (Abnormal) Collected: 08/14/24 1151    Lab Status: Final result Specimen: Blood from Arm, Left Updated: 08/14/24 1159     WBC 6.19 Thousand/uL      RBC 4.73 Million/uL      Hemoglobin 13.6 g/dL      Hematocrit 40.5 %      MCV 86 fL      MCH 28.8 pg      MCHC 33.6 g/dL      RDW 12.2 %      MPV 9.2 fL      Platelets 226 Thousands/uL      nRBC 0 /100 WBCs      Segmented % 58 %      Immature Grans % 0 %      Lymphocytes % 23 %      Monocytes % 10 %      Eosinophils Relative 8 %      Basophils Relative 1 %      Absolute Neutrophils 3.61 Thousands/µL      Absolute Immature Grans 0.02 Thousand/uL      Absolute Lymphocytes 1.43 Thousands/µL      Absolute Monocytes 0.59 Thousand/µL      Eosinophils Absolute 0.49 Thousand/µL      Basophils Absolute 0.05 Thousands/µL                    CT abdomen pelvis with contrast   Final Result by Gatito Em MD (08/14 1320)      1.  Bladder wall appears thickened, but chronically so. Findings most likely represent sequelae of chronic partial outlet obstruction by the  prostate accentuated by under distention. However, recommend correlation for clinical or urinalysis evidence for    infectious cystitis.      2.  Otherwise, no acute abdominopelvic findings or significant change from priors.      Resident: ONEIDA NELSON I, the attending radiologist, have reviewed the images and agree with the final report above.      Workstation performed: [id number]         US scrotum and testicles   Final Result by Elpidio Fowler MD (08/14 1235)      Normal exam.      Workstation performed: [id number]                    Procedures  Procedures         ED Course                                 SBIRT 20yo+      Flowsheet Row Most Recent Value   Initial Alcohol Screen: US AUDIT-C     1. How often do you have a drink containing alcohol? 0 Filed at: 08/14/2024 1146   2. How many drinks containing alcohol do you have on a typical day you are drinking?  0 Filed at: 08/14/2024 1146   3a. Male UNDER 65: How often do you have five or more drinks on one occasion? 0 Filed at: 08/14/2024 1146   3b. FEMALE Any Age, or MALE 65+: How often do you have 4 or more drinks on one occassion? 0 Filed at: 08/14/2024 1146   Audit-C Score 0 Filed at: 08/14/2024 1146   ROXY: How many times in the past year have you...    Used an illegal drug or used a prescription medication for non-medical reasons? Never Filed at: 08/14/2024 1146                      Medical Decision Making  59-year-old male presents with right flank pain that radiates to the right groin as well as right testicular pain  No testicular tenderness noted on exam as well as no swelling  However will assess for possible torsion versus epididymitis  No significant abdominal tenderness noted however due to the location of the abdominal pain will assess for possible hernia, appendicitis, hepatitis, cholecystitis, pancreatitis  Also will assess for possible diverticulitis, colitis, proctitis  Will obtain abdominal labs as well as CT abdomen pelvis and  ultrasound of the testicles  Findings normal   Patient discharged       Problems Addressed:  Right lateral abdominal pain: acute illness or injury  Right testicular pain: acute illness or injury    Amount and/or Complexity of Data Reviewed  Labs: ordered.  Radiology: ordered.    Risk  Prescription drug management.                 Disposition  Final diagnoses:   Right testicular pain   Right lateral abdominal pain     Time reflects when diagnosis was documented in both MDM as applicable and the Disposition within this note       Time User Action Codes Description Comment    8/14/2024  2:49 PM Saurav Sepulveda Add [N50.811] Right testicular pain     8/14/2024  2:50 PM Saurav Sepulveda Add [R10.9] Right lateral abdominal pain           ED Disposition       ED Disposition   Discharge    Condition   Stable    Date/Time   Wed Aug 14, 2024 1499    Comment   Moises Ordaz discharge to home/self care.                   Follow-up Information       Follow up With Specialties Details Why Contact Info Additional Information    [facility] Emergency Medicine Go to  If symptoms worsen [address] [facility] [address]  [phone] [facility]            Discharge Medication List as of 8/14/2024  2:50 PM        CONTINUE these medications which have NOT CHANGED    Details   acetaminophen (TYLENOL) 650 mg CR tablet Take 1 tablet (650 mg total) by mouth every 8 (eight) hours as needed for mild pain, Starting Wed 2/7/2024, Normal      albuterol (2.5 mg/3 mL) 0.083 % nebulizer solution Take 3 mL (2.5 mg total) by nebulization every 6 (six) hours as needed for wheezing or shortness of breath, Starting Wed 8/14/2024, Normal      albuterol (ProAir HFA) 90 mcg/act inhaler Inhale 2 puffs every 6 (six) hours as needed for wheezing, Starting Fri 7/21/2023, Normal      amLODIPine (NORVASC) 5 mg tablet Take 5 mg by mouth daily, Historical Med       atorvastatin (LIPITOR) 80 mg tablet Take 80 mg by mouth daily at bedtime, Starting Thu 9/8/2016, Historical Med      benzonatate (TESSALON PERLES) 100 mg capsule Take 1 capsule (100 mg total) by mouth 3 (three) times a day as needed for cough, Starting Wed 4/10/2024, Normal      betamethasone dipropionate (DIPROSONE) 0.05 % ointment Apply topically 2 (two) times a day, Starting Fri 8/4/2023, Normal      bimatoprost (LUMIGAN) 0.03 % ophthalmic drops Administer 1 drop to both eyes daily at bedtime  , Starting Thu 6/5/2008, Historical Med      Blood Glucose Monitoring Suppl (ACURA BLOOD GLUCOSE METER) w/Device KIT by Does not apply route, Historical Med      Calcium Carb-Cholecalciferol (CALCIUM CARBONATE-VITAMIN D3 PO) Take 1,500 mg by mouth daily, Starting Fri 9/20/2013, Historical Med      carBAMazepine (TEGretol) 200 mg tablet Take 2 tabs in the morning and 3 tabs at night, Historical Med      Cinnamon 500 MG capsule Take 500 mg by mouth daily, Historical Med      Diclofenac Sodium (VOLTAREN) 1 % Apply 2 g topically 4 (four) times a day, Starting Wed 2/7/2024, Normal      diclofenac sodium (VOLTAREN) 50 mg EC tablet Take 1 tablet (50 mg total) by mouth 2 (two) times a day for 10 days, Starting Mon 7/12/2021, Until Wed 3/20/2024, Normal      dupilumab (DUPIXENT) subcutaneous injection Inject 2 mL (300 mg total) under the skin every 14 (fourteen) days, Starting Wed 3/20/2024, Print      EPINEPHrine (EPIPEN) 0.3 mg/0.3 mL SOAJ Inject 0.3 mL (0.3 mg total) into a muscle once for 1 dose, Starting Wed 3/20/2024, Normal      erythromycin (ILOTYCIN) ophthalmic ointment Place a 1/2 inch ribbon of ointment into the lower eyelid., Normal      fexofenadine (ALLEGRA) 60 MG tablet Take 1 tablet (60 mg total) by mouth daily, Starting Wed 3/20/2024, Normal      FIBER PO Take by mouth, Historical Med      finasteride (PROSCAR) 5 mg tablet Take 5 mg by mouth daily, Starting Tue 4/26/2016, Historical Med      fluticasone (FLONASE)  "50 mcg/act nasal spray 1 spray into each nostril daily, Starting Wed 3/20/2024, Normal      fluticasone-vilanterol (Breo Ellipta) 200-25 mcg/actuation inhaler Inhale 1 puff daily Rinse mouth after use., Starting Wed 3/20/2024, Until Mon 9/16/2024, Normal      gabapentin (NEURONTIN) 300 mg capsule Take 300 mg by mouth 3 (three) times a day, Starting Thu 9/8/2016, Historical Med      glucagon 1 MG injection as needed, Starting Fri 8/5/2016, Historical Med      guaiFENesin (MUCINEX) 600 mg 12 hr tablet Take 1 tablet (600 mg total) by mouth every 12 (twelve) hours, Starting Fri 5/3/2019, Print      hydrOXYzine pamoate (VISTARIL) 50 mg capsule Take 50 mg by mouth Three times daily as needed, Starting Thu 8/8/2019, Historical Med      insulin aspart (NovoLOG) 100 units/mL injection Inject under the skin 3 (three) times a day before meals, Historical Med      insulin glargine (BASAGLAR KWIKPEN) 100 units/mL injection pen Inject 43 Units under the skin daily at bedtime , Historical Med      Insulin Pen Needle 32G X 4 MM MISC Unifine Pentips Plus 32 gauge x 5/32\" needle, Historical Med      ipratropium (ATROVENT) 0.02 % nebulizer solution Take 2.5 mL (0.5 mg total) by nebulization 4 (four) times a day, Starting Mon 3/28/2022, Normal      latanoprost (XALATAN) 0.005 % ophthalmic solution Administer 1 drop to both eyes daily, Historical Med      lidocaine (LIDODERM) 5 % Apply 1 patch topically daily Remove & Discard patch within 12 hours or as directed by MD, Starting Fri 5/14/2021, Normal      naloxone (NARCAN) 4 mg/0.1 mL nasal spray Administer 1 spray into a nostril. If no response after 2-3 minutes, give another dose in the other nostril using a new spray., Normal      naproxen (EC NAPROSYN) 375 MG TBEC Take 1 tablet (375 mg total) by mouth 2 (two) times a day with meals, Starting Mon 5/29/2023, Until Tue 5/28/2024, Normal      omega-3-acid ethyl esters (LOVAZA) 1 g capsule Take 1 g by mouth daily, Historical Med    "   omeprazole (PriLOSEC) 40 MG capsule Take 40 mg by mouth daily, Starting Tue 4/26/2016, Historical Med      perphenazine 2 mg tablet Take 2 mg by mouth daily, Historical Med      polyvinyl alcohol (LIQUIFILM TEARS) 1.4 % ophthalmic solution 2 drops as needed for dry eyes, Historical Med      predniSONE 5 mg tablet Take 1 tablet (5 mg total) by mouth daily, Starting Mon 4/29/2024, Normal      TAMSULOSIN HCL PO Take 0.4 mg by mouth daily , Historical Med      tolnaftate (TINACTIN) 1 % powder Apply 1 application topically 2 (two) times a day, Historical Med      Turmeric 500 MG CAPS Take by mouth, Historical Med      VITAMIN D PO Take by mouth, Historical Med             No discharge procedures on file.    PDMP Review         Value Time User    PDMP Reviewed  Yes 7/12/2021  7:56 AM Eugenia Ruiz DO            ED Provider  Electronically Signed by             Saurav Sepulveda MD  08/15/24 [id number]

## 2024-08-14 NOTE — PROGRESS NOTES
Pulmonary Follow-Up Note   Moises Ordaz 59 y.o. male MRN: [id number]  8/14/2024      Assessment/Plan:    Problem List Items Addressed This Visit       ARPITA (obstructive sleep apnea)     Refuses to use CPAP - he has concerns ongoing about [facility] on the whole and also notes machines are giving him headaches. He will call his sleep medicine specialist to get more details.         Respiratory arrest (HCC)     Prior history - maintain EpiPen for bee sting allergy         Severe persistent asthma without complication     Currently symptoms are intermittent however on exam today his chest is clear. Discussed with Marvin his SOB in the night sounds more like sleep apnea rather than true asthma symptoms.  Breo 200 1 puff daily  Albuterol HFA or via nebulizer up to every 4 hours if needed  Limited use of ipratropium via nebulizer as needed - history of prostate issues  Continue Dupixent injections 300mg sq every 14 days         Relevant Medications    albuterol (2.5 mg/3 mL) 0.083 % nebulizer solution    Preop pulmonary/respiratory exam - Primary     Patient anticipating inguinal hernia repair. On exam today he is clear and asthma regimen is currently optimized.    Pulmonary risk stratification: ARISCAT score is 3, low risk of in-hospital pulmonary complications (composite including respiratory failure, respiratory infection, pleural effusion, atelectasis, pneumothorax, bronchospasm, aspiration pneumonitis).         Allergic rhinitis with postnasal drip     Symptoms currently in good control  Continue Flonase daily  Allegra daily if needed          Other Visit Diagnoses       Asthmatic bronchitis        Relevant Medications    albuterol (2.5 mg/3 mL) 0.083 % nebulizer solution          Vaccines: Recommend annual flu shot    Return in about 6 months (around 2/14/2025).    All of Marvin's questions were answered prior to leaving the office today.  He is aware to call our office with any further questions or  concerns.    History of Present Illness   Reason for Visit: Follow up   Chief Complaint: asthma  HPI: Moises Ordaz is a 59 y.o. male who presents to the office today for routine follow up; at last office visit he was asked to taper off oral steroids. He remains on Dupixent in addition to ICS/LABA for severe persistent asthma. He was also asked to resume using his CPAP machine.    Today he feels he is doing ok but for the past couple weeks he has wheezing at times. He is using nebulizer with albuterol about twice per day. He is not coughing. Denies fever or chills. Nebulizer treatments are helpful.  Sleep: awakens a couple nights per week with asthma; not using CPAP machine. He is uncomfortable using [facility]man machine even though it was replaced after the recall.  Prednisone: none currently for about 6 weeks.    Review of Systems  Please note that a 14-point review of systems was performed to include Constitutional, HEENT, Respiratory, CVS, GI, , Musculoskeletal, Integumentary, Neurologic, Rheumatologic, Endocrinologic, Psychiatric, Lymphatic, and Hematologic/Oncologic systems were reviewed and are negative unless otherwise stated in HPI. Positive and negative findings pertinent to this evaluation are incorporated into the history of present illness.       Historical Information   Past Medical History:   Diagnosis Date    Asthma     Bipolar disorder (HCC)     COPD (chronic obstructive pulmonary disease) (HCC)     Diabetes mellitus (HCC)     Enlarged prostate     Glaucoma     Hyperlipidemia     Hypertension     Overdose of heroin, accidental or unintentional, sequela     Psychiatric disorder     Seasonal allergic rhinitis     Seizures (HCC)      Past Surgical History:   Procedure Laterality Date    CIRCUMCISION      KNEE SURGERY      WRIST SURGERY Left      Family History   Problem Relation Age of Onset    Arthritis Mother      Social History   Social History     Substance and Sexual Activity   Alcohol Use Not  Currently     Social History     Substance and Sexual Activity   Drug Use Not Currently    Types: Heroin, Fentanyl    Comment: OD 21 heroin     Social History     Tobacco Use   Smoking Status Former    Current packs/day: 0.00    Average packs/day: 2.0 packs/day for 27.0 years (54.0 ttl pk-yrs)    Types: Cigarettes    Start date:     Quit date:     Years since quittin.6   Smokeless Tobacco Never     E-Cigarette/Vaping    E-Cigarette Use Never User      E-Cigarette/Vaping Substances    Nicotine No     THC No     CBD No     Flavoring No     Other No     Unknown No        Meds/Allergies     Current Outpatient Medications:     acetaminophen (TYLENOL) 650 mg CR tablet, Take 1 tablet (650 mg total) by mouth every 8 (eight) hours as needed for mild pain, Disp: 30 tablet, Rfl: 0    albuterol (2.5 mg/3 mL) 0.083 % nebulizer solution, Take 3 mL (2.5 mg total) by nebulization every 6 (six) hours as needed for wheezing or shortness of breath, Disp: 90 mL, Rfl: 2    albuterol (ProAir HFA) 90 mcg/act inhaler, Inhale 2 puffs every 6 (six) hours as needed for wheezing, Disp: 8.5 g, Rfl: 0    amLODIPine (NORVASC) 5 mg tablet, Take 5 mg by mouth daily, Disp: , Rfl:     atorvastatin (LIPITOR) 80 mg tablet, Take 80 mg by mouth daily at bedtime, Disp: , Rfl:     benzonatate (TESSALON PERLES) 100 mg capsule, Take 1 capsule (100 mg total) by mouth 3 (three) times a day as needed for cough, Disp: 20 capsule, Rfl: 0    betamethasone dipropionate (DIPROSONE) 0.05 % ointment, Apply topically 2 (two) times a day, Disp: 30 g, Rfl: 0    bimatoprost (LUMIGAN) 0.03 % ophthalmic drops, Administer 1 drop to both eyes daily at bedtime  , Disp: , Rfl:     Blood Glucose Monitoring Suppl (ACURA BLOOD GLUCOSE METER) w/Device KIT, by Does not apply route, Disp: , Rfl:     Calcium Carb-Cholecalciferol (CALCIUM CARBONATE-VITAMIN D3 PO), Take 1,500 mg by mouth daily, Disp: , Rfl:     carBAMazepine (TEGretol) 200 mg tablet, Take 2 tabs in the  "morning and 3 tabs at night, Disp: , Rfl:     Cinnamon 500 MG capsule, Take 500 mg by mouth daily, Disp: , Rfl:     Diclofenac Sodium (VOLTAREN) 1 %, Apply 2 g topically 4 (four) times a day, Disp: 100 g, Rfl: 0    dupilumab (DUPIXENT) subcutaneous injection, Inject 2 mL (300 mg total) under the skin every 14 (fourteen) days, Disp: 4 mL, Rfl: 12    erythromycin (ILOTYCIN) ophthalmic ointment, Place a 1/2 inch ribbon of ointment into the lower eyelid., Disp: 1 g, Rfl: 0    fexofenadine (ALLEGRA) 60 MG tablet, Take 1 tablet (60 mg total) by mouth daily, Disp: 30 tablet, Rfl: 5    FIBER PO, Take by mouth, Disp: , Rfl:     finasteride (PROSCAR) 5 mg tablet, Take 5 mg by mouth daily, Disp: , Rfl:     fluticasone (FLONASE) 50 mcg/act nasal spray, 1 spray into each nostril daily, Disp: 16 g, Rfl: 5    fluticasone-vilanterol (Breo Ellipta) 200-25 mcg/actuation inhaler, Inhale 1 puff daily Rinse mouth after use., Disp: 60 blister, Rfl: 5    gabapentin (NEURONTIN) 300 mg capsule, Take 300 mg by mouth 3 (three) times a day, Disp: , Rfl:     glucagon 1 MG injection, as needed, Disp: , Rfl:     guaiFENesin (MUCINEX) 600 mg 12 hr tablet, Take 1 tablet (600 mg total) by mouth every 12 (twelve) hours, Disp: 12 tablet, Rfl: 0    hydrOXYzine pamoate (VISTARIL) 50 mg capsule, Take 50 mg by mouth Three times daily as needed, Disp: , Rfl:     insulin aspart (NovoLOG) 100 units/mL injection, Inject under the skin 3 (three) times a day before meals, Disp: , Rfl:     insulin glargine (BASAGLAR KWIKPEN) 100 units/mL injection pen, Inject 43 Units under the skin daily at bedtime , Disp: , Rfl:     Insulin Pen Needle 32G X 4 MM MISC, Unifine Pentips Plus 32 gauge x 5/32\" needle, Disp: , Rfl:     ipratropium (ATROVENT) 0.02 % nebulizer solution, Take 2.5 mL (0.5 mg total) by nebulization 4 (four) times a day, Disp: 100 mL, Rfl: 0    latanoprost (XALATAN) 0.005 % ophthalmic solution, Administer 1 drop to both eyes daily, Disp: , Rfl:     " "lidocaine (LIDODERM) 5 %, Apply 1 patch topically daily Remove & Discard patch within 12 hours or as directed by MD, Disp: 10 patch, Rfl: 0    naloxone (NARCAN) 4 mg/0.1 mL nasal spray, Administer 1 spray into a nostril. If no response after 2-3 minutes, give another dose in the other nostril using a new spray., Disp: 1 each, Rfl: 0    omega-3-acid ethyl esters (LOVAZA) 1 g capsule, Take 1 g by mouth daily, Disp: , Rfl:     omeprazole (PriLOSEC) 40 MG capsule, Take 40 mg by mouth daily, Disp: , Rfl:     perphenazine 2 mg tablet, Take 2 mg by mouth daily, Disp: , Rfl:     polyvinyl alcohol (LIQUIFILM TEARS) 1.4 % ophthalmic solution, 2 drops as needed for dry eyes, Disp: , Rfl:     TAMSULOSIN HCL PO, Take 0.4 mg by mouth daily , Disp: , Rfl:     tolnaftate (TINACTIN) 1 % powder, Apply 1 application topically 2 (two) times a day, Disp: , Rfl:     Turmeric 500 MG CAPS, Take by mouth, Disp: , Rfl:     VITAMIN D PO, Take by mouth, Disp: , Rfl:     diclofenac sodium (VOLTAREN) 50 mg EC tablet, Take 1 tablet (50 mg total) by mouth 2 (two) times a day for 10 days (Patient not taking: Reported on 4/29/2024), Disp: 20 tablet, Rfl: 0    EPINEPHrine (EPIPEN) 0.3 mg/0.3 mL SOAJ, Inject 0.3 mL (0.3 mg total) into a muscle once for 1 dose, Disp: 0.6 mL, Rfl: 0    naproxen (EC NAPROSYN) 375 MG TBEC, Take 1 tablet (375 mg total) by mouth 2 (two) times a day with meals, Disp: 20 tablet, Rfl: 0    predniSONE 5 mg tablet, Take 1 tablet (5 mg total) by mouth daily (Patient not taking: Reported on 8/14/2024), Disp: 30 tablet, Rfl: 0  Allergies   Allergen Reactions    Aspirin GI Bleeding    Ibuprofen GI Bleeding    Licorice Flavor [Flavoring Agent - Food Allergy] Hives    Penicillins Hives    Propoxyphene Hives     Propoxyphene N-Acetaminophen---hives & palpitations    Tramadol Palpitations     Other reaction(s): heart pumps real fast    Bee Venom     Glycyrrhiza     Haloperidol Other (See Comments)     \"bent out of shape\" - dystonia    " "Lithium Other (See Comments)     edema    Molds & Smuts     Other     Wasp Venom        Vitals: Blood pressure 114/72, pulse 86, temperature 97.8 °F (36.6 °C), temperature source Tympanic, height 5' 8.5\" (1.74 m), weight 99.3 kg (219 lb), SpO2 98%. Body mass index is 32.81 kg/m². Oxygen Therapy  SpO2: 98 %  Oxygen Therapy: None (Room air)      Physical Exam  Vitals reviewed.   Constitutional:       Appearance: Normal appearance.   HENT:      Head: Normocephalic.      Nose: Nose normal.      Mouth/Throat:      Mouth: Mucous membranes are moist.      Pharynx: Oropharynx is clear.   Cardiovascular:      Rate and Rhythm: Normal rate and regular rhythm.      Pulses: Normal pulses.      Heart sounds: Normal heart sounds.   Pulmonary:      Effort: Pulmonary effort is normal.      Breath sounds: Normal breath sounds.   Musculoskeletal:         General: Normal range of motion.   Skin:     General: Skin is warm.      Capillary Refill: Capillary refill takes less than 2 seconds.   Neurological:      General: No focal deficit present.      Mental Status: He is alert and oriented to person, place, and time.   Psychiatric:         Mood and Affect: Mood normal.         Behavior: Behavior normal.         Labs:   Component      Latest Ref Rng 6/28/2024   GLUCOSE      65 - 99 mg/dL 143 (H) (E)   BUN      7 - 28 mg/dL 10 (E)   Creatinine      0.53 - 1.30 mg/dL 1.10 (E)   Sodium      135 - 145 mmol/L 141 (E)   Potassium      3.5 - 5.2 mmol/L 4.2 (E)   Chloride      100 - 109 mmol/L 104 (E)   Carbon Dioxide      21 - 31 mmol/L 30 (E)   Calcium      8.5 - 10.1 mg/dL 9.4 (E)   ALK PHOS      35 - 120 U/L 60 (E)   Albumin      3.5 - 5.7 g/dL 4.3 (E)   Total Bilirubin      0.2 - 1.0 mg/dL 0.5 (E)   Total Protein      6.3 - 8.3 g/dL 6.3 (E)   AST      <41 U/L 17 (E)   ALT      <56 U/L 16 (E)   ANION GAP      3 - 11  7 (E)   GFR, Calculated      >59  77 (E)   eGFR Comment Interpretive information: calculated GFR (E)   CBC AND PLATELET  Order: " "[id number]  Component  Ref Range & Units 6/28/24  9:43 AM   Hemoglobin  12.5 - 17.0 g/dL 14.8   Hematocrit  37.0 - 48.0 % 43.5   WBC  4.0 - 10.5 thou/cmm 6.8   RBC  4.00 - 5.40 mill/cmm 5.16   Platelet  140 - 350 thou/cmm 238   MPV  7.5 - 11.3 fL 8.2   MCV  80 - 100 fL 84   MCH  27.0 - 36.0 pg 28.8   MCHC  32.0 - 37.0 g/dL 34.1   RDW  12.0 - 16.0 % 13.8         CARRIE Gillis  [facility]        Portions of the record may have been created with voice recognition software.  Occasional wrong word or \"sound a like\" substitutions may have occurred due to the inherent limitations of voice recognition software.  Read the chart carefully and recognize, using context, where substitutions have occurred or contact the dictating provider.  "

## 2024-09-11 DIAGNOSIS — J45.909 ASTHMATIC BRONCHITIS: ICD-10-CM

## 2024-09-11 RX ORDER — ALBUTEROL SULFATE 0.83 MG/ML
SOLUTION RESPIRATORY (INHALATION)
Qty: 90 ML | Refills: 2 | Status: SHIPPED | OUTPATIENT
Start: 2024-09-11

## 2024-10-18 DIAGNOSIS — J45.909 ASTHMATIC BRONCHITIS: ICD-10-CM

## 2024-10-18 RX ORDER — ALBUTEROL SULFATE 0.83 MG/ML
SOLUTION RESPIRATORY (INHALATION)
Qty: 90 ML | Refills: 2 | Status: SHIPPED | OUTPATIENT
Start: 2024-10-18

## 2024-12-29 ENCOUNTER — APPOINTMENT (EMERGENCY)
Dept: RADIOLOGY | Facility: HOSPITAL | Age: 59
End: 2024-12-29
Payer: COMMERCIAL

## 2024-12-29 ENCOUNTER — HOSPITAL ENCOUNTER (EMERGENCY)
Facility: HOSPITAL | Age: 59
Discharge: HOME | End: 2024-12-29
Payer: COMMERCIAL

## 2024-12-29 VITALS
RESPIRATION RATE: 18 BRPM | BODY MASS INDEX: 33.43 KG/M2 | WEIGHT: 223.11 LBS | OXYGEN SATURATION: 98 % | TEMPERATURE: 98.1 F | DIASTOLIC BLOOD PRESSURE: 70 MMHG | SYSTOLIC BLOOD PRESSURE: 136 MMHG | HEART RATE: 103 BPM

## 2024-12-29 DIAGNOSIS — J45.901 ASTHMA EXACERBATION: ICD-10-CM

## 2024-12-29 DIAGNOSIS — U07.1 COVID-19: Primary | ICD-10-CM

## 2024-12-29 LAB
ANION GAP SERPL CALCULATED.3IONS-SCNC: 7 MMOL/L (ref 4–13)
ATRIAL RATE: 95 BPM
BASOPHILS # BLD AUTO: 0.03 THOUSANDS/ΜL (ref 0–0.1)
BASOPHILS NFR BLD AUTO: 1 % (ref 0–1)
BUN SERPL-MCNC: 10 MG/DL (ref 5–25)
CALCIUM SERPL-MCNC: 8.7 MG/DL (ref 8.4–10.2)
CARDIAC TROPONIN I PNL SERPL HS: <2 NG/L (ref ?–50)
CHLORIDE SERPL-SCNC: 102 MMOL/L (ref 96–108)
CO2 SERPL-SCNC: 27 MMOL/L (ref 21–32)
CREAT SERPL-MCNC: 0.95 MG/DL (ref 0.6–1.3)
EOSINOPHIL # BLD AUTO: 0.33 THOUSAND/ΜL (ref 0–0.61)
EOSINOPHIL NFR BLD AUTO: 5 % (ref 0–6)
ERYTHROCYTE [DISTWIDTH] IN BLOOD BY AUTOMATED COUNT: 13.7 % (ref 11.6–15.1)
FLUAV AG UPPER RESP QL IA.RAPID: NEGATIVE
FLUBV AG UPPER RESP QL IA.RAPID: NEGATIVE
GFR SERPL CREATININE-BSD FRML MDRD: 86 ML/MIN/1.73SQ M
GLUCOSE SERPL-MCNC: 158 MG/DL (ref 65–140)
HCT VFR BLD AUTO: 39.5 % (ref 36.5–49.3)
HGB BLD-MCNC: 13.2 G/DL (ref 12–17)
IMM GRANULOCYTES # BLD AUTO: 0.03 THOUSAND/UL (ref 0–0.2)
IMM GRANULOCYTES NFR BLD AUTO: 1 % (ref 0–2)
LYMPHOCYTES # BLD AUTO: 0.8 THOUSANDS/ΜL (ref 0.6–4.47)
LYMPHOCYTES NFR BLD AUTO: 13 % (ref 14–44)
MCH RBC QN AUTO: 26.8 PG (ref 26.8–34.3)
MCHC RBC AUTO-ENTMCNC: 33.4 G/DL (ref 31.4–37.4)
MCV RBC AUTO: 80 FL (ref 82–98)
MONOCYTES # BLD AUTO: 0.74 THOUSAND/ΜL (ref 0.17–1.22)
MONOCYTES NFR BLD AUTO: 12 % (ref 4–12)
NEUTROPHILS # BLD AUTO: 4.36 THOUSANDS/ΜL (ref 1.85–7.62)
NEUTS SEG NFR BLD AUTO: 68 % (ref 43–75)
NRBC BLD AUTO-RTO: 0 /100 WBCS
P AXIS: -6 DEGREES
PLATELET # BLD AUTO: 227 THOUSANDS/UL (ref 149–390)
PMV BLD AUTO: 9 FL (ref 8.9–12.7)
POTASSIUM SERPL-SCNC: 5.1 MMOL/L (ref 3.5–5.3)
PR INTERVAL: 176 MS
QRS AXIS: 14 DEGREES
QRSD INTERVAL: 86 MS
QT INTERVAL: 352 MS
QTC INTERVAL: 442 MS
RBC # BLD AUTO: 4.92 MILLION/UL (ref 3.88–5.62)
SARS-COV+SARS-COV-2 AG RESP QL IA.RAPID: POSITIVE
SODIUM SERPL-SCNC: 136 MMOL/L (ref 135–147)
T WAVE AXIS: 29 DEGREES
VENTRICULAR RATE: 95 BPM
WBC # BLD AUTO: 6.29 THOUSAND/UL (ref 4.31–10.16)

## 2024-12-29 PROCEDURE — 93010 ELECTROCARDIOGRAM REPORT: CPT | Performed by: STUDENT IN AN ORGANIZED HEALTH CARE EDUCATION/TRAINING PROGRAM

## 2024-12-29 PROCEDURE — 71045 X-RAY EXAM CHEST 1 VIEW: CPT

## 2024-12-29 PROCEDURE — 99284 EMERGENCY DEPT VISIT MOD MDM: CPT

## 2024-12-29 PROCEDURE — 85025 COMPLETE CBC W/AUTO DIFF WBC: CPT | Performed by: PHYSICIAN ASSISTANT

## 2024-12-29 PROCEDURE — 94640 AIRWAY INHALATION TREATMENT: CPT

## 2024-12-29 PROCEDURE — 96374 THER/PROPH/DIAG INJ IV PUSH: CPT

## 2024-12-29 PROCEDURE — 93005 ELECTROCARDIOGRAM TRACING: CPT

## 2024-12-29 PROCEDURE — 80048 BASIC METABOLIC PNL TOTAL CA: CPT | Performed by: PHYSICIAN ASSISTANT

## 2024-12-29 PROCEDURE — 36415 COLL VENOUS BLD VENIPUNCTURE: CPT | Performed by: PHYSICIAN ASSISTANT

## 2024-12-29 PROCEDURE — 87811 SARS-COV-2 COVID19 W/OPTIC: CPT | Performed by: PHYSICIAN ASSISTANT

## 2024-12-29 PROCEDURE — 84484 ASSAY OF TROPONIN QUANT: CPT | Performed by: PHYSICIAN ASSISTANT

## 2024-12-29 PROCEDURE — 87804 INFLUENZA ASSAY W/OPTIC: CPT | Performed by: PHYSICIAN ASSISTANT

## 2024-12-29 PROCEDURE — 99284 EMERGENCY DEPT VISIT MOD MDM: CPT | Performed by: PHYSICIAN ASSISTANT

## 2024-12-29 RX ORDER — ALBUTEROL SULFATE 90 UG/1
1-2 INHALANT RESPIRATORY (INHALATION) EVERY 6 HOURS PRN
Qty: 8 G | Refills: 0 | Status: SHIPPED | OUTPATIENT
Start: 2024-12-29 | End: 2024-12-29

## 2024-12-29 RX ORDER — IPRATROPIUM BROMIDE AND ALBUTEROL SULFATE 2.5; .5 MG/3ML; MG/3ML
3 SOLUTION RESPIRATORY (INHALATION) ONCE
Status: COMPLETED | OUTPATIENT
Start: 2024-12-29 | End: 2024-12-29

## 2024-12-29 RX ORDER — METHYLPREDNISOLONE SODIUM SUCCINATE 125 MG/2ML
80 INJECTION, POWDER, LYOPHILIZED, FOR SOLUTION INTRAMUSCULAR; INTRAVENOUS ONCE
Status: COMPLETED | OUTPATIENT
Start: 2024-12-29 | End: 2024-12-29

## 2024-12-29 RX ORDER — PREDNISONE 20 MG/1
40 TABLET ORAL DAILY
Qty: 10 TABLET | Refills: 0 | Status: SHIPPED | OUTPATIENT
Start: 2024-12-29 | End: 2024-12-29

## 2024-12-29 RX ORDER — PREDNISONE 20 MG/1
40 TABLET ORAL DAILY
Qty: 10 TABLET | Refills: 0 | Status: SHIPPED | OUTPATIENT
Start: 2024-12-29 | End: 2024-12-31

## 2024-12-29 RX ORDER — ALBUTEROL SULFATE 90 UG/1
1-2 INHALANT RESPIRATORY (INHALATION) EVERY 6 HOURS PRN
Qty: 8 G | Refills: 0 | Status: SHIPPED | OUTPATIENT
Start: 2024-12-29

## 2024-12-29 RX ADMIN — IPRATROPIUM BROMIDE AND ALBUTEROL SULFATE 3 ML: .5; 3 SOLUTION RESPIRATORY (INHALATION) at 06:50

## 2024-12-29 RX ADMIN — METHYLPREDNISOLONE SODIUM SUCCINATE 80 MG: 125 INJECTION, POWDER, FOR SOLUTION INTRAMUSCULAR; INTRAVENOUS at 06:50

## 2024-12-29 NOTE — ED PROVIDER NOTES
Time reflects when diagnosis was documented in both MDM as applicable and the Disposition within this note       Time User Action Codes Description Comment    12/29/2024  8:32 AM Augustus Hunter [U07.1] COVID-19     12/29/2024  8:43 AM Augustus Hunter Add [J45.909] Asthma     12/29/2024  8:45 AM Augustus Hunter Remove [J45.909] Asthma     12/29/2024  8:45 AM Augustus Hunter Add [J45.901] Asthma exacerbation           ED Disposition       ED Disposition   Discharge    Condition   Stable    Date/Time   Sun Dec 29, 2024  8:32 AM    Comment   Moises Ordaz discharge to home/self care.                   Assessment & Plan       Medical Decision Making  Several days of cough, dyspnea/wheezing, fatigue, chest tightness with coughing. Albuterol does seem to temporarily relieve dyspnea but recurrent. Wheezing noted on exam without acute resp distress/increased work of breathing. Given duoneb, solumedrol here with significant overall symptomatic improvement. EKG/trop WNL. CXR without focal infiltrate on my initial read. COVID-19 testing is positive. O2 stable on room air.     Pt not candidate for paxlovid given interaction with carbamazepine, and he declines using experimental alternative agent. Will d/c with course of prednisone, continue supportive care. F/u with PCP recommended, strict return to ED indications reviewed.     Amount and/or Complexity of Data Reviewed  Labs: ordered.  Radiology: ordered.    Risk  Prescription drug management.             Medications   ipratropium-albuterol (DUO-NEB) 0.5-2.5 mg/3 mL inhalation solution 3 mL (3 mL Nebulization Given 12/29/24 0650)   methylPREDNISolone sodium succinate (Solu-MEDROL) injection 80 mg (80 mg Intravenous Given 12/29/24 0650)       ED Risk Strat Scores                          SBIRT 22yo+      Flowsheet Row Most Recent Value   Initial Alcohol Screen: US AUDIT-C     1. How often do you have a drink containing alcohol? 0 Filed at: 12/29/2024 0408   2.  "How many drinks containing alcohol do you have on a typical day you are drinking?  0 Filed at: 2024   3a. Male UNDER 65: How often do you have five or more drinks on one occasion? 0 Filed at: 2024   3b. FEMALE Any Age, or MALE 65+: How often do you have 4 or more drinks on one occassion? 0 Filed at: 2024   Audit-C Score 0 Filed at: 2024   ROXY: How many times in the past year have you...    Used an illegal drug or used a prescription medication for non-medical reasons? Never Filed at: 2024                            History of Present Illness       Chief Complaint   Patient presents with    Allergic Reaction     Pt states he ate a sandwich on  that may have had mushrooms on it, and then 12 hours afterwards, felt \"a knot in my throat\".  Pt states he took steroids that he had leftover at home from last year and felt better yesterday but now today has congestion, cough, rash on hands.  Pt took Benadryl at 0245 and did a neb treatment at 0300 at home.        Past Medical History:   Diagnosis Date    Asthma     Bipolar disorder (HCC)     COPD (chronic obstructive pulmonary disease) (HCC)     Diabetes mellitus (HCC)     Enlarged prostate     Glaucoma     Hyperlipidemia     Hypertension     Overdose of heroin, accidental or unintentional, sequela     Psychiatric disorder     Seasonal allergic rhinitis     Seizures (HCC)       Past Surgical History:   Procedure Laterality Date    CIRCUMCISION      KNEE SURGERY      WRIST SURGERY Left       Family History   Problem Relation Age of Onset    Arthritis Mother       Social History     Tobacco Use    Smoking status: Former     Current packs/day: 0.00     Average packs/day: 2.0 packs/day for 27.0 years (54.0 ttl pk-yrs)     Types: Cigarettes     Start date:      Quit date:      Years since quittin.0    Smokeless tobacco: Never   Vaping Use    Vaping status: Never Used   Substance Use Topics    Alcohol use: " Not Currently    Drug use: Not Currently     Types: Heroin, Fentanyl     Comment: OD 7/8/22 heroin      E-Cigarette/Vaping    E-Cigarette Use Never User       E-Cigarette/Vaping Substances    Nicotine No     THC No     CBD No     Flavoring No     Other No     Unknown No       I have reviewed and agree with the history as documented.     Moises is a 60 yo M, history of asthma, HTN, HLD, seizure disorder, presenting with 2 days of cough, congestion, dyspnea, chest tightness with coughing. He has been compliant with at home medication regimen and has been using albuterol rescue inhaler every 4 hours or so. No fevers reported. Does report one sick contact with similar.       History provided by:  Patient   used: No        Review of Systems   Constitutional:  Positive for fatigue. Negative for chills and fever.   HENT:  Positive for congestion. Negative for rhinorrhea and sore throat.    Eyes:  Negative for pain and visual disturbance.   Respiratory:  Positive for cough, shortness of breath and wheezing.    Cardiovascular:  Negative for chest pain and palpitations.   Gastrointestinal:  Negative for abdominal pain, diarrhea, nausea and vomiting.   Genitourinary:  Negative for dysuria, frequency and urgency.   Musculoskeletal:  Negative for back pain, neck pain and neck stiffness.   Skin:  Negative for rash and wound.   Neurological:  Negative for dizziness, weakness, light-headedness and numbness.           Objective       ED Triage Vitals [12/29/24 0405]   Temperature Pulse Blood Pressure Respirations SpO2 Patient Position - Orthostatic VS   98.1 °F (36.7 °C) 92 169/82 18 99 % Sitting      Temp Source Heart Rate Source BP Location FiO2 (%) Pain Score    Oral Monitor Right arm -- No Pain      Vitals      Date and Time Temp Pulse SpO2 Resp BP Pain Score FACES Pain Rating User   12/29/24 0826 -- 103 98 % 18 136/70 -- -- HS   12/29/24 0617 -- 100 98 % 20 172/75 5 -- DM   12/29/24 0405 98.1 °F (36.7 °C) 92  99 % 18 169/82 No Pain -- LJB            Physical Exam  Constitutional:       General: He is not in acute distress.     Appearance: He is well-developed. He is not diaphoretic.   HENT:      Head: Normocephalic and atraumatic.      Right Ear: External ear normal.      Left Ear: External ear normal.      Nose: Congestion present.   Eyes:      Extraocular Movements:      Right eye: Normal extraocular motion.      Left eye: Normal extraocular motion.      Conjunctiva/sclera: Conjunctivae normal.      Pupils: Pupils are equal, round, and reactive to light.   Cardiovascular:      Rate and Rhythm: Normal rate and regular rhythm.      Comments: No lower extremity edema  Pulmonary:      Effort: Pulmonary effort is normal. No accessory muscle usage or respiratory distress.      Breath sounds: Wheezing (expiratory) present. No rhonchi or rales.      Comments: No acute respiratory distress  Abdominal:      General: Abdomen is flat. There is no distension.   Musculoskeletal:      Cervical back: Normal range of motion. No rigidity.   Skin:     General: Skin is warm and dry.      Capillary Refill: Capillary refill takes less than 2 seconds.      Findings: No erythema or rash.   Neurological:      Mental Status: He is alert and oriented to person, place, and time.      Motor: No abnormal muscle tone.      Coordination: Coordination normal.   Psychiatric:         Behavior: Behavior normal.         Thought Content: Thought content normal.         Judgment: Judgment normal.         Results Reviewed       Procedure Component Value Units Date/Time    Basic metabolic panel [[id number]]  (Abnormal) Collected: 12/29/24 0646    Lab Status: Final result Specimen: Blood from Arm, Left Updated: 12/29/24 0758     Sodium 136 mmol/L      Potassium 5.1 mmol/L      Chloride 102 mmol/L      CO2 27 mmol/L      ANION GAP 7 mmol/L      BUN 10 mg/dL      Creatinine 0.95 mg/dL      Glucose 158 mg/dL      Calcium 8.7 mg/dL      eGFR 86 ml/min/1.73sq m      Narrative:      National Kid[employer] Disease Foundation guidelines for Chronic Kidney Disease (CKD):     Stage 1 with normal or high GFR (GFR > 90 mL/min/1.73 square meters)    Stage 2 Mild CKD (GFR = 60-89 mL/min/1.73 square meters)    Stage 3A Moderate CKD (GFR = 45-59 mL/min/1.73 square meters)    Stage 3B Moderate CKD (GFR = 30-44 mL/min/1.73 square meters)    Stage 4 Severe CKD (GFR = 15-29 mL/min/1.73 square meters)    Stage 5 End Stage CKD (GFR <15 mL/min/1.73 square meters)  Note: GFR calculation is accurate only with a steady state creatinine    HS Troponin 0hr (reflex protocol) [[id number]]  (Normal) Collected: 12/29/24 0646    Lab Status: Final result Specimen: Blood from Arm, Left Updated: 12/29/24 0724     hs TnI 0hr <2 ng/L     FLU/COVID Rapid Antigen (30 min. TAT) - Preferred screening test in ED [[id number]]  (Abnormal) Collected: 12/29/24 0646    Lab Status: Final result Specimen: Nares from Nose Updated: 12/29/24 0717     SARS COV Rapid Antigen Positive     Influenza A Rapid Antigen Negative     Influenza B Rapid Antigen Negative    Narrative:      This test has been performed using the Quidel Romana 2 FLU+SARS Antigen test under the Emergency Use Authorization (EUA). This test has been validated by the  and verified by the performing laboratory. The Romana uses lateral flow immunofluorescent sandwich assay to detect SARS-COV, Influenza A and Influenza B Antigen.     The Quidel Romana 2 SARS Antigen test does not differentiate between SARS-CoV and SARS-CoV-2.     Negative results are presumptive and may be confirmed with a molecular assay, if necessary, for patient management. Negative results do not rule out SARS-CoV-2 or influenza infection and should not be used as the sole basis for treatment or patient management decisions. A negative test result may occur if the level of antigen in a sample is below the limit of detection of this test.     Positive results are indicative of the presence of  viral antigens, but do not rule out bacterial infection or co-infection with other viruses.     All test results should be used as an adjunct to clinical observations and other information available to the provider.    FOR PEDIATRIC PATIENTS - copy/paste COVID Guidelines URL to browser: [email or web address]    CBC and differential [[id number]]  (Abnormal) Collected: 12/29/24 0646    Lab Status: Final result Specimen: Blood from Arm, Left Updated: 12/29/24 0659     WBC 6.29 Thousand/uL      RBC 4.92 Million/uL      Hemoglobin 13.2 g/dL      Hematocrit 39.5 %      MCV 80 fL      MCH 26.8 pg      MCHC 33.4 g/dL      RDW 13.7 %      MPV 9.0 fL      Platelets 227 Thousands/uL      nRBC 0 /100 WBCs      Segmented % 68 %      Immature Grans % 1 %      Lymphocytes % 13 %      Monocytes % 12 %      Eosinophils Relative 5 %      Basophils Relative 1 %      Absolute Neutrophils 4.36 Thousands/µL      Absolute Immature Grans 0.03 Thousand/uL      Absolute Lymphocytes 0.80 Thousands/µL      Absolute Monocytes 0.74 Thousand/µL      Eosinophils Absolute 0.33 Thousand/µL      Basophils Absolute 0.03 Thousands/µL             XR chest 1 view portable    (Results Pending)       ECG 12 Lead Documentation Only    Date/Time: 12/29/2024 6:50 AM    Performed by: Augustus Hunter PA-C  Authorized by: Augustus Hunter PA-C    Indications / Diagnosis:  Dyspnea  ECG reviewed by me, the ED Provider: yes    Patient location:  ED  Previous ECG:     Previous ECG:  Compared to current    Similarity:  No change    Comparison to cardiac monitor: Yes    Interpretation:     Interpretation: normal    Rate:     ECG rate:  95    ECG rate assessment: normal    Rhythm:     Rhythm: sinus rhythm    Ectopy:     Ectopy: none    QRS:     QRS axis:  Normal    QRS intervals:  Normal  Conduction:     Conduction: normal    ST segments:     ST segments:  Normal  T waves:     T waves: normal        ED  "Medication and Procedure Management   Prior to Admission Medications   Prescriptions Last Dose Informant Patient Reported? Taking?   Blood Glucose Monitoring Suppl (ACURA BLOOD GLUCOSE METER) w/Device KIT  Self Yes No   Sig: by Does not apply route   Calcium Carb-Cholecalciferol (CALCIUM CARBONATE-VITAMIN D3 PO)  Self Yes No   Sig: Take 1,500 mg by mouth daily   Cinnamon 500 MG capsule  Self Yes No   Sig: Take 500 mg by mouth daily   Diclofenac Sodium (VOLTAREN) 1 %   No No   Sig: Apply 2 g topically 4 (four) times a day   EPINEPHrine (EPIPEN) 0.3 mg/0.3 mL SOAJ   No No   Sig: Inject 0.3 mL (0.3 mg total) into a muscle once for 1 dose   FIBER PO  Self Yes No   Sig: Take by mouth   Insulin Pen Needle 32G X 4 MM MISC  Self Yes No   Sig: Unifine Pentips Plus 32 gauge x 5/32\" needle   TAMSULOSIN HCL PO  Self Yes No   Sig: Take 0.4 mg by mouth daily    Turmeric 500 MG CAPS  Self Yes No   Sig: Take by mouth   VITAMIN D PO  Self Yes No   Sig: Take by mouth   acetaminophen (TYLENOL) 650 mg CR tablet   No No   Sig: Take 1 tablet (650 mg total) by mouth every 8 (eight) hours as needed for mild pain   albuterol (2.5 mg/3 mL) 0.083 % nebulizer solution   No No   Sig: inhale 3 MILLILITERS in nebulizer every 6 hours if needed for shortness of breath or wheezing   albuterol (ProAir HFA) 90 mcg/act inhaler   No No   Sig: Inhale 2 puffs every 6 (six) hours as needed for wheezing   amLODIPine (NORVASC) 5 mg tablet  Self Yes No   Sig: Take 5 mg by mouth daily   atorvastatin (LIPITOR) 80 mg tablet  Self Yes No   Sig: Take 80 mg by mouth daily at bedtime   betamethasone dipropionate (DIPROSONE) 0.05 % ointment   No No   Sig: Apply topically 2 (two) times a day   bimatoprost (LUMIGAN) 0.03 % ophthalmic drops  Self Yes No   Sig: Administer 1 drop to both eyes daily at bedtime     carBAMazepine (TEGretol) 200 mg tablet  Self Yes No   Sig: Take 2 tabs in the morning and 3 tabs at night   dupilumab (DUPIXENT) subcutaneous injection   No No "   Sig: Inject 2 mL (300 mg total) under the skin every 14 (fourteen) days   fexofenadine (ALLEGRA) 60 MG tablet   No No   Sig: Take 1 tablet (60 mg total) by mouth daily   finasteride (PROSCAR) 5 mg tablet  Self Yes No   Sig: Take 5 mg by mouth daily   fluticasone (FLONASE) 50 mcg/act nasal spray   No No   Si spray into each nostril daily   fluticasone-vilanterol (Breo Ellipta) 200-25 mcg/actuation inhaler   No No   Sig: Inhale 1 puff daily Rinse mouth after use.   gabapentin (NEURONTIN) 300 mg capsule  Self Yes No   Sig: Take 300 mg by mouth 3 (three) times a day   glucagon 1 MG injection  Self Yes No   Sig: as needed   insulin aspart (NovoLOG) 100 units/mL injection  Self Yes No   Sig: Inject under the skin 3 (three) times a day before meals   insulin glargine (BASAGLAR KWIKPEN) 100 units/mL injection pen  Self Yes No   Sig: Inject 43 Units under the skin daily at bedtime    ipratropium (ATROVENT) 0.02 % nebulizer solution  Self No No   Sig: Take 2.5 mL (0.5 mg total) by nebulization 4 (four) times a day   latanoprost (XALATAN) 0.005 % ophthalmic solution  Self Yes No   Sig: Administer 1 drop to both eyes daily   naloxone (NARCAN) 4 mg/0.1 mL nasal spray  Self No No   Sig: Administer 1 spray into a nostril. If no response after 2-3 minutes, give another dose in the other nostril using a new spray.   omega-3-acid ethyl esters (LOVAZA) 1 g capsule  Self Yes No   Sig: Take 1 g by mouth daily   omeprazole (PriLOSEC) 40 MG capsule  Self Yes No   Sig: Take 40 mg by mouth daily   perphenazine 2 mg tablet  Self Yes No   Sig: Take 2 mg by mouth daily   polyvinyl alcohol (LIQUIFILM TEARS) 1.4 % ophthalmic solution  Self Yes No   Si drops as needed for dry eyes   predniSONE 5 mg tablet   No No   Sig: Take 1 tablet (5 mg total) by mouth daily   Patient not taking: Reported on 2024   tolnaftate (TINACTIN) 1 % powder  Self Yes No   Sig: Apply 1 application topically 2 (two) times a day      Facility-Administered  Medications: None     Discharge Medication List as of 12/29/2024  8:47 AM        START taking these medications    Details   !! albuterol (Proventil HFA) 90 mcg/act inhaler Inhale 1-2 puffs every 6 (six) hours as needed for wheezing or shortness of breath, Starting Sun 12/29/2024, Normal      !! predniSONE 20 mg tablet Take 2 tablets (40 mg total) by mouth daily for 5 days, Starting Sun 12/29/2024, Until Fri 1/3/2025, Normal       !! - Potential duplicate medications found. Please discuss with provider.        CONTINUE these medications which have NOT CHANGED    Details   acetaminophen (TYLENOL) 650 mg CR tablet Take 1 tablet (650 mg total) by mouth every 8 (eight) hours as needed for mild pain, Starting Wed 2/7/2024, Normal      albuterol (2.5 mg/3 mL) 0.083 % nebulizer solution inhale 3 MILLILITERS in nebulizer every 6 hours if needed for shortness of breath or wheezing, Normal      !! albuterol (ProAir HFA) 90 mcg/act inhaler Inhale 2 puffs every 6 (six) hours as needed for wheezing, Starting Fri 7/21/2023, Normal      amLODIPine (NORVASC) 5 mg tablet Take 5 mg by mouth daily, Historical Med      atorvastatin (LIPITOR) 80 mg tablet Take 80 mg by mouth daily at bedtime, Starting Thu 9/8/2016, Historical Med      betamethasone dipropionate (DIPROSONE) 0.05 % ointment Apply topically 2 (two) times a day, Starting Fri 8/4/2023, Normal      bimatoprost (LUMIGAN) 0.03 % ophthalmic drops Administer 1 drop to both eyes daily at bedtime  , Starting Thu 6/5/2008, Historical Med      Blood Glucose Monitoring Suppl (ACURA BLOOD GLUCOSE METER) w/Device KIT by Does not apply route, Historical Med      Calcium Carb-Cholecalciferol (CALCIUM CARBONATE-VITAMIN D3 PO) Take 1,500 mg by mouth daily, Starting Fri 9/20/2013, Historical Med      carBAMazepine (TEGretol) 200 mg tablet Take 2 tabs in the morning and 3 tabs at night, Historical Med      Cinnamon 500 MG capsule Take 500 mg by mouth daily, Historical Med      Diclofenac  "Sodium (VOLTAREN) 1 % Apply 2 g topically 4 (four) times a day, Starting Wed 2/7/2024, Normal      dupilumab (DUPIXENT) subcutaneous injection Inject 2 mL (300 mg total) under the skin every 14 (fourteen) days, Starting Wed 3/20/2024, Print      EPINEPHrine (EPIPEN) 0.3 mg/0.3 mL SOAJ Inject 0.3 mL (0.3 mg total) into a muscle once for 1 dose, Starting Wed 3/20/2024, Normal      fexofenadine (ALLEGRA) 60 MG tablet Take 1 tablet (60 mg total) by mouth daily, Starting Wed 3/20/2024, Normal      FIBER PO Take by mouth, Historical Med      finasteride (PROSCAR) 5 mg tablet Take 5 mg by mouth daily, Starting Tue 4/26/2016, Historical Med      fluticasone (FLONASE) 50 mcg/act nasal spray 1 spray into each nostril daily, Starting Wed 3/20/2024, Normal      fluticasone-vilanterol (Breo Ellipta) 200-25 mcg/actuation inhaler Inhale 1 puff daily Rinse mouth after use., Starting Wed 3/20/2024, Until Mon 9/16/2024, Normal      gabapentin (NEURONTIN) 300 mg capsule Take 300 mg by mouth 3 (three) times a day, Starting Thu 9/8/2016, Historical Med      glucagon 1 MG injection as needed, Starting Fri 8/5/2016, Historical Med      insulin aspart (NovoLOG) 100 units/mL injection Inject under the skin 3 (three) times a day before meals, Historical Med      insulin glargine (BASAGLAR KWIKPEN) 100 units/mL injection pen Inject 43 Units under the skin daily at bedtime , Historical Med      Insulin Pen Needle 32G X 4 MM MISC Unifine Pentips Plus 32 gauge x 5/32\" needle, Historical Med      ipratropium (ATROVENT) 0.02 % nebulizer solution Take 2.5 mL (0.5 mg total) by nebulization 4 (four) times a day, Starting Mon 3/28/2022, Normal      latanoprost (XALATAN) 0.005 % ophthalmic solution Administer 1 drop to both eyes daily, Historical Med      naloxone (NARCAN) 4 mg/0.1 mL nasal spray Administer 1 spray into a nostril. If no response after 2-3 minutes, give another dose in the other nostril using a new spray., Normal      omega-3-acid ethyl " esters (LOVAZA) 1 g capsule Take 1 g by mouth daily, Historical Med      omeprazole (PriLOSEC) 40 MG capsule Take 40 mg by mouth daily, Starting Tue 4/26/2016, Historical Med      perphenazine 2 mg tablet Take 2 mg by mouth daily, Historical Med      polyvinyl alcohol (LIQUIFILM TEARS) 1.4 % ophthalmic solution 2 drops as needed for dry eyes, Historical Med      !! predniSONE 5 mg tablet Take 1 tablet (5 mg total) by mouth daily, Starting Mon 4/29/2024, Normal      TAMSULOSIN HCL PO Take 0.4 mg by mouth daily , Historical Med      tolnaftate (TINACTIN) 1 % powder Apply 1 application topically 2 (two) times a day, Historical Med      Turmeric 500 MG CAPS Take by mouth, Historical Med      VITAMIN D PO Take by mouth, Historical Med       !! - Potential duplicate medications found. Please discuss with provider.        No discharge procedures on file.  ED SEPSIS DOCUMENTATION   Time reflects when diagnosis was documented in both MDM as applicable and the Disposition within this note       Time User Action Codes Description Comment    12/29/2024  8:32 AM Augustus Hunter [U07.1] COVID-19     12/29/2024  8:43 AM Augustus Hunter [J45.909] Asthma     12/29/2024  8:45 AM Augustus Hunter Remove [J45.909] Asthma     12/29/2024  8:45 AM Augustus Hunter [J45.901] Asthma exacerbation                  Augustus Hunter PA-C  12/29/24 1012

## 2024-12-29 NOTE — DISCHARGE INSTRUCTIONS
Please refer to the attached information for strict return instructions. If symptoms worsen or new symptoms develop please return to the ER. Use prescribed steroid (prednisone) as directed. Please follow up with your primary care physician for re-evaluation.

## 2024-12-30 ENCOUNTER — TELEPHONE (OUTPATIENT)
Age: 59
End: 2024-12-30

## 2024-12-30 ENCOUNTER — HOSPITAL ENCOUNTER (OUTPATIENT)
Facility: HOSPITAL | Age: 59
Setting detail: OBSERVATION
Discharge: HOME | End: 2024-12-31
Attending: EMERGENCY MEDICINE | Admitting: INTERNAL MEDICINE
Payer: COMMERCIAL

## 2024-12-30 ENCOUNTER — APPOINTMENT (EMERGENCY)
Dept: RADIOLOGY | Facility: HOSPITAL | Age: 59
End: 2024-12-30
Payer: COMMERCIAL

## 2024-12-30 DIAGNOSIS — J45.901 ACUTE ASTHMA EXACERBATION: ICD-10-CM

## 2024-12-30 DIAGNOSIS — J42 CHRONIC BRONCHITIS, UNSPECIFIED CHRONIC BRONCHITIS TYPE (HCC): Primary | ICD-10-CM

## 2024-12-30 DIAGNOSIS — U07.1 COVID-19: Primary | ICD-10-CM

## 2024-12-30 PROBLEM — J45.51 SEVERE PERSISTENT ASTHMA WITH ACUTE EXACERBATION: Status: ACTIVE | Noted: 2021-01-11

## 2024-12-30 PROBLEM — E78.2 MIXED HYPERLIPIDEMIA: Status: ACTIVE | Noted: 2018-10-25

## 2024-12-30 LAB
ALBUMIN SERPL BCG-MCNC: 4.3 G/DL (ref 3.5–5)
ALP SERPL-CCNC: 98 U/L (ref 34–104)
ALT SERPL W P-5'-P-CCNC: 13 U/L (ref 7–52)
ANION GAP SERPL CALCULATED.3IONS-SCNC: 9 MMOL/L (ref 4–13)
AST SERPL W P-5'-P-CCNC: 14 U/L (ref 13–39)
ATRIAL RATE: 97 BPM
BASOPHILS # BLD AUTO: 0.01 THOUSANDS/ΜL (ref 0–0.1)
BASOPHILS NFR BLD AUTO: 0 % (ref 0–1)
BILIRUB SERPL-MCNC: 0.25 MG/DL (ref 0.2–1)
BUN SERPL-MCNC: 14 MG/DL (ref 5–25)
CALCIUM SERPL-MCNC: 9.1 MG/DL (ref 8.4–10.2)
CARDIAC TROPONIN I PNL SERPL HS: <2 NG/L (ref ?–50)
CHLORIDE SERPL-SCNC: 104 MMOL/L (ref 96–108)
CO2 SERPL-SCNC: 25 MMOL/L (ref 21–32)
CREAT SERPL-MCNC: 0.92 MG/DL (ref 0.6–1.3)
EOSINOPHIL # BLD AUTO: 0.01 THOUSAND/ΜL (ref 0–0.61)
EOSINOPHIL NFR BLD AUTO: 0 % (ref 0–6)
ERYTHROCYTE [DISTWIDTH] IN BLOOD BY AUTOMATED COUNT: 13.7 % (ref 11.6–15.1)
GFR SERPL CREATININE-BSD FRML MDRD: 90 ML/MIN/1.73SQ M
GLUCOSE SERPL-MCNC: 125 MG/DL (ref 65–140)
GLUCOSE SERPL-MCNC: 314 MG/DL (ref 65–140)
HCT VFR BLD AUTO: 40.1 % (ref 36.5–49.3)
HGB BLD-MCNC: 13 G/DL (ref 12–17)
IMM GRANULOCYTES # BLD AUTO: 0.04 THOUSAND/UL (ref 0–0.2)
IMM GRANULOCYTES NFR BLD AUTO: 1 % (ref 0–2)
LYMPHOCYTES # BLD AUTO: 0.57 THOUSANDS/ΜL (ref 0.6–4.47)
LYMPHOCYTES NFR BLD AUTO: 8 % (ref 14–44)
MCH RBC QN AUTO: 26.3 PG (ref 26.8–34.3)
MCHC RBC AUTO-ENTMCNC: 32.4 G/DL (ref 31.4–37.4)
MCV RBC AUTO: 81 FL (ref 82–98)
MONOCYTES # BLD AUTO: 0.29 THOUSAND/ΜL (ref 0.17–1.22)
MONOCYTES NFR BLD AUTO: 4 % (ref 4–12)
NEUTROPHILS # BLD AUTO: 6.04 THOUSANDS/ΜL (ref 1.85–7.62)
NEUTS SEG NFR BLD AUTO: 87 % (ref 43–75)
NRBC BLD AUTO-RTO: 0 /100 WBCS
P AXIS: 51 DEGREES
PLATELET # BLD AUTO: 243 THOUSANDS/UL (ref 149–390)
PMV BLD AUTO: 8.9 FL (ref 8.9–12.7)
POTASSIUM SERPL-SCNC: 3.7 MMOL/L (ref 3.5–5.3)
PR INTERVAL: 158 MS
PROT SERPL-MCNC: 6.9 G/DL (ref 6.4–8.4)
QRS AXIS: -5 DEGREES
QRSD INTERVAL: 82 MS
QT INTERVAL: 348 MS
QTC INTERVAL: 442 MS
RBC # BLD AUTO: 4.95 MILLION/UL (ref 3.88–5.62)
SODIUM SERPL-SCNC: 138 MMOL/L (ref 135–147)
T WAVE AXIS: 20 DEGREES
VENTRICULAR RATE: 97 BPM
WBC # BLD AUTO: 6.96 THOUSAND/UL (ref 4.31–10.16)

## 2024-12-30 PROCEDURE — 94644 CONT INHLJ TX 1ST HOUR: CPT

## 2024-12-30 PROCEDURE — 84484 ASSAY OF TROPONIN QUANT: CPT | Performed by: PHYSICIAN ASSISTANT

## 2024-12-30 PROCEDURE — 71046 X-RAY EXAM CHEST 2 VIEWS: CPT

## 2024-12-30 PROCEDURE — 93005 ELECTROCARDIOGRAM TRACING: CPT

## 2024-12-30 PROCEDURE — 94760 N-INVAS EAR/PLS OXIMETRY 1: CPT

## 2024-12-30 PROCEDURE — 99285 EMERGENCY DEPT VISIT HI MDM: CPT

## 2024-12-30 PROCEDURE — 99285 EMERGENCY DEPT VISIT HI MDM: CPT | Performed by: PHYSICIAN ASSISTANT

## 2024-12-30 PROCEDURE — 80053 COMPREHEN METABOLIC PANEL: CPT | Performed by: PHYSICIAN ASSISTANT

## 2024-12-30 PROCEDURE — 93010 ELECTROCARDIOGRAM REPORT: CPT

## 2024-12-30 PROCEDURE — 96375 TX/PRO/DX INJ NEW DRUG ADDON: CPT

## 2024-12-30 PROCEDURE — 82948 REAGENT STRIP/BLOOD GLUCOSE: CPT

## 2024-12-30 PROCEDURE — 96361 HYDRATE IV INFUSION ADD-ON: CPT

## 2024-12-30 PROCEDURE — 85025 COMPLETE CBC W/AUTO DIFF WBC: CPT | Performed by: PHYSICIAN ASSISTANT

## 2024-12-30 PROCEDURE — 96365 THER/PROPH/DIAG IV INF INIT: CPT

## 2024-12-30 PROCEDURE — 36415 COLL VENOUS BLD VENIPUNCTURE: CPT | Performed by: PHYSICIAN ASSISTANT

## 2024-12-30 PROCEDURE — 99223 1ST HOSP IP/OBS HIGH 75: CPT | Performed by: INTERNAL MEDICINE

## 2024-12-30 RX ORDER — MAGNESIUM SULFATE 1 G/100ML
1 INJECTION INTRAVENOUS ONCE
Status: COMPLETED | OUTPATIENT
Start: 2024-12-30 | End: 2024-12-30

## 2024-12-30 RX ORDER — SODIUM CHLORIDE FOR INHALATION 0.9 %
12 VIAL, NEBULIZER (ML) INHALATION ONCE
Status: COMPLETED | OUTPATIENT
Start: 2024-12-30 | End: 2024-12-30

## 2024-12-30 RX ORDER — GABAPENTIN 300 MG/1
300 CAPSULE ORAL 3 TIMES DAILY
Status: DISCONTINUED | OUTPATIENT
Start: 2024-12-30 | End: 2024-12-31 | Stop reason: HOSPADM

## 2024-12-30 RX ORDER — MAGNESIUM HYDROXIDE/ALUMINUM HYDROXICE/SIMETHICONE 120; 1200; 1200 MG/30ML; MG/30ML; MG/30ML
30 SUSPENSION ORAL EVERY 6 HOURS PRN
Status: DISCONTINUED | OUTPATIENT
Start: 2024-12-30 | End: 2024-12-31 | Stop reason: HOSPADM

## 2024-12-30 RX ORDER — ALBUTEROL SULFATE 5 MG/ML
10 SOLUTION RESPIRATORY (INHALATION) ONCE
Status: COMPLETED | OUTPATIENT
Start: 2024-12-30 | End: 2024-12-30

## 2024-12-30 RX ORDER — SODIUM CHLORIDE FOR INHALATION 0.9 %
12 VIAL, NEBULIZER (ML) INHALATION ONCE
Status: DISCONTINUED | OUTPATIENT
Start: 2024-12-30 | End: 2024-12-31 | Stop reason: HOSPADM

## 2024-12-30 RX ORDER — CARBAMAZEPINE 200 MG/1
400 TABLET ORAL DAILY
Status: DISCONTINUED | OUTPATIENT
Start: 2024-12-30 | End: 2024-12-31 | Stop reason: HOSPADM

## 2024-12-30 RX ORDER — ACETAMINOPHEN 325 MG/1
650 TABLET ORAL EVERY 6 HOURS PRN
Status: DISCONTINUED | OUTPATIENT
Start: 2024-12-30 | End: 2024-12-31 | Stop reason: HOSPADM

## 2024-12-30 RX ORDER — METHYLPREDNISOLONE SODIUM SUCCINATE 125 MG/2ML
125 INJECTION, POWDER, LYOPHILIZED, FOR SOLUTION INTRAMUSCULAR; INTRAVENOUS ONCE
Status: COMPLETED | OUTPATIENT
Start: 2024-12-30 | End: 2024-12-30

## 2024-12-30 RX ORDER — GUAIFENESIN 600 MG/1
600 TABLET, EXTENDED RELEASE ORAL ONCE
Status: COMPLETED | OUTPATIENT
Start: 2024-12-30 | End: 2024-12-30

## 2024-12-30 RX ORDER — INSULIN LISPRO 100 [IU]/ML
5 INJECTION, SOLUTION INTRAVENOUS; SUBCUTANEOUS
Status: DISCONTINUED | OUTPATIENT
Start: 2024-12-30 | End: 2024-12-31 | Stop reason: HOSPADM

## 2024-12-30 RX ORDER — ONDANSETRON 2 MG/ML
4 INJECTION INTRAMUSCULAR; INTRAVENOUS EVERY 6 HOURS PRN
Status: DISCONTINUED | OUTPATIENT
Start: 2024-12-30 | End: 2024-12-31 | Stop reason: HOSPADM

## 2024-12-30 RX ORDER — PERPHENAZINE 2 MG/1
2 TABLET ORAL DAILY
Status: DISCONTINUED | OUTPATIENT
Start: 2024-12-30 | End: 2024-12-31 | Stop reason: HOSPADM

## 2024-12-30 RX ORDER — ALBUTEROL SULFATE 5 MG/ML
10 SOLUTION RESPIRATORY (INHALATION) ONCE
Status: DISCONTINUED | OUTPATIENT
Start: 2024-12-30 | End: 2024-12-30

## 2024-12-30 RX ORDER — ALBUTEROL SULFATE 90 UG/1
2 INHALANT RESPIRATORY (INHALATION) EVERY 6 HOURS PRN
Status: DISCONTINUED | OUTPATIENT
Start: 2024-12-30 | End: 2024-12-31 | Stop reason: HOSPADM

## 2024-12-30 RX ORDER — ENOXAPARIN SODIUM 100 MG/ML
40 INJECTION SUBCUTANEOUS DAILY
Status: DISCONTINUED | OUTPATIENT
Start: 2024-12-30 | End: 2024-12-31 | Stop reason: HOSPADM

## 2024-12-30 RX ORDER — INSULIN GLARGINE 100 [IU]/ML
45 INJECTION, SOLUTION SUBCUTANEOUS
Status: DISCONTINUED | OUTPATIENT
Start: 2024-12-30 | End: 2024-12-31 | Stop reason: HOSPADM

## 2024-12-30 RX ORDER — FINASTERIDE 5 MG/1
5 TABLET, FILM COATED ORAL DAILY
Status: DISCONTINUED | OUTPATIENT
Start: 2024-12-30 | End: 2024-12-31 | Stop reason: HOSPADM

## 2024-12-30 RX ORDER — FLUTICASONE PROPIONATE 50 MCG
1 SPRAY, SUSPENSION (ML) NASAL DAILY
Status: DISCONTINUED | OUTPATIENT
Start: 2024-12-30 | End: 2024-12-31 | Stop reason: HOSPADM

## 2024-12-30 RX ORDER — TAMSULOSIN HYDROCHLORIDE 0.4 MG/1
0.4 CAPSULE ORAL DAILY
Status: DISCONTINUED | OUTPATIENT
Start: 2024-12-30 | End: 2024-12-31 | Stop reason: HOSPADM

## 2024-12-30 RX ORDER — LEVALBUTEROL INHALATION SOLUTION 1.25 MG/3ML
1.25 SOLUTION RESPIRATORY (INHALATION) EVERY 6 HOURS PRN
Status: DISCONTINUED | OUTPATIENT
Start: 2024-12-30 | End: 2024-12-31 | Stop reason: HOSPADM

## 2024-12-30 RX ORDER — LORATADINE 10 MG/1
10 TABLET ORAL DAILY
Status: DISCONTINUED | OUTPATIENT
Start: 2024-12-30 | End: 2024-12-31 | Stop reason: HOSPADM

## 2024-12-30 RX ORDER — METHYLPREDNISOLONE SODIUM SUCCINATE 40 MG/ML
20 INJECTION, POWDER, LYOPHILIZED, FOR SOLUTION INTRAMUSCULAR; INTRAVENOUS EVERY 8 HOURS SCHEDULED
Status: DISCONTINUED | OUTPATIENT
Start: 2024-12-30 | End: 2024-12-31 | Stop reason: HOSPADM

## 2024-12-30 RX ORDER — INSULIN LISPRO 100 [IU]/ML
2-12 INJECTION, SOLUTION INTRAVENOUS; SUBCUTANEOUS
Status: DISCONTINUED | OUTPATIENT
Start: 2024-12-30 | End: 2024-12-31 | Stop reason: HOSPADM

## 2024-12-30 RX ORDER — PREDNISONE 10 MG/1
TABLET ORAL
Qty: 30 TABLET | Refills: 0 | Status: SHIPPED | OUTPATIENT
Start: 2024-12-30 | End: 2025-01-10

## 2024-12-30 RX ORDER — FLUTICASONE FUROATE AND VILANTEROL 200; 25 UG/1; UG/1
1 POWDER RESPIRATORY (INHALATION) DAILY
Status: DISCONTINUED | OUTPATIENT
Start: 2024-12-30 | End: 2024-12-31 | Stop reason: HOSPADM

## 2024-12-30 RX ORDER — CARBAMAZEPINE 200 MG/1
600 TABLET ORAL
Status: DISCONTINUED | OUTPATIENT
Start: 2024-12-30 | End: 2024-12-31 | Stop reason: HOSPADM

## 2024-12-30 RX ORDER — PANTOPRAZOLE SODIUM 40 MG/1
40 TABLET, DELAYED RELEASE ORAL
Status: DISCONTINUED | OUTPATIENT
Start: 2024-12-31 | End: 2024-12-31 | Stop reason: HOSPADM

## 2024-12-30 RX ORDER — IPRATROPIUM BROMIDE AND ALBUTEROL SULFATE .5; 3 MG/3ML; MG/3ML
1 SOLUTION RESPIRATORY (INHALATION) ONCE
Status: COMPLETED | OUTPATIENT
Start: 2024-12-30 | End: 2024-12-30

## 2024-12-30 RX ORDER — ATORVASTATIN CALCIUM 80 MG/1
80 TABLET, FILM COATED ORAL
Status: DISCONTINUED | OUTPATIENT
Start: 2024-12-30 | End: 2024-12-31 | Stop reason: HOSPADM

## 2024-12-30 RX ORDER — AMLODIPINE BESYLATE 5 MG/1
5 TABLET ORAL DAILY
Status: DISCONTINUED | OUTPATIENT
Start: 2024-12-30 | End: 2024-12-31 | Stop reason: HOSPADM

## 2024-12-30 RX ADMIN — ENOXAPARIN SODIUM 40 MG: 40 INJECTION SUBCUTANEOUS at 16:09

## 2024-12-30 RX ADMIN — INSULIN LISPRO 5 UNITS: 100 INJECTION, SOLUTION INTRAVENOUS; SUBCUTANEOUS at 16:14

## 2024-12-30 RX ADMIN — IPRATROPIUM BROMIDE 1 MG: 0.5 SOLUTION RESPIRATORY (INHALATION) at 11:39

## 2024-12-30 RX ADMIN — GABAPENTIN 300 MG: 300 CAPSULE ORAL at 21:06

## 2024-12-30 RX ADMIN — ISODIUM CHLORIDE 12 ML: 0.03 SOLUTION RESPIRATORY (INHALATION) at 13:21

## 2024-12-30 RX ADMIN — METHYLPREDNISOLONE SODIUM SUCCINATE 20 MG: 40 INJECTION, POWDER, FOR SOLUTION INTRAMUSCULAR; INTRAVENOUS at 23:12

## 2024-12-30 RX ADMIN — INSULIN LISPRO 8 UNITS: 100 INJECTION, SOLUTION INTRAVENOUS; SUBCUTANEOUS at 16:13

## 2024-12-30 RX ADMIN — ALBUTEROL SULFATE 10 MG: 2.5 SOLUTION RESPIRATORY (INHALATION) at 11:38

## 2024-12-30 RX ADMIN — ACETAMINOPHEN 650 MG: 325 TABLET, FILM COATED ORAL at 23:21

## 2024-12-30 RX ADMIN — SODIUM CHLORIDE 1000 ML: 0.9 INJECTION, SOLUTION INTRAVENOUS at 11:51

## 2024-12-30 RX ADMIN — ATORVASTATIN CALCIUM 80 MG: 80 TABLET, FILM COATED ORAL at 21:06

## 2024-12-30 RX ADMIN — MAGNESIUM SULFATE HEPTAHYDRATE 1 G: 1 INJECTION, SOLUTION INTRAVENOUS at 11:52

## 2024-12-30 RX ADMIN — METHYLPREDNISOLONE SODIUM SUCCINATE 20 MG: 40 INJECTION, POWDER, FOR SOLUTION INTRAMUSCULAR; INTRAVENOUS at 16:09

## 2024-12-30 RX ADMIN — INSULIN LISPRO 8 UNITS: 100 INJECTION, SOLUTION INTRAVENOUS; SUBCUTANEOUS at 21:07

## 2024-12-30 RX ADMIN — CARBAMAZEPINE 600 MG: 200 TABLET ORAL at 21:06

## 2024-12-30 RX ADMIN — LORATADINE 10 MG: 10 TABLET ORAL at 16:09

## 2024-12-30 RX ADMIN — ISODIUM CHLORIDE 12 ML: 0.03 SOLUTION RESPIRATORY (INHALATION) at 11:39

## 2024-12-30 RX ADMIN — SODIUM CHLORIDE 1000 ML: 0.9 INJECTION, SOLUTION INTRAVENOUS at 14:10

## 2024-12-30 RX ADMIN — METHYLPREDNISOLONE SODIUM SUCCINATE 125 MG: 125 INJECTION, POWDER, FOR SOLUTION INTRAMUSCULAR; INTRAVENOUS at 11:51

## 2024-12-30 RX ADMIN — ALBUTEROL SULFATE 10 MG: 2.5 SOLUTION RESPIRATORY (INHALATION) at 13:21

## 2024-12-30 RX ADMIN — INSULIN GLARGINE 45 UNITS: 100 INJECTION, SOLUTION SUBCUTANEOUS at 21:06

## 2024-12-30 RX ADMIN — GABAPENTIN 300 MG: 300 CAPSULE ORAL at 16:09

## 2024-12-30 RX ADMIN — DEXTRAN 70, GLYCERIN, HYPROMELLOSE 1 DROP: 1; 2; 3 SOLUTION/ DROPS OPHTHALMIC at 23:21

## 2024-12-30 RX ADMIN — GUAIFENESIN 600 MG: 600 TABLET ORAL at 14:10

## 2024-12-30 NOTE — TELEPHONE ENCOUNTER
Patient was seen in ER and diagnosed COVID19 positive. He is asking if he can have prolonged steroid taper instead of 5-day burst and this is appropriate. I have sent to pharmacy. He will call if he does not improve as expected.

## 2024-12-30 NOTE — PLAN OF CARE
Problem: RESPIRATORY - ADULT  Goal: Achieves optimal ventilation and oxygenation  Description: INTERVENTIONS:  - Assess for changes in respiratory status  - Assess for changes in mentation and behavior  - Position to facilitate oxygenation and minimize respiratory effort  - Oxygen administered by appropriate delivery if ordered  - Initiate smoking cessation education as indicated  - Encourage broncho-pulmonary hygiene including cough, deep breathe, Incentive Spirometry  - Assess the need for suctioning and aspirate as needed  - Assess and instruct to report SOB or any respiratory difficulty  - Respiratory Therapy support as indicated  Outcome: Progressing     Problem: METABOLIC, FLUID AND ELECTROLYTES - ADULT  Goal: Glucose maintained within target range  Description: INTERVENTIONS:  - Monitor Blood Glucose as ordered  - Assess for signs and symptoms of hyperglycemia and hypoglycemia  - Administer ordered medications to maintain glucose within target range  - Assess nutritional intake and initiate nutrition service referral as needed  Outcome: Progressing     Problem: INFECTION - ADULT  Goal: Absence or prevention of progression during hospitalization  Description: INTERVENTIONS:  - Assess and monitor for signs and symptoms of infection  - Monitor lab/diagnostic results  - Monitor all insertion sites, i.e. indwelling lines, tubes, and drains  - Monitor endotracheal if appropriate and nasal secretions for changes in amount and color  - [place] appropriate cooling/warming therapies per order  - Administer medications as ordered  - Instruct and encourage patient and family to use good hand hygiene technique  - Identify and instruct in appropriate isolation precautions for identified infection/condition  Outcome: Progressing     Problem: Knowledge Deficit  Goal: Patient/family/caregiver demonstrates understanding of disease process, treatment plan, medications, and discharge instructions  Description: Complete  learning assessment and assess knowledge base.  Interventions:  - Provide teaching at level of understanding  - Provide teaching via preferred learning methods  Outcome: Progressing     Problem: DISCHARGE PLANNING  Goal: Discharge to home or other facility with appropriate resources  Description: INTERVENTIONS:  - Identify barriers to discharge w/patient and caregiver  - Arrange for needed discharge resources and transportation as appropriate  - Identify discharge learning needs (meds, wound care, etc.)  - Arrange for interpretive services to assist at discharge as needed  - Refer to Case Management Department for coordinating discharge planning if the patient needs post-hospital services based on physician/advanced practitioner order or complex needs related to functional status, cognitive ability, or social support system  Outcome: Progressing

## 2024-12-30 NOTE — ED PROVIDER NOTES
Time reflects when diagnosis was documented in both MDM as applicable and the Disposition within this note       Time User Action Codes Description Comment    12/30/2024  2:15 PM Naheed Mejia [U07.1] COVID-19     12/30/2024  2:15 PM Naheed Mejia [J45.901] Acute asthma exacerbation           ED Disposition       ED Disposition   Admit    Condition   Stable    Date/Time   Mon Dec 30, 2024  2:15 PM    Comment   Case was discussed with  and the patient's admission status was agreed to be Admission Status: observation status to the service of Dr. Sarabia .               Assessment & Plan       Medical Decision Making  59-year-old male history of asthma recent COVID-19 diagnosis presenting for evaluation of worsening shortness of breath chest tightness and wheezing patient has been compliant with his medications at home and reports he has been using DuoNeb breathing treatments without resolution for his chest tightness and wheezing.    Chest x-ray obtained to evaluate for potential pneumonia -x-ray my interpretation demonstrates no evidence for acute consolidation or other acute cardiopulmonary abnormalities  EKG obtained for potential ACS, blood work to include troponin for potential cardiac equivalent symptom of dyspnea.  Workup demonstrates unremarkable blood work, troponin is less than 2.  EKG is without evidence for ischemic changes or malignant dysrhythmia    Initial hour-long breathing treatment given, 125 mg Solu-Medrol given, 1 g magnesium provided  After initial hour-long breathing treatment patient's lung sounds were reassessed and noted to have continued wheezing with increased rhonchi  Second hour-long breathing treatment administered, patient reassessment demonstrates continued wheezing, chest tightness and rhonchi.  Discussed with patient the option for observation admission patient was agreeable.    All imaging and/or lab testing discussed with patient. Patient and/or family  "members verbalizes understanding and agrees with plan for admission. Patient is stable for admission.     Portions of the record may have been created with voice recognition software. Occasional wrong word or \"sound a like\" substitutions may have occurred due to the inherent limitations of voice recognition software. Read the chart carefully and recognize, using context, where substitutions have occurred.    Amount and/or Complexity of Data Reviewed  Labs: ordered.  Radiology: ordered and independent interpretation performed.    Risk  OTC drugs.  Prescription drug management.  Decision regarding hospitalization.        ED Course as of 12/30/24 1638   Mon Dec 30, 2024   1309 Patient was reassessed and has significant mucus production with his cough at this time, louder adventitious lung sounds are auscultated in all lung fields throughout after his hour-long breathing treatment.  Significant expiratory wheezing is auscultated in all lung fields in association with rhonchi.  Patient does report improvement however is agreeable to a second breathing treatment in the setting of continued adventitious lung sounds and wheezing.   1408 Patient was reassessed after his second hour-long breathing treatment, a total of 20 mg of albuterol has been administered at this time.  Patient reports he feels as though he is \"moving better air\" and reports he feels better than when he first arrived however states in comparison to when he has not ill or experiencing asthma exacerbation he feels chest tightness wheezing and short of breath still.  He reports he is trying to clear mucus from his chest and requests a dose of Mucinex at this time.  Patient is agreeable to an observation admission due to continued chest tightness and wheezing despite albuterol therapy.       Medications   sodium chloride 0.9 % inhalation solution 12 mL (has no administration in time range)   ipratropium-albuterol (FOR EMS ONLY) (DUO-NEB) 0.5-2.5 mg/3 mL " inhalation solution 3 mL (0 mL Does not apply Given to EMS 12/30/24 1123)   sodium chloride 0.9 % bolus 1,000 mL (0 mL Intravenous Stopped 12/30/24 1408)   magnesium sulfate IVPB (premix) SOLN 1 g (0 g Intravenous Stopped 12/30/24 1310)   methylPREDNISolone sodium succinate (Solu-MEDROL) injection 125 mg (125 mg Intravenous Given 12/30/24 1151)   albuterol inhalation solution 10 mg (10 mg Nebulization Given 12/30/24 1138)   ipratropium (ATROVENT) 0.02 % inhalation solution 1 mg (1 mg Nebulization Given 12/30/24 1139)   sodium chloride 0.9 % inhalation solution 12 mL (12 mL Nebulization Given 12/30/24 1139)   albuterol inhalation solution 10 mg (10 mg Nebulization Given 12/30/24 1321)   sodium chloride 0.9 % inhalation solution 12 mL (12 mL Nebulization Given 12/30/24 1321)   sodium chloride 0.9 % bolus 1,000 mL (1,000 mL Intravenous New Bag 12/30/24 1410)   guaiFENesin (MUCINEX) 12 hr tablet 600 mg (600 mg Oral Given 12/30/24 1410)       ED Risk Strat Scores                          SBIRT 20yo+      Flowsheet Row Most Recent Value   Initial Alcohol Screen: US AUDIT-C     1. How often do you have a drink containing alcohol? 0 Filed at: 12/30/2024 1126   2. How many drinks containing alcohol do you have on a typical day you are drinking?  0 Filed at: 12/30/2024 1126   3a. Male UNDER 65: How often do you have five or more drinks on one occasion? 0 Filed at: 12/30/2024 1126   3b. FEMALE Any Age, or MALE 65+: How often do you have 4 or more drinks on one occassion? 0 Filed at: 12/30/2024 1126   Audit-C Score 0 Filed at: 12/30/2024 1126   ROXY: How many times in the past year have you...    Used an illegal drug or used a prescription medication for non-medical reasons? Never Filed at: 12/30/2024 1126                            History of Present Illness       Chief Complaint   Patient presents with    Shortness of Breath     Covid +, got worse this AM, called [facility]       Past Medical History:   Diagnosis Date    Asthma      Bipolar disorder (HCC)     COPD (chronic obstructive pulmonary disease) (HCC)     Diabetes mellitus (HCC)     Enlarged prostate     Glaucoma     Hyperlipidemia     Hypertension     Overdose of heroin, accidental or unintentional, sequela     Psychiatric disorder     Seasonal allergic rhinitis     Seizures (HCC)       Past Surgical History:   Procedure Laterality Date    CIRCUMCISION      KNEE SURGERY      WRIST SURGERY Left       Family History   Problem Relation Age of Onset    Arthritis Mother       Social History     Tobacco Use    Smoking status: Former     Current packs/day: 0.00     Average packs/day: 2.0 packs/day for 27.0 years (54.0 ttl pk-yrs)     Types: Cigarettes     Start date:      Quit date:      Years since quittin.0    Smokeless tobacco: Never   Vaping Use    Vaping status: Never Used   Substance Use Topics    Alcohol use: Not Currently    Drug use: Not Currently     Types: Heroin, Fentanyl     Comment: OD 21 heroin      E-Cigarette/Vaping    E-Cigarette Use Never User       E-Cigarette/Vaping Substances    Nicotine No     THC No     CBD No     Flavoring No     Other No     Unknown No       I have reviewed and agree with the history as documented.     59-year-old male reports a history of hypertension, hyperlipidemia, seizure disorder managed on Tegretol, recent COVID-19 diagnosis and a history of asthma  for which he has been intubated twice in the past also reports multiple ICU admissions however states that the most recent ICU admission was over 2 years ago as he reports he started an immune modulator for asthma and has been significantly improved since then.  He presents today for evaluation of worsening asthma symptoms in the setting of a recent COVID-19 positive results.  He reports he is been coughing, congested with associated chest tightness, wheezing and shortness of breath.  He denies abdominal pain nausea vomiting or diarrhea.  He reports he has been using breathing  treatments at home however continues to have wheezing and shortness of breath.      Shortness of Breath  Associated symptoms: cough and wheezing    Associated symptoms: no abdominal pain, no chest pain, no ear pain, no fever, no rash, no sore throat and no vomiting        Review of Systems   Constitutional:  Negative for chills, fatigue and fever.   HENT:  Positive for congestion and rhinorrhea. Negative for ear pain and sore throat.    Eyes:  Negative for redness.   Respiratory:  Positive for cough, chest tightness, shortness of breath and wheezing.    Cardiovascular:  Negative for chest pain and palpitations.   Gastrointestinal:  Negative for abdominal pain, nausea and vomiting.   Genitourinary:  Negative for dysuria and hematuria.   Musculoskeletal: Negative.    Skin:  Negative for rash.   Neurological:  Negative for dizziness, syncope, light-headedness and numbness.           Objective       ED Triage Vitals   Temperature Pulse Blood Pressure Respirations SpO2 Patient Position - Orthostatic VS   12/30/24 1230 12/30/24 1230 12/30/24 1230 12/30/24 1230 12/30/24 1230 12/30/24 1230   97.9 °F (36.6 °C) 96 145/74 20 99 % Sitting      Temp Source Heart Rate Source BP Location FiO2 (%) Pain Score    12/30/24 1230 12/30/24 1230 12/30/24 1230 -- 12/30/24 1512    Oral Monitor Left arm  No Pain      Vitals      Date and Time Temp Pulse SpO2 Resp BP Pain Score FACES Pain Rating User   12/30/24 1512 -- -- -- -- -- No Pain -- SW   12/30/24 1512 97 °F (36.1 °C) 110 98 % 20 143/79 -- -- DS   12/30/24 1413 -- 114 97 % 20 128/64 -- -- KR   12/30/24 1300 -- -- 97 % -- -- -- -- RF   12/30/24 1230 97.9 °F (36.6 °C) 96 99 % 20 145/74 -- -- FK            Physical Exam  Vitals and nursing note reviewed.   Constitutional:       Appearance: Normal appearance. He is well-developed.   HENT:      Head: Normocephalic and atraumatic.   Eyes:      General: No scleral icterus.     Pupils: Pupils are equal, round, and reactive to light.    Cardiovascular:      Rate and Rhythm: Normal rate and regular rhythm.      Pulses: Normal pulses.   Pulmonary:      Effort: Pulmonary effort is normal. No respiratory distress.      Breath sounds: No stridor. Wheezing (Bilateral inspiratory and expiratory wheezing auscultated in all lung fields) present.      Comments:  Patient in no respiratory distress, speaking in full sentences, managing oral secretions without difficulty, no accessory muscle use, retractions, or belly breathing noted.  Abdominal:      General: There is no distension.      Palpations: There is no mass.   Musculoskeletal:      Cervical back: Normal range of motion.   Skin:     General: Skin is warm and dry.      Capillary Refill: Capillary refill takes less than 2 seconds.      Coloration: Skin is not jaundiced.   Neurological:      Mental Status: He is alert and oriented to person, place, and time.      Gait: Gait normal.   Psychiatric:         Mood and Affect: Mood normal.         Results Reviewed       Procedure Component Value Units Date/Time    Comprehensive metabolic panel [[id number]] Collected: 12/30/24 1152    Lab Status: Final result Specimen: Blood from Arm, Left Updated: 12/30/24 1235     Sodium 138 mmol/L      Potassium 3.7 mmol/L      Chloride 104 mmol/L      CO2 25 mmol/L      ANION GAP 9 mmol/L      BUN 14 mg/dL      Creatinine 0.92 mg/dL      Glucose 125 mg/dL      Calcium 9.1 mg/dL      AST 14 U/L      ALT 13 U/L      Alkaline Phosphatase 98 U/L      Total Protein 6.9 g/dL      Albumin 4.3 g/dL      Total Bilirubin 0.25 mg/dL      eGFR 90 ml/min/1.73sq m     Narrative:      [employer] guidelines for Chronic Kidney Disease (CKD):     Stage 1 with normal or high GFR (GFR > 90 mL/min/1.73 square meters)    Stage 2 Mild CKD (GFR = 60-89 mL/min/1.73 square meters)    Stage 3A Moderate CKD (GFR = 45-59 mL/min/1.73 square meters)    Stage 3B Moderate CKD (GFR = 30-44 mL/min/1.73 square meters)    Stage 4 Severe  CKD (GFR = 15-29 mL/min/1.73 square meters)    Stage 5 End Stage CKD (GFR <15 mL/min/1.73 square meters)  Note: GFR calculation is accurate only with a steady state creatinine    HS Troponin 0hr (reflex protocol) [[id number]]  (Normal) Collected: 12/30/24 1152    Lab Status: Final result Specimen: Blood from Arm, Left Updated: 12/30/24 1229     hs TnI 0hr <2 ng/L     CBC and differential [[id number]]  (Abnormal) Collected: 12/30/24 1152    Lab Status: Final result Specimen: Blood from Arm, Left Updated: 12/30/24 1209     WBC 6.96 Thousand/uL      RBC 4.95 Million/uL      Hemoglobin 13.0 g/dL      Hematocrit 40.1 %      MCV 81 fL      MCH 26.3 pg      MCHC 32.4 g/dL      RDW 13.7 %      MPV 8.9 fL      Platelets 243 Thousands/uL      nRBC 0 /100 WBCs      Segmented % 87 %      Immature Grans % 1 %      Lymphocytes % 8 %      Monocytes % 4 %      Eosinophils Relative 0 %      Basophils Relative 0 %      Absolute Neutrophils 6.04 Thousands/µL      Absolute Immature Grans 0.04 Thousand/uL      Absolute Lymphocytes 0.57 Thousands/µL      Absolute Monocytes 0.29 Thousand/µL      Eosinophils Absolute 0.01 Thousand/µL      Basophils Absolute 0.01 Thousands/µL             XR chest 2 views   ED Interpretation by Naheed Mejia PA-C (12/30 1209)   No acute consolidation to suggest pneumonia, bilateral patchiness in the peribronchial areas consistent with patient's already known COVID-19 diagnosis.      Final Interpretation by Jenny Estes MD (12/30 1601)      No acute cardiopulmonary disease.            Workstation performed: [id number]             ECG 12 Lead Documentation Only    Date/Time: 12/30/2024 11:43 AM    Performed by: Naheed Mejia PA-C  Authorized by: Naheed Mejia PA-C    Indications / Diagnosis:  Dyspnea  ECG reviewed by me, the ED Provider: yes    Patient location:  ED  Previous ECG:     Comparison to cardiac monitor: Yes    Interpretation:     Interpretation: normal   "  Rate:     ECG rate:  97    ECG rate assessment: normal    Rhythm:     Rhythm: sinus rhythm    Ectopy:     Ectopy: none    QRS:     QRS axis:  Normal    QRS intervals:  Normal  Conduction:     Conduction: normal    ST segments:     ST segments:  Normal  T waves:     T waves: normal    Comments:        QRS 88          ED Medication and Procedure Management   Prior to Admission Medications   Prescriptions Last Dose Informant Patient Reported? Taking?   Blood Glucose Monitoring Suppl (ACURA BLOOD GLUCOSE METER) w/Device KIT  Self Yes No   Sig: by Does not apply route   Calcium Carb-Cholecalciferol (CALCIUM CARBONATE-VITAMIN D3 PO) 12/30/2024 Self Yes Yes   Sig: Take 1,500 mg by mouth daily   Cinnamon 500 MG capsule 12/30/2024 Self Yes Yes   Sig: Take 500 mg by mouth daily   Diclofenac Sodium (VOLTAREN) 1 % 12/30/2024  No Yes   Sig: Apply 2 g topically 4 (four) times a day   EPINEPHrine (EPIPEN) 0.3 mg/0.3 mL SOAJ   No No   Sig: Inject 0.3 mL (0.3 mg total) into a muscle once for 1 dose   FIBER PO 12/30/2024 Self Yes Yes   Sig: Take by mouth   Insulin Pen Needle 32G X 4 MM MISC  Self Yes No   Sig: Unifine Pentips Plus 32 gauge x 5/32\" needle   TAMSULOSIN HCL PO 12/30/2024 Self Yes Yes   Sig: Take 0.4 mg by mouth daily    Turmeric 500 MG CAPS Not Taking Self Yes No   Sig: Take by mouth   Patient not taking: Reported on 12/30/2024   VITAMIN D PO 12/30/2024 Self Yes Yes   Sig: Take by mouth   acetaminophen (TYLENOL) 650 mg CR tablet   No No   Sig: Take 1 tablet (650 mg total) by mouth every 8 (eight) hours as needed for mild pain   albuterol (2.5 mg/3 mL) 0.083 % nebulizer solution   No No   Sig: inhale 3 MILLILITERS in nebulizer every 6 hours if needed for shortness of breath or wheezing   albuterol (ProAir HFA) 90 mcg/act inhaler   No No   Sig: Inhale 2 puffs every 6 (six) hours as needed for wheezing   albuterol (Proventil HFA) 90 mcg/act inhaler   No No   Sig: Inhale 1-2 puffs every 6 (six) hours as " needed for wheezing or shortness of breath   amLODIPine (NORVASC) 5 mg tablet 2024 Self Yes Yes   Sig: Take 5 mg by mouth daily   atorvastatin (LIPITOR) 80 mg tablet 2024 Self Yes Yes   Sig: Take 80 mg by mouth daily at bedtime   betamethasone dipropionate (DIPROSONE) 0.05 % ointment Not Taking  No No   Sig: Apply topically 2 (two) times a day   Patient not taking: Reported on 2024   bimatoprost (LUMIGAN) 0.03 % ophthalmic drops Not Taking Self Yes No   Sig: Administer 1 drop to both eyes daily at bedtime     Patient not taking: Reported on 2024   carBAMazepine (TEGretol) 200 mg tablet 2024 Self Yes Yes   Sig: Take 2 tabs in the morning and 3 tabs at night   dupilumab (DUPIXENT) subcutaneous injection 2024  No No   Sig: Inject 2 mL (300 mg total) under the skin every 14 (fourteen) days   fexofenadine (ALLEGRA) 60 MG tablet 2024  No Yes   Sig: Take 1 tablet (60 mg total) by mouth daily   finasteride (PROSCAR) 5 mg tablet 2024 Self Yes Yes   Sig: Take 5 mg by mouth daily   fluticasone (FLONASE) 50 mcg/act nasal spray 2024  No Yes   Si spray into each nostril daily   fluticasone-vilanterol (Breo Ellipta) 200-25 mcg/actuation inhaler   No No   Sig: Inhale 1 puff daily Rinse mouth after use.   gabapentin (NEURONTIN) 300 mg capsule 2024 at  9:00 AM Self Yes Yes   Sig: Take 300 mg by mouth 3 (three) times a day   glucagon 1 MG injection  Self Yes No   Sig: as needed   insulin aspart (NovoLOG) 100 units/mL injection 2024 Self Yes Yes   Sig: Inject under the skin 3 (three) times a day before meals   insulin glargine (BASAGLAR KWIKPEN) 100 units/mL injection pen 2024 Self Yes Yes   Sig: Inject 43 Units under the skin daily at bedtime    ipratropium (ATROVENT) 0.02 % nebulizer solution 2024 Self No Yes   Sig: Take 2.5 mL (0.5 mg total) by nebulization 4 (four) times a day   latanoprost (XALATAN) 0.005 % ophthalmic solution Not Taking Self Yes No    Sig: Administer 1 drop to both eyes daily   Patient not taking: Reported on 2024   naloxone (NARCAN) 4 mg/0.1 mL nasal spray  Self No No   Sig: Administer 1 spray into a nostril. If no response after 2-3 minutes, give another dose in the other nostril using a new spray.   omega-3-acid ethyl esters (LOVAZA) 1 g capsule 2024 Self Yes Yes   Sig: Take 1 g by mouth daily   omeprazole (PriLOSEC) 40 MG capsule 2024 Self Yes Yes   Sig: Take 40 mg by mouth daily   perphenazine 2 mg tablet Past Week Self Yes Yes   Sig: Take 2 mg by mouth daily   polyvinyl alcohol (LIQUIFILM TEARS) 1.4 % ophthalmic solution Not Taking Self Yes No   Si drops as needed for dry eyes   Patient not taking: Reported on 2024   predniSONE 10 mg tablet   No No   Sig: Take 4 tablets (40 mg total) by mouth daily for 3 days, THEN 3 tablets (30 mg total) daily for 3 days, THEN 2 tablets (20 mg total) daily for 3 days, THEN 1 tablet (10 mg total) daily for 3 days.   predniSONE 20 mg tablet 2024  No Yes   Sig: Take 2 tablets (40 mg total) by mouth daily for 5 days   predniSONE 5 mg tablet   No No   Sig: Take 1 tablet (5 mg total) by mouth daily   Patient not taking: Reported on 2024   tolnaftate (TINACTIN) 1 % powder Not Taking Self Yes No   Sig: Apply 1 application topically 2 (two) times a day   Patient not taking: Reported on 2024      Facility-Administered Medications: None     Current Discharge Medication List        START taking these medications    Details   !! predniSONE 10 mg tablet Take 4 tablets (40 mg total) by mouth daily for 3 days, THEN 3 tablets (30 mg total) daily for 3 days, THEN 2 tablets (20 mg total) daily for 3 days, THEN 1 tablet (10 mg total) daily for 3 days.  Qty: 30 tablet, Refills: 0    Associated Diagnoses: Chronic bronchitis, unspecified chronic bronchitis type (HCC)       !! - Potential duplicate medications found. Please discuss with provider.        CONTINUE these medications  which have NOT CHANGED    Details   amLODIPine (NORVASC) 5 mg tablet Take 5 mg by mouth daily      atorvastatin (LIPITOR) 80 mg tablet Take 80 mg by mouth daily at bedtime      Calcium Carb-Cholecalciferol (CALCIUM CARBONATE-VITAMIN D3 PO) Take 1,500 mg by mouth daily      carBAMazepine (TEGretol) 200 mg tablet Take 2 tabs in the morning and 3 tabs at night      Cinnamon 500 MG capsule Take 500 mg by mouth daily      Diclofenac Sodium (VOLTAREN) 1 % Apply 2 g topically 4 (four) times a day  Qty: 100 g, Refills: 0    Associated Diagnoses: Chronic knee pain      fexofenadine (ALLEGRA) 60 MG tablet Take 1 tablet (60 mg total) by mouth daily  Qty: 30 tablet, Refills: 5    Associated Diagnoses: Allergic reaction, initial encounter      FIBER PO Take by mouth      finasteride (PROSCAR) 5 mg tablet Take 5 mg by mouth daily      fluticasone (FLONASE) 50 mcg/act nasal spray 1 spray into each nostril daily  Qty: 16 g, Refills: 5    Associated Diagnoses: Allergic reaction, initial encounter      gabapentin (NEURONTIN) 300 mg capsule Take 300 mg by mouth 3 (three) times a day      insulin aspart (NovoLOG) 100 units/mL injection Inject under the skin 3 (three) times a day before meals      insulin glargine (BASAGLAR KWIKPEN) 100 units/mL injection pen Inject 43 Units under the skin daily at bedtime       ipratropium (ATROVENT) 0.02 % nebulizer solution Take 2.5 mL (0.5 mg total) by nebulization 4 (four) times a day  Qty: 100 mL, Refills: 0    Associated Diagnoses: Asthma exacerbation      omega-3-acid ethyl esters (LOVAZA) 1 g capsule Take 1 g by mouth daily      omeprazole (PriLOSEC) 40 MG capsule Take 40 mg by mouth daily      perphenazine 2 mg tablet Take 2 mg by mouth daily      !! predniSONE 20 mg tablet Take 2 tablets (40 mg total) by mouth daily for 5 days  Qty: 10 tablet, Refills: 0    Associated Diagnoses: Asthma exacerbation      TAMSULOSIN HCL PO Take 0.4 mg by mouth daily       VITAMIN D PO Take by mouth       acetaminophen (TYLENOL) 650 mg CR tablet Take 1 tablet (650 mg total) by mouth every 8 (eight) hours as needed for mild pain  Qty: 30 tablet, Refills: 0    Associated Diagnoses: Chronic knee pain      albuterol (2.5 mg/3 mL) 0.083 % nebulizer solution inhale 3 MILLILITERS in nebulizer every 6 hours if needed for shortness of breath or wheezing  Qty: 90 mL, Refills: 2    Associated Diagnoses: Asthmatic bronchitis      !! albuterol (ProAir HFA) 90 mcg/act inhaler Inhale 2 puffs every 6 (six) hours as needed for wheezing  Qty: 8.5 g, Refills: 0    Comments: Substitution to a formulary equivalent within the same pharmaceutical class is authorized.  Associated Diagnoses: Asthma exacerbation      !! albuterol (Proventil HFA) 90 mcg/act inhaler Inhale 1-2 puffs every 6 (six) hours as needed for wheezing or shortness of breath  Qty: 8 g, Refills: 0    Comments: Substitution to a formulary equivalent within the same pharmaceutical class is authorized.  Associated Diagnoses: Asthma exacerbation      betamethasone dipropionate (DIPROSONE) 0.05 % ointment Apply topically 2 (two) times a day  Qty: 30 g, Refills: 0    Associated Diagnoses: Allergic reaction, initial encounter      bimatoprost (LUMIGAN) 0.03 % ophthalmic drops Administer 1 drop to both eyes daily at bedtime        Blood Glucose Monitoring Suppl (ACURA BLOOD GLUCOSE METER) w/Device KIT by Does not apply route      dupilumab (DUPIXENT) subcutaneous injection Inject 2 mL (300 mg total) under the skin every 14 (fourteen) days  Qty: 4 mL, Refills: 12    Associated Diagnoses: Severe persistent asthma without complication      EPINEPHrine (EPIPEN) 0.3 mg/0.3 mL SOAJ Inject 0.3 mL (0.3 mg total) into a muscle once for 1 dose  Qty: 0.6 mL, Refills: 0    Associated Diagnoses: Allergic reaction, initial encounter      fluticasone-vilanterol (Breo Ellipta) 200-25 mcg/actuation inhaler Inhale 1 puff daily Rinse mouth after use.  Qty: 60 blister, Refills: 5    Associated  "Diagnoses: Severe persistent asthma without complication      glucagon 1 MG injection as needed      Insulin Pen Needle 32G X 4 MM MISC Unifine Pentips Plus 32 gauge x 5/32\" needle      latanoprost (XALATAN) 0.005 % ophthalmic solution Administer 1 drop to both eyes daily      naloxone (NARCAN) 4 mg/0.1 mL nasal spray Administer 1 spray into a nostril. If no response after 2-3 minutes, give another dose in the other nostril using a new spray.  Qty: 1 each, Refills: 0    Associated Diagnoses: Substance abuse (HCC)      polyvinyl alcohol (LIQUIFILM TEARS) 1.4 % ophthalmic solution 2 drops as needed for dry eyes      !! predniSONE 5 mg tablet Take 1 tablet (5 mg total) by mouth daily  Qty: 30 tablet, Refills: 0    Associated Diagnoses: Severe persistent asthma without complication      tolnaftate (TINACTIN) 1 % powder Apply 1 application topically 2 (two) times a day      Turmeric 500 MG CAPS Take by mouth       !! - Potential duplicate medications found. Please discuss with provider.        No discharge procedures on file.  ED SEPSIS DOCUMENTATION   Time reflects when diagnosis was documented in both MDM as applicable and the Disposition within this note       Time User Action Codes Description Comment    12/30/2024  2:15 PM Naheed Mejia [U07.1] COVID-19     12/30/2024  2:15 PM Naheed Mejia [J45.901] Acute asthma exacerbation                  Naheed Mejia PA-C  12/30/24 [id number]    "

## 2024-12-30 NOTE — TELEPHONE ENCOUNTER
Pt calling in to speak to Ro Servin  in regards to him being diagnosed with covid 19. He has questions and requesting a call back

## 2024-12-30 NOTE — H&P
Unit/Bed#: 7T [facility] 709-01 Encounter: [id number]    Chief complaint: Shortness of breath    History of Present Illness     HPI:  Moises Ordaz is a 59 y.o. male who was in his usual state of health until 4 days ago.  At that time he developed a scratchy throat.  This progressed to a cough and wheezing.  He has a history of asthma and has required intubation twice.  He was using his bronchodilators but was not improving.  He therefore came to the emergency room yesterday.  He was found to have COVID-19.  Paxlovid was considered contraindicated because of his antiepileptics.  He was started on steroids.  After he returned home, he had recurrent wheezing and worsening shortness of breath.  He therefore came to the emergency room again today.  Despite vigorous treatment he did not improve.  He is admitted for further care.    The patient's past medical history is remarkable for asthma as detailed.  He has a history of a seizure disorder.  He has hyperlipidemia.  He has BPH.  He has glaucoma.  He has hypertension and type 2 diabetes.    The patient's medications at the time of admission include:  Amlodipine 5 mg daily  Atorvastatin 80 mg daily  Calcium carbonate 1500 mg daily  Carbamazepine 400 mg in the morning 600 mg in the evening  Diclofenac, topically, as needed  Fexofenadine 60 mg daily  Fiber supplement daily  Finasteride 5 mg daily  Fluticasone nose spray 1 puff each nostril daily  Gabapentin 300 mg 3 times daily  Insulin aspart 5 units 3 times daily before meals  Glargine 43 units daily  Lovaza 1 g daily  Omeprazole 40 mg daily  Perphenazine 2 mg daily  Tamsulosin 0.4 mg daily  Vitamin D 1000 units daily  Albuterol 2 puffs every 4 hours as needed  Albuterol by nebulization every 6 hours as needed  Breo Ellipta 200/25 daily  Dupixent 300 mg subcu every 14 days  Turmeric 500 mg daily  Glucagon as needed for hypoglycemia    Allergies   Allergen Reactions    Aspirin GI Bleeding    Ibuprofen GI Bleeding     "Licorice Flavor [Flavoring Agent - Food Allergy] Hives    Penicillins Hives    Propoxyphene Hives     Propoxyphene N-Acetaminophen---hives & palpitations    Tramadol Palpitations     Other reaction(s): heart pumps real fast    Bee Venom     Haloperidol Other (See Comments)     \"bent out of shape\" - dystonia    Licorice (Glycyrrhiza)     Lithium Other (See Comments)     edema    Molds & Smuts     Other     Wasp Venom        Family history is noncontributory    Social history reveals that the patient is a former smoker but quit in 1992.  He has a history of heroin abuse but has been abstinent for more than a year.  He does not imbibe ethanol habitually.    Review of systems was taken in detail and is essentially negative except as mentioned above.        Objective   Vitals: Blood pressure 143/79, pulse (!) 110, temperature (!) 97 °F (36.1 °C), temperature source Temporal, resp. rate 20, height 5' 8.5\" (1.74 m), weight 102 kg (224 lb 13.9 oz), SpO2 98%.    Physical Exam   Physical examination reveals a well-developed, well-nourished man who appears in no distress.  Head is atraumatic and normocephalic.  ENT examination is unremarkable.  Eyes show the pupils to be equal, round, and reactive to light.  Extraocular movements are intact.  Neck is supple.  Carotids are full without bruits.  There is no lymphadenopathy or goiter.  Lungs reveal bilateral wheezing.  Air movement appears adequate.  Cardiac exam revealed a regular rhythm.  I heard no murmur or gallop.  The abdomen is soft with active bowel sounds.  There is no mass or tenderness.  Extremities showed no clubbing, cyanosis, or edema.  No calf tenderness was present.  Neurologic examination revealed the patient to be alert and oriented.  No focal sign was present.    Lab Results:   Results for orders placed or performed during the hospital encounter of 12/30/24   ECG 12 lead    Collection Time: 12/30/24 11:34 AM   Result Value Ref Range    Ventricular Rate 97 BPM " "   Atrial Rate 97 BPM    DC Interval 158 ms    QRSD Interval 82 ms    QT Interval 348 ms    QTC Interval 442 ms    P Axis 51 degrees    QRS Axis -5 degrees    T Wave Axis 20 degrees   CBC and differential    Collection Time: 12/30/24 11:52 AM   Result Value Ref Range    WBC 6.96 4.31 - 10.16 Thousand/uL    RBC 4.95 3.88 - 5.62 Million/uL    Hemoglobin 13.0 12.0 - 17.0 g/dL    Hematocrit 40.1 36.5 - 49.3 %    MCV 81 (L) 82 - 98 fL    MCH 26.3 (L) 26.8 - 34.3 pg    MCHC 32.4 31.4 - 37.4 g/dL    RDW 13.7 11.6 - 15.1 %    MPV 8.9 8.9 - 12.7 fL    Platelets 243 149 - 390 Thousands/uL    nRBC 0 /100 WBCs    Segmented % 87 (H) 43 - 75 %    Immature Grans % 1 0 - 2 %    Lymphocytes % 8 (L) 14 - 44 %    Monocytes % 4 4 - 12 %    Eosinophils Relative 0 0 - 6 %    Basophils Relative 0 0 - 1 %    Absolute Neutrophils 6.04 1.85 - 7.62 Thousands/µL    Absolute Immature Grans 0.04 0.00 - 0.20 Thousand/uL    Absolute Lymphocytes 0.57 (L) 0.60 - 4.47 Thousands/µL    Absolute Monocytes 0.29 0.17 - 1.22 Thousand/µL    Eosinophils Absolute 0.01 0.00 - 0.61 Thousand/µL    Basophils Absolute 0.01 0.00 - 0.10 Thousands/µL   Comprehensive metabolic panel    Collection Time: 12/30/24 11:52 AM   Result Value Ref Range    Sodium 138 135 - 147 mmol/L    Potassium 3.7 3.5 - 5.3 mmol/L    Chloride 104 96 - 108 mmol/L    CO2 25 21 - 32 mmol/L    ANION GAP 9 4 - 13 mmol/L    BUN 14 5 - 25 mg/dL    Creatinine 0.92 0.60 - 1.30 mg/dL    Glucose 125 65 - 140 mg/dL    Calcium 9.1 8.4 - 10.2 mg/dL    AST 14 13 - 39 U/L    ALT 13 7 - 52 U/L    Alkaline Phosphatase 98 34 - 104 U/L    Total Protein 6.9 6.4 - 8.4 g/dL    Albumin 4.3 3.5 - 5.0 g/dL    Total Bilirubin 0.25 0.20 - 1.00 mg/dL    eGFR 90 ml/min/1.73sq m   HS Troponin 0hr (reflex protocol)    Collection Time: 12/30/24 11:52 AM   Result Value Ref Range    hs TnI 0hr <2 \"Refer to ACS Flowchart\"- see link ng/L         Assessment:  1.  Acute exacerbation of asthma, precipitated by COVID-19  2.  " COVID-19  3.  Hypertension  4.  Hypercholesterolemia  5.  Type 2 diabetes  6.  BPH  7.  Seizure disorder  8.  Bipolar affective disorder  9.  History of drug abuse, now abstinent    Plan:  The patient will be admitted to the hospital and monitored carefully.  His bronchodilator regimen will be intensified.  He will be continued on intravenous methylprednisolone.  The patient has improved somewhat and I am hopeful that he will be able to be discharged tomorrow.  Because of this, I do not believe that there is any utility in starting him on specific COVID-19 therapy.  As mentioned, Paxlovid was thought contraindicated because of the high potential for drug interactions.  The patient's other medical issues appear to be stable and his usual therapy will be continued.    The patient has been assigned observation status because of the probability that he may be discharged tomorrow.    I discussed resuscitative measures with the patient and he would like all available modalities employed in his behalf in the event of a cardiac arrest.  He is assigned to CODE BLUE level 1.        Code Status: Level 1 - Full Code

## 2024-12-31 VITALS
TEMPERATURE: 97.6 F | HEART RATE: 85 BPM | HEIGHT: 69 IN | DIASTOLIC BLOOD PRESSURE: 89 MMHG | RESPIRATION RATE: 18 BRPM | WEIGHT: 224.87 LBS | SYSTOLIC BLOOD PRESSURE: 165 MMHG | BODY MASS INDEX: 33.31 KG/M2 | OXYGEN SATURATION: 96 %

## 2024-12-31 LAB
ANION GAP SERPL CALCULATED.3IONS-SCNC: 7 MMOL/L (ref 4–13)
BASOPHILS # BLD AUTO: 0 THOUSANDS/ΜL (ref 0–0.1)
BASOPHILS NFR BLD AUTO: 0 % (ref 0–1)
BUN SERPL-MCNC: 15 MG/DL (ref 5–25)
CALCIUM SERPL-MCNC: 8.8 MG/DL (ref 8.4–10.2)
CHLORIDE SERPL-SCNC: 104 MMOL/L (ref 96–108)
CO2 SERPL-SCNC: 27 MMOL/L (ref 21–32)
CREAT SERPL-MCNC: 0.87 MG/DL (ref 0.6–1.3)
EOSINOPHIL # BLD AUTO: 0 THOUSAND/ΜL (ref 0–0.61)
EOSINOPHIL NFR BLD AUTO: 0 % (ref 0–6)
ERYTHROCYTE [DISTWIDTH] IN BLOOD BY AUTOMATED COUNT: 14 % (ref 11.6–15.1)
GFR SERPL CREATININE-BSD FRML MDRD: 93 ML/MIN/1.73SQ M
GLUCOSE P FAST SERPL-MCNC: 164 MG/DL (ref 65–99)
GLUCOSE SERPL-MCNC: 120 MG/DL (ref 65–140)
GLUCOSE SERPL-MCNC: 164 MG/DL (ref 65–140)
GLUCOSE SERPL-MCNC: 203 MG/DL (ref 65–140)
HCT VFR BLD AUTO: 37.2 % (ref 36.5–49.3)
HGB BLD-MCNC: 12 G/DL (ref 12–17)
IMM GRANULOCYTES # BLD AUTO: 0.04 THOUSAND/UL (ref 0–0.2)
IMM GRANULOCYTES NFR BLD AUTO: 0 % (ref 0–2)
LYMPHOCYTES # BLD AUTO: 0.69 THOUSANDS/ΜL (ref 0.6–4.47)
LYMPHOCYTES NFR BLD AUTO: 6 % (ref 14–44)
MCH RBC QN AUTO: 26.8 PG (ref 26.8–34.3)
MCHC RBC AUTO-ENTMCNC: 32.3 G/DL (ref 31.4–37.4)
MCV RBC AUTO: 83 FL (ref 82–98)
MONOCYTES # BLD AUTO: 0.67 THOUSAND/ΜL (ref 0.17–1.22)
MONOCYTES NFR BLD AUTO: 6 % (ref 4–12)
NEUTROPHILS # BLD AUTO: 9.6 THOUSANDS/ΜL (ref 1.85–7.62)
NEUTS SEG NFR BLD AUTO: 88 % (ref 43–75)
NRBC BLD AUTO-RTO: 0 /100 WBCS
PLATELET # BLD AUTO: 238 THOUSANDS/UL (ref 149–390)
PMV BLD AUTO: 9.1 FL (ref 8.9–12.7)
POTASSIUM SERPL-SCNC: 3.9 MMOL/L (ref 3.5–5.3)
RBC # BLD AUTO: 4.48 MILLION/UL (ref 3.88–5.62)
SODIUM SERPL-SCNC: 138 MMOL/L (ref 135–147)
WBC # BLD AUTO: 11 THOUSAND/UL (ref 4.31–10.16)

## 2024-12-31 PROCEDURE — 82948 REAGENT STRIP/BLOOD GLUCOSE: CPT

## 2024-12-31 PROCEDURE — 85025 COMPLETE CBC W/AUTO DIFF WBC: CPT | Performed by: INTERNAL MEDICINE

## 2024-12-31 PROCEDURE — 99239 HOSP IP/OBS DSCHRG MGMT >30: CPT | Performed by: INTERNAL MEDICINE

## 2024-12-31 PROCEDURE — 80048 BASIC METABOLIC PNL TOTAL CA: CPT | Performed by: INTERNAL MEDICINE

## 2024-12-31 RX ADMIN — METHYLPREDNISOLONE SODIUM SUCCINATE 20 MG: 40 INJECTION, POWDER, FOR SOLUTION INTRAMUSCULAR; INTRAVENOUS at 14:14

## 2024-12-31 RX ADMIN — FLUTICASONE PROPIONATE 1 SPRAY: 50 SPRAY, METERED NASAL at 08:06

## 2024-12-31 RX ADMIN — INSULIN LISPRO 4 UNITS: 100 INJECTION, SOLUTION INTRAVENOUS; SUBCUTANEOUS at 11:58

## 2024-12-31 RX ADMIN — ENOXAPARIN SODIUM 40 MG: 40 INJECTION SUBCUTANEOUS at 08:06

## 2024-12-31 RX ADMIN — INSULIN LISPRO 5 UNITS: 100 INJECTION, SOLUTION INTRAVENOUS; SUBCUTANEOUS at 11:58

## 2024-12-31 RX ADMIN — FLUTICASONE FUROATE AND VILANTEROL TRIFENATATE 1 PUFF: 200; 25 POWDER RESPIRATORY (INHALATION) at 08:06

## 2024-12-31 RX ADMIN — PANTOPRAZOLE SODIUM 40 MG: 40 TABLET, DELAYED RELEASE ORAL at 06:04

## 2024-12-31 RX ADMIN — CARBAMAZEPINE 400 MG: 200 TABLET ORAL at 08:06

## 2024-12-31 RX ADMIN — PERPHENAZINE 2 MG: 2 TABLET, FILM COATED ORAL at 08:06

## 2024-12-31 RX ADMIN — GABAPENTIN 300 MG: 300 CAPSULE ORAL at 08:06

## 2024-12-31 RX ADMIN — METHYLPREDNISOLONE SODIUM SUCCINATE 20 MG: 40 INJECTION, POWDER, FOR SOLUTION INTRAMUSCULAR; INTRAVENOUS at 06:04

## 2024-12-31 RX ADMIN — LORATADINE 10 MG: 10 TABLET ORAL at 08:07

## 2024-12-31 RX ADMIN — AMLODIPINE BESYLATE 5 MG: 5 TABLET ORAL at 08:06

## 2024-12-31 RX ADMIN — TAMSULOSIN HYDROCHLORIDE 0.4 MG: 0.4 CAPSULE ORAL at 08:07

## 2024-12-31 RX ADMIN — INSULIN LISPRO 5 UNITS: 100 INJECTION, SOLUTION INTRAVENOUS; SUBCUTANEOUS at 08:11

## 2024-12-31 RX ADMIN — FINASTERIDE 5 MG: 5 TABLET, FILM COATED ORAL at 08:06

## 2024-12-31 NOTE — PROGRESS NOTES
Discharge instructions given to pt. Pt verbalizes the understanding of discharge instructions. All belongings given to patient. Patient in no distress and no concerns at this time.

## 2024-12-31 NOTE — PLAN OF CARE
Problem: RESPIRATORY - ADULT  Goal: Achieves optimal ventilation and oxygenation  Description: INTERVENTIONS:  - Assess for changes in respiratory status  - Assess for changes in mentation and behavior  - Position to facilitate oxygenation and minimize respiratory effort  - Oxygen administered by appropriate delivery if ordered  - Initiate smoking cessation education as indicated  - Encourage broncho-pulmonary hygiene including cough, deep breathe, Incentive Spirometry  - Assess the need for suctioning and aspirate as needed  - Assess and instruct to report SOB or any respiratory difficulty  - Respiratory Therapy support as indicated  Outcome: Progressing     Problem: METABOLIC, FLUID AND ELECTROLYTES - ADULT  Goal: Glucose maintained within target range  Description: INTERVENTIONS:  - Monitor Blood Glucose as ordered  - Assess for signs and symptoms of hyperglycemia and hypoglycemia  - Administer ordered medications to maintain glucose within target range  - Assess nutritional intake and initiate nutrition service referral as needed  Outcome: Progressing     Problem: INFECTION - ADULT  Goal: Absence or prevention of progression during hospitalization  Description: INTERVENTIONS:  - Assess and monitor for signs and symptoms of infection  - Monitor lab/diagnostic results  - Monitor all insertion sites, i.e. indwelling lines, tubes, and drains  - Monitor endotracheal if appropriate and nasal secretions for changes in amount and color  - Rives appropriate cooling/warming therapies per order  - Administer medications as ordered  - Instruct and encourage patient and family to use good hand hygiene technique  - Identify and instruct in appropriate isolation precautions for identified infection/condition  Outcome: Progressing     Problem: SAFETY ADULT  Goal: Maintains/Returns to pre admission functional level  Description: INTERVENTIONS:  - Perform AM-PAC 6 Click Basic Mobility/ Daily Activity assessment daily.  - Set  and communicate daily mobility goal to care team and patient/family/caregiver.   - Collaborate with rehabilitation services on mobility goals if consulted  - Out of bed for toileting  - Record patient progress and toleration of activity level   Outcome: Progressing     Problem: Knowledge Deficit  Goal: Patient/family/caregiver demonstrates understanding of disease process, treatment plan, medications, and discharge instructions  Description: Complete learning assessment and assess knowledge base.  Interventions:  - Provide teaching at level of understanding  - Provide teaching via preferred learning methods  Outcome: Progressing     Problem: DISCHARGE PLANNING  Goal: Discharge to home or other facility with appropriate resources  Description: INTERVENTIONS:  - Identify barriers to discharge w/patient and caregiver  - Arrange for needed discharge resources and transportation as appropriate  - Identify discharge learning needs (meds, wound care, etc.)  - Arrange for interpretive services to assist at discharge as needed  - Refer to Case Management Department for coordinating discharge planning if the patient needs post-hospital services based on physician/advanced practitioner order or complex needs related to functional status, cognitive ability, or social support system  Outcome: Progressing

## 2024-12-31 NOTE — PLAN OF CARE
Problem: METABOLIC, FLUID AND ELECTROLYTES - ADULT  Goal: Glucose maintained within target range  Description: INTERVENTIONS:  - Monitor Blood Glucose as ordered  - Assess for signs and symptoms of hyperglycemia and hypoglycemia  - Administer ordered medications to maintain glucose within target range  - Assess nutritional intake and initiate nutrition service referral as needed  Outcome: Progressing     Problem: RESPIRATORY - ADULT  Goal: Achieves optimal ventilation and oxygenation  Description: INTERVENTIONS:  - Assess for changes in respiratory status  - Assess for changes in mentation and behavior  - Position to facilitate oxygenation and minimize respiratory effort  - Oxygen administered by appropriate delivery if ordered  - Initiate smoking cessation education as indicated  - Encourage broncho-pulmonary hygiene including cough, deep breathe, Incentive Spirometry  - Assess the need for suctioning and aspirate as needed  - Assess and instruct to report SOB or any respiratory difficulty  - Respiratory Therapy support as indicated  Outcome: Progressing     Problem: PAIN - ADULT  Goal: Verbalizes/displays adequate comfort level or baseline comfort level  Description: Interventions:  - Encourage patient to monitor pain and request assistance  - Assess pain using appropriate pain scale  - Administer analgesics based on type and severity of pain and evaluate response  - Implement non-pharmacological measures as appropriate and evaluate response  - Consider cultural and social influences on pain and pain management  - Notify physician/advanced practitioner if interventions unsuccessful or patient reports new pain  Outcome: Progressing

## 2024-12-31 NOTE — DISCHARGE SUMMARY
Discharge Summary - Moises Ordaz, 1965, [id number]        Admission Date: 12/30/2024  Discharge Date: 12/31/2024    Discharge Diagnosis:   1.  Severe persistent asthma with acute exacerbation  2.  Acute COVID-19  3.  Hypertension  4.  Hypercholesterolemia  5.  Type 2 diabetes  6.  BPH  7.  Seizure disorder  8.  Bipolar affective disorder  9.  History of drug abuse, now abstinent    Consulting Physicians:  None    Procedures Performed:   None    HPI: The patient is a 59-year-old man who was in his usual state of health until about 4 days before admission.  Initially, he developed a scratchy throat which then progressed to coughing and wheezing.  He has a history of severe asthma and has required intubation on 2 occasions.  He came to the emergency room and was found to have COVID-19.  He was treated with bronchodilators and steroids.  He improved and was discharged.  He was not given Paxlovid because of concern about drug interactions, specifically with his antiepileptics.  Unfortunately, the patient got worse and he had to return to the emergency room.  At that point he was admitted for further care.    Hospital Course: The patient was admitted to the hospital and monitored carefully.  He was treated with bronchodilators and intravenous methylprednisolone.  Fortunately, with this intervention he improved significantly.  On the day following his admission he thought that his breathing was essentially at baseline.  He was discharged on a tapering dose of steroids in his usual bronchodilators.    The patient has COVID-19.  He seems to be improving without specific intervention.  As mentioned, Paxlovid is relatively contraindicated.  He was not started on remdesivir because of the brief duration of his hospital stay.    The patient's other medical issues remained stable during his brief hospitalization.    At the time of discharge the patient was feeling rather well.  Vital signs were stable.  Lungs were clear.   Cardiac exam revealed a regular rhythm.  The abdomen was soft and nontender.  There was no edema.    Disposition: The patient was discharged home on December 31.  He will continue with diabetic diet.  His activity will be as tolerated.  He was asked to arrange follow-up with his primary care physician within 1 week.    Discharge instructions/Information to patient and family:   See after visit summary for information provided to patient and family.      Provisions for Follow-Up Care:  See after visit summary for information related to follow-up care and any pertinent home health orders.      Planned Readmission: No    Discharge Statement   I spent 35 minutes discharging the patient. This time was spent on the day of discharge. I had direct contact with the patient on the day of discharge.     Discharge Medications:  See after visit summary for reconciled discharge medications provided to patient and family.

## 2025-01-24 ENCOUNTER — TELEPHONE (OUTPATIENT)
Age: 60
End: 2025-01-24

## 2025-01-29 ENCOUNTER — OFFICE VISIT (OUTPATIENT)
Dept: PULMONOLOGY | Facility: CLINIC | Age: 60
End: 2025-01-29
Payer: COMMERCIAL

## 2025-01-29 VITALS
WEIGHT: 221 LBS | OXYGEN SATURATION: 97 % | TEMPERATURE: 97.7 F | SYSTOLIC BLOOD PRESSURE: 136 MMHG | HEIGHT: 69 IN | DIASTOLIC BLOOD PRESSURE: 82 MMHG | BODY MASS INDEX: 32.73 KG/M2 | HEART RATE: 89 BPM

## 2025-01-29 DIAGNOSIS — J45.51 SEVERE PERSISTENT ASTHMA WITH ACUTE EXACERBATION: Primary | ICD-10-CM

## 2025-01-29 DIAGNOSIS — J30.9 ALLERGIC RHINITIS WITH POSTNASAL DRIP: ICD-10-CM

## 2025-01-29 DIAGNOSIS — J42 CHRONIC BRONCHITIS, UNSPECIFIED CHRONIC BRONCHITIS TYPE (HCC): ICD-10-CM

## 2025-01-29 DIAGNOSIS — K21.9 GASTROESOPHAGEAL REFLUX DISEASE WITHOUT ESOPHAGITIS: ICD-10-CM

## 2025-01-29 DIAGNOSIS — J45.909 ASTHMATIC BRONCHITIS: ICD-10-CM

## 2025-01-29 DIAGNOSIS — R09.82 ALLERGIC RHINITIS WITH POSTNASAL DRIP: ICD-10-CM

## 2025-01-29 DIAGNOSIS — U07.1 COVID-19: ICD-10-CM

## 2025-01-29 DIAGNOSIS — G47.33 OSA (OBSTRUCTIVE SLEEP APNEA): ICD-10-CM

## 2025-01-29 PROCEDURE — 99214 OFFICE O/P EST MOD 30 MIN: CPT | Performed by: NURSE PRACTITIONER

## 2025-01-29 RX ORDER — MONTELUKAST SODIUM 10 MG/1
1 TABLET ORAL DAILY
COMMUNITY
Start: 2025-01-17

## 2025-01-29 RX ORDER — PREDNISONE 10 MG/1
TABLET ORAL
Qty: 30 TABLET | Refills: 0 | Status: SHIPPED | OUTPATIENT
Start: 2025-01-29 | End: 2025-02-09

## 2025-01-29 RX ORDER — MOMETASONE FUROATE AND FORMOTEROL FUMARATE DIHYDRATE 200; 5 UG/1; UG/1
2 AEROSOL RESPIRATORY (INHALATION) 2 TIMES DAILY
COMMUNITY
Start: 2025-01-03

## 2025-01-29 RX ORDER — ALBUTEROL SULFATE 0.83 MG/ML
2.5 SOLUTION RESPIRATORY (INHALATION) EVERY 4 HOURS PRN
Qty: 90 ML | Refills: 2 | Status: SHIPPED | OUTPATIENT
Start: 2025-01-29

## 2025-01-29 RX ORDER — AZITHROMYCIN 250 MG/1
TABLET, FILM COATED ORAL
Qty: 6 TABLET | Refills: 0 | Status: SHIPPED | OUTPATIENT
Start: 2025-01-29 | End: 2025-02-02

## 2025-01-29 RX ORDER — PREDNISONE 50 MG/1
40 TABLET ORAL DAILY
COMMUNITY

## 2025-01-29 RX ORDER — HYDROXYZINE PAMOATE 50 MG/1
50 CAPSULE ORAL 4 TIMES DAILY PRN
COMMUNITY
Start: 2025-01-16

## 2025-01-29 RX ORDER — MIRTAZAPINE 7.5 MG/1
7.5 TABLET, FILM COATED ORAL
COMMUNITY

## 2025-01-29 NOTE — ASSESSMENT & PLAN NOTE
Overall he is doing well with no active nasal or sinus symptoms reported  Continue Flonase and Allegra daily as doing

## 2025-01-29 NOTE — PROGRESS NOTES
Pulmonary Follow-Up Note   Moises Ordaz 60 y.o. male MRN: [id number]  1/29/2025      Assessment/Plan:    Problem List Items Addressed This Visit       ARPITA (obstructive sleep apnea)    Intolerant of CPAP         GERD (gastroesophageal reflux disease)    No breakthrough on daily PPI         Severe persistent asthma with acute exacerbation - Primary    Recent exacerbation noted due to COVID19 and he has completed oral steroid taper. Today he feels active symptoms once again and he has both wheezing and rhonchi on exam.  Continue Dulera twice daily  Albuterol HFA as needed  Continue Dupixent 300mg every 14 days  Repeat pred taper  Azithro 5 days  Continue frequent nebulizer with albuterol. He tends to have prostate irritation from ipratropium and I have advised sparing use if any.         Relevant Medications    Dulera 200-5 MCG/ACT inhaler    montelukast (SINGULAIR) 10 mg tablet    albuterol (2.5 mg/3 mL) 0.083 % nebulizer solution    Allergic rhinitis with postnasal drip    Overall he is doing well with no active nasal or sinus symptoms reported  Continue Flonase and Allegra daily as doing         Relevant Medications    predniSONE 50 mg tablet    predniSONE 10 mg tablet    COVID-19    Improved           Other Visit Diagnoses         Chronic bronchitis, unspecified chronic bronchitis type (HCC)        Relevant Medications    Dulera 200-5 MCG/ACT inhaler    montelukast (SINGULAIR) 10 mg tablet    azithromycin (ZITHROMAX) 250 mg tablet    predniSONE 10 mg tablet    albuterol (2.5 mg/3 mL) 0.083 % nebulizer solution      Asthmatic bronchitis        Relevant Medications    Dulera 200-5 MCG/ACT inhaler    montelukast (SINGULAIR) 10 mg tablet    albuterol (2.5 mg/3 mL) 0.083 % nebulizer solution          Vaccines: up to date    Return in about 4 months (around 5/29/2025).    All of Marvin's questions were answered prior to leaving the office today.  He is aware to call our office with any further questions or  concerns.    History of Present Illness   Reason for Visit: HFU  Chief Complaint: SOB, wheeze  HPI: Moises Ordaz is a 60 y.o. male who presents to the office today following recent hospital visit 12/30/24-12/31/24 for asthma exacerbation due to COVID19. He was treated for exacerbation with IV steroids and discharged to home on prednisone taper. He did not get Paxlovid out of concern for interactions with his antiepileptic meds.    Today he feels increased symptoms the past 3 days - he was visiting family in [facility] and that is when symptoms stated. No fever or chills. Feels his appetite decreased. Sleep is poor - woke up twice for nebulizer treatment. He is currently taking 40mg of prednisone tablets he had lying around (60mg 2 days ago, decreasing by 10mg daily).    He has wheezing, tight chest, SOB. Nebulizer treatment only benefits about 3 hours before symptoms restart.    Review of Systems  Please note that a 14-point review of systems was performed to include Constitutional, HEENT, Respiratory, CVS, GI, , Musculoskeletal, Integumentary, Neurologic, Rheumatologic, Endocrinologic, Psychiatric, Lymphatic, and Hematologic/Oncologic systems were reviewed and are negative unless otherwise stated in HPI. Positive and negative findings pertinent to this evaluation are incorporated into the history of present illness.       Historical Information   Past Medical History:   Diagnosis Date    Asthma     COPD (chronic obstructive pulmonary disease) (HCC)     Enlarged prostate     Glaucoma     Hyperlipidemia     Overdose of heroin, accidental or unintentional, sequela     Psychiatric disorder     Seasonal allergic rhinitis     Seizures (HCC)      Past Surgical History:   Procedure Laterality Date    CIRCUMCISION      KNEE SURGERY      WRIST SURGERY Left      Family History   Problem Relation Age of Onset    Arthritis Mother      Social History   Social History     Substance and Sexual Activity   Alcohol Use Not Currently      Social History     Substance and Sexual Activity   Drug Use Not Currently    Types: Heroin, Fentanyl    Comment: OD 21 heroin     Social History     Tobacco Use   Smoking Status Former    Current packs/day: 0.00    Average packs/day: 2.0 packs/day for 27.0 years (54.0 ttl pk-yrs)    Types: Cigarettes    Start date:     Quit date:     Years since quittin.1   Smokeless Tobacco Never     E-Cigarette/Vaping    E-Cigarette Use Never User      E-Cigarette/Vaping Substances    Nicotine No     THC No     CBD No     Flavoring No     Other No     Unknown No        Meds/Allergies     Current Outpatient Medications:     acetaminophen (TYLENOL) 650 mg CR tablet, Take 1 tablet (650 mg total) by mouth every 8 (eight) hours as needed for mild pain, Disp: 30 tablet, Rfl: 0    albuterol (2.5 mg/3 mL) 0.083 % nebulizer solution, Take 3 mL (2.5 mg total) by nebulization every 4 (four) hours as needed for wheezing or shortness of breath, Disp: 90 mL, Rfl: 2    albuterol (Proventil HFA) 90 mcg/act inhaler, Inhale 1-2 puffs every 6 (six) hours as needed for wheezing or shortness of breath, Disp: 8 g, Rfl: 0    amLODIPine (NORVASC) 5 mg tablet, Take 5 mg by mouth daily, Disp: , Rfl:     atorvastatin (LIPITOR) 80 mg tablet, Take 80 mg by mouth daily at bedtime, Disp: , Rfl:     azithromycin (ZITHROMAX) 250 mg tablet, Take 2 tablets today then 1 tablet daily x 4 days, Disp: 6 tablet, Rfl: 0    Blood Glucose Monitoring Suppl (ACURA BLOOD GLUCOSE METER) w/Device KIT, by Does not apply route, Disp: , Rfl:     Calcium Carb-Cholecalciferol (CALCIUM CARBONATE-VITAMIN D3 PO), Take 1,500 mg by mouth daily, Disp: , Rfl:     carBAMazepine (TEGretol) 200 mg tablet, Take 2 tabs in the morning and 3 tabs at night, Disp: , Rfl:     Cinnamon 500 MG capsule, Take 500 mg by mouth daily, Disp: , Rfl:     Diclofenac Sodium (VOLTAREN) 1 %, Apply 2 g topically 4 (four) times a day, Disp: 100 g, Rfl: 0    Dulera 200-5 MCG/ACT inhaler, Inhale  "2 puffs 2 (two) times a day, Disp: , Rfl:     dupilumab (DUPIXENT) subcutaneous injection, Inject 2 mL (300 mg total) under the skin every 14 (fourteen) days, Disp: 4 mL, Rfl: 12    fexofenadine (ALLEGRA) 60 MG tablet, Take 1 tablet (60 mg total) by mouth daily, Disp: 30 tablet, Rfl: 5    FIBER PO, Take by mouth, Disp: , Rfl:     finasteride (PROSCAR) 5 mg tablet, Take 5 mg by mouth daily, Disp: , Rfl:     fluticasone (FLONASE) 50 mcg/act nasal spray, 1 spray into each nostril daily, Disp: 16 g, Rfl: 5    gabapentin (NEURONTIN) 300 mg capsule, Take 300 mg by mouth 3 (three) times a day, Disp: , Rfl:     hydrOXYzine pamoate (VISTARIL) 50 mg capsule, Take 50 mg by mouth 4 (four) times a day as needed, Disp: , Rfl:     insulin glargine (BASAGLAR KWIKPEN) 100 units/mL injection pen, Inject 43 Units under the skin daily at bedtime , Disp: , Rfl:     Insulin Pen Needle 32G X 4 MM MISC, Unifine Pentips Plus 32 gauge x 5/32\" needle, Disp: , Rfl:     ipratropium (ATROVENT) 0.02 % nebulizer solution, Take 2.5 mL (0.5 mg total) by nebulization 4 (four) times a day, Disp: 100 mL, Rfl: 0    mirtazapine (REMERON) 7.5 MG tablet, Take 7.5 mg by mouth daily at bedtime, Disp: , Rfl:     montelukast (SINGULAIR) 10 mg tablet, Take 1 tablet by mouth in the morning, Disp: , Rfl:     naloxone (NARCAN) 4 mg/0.1 mL nasal spray, Administer 1 spray into a nostril. If no response after 2-3 minutes, give another dose in the other nostril using a new spray., Disp: 1 each, Rfl: 0    omega-3-acid ethyl esters (LOVAZA) 1 g capsule, Take 1 g by mouth daily, Disp: , Rfl:     omeprazole (PriLOSEC) 40 MG capsule, Take 40 mg by mouth daily, Disp: , Rfl:     perphenazine 2 mg tablet, Take 2 mg by mouth daily, Disp: , Rfl:     polyvinyl alcohol (LIQUIFILM TEARS) 1.4 % ophthalmic solution, 2 drops as needed for dry eyes, Disp: , Rfl:     predniSONE 10 mg tablet, Take 4 tablets (40 mg total) by mouth daily for 3 days, THEN 3 tablets (30 mg total) daily for " "3 days, THEN 2 tablets (20 mg total) daily for 3 days, THEN 1 tablet (10 mg total) daily for 3 days., Disp: 30 tablet, Rfl: 0    predniSONE 50 mg tablet, Take 40 mg by mouth daily, Disp: , Rfl:     TAMSULOSIN HCL PO, Take 0.4 mg by mouth daily , Disp: , Rfl:     VITAMIN D PO, Take by mouth, Disp: , Rfl:     EPINEPHrine (EPIPEN) 0.3 mg/0.3 mL SOAJ, Inject 0.3 mL (0.3 mg total) into a muscle once for 1 dose, Disp: 0.6 mL, Rfl: 0    fluticasone-vilanterol (Breo Ellipta) 200-25 mcg/actuation inhaler, Inhale 1 puff daily Rinse mouth after use., Disp: 60 blister, Rfl: 5    glucagon 1 MG injection, as needed (Patient not taking: Reported on 1/29/2025), Disp: , Rfl:     insulin aspart (NovoLOG) 100 units/mL injection, Inject under the skin 3 (three) times a day before meals, Disp: , Rfl:   Allergies   Allergen Reactions    Aspirin GI Bleeding    Ibuprofen GI Bleeding    Licorice Flavor [Flavoring Agent - Food Allergy] Hives    Penicillins Hives    Propoxyphene Hives     Propoxyphene N-Acetaminophen---hives & palpitations    Tramadol Palpitations     Other reaction(s): heart pumps real fast    Bee Venom     Haloperidol Other (See Comments)     \"bent out of shape\" - dystonia    Licorice (Glycyrrhiza)     Lithium Other (See Comments)     edema    Molds & Smuts     Other     Wasp Venom        Vitals: Blood pressure 136/82, pulse 89, temperature 97.7 °F (36.5 °C), temperature source Tympanic, height 5' 8.5\" (1.74 m), weight 100 kg (221 lb), SpO2 97%. Body mass index is 33.11 kg/m². Oxygen Therapy  SpO2: 97 %  Oxygen Therapy: None (Room air)      Physical Exam  Vitals reviewed.   Constitutional:       Appearance: Normal appearance.   HENT:      Head: Normocephalic.      Nose: Nose normal.      Mouth/Throat:      Mouth: Mucous membranes are moist.      Pharynx: Oropharynx is clear.   Cardiovascular:      Rate and Rhythm: Normal rate and regular rhythm.      Pulses: Normal pulses.      Heart sounds: Normal heart sounds. " "  Pulmonary:      Effort: Pulmonary effort is normal.      Breath sounds: Wheezing and rhonchi present.   Musculoskeletal:         General: Normal range of motion.   Skin:     General: Skin is warm.      Capillary Refill: Capillary refill takes less than 2 seconds.   Neurological:      General: No focal deficit present.      Mental Status: He is alert and oriented to person, place, and time.   Psychiatric:         Mood and Affect: Mood normal.         Behavior: Behavior normal.         Labs:   Lab Results   Component Value Date    WBC 11.00 (H) 12/31/2024    HGB 12.0 12/31/2024    HCT 37.2 12/31/2024    MCV 83 12/31/2024     12/31/2024    EOSPCT 0 12/31/2024    EOSABS 0.00 12/31/2024    SEGSPCT 58 02/07/2019    MONOPCT 6 12/31/2024    MONOABS 0.79 02/07/2019    BANDSPCT 1 02/07/2019     Lab Results   Component Value Date    GLUCOSE 340 (H) 02/07/2019    CALCIUM 8.8 12/31/2024     (L) 12/11/2014    K 3.9 12/31/2024    CO2 27 12/31/2024     12/31/2024    BUN 15 12/31/2024    CREATININE 0.87 12/31/2024     No results found for: \"IGE\", \"RAST\"  Lab Results   Component Value Date    ALT 13 12/30/2024    AST 14 12/30/2024    ALKPHOS 98 12/30/2024    BILITOT 0.3 10/01/2014         Imaging and other studies: Results Review Statement: I reviewed radiology reports from this admission including: chest xray.  CXR 12/30/24  No acute cardiopulmonary disease.         CARRIE Gillis  [facility]        Portions of the record may have been created with voice recognition software.  Occasional wrong word or \"sound a like\" substitutions may have occurred due to the inherent limitations of voice recognition software.  Read the chart carefully and recognize, using context, where substitutions have occurred or contact the dictating provider.  "

## 2025-01-29 NOTE — ASSESSMENT & PLAN NOTE
Recent exacerbation noted due to COVID19 and he has completed oral steroid taper. Today he feels active symptoms once again and he has both wheezing and rhonchi on exam.  Continue Dulera twice daily  Albuterol HFA as needed  Continue Dupixent 300mg every 14 days  Repeat pred taper  Azithro 5 days  Continue frequent nebulizer with albuterol. He tends to have prostate irritation from ipratropium and I have advised sparing use if any.

## 2025-02-21 ENCOUNTER — NURSE TRIAGE (OUTPATIENT)
Age: 60
End: 2025-02-21

## 2025-02-21 DIAGNOSIS — J45.51 SEVERE PERSISTENT ASTHMA WITH ACUTE EXACERBATION: Primary | ICD-10-CM

## 2025-02-21 RX ORDER — PREDNISONE 10 MG/1
TABLET ORAL
Qty: 30 TABLET | Refills: 0 | Status: SHIPPED | OUTPATIENT
Start: 2025-02-21

## 2025-02-21 RX ORDER — DOXYCYCLINE 100 MG/1
100 TABLET ORAL 2 TIMES DAILY
Qty: 14 TABLET | Refills: 0 | Status: SHIPPED | OUTPATIENT
Start: 2025-02-21 | End: 2025-02-28

## 2025-02-21 NOTE — TELEPHONE ENCOUNTER
Called pt. Advised and instructed on prescriptions sent to pharmacy. Patient had no further questions and/or concerns at this time.

## 2025-02-21 NOTE — TELEPHONE ENCOUNTER
Attempted to contact patient unable to connect: If he calls back please let him know I have called in a prednisone taper and Augmentin

## 2025-02-21 NOTE — TELEPHONE ENCOUNTER
Pt stated Pulm Provider: Ro Servin    Actionable item: Recommendations    What is the reason for the call/chief complaint?    Pt calling for URI symptoms/worsening of asthma symptoms. He has a hx of asthma-maintained on Dulera. He is c/o experiencing asthma attacks 1-2 times per day for the past 2 days. Symptoms were precipitated by URI symptoms including nasal congestion, rhinorrhea, cough and fever. Cough is productive for green sputum. Did have fever last night, has not had one yet today. Peak flow this morning prior to treatment was 180, following treatment-around 300. Normal best is 400. He has been compliant with twice daily Dulera and has been using duoneb treatments every 4 hours since symptom onset. COVID negative. Recommended continued duoneb treatments every 4 hours, increased fluids, monitor temps. No sick visits available in [place] today. Will discuss with provider and update pt with further recommendations. He is aware to seek care in ER if he is getting worse.

## 2025-02-21 NOTE — TELEPHONE ENCOUNTER
"Reason for Disposition  • Coughing up peggy-colored or yellow-green sputum    Answer Assessment - Initial Assessment Questions  1. RESPIRATORY STATUS: \"Describe your breathing?\" (e.g., wheezing, shortness of breath, unable to speak, severe coughing)       Asthma attacks 1-2 times a day  2. ONSET: \"When did this asthma attack begin?\"       About 2 days ago  3. TRIGGER: \"What do you think triggered this attack?\" (e.g., URI, exposure to pollen or other allergen, tobacco smoke)       URI  4. PEAK EXPIRATORY FLOW RATE (PEFR): \"Do you use a peak flow meter?\" If Yes, ask: \"What's the current peak flow? What's your personal best peak flow?\"       Peak flow-180 after treatment 289. Normal best 400  5. SEVERITY: \"How bad is this attack?\"       Unspecified  6. ASTHMA MEDICINES:  \"What treatments have you tried?\"       Duonebs every 4 hours, Dulera  7. INHALED QUICK-RELIEF TREATMENTS FOR THIS ATTACK: \"What treatments have you given yourself so far?\" and \"How many and how often?\" If using an inhaler, ask, \"How many puffs?\" Note: Routine treatments are 2 puffs every 4 hours as needed. Rescue treatments are 4 puffs repeated every 20 minutes, up to three times as needed.       Albuterol MDI  8. OTHER SYMPTOMS: \"Do you have any other symptoms?\" (e.g., chest pain, coughing up yellow sputum, fever, runny nose)      Runny nose/congestion/Green in the AM    Protocols used: Asthma Attack-Adult-[place]    "

## 2025-03-11 DIAGNOSIS — J45.51 SEVERE PERSISTENT ASTHMA WITH ACUTE EXACERBATION: Primary | ICD-10-CM

## 2025-03-12 RX ORDER — DUPILUMAB 300 MG/2ML
INJECTION, SOLUTION SUBCUTANEOUS
Qty: 4 ML | Refills: 11 | Status: SHIPPED | OUTPATIENT
Start: 2025-03-12

## 2025-04-14 ENCOUNTER — TELEPHONE (OUTPATIENT)
Age: 60
End: 2025-04-14

## 2025-04-14 DIAGNOSIS — J45.51 SEVERE PERSISTENT ASTHMA WITH ACUTE EXACERBATION: Primary | ICD-10-CM

## 2025-04-14 RX ORDER — PREDNISONE 10 MG/1
TABLET ORAL
Qty: 40 TABLET | Refills: 0 | Status: SHIPPED | OUTPATIENT
Start: 2025-04-14 | End: 2025-04-30

## 2025-04-14 NOTE — TELEPHONE ENCOUNTER
Dr. Liao / SHERLEY Servin    Asthma Attack /Prednisone    Pt was in [place], had an asthma attack this past Saturday, 04/12/25. ER prescribed Prednisone; however his insurance does not cover medications in [place]. Pt just returned to [place] and requesting the Prednisone script be sent to his pharmacy.     Per pt Prednisone rx was:  Prednisone 40mg x4 days; decrease by 10mg every 4 days until done.    Pt requesting Prednisone taper be sent to [facility] as per EMR. Pt states he is using his nebulizer as ordered, but needs his steroid to avert further exacerbation.

## 2025-04-18 ENCOUNTER — APPOINTMENT (EMERGENCY)
Dept: RADIOLOGY | Facility: HOSPITAL | Age: 60
End: 2025-04-18
Payer: COMMERCIAL

## 2025-04-18 ENCOUNTER — HOSPITAL ENCOUNTER (EMERGENCY)
Facility: HOSPITAL | Age: 60
Discharge: HOME | End: 2025-04-18
Attending: EMERGENCY MEDICINE
Payer: COMMERCIAL

## 2025-04-18 VITALS
HEART RATE: 93 BPM | SYSTOLIC BLOOD PRESSURE: 158 MMHG | WEIGHT: 217.37 LBS | OXYGEN SATURATION: 96 % | DIASTOLIC BLOOD PRESSURE: 70 MMHG | BODY MASS INDEX: 32.57 KG/M2 | TEMPERATURE: 98 F | RESPIRATION RATE: 20 BRPM

## 2025-04-18 DIAGNOSIS — J45.901 EXACERBATION OF ASTHMA, UNSPECIFIED ASTHMA SEVERITY, UNSPECIFIED WHETHER PERSISTENT: Primary | ICD-10-CM

## 2025-04-18 LAB
ALBUMIN SERPL BCG-MCNC: 4 G/DL (ref 3.5–5)
ALP SERPL-CCNC: 71 U/L (ref 34–104)
ALT SERPL W P-5'-P-CCNC: 18 U/L (ref 7–52)
ANION GAP SERPL CALCULATED.3IONS-SCNC: 9 MMOL/L (ref 4–13)
AST SERPL W P-5'-P-CCNC: 11 U/L (ref 13–39)
ATRIAL RATE: 90 BPM
BASOPHILS # BLD AUTO: 0.04 THOUSANDS/ÂΜL (ref 0–0.1)
BASOPHILS NFR BLD AUTO: 1 % (ref 0–1)
BILIRUB SERPL-MCNC: 0.27 MG/DL (ref 0.2–1)
BNP SERPL-MCNC: 12 PG/ML (ref 0–100)
BUN SERPL-MCNC: 13 MG/DL (ref 5–25)
CALCIUM SERPL-MCNC: 8.8 MG/DL (ref 8.4–10.2)
CHLORIDE SERPL-SCNC: 102 MMOL/L (ref 96–108)
CO2 SERPL-SCNC: 26 MMOL/L (ref 21–32)
CREAT SERPL-MCNC: 0.98 MG/DL (ref 0.6–1.3)
EOSINOPHIL # BLD AUTO: 0.07 THOUSAND/ÂΜL (ref 0–0.61)
EOSINOPHIL NFR BLD AUTO: 1 % (ref 0–6)
ERYTHROCYTE [DISTWIDTH] IN BLOOD BY AUTOMATED COUNT: 13.4 % (ref 11.6–15.1)
GFR SERPL CREATININE-BSD FRML MDRD: 82 ML/MIN/1.73SQ M
GLUCOSE SERPL-MCNC: 358 MG/DL (ref 65–140)
HCT VFR BLD AUTO: 38.4 % (ref 36.5–49.3)
HGB BLD-MCNC: 12.3 G/DL (ref 12–17)
IMM GRANULOCYTES # BLD AUTO: 0.02 THOUSAND/UL (ref 0–0.2)
IMM GRANULOCYTES NFR BLD AUTO: 0 % (ref 0–2)
LYMPHOCYTES # BLD AUTO: 1.19 THOUSANDS/ÂΜL (ref 0.6–4.47)
LYMPHOCYTES NFR BLD AUTO: 23 % (ref 14–44)
MCH RBC QN AUTO: 26.4 PG (ref 26.8–34.3)
MCHC RBC AUTO-ENTMCNC: 32 G/DL (ref 31.4–37.4)
MCV RBC AUTO: 82 FL (ref 82–98)
MONOCYTES # BLD AUTO: 0.3 THOUSAND/ÂΜL (ref 0.17–1.22)
MONOCYTES NFR BLD AUTO: 6 % (ref 4–12)
NEUTROPHILS # BLD AUTO: 3.58 THOUSANDS/ÂΜL (ref 1.85–7.62)
NEUTS SEG NFR BLD AUTO: 69 % (ref 43–75)
NRBC BLD AUTO-RTO: 0 /100 WBCS
P AXIS: 55 DEGREES
PLATELET # BLD AUTO: 268 THOUSANDS/UL (ref 149–390)
PMV BLD AUTO: 9.1 FL (ref 8.9–12.7)
POTASSIUM SERPL-SCNC: 3.4 MMOL/L (ref 3.5–5.3)
PR INTERVAL: 162 MS
PROT SERPL-MCNC: 6.1 G/DL (ref 6.4–8.4)
QRS AXIS: 2 DEGREES
QRSD INTERVAL: 86 MS
QT INTERVAL: 354 MS
QTC INTERVAL: 434 MS
RBC # BLD AUTO: 4.66 MILLION/UL (ref 3.88–5.62)
SODIUM SERPL-SCNC: 137 MMOL/L (ref 135–147)
T WAVE AXIS: 23 DEGREES
VENTRICULAR RATE: 90 BPM
WBC # BLD AUTO: 5.2 THOUSAND/UL (ref 4.31–10.16)

## 2025-04-18 PROCEDURE — 93010 ELECTROCARDIOGRAM REPORT: CPT | Performed by: INTERNAL MEDICINE

## 2025-04-18 PROCEDURE — 94640 AIRWAY INHALATION TREATMENT: CPT

## 2025-04-18 PROCEDURE — 85025 COMPLETE CBC W/AUTO DIFF WBC: CPT

## 2025-04-18 PROCEDURE — 83880 ASSAY OF NATRIURETIC PEPTIDE: CPT

## 2025-04-18 PROCEDURE — 80053 COMPREHEN METABOLIC PANEL: CPT

## 2025-04-18 PROCEDURE — 93005 ELECTROCARDIOGRAM TRACING: CPT

## 2025-04-18 PROCEDURE — 94644 CONT INHLJ TX 1ST HOUR: CPT

## 2025-04-18 PROCEDURE — 71045 X-RAY EXAM CHEST 1 VIEW: CPT

## 2025-04-18 PROCEDURE — 96365 THER/PROPH/DIAG IV INF INIT: CPT

## 2025-04-18 PROCEDURE — 99285 EMERGENCY DEPT VISIT HI MDM: CPT

## 2025-04-18 PROCEDURE — 94664 DEMO&/EVAL PT USE INHALER: CPT

## 2025-04-18 PROCEDURE — 36415 COLL VENOUS BLD VENIPUNCTURE: CPT

## 2025-04-18 PROCEDURE — 94760 N-INVAS EAR/PLS OXIMETRY 1: CPT

## 2025-04-18 RX ORDER — ALBUTEROL SULFATE 5 MG/ML
10 SOLUTION RESPIRATORY (INHALATION) ONCE
Status: COMPLETED | OUTPATIENT
Start: 2025-04-18 | End: 2025-04-18

## 2025-04-18 RX ORDER — MAGNESIUM SULFATE HEPTAHYDRATE 40 MG/ML
2 INJECTION, SOLUTION INTRAVENOUS ONCE
Status: COMPLETED | OUTPATIENT
Start: 2025-04-18 | End: 2025-04-18

## 2025-04-18 RX ORDER — SODIUM CHLORIDE FOR INHALATION 0.9 %
12 VIAL, NEBULIZER (ML) INHALATION ONCE
Status: COMPLETED | OUTPATIENT
Start: 2025-04-18 | End: 2025-04-18

## 2025-04-18 RX ORDER — ALBUTEROL SULFATE 0.83 MG/ML
SOLUTION RESPIRATORY (INHALATION)
Status: DISCONTINUED
Start: 2025-04-18 | End: 2025-04-18 | Stop reason: HOSPADM

## 2025-04-18 RX ORDER — IPRATROPIUM BROMIDE AND ALBUTEROL SULFATE 2.5; .5 MG/3ML; MG/3ML
SOLUTION RESPIRATORY (INHALATION)
Status: COMPLETED
Start: 2025-04-18 | End: 2025-04-18

## 2025-04-18 RX ORDER — IPRATROPIUM BROMIDE AND ALBUTEROL SULFATE .5; 3 MG/3ML; MG/3ML
1 SOLUTION RESPIRATORY (INHALATION) ONCE
Status: COMPLETED | OUTPATIENT
Start: 2025-04-18 | End: 2025-04-18

## 2025-04-18 RX ADMIN — ALBUTEROL SULFATE 10 MG: 2.5 SOLUTION RESPIRATORY (INHALATION) at 12:15

## 2025-04-18 RX ADMIN — MAGNESIUM SULFATE HEPTAHYDRATE 2 G: 40 INJECTION, SOLUTION INTRAVENOUS at 12:10

## 2025-04-18 RX ADMIN — Medication 12 ML: at 12:15

## 2025-04-18 RX ADMIN — IPRATROPIUM BROMIDE 1 MG: 0.5 SOLUTION RESPIRATORY (INHALATION) at 12:14

## 2025-04-18 NOTE — ED PROVIDER NOTES
Time reflects when diagnosis was documented in both MDM as applicable and the Disposition within this note       Time User Action Codes Description Comment    4/18/2025  1:16 PM Luis Daniel Olivera Add [J45.901] Exacerbation of asthma, unspecified asthma severity, unspecified whether persistent           ED Disposition       ED Disposition   Discharge    Condition   Stable    Date/Time   Fri Apr 18, 2025  1:16 PM    Comment   Moises Ordaz discharge to home/self care.                   Assessment & Plan       Medical Decision Making  Patient is a 60-year-old male coming in for evaluation of asthma exacerbation.  Is in no acute distress at this time.  Received a heart treatment here, given IV mag.  Patient is already on steroids at this time.  No pneumonia seen on chest x-ray, as read by myself.  Patient is no acute distress and was discharged home.  EKG shows no sign of acute ST elevation or depression.  Patient is agreeable with plan    Amount and/or Complexity of Data Reviewed  Labs: ordered. Decision-making details documented in ED Course.  Radiology: ordered and independent interpretation performed.    Risk  Prescription drug management.        ED Course as of 04/18/25 1453   Fri Apr 18, 2025   1316 ANION GAP: 9       Medications   ipratropium (ATROVENT) 0.02 % inhalation solution **ADS Override Pull** (has no administration in time range)   albuterol (2.5 mg/3 mL) 0.083 % inhalation solution **ADS Override Pull** (has no administration in time range)   ipratropium-albuterol (FOR EMS ONLY) (DUO-NEB) 0.5-2.5 mg/3 mL inhalation solution 3 mL (0 mL Does not apply Given to EMS 4/18/25 1149)   albuterol inhalation solution 10 mg (10 mg Nebulization Given 4/18/25 1215)   ipratropium (ATROVENT) 0.02 % inhalation solution 1 mg (1 mg Nebulization Given 4/18/25 1214)   sodium chloride 0.9 % inhalation solution 12 mL (12 mL Nebulization Given 4/18/25 1215)   magnesium sulfate 2 g/50 mL IVPB (premix) 2 g (0 g Intravenous  Stopped 25 1230)       ED Risk Strat Scores                    No data recorded        SBIRT 22yo+      Flowsheet Row Most Recent Value   Initial Alcohol Screen: US AUDIT-C     1. How often do you have a drink containing alcohol? 0 Filed at: 2025 1148   2. How many drinks containing alcohol do you have on a typical day you are drinking?  0 Filed at: 2025 1148   3a. Male UNDER 65: How often do you have five or more drinks on one occasion? 0 Filed at: 2025 1148   3b. FEMALE Any Age, or MALE 65+: How often do you have 4 or more drinks on one occassion? 0 Filed at: 2025 1148   Audit-C Score 0 Filed at: 2025 1148   ROXY: How many times in the past year have you...    Used an illegal drug or used a prescription medication for non-medical reasons? Never Filed at: 2025 1148                            History of Present Illness       Chief Complaint   Patient presents with    Asthma     Patient says he has been on a Prednisone taper for asthma, for the last 3 days he has felt tightness despite using duo-nebs at home       Past Medical History:   Diagnosis Date    Asthma     COPD (chronic obstructive pulmonary disease) (HCC)     Enlarged prostate     Glaucoma     Hyperlipidemia     Overdose of heroin, accidental or unintentional, sequela     Psychiatric disorder     Seasonal allergic rhinitis     Seizures (HCC)       Past Surgical History:   Procedure Laterality Date    CIRCUMCISION      KNEE SURGERY      WRIST SURGERY Left       Family History   Problem Relation Age of Onset    Arthritis Mother       Social History     Tobacco Use    Smoking status: Former     Current packs/day: 0.00     Average packs/day: 2.0 packs/day for 27.0 years (54.0 ttl pk-yrs)     Types: Cigarettes     Start date:      Quit date:      Years since quittin.3    Smokeless tobacco: Never   Vaping Use    Vaping status: Never Used   Substance Use Topics    Alcohol use: Not Currently    Drug use: Not  Currently     Types: Heroin, Fentanyl     Comment: OD 7/8/22 heroin      E-Cigarette/Vaping    E-Cigarette Use Never User       E-Cigarette/Vaping Substances    Nicotine No     THC No     CBD No     Flavoring No     Other No     Unknown No       I have reviewed and agree with the history as documented.     Patient is a 60 year old male coming in for reports of SOB.  Does have an extensive medical history of asthma exacerbations, to include needing to be intubated in the past for it.  Reports his last intubation for asthma, was approximately 2 years ago.  Reports that he recently was in [place], was having shortness of breath, went to the hospital, where they start him on a prednisone taper.  Reports he was not able to  the prednisone due to insurance issues, and to he got back to the [place].  Is currently on day 4 of the prednisone taper.  Has been taking his nebulizer without significant relief.  Patient was having worsening shortness of breath this morning, minimally relieved with nebulizer, so called EMS, who gave him a nebulizer on the way over to the emergency department.  At this time patient is speaking in full sentences, denies chest pain, fever      Asthma  Associated symptoms: shortness of breath and wheezing    Associated symptoms: no abdominal pain, no chest pain, no fatigue, no fever, no nausea and no vomiting        Review of Systems   Constitutional:  Negative for chills, fatigue and fever.   Respiratory:  Positive for chest tightness, shortness of breath and wheezing.    Cardiovascular:  Negative for chest pain and palpitations.   Gastrointestinal:  Negative for abdominal pain, nausea and vomiting.           Objective       ED Triage Vitals [04/18/25 1146]   Temperature Pulse Blood Pressure Respirations SpO2 Patient Position - Orthostatic VS   98 °F (36.7 °C) 85 158/70 18 95 % Sitting      Temp Source Heart Rate Source BP Location FiO2 (%) Pain Score    Oral Monitor Left arm -- No Pain       Vitals      Date and Time Temp Pulse SpO2 Resp BP Pain Score FACES Pain Rating User   04/18/25 1325 -- 93 96 % 20 -- -- -- TB   04/18/25 1215 -- -- 94 % -- -- -- -- BJD   04/18/25 1146 98 °F (36.7 °C) 85 95 % 18 158/70 No Pain -- TW            Physical Exam  Vitals reviewed.   Constitutional:       Appearance: Normal appearance. He is normal weight.   HENT:      Head: Normocephalic and atraumatic.      Right Ear: External ear normal.      Left Ear: External ear normal.      Nose: Nose normal.   Eyes:      Conjunctiva/sclera: Conjunctivae normal.   Cardiovascular:      Rate and Rhythm: Normal rate.   Pulmonary:      Effort: Pulmonary effort is normal.   Musculoskeletal:         General: Normal range of motion.      Cervical back: Normal range of motion.   Skin:     General: Skin is warm and dry.   Neurological:      Mental Status: He is alert.         Results Reviewed       Procedure Component Value Units Date/Time    B-Type Natriuretic Peptide(BNP) [[id number]]  (Normal) Collected: 04/18/25 1204    Lab Status: Final result Specimen: Blood from Arm, Right Updated: 04/18/25 1238     BNP 12 pg/mL     Comprehensive metabolic panel [[id number]]  (Abnormal) Collected: 04/18/25 1204    Lab Status: Final result Specimen: Blood from Arm, Right Updated: 04/18/25 1230     Sodium 137 mmol/L      Potassium 3.4 mmol/L      Chloride 102 mmol/L      CO2 26 mmol/L      ANION GAP 9 mmol/L      BUN 13 mg/dL      Creatinine 0.98 mg/dL      Glucose 358 mg/dL      Calcium 8.8 mg/dL      AST 11 U/L      ALT 18 U/L      Alkaline Phosphatase 71 U/L      Total Protein 6.1 g/dL      Albumin 4.0 g/dL      Total Bilirubin 0.27 mg/dL      eGFR 82 ml/min/1.73sq m     Narrative:      National Kid[employer] guidelines for Chronic Kidney Disease (CKD):     Stage 1 with normal or high GFR (GFR > 90 mL/min/1.73 square meters)    Stage 2 Mild CKD (GFR = 60-89 mL/min/1.73 square meters)    Stage 3A Moderate CKD (GFR = 45-59 mL/min/1.73  square meters)    Stage 3B Moderate CKD (GFR = 30-44 mL/min/1.73 square meters)    Stage 4 Severe CKD (GFR = 15-29 mL/min/1.73 square meters)    Stage 5 End Stage CKD (GFR <15 mL/min/1.73 square meters)  Note: GFR calculation is accurate only with a steady state creatinine    CBC and differential [[id number]]  (Abnormal) Collected: 04/18/25 1204    Lab Status: Final result Specimen: Blood from Arm, Right Updated: 04/18/25 1211     WBC 5.20 Thousand/uL      RBC 4.66 Million/uL      Hemoglobin 12.3 g/dL      Hematocrit 38.4 %      MCV 82 fL      MCH 26.4 pg      MCHC 32.0 g/dL      RDW 13.4 %      MPV 9.1 fL      Platelets 268 Thousands/uL      nRBC 0 /100 WBCs      Segmented % 69 %      Immature Grans % 0 %      Lymphocytes % 23 %      Monocytes % 6 %      Eosinophils Relative 1 %      Basophils Relative 1 %      Absolute Neutrophils 3.58 Thousands/µL      Absolute Immature Grans 0.02 Thousand/uL      Absolute Lymphocytes 1.19 Thousands/µL      Absolute Monocytes 0.30 Thousand/µL      Eosinophils Absolute 0.07 Thousand/µL      Basophils Absolute 0.04 Thousands/µL             XR chest 1 view portable   ED Interpretation by Luis Daniel Olivera PA-C (04/18 1222)   No acute consolidation.  Potential viral-like pattern          Procedures    ED Medication and Procedure Management   Prior to Admission Medications   Prescriptions Last Dose Informant Patient Reported? Taking?   Blood Glucose Monitoring Suppl (ACURA BLOOD GLUCOSE METER) w/Device KIT  Self Yes No   Sig: by Does not apply route   Calcium Carb-Cholecalciferol (CALCIUM CARBONATE-VITAMIN D3 PO)  Self Yes No   Sig: Take 1,500 mg by mouth daily   Cinnamon 500 MG capsule  Self Yes No   Sig: Take 500 mg by mouth daily   Diclofenac Sodium (VOLTAREN) 1 %   No No   Sig: Apply 2 g topically 4 (four) times a day   Dulera 200-5 MCG/ACT inhaler   Yes No   Sig: Inhale 2 puffs 2 (two) times a day   Dupilumab (Dupixent) 300 MG/2ML SOAJ   No No   Sig: Inject 1 pen (300mg total)  "under the skin every 14 (fourteen) days.   EPINEPHrine (EPIPEN) 0.3 mg/0.3 mL SOAJ   No No   Sig: Inject 0.3 mL (0.3 mg total) into a muscle once for 1 dose   FIBER PO  Self Yes No   Sig: Take by mouth   Insulin Pen Needle 32G X 4 MM MISC  Self Yes No   Sig: Unifine Pentips Plus 32 gauge x 5/32\" needle   TAMSULOSIN HCL PO  Self Yes No   Sig: Take 0.4 mg by mouth daily    VITAMIN D PO  Self Yes No   Sig: Take by mouth   acetaminophen (TYLENOL) 650 mg CR tablet   No No   Sig: Take 1 tablet (650 mg total) by mouth every 8 (eight) hours as needed for mild pain   albuterol (2.5 mg/3 mL) 0.083 % nebulizer solution   No No   Sig: Take 3 mL (2.5 mg total) by nebulization every 4 (four) hours as needed for wheezing or shortness of breath   albuterol (Proventil HFA) 90 mcg/act inhaler   No No   Sig: Inhale 1-2 puffs every 6 (six) hours as needed for wheezing or shortness of breath   amLODIPine (NORVASC) 5 mg tablet  Self Yes No   Sig: Take 5 mg by mouth daily   atorvastatin (LIPITOR) 80 mg tablet  Self Yes No   Sig: Take 80 mg by mouth daily at bedtime   carBAMazepine (TEGretol) 200 mg tablet  Self Yes No   Sig: Take 2 tabs in the morning and 3 tabs at night   dupilumab (DUPIXENT) subcutaneous injection   No No   Sig: Inject 2 mL (300 mg total) under the skin every 14 (fourteen) days   fexofenadine (ALLEGRA) 60 MG tablet   No No   Sig: Take 1 tablet (60 mg total) by mouth daily   finasteride (PROSCAR) 5 mg tablet  Self Yes No   Sig: Take 5 mg by mouth daily   fluticasone (FLONASE) 50 mcg/act nasal spray   No No   Si spray into each nostril daily   fluticasone-vilanterol (Breo Ellipta) 200-25 mcg/actuation inhaler   No No   Sig: Inhale 1 puff daily Rinse mouth after use.   gabapentin (NEURONTIN) 300 mg capsule  Self Yes No   Sig: Take 300 mg by mouth 3 (three) times a day   glucagon 1 MG injection  Self Yes No   Sig: as needed   Patient not taking: Reported on 2025   hydrOXYzine pamoate (VISTARIL) 50 mg capsule   Yes " No   Sig: Take 50 mg by mouth 4 (four) times a day as needed   insulin aspart (NovoLOG) 100 units/mL injection  Self Yes No   Sig: Inject under the skin 3 (three) times a day before meals   insulin glargine (BASAGLAR KWIKPEN) 100 units/mL injection pen  Self Yes No   Sig: Inject 43 Units under the skin daily at bedtime    ipratropium (ATROVENT) 0.02 % nebulizer solution  Self No No   Sig: Take 2.5 mL (0.5 mg total) by nebulization 4 (four) times a day   mirtazapine (REMERON) 7.5 MG tablet   Yes No   Sig: Take 7.5 mg by mouth daily at bedtime   montelukast (SINGULAIR) 10 mg tablet   Yes No   Sig: Take 1 tablet by mouth in the morning   naloxone (NARCAN) 4 mg/0.1 mL nasal spray  Self No No   Sig: Administer 1 spray into a nostril. If no response after 2-3 minutes, give another dose in the other nostril using a new spray.   omega-3-acid ethyl esters (LOVAZA) 1 g capsule  Self Yes No   Sig: Take 1 g by mouth daily   omeprazole (PriLOSEC) 40 MG capsule  Self Yes No   Sig: Take 40 mg by mouth daily   perphenazine 2 mg tablet  Self Yes No   Sig: Take 2 mg by mouth daily   polyvinyl alcohol (LIQUIFILM TEARS) 1.4 % ophthalmic solution  Self Yes No   Si drops as needed for dry eyes   predniSONE 10 mg tablet   No No   Si tabs x 3 days decrease by 1 tablet every third day   predniSONE 10 mg tablet   No No   Sig: Take 4 tablets (40 mg total) by mouth daily for 4 days, THEN 3 tablets (30 mg total) daily for 4 days, THEN 2 tablets (20 mg total) daily for 4 days, THEN 1 tablet (10 mg total) daily for 4 days.   predniSONE 50 mg tablet  Self Yes No   Sig: Take 40 mg by mouth daily      Facility-Administered Medications: None     Discharge Medication List as of 2025  1:16 PM        CONTINUE these medications which have NOT CHANGED    Details   acetaminophen (TYLENOL) 650 mg CR tablet Take 1 tablet (650 mg total) by mouth every 8 (eight) hours as needed for mild pain, Starting Wed 2024, Normal      albuterol (2.5 mg/3  mL) 0.083 % nebulizer solution Take 3 mL (2.5 mg total) by nebulization every 4 (four) hours as needed for wheezing or shortness of breath, Starting Wed 1/29/2025, Normal      albuterol (Proventil HFA) 90 mcg/act inhaler Inhale 1-2 puffs every 6 (six) hours as needed for wheezing or shortness of breath, Starting Sun 12/29/2024, Normal      amLODIPine (NORVASC) 5 mg tablet Take 5 mg by mouth daily, Historical Med      atorvastatin (LIPITOR) 80 mg tablet Take 80 mg by mouth daily at bedtime, Starting Thu 9/8/2016, Historical Med      Blood Glucose Monitoring Suppl (ACURA BLOOD GLUCOSE METER) w/Device KIT by Does not apply route, Historical Med      Calcium Carb-Cholecalciferol (CALCIUM CARBONATE-VITAMIN D3 PO) Take 1,500 mg by mouth daily, Starting Fri 9/20/2013, Historical Med      carBAMazepine (TEGretol) 200 mg tablet Take 2 tabs in the morning and 3 tabs at night, Historical Med      Cinnamon 500 MG capsule Take 500 mg by mouth daily, Historical Med      Diclofenac Sodium (VOLTAREN) 1 % Apply 2 g topically 4 (four) times a day, Starting Wed 2/7/2024, Normal      Dulera 200-5 MCG/ACT inhaler Inhale 2 puffs 2 (two) times a day, Starting Fri 1/3/2025, Historical Med      Dupilumab (Dupixent) 300 MG/2ML SOAJ Inject 1 pen (300mg total) under the skin every 14 (fourteen) days., Normal      dupilumab (DUPIXENT) subcutaneous injection Inject 2 mL (300 mg total) under the skin every 14 (fourteen) days, Starting Wed 3/20/2024, Print      EPINEPHrine (EPIPEN) 0.3 mg/0.3 mL SOAJ Inject 0.3 mL (0.3 mg total) into a muscle once for 1 dose, Starting Wed 3/20/2024, Normal      fexofenadine (ALLEGRA) 60 MG tablet Take 1 tablet (60 mg total) by mouth daily, Starting Wed 3/20/2024, Normal      FIBER PO Take by mouth, Historical Med      finasteride (PROSCAR) 5 mg tablet Take 5 mg by mouth daily, Starting Tue 4/26/2016, Historical Med      fluticasone (FLONASE) 50 mcg/act nasal spray 1 spray into each nostril daily, Starting Wed  "3/20/2024, Normal      fluticasone-vilanterol (Breo Ellipta) 200-25 mcg/actuation inhaler Inhale 1 puff daily Rinse mouth after use., Starting Wed 3/20/2024, Until Mon 9/16/2024, Normal      gabapentin (NEURONTIN) 300 mg capsule Take 300 mg by mouth 3 (three) times a day, Starting Thu 9/8/2016, Historical Med      glucagon 1 MG injection as needed, Starting Fri 8/5/2016, Historical Med      hydrOXYzine pamoate (VISTARIL) 50 mg capsule Take 50 mg by mouth 4 (four) times a day as needed, Starting Thu 1/16/2025, Historical Med      insulin aspart (NovoLOG) 100 units/mL injection Inject under the skin 3 (three) times a day before meals, Historical Med      insulin glargine (BASAGLAR KWIKPEN) 100 units/mL injection pen Inject 43 Units under the skin daily at bedtime , Historical Med      Insulin Pen Needle 32G X 4 MM MISC Unifine Pentips Plus 32 gauge x 5/32\" needle, Historical Med      ipratropium (ATROVENT) 0.02 % nebulizer solution Take 2.5 mL (0.5 mg total) by nebulization 4 (four) times a day, Starting Mon 3/28/2022, Normal      mirtazapine (REMERON) 7.5 MG tablet Take 7.5 mg by mouth daily at bedtime, Historical Med      montelukast (SINGULAIR) 10 mg tablet Take 1 tablet by mouth in the morning, Starting Fri 1/17/2025, Historical Med      naloxone (NARCAN) 4 mg/0.1 mL nasal spray Administer 1 spray into a nostril. If no response after 2-3 minutes, give another dose in the other nostril using a new spray., Normal      omega-3-acid ethyl esters (LOVAZA) 1 g capsule Take 1 g by mouth daily, Historical Med      omeprazole (PriLOSEC) 40 MG capsule Take 40 mg by mouth daily, Starting Tue 4/26/2016, Historical Med      perphenazine 2 mg tablet Take 2 mg by mouth daily, Historical Med      polyvinyl alcohol (LIQUIFILM TEARS) 1.4 % ophthalmic solution 2 drops as needed for dry eyes, Historical Med      !! predniSONE 10 mg tablet 4 tabs x 3 days decrease by 1 tablet every third day, Normal      !! predniSONE 10 mg tablet " Multiple Dosages:Starting Mon 4/14/2025, Until Thu 4/17/2025 at 2359, THEN Starting Fri 4/18/2025, Until Mon 4/21/2025 at 2359, THEN Starting Tue 4/22/2025, Until Fri 4/25/2025 at 2359, THEN Starting Sat 4/26/2025, Until Tue 4/29/2025 at 2359Take 4  tablets (40 mg total) by mouth daily for 4 days, THEN 3 tablets (30 mg total) daily for 4 days, THEN 2 tablets (20 mg total) daily for 4 days, THEN 1 tablet (10 mg total) daily for 4 days., Normal      !! predniSONE 50 mg tablet Take 40 mg by mouth daily, Historical Med      TAMSULOSIN HCL PO Take 0.4 mg by mouth daily , Historical Med      VITAMIN D PO Take by mouth, Historical Med       !! - Potential duplicate medications found. Please discuss with provider.        No discharge procedures on file.  ED SEPSIS DOCUMENTATION   Time reflects when diagnosis was documented in both MDM as applicable and the Disposition within this note       Time User Action Codes Description Comment    4/18/2025  1:16 PM Luis Daniel Olivera Add [J45.901] Exacerbation of asthma, unspecified asthma severity, unspecified whether persistent                  Luis Daniel Olivera PA-C  04/18/25 8568

## 2025-04-18 NOTE — Clinical Note
Moises Ordaz was seen and treated in our emergency department on 4/18/2025.    No restrictions            Diagnosis:     Moises  .    He may return on this date: 04/21/2025         If you have any questions or concerns, please don't hesitate to call.      Luis Daniel Olivera PA-C    ______________________________           _______________          _______________  Hospital Representative                              Date                                Time

## 2025-04-24 ENCOUNTER — TELEPHONE (OUTPATIENT)
Age: 60
End: 2025-04-24

## 2025-04-24 NOTE — TELEPHONE ENCOUNTER
Dupixent Approved  4/24/25-4/24/26  [facility]    Called and LVM for patient making him aware.

## 2025-04-24 NOTE — TELEPHONE ENCOUNTER
Patient called and requested prior authorization for Dupixent medication. Patient requested a call back to discuss. Please advise.

## 2025-05-02 ENCOUNTER — TELEPHONE (OUTPATIENT)
Age: 60
End: 2025-05-02

## 2025-05-06 ENCOUNTER — OFFICE VISIT (OUTPATIENT)
Dept: PULMONOLOGY | Facility: CLINIC | Age: 60
End: 2025-05-06
Payer: COMMERCIAL

## 2025-05-06 ENCOUNTER — TELEPHONE (OUTPATIENT)
Age: 60
End: 2025-05-06

## 2025-05-06 VITALS
WEIGHT: 222 LBS | BODY MASS INDEX: 32.88 KG/M2 | TEMPERATURE: 98.1 F | DIASTOLIC BLOOD PRESSURE: 78 MMHG | HEIGHT: 69 IN | OXYGEN SATURATION: 98 % | SYSTOLIC BLOOD PRESSURE: 130 MMHG | HEART RATE: 87 BPM

## 2025-05-06 DIAGNOSIS — T78.40XA ALLERGIC REACTION, INITIAL ENCOUNTER: ICD-10-CM

## 2025-05-06 DIAGNOSIS — J45.901 ASTHMA EXACERBATION: ICD-10-CM

## 2025-05-06 DIAGNOSIS — G47.33 OSA (OBSTRUCTIVE SLEEP APNEA): ICD-10-CM

## 2025-05-06 DIAGNOSIS — R09.82 ALLERGIC RHINITIS WITH POSTNASAL DRIP: ICD-10-CM

## 2025-05-06 DIAGNOSIS — R09.2 RESPIRATORY ARREST (HCC): ICD-10-CM

## 2025-05-06 DIAGNOSIS — J45.51 SEVERE PERSISTENT ASTHMA WITH ACUTE EXACERBATION: Primary | ICD-10-CM

## 2025-05-06 DIAGNOSIS — R07.9 CHEST PAIN, UNSPECIFIED TYPE: ICD-10-CM

## 2025-05-06 DIAGNOSIS — J30.9 ALLERGIC RHINITIS WITH POSTNASAL DRIP: ICD-10-CM

## 2025-05-06 DIAGNOSIS — K21.9 GASTROESOPHAGEAL REFLUX DISEASE WITHOUT ESOPHAGITIS: ICD-10-CM

## 2025-05-06 PROCEDURE — 95012 NITRIC OXIDE EXP GAS DETER: CPT | Performed by: INTERNAL MEDICINE

## 2025-05-06 PROCEDURE — 99215 OFFICE O/P EST HI 40 MIN: CPT | Performed by: INTERNAL MEDICINE

## 2025-05-06 RX ORDER — ALBUTEROL SULFATE 90 UG/1
1-2 INHALANT RESPIRATORY (INHALATION) EVERY 6 HOURS PRN
Qty: 8 G | Refills: 5 | Status: SHIPPED | OUTPATIENT
Start: 2025-05-06

## 2025-05-06 RX ORDER — PREDNISONE 20 MG/1
40 TABLET ORAL DAILY
Qty: 10 TABLET | Refills: 0 | Status: SHIPPED | OUTPATIENT
Start: 2025-05-06

## 2025-05-06 RX ORDER — FLUTICASONE PROPIONATE 220 UG/1
2 AEROSOL, METERED RESPIRATORY (INHALATION) 2 TIMES DAILY
Qty: 36 G | Refills: 1 | Status: SHIPPED | OUTPATIENT
Start: 2025-05-06 | End: 2025-05-08

## 2025-05-06 RX ORDER — GUAIFENESIN 600 MG/1
1200 TABLET, EXTENDED RELEASE ORAL 2 TIMES DAILY
COMMUNITY

## 2025-05-06 RX ORDER — IPRATROPIUM BROMIDE AND ALBUTEROL SULFATE 2.5; .5 MG/3ML; MG/3ML
3 SOLUTION RESPIRATORY (INHALATION) 2 TIMES DAILY
COMMUNITY
Start: 2025-04-26

## 2025-05-06 RX ORDER — EPINEPHRINE 0.3 MG/.3ML
0.3 INJECTION SUBCUTANEOUS ONCE
Qty: 0.6 ML | Refills: 0 | Status: SHIPPED | OUTPATIENT
Start: 2025-05-06 | End: 2025-05-06

## 2025-05-06 RX ORDER — EZETIMIBE 10 MG/1
10 TABLET ORAL DAILY
COMMUNITY

## 2025-05-06 RX ORDER — FEXOFENADINE HCL 180 MG/1
180 TABLET ORAL DAILY
Qty: 90 TABLET | Refills: 1 | Status: SHIPPED | OUTPATIENT
Start: 2025-05-06

## 2025-05-06 RX ORDER — MONTELUKAST SODIUM 10 MG/1
10 TABLET ORAL DAILY
Qty: 90 TABLET | Refills: 1 | Status: SHIPPED | OUTPATIENT
Start: 2025-05-06

## 2025-05-06 NOTE — ASSESSMENT & PLAN NOTE
Continue Allegra but will increase to 180 mg daily and will restart Singulair hopefully this will help his asthma management as well especially if there is allergic component.  Continue Flonase.

## 2025-05-06 NOTE — ASSESSMENT & PLAN NOTE
He is doing fine currently with excellent lung auscultation but he has symptoms.    Will continue Dulera and as needed albuterol.  Continue Dupixent q. 14 days, if she continues to have symptoms over the next 6 months then probably to consider changing his Biologics  Carry EpiPen  Continue DuoNebs as needed, could not tolerate LAMA before as described in HPI  Management of GERD and allergic rhinitis as below  We will follow on PFT ordered by cardiology, will be done next month at [facility] but doubt it will change his management since he is maximized on therapy  And also given the fact FeNO is mildly elevated I decided to add more inhaled corticosteroid so I ordered Flovent twice daily to use with the Dulera.  Will repeat FeNO in 3 months and decide whether to keep Flovent or use it as needed in the future.  Prescribed prednisone to have available and I asked the patient to take 1 or 2 pills of the 20 mg for up to 5 days days

## 2025-05-06 NOTE — PROGRESS NOTES
Progress note - Pulmonary Medicine   Moises Ordaz 60 y.o. male MRN: [id number]       Impression & Plan:     Severe persistent asthma with acute exacerbation  He is doing fine currently with excellent lung auscultation but he has symptoms.    Will continue Dulera and as needed albuterol.  Continue Dupixent q. 14 days, if she continues to have symptoms over the next 6 months then probably to consider changing his Biologics  Carry EpiPen  Continue DuoNebs as needed, could not tolerate LAMA before as described in HPI  Management of GERD and allergic rhinitis as below  We will follow on PFT ordered by cardiology, will be done next month at [facility] but doubt it will change his management since he is maximized on therapy  And also given the fact FeNO is mildly elevated I decided to add more inhaled corticosteroid so I ordered Flovent twice daily to use with the Dulera.  Will repeat FeNO in 3 months and decide whether to keep Flovent or use it as needed in the future.  Prescribed prednisone to have available and I asked the patient to take 1 or 2 pills of the 20 mg for up to 5 days days    Allergic rhinitis with postnasal drip  Continue Allegra but will increase to 180 mg daily and will restart Singulair hopefully this will help his asthma management as well especially if there is allergic component.  Continue Flonase.    ARPITA (obstructive sleep apnea)  I tried to reassure the patient that the new machine is fine to use but he declined and he will speak to his sleep doctor later this month    Respiratory arrest (HCC)  Prescribed new EpiPen    GERD (gastroesophageal reflux disease)  Currently controlled, continue PPI    Chest pain  Atypical, currently no chest pain, continue to follow with cardiology      Return in about 3 months (around 8/6/2025).    Diagnoses and all orders for this visit:    Severe persistent asthma with acute exacerbation  -     POCT FeNO  -     predniSONE 20 mg tablet; Take 2 tablets (40 mg total) by  mouth daily  -     fluticasone (Flovent HFA) 220 mcg/act inhaler; Inhale 2 puffs 2 (two) times a day Rinse mouth after use.    Asthma exacerbation  -     albuterol (Proventil HFA) 90 mcg/act inhaler; Inhale 1-2 puffs every 6 (six) hours as needed for wheezing or shortness of breath    Allergic reaction, initial encounter  -     EPINEPHrine (EPIPEN) 0.3 mg/0.3 mL SOAJ; Inject 0.3 mL (0.3 mg total) into a muscle once for 1 dose    ARPITA (obstructive sleep apnea)    Allergic rhinitis with postnasal drip  -     montelukast (SINGULAIR) 10 mg tablet; Take 1 tablet (10 mg total) by mouth in the morning  -     fexofenadine (ALLEGRA) 180 MG tablet; Take 1 tablet (180 mg total) by mouth daily    Gastroesophageal reflux disease without esophagitis    Respiratory arrest (HCC)    Chest pain, unspecified type    Other orders  -     Cholecalciferol (Vitamin D3) 125 MCG (5000 UT) CAPS; Take 1 capsule by mouth in the morning  -     ezetimibe (ZETIA) 10 mg tablet; Take 10 mg by mouth daily  -     guaiFENesin (MUCINEX) 600 mg 12 hr tablet; Take 1,200 mg by mouth 2 (two) times a day  -     ipratropium-albuterol (DUO-NEB) 0.5-2.5 mg/3 mL nebulizer solution; Take 3 mL by nebulization 2 (two) times a day      ______________________________________________________________________    HPI:    Moises Ordaz presents today for follow-up of severe persistent asthma.    Patient has severe persistent asthma with remote history of respiratory arrest, his asthma became well-controlled since we started Dupixent that has been going for 3 years.  He is currently on Dulera 200 and as needed albuterol.  He uses DuoNebs as needed.  He reports that few months ago he had COVID-19 infection and since then he has been suffering from his asthma with recurrent shortness of breath and cough, denies wheezing, and every time he gets lung auscultation there is no wheezing.  He was treated with courses of azithromycin and prednisone few months ago.  He reports  benefit from DuoNeb's more than albuterol.  In the past he was on LAMA with Spiriva but that caused him issues with his prostate.  He is tolerating nebulized ipratropium well.  He continues to inject Dupixent every 2 weeks.    Patient has allergic rhinitis and currently feels worse with the allergy season, he is on Allegra 60 mg currently and used to be on Singulair but not sure why it was stopped.  He has Flonase as well.    Patient has obstructive sleep apnea but he stopped using his CPAP since there was a recall on his machine then he was provided with a new 1 but because of this from the same company [employer] he did not want to use because he does not trust the brand anymore as he states.  He has a follow-up appointment with sleep medicine later this month.  He denies significant symptoms currently.    He carries EpiPen due to the respiratory arrest in the past and also because he is on Dupixent.    He has GERD currently controlled with omeprazole and denies dysphagia.    He has seizure disorder on Tegretol.    Patient has been complaining of some chest pain, he follows with cardiology and he will get further workup and they ordered PFTs that is scheduled at [facility] next month.            Current Medications:    Current Outpatient Medications:     acetaminophen (TYLENOL) 650 mg CR tablet, Take 1 tablet (650 mg total) by mouth every 8 (eight) hours as needed for mild pain, Disp: 30 tablet, Rfl: 0    albuterol (2.5 mg/3 mL) 0.083 % nebulizer solution, Take 3 mL (2.5 mg total) by nebulization every 4 (four) hours as needed for wheezing or shortness of breath, Disp: 90 mL, Rfl: 2    albuterol (Proventil HFA) 90 mcg/act inhaler, Inhale 1-2 puffs every 6 (six) hours as needed for wheezing or shortness of breath, Disp: 8 g, Rfl: 0    amLODIPine (NORVASC) 5 mg tablet, Take 5 mg by mouth daily, Disp: , Rfl:     atorvastatin (LIPITOR) 80 mg tablet, Take 80 mg by mouth daily at bedtime, Disp: , Rfl:     Blood Glucose  "Monitoring Suppl (ACURA BLOOD GLUCOSE METER) w/Device KIT, by Does not apply route, Disp: , Rfl:     Calcium Carb-Cholecalciferol (CALCIUM CARBONATE-VITAMIN D3 PO), Take 1,500 mg by mouth daily, Disp: , Rfl:     carBAMazepine (TEGretol) 200 mg tablet, Take 2 tabs in the morning and 3 tabs at night, Disp: , Rfl:     Cholecalciferol (Vitamin D3) 125 MCG (5000 UT) CAPS, Take 1 capsule by mouth in the morning, Disp: , Rfl:     Diclofenac Sodium (VOLTAREN) 1 %, Apply 2 g topically 4 (four) times a day, Disp: 100 g, Rfl: 0    Dulera 200-5 MCG/ACT inhaler, Inhale 2 puffs 2 (two) times a day, Disp: , Rfl:     Dupilumab (Dupixent) 300 MG/2ML SOAJ, Inject 1 pen (300mg total) under the skin every 14 (fourteen) days., Disp: 4 mL, Rfl: 11    dupilumab (DUPIXENT) subcutaneous injection, Inject 2 mL (300 mg total) under the skin every 14 (fourteen) days, Disp: 4 mL, Rfl: 12    EPINEPHrine (EPIPEN) 0.3 mg/0.3 mL SOAJ, Inject 0.3 mL (0.3 mg total) into a muscle once for 1 dose, Disp: 0.6 mL, Rfl: 0    fexofenadine (ALLEGRA) 60 MG tablet, Take 1 tablet (60 mg total) by mouth daily, Disp: 30 tablet, Rfl: 5    FIBER PO, Take by mouth, Disp: , Rfl:     finasteride (PROSCAR) 5 mg tablet, Take 5 mg by mouth daily, Disp: , Rfl:     fluticasone (FLONASE) 50 mcg/act nasal spray, 1 spray into each nostril daily, Disp: 16 g, Rfl: 5    gabapentin (NEURONTIN) 300 mg capsule, Take 300 mg by mouth 3 (three) times a day, Disp: , Rfl:     glucagon 1 MG injection, as needed, Disp: , Rfl:     hydrOXYzine pamoate (VISTARIL) 50 mg capsule, Take 50 mg by mouth 4 (four) times a day as needed, Disp: , Rfl:     insulin aspart (NovoLOG) 100 units/mL injection, Inject under the skin 3 (three) times a day before meals, Disp: , Rfl:     insulin glargine (BASAGLAR KWIKPEN) 100 units/mL injection pen, Inject 43 Units under the skin daily at bedtime , Disp: , Rfl:     Insulin Pen Needle 32G X 4 MM MISC, Unifine Pentips Plus 32 gauge x 5/32\" needle, Disp: , Rfl:    "  ipratropium (ATROVENT) 0.02 % nebulizer solution, Take 2.5 mL (0.5 mg total) by nebulization 4 (four) times a day, Disp: 100 mL, Rfl: 0    ipratropium-albuterol (DUO-NEB) 0.5-2.5 mg/3 mL nebulizer solution, Take 3 mL by nebulization 2 (two) times a day, Disp: , Rfl:     mirtazapine (REMERON) 7.5 MG tablet, Take 7.5 mg by mouth daily at bedtime, Disp: , Rfl:     montelukast (SINGULAIR) 10 mg tablet, Take 1 tablet by mouth in the morning, Disp: , Rfl:     naloxone (NARCAN) 4 mg/0.1 mL nasal spray, Administer 1 spray into a nostril. If no response after 2-3 minutes, give another dose in the other nostril using a new spray., Disp: 1 each, Rfl: 0    omega-3-acid ethyl esters (LOVAZA) 1 g capsule, Take 1 g by mouth daily, Disp: , Rfl:     omeprazole (PriLOSEC) 40 MG capsule, Take 40 mg by mouth daily, Disp: , Rfl:     perphenazine 2 mg tablet, Take 2 mg by mouth daily, Disp: , Rfl:     polyvinyl alcohol (LIQUIFILM TEARS) 1.4 % ophthalmic solution, 2 drops as needed for dry eyes, Disp: , Rfl:     TAMSULOSIN HCL PO, Take 0.4 mg by mouth daily , Disp: , Rfl:     VITAMIN D PO, Take by mouth, Disp: , Rfl:     Cinnamon 500 MG capsule, Take 500 mg by mouth daily (Patient not taking: Reported on 5/6/2025), Disp: , Rfl:     ezetimibe (ZETIA) 10 mg tablet, Take 10 mg by mouth daily, Disp: , Rfl:     fluticasone-vilanterol (Breo Ellipta) 200-25 mcg/actuation inhaler, Inhale 1 puff daily Rinse mouth after use., Disp: 60 blister, Rfl: 5    guaiFENesin (MUCINEX) 600 mg 12 hr tablet, Take 1,200 mg by mouth 2 (two) times a day, Disp: , Rfl:     predniSONE 10 mg tablet, 4 tabs x 3 days decrease by 1 tablet every third day (Patient not taking: Reported on 5/6/2025), Disp: 30 tablet, Rfl: 0    predniSONE 50 mg tablet, Take 40 mg by mouth daily (Patient not taking: Reported on 5/6/2025), Disp: , Rfl:     Review of Systems:  Review of Systems   Constitutional: Negative.    HENT: Negative.     Eyes: Negative.    Respiratory:  Positive for  "cough and shortness of breath.    Cardiovascular: Negative.    Gastrointestinal: Negative.    Endocrine: Negative.    Genitourinary: Negative.    Musculoskeletal: Negative.    Skin: Negative.    Allergic/Immunologic: Negative.    Neurological: Negative.    Hematological: Negative.    Psychiatric/Behavioral: Negative.       Aside from what is mentioned in the HPI, the review of systems is otherwise negative    Past medical history, surgical history, and family history were reviewed and updated as appropriate    Social history updates:  Social History     Tobacco Use   Smoking Status Former    Current packs/day: 0.00    Average packs/day: 2.0 packs/day for 27.0 years (54.0 ttl pk-yrs)    Types: Cigarettes    Start date:     Quit date:     Years since quittin.3   Smokeless Tobacco Never       PhysicalExamination:  Vitals:   /78 (BP Location: Left arm, Patient Position: Sitting, Cuff Size: Large)   Pulse 87   Temp 98.1 °F (36.7 °C) (Tympanic)   Ht 5' 8.5\" (1.74 m)   Wt 101 kg (222 lb)   SpO2 98%   BMI 33.26 kg/m²     Gen:  Comfortable on room air.  No conversational dyspnea  HEENT:  Conjugate gaze.  sclerae anicteric.  Oropharynx moist  Neck: Trachea is midline. No JVD. No adenopathy  Chest: Equal breath sounds and clear to auscultation, no wheezing or crackles, good air movement  Cardiac: S1-S2 regular. no murmur  Abdomen:  benign  Extremities: No edema  Neuro:  Normal speech and mentation    Diagnostic Data:  Labs:  I personally reviewed the most recent laboratory data pertinent to today's visit    Lab Results   Component Value Date    WBC 5.20 2025    HGB 12.3 2025    HCT 38.4 2025    MCV 82 2025     2025     Lab Results   Component Value Date    SODIUM 137 2025    K 3.4 (L) 2025    CO2 26 2025     2025    BUN 13 2025    CREATININE 0.98 2025    GLUCOSE 340 (H) 2019    CALCIUM 8.8 2025 "     2/7/2024:  Eosinophils 9%/580      Imaging:  I personally reviewed the images on the PAC system pertinent to today's visit  Chest x-ray reviewed on PACS: Clear lungs      Chest CT scan 2021:LUNGS:  Bronchial wall thickening in the right lower lobe with minimal atelectasis in the posterior basal the right lower lobe with mild endobronchial debris in the right lower lobe bronchi.   No change in 6 mm left upper lobe nodule since April 2014, benign    Other studies:  Echocardiogram: LVEF 61-65%, normal RV    FeNO done in office today: 39 ppb which indicates possible airway inflammation    Tenzin Liao MD

## 2025-05-06 NOTE — TELEPHONE ENCOUNTER
PA for FLOVENT  SUBMITTED to [email or web address]    via    [x]CMM-KEY: [id number]      [x]PA sent as URGENT    All office notes, labs and other pertaining documents and studies sent. Clinical questions answered. Awaiting determination from insurance company.     Turnaround time for your insurance to make a decision on your Prior Authorization can take 7-21 business days.

## 2025-05-06 NOTE — ASSESSMENT & PLAN NOTE
I tried to reassure the patient that the new machine is fine to use but he declined and he will speak to his sleep doctor later this month

## 2025-05-08 DIAGNOSIS — J45.51 SEVERE PERSISTENT ASTHMA WITH ACUTE EXACERBATION: Primary | ICD-10-CM

## 2025-05-08 RX ORDER — MOMETASONE FUROATE 200 UG/1
2 AEROSOL RESPIRATORY (INHALATION) 2 TIMES DAILY
Qty: 13 G | Refills: 5 | Status: SHIPPED | OUTPATIENT
Start: 2025-05-08

## 2025-05-08 NOTE — TELEPHONE ENCOUNTER
PA for FLOVENT HFA DENIED    Reason:(Screenshot if applicable)        Message sent to office clinical pool Yes    Denial letter scanned into Media Yes    We can gladly do an appeal but the process can take about 30-60 days to provide determination. Please Schedule a Peer to Peer at phone [phone] . If an appeal is truly warranted please have Provider send clinical documentation to the PA department to support the appeal.     **Please follow up with your patient regarding denial and next steps**

## 2025-05-13 NOTE — TELEPHONE ENCOUNTER
Patient is calling to notify us that the Flovent inhaler was denied by his insurance. I did advise patient that Dr. Liao did send in a prescription for another medication called Asmanex and the patient's [facility] pharmacy electronically confirmed receiving that order. Patient is going to call his pharmacy first to check on the status of the medication. Patient was encouraged to call us back if there are any issues with this.      Thank you.    Alert and oriented, no focal deficits, no motor or sensory deficits.

## 2025-05-14 NOTE — TELEPHONE ENCOUNTER
[facility] calling stating prior auth is needed for asmanex, said it is coming back as duplicate therapy with dulera. [phone]  patient ID-[id number]  BIN-[id number]  [facility]-[id number]

## 2025-05-15 NOTE — TELEPHONE ENCOUNTER
Good morning, please have provider either addend last office note to reflect why duplication of therapy is needed for the pt or write a letter of medical necessity so we can submit with PA request. Thank you

## 2025-05-20 ENCOUNTER — TELEPHONE (OUTPATIENT)
Age: 60
End: 2025-05-20

## 2025-05-20 NOTE — TELEPHONE ENCOUNTER
Received a call from Nahed at [facility] stating they received the prescription for the Budesonide (Pulmicort) and they received an alert for duplicate therapy.   Patient was ordered Dulera by his pcp.   The budesonide (Pulmicort) will either need a prior authorization or be changed to another medication.

## 2025-06-23 DIAGNOSIS — J45.51 SEVERE PERSISTENT ASTHMA WITH ACUTE EXACERBATION: ICD-10-CM

## 2025-06-23 RX ORDER — BUDESONIDE 0.5 MG/2ML
INHALANT ORAL
Qty: 240 ML | Refills: 1 | Status: SHIPPED | OUTPATIENT
Start: 2025-06-23

## 2025-08-05 ENCOUNTER — TELEPHONE (OUTPATIENT)
Age: 60
End: 2025-08-05

## 2025-08-06 ENCOUNTER — OFFICE VISIT (OUTPATIENT)
Dept: PULMONOLOGY | Facility: CLINIC | Age: 60
End: 2025-08-06
Payer: COMMERCIAL

## 2025-08-06 VITALS
HEART RATE: 81 BPM | HEIGHT: 69 IN | DIASTOLIC BLOOD PRESSURE: 68 MMHG | SYSTOLIC BLOOD PRESSURE: 130 MMHG | TEMPERATURE: 97.7 F | BODY MASS INDEX: 32.29 KG/M2 | WEIGHT: 218 LBS | OXYGEN SATURATION: 98 %

## 2025-08-06 DIAGNOSIS — R09.82 ALLERGIC RHINITIS WITH POSTNASAL DRIP: ICD-10-CM

## 2025-08-06 DIAGNOSIS — J45.51 SEVERE PERSISTENT ASTHMA WITH ACUTE EXACERBATION: Primary | ICD-10-CM

## 2025-08-06 DIAGNOSIS — J30.9 ALLERGIC RHINITIS WITH POSTNASAL DRIP: ICD-10-CM

## 2025-08-06 DIAGNOSIS — G47.33 OSA (OBSTRUCTIVE SLEEP APNEA): ICD-10-CM

## 2025-08-06 DIAGNOSIS — E66.811 OBESITY (BMI 30.0-34.9): ICD-10-CM

## 2025-08-06 PROCEDURE — 99214 OFFICE O/P EST MOD 30 MIN: CPT | Performed by: NURSE PRACTITIONER

## 2025-08-06 PROCEDURE — 95012 NITRIC OXIDE EXP GAS DETER: CPT | Performed by: NURSE PRACTITIONER

## 2025-08-13 ENCOUNTER — TELEPHONE (OUTPATIENT)
Age: 60
End: 2025-08-13

## 2025-08-13 ENCOUNTER — NURSE TRIAGE (OUTPATIENT)
Age: 60
End: 2025-08-13

## 2025-08-20 PROBLEM — J44.1 ACUTE EXACERBATION OF CHRONIC OBSTRUCTIVE PULMONARY DISEASE (COPD) (HCC): Status: ACTIVE | Noted: 2025-08-20

## 2025-08-21 ENCOUNTER — TELEPHONE (OUTPATIENT)
Age: 60
End: 2025-08-21